# Patient Record
Sex: MALE | Race: WHITE | NOT HISPANIC OR LATINO | Employment: FULL TIME | URBAN - METROPOLITAN AREA
[De-identification: names, ages, dates, MRNs, and addresses within clinical notes are randomized per-mention and may not be internally consistent; named-entity substitution may affect disease eponyms.]

---

## 2017-01-04 DIAGNOSIS — I27.20 PULMONARY HYPERTENSION (H): Primary | ICD-10-CM

## 2017-01-04 RX ORDER — DIGOXIN 125 MCG
125 TABLET ORAL DAILY
Qty: 90 TABLET | Refills: 1 | Status: SHIPPED | OUTPATIENT
Start: 2017-01-04 | End: 2017-07-14

## 2017-01-10 DIAGNOSIS — E78.2 MIXED HYPERLIPIDEMIA: ICD-10-CM

## 2017-01-10 DIAGNOSIS — I27.20 PULMONARY HYPERTENSION (H): Primary | ICD-10-CM

## 2017-01-10 RX ORDER — ALBUTEROL SULFATE 90 UG/1
4-6 AEROSOL, METERED RESPIRATORY (INHALATION) EVERY 6 HOURS PRN
Qty: 1 INHALER | Refills: 3 | Status: SHIPPED | OUTPATIENT
Start: 2017-01-10 | End: 2017-04-13

## 2017-03-17 ENCOUNTER — TELEPHONE (OUTPATIENT)
Dept: CARDIOLOGY | Facility: CLINIC | Age: 57
End: 2017-03-17

## 2017-03-17 NOTE — TELEPHONE ENCOUNTER
PA Initiation    Medication: Adcirca 20 MG  Insurance Company: [employer]akes - Phone [phone] Fax [phone]  Pharmacy Filling the Rx: [facility] - [place], [place] [facility] [address] [facility]  Filling Pharmacy Phone: [phone]  Filling Pharmacy Fax:    Start Date: 3/17/2017     Urgent renewal request has been faxed to [facility]. Previous letter of approval and RHC were included with request.

## 2017-03-17 NOTE — TELEPHONE ENCOUNTER
Prior Authorization Specialty Medication Request    Medication/Dose: Adcirca 20 MG  Frequency: 40 MG Daily    Route: PO  60 Quantity 60 tablets/30days  Diagnosis and ICD: PAH associated with Sarcoid [I27.2, D86.9]   WHO Group: 1 NYHA FC: 3  New/Renewal/Insurance Change PA: Renewal  Previously Tried and Failed Therapies:   *Adcirca initiated: 05/04/12  *Letairis initiated: 04/17/13  *Tyvaso initiated: 03/07/12

## 2017-03-22 DIAGNOSIS — I27.20 PULMONARY HYPERTENSION (H): Primary | ICD-10-CM

## 2017-03-22 RX ORDER — TADALAFIL 20 MG/1
40 TABLET ORAL DAILY
Qty: 60 TABLET | Refills: 11 | Status: SHIPPED | OUTPATIENT
Start: 2017-03-22 | End: 2018-03-02

## 2017-04-06 DIAGNOSIS — J44.9 COPD (CHRONIC OBSTRUCTIVE PULMONARY DISEASE) (H): Primary | ICD-10-CM

## 2017-04-06 DIAGNOSIS — J44.9 COPD (CHRONIC OBSTRUCTIVE PULMONARY DISEASE) (H): ICD-10-CM

## 2017-04-12 DIAGNOSIS — I27.20 PULMONARY HYPERTENSION (H): ICD-10-CM

## 2017-04-12 DIAGNOSIS — E78.2 MIXED HYPERLIPIDEMIA: ICD-10-CM

## 2017-04-12 RX ORDER — ALBUTEROL SULFATE 90 UG/1
4-6 AEROSOL, METERED RESPIRATORY (INHALATION) EVERY 6 HOURS PRN
Qty: 1 INHALER | Refills: 3 | Status: CANCELLED | OUTPATIENT
Start: 2017-04-12

## 2017-04-13 DIAGNOSIS — J44.9 COPD (CHRONIC OBSTRUCTIVE PULMONARY DISEASE) (H): Primary | ICD-10-CM

## 2017-04-13 DIAGNOSIS — E78.2 MIXED HYPERLIPIDEMIA: ICD-10-CM

## 2017-04-13 DIAGNOSIS — I27.20 PULMONARY HYPERTENSION (H): ICD-10-CM

## 2017-04-13 RX ORDER — ALBUTEROL SULFATE 90 UG/1
4-6 AEROSOL, METERED RESPIRATORY (INHALATION) EVERY 6 HOURS PRN
Qty: 1 INHALER | Refills: 6 | Status: SHIPPED | OUTPATIENT
Start: 2017-04-13 | End: 2017-10-12

## 2017-04-26 DIAGNOSIS — I27.20 PULMONARY HYPERTENSION (H): ICD-10-CM

## 2017-04-26 RX ORDER — AMBRISENTAN 5 MG/1
10 TABLET, FILM COATED ORAL DAILY
Qty: 30 TABLET | Refills: 11 | Status: SHIPPED | OUTPATIENT
Start: 2017-04-26 | End: 2017-11-01

## 2017-04-26 NOTE — TELEPHONE ENCOUNTER
Date: 4/26/2017    Time of Call: 11:38 AM     Diagnosis: Pulmonary Hypertension     [ TORB ] Ordering provider: Dr Khang Lindsey MD    Order: Letairis 10 mg, daily (e prescribed--30 tablets for 11 refills)     Order received by: Sandi Kwok RN, BSN     Follow-up/additional notes: Sent the prescription, as requested to [facility].    Continue to monitor.

## 2017-05-12 NOTE — PROGRESS NOTES
Khang Lindsey M.D.  Cardiovascular Medicine    I personally saw and examined this patient, discussed care with housestaff and other consultants, reviewed current laboratories and imaging studies, and conveyed impression and diagnostic/therapeutic plan to patient.    Marianne Rodríguez M.D.  Pulmonary Division  Department of Medicine  [facility]     David Perlman, M.D.  Pulmonary Division  [facility]  Shane Ruvalcaba M.D.    Leandra Ratliff M.D.    Problem List  1. Pulmonary hypertension  2. Bronchial sarcoid  3. COLD  4. CKD  5. Diabetes    History      Objective    Wt Readings from Last 5 Encounters:   10/06/16 79.4 kg (175 lb)   08/23/16 81.2 kg (179 lb)   04/12/16 84.1 kg (185 lb 8 oz)   04/12/16 83.9 kg (185 lb)   03/29/16 84 kg (185 lb 1.6 oz)       Meds    Current Outpatient Prescriptions on File Prior to Visit:  LETAIRIS 5 MG tablet Take 2 tablets (10 mg) by mouth daily   albuterol (ALBUTEROL) 108 (90 BASE) MCG/ACT Inhaler Inhale 4-6 puffs into the lungs every 6 hours as needed   amoxicillin-clavulanate (AUGMENTIN) 875-125 MG per tablet Take 1 tablet by mouth 2 times daily   tadalafil, PAH, (ADCIRCA) 20 MG TABS Take 2 tablets (40 mg) by mouth daily   digoxin (LANOXIN) 125 MCG tablet Take 1 tablet (125 mcg) by mouth daily   budesonide (PULMICORT) 0.5 MG/2ML nebulizer solution Take 2 mLs (0.5 mg) by nebulization 2 times daily   treprostinil (TYVASO) 0.6 MG/ML SOLN 12 breaths 4 times daily   Cholecalciferol (VITAMIN D) 2000 UNITS CAPS Take 1 capsule by mouth daily.     No current facility-administered medications on file prior to visit.     Labs  Results for MATTY LOPEZ (MRN [id number]) as of 5/17/2017 07:46   Ref. Range 10/6/2016 12:36 5/16/2017 11:59   Sodium Latest Ref Range: 133 - 144 mmol/L  139   Potassium Latest Ref Range: 3.4 - 5.3 mmol/L  4.3   Chloride Latest Ref Range: 94 - 109 mmol/L  103   Carbon Dioxide Latest Ref Range: 20 - 32 mmol/L  28   Urea Nitrogen Latest Ref Range:  7 - 30 mg/dL  16   Creatinine Latest Ref Range: 0.66 - 1.25 mg/dL  1.38 (H)   GFR Estimate Latest Ref Range: >60 mL/min/1.7m2  53 (L)   GFR Estimate If Black Latest Ref Range: >60 mL/min/1.7m2  64   Calcium Latest Ref Range: 8.5 - 10.1 mg/dL  9.1   Anion Gap Latest Ref Range: 3 - 14 mmol/L  8   Albumin Latest Ref Range: 3.4 - 5.0 g/dL  3.8   Protein Total Latest Ref Range: 6.8 - 8.8 g/dL  7.3   Bilirubin Total Latest Ref Range: 0.2 - 1.3 mg/dL  0.5   Alkaline Phosphatase Latest Ref Range: 40 - 150 U/L  75   ALT Latest Ref Range: 0 - 70 U/L  22   AST Latest Ref Range: 0 - 45 U/L  17   Angiotensin Converting Enzyme Unknown  50   CRP Inflammation Latest Ref Range: 0.0 - 8.0 mg/L  8.0   N-Terminal Pro Bnp Latest Ref Range: 0 - 125 pg/mL  460 (H)   Glucose Latest Ref Range: 70 - 99 mg/dL  98   WBC Latest Ref Range: 4.0 - 11.0 10e9/L  5.7   Hemoglobin Latest Ref Range: 13.3 - 17.7 g/dL  14.9   Hematocrit Latest Ref Range: 40.0 - 53.0 %  44.4   Platelet Count Latest Ref Range: 150 - 450 10e9/L  142 (L)   RBC Count Latest Ref Range: 4.4 - 5.9 10e12/L  4.88   MCV Latest Ref Range: 78 - 100 fl  91   MCH Latest Ref Range: 26.5 - 33.0 pg  30.5   MCHC Latest Ref Range: 31.5 - 36.5 g/dL  33.6   RDW Latest Ref Range: 10.0 - 15.0 %  13.5   CT CHEST W/O CONTRAST Unknown Rpt      Imaging     Echocariogram  Name: MATTY LOPEZ  MRN: [id number]  : 1960  Study Date: 2016 01:06 PM  Age: 55 yrs  Gender: Male  Patient Location: [facility] [place]  Reason For Study: , Other secondary pulmonary hypertension  Ordering Physician: DUY MIDDLETON  Referring Physician: DUY MIDDLETON  Performed By: Jaqueline Peña RDCS     BSA: 2.1 m2  Height: 72 in  Weight: 186 lb  BP: 123/80 mmHg  ______________________________________________________________________________        Procedure  Echocardiogram with two-dimensional, color and spectral Doppler  performed.  ______________________________________________________________________________     Interpretation Summary  Global and regional left ventricular function is normal with an EF of 60-65%.  Right ventricular function, chamber size, wall motion, and thickness are  normal.  Right ventricular systolic pressure is 38mmHg above the right atrial  pressure.  The inferior vena cava is normal.  No pericardial effusion is present.  ______________________________________________________________________________           Left Ventricle  Global and regional left ventricular function is normal with an EF of 60-65%.  Left ventricular wall thickness is normal. Left ventricular size is normal.  Normal left ventricular filling for age.     Right Ventricle  Right ventricular function, chamber size, wall motion, and thickness are  normal.  Atria  Both atria appear normal.     Mitral Valve  The mitral valve is normal.     Aortic Valve  Aortic valve is normal in structure and function.     Tricuspid Valve  The tricuspid valve is normal. Trace tricuspid insufficiency is present.  Right ventricular systolic pressure is 38mmHg above the right atrial  pressure.     Pulmonic Valve  The pulmonic valve is normal.     Vessels  The aorta root is normal. The inferior vena cava is normal.  Pericardium  No pericardial effusion is present.     ______________________________________________________________________________  MMode/2D Measurements & Calculations  IVSd: 0.95 cm  LVIDd: 4.0 cm  LVIDs: 2.0 cm  LVPWd: 0.92 cm  FS: 51.3 %  EDV(Teich): 70.5 ml  ESV(Teich): 12.0 ml  LV mass(C)d: 116.3 grams  Ao root diam: 3.5 cm  LA dimension: 3.8 cm  LA/Ao: 1.1  LVOT diam: 2.2 cm  LVOT area: 3.9 cm2  LA Volume (BP): 24.8 ml     LA Volume Index (BP): 12.0 ml/m2           Doppler Measurements & Calculations  MV E max xavi: 68.7 cm/sec  MV A max xavi: 85.2 cm/sec  MV E/A: 0.81    CT chest without intravenous contrast; coronal and sagittal  reformats.  FINDINGS:  Perihilar interstitial fibrosis involving both lungs. Traction bronchiectasis involving both lungs in the perihilar location. Abnormal mediastinal lymphadenopathy. The lymph node in the right paratracheal location at station 2 R is measuring 1.5 cm in the short axis diameter. Enlarged central pulmonary arteries indicating pulmonary hypertension. The main pulmonary artery is measuring 4 cm in diameter. No evidence of pleural effusion. Normal size cardiac silhouette without any evidence of pericardial effusion. Coronary artery calcifications. No discrete abnormal intra pulmonary nodular densities are identified. Limited CT through the upper abdomen reveals multiple nonobstructing renal calculi bilateral.      IMPRESSION:  1. Diffuse interstitial fibrosis predominantly perihilar location with traction bronchiectasis.  2. Abnormal mediastinal lymphadenopathy.  3. Enlarged central pulmonary arteries r/o pulmonary hypertension.  4. Nonobstructing renal calculi bilateral.  5. Coronary artery calcifications.    Right heart  catherization  Mean PA 55 PCP 15 RA8    PVR 7.0 CO 5.5  Assessment/Plan     Deteriorating functional status with history of sarcoid  1. Increase inhaled prostacyclin  2. Cardiac MRI  3. Repeat echocardiogram  4. PET scan for sarcoid : cardiac liver and lung imaging please

## 2017-05-16 ENCOUNTER — OFFICE VISIT (OUTPATIENT)
Dept: CARDIOLOGY | Facility: CLINIC | Age: 57
End: 2017-05-16
Payer: COMMERCIAL

## 2017-05-16 VITALS
DIASTOLIC BLOOD PRESSURE: 66 MMHG | HEIGHT: 72 IN | HEART RATE: 90 BPM | OXYGEN SATURATION: 92 % | BODY MASS INDEX: 25.33 KG/M2 | WEIGHT: 187 LBS | SYSTOLIC BLOOD PRESSURE: 132 MMHG

## 2017-05-16 DIAGNOSIS — I27.20 PULMONARY HYPERTENSION (H): ICD-10-CM

## 2017-05-16 DIAGNOSIS — I27.20 PULMONARY HYPERTENSION (H): Primary | ICD-10-CM

## 2017-05-16 DIAGNOSIS — I50.810 RIGHT HEART FAILURE (H): ICD-10-CM

## 2017-05-16 DIAGNOSIS — D86.9 SARCOIDOSIS: Primary | ICD-10-CM

## 2017-05-16 DIAGNOSIS — R06.09 DYSPNEA ON EXERTION: ICD-10-CM

## 2017-05-16 LAB
ALBUMIN SERPL-MCNC: 3.8 G/DL (ref 3.4–5)
ALP SERPL-CCNC: 75 U/L (ref 40–150)
ALT SERPL W P-5'-P-CCNC: 22 U/L (ref 0–70)
ANION GAP SERPL CALCULATED.3IONS-SCNC: 8 MMOL/L (ref 3–14)
AST SERPL W P-5'-P-CCNC: 17 U/L (ref 0–45)
BILIRUB SERPL-MCNC: 0.5 MG/DL (ref 0.2–1.3)
BUN SERPL-MCNC: 16 MG/DL (ref 7–30)
CALCIUM SERPL-MCNC: 9.1 MG/DL (ref 8.5–10.1)
CHLORIDE SERPL-SCNC: 103 MMOL/L (ref 94–109)
CO2 SERPL-SCNC: 28 MMOL/L (ref 20–32)
CREAT SERPL-MCNC: 1.38 MG/DL (ref 0.66–1.25)
CRP SERPL-MCNC: 8 MG/L (ref 0–8)
ERYTHROCYTE [DISTWIDTH] IN BLOOD BY AUTOMATED COUNT: 13.5 % (ref 10–15)
GFR SERPL CREATININE-BSD FRML MDRD: 53 ML/MIN/1.7M2
GLUCOSE SERPL-MCNC: 98 MG/DL (ref 70–99)
HCT VFR BLD AUTO: 44.4 % (ref 40–53)
HGB BLD-MCNC: 14.9 G/DL (ref 13.3–17.7)
MCH RBC QN AUTO: 30.5 PG (ref 26.5–33)
MCHC RBC AUTO-ENTMCNC: 33.6 G/DL (ref 31.5–36.5)
MCV RBC AUTO: 91 FL (ref 78–100)
NT-PROBNP SERPL-MCNC: 460 PG/ML (ref 0–125)
PLATELET # BLD AUTO: 142 10E9/L (ref 150–450)
POTASSIUM SERPL-SCNC: 4.3 MMOL/L (ref 3.4–5.3)
PROT SERPL-MCNC: 7.3 G/DL (ref 6.8–8.8)
RBC # BLD AUTO: 4.88 10E12/L (ref 4.4–5.9)
SODIUM SERPL-SCNC: 139 MMOL/L (ref 133–144)
WBC # BLD AUTO: 5.7 10E9/L (ref 4–11)

## 2017-05-16 PROCEDURE — 99000 SPECIMEN HANDLING OFFICE-LAB: CPT | Performed by: INTERNAL MEDICINE

## 2017-05-16 PROCEDURE — 83880 ASSAY OF NATRIURETIC PEPTIDE: CPT | Performed by: INTERNAL MEDICINE

## 2017-05-16 PROCEDURE — 36415 COLL VENOUS BLD VENIPUNCTURE: CPT | Performed by: INTERNAL MEDICINE

## 2017-05-16 PROCEDURE — 85027 COMPLETE CBC AUTOMATED: CPT | Performed by: INTERNAL MEDICINE

## 2017-05-16 PROCEDURE — 82164 ANGIOTENSIN I ENZYME TEST: CPT | Mod: 90 | Performed by: INTERNAL MEDICINE

## 2017-05-16 PROCEDURE — 86140 C-REACTIVE PROTEIN: CPT | Performed by: INTERNAL MEDICINE

## 2017-05-16 PROCEDURE — 80053 COMPREHEN METABOLIC PANEL: CPT | Performed by: INTERNAL MEDICINE

## 2017-05-16 PROCEDURE — 99212 OFFICE O/P EST SF 10 MIN: CPT | Performed by: INTERNAL MEDICINE

## 2017-05-16 RX ORDER — POTASSIUM CHLORIDE 1500 MG/1
20 TABLET, EXTENDED RELEASE ORAL
Status: CANCELLED | OUTPATIENT
Start: 2017-05-16

## 2017-05-16 RX ORDER — LIDOCAINE 40 MG/G
CREAM TOPICAL
Status: CANCELLED | OUTPATIENT
Start: 2017-05-16

## 2017-05-16 RX ORDER — SODIUM CHLORIDE 9 MG/ML
1000 INJECTION, SOLUTION INTRAVENOUS CONTINUOUS
Status: CANCELLED | OUTPATIENT
Start: 2017-05-16

## 2017-05-16 NOTE — NURSING NOTE
Cardiac Testing: Patient given instructions regarding  echocardiogram . Discussed purpose, preparation, procedure and when to expect results reported back to the patient. Patient demonstrated understanding of this information and agreed to call with further questions or concerns.    Diet: Patient instructed regarding a heart healthy diet, including discussion of reduced fat and sodium intake. Patient demonstrated understanding of this information and agreed to call with further questions or concerns.    Labs: Patient was given results of the laboratory testing obtained today. Patient was instructed to return for the next laboratory testing TODAY. Patient demonstrated understanding of this information and agreed to call with further questions or concerns.    Med Reconcile: Reviewed and verified all current medications with the patient. The updated medication list was printed and given to the patient.    Return Appointment: Patient given instructions regarding scheduling next clinic visit. Patient demonstrated understanding of this information and agreed to call with further questions or concerns.    Right Heart Catheterization: Patient was instructed regarding right heart catheterization, including discussion of the procedure, preparation, intra-procedural steps, and recovery at home. Patient demonstrated understanding of this information and agreed to call with further questions or concerns.    Medication Change: Patient was educated regarding prescribed medication change, including discussion of the indication, administration, side effects, and when to report to MD or RN. Patient demonstrated understanding of this information and agreed to call with further questions or concerns.    Patient stated he understood all health information given and agreed to call with further questions or concerns.     Medication Changes:  Increase your Tyvaso to 14 breaths, four times per day    Patient Instructions:  Abdominal  MRI-evaluation of pancreatic cyst ([facility])  Cardiac Echo ([facility])      Check-In  Time Check-In Location Estimated Length Procedure   Name        GOLD  waiting room 60-90 minutes Right Heart Catheterization**     Procedure Preparations & Instructions     This is an invasive procedure that DOES require preparation:    - Nothing to eat for 6 hours   - You may have clear liquids up until the time of your procedure  - A ride should be arranged for you in the instance you are unable to drive home, however you should be able to function as you normally would after the procedure     *For Patients with Diabetes: ? DO NOT take any oral diabetic medication, short-acting diabetes medications/insulin, humalog or regular insulin the morning of your test  ? Take   dose of long-acting insulin (Lantus, Levemir) the day of your test  ? Remember to  bring your glucometer and insulin with you to take after your test if needed     *For Patients on anticoagulants: ? Your Coumadin Clinic will give you instructions on medication adjustments or bridging prior to the procedure       PET scan (evaluation for sarcoid), heart and lung ([facility])  CT scan-full body ([facility])    **labs today- CBC, CMP, BNP, CRP Inflammation, ACE      Follow up Appointment Information:  Check-In  Time Check-In Location Appointment   Type Provider   Name     After testing   [address] [facility]  3rd Floor Routine Clinic   Follow Up Visit Khang Lindsey MD   Appointment Preparations & Instructions   We ask that you plan accordingly due to traffic and parking that may delay you. We look forward to seeing you!         For scheduling at [facility] please call [phone]  For scheduling at the [place] please call [phone]  We are located on the second floor [address] at the [facility].  Our address is     [facility] [address],   [address]  [place]  [address]    Thank you for allowing us to be a part of your  care here at the [facility]    If you have questions or concerns please contact us at:    Sandi Kwok RN, BSN  Care Coordinator  Pulmonary Hypertension  [facility]  (P) [phone]    ** Please note that you will NOT receive a reminder call regarding your scheduled testing, reminder calls are for provider appointments only.  If you are scheduled for testing within the [facility] system you may receive a call regarding pre-registration for billing purposes only.**     Remember to weigh yourself daily after voiding and before you consume any food or beverages and log the numbers.  If you have gained/lost 2 pounds overnight or 5 pounds in a week contact us immediately for medication adjustments or further instructions.

## 2017-05-16 NOTE — LETTER
5/16/2017    Cristhian Busch MD  [facility]  [address]  [place] [address]    RE: Matty KASPER Poncho       Dear Colleague,    I had the pleasure of seeing Matty Lopez in the [facility].    Khang Lindsey M.D.  Cardiovascular Medicine    I personally saw and examined this patient, discussed care with housestaff and other consultants, reviewed current laboratories and imaging studies, and conveyed impression and diagnostic/therapeutic plan to patient.    Marianne Rodríguez M.D.  Pulmonary Division  Department of Medicine  [facility]     David Perlman, M.D.  Pulmonary Division  [facility]  Shane Ruvalcaba M.D.    Leandra Ratliff M.D.    Problem List  1. Pulmonary hypertension  2. Bronchial sarcoid  3. COLD  4. CKD  5. Diabetes    History      Objective    Wt Readings from Last 5 Encounters:   10/06/16 79.4 kg (175 lb)   08/23/16 81.2 kg (179 lb)   04/12/16 84.1 kg (185 lb 8 oz)   04/12/16 83.9 kg (185 lb)   03/29/16 84 kg (185 lb 1.6 oz)       Meds    Current Outpatient Prescriptions on File Prior to Visit:  LETAIRIS 5 MG tablet Take 2 tablets (10 mg) by mouth daily   albuterol (ALBUTEROL) 108 (90 BASE) MCG/ACT Inhaler Inhale 4-6 puffs into the lungs every 6 hours as needed   amoxicillin-clavulanate (AUGMENTIN) 875-125 MG per tablet Take 1 tablet by mouth 2 times daily   tadalafil, PAH, (ADCIRCA) 20 MG TABS Take 2 tablets (40 mg) by mouth daily   digoxin (LANOXIN) 125 MCG tablet Take 1 tablet (125 mcg) by mouth daily   budesonide (PULMICORT) 0.5 MG/2ML nebulizer solution Take 2 mLs (0.5 mg) by nebulization 2 times daily   treprostinil (TYVASO) 0.6 MG/ML SOLN 12 breaths 4 times daily   Cholecalciferol (VITAMIN D) 2000 UNITS CAPS Take 1 capsule by mouth daily.     No current facility-administered medications on file prior to visit.     Labs  Results for MATTY LOPEZ (MRN [id number]) as of 5/17/2017 07:46   Ref. Range 10/6/2016 12:36 5/16/2017 11:59   Sodium Latest  Ref Range: 133 - 144 mmol/L  139   Potassium Latest Ref Range: 3.4 - 5.3 mmol/L  4.3   Chloride Latest Ref Range: 94 - 109 mmol/L  103   Carbon Dioxide Latest Ref Range: 20 - 32 mmol/L  28   Urea Nitrogen Latest Ref Range: 7 - 30 mg/dL  16   Creatinine Latest Ref Range: 0.66 - 1.25 mg/dL  1.38 (H)   GFR Estimate Latest Ref Range: >60 mL/min/1.7m2  53 (L)   GFR Estimate If Black Latest Ref Range: >60 mL/min/1.7m2  64   Calcium Latest Ref Range: 8.5 - 10.1 mg/dL  9.1   Anion Gap Latest Ref Range: 3 - 14 mmol/L  8   Albumin Latest Ref Range: 3.4 - 5.0 g/dL  3.8   Protein Total Latest Ref Range: 6.8 - 8.8 g/dL  7.3   Bilirubin Total Latest Ref Range: 0.2 - 1.3 mg/dL  0.5   Alkaline Phosphatase Latest Ref Range: 40 - 150 U/L  75   ALT Latest Ref Range: 0 - 70 U/L  22   AST Latest Ref Range: 0 - 45 U/L  17   Angiotensin Converting Enzyme Unknown  50   CRP Inflammation Latest Ref Range: 0.0 - 8.0 mg/L  8.0   N-Terminal Pro Bnp Latest Ref Range: 0 - 125 pg/mL  460 (H)   Glucose Latest Ref Range: 70 - 99 mg/dL  98   WBC Latest Ref Range: 4.0 - 11.0 10e9/L  5.7   Hemoglobin Latest Ref Range: 13.3 - 17.7 g/dL  14.9   Hematocrit Latest Ref Range: 40.0 - 53.0 %  44.4   Platelet Count Latest Ref Range: 150 - 450 10e9/L  142 (L)   RBC Count Latest Ref Range: 4.4 - 5.9 10e12/L  4.88   MCV Latest Ref Range: 78 - 100 fl  91   MCH Latest Ref Range: 26.5 - 33.0 pg  30.5   MCHC Latest Ref Range: 31.5 - 36.5 g/dL  33.6   RDW Latest Ref Range: 10.0 - 15.0 %  13.5   CT CHEST W/O CONTRAST Unknown Rpt      Imaging     Echocariogram  Name: MATTY LOPEZ  MRN: [id number]  : 1960  Study Date: 2016 01:06 PM  Age: 55 yrs  Gender: Male  Patient Location: [facility] [place]  Reason For Study: , Other secondary pulmonary hypertension  Ordering Physician: DUY MIDDLETON  Referring Physician: DUY MIDDLETON  Performed By: Jaqueline Peña RDCS     BSA: 2.1 m2  Height: 72 in  Weight: 186 lb  BP: 123/80  mmHg  ______________________________________________________________________________        Procedure  Echocardiogram with two-dimensional, color and spectral Doppler performed.  ______________________________________________________________________________     Interpretation Summary  Global and regional left ventricular function is normal with an EF of 60-65%.  Right ventricular function, chamber size, wall motion, and thickness are  normal.  Right ventricular systolic pressure is 38mmHg above the right atrial  pressure.  The inferior vena cava is normal.  No pericardial effusion is present.  ______________________________________________________________________________           Left Ventricle  Global and regional left ventricular function is normal with an EF of 60-65%.  Left ventricular wall thickness is normal. Left ventricular size is normal.  Normal left ventricular filling for age.     Right Ventricle  Right ventricular function, chamber size, wall motion, and thickness are  normal.  Atria  Both atria appear normal.     Mitral Valve  The mitral valve is normal.     Aortic Valve  Aortic valve is normal in structure and function.     Tricuspid Valve  The tricuspid valve is normal. Trace tricuspid insufficiency is present.  Right ventricular systolic pressure is 38mmHg above the right atrial  pressure.     Pulmonic Valve  The pulmonic valve is normal.     Vessels  The aorta root is normal. The inferior vena cava is normal.  Pericardium  No pericardial effusion is present.     ______________________________________________________________________________  MMode/2D Measurements & Calculations  IVSd: 0.95 cm  LVIDd: 4.0 cm  LVIDs: 2.0 cm  LVPWd: 0.92 cm  FS: 51.3 %  EDV(Teich): 70.5 ml  ESV(Teich): 12.0 ml  LV mass(C)d: 116.3 grams  Ao root diam: 3.5 cm  LA dimension: 3.8 cm  LA/Ao: 1.1  LVOT diam: 2.2 cm  LVOT area: 3.9 cm2  LA Volume (BP): 24.8 ml     LA Volume Index (BP): 12.0 ml/m2           Doppler  Measurements & Calculations  MV E max xavi: 68.7 cm/sec  MV A max xavi: 85.2 cm/sec  MV E/A: 0.81    CT chest without intravenous contrast; coronal and sagittal reformats.  FINDINGS:  Perihilar interstitial fibrosis involving both lungs. Traction bronchiectasis involving both lungs in the perihilar location. Abnormal mediastinal lymphadenopathy. The lymph node in the right paratracheal location at station 2 R is measuring 1.5 cm in the short axis diameter. Enlarged central pulmonary arteries indicating pulmonary hypertension. The main pulmonary artery is measuring 4 cm in diameter. No evidence of pleural effusion. Normal size cardiac silhouette without any evidence of pericardial effusion. Coronary artery calcifications. No discrete abnormal intra pulmonary nodular densities are identified. Limited CT through the upper abdomen reveals multiple nonobstructing renal calculi bilateral.      IMPRESSION:  1. Diffuse interstitial fibrosis predominantly perihilar location with traction bronchiectasis.  2. Abnormal mediastinal lymphadenopathy.  3. Enlarged central pulmonary arteries r/o pulmonary hypertension.  4. Nonobstructing renal calculi bilateral.  5. Coronary artery calcifications.    Right heart  catherization  Mean PA 55 PCP 15 RA8    PVR 7.0 CO 5.5  Assessment/Plan     Deteriorating functional status with history of sarcoid  1. Increase inhaled prostacyclin  2. Cardiac MRI  3. Repeat echocardiogram  4. PET scan for sarcoid : cardiac liver and lung imaging please    Thank you for allowing me to participate in the care of your patient.    Sincerely,     Khang Lindsey MD     [facility]

## 2017-05-16 NOTE — MR AVS SNAPSHOT
After Visit Summary   5/16/2017    Jimmy Isaac    MRN: [id number]           Patient Information     Date Of Birth          1960        Visit Information        Provider Department      5/16/2017 10:00 AM Khang Lindsey MD [facility] [place]        Today's Diagnoses     Sarcoidosis (H)    -  1    Dyspnea on exertion        Right heart failure (H)        Pulmonary hypertension (H)          Care Instructions    Medication Changes:  Increase your Tyvaso to 14 breaths, four times per day    Patient Instructions:  Abdominal MRI-evaluation of pancreatic cyst ([facility])  Cardiac Echo ([facility])      Check-In  Time Check-In Location Estimated Length Procedure   Name        [facility]  waiting room 60-90 minutes Right Heart Catheterization**     Procedure Preparations & Instructions     This is an invasive procedure that DOES require preparation:    - Nothing to eat for 6 hours   - You may have clear liquids up until the time of your procedure  - A ride should be arranged for you in the instance you are unable to drive home, however you should be able to function as you normally would after the procedure     *For Patients with Diabetes: ? DO NOT take any oral diabetic medication, short-acting diabetes medications/insulin, humalog or regular insulin the morning of your test  ? Take   dose of long-acting insulin (Lantus, Levemir) the day of your test  ? Remember to  bring your glucometer and insulin with you to take after your test if needed     *For Patients on anticoagulants: ? Your Coumadin Clinic will give you instructions on medication adjustments or bridging prior to the procedure       PET scan (evaluation for sarcoid), heart and lung ([facility])  CT scan-full body ([facility])    **labs today- CBC, CMP, BNP, CRP Inflammation, ACE      Follow up Appointment Information:  Check-In  Time Check-In Location Appointment   Type Provider    Name     After testing   [address] [place] [address]  3rd Floor Routine Clinic   Follow Up Visit Khang Lindsey MD   Appointment Preparations & Instructions   We ask that you plan accordingly due to traffic and parking that may delay you. We look forward to seeing you!         For scheduling at [facility] please call [phone]  For scheduling at the [place] please call [phone]  We are located on the second floor [address] at the [facility].  Our address is     [facility] [address],   [address]  [place]  [address]    Thank you for allowing us to be a part of your care here at the [facility]    If you have questions or concerns please contact us at:    Sandi Kwok RN, BSN  Care Coordinator  Pulmonary Hypertension  [facility]  (P) [phone]    ** Please note that you will NOT receive a reminder call regarding your scheduled testing, reminder calls are for provider appointments only.  If you are scheduled for testing within the [facility] system you may receive a call regarding pre-registration for billing purposes only.**     Remember to weigh yourself daily after voiding and before you consume any food or beverages and log the numbers.  If you have gained/lost 2 pounds overnight or 5 pounds in a week contact us immediately for medication adjustments or further instructions.           Follow-ups after your visit        Your next 10 appointments already scheduled     May 25, 2017  1:00 PM CDT   PFT VISIT with  PFL D   [facility] Pulmonary Function Testing ([facility])    [address] [place] [address] [phone]            May 25, 2017  1:40 PM CDT   (Arrive by 1:25 PM)   Return Visit with Leandra Ratliff MD   [facility] ([facility])    [address] [place] [address] [phone]              Future tests  that were ordered for you today     Open Future Orders        Priority Expected Expires Ordered    Echocardiogram Complete Routine  5/16/2018 5/16/2017    MR Abdomen w Contrast Routine  5/16/2018 5/16/2017    NM PET Cardiac viability Routine  5/16/2018 5/16/2017    CT 3D Reconstruction Routine  5/16/2018 5/16/2017    Heart Cath Right heart cath Routine  5/16/2018 5/16/2017            Who to contact     If you have questions or need follow up information about today's clinic visit or your schedule please contact [facility] AT [place] directly at [phone].  Normal or non-critical lab and imaging results will be communicated to you by Snagstahart, letter or phone within 4 business days after the clinic has received the results. If you do not hear from us within 7 days, please contact the clinic through basestonet or phone. If you have a critical or abnormal lab result, we will notify you by phone as soon as possible.  Submit refill requests through [employer] or call your pharmacy and they will forward the refill request to us. Please allow 3 business days for your refill to be completed.          Additional Information About Your Visit        MyChart Information     [employer] gives you secure access to your electronic health record. If you see a primary care provider, you can also send messages to your care team and make appointments. If you have questions, please call your primary care clinic.  If you do not have a primary care provider, please call [phone] and they will assist you.        Care EveryWhere ID     This is your Care EveryWhere ID. This could be used by other organizations to access your [place] medical records  [id number]        Your Vitals Were     Pulse Height Pulse Oximetry BMI (Body Mass Index)          90 1.829 m (6') 92% 25.36 kg/m2         Blood Pressure from Last 3 Encounters:   05/16/17 132/66   10/06/16 151/85   08/23/16 107/73    Weight from Last 3 Encounters:    05/16/17 84.8 kg (187 lb)   10/06/16 79.4 kg (175 lb)   08/23/16 81.2 kg (179 lb)              We Performed the Following     Angiotensin converting enzyme     CBC with platelets     Comprehensive metabolic panel     CRP inflammation     N terminal pro BNP outpatient        Primary Care Provider Office Phone # Fax #    Cristhian Busch [phone]       [facility] [address]  [facility] [address]        Thank you!     Thank you for choosing [facility] [place]  for your care. Our goal is always to provide you with excellent care. Hearing back from our patients is one way we can continue to improve our services. Please take a few minutes to complete the written survey that you may receive in the mail after your visit with us. Thank you!             Your Updated Medication List - Protect others around you: Learn how to safely use, store and throw away your medicines at www.disposemymeds.org.          This list is accurate as of: 5/16/17 11:22 AM.  Always use your most recent med list.                   Brand Name Dispense Instructions for use    albuterol 108 (90 BASE) MCG/ACT Inhaler    albuterol    1 Inhaler    Inhale 4-6 puffs into the lungs every 6 hours as needed       digoxin 125 MCG tablet    LANOXIN    90 tablet    Take 1 tablet (125 mcg) by mouth daily       LETAIRIS 5 MG tablet   Generic drug:  ambrisentan     30 tablet    Take 2 tablets (10 mg) by mouth daily       tadalafil (PAH) 20 MG Tabs    ADCIRCA    60 tablet    Take 2 tablets (40 mg) by mouth daily       treprostinil 0.6 MG/ML Soln neb solution    TYVASO    3.6 mL    12 breaths 4 times daily       vitamin D 2000 UNITS Caps      Take 1 capsule by mouth daily.

## 2017-05-16 NOTE — PATIENT INSTRUCTIONS
Medication Changes:  Increase your Tyvaso to 14 breaths, four times per day    Patient Instructions:  Abdominal MRI-evaluation of pancreatic cyst ([facility])  Cardiac Echo ([facility])      Check-In  Time Check-In Location Estimated Length Procedure   Name        [facility]  waiting room 60-90 minutes Right Heart Catheterization**     Procedure Preparations & Instructions     This is an invasive procedure that DOES require preparation:    - Nothing to eat for 6 hours   - You may have clear liquids up until the time of your procedure  - A ride should be arranged for you in the instance you are unable to drive home, however you should be able to function as you normally would after the procedure     *For Patients with Diabetes: ? DO NOT take any oral diabetic medication, short-acting diabetes medications/insulin, humalog or regular insulin the morning of your test  ? Take   dose of long-acting insulin (Lantus, Levemir) the day of your test  ? Remember to  bring your glucometer and insulin with you to take after your test if needed     *For Patients on anticoagulants: ? Your Coumadin Clinic will give you instructions on medication adjustments or bridging prior to the procedure       PET scan (evaluation for sarcoid), heart and lung ([facility])  CT scan-full body ([facility])    **labs today- CBC, CMP, BNP, CRP Inflammation, ACE      Follow up Appointment Information:  Check-In  Time Check-In Location Appointment   Type Provider   Name     After testing   [address] [facility]  3rd Floor Routine Clinic   Follow Up Visit Khang Lindsey MD   Appointment Preparations & Instructions   We ask that you plan accordingly due to traffic and parking that may delay you. We look forward to seeing you!         For scheduling at [facility] please call [phone]  For scheduling at the [facility] please call [phone]  We are located on the second floor [address] at the [facility].  Our  address is     [address],   [address]  [place]  [address]    Thank you for allowing us to be a part of your care here at the [facility]    If you have questions or concerns please contact us at:    Sandi Kwok RN, BSN  Care Coordinator  Pulmonary Hypertension  [facility]  (P) [phone]    ** Please note that you will NOT receive a reminder call regarding your scheduled testing, reminder calls are for provider appointments only.  If you are scheduled for testing within the [facility] system you may receive a call regarding pre-registration for billing purposes only.**     Remember to weigh yourself daily after voiding and before you consume any food or beverages and log the numbers.  If you have gained/lost 2 pounds overnight or 5 pounds in a week contact us immediately for medication adjustments or further instructions.

## 2017-05-17 LAB — ACE SERPL-CCNC: 50 U/L

## 2017-05-22 DIAGNOSIS — I27.20 PULMONARY HYPERTENSION (H): Primary | ICD-10-CM

## 2017-05-24 ASSESSMENT — ENCOUNTER SYMPTOMS
WEIGHT LOSS: 0
SLEEP DISTURBANCES DUE TO BREATHING: 1
BLOOD IN STOOL: 0
MUSCLE CRAMPS: 0
PALPITATIONS: 1
SNORES LOUDLY: 0
NAUSEA: 0
SYNCOPE: 0
BLOATING: 0
HALLUCINATIONS: 0
DIARRHEA: 1
POLYDIPSIA: 0
ALTERED TEMPERATURE REGULATION: 0
NECK PAIN: 1
ORTHOPNEA: 1
ARTHRALGIAS: 1
EXERCISE INTOLERANCE: 1
RECTAL BLEEDING: 0
DECREASED APPETITE: 0
BACK PAIN: 1
TACHYCARDIA: 1
CONSTIPATION: 0
RECTAL PAIN: 0
FATIGUE: 1
VOMITING: 0
WHEEZING: 1
COUGH DISTURBING SLEEP: 0
HEARTBURN: 0
DYSPNEA ON EXERTION: 1
POLYPHAGIA: 0
HYPOTENSION: 0
INCREASED ENERGY: 1
LEG SWELLING: 1
FEVER: 0
CLAUDICATION: 0
NIGHT SWEATS: 0
WEIGHT GAIN: 0
LEG PAIN: 1
CHILLS: 0
RESPIRATORY PAIN: 1
MUSCLE WEAKNESS: 0
POSTURAL DYSPNEA: 1
JOINT SWELLING: 1
STIFFNESS: 1
JAUNDICE: 0
SHORTNESS OF BREATH: 1
LIGHT-HEADEDNESS: 0
ABDOMINAL PAIN: 0
COUGH: 1
BOWEL INCONTINENCE: 0
HEMOPTYSIS: 0
MYALGIAS: 1
SPUTUM PRODUCTION: 1
HYPERTENSION: 0

## 2017-05-25 ENCOUNTER — OFFICE VISIT (OUTPATIENT)
Dept: PULMONOLOGY | Facility: CLINIC | Age: 57
End: 2017-05-25
Payer: COMMERCIAL

## 2017-05-25 ENCOUNTER — HOSPITAL ENCOUNTER (OUTPATIENT)
Dept: MRI IMAGING | Facility: CLINIC | Age: 57
End: 2017-05-25
Attending: INTERNAL MEDICINE
Payer: COMMERCIAL

## 2017-05-25 ENCOUNTER — HOSPITAL ENCOUNTER (OUTPATIENT)
Dept: CARDIOLOGY | Facility: CLINIC | Age: 57
Discharge: HOME | End: 2017-05-25
Attending: INTERNAL MEDICINE | Admitting: INTERNAL MEDICINE
Payer: COMMERCIAL

## 2017-05-25 VITALS
OXYGEN SATURATION: 94 % | BODY MASS INDEX: 23.7 KG/M2 | DIASTOLIC BLOOD PRESSURE: 77 MMHG | HEART RATE: 96 BPM | HEIGHT: 72 IN | WEIGHT: 175 LBS | SYSTOLIC BLOOD PRESSURE: 129 MMHG

## 2017-05-25 DIAGNOSIS — I27.20 PULMONARY HYPERTENSION (H): ICD-10-CM

## 2017-05-25 DIAGNOSIS — I50.810 RIGHT HEART FAILURE (H): ICD-10-CM

## 2017-05-25 DIAGNOSIS — J45.51 SEVERE PERSISTENT ASTHMA WITH ACUTE EXACERBATION (H): Primary | ICD-10-CM

## 2017-05-25 DIAGNOSIS — D86.9 SARCOIDOSIS: ICD-10-CM

## 2017-05-25 DIAGNOSIS — R06.09 DYSPNEA ON EXERTION: ICD-10-CM

## 2017-05-25 DIAGNOSIS — I27.20 PULMONARY HYPERTENSION (H): Primary | ICD-10-CM

## 2017-05-25 LAB
EXPTIME-PRE: 15.8 SEC
FEF2575-%PRED-PRE: 10 %
FEF2575-PRE: 0.36 L/SEC
FEF2575-PRED: 3.29 L/SEC
FEFMAX-%PRED-PRE: 38 %
FEFMAX-PRE: 3.81 L/SEC
FEFMAX-PRED: 9.85 L/SEC
FEV1-%PRED-PRE: 30 %
FEV1-PRE: 1.21 L
FEV1FEV6-PRE: 45 %
FEV1FEV6-PRED: 79 %
FEV1FVC-PRE: 35 %
FEV1FVC-PRED: 78 %
FIFMAX-PRE: 4.64 L/SEC
FVC-%PRED-PRE: 67 %
FVC-PRE: 3.4 L
FVC-PRED: 5.07 L

## 2017-05-25 PROCEDURE — A9585 GADOBUTROL INJECTION: HCPCS | Performed by: RADIOLOGY

## 2017-05-25 PROCEDURE — 25000128 H RX IP 250 OP 636: Performed by: RADIOLOGY

## 2017-05-25 PROCEDURE — 25500064 ZZH RX 255 OP 636: Performed by: INTERNAL MEDICINE

## 2017-05-25 PROCEDURE — 74183 MRI ABD W/O CNTR FLWD CNTR: CPT

## 2017-05-25 PROCEDURE — 40000264 ECHO COMPLETE WITH OPTISON

## 2017-05-25 PROCEDURE — 99212 OFFICE O/P EST SF 10 MIN: CPT | Mod: ZF

## 2017-05-25 PROCEDURE — 93306 TTE W/DOPPLER COMPLETE: CPT | Mod: 26 | Performed by: INTERNAL MEDICINE

## 2017-05-25 RX ORDER — GADOBUTROL 604.72 MG/ML
15 INJECTION INTRAVENOUS ONCE
Status: COMPLETED | OUTPATIENT
Start: 2017-05-25 | End: 2017-05-25

## 2017-05-25 RX ADMIN — GADOBUTROL 15 ML: 604.72 INJECTION INTRAVENOUS at 08:30

## 2017-05-25 RX ADMIN — HUMAN ALBUMIN MICROSPHERES AND PERFLUTREN 3 ML: 10; .22 INJECTION, SOLUTION INTRAVENOUS at 10:43

## 2017-05-25 ASSESSMENT — PAIN SCALES - GENERAL: PAINLEVEL: NO PAIN (0)

## 2017-05-25 NOTE — NURSING NOTE
Chief Complaint   Patient presents with     Follow Up For     Return Visit--COPD/Pulm. HTN    Rachel Chun at 12:48 PM on 5/25/2017

## 2017-05-25 NOTE — PATIENT INSTRUCTIONS
We will start you on Dulera - this should help open your lungs and reduce your wheezing    In addition, I think you should see one of the doctors who specialize in Sarcoidosis. We'll get that set up for you

## 2017-05-25 NOTE — MR AVS SNAPSHOT
After Visit Summary   5/25/2017    Jimmy Isaac    MRN: [id number]           Patient Information     Date Of Birth          1960        Visit Information        Provider Department      5/25/2017 1:40 PM Leandra Ratliff MD [facility]        Today's Diagnoses     Severe persistent asthma with acute exacerbation    -  1      Care Instructions    We will start you on Dulera - this should help open your lungs and reduce your wheezing    In addition, I think you should see one of the doctors who specialize in Sarcoidosis. We'll get that set up for you          Follow-ups after your visit        Follow-up notes from your care team     Return for Next available in sarcoid clinic.      Your next 10 appointments already scheduled     Jun 01, 2017 10:30 AM CDT   PE NPET CARDIAC PERFUSION with UMPPET1   [facility] ([facility])    [address] [facility] [address] [facility] [address]   [phone]           Tell your doctor:   If there is any chance you may be pregnant or if you are breastfeeding.   If you have problems lying in small spaces (claustrophobia). If you do, your doctor may give you medicine to help you relax. If you have diabetes:   Have your exam early in the morning. Your blood glucose will go up as the day goes by.   Your glucose level must be 180 or less at the start of the exam. Please take any medicines you need to ensure this blood glucose level. 24 hours before your scan: Don t do any heavy exercise. (No jogging, aerobics or other workouts.) Exercise will make your pictures less accurate. 6 hours before your scan:   Stop all food and liquids (except water).   Do not chew gum or suck on mints.   If you need to take medicine with food, you may take it with a few crackers.  Please call your Imaging Department at your exam site with any questions.            Jun 01, 2017 12:00 PM CDT   PE NPET CARDIAC PERFUSION with UMPPET1    [facility]    [address] [facility] [facility] [address]   [phone]           Tell your doctor:   If there is any chance you may be pregnant or if you are breastfeeding.   If you have problems lying in small spaces (claustrophobia). If you do, your doctor may give you medicine to help you relax. If you have diabetes:   Have your exam early in the morning. Your blood glucose will go up as the day goes by.   Your glucose level must be 180 or less at the start of the exam. Please take any medicines you need to ensure this blood glucose level. 24 hours before your scan: Don t do any heavy exercise. (No jogging, aerobics or other workouts.) Exercise will make your pictures less accurate. 6 hours before your scan:   Stop all food and liquids (except water).   Do not chew gum or suck on mints.   If you need to take medicine with food, you may take it with a few crackers.  Please call your Imaging Department at your exam site with any questions.            Jun 02, 2017  8:40 AM CDT   CT 3D RECONSTRUCTION with UUCT1   [facility] [place] [place] ([facility])    [address] [facility] [address] [phone]           Please bring any scans or X-rays taken at other hospitals, if similar tests were done. Also bring a list of your medicines, including vitamins, minerals and over-the-counter drugs. It is safest to leave personal items at home.  Be sure to tell your doctor:   If you have any allergies.   If there s any chance you are pregnant.   If you are breastfeeding.   If you have any special needs.  You do not need to do anything special to prepare.  Please wear loose clothing, such as a sweat suit or jogging clothes. Avoid snaps, zippers and other metal. We may ask you to undress and put on a hospital gown.            Jun 02, 2017  9:30 AM CDT   Procedure - 2.5 hour with U2A ROOM 9   Unit [address]A [facility])    [address] [address]               Jun 02, 2017 10:30 AM CDT   Heart Cath Right Heart Cath with UUHCVR3   [facility])    [address] [address]   [phone]            Jun 06, 2017  1:00 PM CDT   Pulmonary Hypertension Return with Khang Lindsey MD   [facility] AT [place] ([facility])    [address] [place] [address] [address]  [facility] [address]   [phone]              Future tests that were ordered for you today     Open Future Orders        Priority Expected Expires Ordered    MR Abdomen w/o & w Contrast Routine  5/16/2018 5/16/2017    ECHO COMPLETE WITH OPTISON Routine  5/16/2018 5/16/2017            Who to contact     If you have questions or need follow up information about today's clinic visit or your schedule please contact [facility] directly at [phone].  Normal or non-critical lab and imaging results will be communicated to you by ApolloMedhart, letter or phone within 4 business days after the clinic has received the results. If you do not hear from us within 7 days, please contact the clinic through [employer] or phone. If you have a critical or abnormal lab result, we will notify you by phone as soon as possible.  Submit refill requests through [employer] or call your pharmacy and they will forward the refill request to us. Please allow 3 business days for your refill to be completed.          Additional Information About Your Visit        [employer] Information     [employer] gives you secure access to your electronic health record. If you see a primary care provider, you can also send messages to your care team and make appointments. If you have questions, please call your primary care clinic.  If you do not have a primary care provider, please call [phone] and they will assist  you.        Care EveryWhere ID     This is your Care EveryWhere ID. This could be used by other organizations to access your [place] medical records  [id number]        Your Vitals Were     Pulse Height Pulse Oximetry BMI (Body Mass Index)          96 1.829 m (6') 94% 23.73 kg/m2         Blood Pressure from Last 3 Encounters:   05/25/17 129/77   05/16/17 132/66   10/06/16 151/85    Weight from Last 3 Encounters:   05/25/17 79.4 kg (175 lb)   05/16/17 84.8 kg (187 lb)   10/06/16 79.4 kg (175 lb)              Today, you had the following     No orders found for display         Today's Medication Changes          These changes are accurate as of: 5/25/17  1:59 PM.  If you have any questions, ask your nurse or doctor.               Start taking these medicines.        Dose/Directions    mometasone-formoterol 200-5 MCG/ACT oral inhaler   Commonly known as:  DULERA   Used for:  Severe persistent asthma with acute exacerbation   Started by:  Leandra Ratliff MD        Dose:  2 puff   Inhale 2 puffs into the lungs 2 times daily   Quantity:  13 g   Refills:  3            Where to get your medicines      These medications were sent to [facility] [place], [place] - [address]  [address], [facility] [address]     Phone:  [phone]     mometasone-formoterol 200-5 MCG/ACT oral inhaler                Primary Care Provider Office Phone # Fax #    Cristhian Busch [phone]       [facility]  [address]  [facility] [address]        Thank you!     Thank you for choosing [facility]  for your care. Our goal is always to provide you with excellent care. Hearing back from our patients is one way we can continue to improve our services. Please take a few minutes to complete the written survey that you may receive in the mail after your visit with us. Thank you!             Your Updated Medication List - Protect others around you: Learn  how to safely use, store and throw away your medicines at www.disposemymeds.org.          This list is accurate as of: 5/25/17  1:59 PM.  Always use your most recent med list.                   Brand Name Dispense Instructions for use    albuterol 108 (90 BASE) MCG/ACT Inhaler    albuterol    1 Inhaler    Inhale 4-6 puffs into the lungs every 6 hours as needed       digoxin 125 MCG tablet    LANOXIN    90 tablet    Take 1 tablet (125 mcg) by mouth daily       LETAIRIS 5 MG tablet   Generic drug:  ambrisentan     30 tablet    Take 2 tablets (10 mg) by mouth daily       mometasone-formoterol 200-5 MCG/ACT oral inhaler    DULERA    13 g    Inhale 2 puffs into the lungs 2 times daily       tadalafil (PAH) 20 MG Tabs    ADCIRCA    60 tablet    Take 2 tablets (40 mg) by mouth daily       treprostinil 0.6 MG/ML Soln neb solution    TYVASO    3.6 mL    12 breaths 4 times daily       vitamin D 2000 UNITS Caps      Take 1 capsule by mouth daily.

## 2017-05-25 NOTE — PROGRESS NOTES
HISTORY OF PRESENT ILLNESS:  The patient is a 56-year-old male with sarcoid and COPD.  I am seeing him today in followup - I had last seen him back in October.  At that time, he was thinking about whether he wanted a trial of steroids.  He has decided against this; however, he is also having worsening difficulty with his breathing.       He called in to the clinic a month or so ago with report of increased, purulent sputum.  He was placed on a course of amoxicillin which helped somewhat.  However, he is still wheezing, coughing and very dyspneic.  He uses albuterol frequently - he goes through about an inhaler every 3 weeks.  He has tried Advair and Symbicort in the past.  He cannot remember why he stopped these medications, but he has been off both of them for quite some time.  He continues to follow with Dr. Lindsey for his pulmonary hypertension.  He has an appointment in the near future for a PET scan and a right heart cath.  He has no fevers or chills.  He has mild diffuse arthralgias.  Complete review of systems is negative.      SOCIAL HISTORY:  He is a nonsmoker.  He lives independently.      PHYSICAL EXAMINATION:   GENERAL:  He is alert, breathing without the use of accessory respiratory muscles.   VITAL SIGNS:  Blood pressure 129/77, heart rate 96, respirations 16, oxygen saturation 94% on 2 liters oxygen per nasal cannula.   HEENT:  Normocephalic, atraumatic.  Sclerae are anicteric and conjunctivae are without injection.  Oropharynx is without lesion and mucous membranes are moist.   NECK:  No adenopathy.   LUNGS:  Diffuse inspiratory and expiratory wheezes.  Crackles at the right base.   HEART:  Regular rate and rhythm, S1, S2.  No murmurs.   EXTREMITIES:  No clubbing or cyanosis.  Trace pretibial edema.  Scattered small ecchymoses.   NEUROLOGIC EXAM:  He is alert and appropriate.  Speech is fluent and there is no facial droop.      RESULTS:  Pulmonary function testing is performed today and personally  reviewed.  FEV1 is 1.21 liters or 30% predicted.  FVC is 3.40 liters or 67% predicted.  This is consistent with very severe obstruction - values are somewhat improved compared with 03/2015.  At that time, his FEV1 was 1.14 liters and FVC was 3.04 liters.      ASSESSMENT AND PLAN:  Sarcoid/chronic obstructive pulmonary disease:  He has a diagnosis of sarcoidosis with changes seen on CT scan and biopsy in the distant past.  He has also been treated for COPD in the past and is a former smoker.  Finally, he has pulmonary hypertension, and that has been the most actively treated condition in the past few years.       He is quite wheezy today and I have encouraged him to use an inhaled corticosteroid on a regular basis.  Since he has tried both Symbicort and Advair in the past, I am going to choose Dulera.  This also appears to be preferred by his insurance formulary.       Since he has a diagnosis of sarcoid, I think he should be seen in our Sarcoid Clinic by Dr. Perlman or Dr. Ruvalcaba.  We discussed the possibility of starting empiric oral steroids before his sarcoid appointment; however, he would prefer not to be on steroids unless needed.       Followup with me will be p.r.n.       Answers for HPI/ROS submitted by the patient on 5/24/2017   General Symptoms: Yes  Skin Symptoms: No  HENT Symptoms: No  EYE SYMPTOMS: No  HEART SYMPTOMS: Yes  LUNG SYMPTOMS: Yes  INTESTINAL SYMPTOMS: Yes  URINARY SYMPTOMS: No  REPRODUCTIVE SYMPTOMS: No  SKELETAL SYMPTOMS: Yes  BLOOD SYMPTOMS: No  NERVOUS SYSTEM SYMPTOMS: No  MENTAL HEALTH SYMPTOMS: No  Fever: No  Loss of appetite: No  Weight loss: No  Weight gain: No  Fatigue: Yes  Night sweats: No  Chills: No  Increased stress: No  Excessive hunger: No  Excessive thirst: No  Feeling hot or cold when others believe the temperature is normal: No  Loss of height: No  Post-operative complications: No  Surgical site pain: No  Hallucinations: No  Change in or Loss of Energy:  Yes  Hyperactivity: No  Confusion: No  Cough: Yes  Sputum or phlegm: Yes  Coughing up blood: No  Difficulty breating or shortness of breath: Yes  Snoring: No  Wheezing: Yes  Difficulty breathing on exertion: Yes  Respiratory pain: Yes  Nighttime Cough: No  Difficulty breathing when lying flat: Yes  Chest pain or pressure: Yes  Fast or irregular heartbeat: Yes  Pain in legs with walking: Yes  Swelling in feet or ankles: Yes  Trouble breathing while lying down: Yes  Fingers or Toes appear blue: No  High blood pressure: No  Low blood pressure: No  Fainting: No  Murmurs: No  Chest pain on exertion: Yes  Chest pain at rest: No  Cramping pain in leg during exercise: No  Pacemaker: No  Varicose veins: No  Edema or swelling: Yes  Fast heart beat: Yes  Wake up at night with shortness of breath: Yes  Heart flutters: No  Light-headedness: No  Exercise intolerance: Yes  Heart burn or indigestion: No  Nausea: No  Vomiting: No  Abdominal pain: No  Bloating: No  Constipation: No  Diarrhea: Yes  Blood in stool: No  Black stools: No  Rectal or Anal pain: No  Fecal incontinence: No  Rectal bleeding: No  Yellowing of skin or eyes: No  Vomit with blood: No  Change in stools: Yes  Hemorrhoids: No  Back pain: Yes  Muscle aches: Yes  Neck pain: Yes  Swollen joints: Yes  Joint pain: Yes  Bone pain: Yes  Muscle cramps: No  Muscle weakness: No  Joint stiffness: Yes  Bone fracture: No

## 2017-05-25 NOTE — LETTER
5/25/2017       RE: Jimmy Isaac  [id number] [address] [address]     Dear Colleague,    Thank you for referring your patient, Jimmy Isaac, to the [facility] at [facility]. Please see a copy of my visit note below.    HISTORY OF PRESENT ILLNESS:  The patient is a 56-year-old male with sarcoid and COPD.  I am seeing him today in followup - I had last seen him back in October.  At that time, he was thinking about whether he wanted a trial of steroids.  He has decided against this; however, he is also having worsening difficulty with his breathing.       He called in to the clinic a month or so ago with report of increased, purulent sputum.  He was placed on a course of amoxicillin which helped somewhat.  However, he is still wheezing, coughing and very dyspneic.  He uses albuterol frequently - he goes through about an inhaler every 3 weeks.  He has tried Advair and Symbicort in the past.  He cannot remember why he stopped these medications, but he has been off both of them for quite some time.  He continues to follow with Dr. Lindsey for his pulmonary hypertension.  He has an appointment in the near future for a PET scan and a right heart cath.  He has no fevers or chills.  He has mild diffuse arthralgias.  Complete review of systems is negative.      SOCIAL HISTORY:  He is a nonsmoker.  He lives independently.      PHYSICAL EXAMINATION:   GENERAL:  He is alert, breathing without the use of accessory respiratory muscles.   VITAL SIGNS:  Blood pressure 129/77, heart rate 96, respirations 16, oxygen saturation 94% on 2 liters oxygen per nasal cannula.   HEENT:  Normocephalic, atraumatic.  Sclerae are anicteric and conjunctivae are without injection.  Oropharynx is without lesion and mucous membranes are moist.   NECK:  No adenopathy.   LUNGS:  Diffuse inspiratory and expiratory wheezes.  Crackles at the right base.   HEART:  Regular  rate and rhythm, S1, S2.  No murmurs.   EXTREMITIES:  No clubbing or cyanosis.  Trace pretibial edema.  Scattered small ecchymoses.   NEUROLOGIC EXAM:  He is alert and appropriate.  Speech is fluent and there is no facial droop.      RESULTS:  Pulmonary function testing is performed today and personally reviewed.  FEV1 is 1.21 liters or 30% predicted.  FVC is 3.40 liters or 67% predicted.  This is consistent with very severe obstruction - values are somewhat improved compared with 03/2015.  At that time, his FEV1 was 1.14 liters and FVC was 3.04 liters.      ASSESSMENT AND PLAN:  Sarcoid/chronic obstructive pulmonary disease:  He has a diagnosis of sarcoidosis with changes seen on CT scan and biopsy in the distant past.  He has also been treated for COPD in the past and is a former smoker.  Finally, he has pulmonary hypertension, and that has been the most actively treated condition in the past few years.       He is quite wheezy today and I have encouraged him to use an inhaled corticosteroid on a regular basis.  Since he has tried both Symbicort and Advair in the past, I am going to choose Dulera.  This also appears to be preferred by his insurance formulary.       Since he has a diagnosis of sarcoid, I think he should be seen in our Sarcoid Clinic by Dr. Perlman or Dr. Ruvalcaba.  We discussed the possibility of starting empiric oral steroids before his sarcoid appointment; however, he would prefer not to be on steroids unless needed.       Followup with me will be p.r.n.       Again, thank you for allowing me to participate in the care of your patient.      Sincerely,    Leandra Ratliff MD

## 2017-06-02 ENCOUNTER — HOSPITAL ENCOUNTER (OUTPATIENT)
Facility: CLINIC | Age: 57
Discharge: HOME | End: 2017-06-02
Attending: INTERNAL MEDICINE | Admitting: INTERNAL MEDICINE
Payer: COMMERCIAL

## 2017-06-02 ENCOUNTER — APPOINTMENT (OUTPATIENT)
Dept: MEDSURG UNIT | Facility: CLINIC | Age: 57
End: 2017-06-02
Attending: INTERNAL MEDICINE
Payer: COMMERCIAL

## 2017-06-02 ENCOUNTER — APPOINTMENT (OUTPATIENT)
Dept: CARDIOLOGY | Facility: CLINIC | Age: 57
End: 2017-06-02
Attending: INTERNAL MEDICINE
Payer: COMMERCIAL

## 2017-06-02 VITALS
DIASTOLIC BLOOD PRESSURE: 81 MMHG | RESPIRATION RATE: 18 BRPM | HEART RATE: 78 BPM | BODY MASS INDEX: 23.19 KG/M2 | WEIGHT: 175 LBS | SYSTOLIC BLOOD PRESSURE: 139 MMHG | HEIGHT: 73 IN | TEMPERATURE: 98 F | OXYGEN SATURATION: 95 %

## 2017-06-02 DIAGNOSIS — I27.20 PULMONARY HYPERTENSION (H): ICD-10-CM

## 2017-06-02 DIAGNOSIS — I50.810 RIGHT HEART FAILURE (H): ICD-10-CM

## 2017-06-02 PROCEDURE — 27210787 ZZH MANIFOLD CR2

## 2017-06-02 PROCEDURE — 40000166 ZZH STATISTIC PP CARE STAGE 1

## 2017-06-02 PROCEDURE — 4A023N6 MEASUREMENT OF CARDIAC SAMPLING AND PRESSURE, RIGHT HEART, PERCUTANEOUS APPROACH: ICD-10-PCS | Performed by: INTERNAL MEDICINE

## 2017-06-02 PROCEDURE — 93451 RIGHT HEART CATH: CPT | Mod: 26 | Performed by: INTERNAL MEDICINE

## 2017-06-02 PROCEDURE — 27211181 ZZH BALLOON TIP PRESSURE CR5

## 2017-06-02 PROCEDURE — 27210982 ZZH KIT RT HC TOTES DISP CR7

## 2017-06-02 PROCEDURE — 93451 RIGHT HEART CATH: CPT

## 2017-06-02 RX ORDER — LIDOCAINE 40 MG/G
CREAM TOPICAL
Status: COMPLETED | OUTPATIENT
Start: 2017-06-02 | End: 2017-06-02

## 2017-06-02 RX ADMIN — LIDOCAINE: 40 CREAM TOPICAL at 09:21

## 2017-06-02 NOTE — DISCHARGE INSTRUCTIONS
[facility]                        Interventional Cardiology  Discharge Instructions   Post Right Heart Cath      AFTER YOU GO HOME:    DO drink plenty of fluids    DO resume your regular diet and medications unless otherwise instructed by your Primary Physician    Do Not scrub the procedure site vigorously    No lotion or powder to the puncture site for 3 days    CALL YOUR PRIMARY PHYSICIAN IF: You may resume all normal activity.  Monitor neck site for bleeding, swelling, or voice changes. If you notice bleeding or swelling immediately apply pressure to the site and call number below to speak with Cardiology Fellow.  If you experience any changes in your breathing you should call your doctor immediately or come to the closest Emergency Department.  Do not drive yourself.    ADDITIONAL INSTRUCTIONS: Medications: You are to resume all home medications including anticoagulation therapy unless otherwise advised by your primary cardiologist or nurse coordinator.    Follow Up: Per your primary cardiology team    If you have any questions or concerns regarding your procedure site please call [phone] at anytime and ask for Cardiology Fellow on call.  They are available 24 hours a day.  You may also contact the Cardiology Clinic after hours number at [phone].                                                       Telephone Numbers [phone] Monday-Friday 8:00 am to 4:30 pm    [phone] After 4:30 pm Monday-Friday, Weekends & Holidays  Ask for Interventional Cardiologist on call. Someone is on call 24 hours/day   [facility] toll free number [phone] Monday-Friday 8:00 am to 4:30 pm   [facility] Emergency Dept [phone]

## 2017-06-02 NOTE — PROGRESS NOTES
D: Pt arrived in cath lab for Right Heart Catheterization  I: Right heart catheterization completed per MD.  7fr sheath removed from rijv site, manual pressure applied until hemostasis achieved.  A: right neck*site clean, dry and intact. Soft, no hematoma.  P: Pt to transfer to  for discharge.  Pt educated on watching neck site for bleeding, hematoma, pressure on airway and voice changes.

## 2017-06-02 NOTE — PROGRESS NOTES
Pt returned to 2A s/p R heart cath. VSS, pt denies pain. R neck site CDI. Pt verbalized understanding of discharge instructions. Pt discharged to home

## 2017-06-02 NOTE — IP AVS SNAPSHOT
[address] [address]                                       After Visit Summary   6/2/2017    Jimmy Isaac    MRN: [id number]           After Visit Summary Signature Page     I have received my discharge instructions, and my questions have been answered. I have discussed any challenges I see with this plan with the nurse or doctor.    ..........................................................................................................................................  Patient/Patient Representative Signature      ..........................................................................................................................................  Patient Representative Print Name and Relationship to Patient    ..................................................               ................................................  Date                                            Time    ..........................................................................................................................................  Reviewed by Signature/Title    ...................................................              ..............................................  Date                                                            Time

## 2017-06-02 NOTE — IP AVS SNAPSHOT
MRN:[id number]                      After Visit Summary   6/2/2017    Jimmy Isaac    MRN: [id number]           Visit Information        Department      6/2/2017  8:41 AM [address] [facility] [place]          Review of your medicines      UNREVIEWED medicines. Ask your doctor about these medicines        Dose / Directions    albuterol 108 (90 BASE) MCG/ACT Inhaler   Commonly known as:  albuterol   Used for:  Pulmonary hypertension (H), Mixed hyperlipidemia, COPD (chronic obstructive pulmonary disease) (H)        Dose:  4-6 puff   Inhale 4-6 puffs into the lungs every 6 hours as needed   Quantity:  1 Inhaler   Refills:  6       digoxin 125 MCG tablet   Commonly known as:  LANOXIN   Used for:  Pulmonary hypertension (H)        Dose:  125 mcg   Take 1 tablet (125 mcg) by mouth daily   Quantity:  90 tablet   Refills:  1       LETAIRIS 5 MG tablet   Used for:  Pulmonary hypertension (H)   Generic drug:  ambrisentan        Dose:  10 mg   Take 2 tablets (10 mg) by mouth daily   Quantity:  30 tablet   Refills:  11       mometasone-formoterol 200-5 MCG/ACT oral inhaler   Commonly known as:  DULERA   Used for:  Severe persistent asthma with acute exacerbation        Dose:  2 puff   Inhale 2 puffs into the lungs 2 times daily   Quantity:  13 g   Refills:  3       tadalafil (PAH) 20 MG Tabs   Commonly known as:  ADCIRCA   Used for:  Pulmonary hypertension (H)        Dose:  40 mg   Take 2 tablets (40 mg) by mouth daily   Quantity:  60 tablet   Refills:  11       treprostinil 0.6 MG/ML Soln neb solution   Commonly known as:  TYVASO   Used for:  Pulmonary hypertension (H)        12 breaths 4 times daily   Quantity:  3.6 mL   Refills:  11       vitamin D 2000 UNITS Caps        Dose:  1 capsule   Take 1 capsule by mouth daily.   Refills:  0                Protect others around you: Learn how to safely use, store and throw away your medicines at www.disposemymeds.org.         Follow-ups after your visit        Your next  10 appointments already scheduled     Jun 06, 2017 11:00 AM CDT   PE NPET CARDIAC PERFUSION with UMPPET1   [facility] ([facility])    [address] [facility] [facility] [address]   [phone]           Tell your doctor:   If there is any chance you may be pregnant or if you are breastfeeding.   If you have problems lying in small spaces (claustrophobia). If you do, your doctor may give you medicine to help you relax. If you have diabetes:   Have your exam early in the morning. Your blood glucose will go up as the day goes by.   Your glucose level must be 180 or less at the start of the exam. Please take any medicines you need to ensure this blood glucose level. 24 hours before your scan: Don t do any heavy exercise. (No jogging, aerobics or other workouts.) Exercise will make your pictures less accurate. 6 hours before your scan:   Stop all food and liquids (except water).   Do not chew gum or suck on mints.   If you need to take medicine with food, you may take it with a few crackers.  Please call your Imaging Department at your exam site with any questions.            Jun 06, 2017 12:45 PM CDT   PE NPET CARDIAC PERFUSION with UMPPET1   [place] [facility] ([facility])    [address] [facility] [facility] [address]   [phone]           Tell your doctor:   If there is any chance you may be pregnant or if you are breastfeeding.   If you have problems lying in small spaces (claustrophobia). If you do, your doctor may give you medicine to help you relax. If you have diabetes:   Have your exam early in the morning. Your blood glucose will go up as the day goes by.   Your glucose level must be 180 or less at the start of the exam. Please take any medicines you need to ensure this blood glucose level. 24 hours before your scan: Don t do any heavy exercise. (No jogging, aerobics or other workouts.) Exercise will make your pictures less accurate. 6 hours  before your scan:   Stop all food and liquids (except water).   Do not chew gum or suck on mints.   If you need to take medicine with food, you may take it with a few crackers.  Please call your Imaging Department at your exam site with any questions.            Jun 06, 2017  1:00 PM CDT   Pulmonary Hypertension Return with Khang Lindsey MD   [facility] [place] ([facility])    [address] [place] [address] [phone]               Care Instructions        Further instructions from your care team       [facility]                        Interventional Cardiology  Discharge Instructions   Post Right Heart Cath      AFTER YOU GO HOME:    DO drink plenty of fluids    DO resume your regular diet and medications unless otherwise instructed by your Primary Physician    Do Not scrub the procedure site vigorously    No lotion or powder to the puncture site for 3 days    CALL YOUR PRIMARY PHYSICIAN IF: You may resume all normal activity.  Monitor neck site for bleeding, swelling, or voice changes. If you notice bleeding or swelling immediately apply pressure to the site and call number below to speak with Cardiology Fellow.  If you experience any changes in your breathing you should call your doctor immediately or come to the closest Emergency Department.  Do not drive yourself.    ADDITIONAL INSTRUCTIONS: Medications: You are to resume all home medications including anticoagulation therapy unless otherwise advised by your primary cardiologist or nurse coordinator.    Follow Up: Per your primary cardiology team    If you have any questions or concerns regarding your procedure site please call [phone] at anytime and ask for Cardiology Fellow on call.  They are available 24 hours a day.  You may also contact the Cardiology Clinic after hours number at [phone].                                                      "  Telephone Numbers [phone] Monday-Friday 8:00 am to 4:30 pm    [phone] After 4:30 pm Monday-Friday, Weekends & Holidays  Ask for Interventional Cardiologist on call. Someone is on call 24 hours/day   [facility] toll free number [phone] Monday-Friday 8:00 am to 4:30 pm   [facility] Emergency Dept [phone]                    Additional Information About Your Visit        [employer] Information     [employer] gives you secure access to your electronic health record. If you see a primary care provider, you can also send messages to your care team and make appointments. If you have questions, please call your primary care clinic.  If you do not have a primary care provider, please call [phone] and they will assist you.        Care EveryWhere ID     This is your Care EveryWhere ID. This could be used by other organizations to access your [facility] medical records  [id number]        Your Vitals Were     Blood Pressure Pulse Temperature Respirations Height Weight    138/81 (BP Location: Right arm) 86 98  F (36.7  C) 18 1.854 m (6' 1\") 79.4 kg (175 lb)    Pulse Oximetry BMI (Body Mass Index)                98% 23.09 kg/m2           Primary Care Provider Office Phone # Fax #    Cristhian Busch [phone]      Thank you!     Thank you for choosing [facility] for your care. Our goal is always to provide you with excellent care. Hearing back from our patients is one way we can continue to improve our services. Please take a few minutes to complete the written survey that you may receive in the mail after you visit with us. Thank you!             Medication List: This is a list of all your medications and when to take them. Check marks below indicate your daily home schedule. Keep this list as a reference.      Medications           Morning Afternoon Evening Bedtime As Needed    albuterol 108 (90 BASE) MCG/ACT Inhaler   Commonly known as:  albuterol   Inhale 4-6 puffs into the lungs every 6 " hours as needed                                digoxin 125 MCG tablet   Commonly known as:  LANOXIN   Take 1 tablet (125 mcg) by mouth daily                                LETAIRIS 5 MG tablet   Take 2 tablets (10 mg) by mouth daily   Generic drug:  ambrisentan                                mometasone-formoterol 200-5 MCG/ACT oral inhaler   Commonly known as:  DULERA   Inhale 2 puffs into the lungs 2 times daily                                tadalafil (PAH) 20 MG Tabs   Commonly known as:  ADCIRCA   Take 2 tablets (40 mg) by mouth daily                                treprostinil 0.6 MG/ML Soln neb solution   Commonly known as:  TYVASO   12 breaths 4 times daily                                vitamin D 2000 UNITS Caps   Take 1 capsule by mouth daily.

## 2017-06-05 DIAGNOSIS — J44.9 COPD (CHRONIC OBSTRUCTIVE PULMONARY DISEASE) (H): Primary | ICD-10-CM

## 2017-06-05 DIAGNOSIS — J84.9 ILD (INTERSTITIAL LUNG DISEASE) (H): Primary | ICD-10-CM

## 2017-06-05 RX ORDER — AMOXICILLIN 875 MG
875 TABLET ORAL 2 TIMES DAILY
Qty: 20 TABLET | Refills: 0 | Status: SHIPPED | OUTPATIENT
Start: 2017-06-05 | End: 2017-06-20

## 2017-06-15 DIAGNOSIS — J44.9 COPD (CHRONIC OBSTRUCTIVE PULMONARY DISEASE) (H): Primary | ICD-10-CM

## 2017-06-15 RX ORDER — AMOXICILLIN 875 MG
875 TABLET ORAL 2 TIMES DAILY
Qty: 10 TABLET | Refills: 0 | Status: SHIPPED | OUTPATIENT
Start: 2017-06-15 | End: 2017-08-01

## 2017-06-20 ENCOUNTER — OFFICE VISIT (OUTPATIENT)
Dept: CARDIOLOGY | Facility: CLINIC | Age: 57
End: 2017-06-20
Payer: COMMERCIAL

## 2017-06-20 VITALS
HEIGHT: 72 IN | SYSTOLIC BLOOD PRESSURE: 142 MMHG | HEART RATE: 78 BPM | WEIGHT: 184 LBS | DIASTOLIC BLOOD PRESSURE: 68 MMHG | BODY MASS INDEX: 24.92 KG/M2 | OXYGEN SATURATION: 90 %

## 2017-06-20 DIAGNOSIS — I27.20 PULMONARY HYPERTENSION (H): Primary | ICD-10-CM

## 2017-06-20 PROCEDURE — 99212 OFFICE O/P EST SF 10 MIN: CPT | Performed by: INTERNAL MEDICINE

## 2017-06-20 NOTE — NURSING NOTE
Med Reconcile: Reviewed and verified all current medications with the patient. The updated medication list was printed and given to the patient.  Return Appointment: Patient given instructions regarding scheduling next clinic visit. Patient demonstrated understanding of this information and agreed to call with further questions or concerns.  Patient stated he understood all health information given and agreed to call with further questions or concerns.     Medication Changes:  Increase your tyvaso to 14 breath QID    Patient Instructions:  Reschedule your PET scan and Chest CT 7/11/17 at 9:45 Check in: 1st floor PET desk at the [facility].  Nothing to eat or drink 6 hours prior   No gum, mints, or cough drops day of procedure.  No exercise 24 hours prior to procedure.    Follow up Appointment Information:  8/1/17 Labs at 10:30, [facility] Herring 11:00 [facility].

## 2017-06-20 NOTE — PATIENT INSTRUCTIONS
Medication Changes:  Increase your tyvaso to 14 breath QID    Patient Instructions:  Reschedule your PET scan and Chest CT 7/11/17 at 9:45 Check in: 1st floor PET desk at the [facility].  Nothing to eat or drink 6 hours prior   No gum, mints, or cough drops day of procedure.  No exercise 24 hours prior to procedure.    Follow up Appointment Information:  8/1/17 Labs at 10:30, NEWTON Herring 11:00 [facility].    For scheduling at [facility] please call [phone]  For scheduling at the [place] please call [phone]  We are located on the second floor [address] at the [facility].  Our address is     [address],   [address]  [place]  [address]    Thank you for allowing us to be a part of your care here at the [facility]    If you have questions or concerns please contact us at:    Bernarda Wilson RN      Nurse Coordinator       Pulmonary Hypertension     [facility]   (P)[phone]  (F)[phone]    ** Please note that you will NOT receive a reminder call regarding your scheduled testing, reminder calls are for provider appointments only.  If you are scheduled for testing within the [facility] system you may receive a call regarding pre-registration for billing purposes only.**     Remember to weigh yourself daily after voiding and before you consume any food or beverages and log the numbers.  If you have gained/lost 2 pounds overnight or 5 pounds in a week contact us immediately for medication adjustments or further instructions.

## 2017-06-20 NOTE — PROGRESS NOTES
1. HR 80 bpm  2. /79/99 mmHg  3. RA 12/15/12  4. RV 75/12  5. PA 75/34/52   6. PCW 16//16   7. PA sat 72.6%   8. PCW sat 97%  9. Hgb 14.2g/dL   10. Lesia CO 5.3   11. Lesia CI 2.6   12. TD CO 5.7   13. TD CI 2.8   14. PVR 6.9  MRN: [id number]  : 1960  Study Date: 2017 09:01 AM  Age: 56 yrs  Gender: Male  [place] [facility] [place] For Study: , Other forms of dyspnea, Heart failure, unspecified, Other  seco  Ordering Physician: DUY MIDDLETON  Referring Physician: SHERRY BOB MD  Performed By: Amy David RDCS     BSA: 2.1 m2  Height: 72 in  Weight: 187 lb  HR: 94  BP: 130/70 mmHg  _____________________________________________________________________________  __        Procedure  Complete Echo Adult. Contrast Optison.  _____________________________________________________________________________  __        Interpretation Summary     The visual ejection fraction is estimated at 65-70%.  There is mild concentric left ventricular hypertrophy.  The right ventricle is not well visualized.  Mildly decreased right ventricular systolic function  The study was technically difficult. Compared to prior study, changes are  noted.  _____________________________________________________________________________  __        Left Ventricle  The left ventricle is normal in size. There is mild concentric left  ventricular hypertrophy. The visual ejection fraction is estimated at 65-70%.  The transmitral spectral Doppler flow pattern is suggestive of impaired LV  relaxation. E by E prime ratio is between 8 and 15, which is indeterminate for  assessment of left ventricular filling pressures. Septal motion is consistent  with conduction abnormality.     Right Ventricle  In the apical views the RV appears to be mildly dilated but in the subcostal  views appears to be normal in size. Mildly decreased right ventricular  systolic function. The right ventricle is not well  visualized.     Atria  Normal left atrial size. Borderline right atrial enlargement. There is no  atrial shunt seen.     Mitral Valve  The mitral valve is normal in structure and function. There is trace mitral  regurgitation.        Tricuspid Valve  The tricuspid valve is normal in structure and function. There is trace  tricuspid regurgitation. Right ventricular systolic pressure could not be  approximated due to inadequate tricuspid regurgitation.     Aortic Valve  The aortic valve is trileaflet. The aortic valve is not well visualized. No  hemodynamically significant valvular aortic stenosis.     Pulmonic Valve  The pulmonic valve is not well seen, but is grossly normal. There is no  pulmonic valvular regurgitation.     Vessels  The aortic root is normal size. Normal size ascending aorta. The inferior vena  cava is not dilated.     Pericardium  There is no pericardial effusion.        Rhythm  The rhythm was normal sinus.  _____________________________________________________________________________  __  MMode/2D Measurements & Calculations  IVSd: 1.2 cm     LVIDd: 3.6 cm  LVIDs: 2.5 cm  LVPWd: 1.4 cm  FS: 32.3 %  EDV(Teich): 55.2 ml  ESV(Teich): 21.3 ml  LV mass(C)d: 161.1 grams  LV mass(C)dI: 77.8 grams/m2  Ao root diam: 3.2 cm  LA dimension: 2.9 cm  asc Aorta Diam: 3.1 cm  LA/Ao: 0.92  LA Volume Index (BP): 19.0 ml/m2        Doppler Measurements & Calculations  MV E max nirmal: 63.1 cm/sec  MV A max nirmal: 81.7 cm/sec  MV E/A: 0.77     MV dec slope: 244.7 cm/sec2  MV dec time: 0.26 sec  Peak E' Nirmal: 6.8 cm/sec             Current Outpatient Prescriptions   Medication     amoxicillin (AMOXIL) 875 MG tablet     LETAIRIS 5 MG tablet     albuterol (ALBUTEROL) 108 (90 BASE) MCG/ACT Inhaler     tadalafil, PAH, (ADCIRCA) 20 MG TABS     digoxin (LANOXIN) 125 MCG tablet     treprostinil (TYVASO) 0.6 MG/ML SOLN     Cholecalciferol (VITAMIN D) 2000 UNITS CAPS     No current facility-administered medications for this visit.         Results for MATTY LOPEZ (MRN [id number]) as of 6/20/2017 13:02   Ref. Range 5/16/2017 11:59 5/25/2017 08:41 5/25/2017 10:44 5/25/2017 11:59 6/2/2017 12:10   Sodium Latest Ref Range: 133 - 144 mmol/L 139       Potassium Latest Ref Range: 3.4 - 5.3 mmol/L 4.3       Chloride Latest Ref Range: 94 - 109 mmol/L 103       Carbon Dioxide Latest Ref Range: 20 - 32 mmol/L 28       Urea Nitrogen Latest Ref Range: 7 - 30 mg/dL 16       Creatinine Latest Ref Range: 0.66 - 1.25 mg/dL 1.38 (H)       GFR Estimate Latest Ref Range: >60 mL/min/1.7m2 53 (L)       GFR Estimate If Black Latest Ref Range: >60 mL/min/1.7m2 64       Calcium Latest Ref Range: 8.5 - 10.1 mg/dL 9.1       Anion Gap Latest Ref Range: 3 - 14 mmol/L 8       Albumin Latest Ref Range: 3.4 - 5.0 g/dL 3.8       Protein Total Latest Ref Range: 6.8 - 8.8 g/dL 7.3       Bilirubin Total Latest Ref Range: 0.2 - 1.3 mg/dL 0.5       Alkaline Phosphatase Latest Ref Range: 40 - 150 U/L 75       ALT Latest Ref Range: 0 - 70 U/L 22       AST Latest Ref Range: 0 - 45 U/L 17       Angiotensin Converting Enzyme Unknown 50       CRP Inflammation Latest Ref Range: 0.0 - 8.0 mg/L 8.0       N-Terminal Pro Bnp Latest Ref Range: 0 - 125 pg/mL 460 (H)       Glucose Latest Ref Range: 70 - 99 mg/dL 98       WBC Latest Ref Range: 4.0 - 11.0 10e9/L 5.7       Hemoglobin Latest Ref Range: 13.3 - 17.7 g/dL 14.9       Hematocrit Latest Ref Range: 40.0 - 53.0 % 44.4       Platelet Count Latest Ref Range: 150 - 450 10e9/L 142 (L)       RBC Count Latest Ref Range: 4.4 - 5.9 10e12/L 4.88       MCV Latest Ref Range: 78 - 100 fl 91       MCH Latest Ref Range: 26.5 - 33.0 pg 30.5       MCHC Latest Ref Range: 31.5 - 36.5 g/dL 33.6       RDW Latest Ref Range: 10.0 - 15.0 % 13.5       MR ABDOMEN W/O & W CONTRAST Unknown  Rpt      FVC-Pred Latest Units: L    5.07    FVC-Pre Latest Units: L    3.40    FVC-%Pred-Pre Latest Units: %    67    FEV1-Pre Latest Units: L    1.21    FEV1-%Pred-Pre Latest  Units: %    30    FEV1FVC-Pred Latest Units: %    78    FEV1FVC-Pre Latest Units: %    35    FEV1FEV6-Pred Latest Units: %    79    FEV1FEV6-Pre Latest Units: %    45    FEFMax-Pred Latest Units: L/sec    9.85    FEFMax-Pre Latest Units: L/sec    3.81    FEFMax-%Pred-Pre Latest Units: %    38    FIFMax-Pre Latest Units: L/sec    4.64    ExpTime-Pre Latest Units: sec    15.80    ECHO COMPLETE WITH OPTISON Unknown   Rpt     HEART CATH RIGHT HEART CATH Unknown     Attch   PFT GENERAL LAB TESTING Unknown    Rpt    VRP0268-%Pred-Pre Latest Units: %    10    FEF2575-Pre Latest Units: L/sec    0.36    FEF2575-Pred Latest Units: L/sec    3.29        HISTORY: Assessment of pancreatic cyst. Sarcoidosis, unspecified.  Other forms of dyspnea. Heart failure, unspecified. Other secondary  pulmonary hypertension.      COMPARISON: CT chest 10/6/2016.     TECHNIQUE: Multisequence, multiplanar imaging of the abdomen is  performed without contrast. A total of 15 mL Gadavist is then given  intravenously. Additional dynamic axial T1 fat-sat sequences are  performed.     FINDINGS:      Abdomen: There is no evidence of fatty liver infiltration or mass.  Gallbladder, spleen, pancreas and adrenal glands are unremarkable.  Bilateral renal calculi are noted. No hydronephrosis. T2 hyperintense  renal lesions are most consistent with cysts. No enlarged abdominal  lymph nodes. No T2 hyperintense lesions are noted in the pancreas.     Following contrast administration, there are no abnormal arterial  enhancing lesions in the liver or pancreas. Area of decreased signal  intensity in the head of the pancreas may be related to the previously  seen pancreatic head calcification. No abnormal enhancing pancreatic  lesions are evident. Upper abdominal organs enhance normally. Multiple  bilateral renal cortical cysts show no abnormal enhancement consistent  with simple cysts. No enlarged lymph nodes. No ascites. No bowel  distention is appreciated where  imaged.         IMPRESSION: No evidence of a cyst in the pancreas when compared to  prior CT. No pancreatic ductal dilatation. Simple renal cysts and  collecting system stones. Abdominal organs are otherwise within normal  limits.

## 2017-06-20 NOTE — MR AVS SNAPSHOT
After Visit Summary   6/20/2017    Jimmy Isaac    MRN: [id number]           Patient Information     Date Of Birth          1960        Visit Information        Provider Department      6/20/2017 12:30 PM Khang Lindsey MD [facility] [place]        Care Instructions    REschedule PET scan  (total body) for sarcoid    Increase number of inhalations.    See me in 6 weeks    Medication Changes:  Increase your tyvaso to 14 breath QID    Patient Instructions:  Reschedule your PET scan and Chest CT 7/11/17 at 9:45 Check in: 1st floor PET desk at the [facility].  Nothing to eat or drink 6 hours prior   No gum, mints, or cough drops day of procedure.  No exercise 24 hours prior to procedure.    Follow up Appointment Information:  8/1/17 Labs at 10:30, NE[facility] Herring 11:00 [facility].    For scheduling at [facility] please call [phone]  For scheduling at the [facility] please call [phone]  We are located on the second floor [address] at the [facility].  Our address is     [address] [place] [address]   [address]  [place] [address]    Thank you for allowing us to be a part of your care here at the [facility]    If you have questions or concerns please contact us at:    Bernarda Wilson, LUCINA      Nurse Coordinator       Pulmonary Hypertension     [facility]   (P)[phone]  (F)[phone]    ** Please note that you will NOT receive a reminder call regarding your scheduled testing, reminder calls are for provider appointments only.  If you are scheduled for testing within the [facility] system you may receive a call regarding pre-registration for billing purposes only.**     Remember to weigh yourself daily after voiding and before you consume any food or beverages and log the numbers.  If you have gained/lost 2 pounds overnight or 5 pounds in a week contact us immediately for  medication adjustments or further instructions.          Follow-ups after your visit        Your next 10 appointments already scheduled     Jul 11, 2017  9:45 AM CDT   PE NPET CARDIAC PERFUSION with UUPET1   [facility] [place] PET CT ([facility])    [address] [address]   [phone]           Tell your doctor:   If there is any chance you may be pregnant or if you are breastfeeding.   If you have problems lying in small spaces (claustrophobia). If you do, your doctor may give you medicine to help you relax. If you have diabetes:   Have your exam early in the morning. Your blood glucose will go up as the day goes by.   Your glucose level must be 180 or less at the start of the exam. Please take any medicines you need to ensure this blood glucose level. 24 hours before your scan: Don t do any heavy exercise. (No jogging, aerobics or other workouts.) Exercise will make your pictures less accurate. 6 hours before your scan:   Stop all food and liquids (except water).   Do not chew gum or suck on mints.   If you need to take medicine with food, you may take it with a few crackers.  Please call your Imaging Department at your exam site with any questions.            Jul 11, 2017 10:30 AM CDT   PE NPET CARDIAC VIABILITY with UUPET1   [facility] [place] PET CT ([facility])    [address] [id number] [phone]           Tell your doctor:   If there is any chance you may be pregnant or if you are breastfeeding.   If you have problems lying in small spaces (claustrophobia). If you do, your doctor may give you medicine to help you relax. If you have diabetes:   Have your exam early in the morning. Your blood glucose will go up as the day goes by.   Your glucose level must be 180 or less at the start of the exam. Please take any medicines you need to ensure this blood glucose level. 24 hours  before your scan: Don t do any heavy exercise. (No jogging, aerobics or other workouts.) Exercise will make your pictures less accurate. 6 hours before your scan:   Stop all food and liquids (except water).   Do not chew gum or suck on mints.   If you need to take medicine with food, you may take it with a few crackers.  Please call your Imaging Department at your exam site with any questions.            Sep 07, 2017  9:40 AM CDT   (Arrive by 9:25 AM)   CT CHEST W/O CONTRAST with UCCT2   [facility] CT ([facility])    [address] [id number] [phone]           Please bring any scans or X-rays taken at other hospitals, if similar tests were done. Also bring a list of your medicines, including vitamins, minerals and over-the-counter drugs. It is safest to leave personal items at home.  Be sure to tell your doctor:   If you have any allergies.   If there s any chance you are pregnant.   If you are breastfeeding.   If you have any special needs.  You do not need to do anything special to prepare.  Please wear loose clothing, such as a sweat suit or jogging clothes. Avoid snaps, zippers and other metal. We may ask you to undress and put on a hospital gown.            Sep 07, 2017 10:00 AM CDT   PFT VISIT with  PFL D   [facility] Pulmonary Function Testing ([facility])    [address] [place] [id number] [phone]            Sep 07, 2017 10:30 AM CDT   (Arrive by 10:15 AM)   New Interstitial Lung with Shane Ruvalcaba MD   [facility] ([facility])    [address] [place] [id number] [phone]              Who to contact     If you have questions or need follow up information about today's clinic visit or your schedule please contact [facility] AT [place] directly at  [phone].  Normal or non-critical lab and imaging results will be communicated to you by MyChart, letter or phone within 4 business days after the clinic has received the results. If you do not hear from us within 7 days, please contact the clinic through [facility] or phone. If you have a critical or abnormal lab result, we will notify you by phone as soon as possible.  Submit refill requests through [facility] or call your pharmacy and they will forward the refill request to us. Please allow 3 business days for your refill to be completed.          Additional Information About Your Visit        [facility] Information     [facility] gives you secure access to your electronic health record. If you see a primary care provider, you can also send messages to your care team and make appointments. If you have questions, please call your primary care clinic.  If you do not have a primary care provider, please call [phone] and they will assist you.        Care EveryWhere ID     This is your Care EveryWhere ID. This could be used by other organizations to access your [facility] medical records  [id number]        Your Vitals Were     Pulse Height Pulse Oximetry BMI (Body Mass Index)          78 1.829 m (6') 90% 24.95 kg/m2         Blood Pressure from Last 3 Encounters:   06/20/17 142/68   06/02/17 139/81   05/25/17 129/77    Weight from Last 3 Encounters:   06/20/17 83.5 kg (184 lb)   06/02/17 79.4 kg (175 lb)   05/25/17 79.4 kg (175 lb)              Today, you had the following     No orders found for display       Primary Care Provider Office Phone # Fax #    Cristhian Busch [phone]       [facility] [address]  [facility] [address]        Thank you!     Thank you for choosing [facility] [place]  for your care. Our goal is always to provide you with excellent care. Hearing back from our patients is one way we can continue to improve our services. Please take a few  minutes to complete the written survey that you may receive in the mail after your visit with us. Thank you!             Your Updated Medication List - Protect others around you: Learn how to safely use, store and throw away your medicines at www.disposemymeds.org.          This list is accurate as of: 6/20/17  1:26 PM.  Always use your most recent med list.                   Brand Name Dispense Instructions for use    albuterol 108 (90 BASE) MCG/ACT Inhaler    albuterol    1 Inhaler    Inhale 4-6 puffs into the lungs every 6 hours as needed       amoxicillin 875 MG tablet    AMOXIL    10 tablet    Take 1 tablet (875 mg) by mouth 2 times daily       digoxin 125 MCG tablet    LANOXIN    90 tablet    Take 1 tablet (125 mcg) by mouth daily       LETAIRIS 5 MG tablet   Generic drug:  ambrisentan     30 tablet    Take 2 tablets (10 mg) by mouth daily       tadalafil (PAH) 20 MG Tabs    ADCIRCA    60 tablet    Take 2 tablets (40 mg) by mouth daily       treprostinil 0.6 MG/ML Soln neb solution    TYVASO    3.6 mL    12 breaths 4 times daily       vitamin D 2000 UNITS Caps      Take 1 capsule by mouth daily.

## 2017-07-14 DIAGNOSIS — I27.20 PULMONARY HYPERTENSION (H): ICD-10-CM

## 2017-07-14 RX ORDER — DIGOXIN 125 MCG
125 TABLET ORAL DAILY
Qty: 90 TABLET | Refills: 3 | Status: SHIPPED | OUTPATIENT
Start: 2017-07-14 | End: 2018-07-02

## 2017-08-01 ENCOUNTER — PRE VISIT (OUTPATIENT)
Dept: CARDIOLOGY | Facility: CLINIC | Age: 57
End: 2017-08-01

## 2017-08-01 ENCOUNTER — OFFICE VISIT (OUTPATIENT)
Dept: CARDIOLOGY | Facility: CLINIC | Age: 57
End: 2017-08-01
Attending: NURSE PRACTITIONER
Payer: COMMERCIAL

## 2017-08-01 VITALS
DIASTOLIC BLOOD PRESSURE: 74 MMHG | SYSTOLIC BLOOD PRESSURE: 133 MMHG | BODY MASS INDEX: 25.22 KG/M2 | HEIGHT: 72 IN | WEIGHT: 186.2 LBS | OXYGEN SATURATION: 93 % | HEART RATE: 70 BPM

## 2017-08-01 DIAGNOSIS — I27.20 PULMONARY HYPERTENSION (H): ICD-10-CM

## 2017-08-01 DIAGNOSIS — I27.20 PULMONARY HYPERTENSION (H): Primary | ICD-10-CM

## 2017-08-01 DIAGNOSIS — D86.9 SARCOIDOSIS: ICD-10-CM

## 2017-08-01 LAB
ALBUMIN SERPL-MCNC: 3.9 G/DL (ref 3.4–5)
ALP SERPL-CCNC: 74 U/L (ref 40–150)
ALT SERPL W P-5'-P-CCNC: 20 U/L (ref 0–70)
ANION GAP SERPL CALCULATED.3IONS-SCNC: 8 MMOL/L (ref 3–14)
AST SERPL W P-5'-P-CCNC: 17 U/L (ref 0–45)
BILIRUB SERPL-MCNC: 0.5 MG/DL (ref 0.2–1.3)
BUN SERPL-MCNC: 20 MG/DL (ref 7–30)
CALCIUM SERPL-MCNC: 8.8 MG/DL (ref 8.5–10.1)
CHLORIDE SERPL-SCNC: 103 MMOL/L (ref 94–109)
CO2 SERPL-SCNC: 29 MMOL/L (ref 20–32)
CREAT SERPL-MCNC: 1.33 MG/DL (ref 0.66–1.25)
ERYTHROCYTE [DISTWIDTH] IN BLOOD BY AUTOMATED COUNT: 13.1 % (ref 10–15)
GFR SERPL CREATININE-BSD FRML MDRD: 55 ML/MIN/1.7M2
GLUCOSE SERPL-MCNC: 99 MG/DL (ref 70–99)
HCT VFR BLD AUTO: 43.8 % (ref 40–53)
HGB BLD-MCNC: 14.3 G/DL (ref 13.3–17.7)
MCH RBC QN AUTO: 30.2 PG (ref 26.5–33)
MCHC RBC AUTO-ENTMCNC: 32.6 G/DL (ref 31.5–36.5)
MCV RBC AUTO: 92 FL (ref 78–100)
NT-PROBNP SERPL-MCNC: 522 PG/ML (ref 0–125)
PLATELET # BLD AUTO: 144 10E9/L (ref 150–450)
POTASSIUM SERPL-SCNC: 4.1 MMOL/L (ref 3.4–5.3)
PROT SERPL-MCNC: 7 G/DL (ref 6.8–8.8)
RBC # BLD AUTO: 4.74 10E12/L (ref 4.4–5.9)
SODIUM SERPL-SCNC: 140 MMOL/L (ref 133–144)
WBC # BLD AUTO: 6.3 10E9/L (ref 4–11)

## 2017-08-01 PROCEDURE — 83880 ASSAY OF NATRIURETIC PEPTIDE: CPT | Performed by: NURSE PRACTITIONER

## 2017-08-01 PROCEDURE — 36415 COLL VENOUS BLD VENIPUNCTURE: CPT | Performed by: NURSE PRACTITIONER

## 2017-08-01 PROCEDURE — 99214 OFFICE O/P EST MOD 30 MIN: CPT | Mod: ZP | Performed by: NURSE PRACTITIONER

## 2017-08-01 PROCEDURE — 99212 OFFICE O/P EST SF 10 MIN: CPT

## 2017-08-01 PROCEDURE — 85027 COMPLETE CBC AUTOMATED: CPT | Performed by: NURSE PRACTITIONER

## 2017-08-01 PROCEDURE — 80053 COMPREHEN METABOLIC PANEL: CPT | Performed by: NURSE PRACTITIONER

## 2017-08-01 RX ORDER — TADALAFIL 20 MG/1
40 TABLET ORAL DAILY
COMMUNITY
End: 2017-10-04

## 2017-08-01 ASSESSMENT — ENCOUNTER SYMPTOMS
SHORTNESS OF BREATH: 1
WHEEZING: 0
COUGH DISTURBING SLEEP: 0
SPUTUM PRODUCTION: 0
POSTURAL DYSPNEA: 1
HEMOPTYSIS: 0
DYSPNEA ON EXERTION: 1
SNORES LOUDLY: 0
COUGH: 0
RESPIRATORY PAIN: 0

## 2017-08-01 ASSESSMENT — PAIN SCALES - GENERAL: PAINLEVEL: NO PAIN (0)

## 2017-08-01 NOTE — LETTER
"8/1/2017      RE: Jimmy Isaac  [id number] [address] [address]       Dear Colleague,    Thank you for the opportunity to participate in the care of your patient, Jimmy Isaac, at the [facility] HEART [facility]. Please see a copy of my visit note below.    August 1, 2017      Mr. Jimmy Isaac is a pleasant 56 year old male with a past medical history of pulmonary hypertension, bronchial sarcoid, chronic kidney disease, and diabetes.    Today, he is accompanied by: no one; here alone    Currently dose of Tyvaso is 12 breaths, four times a day.    Current Symptoms  1. Any ER visits or hospital admissions since last appointment: No    2. Shortness of breath: Yes: with exertion or physical activity. Will stop and rest when needed and pace himself accordingly.  Will wear 6L of oxygen with activity, and at rest most of the time no oxygen.     3. Dizziness or lightheadedness: No  4. Any syncopal episodes or falls: No  5. Chest pain or chest tightness: Yes: tightness with shortness of breath, resolves with rest.  6. Nausea, vomiting, diarrhea, constipation: No  7. Swelling in the legs: No  8. Bloating in the abdomen: Yes: slight bloating  9. PND; Waking up at night coughing, choking or SOB: No; usually does not sleep with oxygen but lately he has been.  Feels \"ready\" to take Tyvaso in the morning and slightly short of breath.    10. Any medication intolerances: No  11. Reported headaches: No  12. Jaw pain or bone/joint pain: No  13. On IV Prostacyclin: No; current dose: N/A      PAST MEDICAL HISTORY:  Past Medical History:   Diagnosis Date     Hypertension      Malignant neoplasm (H)      Pulmonary sarcoidosis (H)          FAMILY HISTORY:  Family History   Problem Relation Age of Onset     Coronary Artery Disease Father      Hypertension Mother      Hyperlipidemia Mother      CEREBROVASCULAR DISEASE Mother          SOCIAL HISTORY:  Social History   Substance Use " Topics     Smoking status: Former Smoker     Packs/day: 1.00     Years: 35.00     Quit date: 3/1/2010     Smokeless tobacco: Former User     Quit date: 1/18/2010     Alcohol use No         CURRENT MEDICATIONS:  Current Outpatient Prescriptions   Medication Sig Dispense Refill     LETAIRIS 5 MG tablet Take 2 tablets (10 mg) by mouth daily 30 tablet 11     albuterol (ALBUTEROL) 108 (90 BASE) MCG/ACT Inhaler Inhale 4-6 puffs into the lungs every 6 hours as needed 1 Inhaler 6     tadalafil, PAH, (ADCIRCA) 20 MG TABS Take 2 tablets (40 mg) by mouth daily 60 tablet 11     treprostinil (TYVASO) 0.6 MG/ML SOLN 12 breaths 4 times daily 3.6 mL 11     Cholecalciferol (VITAMIN D) 2000 UNITS CAPS Take 1 capsule by mouth daily.       tadalafil, PAH, 20 MG TABS Take 40 mg by mouth daily       digoxin (LANOXIN) 125 MCG tablet Take 1 tablet (125 mcg) by mouth daily 90 tablet 3         ROS:   10 point ROS of systems including Constitutional, Eyes, Respiratory, Cardiovascular, Gastroenterology, Genitourinary, Integumentary, Muscularskeletal, Psychiatric were all negative except for pertinent positives noted in my HPI.    EXAM:  /74 (BP Location: Right arm, Patient Position: Chair, Cuff Size: Adult Regular)  Pulse 70  Ht 1.829 m (6')  Wt 84.5 kg (186 lb 3.2 oz)  SpO2 93%  BMI 25.25 kg/m2  General: appears comfortable, alert and articulate  Head: normocephalic, atraumatic  Eyes: anicteric sclera, EOMI  Neck: no adenopathy  Orophyarynx: moist mucosa, no lesions, dentition intact  Heart: regular, S1/S2, no murmur, gallop, rub, estimated JVP wnl  Lungs: clear, no rales or wheezing  Abdomen: soft, non-tender, bowel sounds present, no hepatosplenomegaly  Extremities: no clubbing, cyanosis or edema  Neurological: normal speech and affect, no gross motor deficits      Weight  Wt Readings from Last 10 Encounters:   08/01/17 84.5 kg (186 lb 3.2 oz)   06/20/17 83.5 kg (184 lb)   06/02/17 79.4 kg (175 lb)   05/25/17 79.4 kg (175 lb)    05/16/17 84.8 kg (187 lb)   10/06/16 79.4 kg (175 lb)   08/23/16 81.2 kg (179 lb)   04/12/16 84.1 kg (185 lb 8 oz)   04/12/16 83.9 kg (185 lb)   03/29/16 84 kg (185 lb 1.6 oz)         Labs:    CBC RESULTS:  Lab Results   Component Value Date    WBC 6.3 08/01/2017    RBC 4.74 08/01/2017    HGB 14.3 08/01/2017    HCT 43.8 08/01/2017    MCV 92 08/01/2017    MCH 30.2 08/01/2017    MCHC 32.6 08/01/2017    RDW 13.1 08/01/2017     (L) 08/01/2017       CMP RESULTS:  Lab Results   Component Value Date     08/01/2017    POTASSIUM 4.1 08/01/2017    CHLORIDE 103 08/01/2017    CO2 29 08/01/2017    ANIONGAP 8 08/01/2017    GLC 99 08/01/2017    BUN 20 08/01/2017    CR 1.33 (H) 08/01/2017    GFRESTIMATED 55 (L) 08/01/2017    GFRESTBLACK 67 08/01/2017    TERI 8.8 08/01/2017    BILITOTAL 0.5 08/01/2017    ALBUMIN 3.9 08/01/2017    ALKPHOS 74 08/01/2017    ALT 20 08/01/2017    AST 17 08/01/2017        INR RESULTS:  Lab Results   Component Value Date    INR 1.18 (H) 03/09/2012       BNP RESULTS:  Lab Results   Component Value Date    NTBNPI 260 04/12/2016     No results found for: BNP      Recent Tests:      Echocardiogram (5/25/2017):  Interpretation Summary     The visual ejection fraction is estimated at 65-70%.  There is mild concentric left ventricular hypertrophy.  The right ventricle is not well visualized.  Mildly decreased right ventricular systolic function  The study was technically difficult. Compared to prior study, changes are  noted.    Cardiac PET Scan (7/11/2017)  Impression: No evidence of active cardiac sarcoidosis. Active systemic  sarcoidosis   1. Cardiac: Normal cardiac function. No decreased perfusion. No  abnormal FDG uptake to suggest active cardiac sarcoidosis.   2. Multiple mediastinal and upper abdominal hypermetabolic  lymphadenopathy, consistent with active sarcoidosis and stable since  10/6/2016.  3. Extensive bilateral nephrolithiasis without hydronephrosis.    RHC (6/2/2017):  RA - 12, 15,  12  RV- 75, 12  PA- 75/34, 52  PAW-16, 17, 16  PVR-6.9  CO/CI-5.7 /2.8  Lesia CO/CI- 5.3/2.6      PFT (5/25/2017):   PFT Latest Ref Rng & Units 5/25/2017   FVC L 3.40   FEV1 L 1.21   FVC% % 67   FEV1% % 30       6MWT (8/23/2016):  314 meters on 4 L continuous O2, desat at 1:30 to 88%, continued 88-86% until completion and 2:29 recovery.      Assessment and Plan:   Mr. Jimmy Isaac is a 56 year old male with pulmonary hypertension and sarcoidosis.  WHO Group V, NYHA Functional Class II.    1. PH  Mr. Isaac is here for follow up to review PET scan results and possible medication titration. He revealed that due to a recent upper respiratory infection, he had not yet titrated his Tyvaso to 14 breaths, four times a day and was still taking 12.  He is going to now start the change in medication.  Additionally, we reviewed his PET scan results which showed no active cardiac sarcoidosis.  He is doing well otherwise with stable labs, weight, and minimal symptoms.  He will return to clinic in three months to see Dr. Lindsey, or sooner if his condition should change.    It was a pleasure seeing Mr. Jimmy Isaac at the [facility]pert[facility].       Sincerely,  Nevaeh Griffith, APRN, CNP.

## 2017-08-01 NOTE — PROGRESS NOTES
"August 1, 2017      Mr. Jimmy Isaac is a pleasant 56 year old male with a past medical history of pulmonary hypertension, bronchial sarcoid, chronic kidney disease, and diabetes.    Today, he is accompanied by: no one; here alone    Currently dose of Tyvaso is 12 breaths, four times a day.    Current Symptoms  1. Any ER visits or hospital admissions since last appointment: No    2. Shortness of breath: Yes: with exertion or physical activity. Will stop and rest when needed and pace himself accordingly.  Will wear 6L of oxygen with activity, and at rest most of the time no oxygen.     3. Dizziness or lightheadedness: No  4. Any syncopal episodes or falls: No  5. Chest pain or chest tightness: Yes: tightness with shortness of breath, resolves with rest.  6. Nausea, vomiting, diarrhea, constipation: No  7. Swelling in the legs: No  8. Bloating in the abdomen: Yes: slight bloating  9. PND; Waking up at night coughing, choking or SOB: No; usually does not sleep with oxygen but lately he has been.  Feels \"ready\" to take Tyvaso in the morning and slightly short of breath.    10. Any medication intolerances: No  11. Reported headaches: No  12. Jaw pain or bone/joint pain: No  13. On IV Prostacyclin: No; current dose: N/A      PAST MEDICAL HISTORY:  Past Medical History:   Diagnosis Date     Hypertension      Malignant neoplasm (H)      Pulmonary sarcoidosis (H)          FAMILY HISTORY:  Family History   Problem Relation Age of Onset     Coronary Artery Disease Father      Hypertension Mother      Hyperlipidemia Mother      CEREBROVASCULAR DISEASE Mother          SOCIAL HISTORY:  Social History   Substance Use Topics     Smoking status: Former Smoker     Packs/day: 1.00     Years: 35.00     Quit date: 3/1/2010     Smokeless tobacco: Former User     Quit date: 1/18/2010     Alcohol use No         CURRENT MEDICATIONS:  Current Outpatient Prescriptions   Medication Sig Dispense Refill     LETAIRIS 5 MG tablet Take 2 tablets " (10 mg) by mouth daily 30 tablet 11     albuterol (ALBUTEROL) 108 (90 BASE) MCG/ACT Inhaler Inhale 4-6 puffs into the lungs every 6 hours as needed 1 Inhaler 6     tadalafil, PAH, (ADCIRCA) 20 MG TABS Take 2 tablets (40 mg) by mouth daily 60 tablet 11     treprostinil (TYVASO) 0.6 MG/ML SOLN 12 breaths 4 times daily 3.6 mL 11     Cholecalciferol (VITAMIN D) 2000 UNITS CAPS Take 1 capsule by mouth daily.       tadalafil, PAH, 20 MG TABS Take 40 mg by mouth daily       digoxin (LANOXIN) 125 MCG tablet Take 1 tablet (125 mcg) by mouth daily 90 tablet 3         ROS:   10 point ROS of systems including Constitutional, Eyes, Respiratory, Cardiovascular, Gastroenterology, Genitourinary, Integumentary, Muscularskeletal, Psychiatric were all negative except for pertinent positives noted in my HPI.    EXAM:  /74 (BP Location: Right arm, Patient Position: Chair, Cuff Size: Adult Regular)  Pulse 70  Ht 1.829 m (6')  Wt 84.5 kg (186 lb 3.2 oz)  SpO2 93%  BMI 25.25 kg/m2  General: appears comfortable, alert and articulate  Head: normocephalic, atraumatic  Eyes: anicteric sclera, EOMI  Neck: no adenopathy  Orophyarynx: moist mucosa, no lesions, dentition intact  Heart: regular, S1/S2, no murmur, gallop, rub, estimated JVP wnl  Lungs: clear, no rales or wheezing  Abdomen: soft, non-tender, bowel sounds present, no hepatosplenomegaly  Extremities: no clubbing, cyanosis or edema  Neurological: normal speech and affect, no gross motor deficits      Weight  Wt Readings from Last 10 Encounters:   08/01/17 84.5 kg (186 lb 3.2 oz)   06/20/17 83.5 kg (184 lb)   06/02/17 79.4 kg (175 lb)   05/25/17 79.4 kg (175 lb)   05/16/17 84.8 kg (187 lb)   10/06/16 79.4 kg (175 lb)   08/23/16 81.2 kg (179 lb)   04/12/16 84.1 kg (185 lb 8 oz)   04/12/16 83.9 kg (185 lb)   03/29/16 84 kg (185 lb 1.6 oz)         Labs:    CBC RESULTS:  Lab Results   Component Value Date    WBC 6.3 08/01/2017    RBC 4.74 08/01/2017    HGB 14.3 08/01/2017    HCT  43.8 08/01/2017    MCV 92 08/01/2017    MCH 30.2 08/01/2017    MCHC 32.6 08/01/2017    RDW 13.1 08/01/2017     (L) 08/01/2017       CMP RESULTS:  Lab Results   Component Value Date     08/01/2017    POTASSIUM 4.1 08/01/2017    CHLORIDE 103 08/01/2017    CO2 29 08/01/2017    ANIONGAP 8 08/01/2017    GLC 99 08/01/2017    BUN 20 08/01/2017    CR 1.33 (H) 08/01/2017    GFRESTIMATED 55 (L) 08/01/2017    GFRESTBLACK 67 08/01/2017    TERI 8.8 08/01/2017    BILITOTAL 0.5 08/01/2017    ALBUMIN 3.9 08/01/2017    ALKPHOS 74 08/01/2017    ALT 20 08/01/2017    AST 17 08/01/2017        INR RESULTS:  Lab Results   Component Value Date    INR 1.18 (H) 03/09/2012       BNP RESULTS:  Lab Results   Component Value Date    NTBNPI 260 04/12/2016     No results found for: BNP      Recent Tests:      Echocardiogram (5/25/2017):  Interpretation Summary     The visual ejection fraction is estimated at 65-70%.  There is mild concentric left ventricular hypertrophy.  The right ventricle is not well visualized.  Mildly decreased right ventricular systolic function  The study was technically difficult. Compared to prior study, changes are  noted.    Cardiac PET Scan (7/11/2017)  Impression: No evidence of active cardiac sarcoidosis. Active systemic  sarcoidosis   1. Cardiac: Normal cardiac function. No decreased perfusion. No  abnormal FDG uptake to suggest active cardiac sarcoidosis.   2. Multiple mediastinal and upper abdominal hypermetabolic  lymphadenopathy, consistent with active sarcoidosis and stable since  10/6/2016.  3. Extensive bilateral nephrolithiasis without hydronephrosis.    RHC (6/2/2017):  RA - 12, 15, 12  RV- 75, 12  PA- 75/34, 52  PAW-16, 17, 16  PVR-6.9  CO/CI-5.7 /2.8  Lesia CO/CI- 5.3/2.6      PFT (5/25/2017):   PFT Latest Ref Rng & Units 5/25/2017   FVC L 3.40   FEV1 L 1.21   FVC% % 67   FEV1% % 30       6MWT (8/23/2016):  314 meters on 4 L continuous O2, desat at 1:30 to 88%, continued 88-86% until completion  and 2:29 recovery.      Assessment and Plan:   Mr. Jimmy Isaac is a 56 year old male with pulmonary hypertension and sarcoidosis.  WHO Group V, NYHA Functional Class II.    1. PH  Mr. Isaac is here for follow up to review PET scan results and possible medication titration. He revealed that due to a recent upper respiratory infection, he had not yet titrated his Tyvaso to 14 breaths, four times a day and was still taking 12.  He is going to now start the change in medication.  Additionally, we reviewed his PET scan results which showed no active cardiac sarcoidosis.  He is doing well otherwise with stable labs, weight, and minimal symptoms.  He will return to clinic in three months to see Dr. Lindsey, or sooner if his condition should change.    It was a pleasure seeing Mr. Jimmy Isaac at the [facility] Pulmonary Hypertension Clinic.       Sincerely,  Nevaeh Griffith, APRN, CNP.                Answers for HPI/ROS submitted by the patient on 8/1/2017   General Symptoms: No  Skin Symptoms: No  HENT Symptoms: No  EYE SYMPTOMS: No  HEART SYMPTOMS: No  LUNG SYMPTOMS: Yes  INTESTINAL SYMPTOMS: No  URINARY SYMPTOMS: No  REPRODUCTIVE SYMPTOMS: No  SKELETAL SYMPTOMS: No  BLOOD SYMPTOMS: No  NERVOUS SYSTEM SYMPTOMS: No  MENTAL HEALTH SYMPTOMS: No  Cough: No  Sputum or phlegm: No  Coughing up blood: No  Difficulty breating or shortness of breath: Yes  Snoring: No  Wheezing: No  Difficulty breathing on exertion: Yes  Respiratory pain: No  Nighttime Cough: No  Difficulty breathing when lying flat: Yes

## 2017-08-01 NOTE — NURSING NOTE
Chief Complaint   Patient presents with     Follow Up For     ph f/u, PET results     Vitals were taken and medications were reconciled.    Maureen Reveles CMA     10:52 AM

## 2017-08-01 NOTE — MR AVS SNAPSHOT
After Visit Summary   8/1/2017    Jimmy Isaac    MRN: [id number]           Patient Information     Date Of Birth          1960        Visit Information        Provider Department      8/1/2017 11:00 AM Nevaeh Griffith APRN CNP [facility]        Today's Diagnoses     Pulmonary hypertension (H)    -  1    Sarcoidosis (H)          Care Instructions    Medication Changes:  1. Try to increase to 14 breaths of Tyvaso, 4 times a day.    Patient Instructions:  1. Continue to stay active; eating a low salt, heart healthy diet    Follow up Appointment Information:  1. Three months follow up with Dr. Lindsey, labs and 6 minute walk test    We are located on the third floor of [facility] on the [facility].  Our address is     [address] [place] [address] on [address]rd [place] [address]      Thank you for allowing us to be a part of your care here at the [facility]    If you have questions or concerns please contact us at:    Bernarda Wilson RN, BSN    Miguel Ángel Valverde (Schedule,P.A.)  Nurse Coordinator     Clinic   Pulmonary Hypertension   Pulmonary Hypertension  [facility]  (P)[phone]    (P) [phone]        (F)[phone]                ** Please note that you will NOT receive a reminder call regarding your scheduled testing, reminder calls are for provider appointments only.  If you are scheduled for testing within the [facility] system you may receive a call regarding pre-registration for billing purposes only.**     Remember to weigh yourself daily after voiding and before you consume any food or beverages and log the numbers.  If you have gained/lost 2 pounds overnight or 5 pounds in a week contact us immediately for medication adjustments or further instructions.  **Please call us immediately if you have any  syncope, chest pain, edema, or decline in your functional status.            Follow-ups after your visit        Follow-up notes from your care team     Return in about 3 months (around 11/1/2017) for with Rosalia, Return PH, with, Labs, 6MWT.      Your next 10 appointments already scheduled     Sep 07, 2017  9:40 AM CDT   (Arrive by 9:25 AM)   CT CHEST W/O CONTRAST with UCCT2   [facility] [place] [facility] ([facility])    [address] [place] [id number] [phone]           Please bring any scans or X-rays taken at other hospitals, if similar tests were done. Also bring a list of your medicines, including vitamins, minerals and over-the-counter drugs. It is safest to leave personal items at home.  Be sure to tell your doctor:   If you have any allergies.   If there s any chance you are pregnant.   If you are breastfeeding.   If you have any special needs.  You do not need to do anything special to prepare.  Please wear loose clothing, such as a sweat suit or jogging clothes. Avoid snaps, zippers and other metal. We may ask you to undress and put on a hospital gown.            Sep 07, 2017 10:00 AM CDT   PFT VISIT with  PFL ISABELLA   [facility] Pulmonary Function Testing ([facility])    [address] [place] [address]   [phone]            Sep 07, 2017 10:30 AM CDT   (Arrive by 10:15 AM)   New Interstitial Lung with Shane Ruvalcaba MD   [facility] ([facility])    [address] [place] [address]   [phone]            Nov 01, 2017 10:30 AM CDT   Lab with  LAB   [facility] ([facility])    [address] [place] [id number]   [phone]            Nov 01, 2017 11:00 AM CDT   PFT VISIT with  PFL LUTHER   [facility] Pulmonary Function Testing ([facility])     [address] [address]   [phone]            Nov 01, 2017 11:30 AM CDT   (Arrive by 11:15 AM)   RETURN PRIMARY PULMONARY with Khang Lindsey MD   [facility] ([facility])    [address] [id number] [phone]              Who to contact     If you have questions or need follow up information about today's clinic visit or your schedule please contact [facility] directly at [phone].  Normal or non-critical lab and imaging results will be communicated to you by [employer], letter or phone within 4 business days after the clinic has received the results. If you do not hear from us within 7 days, please contact the clinic through [employer] or phone. If you have a critical or abnormal lab result, we will notify you by phone as soon as possible.  Submit refill requests through [employer] or call your pharmacy and they will forward the refill request to us. Please allow 3 business days for your refill to be completed.          Additional Information About Your Visit        [employer] Information     [employer] gives you secure access to your electronic health record. If you see a primary care provider, you can also send messages to your care team and make appointments. If you have questions, please call your primary care clinic.  If you do not have a primary care provider, please call [phone] and they will assist you.        Care EveryWhere ID     This is your Care EveryWhere ID. This could be used by other organizations to access your [place] medical records  [id number]        Your Vitals Were     Pulse Height Pulse Oximetry BMI (Body Mass Index)          70 1.829 m (6') 93% 25.25 kg/m2         Blood Pressure from Last 3 Encounters:   08/01/17 133/74   06/20/17 142/68   06/02/17 139/81    Weight from Last 3 Encounters:   08/01/17 84.5 kg (186 lb 3.2 oz)   06/20/17 83.5 kg (184 lb)   06/02/17  79.4 kg (175 lb)              Today, you had the following     No orders found for display       Primary Care Provider Office Phone # Fax #    Cristhian Busch [phone]       [facility] [address]  [facility] [address]        Equal Access to Services     MARIONZOILA SANDER : Hadii aad ku hadasho Soomaali, waaxda luqadaha, qaybta kaalmada adeegyada, waxay farzanehin hayflorindan emperatriz franciscajj laely ah. So [facility] [phone].    ATENCIÓN: Si habla español, tiene a hutton disposición servicios gratuitos de asistencia lingüística. Diegoame al [phone].    We comply with applicable federal civil rights laws and [place] laws. We do not discriminate on the basis of race, color, national origin, age, disability sex, sexual orientation or gender identity.            Thank you!     Thank you for choosing [facility]  for your care. Our goal is always to provide you with excellent care. Hearing back from our patients is one way we can continue to improve our services. Please take a few minutes to complete the written survey that you may receive in the mail after your visit with us. Thank you!             Your Updated Medication List - Protect others around you: Learn how to safely use, store and throw away your medicines at www.disposemymeds.org.          This list is accurate as of: 8/1/17 11:56 AM.  Always use your most recent med list.                   Brand Name Dispense Instructions for use Diagnosis    albuterol 108 (90 BASE) MCG/ACT Inhaler    albuterol    1 Inhaler    Inhale 4-6 puffs into the lungs every 6 hours as needed    Pulmonary hypertension (H), Mixed hyperlipidemia, COPD (chronic obstructive pulmonary disease) (H)       digoxin 125 MCG tablet    LANOXIN    90 tablet    Take 1 tablet (125 mcg) by mouth daily    Pulmonary hypertension (H)       LETAIRIS 5 MG tablet   Generic drug:  ambrisentan     30 tablet    Take 2 tablets (10 mg) by mouth daily    Pulmonary hypertension (H)       * tadalafil (PAH)  20 MG Tabs      Take 40 mg by mouth daily        * tadalafil (PAH) 20 MG Tabs    ADCIRCA    60 tablet    Take 2 tablets (40 mg) by mouth daily    Pulmonary hypertension (H)       treprostinil 0.6 MG/ML Soln neb solution    TYVASO    3.6 mL    12 breaths 4 times daily    Pulmonary hypertension (H)       vitamin D 2000 UNITS Caps      Take 1 capsule by mouth daily.        * Notice:  This list has 2 medication(s) that are the same as other medications prescribed for you. Read the directions carefully, and ask your doctor or other care provider to review them with you.

## 2017-08-01 NOTE — PATIENT INSTRUCTIONS
Medication Changes:  1. Try to increase to 14 breaths of Tyvaso, 4 times a day.    Patient Instructions:  1. Continue to stay active; eating a low salt, heart healthy diet    Follow up Appointment Information:  1. Three months follow up with Dr. Lindsey, labs and 6 minute walk test    We are located on the third floor of the [facility].  Our address is     [address] [place] [address] on 3rd [place] [address]      Thank you for allowing us to be a part of your care here at the [facility]    If you have questions or concerns please contact us at:    Bernarda Wilson RN, BSN    Miguel Ángel Valverde (Schedule,P.A.)  Nurse Coordinator     Clinic   Pulmonary Hypertension   Pulmonary Hypertension  [facility]  (P)[phone]    (P) [phone]        (F)[phone]                ** Please note that you will NOT receive a reminder call regarding your scheduled testing, reminder calls are for provider appointments only.  If you are scheduled for testing within the [facility] system you may receive a call regarding pre-registration for billing purposes only.**     Remember to weigh yourself daily after voiding and before you consume any food or beverages and log the numbers.  If you have gained/lost 2 pounds overnight or 5 pounds in a week contact us immediately for medication adjustments or further instructions.  **Please call us immediately if you have any syncope, chest pain, edema, or decline in your functional status.

## 2017-09-06 ASSESSMENT — ENCOUNTER SYMPTOMS
ORTHOPNEA: 0
POLYDIPSIA: 0
WHEEZING: 1
LEG PAIN: 0
WEIGHT GAIN: 0
RESPIRATORY PAIN: 0
POLYPHAGIA: 0
LEG SWELLING: 0
NIGHT SWEATS: 0
HYPOTENSION: 0
ALTERED TEMPERATURE REGULATION: 0
SNORES LOUDLY: 0
TACHYCARDIA: 1
SLEEP DISTURBANCES DUE TO BREATHING: 0
CHILLS: 0
FATIGUE: 1
HYPERTENSION: 1
INCREASED ENERGY: 0
DYSPNEA ON EXERTION: 1
COUGH: 0
DECREASED APPETITE: 0
FEVER: 0
CLAUDICATION: 0
POSTURAL DYSPNEA: 1
HEMOPTYSIS: 0
SYNCOPE: 0
WEIGHT LOSS: 0
HALLUCINATIONS: 0
COUGH DISTURBING SLEEP: 0
LIGHT-HEADEDNESS: 0
EXERCISE INTOLERANCE: 1
SPUTUM PRODUCTION: 0
PALPITATIONS: 1
SHORTNESS OF BREATH: 1

## 2017-09-07 ENCOUNTER — OFFICE VISIT (OUTPATIENT)
Dept: PULMONOLOGY | Facility: CLINIC | Age: 57
End: 2017-09-07
Attending: INTERNAL MEDICINE
Payer: COMMERCIAL

## 2017-09-07 VITALS
HEART RATE: 75 BPM | HEIGHT: 72 IN | RESPIRATION RATE: 16 BRPM | SYSTOLIC BLOOD PRESSURE: 132 MMHG | OXYGEN SATURATION: 99 % | BODY MASS INDEX: 25.34 KG/M2 | DIASTOLIC BLOOD PRESSURE: 72 MMHG | WEIGHT: 187.1 LBS

## 2017-09-07 DIAGNOSIS — R06.02 SOB (SHORTNESS OF BREATH): ICD-10-CM

## 2017-09-07 DIAGNOSIS — I27.0 PRIMARY PULMONARY HYPERTENSION (H): ICD-10-CM

## 2017-09-07 DIAGNOSIS — D86.9 SARCOIDOSIS: Primary | ICD-10-CM

## 2017-09-07 DIAGNOSIS — J84.9 ILD (INTERSTITIAL LUNG DISEASE) (H): ICD-10-CM

## 2017-09-07 LAB
6 MIN WALK (FT): 1150 FT
6 MIN WALK (M): 351 M

## 2017-09-07 PROCEDURE — 86606 ASPERGILLUS ANTIBODY: CPT | Performed by: INTERNAL MEDICINE

## 2017-09-07 PROCEDURE — 82787 IGG 1 2 3 OR 4 EACH: CPT | Performed by: INTERNAL MEDICINE

## 2017-09-07 PROCEDURE — 86331 IMMUNODIFFUSION OUCHTERLONY: CPT | Performed by: INTERNAL MEDICINE

## 2017-09-07 ASSESSMENT — PAIN SCALES - GENERAL: PAINLEVEL: NO PAIN (0)

## 2017-09-07 NOTE — LETTER
"9/7/2017       RE: Jimmy Isaac  [address] [address] [address]     Dear Colleague,    Thank you for referring your patient, Jimmy Isaac, to the [facility] at [facility]. Please see a copy of my visit note below.    REASON FOR VISIT:  Mr. Isaac is seen for pulmonary sarcoidosis.        HISTORY OF PRESENT ILLNESS:  This is a very pleasant 56-year-old male who has a diagnosis of sarcoidosis which was made via transbronchial biopsy which he tells me was in 1991.  Apparently he had another bronchoscopy in 2012.  He was treated with prednisone initially which caused significant weight gain, irritability and insomnia.  He has since been receiving periodic bursts of prednisone for \"pneumonia.\"  In 2010 he was evaluated for shortness of breath and ended up getting diagnosed with pulmonary hypertension for which he is on Tyvaso, digoxin, Letairis and Adcirca.  Since 2013 he has been on home oxygen, which he has been using intermittently.  He has been using nocturnal oxygen for the last 2 months.  He has had progressive decline in his symptoms, prompting this visit.  At this time, he tells me that his shortness of breath is present with almost any activity including going from one room to the other.  He denies any significant wheezing with it at this point, though he has noticed wheezing in the past.  He also has had intermittent chest pain.  He uses ProAir with some relief.  He is doing 2 puffs 2-3 times a day.  He is not on any long-acting bronchodilators.      He denies any other symptoms suggestive of active sarcoid except that he was told he has chronic sinusitis.  He also has had renal stones, and a stone examination done in 2012 showed calcium oxalate stones.  A recent PET scan does show nephrolithiasis with possible hydronephrosis.  He denies any ocular symptoms.  He has no neurological symptoms.  He does not have any liver " problems.        PAST MEDICAL HISTORY:   1.  Sarcoidosis diagnosed in  per his report by transbronchial biopsy.  This was treated with prednisone causing irritability, weight gain, insomnia and jitteriness.  He was treated for SAPH with most recent PA pressures of 75/34/52 and wedge pressure of 16.  Last echo was in 2016 with an EF of 60-65%.  I am not sure if there was a bubble study done at that time.    2.  Chronic sinusitis with deviated nasal septum.   3.  Hyperlipidemia.   4.  Renal stones, calcium oxalate.    5.  Pancreatitis.   6.  Question of hypercalcemia.      CURRENT MEDICATIONS:  Reviewed and include albuterol, vitamin D, digoxin, Letairis, Adcirca, and Tyvaso inhaled.      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  This company is in [place].  It is a desk job, though he is exposed to some paper dust, but he does not work actively in the [employer] area.  He lives in an old house with mold in it.  He also has horses which he feeds hay on a daily basis.  He acknowledges being exposed to mold doing that.  Currently they have cats, but no birds.  He has a 30-45-pack-year history of smoking.  He drinks 1-2 drinks twice a week.  He has no recreational drug use recently, but he has smoked pot in the past.  He does not use hot tubs and has had no TB exposures.  He has had no asbestos exposures.  He has been to [place] where they have been in caves, but nothing recently.       FAMILY HISTORY:  No sarcoidosis in the family.  One of his sisters  of lupus and the other one is living with lupus.  His father had COPD.       ALLERGIES:  No environmental allergies.      REVIEW OF SYSTEMS:  A comprehensive review of systems was done.  Positives include shortness of breath and intermittent wheezing and chest pain.  Otherwise, his review of systems is complete and negative.      PHYSICAL EXAMINATION:   GENERAL:  A middle-aged male.   VITAL SIGNS:  Blood  pressure 132/72, heart rate 75, respiratory rate 16, O2 saturation 99% on room air at rest.   HEENT:  Normocephalic, atraumatic.  Pupils round and reactive to light.  Extraocular movement intact.  Moist mucosa.  Anicteric sclerae.    NECK:  No JVD, no lymphadenopathy, no thyromegaly.   HEART:  S1, S2.  No murmurs or rubs or gallops.   ABDOMEN:  Soft, nontender, no distention.   EXTREMITIES:  No clubbing, no cyanosis, no edema.   NEUROLOGIC:  Nonfocal.      RESULTS:  PFTs done today were reviewed by me.  FVC is 2.8 liters which is 55% of predicted.  FEV1 is 0.95 liter which is 24% of predicted.  The ratio is 34.  DLCO is 10.14 which is 34% of predicted.  My interpretation is that he has obstructive ventilatory defect with severely decreased diffusion capacity.  Lung volumes would be needed to confirm concomitant restriction versus air trapping.  In comparison to spirometry in May, there has been a 260-mL decrease in FEV1.  I do not have lung volumes or    6-minute walk test on him today.        Chest CT scan has been reviewed by me with the patient.  The formal reading on his chest CT is still pending.  There are peribronchial fibrocystic changes present.  Lymph node enlargement is noted.        Cardiac PET scan done on 07/11/2017 shows normal cardiac function, multiple mediastinal and upper abdominal hypermetabolic lymph nodes, extensive bilateral nephrolithiasis with hydronephrosis, and no FDG uptake in the myocardium.       ASSESSMENT AND PLAN:  Mr. Isaac is a pleasant 56-year-old male with sarcoidosis (transbronchial biopsies not reviewed by me here) and sarcoidosis-related pulmonary hypertension presenting with worsening shortness of breath over several months.  He has had increasing gas exchange issues and has been on oxygen.  There is some question about adherence to therapy with oxygen.    1.  Pulmonary sarcoidosis and fibrotic changes with consolidation in the peribronchial area.  It is unclear if this is  treatment-responsive disease.  I have discussed this with the patient.  One way to approach this would be to do a trial of steroids for 6-8 weeks and see if his PFTs and symptoms improve.  Alternatively, he can be on methotrexate for 3-4 months to see if that will improve his symptoms.    2.  Extrapulmonary sarcoidosis with evidence of sarcoidosis-related pulmonary hypertension, on full treatment.  He has other concerns including concern for sinus involvement and also abnormal   vitamin D, calcium metabolism with renal stones.  His most recent PET scan done in 07/2017 shows hydronephrosis.  I will discuss this case with Dr. Ba and see if he would need to see Nephrology or Urology.  I think he should also see ENT to see if he has sinus disease or not.  He has never seen Ophthalmology and will need evaluation there.  Meanwhile, I am getting metabolic labs on him to see if his liver function tests versus calcium, etc., are within range.    3.  Hypoxia, on oxygen supplementation.  We will do a titration study today.  He might benefit from a bubble study to make sure he has no right-to-left shunting.    4.  I will see him back in 4-6 weeks to firm up the plan of action.  He will review prednisone versus methotrexate as treatment options.  He can also benefit from inhalers in the meantime.       Addendum: CT scan of the chest reviewed in radiology multidisciplinary rounds. Peribronchial infiltrates seen. Airway narrowing also present. Agreement about trial of prednisone to see if there is any response. If no improvement in approximately 6 weeks, will discontinue otherwise will add steroid sparing agent. Also reviewed Echo. No atrial level shunting.       Again, thank you for allowing me to participate in the care of your patient.      Sincerely,    Shane Ruvalcaba MD

## 2017-09-07 NOTE — MR AVS SNAPSHOT
After Visit Summary   9/7/2017    Jimmy Isaac    MRN: [id number]           Patient Information     Date Of Birth          1960        Visit Information        Provider Department      9/7/2017 10:30 AM Shane Ruvalcaba MD [facility]        Today's Diagnoses     Sarcoidosis (H)    -  1       Follow-ups after your visit        Additional Services     ENT referral       Specify- evalute for sinus sarcoidosis            Nephrology Adult Referral       Specify provider Dr Joshua            OPHTHALMOLOGY ADULT REFERRAL       Your provider has referred you to: [facility]: Eye Clinic [facility] [phone]   [email or web address]    Please see Dr Queen    Please be aware that coverage of these services is subject to the terms and limitations of your health insurance plan.  Call member services at your health plan with any benefit or coverage questions.      Please bring the following with you to your appointment:    (1) Any X-Rays, CTs or MRIs which have been performed.  Contact the facility where they were done to arrange for  prior to your scheduled appointment.    (2) List of current medications  (3) This referral request   (4) Any documents/labs given to you for this referral                  Follow-up notes from your care team     Return in about 6 weeks (around 10/19/2017).      Your next 10 appointments already scheduled     Sep 07, 2017 12:45 PM CDT   Lab with  LAB    Health Lab ([facility])    [address] [place] [address] [id number] [phone]            Sep 07, 2017  1:00 PM CDT   Six Minute Walk with  PFL 6 MINUTE WALK 1   [facility] Pulmonary Function Testing ([facility])    [address] [place] [address] [id number] [phone]            Sep 22, 2017 10:00 AM CDT   NEW CORNEA with Zackery Queen MD   Eye Clinic ([facility]  Clinics)    [address]  [address]  [facility] [address]   [phone]            Nov 01, 2017 10:30 AM CDT   Lab with  LAB   [facility] ([facility])    [address] [facility] [address]   [phone]            Nov 01, 2017 11:00 AM CDT   PFT VISIT with MARYANN PFL C   [facility] Pulmonary Function Testing ([facility])    [address]  [address] [facility] [address]   [phone]            Nov 01, 2017 11:30 AM CDT   (Arrive by 11:15 AM)   RETURN PRIMARY PULMONARY with Khang Lindsey MD   [facility] ([facility])    [address]  [address] [facility] [address]   [phone]            Nov 01, 2017  1:15 PM CDT   (Arrive by 1:00 PM)   New Patient Visit with Marcela Roy MD   [facility] ([facility])    [address]  [address] [facility] [address] [phone]              Future tests that were ordered for you today     Open Future Orders        Priority Expected Expires Ordered    Pulmonary Function Test Today  9/7/2018 9/7/2017    6 minute walk test Today  9/7/2018 9/7/2017    General PFT Lab (Please always keep checked) Routine  9/7/2018 9/7/2017            Who to contact     If you have questions or need follow up information about today's clinic visit or your schedule please contact [facility] directly at [phone].  Normal or non-critical lab and imaging results will be communicated to you by MyChart, letter or phone within 4 business days after the clinic has received the results. If you do not hear from us within 7 days, please contact the clinic through MyChart or phone. If you have a critical or abnormal lab result, we will notify you by phone as soon as possible.  Submit refill requests through [employer] or call your pharmacy  and they will forward the refill request to us. Please allow 3 business days for your refill to be completed.          Additional Information About Your Visit        [employer] Information     [employer] gives you secure access to your electronic health record. If you see a primary care provider, you can also send messages to your care team and make appointments. If you have questions, please call your primary care clinic.  If you do not have a primary care provider, please call [phone] and they will assist you.        Care EveryWhere ID     This is your Care EveryWhere ID. This could be used by other organizations to access your [facility] medical records  [id number]        Your Vitals Were     Pulse Respirations Height Pulse Oximetry BMI (Body Mass Index)       75 16 1.829 m (6') 99% 25.38 kg/m2        Blood Pressure from Last 3 Encounters:   09/07/17 132/72   08/01/17 133/74   06/20/17 142/68    Weight from Last 3 Encounters:   09/07/17 84.9 kg (187 lb 1.6 oz)   08/01/17 84.5 kg (186 lb 3.2 oz)   06/20/17 83.5 kg (184 lb)              We Performed the Following     Aldolase     Angiotensin converting enzyme     Anti Nuclear Phyllis IgG by IFA with Reflex     Antineutrophil cytoplasmic Phyllis IgG     Basic metabolic panel     CBC with platelets differential     CK total     CRP inflammation     Cyclic Citrullinated Peptide Antibody IgG     ENT referral     Erythrocyte sedimentation rate auto     Hepatic panel     Hypersens Pneumonitis - Farmer's Lung II     Hypersensitivity pneumonitis     IgG Subclasses     Nikole 1 Antibody IgG     Nephrology Adult Referral     OPHTHALMOLOGY ADULT REFERRAL     Rheumatoid factor     Scleroderma Antibody Scl70 SERGIO IgG     SSA Ro SERGIO Antibody IgG     SSB La SERGIO Antibody IgG        Primary Care Provider Office Phone # Fax #    Cristhian Busch [phone]       [facility] [address]  [facility] [address]        Equal Access to Services     JOVAN BOUCHER AH: Gus collins  Sarahali, isabelda luqadaha, qajoelta kajarrett ware, silvano youngblood. So [facility] [phone].    ATENCIÓN: Si kati grove, tiene a hutton disposición servicios gratuitos de asistencia lingüística. Magi al [phone].    We comply with applicable federal civil rights laws and [place] laws. We do not discriminate on the basis of race, color, national origin, age, disability sex, sexual orientation or gender identity.            Thank you!     Thank you for choosing [facility]  for your care. Our goal is always to provide you with excellent care. Hearing back from our patients is one way we can continue to improve our services. Please take a few minutes to complete the written survey that you may receive in the mail after your visit with us. Thank you!             Your Updated Medication List - Protect others around you: Learn how to safely use, store and throw away your medicines at www.disposemymeds.org.          This list is accurate as of: 9/7/17 12:09 PM.  Always use your most recent med list.                   Brand Name Dispense Instructions for use Diagnosis    albuterol 108 (90 BASE) MCG/ACT Inhaler    PROAIR HFA    1 Inhaler    Inhale 4-6 puffs into the lungs every 6 hours as needed    Pulmonary hypertension (H), Mixed hyperlipidemia, COPD (chronic obstructive pulmonary disease) (H)       digoxin 125 MCG tablet    LANOXIN    90 tablet    Take 1 tablet (125 mcg) by mouth daily    Pulmonary hypertension (H)       LETAIRIS 5 MG tablet   Generic drug:  ambrisentan     30 tablet    Take 2 tablets (10 mg) by mouth daily    Pulmonary hypertension (H)       * tadalafil (PAH) 20 MG Tabs      Take 40 mg by mouth daily        * tadalafil (PAH) 20 MG Tabs    ADCIRCA    60 tablet    Take 2 tablets (40 mg) by mouth daily    Pulmonary hypertension (H)       treprostinil 0.6 MG/ML Soln neb solution    TYVASO    3.6 mL    12 breaths 4 times daily    Pulmonary  hypertension (H)       vitamin D 2000 UNITS Caps      Take 1 capsule by mouth daily.        * Notice:  This list has 2 medication(s) that are the same as other medications prescribed for you. Read the directions carefully, and ask your doctor or other care provider to review them with you.

## 2017-09-07 NOTE — NURSING NOTE
Chief Complaint   Patient presents with     Interstitial Lung Disease (ILD)     Patient is being seen for consultation of ILD      Beverly De Santiago CMA at 10:31 AM on 9/7/2017

## 2017-09-13 LAB
DLCOUNC-%PRED-PRE: 34 %
DLCOUNC-PRE: 10.14 ML/MIN/MMHG
DLCOUNC-PRED: 29.76 ML/MIN/MMHG
ERV-%PRED-PRE: 55 %
ERV-PRE: 0.84 L
ERV-PRED: 1.5 L
EXPTIME-PRE: 15.08 SEC
FEF2575-%PRED-PRE: 9 %
FEF2575-PRE: 0.3 L/SEC
FEF2575-PRED: 3.27 L/SEC
FEFMAX-%PRED-PRE: 30 %
FEFMAX-PRE: 3.01 L/SEC
FEFMAX-PRED: 9.83 L/SEC
FEV1-%PRED-PRE: 24 %
FEV1-PRE: 0.95 L
FEV1FEV6-PRE: 44 %
FEV1FEV6-PRED: 79 %
FEV1FVC-PRE: 34 %
FEV1FVC-PRED: 77 %
FEV1SVC-PRE: 33 %
FEV1SVC-PRED: 72 %
FIFMAX-PRE: 3.22 L/SEC
FRCPLETH-%PRED-PRE: 152 %
FRCPLETH-PRE: 5.66 L
FRCPLETH-PRED: 3.71 L
FVC-%PRED-PRE: 55 %
FVC-PRE: 2.8 L
FVC-PRED: 5.06 L
IC-%PRED-PRE: 52 %
IC-PRE: 2.03 L
IC-PRED: 3.89 L
RVPLETH-%PRED-PRE: 197 %
RVPLETH-PRE: 4.82 L
RVPLETH-PRED: 2.44 L
TLCPLETH-%PRED-PRE: 102 %
TLCPLETH-PRE: 7.69 L
TLCPLETH-PRED: 7.53 L
VA-%PRED-PRE: 64 %
VA-PRE: 4.67 L
VC-%PRED-PRE: 53 %
VC-PRE: 2.87 L
VC-PRED: 5.39 L

## 2017-09-14 DIAGNOSIS — J84.9 ILD (INTERSTITIAL LUNG DISEASE) (H): Primary | ICD-10-CM

## 2017-09-18 ENCOUNTER — CARE COORDINATION (OUTPATIENT)
Dept: PULMONOLOGY | Facility: CLINIC | Age: 57
End: 2017-09-18

## 2017-09-18 DIAGNOSIS — D86.9 SARCOIDOSIS: Primary | ICD-10-CM

## 2017-09-18 NOTE — PROGRESS NOTES
Telephone Encounter: Outgoing    Reason for Outgoing Call: Dr Ruvalcaba reviewed case with team, team agreed that starting therapy may have benefit, recommending starting Prednisone first.     Nursing Action/Patient Instruction: Informed patient.    Patient Response/Questions/Concerns: Agreed with plan and okay to start right away.

## 2017-09-20 RX ORDER — PREDNISONE 10 MG/1
30 TABLET ORAL DAILY
Qty: 180 TABLET | Refills: 1 | Status: SHIPPED | OUTPATIENT
Start: 2017-09-20 | End: 2017-09-20

## 2017-09-20 RX ORDER — PREDNISONE 10 MG/1
30 TABLET ORAL DAILY
Qty: 180 TABLET | Refills: 1 | Status: SHIPPED | OUTPATIENT
Start: 2017-09-20 | End: 2017-11-27

## 2017-09-21 DIAGNOSIS — D86.9 SARCOIDOSIS: Primary | ICD-10-CM

## 2017-09-22 ENCOUNTER — OFFICE VISIT (OUTPATIENT)
Dept: OPHTHALMOLOGY | Facility: CLINIC | Age: 57
End: 2017-09-22
Attending: OPHTHALMOLOGY
Payer: COMMERCIAL

## 2017-09-22 ENCOUNTER — RADIANT APPOINTMENT (OUTPATIENT)
Dept: CARDIOLOGY | Facility: CLINIC | Age: 57
End: 2017-09-22
Attending: INTERNAL MEDICINE

## 2017-09-22 DIAGNOSIS — H25.13 AGE-RELATED NUCLEAR CATARACT OF BOTH EYES: Primary | ICD-10-CM

## 2017-09-22 DIAGNOSIS — R06.02 SOB (SHORTNESS OF BREATH): ICD-10-CM

## 2017-09-22 DIAGNOSIS — D86.9 SARCOIDOSIS: ICD-10-CM

## 2017-09-22 LAB
ALBUMIN SERPL-MCNC: 3.8 G/DL (ref 3.4–5)
ALP SERPL-CCNC: 67 U/L (ref 40–150)
ALT SERPL W P-5'-P-CCNC: 20 U/L (ref 0–70)
ANION GAP SERPL CALCULATED.3IONS-SCNC: 7 MMOL/L (ref 3–14)
AST SERPL W P-5'-P-CCNC: 17 U/L (ref 0–45)
BASOPHILS # BLD AUTO: 0 10E9/L (ref 0–0.2)
BASOPHILS NFR BLD AUTO: 0.7 %
BILIRUB DIRECT SERPL-MCNC: 0.1 MG/DL (ref 0–0.2)
BILIRUB SERPL-MCNC: 0.6 MG/DL (ref 0.2–1.3)
BUN SERPL-MCNC: 24 MG/DL (ref 7–30)
CALCIUM SERPL-MCNC: 8 MG/DL (ref 8.5–10.1)
CHLORIDE SERPL-SCNC: 103 MMOL/L (ref 94–109)
CK SERPL-CCNC: 79 U/L (ref 30–300)
CO2 SERPL-SCNC: 30 MMOL/L (ref 20–32)
CREAT SERPL-MCNC: 1.49 MG/DL (ref 0.66–1.25)
CRP SERPL-MCNC: 5.1 MG/L (ref 0–8)
DIFFERENTIAL METHOD BLD: ABNORMAL
EOSINOPHIL # BLD AUTO: 0.1 10E9/L (ref 0–0.7)
EOSINOPHIL NFR BLD AUTO: 2.6 %
ERYTHROCYTE [DISTWIDTH] IN BLOOD BY AUTOMATED COUNT: 12.6 % (ref 10–15)
ERYTHROCYTE [SEDIMENTATION RATE] IN BLOOD BY WESTERGREN METHOD: 8 MM/H (ref 0–20)
GFR SERPL CREATININE-BSD FRML MDRD: 48 ML/MIN/1.7M2
GLUCOSE SERPL-MCNC: 93 MG/DL (ref 70–99)
HCT VFR BLD AUTO: 43.3 % (ref 40–53)
HGB BLD-MCNC: 14 G/DL (ref 13.3–17.7)
IMM GRANULOCYTES # BLD: 0 10E9/L (ref 0–0.4)
IMM GRANULOCYTES NFR BLD: 0.4 %
LYMPHOCYTES # BLD AUTO: 0.6 10E9/L (ref 0.8–5.3)
LYMPHOCYTES NFR BLD AUTO: 11.1 %
MCH RBC QN AUTO: 29.9 PG (ref 26.5–33)
MCHC RBC AUTO-ENTMCNC: 32.3 G/DL (ref 31.5–36.5)
MCV RBC AUTO: 93 FL (ref 78–100)
MONOCYTES # BLD AUTO: 0.4 10E9/L (ref 0–1.3)
MONOCYTES NFR BLD AUTO: 7.6 %
NEUTROPHILS # BLD AUTO: 4.2 10E9/L (ref 1.6–8.3)
NEUTROPHILS NFR BLD AUTO: 77.6 %
NRBC # BLD AUTO: 0 10*3/UL
NRBC BLD AUTO-RTO: 0 /100
PLATELET # BLD AUTO: 131 10E9/L (ref 150–450)
POTASSIUM SERPL-SCNC: 4.1 MMOL/L (ref 3.4–5.3)
PROT SERPL-MCNC: 7.2 G/DL (ref 6.8–8.8)
RBC # BLD AUTO: 4.68 10E12/L (ref 4.4–5.9)
SODIUM SERPL-SCNC: 140 MMOL/L (ref 133–144)
WBC # BLD AUTO: 5.4 10E9/L (ref 4–11)

## 2017-09-22 PROCEDURE — 99214 OFFICE O/P EST MOD 30 MIN: CPT | Mod: ZF

## 2017-09-22 RX ADMIN — Medication 3 ML: at 13:45

## 2017-09-22 ASSESSMENT — REFRACTION_WEARINGRX
OS_SPHERE: -2.00
SPECS_TYPE: SVL
OS_AXIS: 004
OD_AXIS: 174
OS_CYLINDER: +1.50
OD_CYLINDER: +1.00
OD_SPHERE: -1.75

## 2017-09-22 ASSESSMENT — CUP TO DISC RATIO
OD_RATIO: 0.1
OS_RATIO: 0.1

## 2017-09-22 ASSESSMENT — REFRACTION_MANIFEST
OD_CYLINDER: +1.00
OS_CYLINDER: +1.00
OS_SPHERE: -1.50
OD_AXIS: 175
OD_SPHERE: -1.75
OD_ADD: +2.50
OS_AXIS: 010
OS_ADD: +2.50

## 2017-09-22 ASSESSMENT — VISUAL ACUITY
METHOD: SNELLEN - LINEAR
OS_PH_CC: 20/20-2
CORRECTION_TYPE: GLASSES
OS_CC: 20/30+1
OD_CC: 20/20-

## 2017-09-22 ASSESSMENT — TONOMETRY
IOP_METHOD: TONOPEN
OS_IOP_MMHG: 24
OD_IOP_MMHG: 19

## 2017-09-22 ASSESSMENT — EXTERNAL EXAM - RIGHT EYE: OD_EXAM: NORMAL

## 2017-09-22 ASSESSMENT — EXTERNAL EXAM - LEFT EYE: OS_EXAM: NORMAL

## 2017-09-22 ASSESSMENT — CONF VISUAL FIELD
METHOD: COUNTING FINGERS
OD_NORMAL: 1
OS_NORMAL: 1

## 2017-09-22 ASSESSMENT — SLIT LAMP EXAM - LIDS
COMMENTS: NORMAL
COMMENTS: NORMAL

## 2017-09-22 NOTE — PROGRESS NOTES
A 56 yr M    Hx of sarcoidosis since 1992, not on immunosupression    Reports mild intermittent redness. 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 I personally obtained the chief complaint(s) and history of present illness.  I confirmed and edited as necessary the review of systems, past medical/surgical history, family history, social history, and examination findings as documented by others.  I examined the patient myself, and I personally reviewed the relevant tests, images, and reports as documented above. I formulated and edited as necessary the assessment and plan and discussed the findings and management plan with the patient and family.     Zackery Queen MD, MA  Director, Cornea & Anterior Segment  [facility] Department of Ophthalmology & Visual Neuroscience

## 2017-09-22 NOTE — NURSING NOTE
Chief Complaints and History of Present Illnesses   Patient presents with     New Patient     HPI    Affected eye(s):  Both   Symptoms:     Blurred vision   No floaters   No flashes   No Dryness   No photophobia         Do you have eye pain now?:  No      Comments:  No VA changes. Wears glasses for driving. No floaters, flashes, photophobia. No previous problems with uveitis.   Brooklyn ALVARADO September 22, 2017 10:29 AM

## 2017-09-22 NOTE — MR AVS SNAPSHOT
After Visit Summary   9/22/2017    Jimmy Isaac    MRN: [id number]           Patient Information     Date Of Birth          1960        Visit Information        Provider Department      9/22/2017 10:00 AM [facility]        Today's Diagnoses     Age-related nuclear cataract of both eyes    -  1    Sarcoidosis (H)           Follow-ups after your visit        Follow-up notes from your care team     Return in about 1 year (around 9/22/2018) for Follow Up, Refraction.      Your next 10 appointments already scheduled     Sep 22, 2017 12:30 PM CDT   LAB with [facility] ([facility])    [address] [place] [id number] [phone]           Patient must bring picture ID. Patient should be prepared to give a urine specimen  Please do not eat 10-12 hours before your appointment if you are coming in fasting for labs on lipids, cholesterol, or glucose (sugar). Pregnant women should follow their Care Team instructions. Water with medications is okay. Do not drink coffee or other fluids. If you have concerns about taking  your medications, please ask at office or if scheduling via Volof[employer], send a message by clicking on Secure Messaging, Message Your Care Team.            Sep 22, 2017  1:00 PM CDT   Ech Complete Bubble with [id number]   [facility] ([facility])    [address] [place] [address] [id number] [phone]           1.  Please bring or wear a comfortable two-piece outfit. 2.  You may eat, drink and take your normal medicines. 3.  For any questions that cannot be answered, please contact the ordering physician            Sep 27, 2017 10:00 AM CDT   Lab with [facility] ([facility])    [address] [place] [id number] [phone]            Sep 27, 2017 11:00 AM CDT   (Arrive by 10:30 AM)   New Patient Visit  with Lilly Ba MD   [facility] Nephrology ([facility])    [address] [facility]  [address] [facility] [address]   [phone]            Nov 01, 2017 10:30 AM CDT   Lab with  LAB   [facility] Lab ([facility])    [address] [place] [address]  [address] [facility] [address]   [phone]            Nov 01, 2017 11:00 AM CDT   PFT VISIT with  PFL C   [facility] Pulmonary Function Testing ([facility])    [address] [place] [address]  [address] [facility] [address]   [phone]            Nov 01, 2017 11:30 AM CDT   (Arrive by 11:15 AM)   RETURN PRIMARY PULMONARY with Khang Lindsey MD   [facility] Heart Care ([facility])    [address] [place] [address]  [address] [facility] [address]   [phone]            Nov 01, 2017  1:15 PM CDT   (Arrive by 1:00 PM)   New Patient Visit with Marcela Roy MD   [facility] Ear Nose and Throat ([facility])    [address] [place] [address]  [address] [facility] [address]   [phone]            Nov 16, 2017  9:30 AM CST   (Arrive by 9:15 AM)   Return Interstitial Lung with Shane Ruvalcaba MD   [facility] ([facility])    [address] [place] [id number] [phone]              Who to contact     Please call your clinic at [phone] to:    Ask questions about your health    Make or cancel appointments    Discuss your medicines    Learn about your test results    Speak to your doctor   If you have compliments or concerns about an experience at your clinic, or if you wish to file a complaint, please contact [facility] Patient Relations at [phone] or email us at [email or web address]         Additional Information About Your Visit        MyChart Information     [employer] gives you secure access to your electronic health  record. If you see a primary care provider, you can also send messages to your care team and make appointments. If you have questions, please call your primary care clinic.  If you do not have a primary care provider, please call [phone] and they will assist you.      [employer] is an electronic gateway that provides easy, online access to your medical records. With [employer], you can request a clinic appointment, read your test results, renew a prescription or communicate with your care team.     To access your existing account, please contact your [facility] or call [phone] for assistance.        Care EveryWhere ID     This is your Care EveryWhere ID. This could be used by other organizations to access your [facility] medical records  [id number]         Blood Pressure from Last 3 Encounters:   09/07/17 132/72   08/01/17 133/74   06/20/17 142/68    Weight from Last 3 Encounters:   09/07/17 84.9 kg (187 lb 1.6 oz)   08/01/17 84.5 kg (186 lb 3.2 oz)   06/20/17 83.5 kg (184 lb)              We Performed the Following     DILATED FUNDUS EXAM        Primary Care Provider Office Phone # Fax #    Cristhian Busch [phone]       [facility] [address]  [facility] [address]        Equal Access to Services     JOVAN BOUCHER AH: Hadii aad ku hadasho Soomaali, waaxda luqadaha, qaybta kaalmada adeegyada, waxay idiin hayaan emperatriz khanthony laely . So [facility] [phone].    ATENCIÓN: Si habla español, tiene a hutton disposición servicios gratuitos de asistencia lingüística. Llame al [phone].    We comply with applicable federal civil rights laws and [place] laws. We do not discriminate on the basis of race, color, national origin, age, disability sex, sexual orientation or gender identity.            Thank you!     Thank you for choosing [facility]  for your care. Our goal is always to provide you with excellent care. Hearing back from our patients is one way we can continue  to improve our services. Please take a few minutes to complete the written survey that you may receive in the mail after your visit with us. Thank you!             Your Updated Medication List - Protect others around you: Learn how to safely use, store and throw away your medicines at www.disposemymeds.org.          This list is accurate as of: 9/22/17 11:41 AM.  Always use your most recent med list.                   Brand Name Dispense Instructions for use Diagnosis    albuterol 108 (90 BASE) MCG/ACT Inhaler    PROAIR HFA    1 Inhaler    Inhale 4-6 puffs into the lungs every 6 hours as needed    Pulmonary hypertension (H), Mixed hyperlipidemia, COPD (chronic obstructive pulmonary disease) (H)       digoxin 125 MCG tablet    LANOXIN    90 tablet    Take 1 tablet (125 mcg) by mouth daily    Pulmonary hypertension (H)       LETAIRIS 5 MG tablet   Generic drug:  ambrisentan     30 tablet    Take 2 tablets (10 mg) by mouth daily    Pulmonary hypertension (H)       predniSONE 10 MG tablet    DELTASONE    180 tablet    Take 3 tablets (30 mg) by mouth daily    Sarcoidosis (H)       * tadalafil (PAH) 20 MG Tabs      Take 40 mg by mouth daily        * tadalafil (PAH) 20 MG Tabs    ADCIRCA    60 tablet    Take 2 tablets (40 mg) by mouth daily    Pulmonary hypertension (H)       treprostinil 0.6 MG/ML Soln neb solution    TYVASO    3.6 mL    12 breaths 4 times daily    Pulmonary hypertension (H)       vitamin D 2000 UNITS Caps      Take 1 capsule by mouth daily.        * Notice:  This list has 2 medication(s) that are the same as other medications prescribed for you. Read the directions carefully, and ask your doctor or other care provider to review them with you.

## 2017-09-23 LAB
ACE SERPL-CCNC: 40 U/L (ref 9–67)
ALDOLASE SERPL-CCNC: 3.9 U/L (ref 1.5–8.1)

## 2017-09-24 LAB — ANCA IGG TITR SER IF: NORMAL {TITER}

## 2017-09-25 DIAGNOSIS — I27.20 PULMONARY HYPERTENSION (H): Primary | ICD-10-CM

## 2017-09-25 LAB
ANA SER QL IF: NEGATIVE
CCP AB SER IA-ACNC: 1 U/ML
DEPRECATED CALCIDIOL+CALCIFEROL SERPL-MC: 37 UG/L (ref 20–75)
ENA JO1 IGG SER-ACNC: <0.2 AI (ref 0–0.9)
ENA SCL70 IGG SER IA-ACNC: <0.2 AI (ref 0–0.9)
ENA SS-A IGG SER IA-ACNC: <0.2 AI (ref 0–0.9)
ENA SS-B IGG SER IA-ACNC: <0.2 AI (ref 0–0.9)
RHEUMATOID FACT SER NEPH-ACNC: <20 IU/ML (ref 0–20)

## 2017-09-26 LAB
IGG SERPL-MCNC: 665 MG/DL (ref 695–1620)
IGG1 SER-MCNC: 406 MG/DL (ref 300–856)
IGG2 SER-MCNC: 142 MG/DL (ref 158–761)
IGG3 SER-MCNC: 38 MG/DL (ref 24–192)
IGG4 SER-MCNC: 10 MG/DL (ref 11–86)

## 2017-09-27 LAB
1,25(OH)2D SERPL-MCNC: 17.4 PG/ML (ref 19.9–79.3)
A FLAVUS AB SER QL ID: NORMAL
A FUMIGATUS1 AB SER QL ID: NORMAL
A FUMIGATUS2 AB SER QL: NORMAL
A FUMIGATUS3 AB SER QL ID: NORMAL
A FUMIGATUS6 AB SER QL ID: NORMAL
A PULLULANS AB SER QL ID: NORMAL
LACEYELLA SACCHARI AB SER QL: NORMAL
PIGEON DROP IGE QN: NORMAL
S RECTIVIRGULA AB SER QL ID: NORMAL
S VIRIDIS AB SER QL: NORMAL
T CANDIDUS AB SER QL: NORMAL
T VULGARIS AB SER QL ID: NORMAL

## 2017-10-01 ENCOUNTER — TRANSFERRED RECORDS (OUTPATIENT)
Dept: HEALTH INFORMATION MANAGEMENT | Facility: CLINIC | Age: 57
End: 2017-10-01

## 2017-10-03 RX ORDER — BUDESONIDE 0.25 MG/2ML
INHALANT ORAL
Qty: 360 ML | Refills: 3 | Status: SHIPPED | OUTPATIENT
Start: 2017-10-03 | End: 2018-09-18

## 2017-10-12 ENCOUNTER — MYC REFILL (OUTPATIENT)
Dept: PULMONOLOGY | Facility: CLINIC | Age: 57
End: 2017-10-12

## 2017-10-12 DIAGNOSIS — J44.9 COPD (CHRONIC OBSTRUCTIVE PULMONARY DISEASE) (H): ICD-10-CM

## 2017-10-12 DIAGNOSIS — I27.20 PULMONARY HYPERTENSION (H): ICD-10-CM

## 2017-10-12 DIAGNOSIS — E78.2 MIXED HYPERLIPIDEMIA: ICD-10-CM

## 2017-10-12 ASSESSMENT — ENCOUNTER SYMPTOMS
SMELL DISTURBANCE: 0
TASTE DISTURBANCE: 0
HEMOPTYSIS: 0
DYSPNEA ON EXERTION: 1
COUGH DISTURBING SLEEP: 0
SINUS CONGESTION: 1
SNORES LOUDLY: 0
COUGH: 1
SHORTNESS OF BREATH: 1
SPUTUM PRODUCTION: 0
SINUS PAIN: 1
HOARSE VOICE: 0
POSTURAL DYSPNEA: 1
RESPIRATORY PAIN: 1
NECK MASS: 0
TROUBLE SWALLOWING: 0
WHEEZING: 1

## 2017-10-16 RX ORDER — ALBUTEROL SULFATE 90 UG/1
4-6 AEROSOL, METERED RESPIRATORY (INHALATION) EVERY 6 HOURS PRN
Qty: 1 INHALER | Refills: 6 | Status: SHIPPED | OUTPATIENT
Start: 2017-10-16 | End: 2018-05-29

## 2017-10-18 ENCOUNTER — OFFICE VISIT (OUTPATIENT)
Dept: NEPHROLOGY | Facility: CLINIC | Age: 57
End: 2017-10-18
Attending: INTERNAL MEDICINE
Payer: COMMERCIAL

## 2017-10-18 VITALS
WEIGHT: 193.6 LBS | BODY MASS INDEX: 26.22 KG/M2 | DIASTOLIC BLOOD PRESSURE: 75 MMHG | HEART RATE: 65 BPM | TEMPERATURE: 99.1 F | SYSTOLIC BLOOD PRESSURE: 132 MMHG | HEIGHT: 72 IN | OXYGEN SATURATION: 90 %

## 2017-10-18 DIAGNOSIS — I27.20 PULMONARY HYPERTENSION (H): ICD-10-CM

## 2017-10-18 DIAGNOSIS — N20.0 RECURRENT NEPHROLITHIASIS: Primary | ICD-10-CM

## 2017-10-18 DIAGNOSIS — R60.0 LOCALIZED EDEMA: ICD-10-CM

## 2017-10-18 LAB
ALBUMIN SERPL-MCNC: 3.4 G/DL (ref 3.4–5)
ALBUMIN UR-MCNC: NEGATIVE MG/DL
ANION GAP SERPL CALCULATED.3IONS-SCNC: 6 MMOL/L (ref 3–14)
APPEARANCE UR: CLEAR
BILIRUB UR QL STRIP: NEGATIVE
BUN SERPL-MCNC: 29 MG/DL (ref 7–30)
CALCIUM SERPL-MCNC: 8.7 MG/DL (ref 8.5–10.1)
CHLORIDE SERPL-SCNC: 102 MMOL/L (ref 94–109)
CO2 SERPL-SCNC: 29 MMOL/L (ref 20–32)
COLOR UR AUTO: YELLOW
CREAT SERPL-MCNC: 1.29 MG/DL (ref 0.66–1.25)
CREAT UR-MCNC: 44 MG/DL
DEPRECATED CALCIDIOL+CALCIFEROL SERPL-MC: 29 UG/L (ref 20–75)
FERRITIN SERPL-MCNC: 41 NG/ML (ref 26–388)
GFR SERPL CREATININE-BSD FRML MDRD: 57 ML/MIN/1.7M2
GLUCOSE SERPL-MCNC: 122 MG/DL (ref 70–99)
GLUCOSE UR STRIP-MCNC: NEGATIVE MG/DL
HGB BLD-MCNC: 14.2 G/DL (ref 13.3–17.7)
HGB UR QL STRIP: ABNORMAL
IRON SATN MFR SERPL: 29 % (ref 15–46)
IRON SERPL-MCNC: 82 UG/DL (ref 35–180)
KETONES UR STRIP-MCNC: NEGATIVE MG/DL
LEUKOCYTE ESTERASE UR QL STRIP: NEGATIVE
MUCOUS THREADS #/AREA URNS LPF: PRESENT /LPF
NITRATE UR QL: NEGATIVE
PH UR STRIP: 6 PH (ref 5–7)
PHOSPHATE SERPL-MCNC: 1.7 MG/DL (ref 2.5–4.5)
POTASSIUM SERPL-SCNC: 4.4 MMOL/L (ref 3.4–5.3)
PROT UR-MCNC: 0.08 G/L
PROT/CREAT 24H UR: 0.19 G/G CR (ref 0–0.2)
PTH-INTACT SERPL-MCNC: 29 PG/ML (ref 12–72)
RBC #/AREA URNS AUTO: 25 /HPF (ref 0–2)
SODIUM SERPL-SCNC: 138 MMOL/L (ref 133–144)
SOURCE: ABNORMAL
SP GR UR STRIP: 1.01 (ref 1–1.03)
TIBC SERPL-MCNC: 285 UG/DL (ref 240–430)
UROBILINOGEN UR STRIP-MCNC: 0 MG/DL (ref 0–2)
WBC #/AREA URNS AUTO: 2 /HPF (ref 0–2)

## 2017-10-18 PROCEDURE — 83540 ASSAY OF IRON: CPT | Performed by: INTERNAL MEDICINE

## 2017-10-18 PROCEDURE — 83970 ASSAY OF PARATHORMONE: CPT | Performed by: INTERNAL MEDICINE

## 2017-10-18 PROCEDURE — 82306 VITAMIN D 25 HYDROXY: CPT | Performed by: INTERNAL MEDICINE

## 2017-10-18 PROCEDURE — 83550 IRON BINDING TEST: CPT | Performed by: INTERNAL MEDICINE

## 2017-10-18 PROCEDURE — 84156 ASSAY OF PROTEIN URINE: CPT | Performed by: INTERNAL MEDICINE

## 2017-10-18 PROCEDURE — 81001 URINALYSIS AUTO W/SCOPE: CPT | Performed by: INTERNAL MEDICINE

## 2017-10-18 PROCEDURE — 85018 HEMOGLOBIN: CPT | Performed by: INTERNAL MEDICINE

## 2017-10-18 PROCEDURE — 36415 COLL VENOUS BLD VENIPUNCTURE: CPT | Performed by: INTERNAL MEDICINE

## 2017-10-18 PROCEDURE — 82728 ASSAY OF FERRITIN: CPT | Performed by: INTERNAL MEDICINE

## 2017-10-18 PROCEDURE — 80069 RENAL FUNCTION PANEL: CPT | Performed by: INTERNAL MEDICINE

## 2017-10-18 RX ORDER — AMBRISENTAN 10 MG/1
TABLET, FILM COATED ORAL DAILY
COMMUNITY
Start: 2017-10-03 | End: 2018-03-02

## 2017-10-18 RX ORDER — FUROSEMIDE 20 MG
20 TABLET ORAL DAILY
Qty: 30 TABLET | Refills: 1 | Status: SHIPPED | OUTPATIENT
Start: 2017-10-18 | End: 2018-01-18

## 2017-10-18 ASSESSMENT — PAIN SCALES - GENERAL: PAINLEVEL: NO PAIN (0)

## 2017-10-18 NOTE — LETTER
10/18/2017       RE: Jimmy Isaac  [address] [address] [phone]     Dear Colleague,    Thank you for referring your patient, Jimmy Isaac, to the [facility] NEPHROLOGY at [facility]. Please see a copy of my visit note below.    [facility]  10/18/2017     Jimmy Isaac MRN:[id number] YOB: 1960  Primary care provider: Cristhian Busch  Requesting physician: Cristhian Busch     ASSESSMENT AND RECOMMENDATIONS:   Jimmy Isaac is a 56 year old male presenting for nephrolithiasis.  1. CKD 3 - b/l Creatinine ~1.4 with eGFR 50-60 since 2012, inactive urine sediment, hematuria probably due to kidney stones.  ddx previous SISI vs interstitial disease related to sarcoidosis  2. Recurrent kidney stones - calcium oxalate, risk for recurrence related to urine volume less than 2.5 liters, low calcium diet, intermittent high sodium diet, dietary hyperoxaluria and hypocitraturia   - hyperoxaluria - he does not have hypercalcemia and based on low 1,25 OH D he is low risk so will have him increase dietary calcium with 8 oz milk with meals  - hypocitraturia - due to low fruit/veggie diet - advised on increase fruit and veggies, he does not like lemon or lime so supplementing with that is not an option.  Briefly introduced idea of adding potassium citrate, he declines additional medication at this time  - increase fluid intake to 100 oz a day when off prednisone  - CT scan for stone burden, he will complete this in one month when he is due for chest CT  - advised on appt with Dr. Moura and dietician after CT is completed  3. Hypervolemia - mild sodium retention with prednisone, discussed low salt diet, will give furosemide 20 mg daily and have nurse follow up in one week  4. Hyperphosphatemia - discussed that dairy is high in phos, his active vitamin D is low, will follow up on PTH and consider adding calcitriol.  - continue  cholecalciferol 2000 units daily    RTC 16 weeks with litholink and no imaging      Lilly Ba MD  [facility]  Department of Medicine  Division of Renal Disease and Hypertension  [phone]       REASON FOR CONSULT: nephrolithiasis    HISTORY OF PRESENT ILLNESS:  Jimmy Isaac is a 56 year old man with history of sarcoidosis diagnosed in 1991 complicated by pulmonary hypertension, hypoxia on home oxygen when ambulating (Dr. Ruvalcaba note reviewed, no e/o left/right shunt on echo, he does have peribronchial infiltrates so started on prednisone) and nephrolithiasis (calcium oxalate).  There is no recent renal imaging.  Labs last month were significant for Vitamin D level of 37 with 1,25 OH D of 17.4 and corrected serum calcium of 8 (albumin is 3.8).  Since starting on prednisone, he does not feel much better, he has a bit more energy but still gets short of breath.  He also notes bilateral leg edema and facial fullness since starting prednisone.    He also has chronic kidney disease with baseline creatinine ~1.4 mg/dL and eGFR 48-55.  He saw Dr. Sevilla for this in 2012.  His creatinine has been stable since 2012.  He has had RBC on UA (2 available to review, today and one from 2012).    He has been on vitamin D since 2012 - 2000 units per day.  He does not take any calcium supplements.  He eats 2 meals per day.  Minimizes red meat, he eats chicken, salad, some veggies.  He also eats pizza, burgers, fries.  He gets only one serving of fruit or veggie per day.  He lives with his fiance who tries to push veggies on him.  He drinks water, soda 2-4 per month, beer 1 every two weeks, coffee every day (2-3 cups but not on weekends).  He avoids milk because of sarcoidosis but has occasional cheese, yogurt or ice cream.  He is trying to avoid nuts.    He was initiate dx kidney stones in 1991 with new dx sarcoidosis and was told to drink a lot of water.  Stones were noted again in 2012  incidentally on imaging for pulmonary hypertension but he has also passed stones which were analyzed in 2012 and showed calcium oxalate.    I reviewed 24 hour urine chemistries, done on 10/1/17 that shows volume of 1.56 L, calcium 186 with sodium of 135 and protein catabolic rate of 1.2.   The 24 hour urine oxalate is 64 and super-saturation of calcium oxalate 12.9.   24 hour urine citrate is 102 with pH of 5.25.  24 hour urine uric acid is 575 mg/day with super-saturation uric acid of 2.25.    In terms of calcium phosphate, risk is low with low urine pH.  24 hour urine potassium is 42 mmol/day  24 hour urine magnesium is 103 mg/day    Family history is negative for kidney stones but he has two sisters with lupus, both with kidney problems, one  of lupus.    Last imagin  Not stone free    PAST MEDICAL HISTORY:  Reviewed  Past Medical History:   Diagnosis Date     Hypertension      Malignant neoplasm (H)      Pulmonary sarcoidosis (H)        PSH:  Reviewed  Past Surgical History:   Procedure Laterality Date     COLONOSCOPY       ENT SURGERY      wisdom teeth extraction        MEDICATIONS:  Current Outpatient Prescriptions   Medication Sig Dispense Refill     albuterol (PROAIR HFA) 108 (90 BASE) MCG/ACT Inhaler Inhale 4-6 puffs into the lungs every 6 hours as needed 1 Inhaler 6     budesonide (PULMICORT) 0.25 MG/2ML neb solution INHALE THE CONTENTS OF 1   VIAL TWO TIMES A  mL 3     predniSONE (DELTASONE) 10 MG tablet Take 3 tablets (30 mg) by mouth daily 180 tablet 1     digoxin (LANOXIN) 125 MCG tablet Take 1 tablet (125 mcg) by mouth daily 90 tablet 3     LETAIRIS 5 MG tablet Take 2 tablets (10 mg) by mouth daily 30 tablet 11     tadalafil, PAH, (ADCIRCA) 20 MG TABS Take 2 tablets (40 mg) by mouth daily 60 tablet 11     treprostinil (TYVASO) 0.6 MG/ML SOLN 12 breaths 4 times daily 3.6 mL 11     Cholecalciferol (VITAMIN D) 2000 UNITS CAPS Take 1 capsule by mouth daily.          ALLERGIES:     Allergies   Allergen Reactions     Nkda [No Known Drug Allergies]        REVIEW OF SYSTEMS:  A 10 point review of systems was negative except as noted above.    SOCIAL HISTORY:   Reviewed  Employed as  for a printing company  Social History     Social History     Marital status: Single     Spouse name: N/A     Number of children: N/A     Years of education: N/A     Occupational History     Not on file.     Social History Main Topics     Smoking status: Former Smoker     Packs/day: 1.00     Years: 35.00     Quit date: 3/1/2010     Smokeless tobacco: Former User     Quit date: 1/18/2010     Alcohol use No     Drug use: No      Comment: past use of marijuana     Sexual activity: Not Currently     Partners: Female     Birth control/ protection: Female Surgical     Other Topics Concern     Caffeine Concern Yes     Exercise Yes     Social History Narrative     No narrative on file       FAMILY MEDICAL HISTORY:   Family History   Problem Relation Age of Onset     Coronary Artery Disease Father      Hypertension Mother      Hyperlipidemia Mother      CEREBROVASCULAR DISEASE Mother      Glaucoma No family hx of      Macular Degeneration No family hx of        PHYSICAL EXAM:   /75  Pulse 65  Temp 99.1  F (37.3  C) (Oral)  Ht 1.829 m (6')  Wt 87.8 kg (193 lb 9.6 oz)  SpO2 90%  BMI 26.26 kg/m2   Not on oxygen  GENERAL APPEARANCE: alert and no distress  EYES: no scleral icterus, gaze conjugate  HENT: mouth without ulcers or lesions  Endocrine: no moon facies, no goiter  RESP: lungs with decreased breath sounds, no appreciable crackles normal work of breathing, no cyanosis or clubbing   CV: regular, no rub, 1+ bilateral leg edema, warm and well perfused  : no Chong  SKIN: no rash, warm, dry  NEURO: face symmetric, mentation intact and speech normal  Psych: well groomed, affect full, pleasant, normal mental status  Musculoskeletal: grossly normal ROM of arms and legs without obvious joint  abnormalities    LABS:   I reviewed:  Electrolytes/Renal -   Recent Labs   Lab Test  09/22/17   1227  08/01/17   1026  05/16/17   1159   03/09/12   0723  03/08/12   0657  03/07/12   1527   01/19/12   1311   NA  140  140  139   < >  138  137  139   < >  143   POTASSIUM  4.1  4.1  4.3   < >  4.2  3.8  4.4   < >  4.9   CHLORIDE  103  103  103   < >  98  100  98   < >  103   CO2  30  29  28   < >  33*  29  34*   < >  30   BUN  24  20  16   < >  24  28  31*   < >  25   CR  1.49*  1.33*  1.38*   < >  1.84*  1.69*  1.86*   < >  1.67*   GLC  93  99  98   < >  96  86  104*   < >  82   TERI  8.0*  8.8  9.1   < >  8.7  8.5  9.0   < >  9.2   MAG   --    --    --    --   2.0  2.0  1.9   --    --    PHOS   --    --    --    --    --    --    --    --   3.8    < > = values in this interval not displayed.       CBC -   Recent Labs   Lab Test  10/18/17   1121  09/22/17   1227  08/01/17   1026  05/16/17   1159   WBC   --   5.4  6.3  5.7   HGB  14.2  14.0  14.3  14.9   PLT   --   131*  144*  142*       LFTs -   Recent Labs   Lab Test  09/22/17   1227  08/01/17   1026  05/16/17   1159   ALKPHOS  67  74  75   BILITOTAL  0.6  0.5  0.5   ALT  20  20  22   AST  17  17  17   PROTTOTAL  7.2  7.0  7.3   ALBUMIN  3.8  3.9  3.8       Coags -   Recent Labs   Lab Test  03/09/12   0723  03/08/12   0657  01/12/12   1147   INR  1.18*  1.15*  1.15*       Iron Panel -   Recent Labs   Lab Test  01/12/12   1147   IRON  49   IRONSAT  14*       Endocrine -   Recent Labs   Lab Test  08/23/16   1431  01/12/12   1147   A1C  6.2*   --    TSH   --   2.05       IMAGING:  CT 9/7/17 report reviewed by me and I also reviewed images, kidneys were not completely imaged.  I measure one stone on left to be 1.5 cm    1. Decreased groundglass opacities in the medial right upper lobe with otherwise unchanged findings of perihilar fibrosis secondary to sarcoidosis   2. Stable mediastinal adenopathy.  3. Pulmonary arterial hypertension.  4. No significant change in the  extensive nonobstructing nephrolithiasis.    Lilly Ba MD       Answers for HPI/ROS submitted by the patient on 10/12/2017   General Symptoms: No  Skin Symptoms: No  HENT Symptoms: Yes  EYE SYMPTOMS: No  HEART SYMPTOMS: No  LUNG SYMPTOMS: Yes  INTESTINAL SYMPTOMS: No  URINARY SYMPTOMS: No  REPRODUCTIVE SYMPTOMS: No  SKELETAL SYMPTOMS: No  BLOOD SYMPTOMS: No  NERVOUS SYSTEM SYMPTOMS: No  MENTAL HEALTH SYMPTOMS: No  Ear pain: No  Ear discharge: No  Hearing loss: No  Tinnitus: No  Nosebleeds: No  Congestion: Yes  Sinus pain: Yes  Trouble swallowing: No   Voice hoarseness: No  Mouth sores: No  Tooth pain: No  Gum tenderness: No  Bleeding gums: No  Change in taste: No  Change in sense of smell: No  Dry mouth: No  Hearing aid used: No  Neck lump: No  Cough: Yes  Sputum or phlegm: No  Coughing up blood: No  Difficulty breating or shortness of breath: Yes  Snoring: No  Wheezing: Yes  Difficulty breathing on exertion: Yes  Respiratory pain: Yes  Nighttime Cough: No  Difficulty breathing when lying flat: Yes      Again, thank you for allowing me to participate in the care of your patient.      Sincerely,    Lilly Ba MD

## 2017-10-18 NOTE — PATIENT INSTRUCTIONS
Dear Jimmy Isaac      Your were seen in the [facility].  Basic advice to avoid kidney stones  - Drink 100 ounces of fluid daily  - keep urine volume greater than 72 ounces per day  - low sodium diet (less than 2400 mg sodium per day)  - plenty of dietary calcium.  Please have one high calcium food three times a day with meals - can be either 8 oz milk, 6 oz yogurt or 2 oz low sodium cheese or 8 oz calcium fortified OJ/dairy alternative)   ---- Total should be at least 1000 mg calcium a day from food  - Protein (meat) in moderation  - add lemon or lime to foods and beverages.  Consider 2-4 oz lemon or lime juice diluted in water.  I advise against lemonade since it is high in sugar and low in actual lemon juice.     [email or web address]    Today we discussed   1- increase dietary calcium - 8 oz milk or 8 oz calcium fortified orange juice with every meal  2. The goal is to drink 100 ounces of fluid every day - coffee counts towards that goal  3. Increase fruit and veggie intake to 6-9 servings per day    I would like you to have appointment with our dietician - Joan Baez as well as one of the surgeons - you can see Dr. Moura on a Tuesday in November after your CT scan      We are suggesting the following medications:  Furosemide 20 mg daily  Please take your BP at home and let us know if you are dizzy or if blood pressure is less than 110/55    Please get the following tests done:  CT scan for kidney stones  24 hour urine collection in 3 months - if citrate level is not better we will talk about citrate pill      Please set up appointment with:  Lilly Ba MD in 4 months    It was a pleasure meeting with you today. Thank you for allowing me and my team the privilege of caring for you today. YOU are the reason we are here, and I truly hope we provided you with the excellent service you deserve. Please let us know if there is anything else we can  do for you so that we can be sure you are leaving completely satisfied with your care experience.    Take care!  Lilly Ba MD  Department of Medicine  Division of Renal Diseases and Hypertension  [facility]    Email: [email or web address]  You may reach a nurse by calling [phone]

## 2017-10-18 NOTE — PROGRESS NOTES
[facility] Nephrology [facility]  10/18/2017     Jimmy Isaac MRN:[id number] YOB: 1960  Primary care provider: Cristhian Busch  Requesting physician: Cristhian Busch     ASSESSMENT AND RECOMMENDATIONS:   Jimmy Isaac is a 56 year old male presenting for nephrolithiasis.  1. CKD 3 - b/l Creatinine ~1.4 with eGFR 50-60 since 2012, inactive urine sediment, hematuria probably due to kidney stones.  ddx previous SISI vs interstitial disease related to sarcoidosis  2. Recurrent kidney stones - calcium oxalate, risk for recurrence related to urine volume less than 2.5 liters, low calcium diet, intermittent high sodium diet, dietary hyperoxaluria and hypocitraturia   - hyperoxaluria - he does not have hypercalcemia and based on low 1,25 OH D he is low risk so will have him increase dietary calcium with 8 oz milk with meals  - hypocitraturia - due to low fruit/veggie diet - advised on increase fruit and veggies, he does not like lemon or lime so supplementing with that is not an option.  Briefly introduced idea of adding potassium citrate, he declines additional medication at this time  - increase fluid intake to 100 oz a day when off prednisone  - CT scan for stone burden, he will complete this in one month when he is due for chest CT  - advised on appt with Dr. Moura and dietician after CT is completed  3. Hypervolemia - mild sodium retention with prednisone, discussed low salt diet, will give furosemide 20 mg daily and have nurse follow up in one week  4. Hyperphosphatemia - discussed that dairy is high in phos, his active vitamin D is low, will follow up on PTH and consider adding calcitriol.  - continue cholecalciferol 2000 units daily    RTC 16 weeks with litholink and no imaging      Lilly Ba MD  [facility]  Department of Medicine  Division of Renal Disease and Hypertension  [phone]       REASON FOR CONSULT: nephrolithiasis    HISTORY OF PRESENT  ILLNESS:  Jimmy Isaac is a 56 year old man with history of sarcoidosis diagnosed in 1991 complicated by pulmonary hypertension, hypoxia on home oxygen when ambulating (Dr. Ruvalcaba note reviewed, no e/o left/right shunt on echo, he does have peribronchial infiltrates so started on prednisone) and nephrolithiasis (calcium oxalate).  There is no recent renal imaging.  Labs last month were significant for Vitamin D level of 37 with 1,25 OH D of 17.4 and corrected serum calcium of 8 (albumin is 3.8).  Since starting on prednisone, he does not feel much better, he has a bit more energy but still gets short of breath.  He also notes bilateral leg edema and facial fullness since starting prednisone.    He also has chronic kidney disease with baseline creatinine ~1.4 mg/dL and eGFR 48-55.  He saw Dr. Sevilla for this in 2012.  His creatinine has been stable since 2012.  He has had RBC on UA (2 available to review, today and one from 2012).    He has been on vitamin D since 2012 - 2000 units per day.  He does not take any calcium supplements.  He eats 2 meals per day.  Minimizes red meat, he eats chicken, salad, some veggies.  He also eats pizza, burgers, fries.  He gets only one serving of fruit or veggie per day.  He lives with his fiance who tries to push veggies on him.  He drinks water, soda 2-4 per month, beer 1 every two weeks, coffee every day (2-3 cups but not on weekends).  He avoids milk because of sarcoidosis but has occasional cheese, yogurt or ice cream.  He is trying to avoid nuts.    He was initiate dx kidney stones in 1991 with new dx sarcoidosis and was told to drink a lot of water.  Stones were noted again in 2012 incidentally on imaging for pulmonary hypertension but he has also passed stones which were analyzed in 2012 and showed calcium oxalate.    I reviewed 24 hour urine chemistries, done on 10/1/17 that shows volume of 1.56 L, calcium 186 with sodium of 135 and protein catabolic rate of 1.2.   The  24 hour urine oxalate is 64 and super-saturation of calcium oxalate 12.9.   24 hour urine citrate is 102 with pH of 5.25.  24 hour urine uric acid is 575 mg/day with super-saturation uric acid of 2.25.    In terms of calcium phosphate, risk is low with low urine pH.  24 hour urine potassium is 42 mmol/day  24 hour urine magnesium is 103 mg/day    Family history is negative for kidney stones but he has two sisters with lupus, both with kidney problems, one  of lupus.    Last imagin  Not stone free    PAST MEDICAL HISTORY:  Reviewed  Past Medical History:   Diagnosis Date     Hypertension      Malignant neoplasm (H)      Pulmonary sarcoidosis (H)        PSH:  Reviewed  Past Surgical History:   Procedure Laterality Date     COLONOSCOPY       ENT SURGERY      wisdom teeth extraction        MEDICATIONS:  Current Outpatient Prescriptions   Medication Sig Dispense Refill     albuterol (PROAIR HFA) 108 (90 BASE) MCG/ACT Inhaler Inhale 4-6 puffs into the lungs every 6 hours as needed 1 Inhaler 6     budesonide (PULMICORT) 0.25 MG/2ML neb solution INHALE THE CONTENTS OF 1   VIAL TWO TIMES A  mL 3     predniSONE (DELTASONE) 10 MG tablet Take 3 tablets (30 mg) by mouth daily 180 tablet 1     digoxin (LANOXIN) 125 MCG tablet Take 1 tablet (125 mcg) by mouth daily 90 tablet 3     LETAIRIS 5 MG tablet Take 2 tablets (10 mg) by mouth daily 30 tablet 11     tadalafil, PAH, (ADCIRCA) 20 MG TABS Take 2 tablets (40 mg) by mouth daily 60 tablet 11     treprostinil (TYVASO) 0.6 MG/ML SOLN 12 breaths 4 times daily 3.6 mL 11     Cholecalciferol (VITAMIN D) 2000 UNITS CAPS Take 1 capsule by mouth daily.          ALLERGIES:    Allergies   Allergen Reactions     Nkda [No Known Drug Allergies]        REVIEW OF SYSTEMS:  A 10 point review of systems was negative except as noted above.    SOCIAL HISTORY:   Reviewed  Employed as  for a printing company  Social History     Social History     Marital  status: Single     Spouse name: N/A     Number of children: N/A     Years of education: N/A     Occupational History     Not on file.     Social History Main Topics     Smoking status: Former Smoker     Packs/day: 1.00     Years: 35.00     Quit date: 3/1/2010     Smokeless tobacco: Former User     Quit date: 1/18/2010     Alcohol use No     Drug use: No      Comment: past use of marijuana     Sexual activity: Not Currently     Partners: Female     Birth control/ protection: Female Surgical     Other Topics Concern     Caffeine Concern Yes     Exercise Yes     Social History Narrative     No narrative on file       FAMILY MEDICAL HISTORY:   Family History   Problem Relation Age of Onset     Coronary Artery Disease Father      Hypertension Mother      Hyperlipidemia Mother      CEREBROVASCULAR DISEASE Mother      Glaucoma No family hx of      Macular Degeneration No family hx of        PHYSICAL EXAM:   /75  Pulse 65  Temp 99.1  F (37.3  C) (Oral)  Ht 1.829 m (6')  Wt 87.8 kg (193 lb 9.6 oz)  SpO2 90%  BMI 26.26 kg/m2   Not on oxygen  GENERAL APPEARANCE: alert and no distress  EYES: no scleral icterus, gaze conjugate  HENT: mouth without ulcers or lesions  Endocrine: no moon facies, no goiter  RESP: lungs with decreased breath sounds, no appreciable crackles normal work of breathing, no cyanosis or clubbing   CV: regular, no rub, 1+ bilateral leg edema, warm and well perfused  : no Chong  SKIN: no rash, warm, dry  NEURO: face symmetric, mentation intact and speech normal  Psych: well groomed, affect full, pleasant, normal mental status  Musculoskeletal: grossly normal ROM of arms and legs without obvious joint abnormalities    LABS:   I reviewed:  Electrolytes/Renal -   Recent Labs   Lab Test  09/22/17   1227  08/01/17   1026  05/16/17   1159   03/09/12   0723  03/08/12   0657  03/07/12   1527   01/19/12   1311   NA  140  140  139   < >  138  137  139   < >  143   POTASSIUM  4.1  4.1  4.3   < >  4.2  3.8   4.4   < >  4.9   CHLORIDE  103  103  103   < >  98  100  98   < >  103   CO2  30  29  28   < >  33*  29  34*   < >  30   BUN  24  20  16   < >  24  28  31*   < >  25   CR  1.49*  1.33*  1.38*   < >  1.84*  1.69*  1.86*   < >  1.67*   GLC  93  99  98   < >  96  86  104*   < >  82   TERI  8.0*  8.8  9.1   < >  8.7  8.5  9.0   < >  9.2   MAG   --    --    --    --   2.0  2.0  1.9   --    --    PHOS   --    --    --    --    --    --    --    --   3.8    < > = values in this interval not displayed.       CBC -   Recent Labs   Lab Test  10/18/17   1121  09/22/17   1227  08/01/17   1026  05/16/17   1159   WBC   --   5.4  6.3  5.7   HGB  14.2  14.0  14.3  14.9   PLT   --   131*  144*  142*       LFTs -   Recent Labs   Lab Test  09/22/17   1227  08/01/17   1026  05/16/17   1159   ALKPHOS  67  74  75   BILITOTAL  0.6  0.5  0.5   ALT  20  20  22   AST  17  17  17   PROTTOTAL  7.2  7.0  7.3   ALBUMIN  3.8  3.9  3.8       Coags -   Recent Labs   Lab Test  03/09/12   0723  03/08/12   0657  01/12/12   1147   INR  1.18*  1.15*  1.15*       Iron Panel -   Recent Labs   Lab Test  01/12/12   1147   IRON  49   IRONSAT  14*       Endocrine -   Recent Labs   Lab Test  08/23/16   1431  01/12/12   1147   A1C  6.2*   --    TSH   --   2.05       IMAGING:  CT 9/7/17 report reviewed by me and I also reviewed images, kidneys were not completely imaged.  I measure one stone on left to be 1.5 cm    1. Decreased groundglass opacities in the medial right upper lobe with otherwise unchanged findings of perihilar fibrosis secondary to sarcoidosis   2. Stable mediastinal adenopathy.  3. Pulmonary arterial hypertension.  4. No significant change in the extensive nonobstructing nephrolithiasis.    Lilly Ba MD       Answers for HPI/ROS submitted by the patient on 10/12/2017   General Symptoms: No  Skin Symptoms: No  HENT Symptoms: Yes  EYE SYMPTOMS: No  HEART SYMPTOMS: No  LUNG SYMPTOMS: Yes  INTESTINAL SYMPTOMS: No  URINARY SYMPTOMS:  No  REPRODUCTIVE SYMPTOMS: No  SKELETAL SYMPTOMS: No  BLOOD SYMPTOMS: No  NERVOUS SYSTEM SYMPTOMS: No  MENTAL HEALTH SYMPTOMS: No  Ear pain: No  Ear discharge: No  Hearing loss: No  Tinnitus: No  Nosebleeds: No  Congestion: Yes  Sinus pain: Yes  Trouble swallowing: No   Voice hoarseness: No  Mouth sores: No  Tooth pain: No  Gum tenderness: No  Bleeding gums: No  Change in taste: No  Change in sense of smell: No  Dry mouth: No  Hearing aid used: No  Neck lump: No  Cough: Yes  Sputum or phlegm: No  Coughing up blood: No  Difficulty breating or shortness of breath: Yes  Snoring: No  Wheezing: Yes  Difficulty breathing on exertion: Yes  Respiratory pain: Yes  Nighttime Cough: No  Difficulty breathing when lying flat: Yes

## 2017-10-18 NOTE — MR AVS SNAPSHOT
After Visit Summary   10/18/2017    Jimmy Isaac    MRN: [id number]           Patient Information     Date Of Birth          1960        Visit Information        Provider Department      10/18/2017 12:30 PM Lilly Ba MD [facility] Nephrology        Today's Diagnoses     Recurrent nephrolithiasis    -  1    Localized edema          Care Instructions    Dear Jimmy Isaac      Your were seen in the [facility].  Basic advice to avoid kidney stones  - Drink 100 ounces of fluid daily  - keep urine volume greater than 72 ounces per day  - low sodium diet (less than 2400 mg sodium per day)  - plenty of dietary calcium.  Please have one high calcium food three times a day with meals - can be either 8 oz milk, 6 oz yogurt or 2 oz low sodium cheese or 8 oz calcium fortified OJ/dairy alternative)   ---- Total should be at least 1000 mg calcium a day from food  - Protein (meat) in moderation  - add lemon or lime to foods and beverages.  Consider 2-4 oz lemon or lime juice diluted in water.  I advise against lemonade since it is high in sugar and low in actual lemon juice.     [email or web address]    Today we discussed   1- increase dietary calcium - 8 oz milk or 8 oz calcium fortified orange juice with every meal  2. The goal is to drink 100 ounces of fluid every day - coffee counts towards that goal  3. Increase fruit and veggie intake to 6-9 servings per day    I would like you to have appointment with our dietician - Joan Baez as well as one of the surgeons - you can see Dr. Moura on a Tuesday in November after your CT scan      We are suggesting the following medications:  Furosemide 20 mg daily  Please take your BP at home and let us know if you are dizzy or if blood pressure is less than 110/55    Please get the following tests done:  CT scan for kidney stones  24 hour urine collection in 3 months - if citrate level is not better we  will talk about citrate pill      Please set up appointment with:  Lilly Ba MD in 4 months    It was a pleasure meeting with you today. Thank you for allowing me and my team the privilege of caring for you today. YOU are the reason we are here, and I truly hope we provided you with the excellent service you deserve. Please let us know if there is anything else we can do for you so that we can be sure you are leaving completely satisfied with your care experience.    Take care!  Lilly Ba MD  Department of Medicine  Division of Renal Diseases and Hypertension  [facility]    Email: [email or web address]  You may reach a nurse by calling [phone]                  Follow-ups after your visit        Follow-up notes from your care team     Return in about 4 months (around 2/18/2018).      Your next 10 appointments already scheduled     Nov 01, 2017 10:30 AM CDT   Lab with  LAB   [facility] ([facility])    [address] [place] [address]  1st [facility] [address]   [phone]            Nov 01, 2017 11:00 AM CDT   PFT VISIT with  PFL [facility] ([facility])    [address] [place] [address]  3rd [facility] [address]   [phone]            Nov 01, 2017 11:30 AM CDT   (Arrive by 11:15 AM)   RETURN PRIMARY PULMONARY with Khang Lindsey MD   [facility] ([facility])    [address] [place] [address]  3[address] [facility] [address]   [phone]            Nov 01, 2017  1:15 PM CDT   (Arrive by 1:00 PM)   New Patient Visit with Marcela Roy MD   [facility] ([facility])    [address] [place] [address]  4th Floor  [facility] [address]   [phone]            Nov 16, 2017  9:00 AM CST   CT ABDOMEN PELVIS W/O CONTRAST with CT2   [facility] [place] [facility] ([facility])    [address] [place] [address]  [facility] [address] [phone]           Please bring any scans or X-rays taken at other hospitals, if similar tests were done. Also bring a list of your medicines, including vitamins, minerals and over-the-counter drugs. It is safest to leave personal items at home.  Be sure to tell your doctor:   If you have any allergies.   If there s any chance you are pregnant.   If you are breastfeeding.   If you have any special needs.  You may have contrast for this exam. To prepare:   Do not eat or drink for 2 hours before your exam. If you need to take medicine, you may take it with small sips of water. (We may ask you to take liquid medicine as well.)   The day before your exam, drink extra fluids at least six 8-ounce glasses (unless your doctor tells you to restrict your fluids).  Patients over 70 or patients with diabetes or kidney problems:   If you haven t had a blood test (creatinine test) within the last 30 days, go to your clinic or Diagnostic Imaging Department for this test.  If you have diabetes:   If your kidney function is normal, continue taking your metformin (Avandamet, Glucophage, Glucovance, Metaglip) on the day of your exam.   If your kidney function is abnormal, wait 48 hours before restarting this medicine.  You will have oral contrast for this exam:   You will drink the contrast at home. Get this from your clinic or Diagnostic Imaging Department. Please follow the directions given.  Please wear loose clothing, such as a sweat suit or jogging clothes. Avoid snaps, zippers and other metal. We may ask you to undress and put on a hospital gown.  If you have any questions, please call the Imaging Department where you will have your exam.            Nov 16, 2017  9:30 AM CST   (Arrive by 9:15 AM)   Return Interstitial Lung with Shane Ruvalcaba MD   [facility] ([facility])    [address]  [address]   [phone]            Mar 28, 2018  1:30 PM CDT   Lab with UC LAB   [facility] ([facility])    [address] [facility]  1st Floor  [facility] [id number] [phone]            Mar 28, 2018  2:30 PM CDT   (Arrive by 2:00 PM)   Return Visit with Lilly Ba MD   [facility] ([facility])    [address] [facility]  3rd [facility] [id number] [phone]              Future tests that were ordered for you today     Open Future Orders        Priority Expected Expires Ordered    CT Abdomen Pelvis w/o Contrast Routine  10/18/2018 10/18/2017            Who to contact     If you have questions or need follow up information about today's clinic visit or your schedule please contact [facility] NEPHROLOGY directly at [phone].  Normal or non-critical lab and imaging results will be communicated to you by Holvihart, letter or phone within 4 business days after the clinic has received the results. If you do not hear from us within 7 days, please contact the clinic through Holvihart or phone. If you have a critical or abnormal lab result, we will notify you by phone as soon as possible.  Submit refill requests through [employer] or call your pharmacy and they will forward the refill request to us. Please allow 3 business days for your refill to be completed.          Additional Information About Your Visit        HolviharGhostery Information     [employer] gives you secure access to your electronic health record. If you see a primary care provider, you can also send messages to your care team and make appointments. If you have questions, please call your primary care clinic.  If you do not have a primary care provider, please call [phone] and they will assist you.        Care EveryWhere ID     This is your Care EveryWhere ID. This could be used by other organizations to access your [facility] medical records  [id number]        Your Vitals Were      Pulse Temperature Height Pulse Oximetry BMI (Body Mass Index)       65 99.1  F (37.3  C) (Oral) 1.829 m (6') 90% 26.26 kg/m2        Blood Pressure from Last 3 Encounters:   10/18/17 132/75   09/07/17 132/72   08/01/17 133/74    Weight from Last 3 Encounters:   10/18/17 87.8 kg (193 lb 9.6 oz)   09/07/17 84.9 kg (187 lb 1.6 oz)   08/01/17 84.5 kg (186 lb 3.2 oz)                 Today's Medication Changes          These changes are accurate as of: 10/18/17 12:53 PM.  If you have any questions, ask your nurse or doctor.               Start taking these medicines.        Dose/Directions    furosemide 20 MG tablet   Commonly known as:  LASIX   Used for:  Localized edema   Started by:  Lilly Ba MD        Dose:  20 mg   Take 1 tablet (20 mg) by mouth daily   Quantity:  30 tablet   Refills:  1            Where to get your medicines      These medications were sent to [facility]/pharmacy #[id number] - [place], [place] - [address]  [address] [facility] [place] [address]     Phone:  [phone]     furosemide 20 MG tablet                Primary Care Provider Office Phone # Fax #    Cristhian Busch [phone]       [facility] [address]  [facility] [address]        Equal Access to Services     [facility] AH: Hadii mehran carrillo hadshakiro Solayo, waaxda luqadaha, qaybta kaalmada jeanie, silvano gonzalez . So [facility] [phone].    ATENCIÓN: Si habla español, tiene a hutton disposición servicios gratuitos de asistencia lingüística. Llame al [phone].    We comply with applicable federal civil rights laws and [place] laws. We do not discriminate on the basis of race, color, national origin, age, disability, sex, sexual orientation, or gender identity.            Thank you!     Thank you for choosing [facility] NEPHROLOGY  for your care. Our goal is always to provide you with excellent care. Hearing back from our patients is one way we can continue to improve our services. Please take a  few minutes to complete the written survey that you may receive in the mail after your visit with us. Thank you!             Your Updated Medication List - Protect others around you: Learn how to safely use, store and throw away your medicines at www.disposemymeds.org.          This list is accurate as of: 10/18/17 12:53 PM.  Always use your most recent med list.                   Brand Name Dispense Instructions for use Diagnosis    albuterol 108 (90 BASE) MCG/ACT Inhaler    PROAIR HFA    1 Inhaler    Inhale 4-6 puffs into the lungs every 6 hours as needed    Pulmonary hypertension, Mixed hyperlipidemia, COPD (chronic obstructive pulmonary disease) (H)       budesonide 0.25 MG/2ML neb solution    PULMICORT    360 mL    INHALE THE CONTENTS OF 1   VIAL TWO TIMES A DAY    ILD (interstitial lung disease) (H)       digoxin 125 MCG tablet    LANOXIN    90 tablet    Take 1 tablet (125 mcg) by mouth daily    Pulmonary hypertension       furosemide 20 MG tablet    LASIX    30 tablet    Take 1 tablet (20 mg) by mouth daily    Localized edema       * LETAIRIS 5 MG tablet   Generic drug:  ambrisentan     30 tablet    Take 2 tablets (10 mg) by mouth daily    Pulmonary hypertension       * LETAIRIS 10 MG tablet   Generic drug:  ambrisentan           predniSONE 10 MG tablet    DELTASONE    180 tablet    Take 3 tablets (30 mg) by mouth daily    Sarcoidosis       tadalafil (PAH) 20 MG Tabs    ADCIRCA    60 tablet    Take 2 tablets (40 mg) by mouth daily    Pulmonary hypertension       treprostinil 0.6 MG/ML Soln neb solution    TYVASO    3.6 mL    12 breaths 4 times daily    Pulmonary hypertension       vitamin D 2000 UNITS Caps      Take 1 capsule by mouth daily.        * Notice:  This list has 2 medication(s) that are the same as other medications prescribed for you. Read the directions carefully, and ask your doctor or other care provider to review them with you.

## 2017-10-20 ENCOUNTER — PRE VISIT (OUTPATIENT)
Dept: OTOLARYNGOLOGY | Facility: CLINIC | Age: 57
End: 2017-10-20

## 2017-10-20 NOTE — TELEPHONE ENCOUNTER
"APPT INFO    Date /Time:  11/1/17 at 1:15PM   Reason for Appt:  sinus sarcoidosis   Ref Provider/Clinic:  Tod Ruvalcaba @ [facility] Pul[facility]   Patient Contact (Y/N) & Call Details:  no, referred   Action:      RECORDS CLINIC NAME  (\"None\" if no records ) RECEIVED RECS & IMG? Y/N   (may include other helpful notes)   Internal Clinics:  [facility]  yes - records and imaging in epic/pacs        External Clinics:  n/a             "

## 2017-10-23 ENCOUNTER — PRE VISIT (OUTPATIENT)
Dept: UROLOGY | Facility: CLINIC | Age: 57
End: 2017-10-23

## 2017-10-28 ENCOUNTER — PRE VISIT (OUTPATIENT)
Dept: CARDIOLOGY | Facility: CLINIC | Age: 57
End: 2017-10-28

## 2017-10-28 DIAGNOSIS — R06.09 DYSPNEA ON EXERTION: ICD-10-CM

## 2017-10-28 DIAGNOSIS — I27.20 PULMONARY HYPERTENSION (H): Primary | ICD-10-CM

## 2017-10-28 NOTE — TELEPHONE ENCOUNTER
Pulmonary Hypertension Return Patient Form       Patient Name: Jimmy Isaac Age: 56M  [birth year] MRN: [id number]   Todays Provider: Dr. Lindsey    Appt:              PH Medications: Letairis 10mg QD, Adcirca 40mg QD, Tyvaso 12 breaths QID              Todays Testing: Labs, PFTs                  Patient Update: Last seen by Nevaeh on 8/1/17. At that time, stated he had not yet titrated his Tyvaso to 14 breaths, four times a day and was still taking 12, but was going to begin to titrate up. PET scan results were reviewed which showed no active cardiac sarcoidosis.  He was doing well otherwise with stable labs, weight, and minimal symptoms.  PMH pulmonary hypertension, bronchial sarcoid, chronic kidney disease, and diabetes. WHO Group V, NYHA Functional Class II.                Recent Testing Prior to Appointment:        Date Test Results        9/22/2017 Echo RVSP:  LVEF: 60-65%       RV: Normal       9/7/2017 6MWT 315  Meters/lowest SaO2 O2: 90     Max HR: 113       9/7/2017 PFT FEV1/FVC: 34, 77, *  FEV1: 0.95, 3.90, 24     FVC: 2.80, 3.90, 24  DLCO: 10.14, 29.76, 34

## 2017-10-31 ENCOUNTER — PRE VISIT (OUTPATIENT)
Dept: UROLOGY | Facility: CLINIC | Age: 57
End: 2017-10-31

## 2017-11-01 ENCOUNTER — OFFICE VISIT (OUTPATIENT)
Dept: OTOLARYNGOLOGY | Facility: CLINIC | Age: 57
End: 2017-11-01

## 2017-11-01 ENCOUNTER — OFFICE VISIT (OUTPATIENT)
Dept: CARDIOLOGY | Facility: CLINIC | Age: 57
End: 2017-11-01
Attending: INTERNAL MEDICINE
Payer: COMMERCIAL

## 2017-11-01 VITALS
DIASTOLIC BLOOD PRESSURE: 72 MMHG | HEART RATE: 89 BPM | WEIGHT: 187.9 LBS | SYSTOLIC BLOOD PRESSURE: 121 MMHG | BODY MASS INDEX: 25.45 KG/M2 | HEIGHT: 72 IN | OXYGEN SATURATION: 95 %

## 2017-11-01 VITALS — BODY MASS INDEX: 25.47 KG/M2 | HEIGHT: 72 IN | WEIGHT: 188 LBS

## 2017-11-01 DIAGNOSIS — R06.09 DYSPNEA ON EXERTION: ICD-10-CM

## 2017-11-01 DIAGNOSIS — D86.9 SARCOIDOSIS: ICD-10-CM

## 2017-11-01 DIAGNOSIS — I27.20 PULMONARY HYPERTENSION (H): ICD-10-CM

## 2017-11-01 DIAGNOSIS — J32.0 CHRONIC MAXILLARY SINUSITIS: Primary | ICD-10-CM

## 2017-11-01 DIAGNOSIS — R06.02 SOB (SHORTNESS OF BREATH): ICD-10-CM

## 2017-11-01 DIAGNOSIS — I27.20 PULMONARY HYPERTENSION (H): Primary | ICD-10-CM

## 2017-11-01 DIAGNOSIS — N18.30 CHRONIC KIDNEY DISEASE, STAGE 3 (MODERATE): ICD-10-CM

## 2017-11-01 LAB
ALBUMIN SERPL-MCNC: 3.7 G/DL (ref 3.4–5)
ALP SERPL-CCNC: 66 U/L (ref 40–150)
ALT SERPL W P-5'-P-CCNC: 24 U/L (ref 0–70)
ANION GAP SERPL CALCULATED.3IONS-SCNC: 7 MMOL/L (ref 3–14)
AST SERPL W P-5'-P-CCNC: 16 U/L (ref 0–45)
BILIRUB SERPL-MCNC: 0.7 MG/DL (ref 0.2–1.3)
BUN SERPL-MCNC: 28 MG/DL (ref 7–30)
CALCIUM SERPL-MCNC: 8.7 MG/DL (ref 8.5–10.1)
CHLORIDE SERPL-SCNC: 98 MMOL/L (ref 94–109)
CHOLEST SERPL-MCNC: 234 MG/DL
CO2 SERPL-SCNC: 30 MMOL/L (ref 20–32)
CREAT SERPL-MCNC: 1.3 MG/DL (ref 0.66–1.25)
ERYTHROCYTE [DISTWIDTH] IN BLOOD BY AUTOMATED COUNT: 14.1 % (ref 10–15)
GFR SERPL CREATININE-BSD FRML MDRD: 57 ML/MIN/1.7M2
GLUCOSE SERPL-MCNC: 146 MG/DL (ref 70–99)
GRAM STN SPEC: NORMAL
GRAM STN SPEC: NORMAL
HBA1C MFR BLD: 6.2 % (ref 4.3–6)
HCT VFR BLD AUTO: 46.4 % (ref 40–53)
HDLC SERPL-MCNC: 91 MG/DL
HGB BLD-MCNC: 15.2 G/DL (ref 13.3–17.7)
LDLC SERPL CALC-MCNC: 129 MG/DL
MCH RBC QN AUTO: 30.6 PG (ref 26.5–33)
MCHC RBC AUTO-ENTMCNC: 32.8 G/DL (ref 31.5–36.5)
MCV RBC AUTO: 93 FL (ref 78–100)
NONHDLC SERPL-MCNC: 143 MG/DL
NT-PROBNP SERPL-MCNC: 1000 PG/ML (ref 0–125)
PHOSPHATE SERPL-MCNC: 1.7 MG/DL (ref 2.5–4.5)
PLATELET # BLD AUTO: 160 10E9/L (ref 150–450)
POTASSIUM SERPL-SCNC: 4.2 MMOL/L (ref 3.4–5.3)
PROT SERPL-MCNC: 6.8 G/DL (ref 6.8–8.8)
RBC # BLD AUTO: 4.97 10E12/L (ref 4.4–5.9)
SODIUM SERPL-SCNC: 136 MMOL/L (ref 133–144)
SPECIMEN SOURCE: NORMAL
TRIGL SERPL-MCNC: 68 MG/DL
WBC # BLD AUTO: 15.4 10E9/L (ref 4–11)

## 2017-11-01 PROCEDURE — 85027 COMPLETE CBC AUTOMATED: CPT | Performed by: INTERNAL MEDICINE

## 2017-11-01 PROCEDURE — 84100 ASSAY OF PHOSPHORUS: CPT | Performed by: INTERNAL MEDICINE

## 2017-11-01 PROCEDURE — 83036 HEMOGLOBIN GLYCOSYLATED A1C: CPT | Performed by: INTERNAL MEDICINE

## 2017-11-01 PROCEDURE — 80061 LIPID PANEL: CPT | Performed by: INTERNAL MEDICINE

## 2017-11-01 PROCEDURE — 36415 COLL VENOUS BLD VENIPUNCTURE: CPT | Performed by: INTERNAL MEDICINE

## 2017-11-01 PROCEDURE — 80053 COMPREHEN METABOLIC PANEL: CPT | Performed by: INTERNAL MEDICINE

## 2017-11-01 PROCEDURE — 99214 OFFICE O/P EST MOD 30 MIN: CPT | Mod: ZP | Performed by: INTERNAL MEDICINE

## 2017-11-01 PROCEDURE — 99212 OFFICE O/P EST SF 10 MIN: CPT | Mod: ZF

## 2017-11-01 PROCEDURE — 80323 ALKALOIDS NOS: CPT | Performed by: INTERNAL MEDICINE

## 2017-11-01 PROCEDURE — 83880 ASSAY OF NATRIURETIC PEPTIDE: CPT | Performed by: INTERNAL MEDICINE

## 2017-11-01 PROCEDURE — 99213 OFFICE O/P EST LOW 20 MIN: CPT | Mod: ZF

## 2017-11-01 ASSESSMENT — ENCOUNTER SYMPTOMS
COUGH DISTURBING SLEEP: 1
TASTE DISTURBANCE: 1
COUGH: 1
PALPITATIONS: 1
SNORES LOUDLY: 0
SPUTUM PRODUCTION: 1
HALLUCINATIONS: 0
SMELL DISTURBANCE: 0
SINUS CONGESTION: 1
HEMOPTYSIS: 0
WEIGHT GAIN: 1
POLYPHAGIA: 1
LIGHT-HEADEDNESS: 0
WEIGHT LOSS: 0
POLYDIPSIA: 1
TROUBLE SWALLOWING: 0
LEG PAIN: 0
SLEEP DISTURBANCES DUE TO BREATHING: 1
EXERCISE INTOLERANCE: 1
ORTHOPNEA: 1
ALTERED TEMPERATURE REGULATION: 0
INCREASED ENERGY: 1
HYPERTENSION: 0
NECK MASS: 0
NIGHT SWEATS: 0
SYNCOPE: 0
SINUS PAIN: 1
FEVER: 0
HYPOTENSION: 0
DECREASED APPETITE: 0
SHORTNESS OF BREATH: 1
DYSPNEA ON EXERTION: 1
SORE THROAT: 0
WHEEZING: 1
CHILLS: 0
FATIGUE: 1
POSTURAL DYSPNEA: 1

## 2017-11-01 ASSESSMENT — PAIN SCALES - GENERAL
PAINLEVEL: NO PAIN (0)
PAINLEVEL: NO PAIN (0)

## 2017-11-01 NOTE — LETTER
2017       RE: Jimmy Isaac  [id number] [address] [address]     Dear Colleague,    Thank you for referring your patient, Jimmy Isaac, to the [facility] EAR NOSE AND THROAT at [facility]. Please see a copy of my visit note below.    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  He has no obvious evidence of nasal sarcoid on exam today.  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  I am going to order an imaging study on him today, and I would like to discuss with his pulmonary team whether or not he would be healthy enough to undergo surgery.  I am very concerned that this could be fungus in his nose and given the amount of prednisone he is on, he is at risk for invasive disease.  I did take a culture today and that may be helpful to base our steps on.  I will look at his imaging study as well.  We will keep in contact with Mr. Isaac regarding next steps.          HPI: Jimmy Isaac is a 56 year old male seen today in the Otolaryngology Clinic consultation from Dr. 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  He is a patient who was diagnosed with sarcoid many years ago.  He states that he has been treated on and off with prednisone for sinus and pulmonary infections.  He most recently has been following with Dr. Ruvalcaba and Dr. Lindsey for cardiac issues.  He has fairly extensive lung findings on his recent CT.  He has been having worsening shortness of breath.  He has pulmonary hypertension.  He is on oxygen currently for hypoxia.  Recently, he has been on prednisone but will possibly be changing to methotrexate.  He  is also using inhalers.  He states that he has intermittent sinus congestion.  He has never had sinus surgery.  He has had head CTs for other reasons that have shown some sinus disease in the past.  His sinus symptoms are not constant.  He does work around moldy hay and horses.           Current Outpatient Prescriptions on File Prior to Visit:  amoxicillin-clavulanate (AUGMENTIN) 875-125 MG per tablet Take 1 tablet by mouth 2 times daily for 7 days   LETAIRIS 10 MG tablet    furosemide (LASIX) 20 MG tablet Take 1 tablet (20 mg) by mouth daily   albuterol (PROAIR HFA) 108 (90 BASE) MCG/ACT Inhaler Inhale 4-6 puffs into the lungs every 6 hours as needed   budesonide (PULMICORT) 0.25 MG/2ML neb solution INHALE THE CONTENTS OF 1   VIAL TWO TIMES A DAY   predniSONE (DELTASONE) 10 MG tablet Take 3 tablets (30 mg) by mouth daily   digoxin (LANOXIN) 125 MCG tablet Take 1 tablet (125 mcg) by mouth daily   tadalafil, PAH, (ADCIRCA) 20 MG TABS Take 2 tablets (40 mg) by mouth daily   treprostinil (TYVASO) 0.6 MG/ML SOLN 12 breaths 4 times daily   Cholecalciferol (VITAMIN D) 2000 UNITS CAPS Take 1 capsule by mouth daily.   [DISCONTINUED] LETAIRIS 5 MG tablet Take 2 tablets (10 mg) by mouth daily     No current facility-administered medications on file prior to visit.        Allergies   Allergen Reactions     Nkda [No Known Drug Allergies]        Past Medical History:   Diagnosis Date     Chronic sinusitis      Hypertension      Malignant neoplasm (H)      Pneumonia a few times     Pulmonary sarcoidosis (H)          Review of Systems   ENT ROS 11/1/2017   Constitutional Weight gain   Ears, Nose, Throat Nasal congestion or drainage   Cardiopulmonary Cough, Breathing problems, Wheezing, Chest pain   Endocrine Thirst, Frequent urination        14 point ROS neg other than the symptoms noted above.    Physical Exam:    General Assessment   The patient is alert, oriented and in no acute distress.   Head/Face/Scalp  Grossly normal.    Ears  Normal canals, auricles and tympanic membranes.   Nose  External nose is straight, skin is normal. Intranasal exam (anterior rhinoscopy) reveals normal moist mucosa, turbinate tissue without edema, erythema or crusting.  Septum mainly to right.  Mouth  Oral cavity shows healthy mucosa with out ulceration, masses or other lesions involving the tongue, palate, buccal mucosa, floor of mouth or gingiva.  Dentition in good repair.  Oropharynx with normal tonsils, posterior wall and base of tongue.  Neck  No significant adenopathy, thyroid or salivary gland abnormality.       Procedure:  Nasal endoscopy performed to examine the posterior nasal passages for pathology.    Verbal consent obtained. Topical anesthetic/decongestant solution applied per patient request.   A rigid endoscope was passed into each nasal cavity separately.  Findings are as follows:   R is narrow due to septal deviation, no polyps or purulence.   Left middle meatus with debri c/w fungal ball.  Culture taken.            Again, thank you for allowing me to participate in the care of your patient.      Sincerely,    Marcela Roy MD

## 2017-11-01 NOTE — PROGRESS NOTES
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  He has no obvious evidence of nasal sarcoid on exam today.  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  I am going to order an imaging study on him today, and I would like to discuss with his pulmonary team whether or not he would be healthy enough to undergo surgery.  I am very concerned that this could be fungus in his nose and given the amount of prednisone he is on, he is at risk for invasive disease.  I did take a culture today and that may be helpful to base our steps on.  I will look at his imaging study as well.  We will keep in contact with Mr. Isaac regarding next steps.          HPI: Jimmy Isaac is a 56 year old male seen today in the Otolaryngology Clinic consultation from Dr. 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  He is a patient who was diagnosed with sarcoid many years ago.  He states that he has been treated on and off with prednisone for sinus and pulmonary infections.  He most recently has been following with Dr. Ruvalcaba and Dr. Lindsey for cardiac issues.  He has fairly extensive lung findings on his recent CT.  He has been having worsening shortness of breath.  He has pulmonary hypertension.  He is on oxygen currently for hypoxia.  Recently, he has been on prednisone but will possibly be changing to methotrexate.  He is also using inhalers.  He states that he has intermittent sinus congestion.  He has never had sinus surgery.  He has had head CTs for other reasons that have shown some sinus disease in the past.  His sinus symptoms are not constant.  He does work around moldy hay and horses.            Current Outpatient Prescriptions on File Prior to Visit:  amoxicillin-clavulanate (AUGMENTIN) 875-125 MG per tablet Take 1 tablet by mouth 2 times daily for 7 days   LETAIRIS 10 MG tablet    furosemide (LASIX) 20 MG tablet Take 1 tablet (20 mg) by mouth daily   albuterol (PROAIR HFA) 108 (90 BASE) MCG/ACT Inhaler Inhale 4-6 puffs into the lungs every 6 hours as needed   budesonide (PULMICORT) 0.25 MG/2ML neb solution INHALE THE CONTENTS OF 1   VIAL TWO TIMES A DAY   predniSONE (DELTASONE) 10 MG tablet Take 3 tablets (30 mg) by mouth daily   digoxin (LANOXIN) 125 MCG tablet Take 1 tablet (125 mcg) by mouth daily   tadalafil, PAH, (ADCIRCA) 20 MG TABS Take 2 tablets (40 mg) by mouth daily   treprostinil (TYVASO) 0.6 MG/ML SOLN 12 breaths 4 times daily   Cholecalciferol (VITAMIN D) 2000 UNITS CAPS Take 1 capsule by mouth daily.   [DISCONTINUED] LETAIRIS 5 MG tablet Take 2 tablets (10 mg) by mouth daily     No current facility-administered medications on file prior to visit.        Allergies   Allergen Reactions     Nkda [No Known Drug Allergies]        Past Medical History:   Diagnosis Date     Chronic sinusitis      Hypertension      Malignant neoplasm (H)      Pneumonia a few times     Pulmonary sarcoidosis (H)          Review of Systems   ENT ROS 11/1/2017   Constitutional Weight gain   Ears, Nose, Throat Nasal congestion or drainage   Cardiopulmonary Cough, Breathing problems, Wheezing, Chest pain   Endocrine Thirst, Frequent urination        14 point ROS neg other than the symptoms noted above.    Physical Exam:    General Assessment   The patient is alert, oriented and in no acute distress.   Head/Face/Scalp  Grossly normal.   Ears  Normal canals, auricles and tympanic membranes.   Nose  External nose is straight, skin is normal. Intranasal exam (anterior rhinoscopy) reveals normal moist mucosa, turbinate tissue without edema, erythema or crusting.  Septum mainly to right.  Mouth  Oral cavity shows  healthy mucosa with out ulceration, masses or other lesions involving the tongue, palate, buccal mucosa, floor of mouth or gingiva.  Dentition in good repair.  Oropharynx with normal tonsils, posterior wall and base of tongue.  Neck  No significant adenopathy, thyroid or salivary gland abnormality.       Procedure:  Nasal endoscopy performed to examine the posterior nasal passages for pathology.    Verbal consent obtained. Topical anesthetic/decongestant solution applied per patient request.   A rigid endoscope was passed into each nasal cavity separately.  Findings are as follows:   R is narrow due to septal deviation, no polyps or purulence.   Left middle meatus with debri c/w fungal ball.  Culture taken.

## 2017-11-01 NOTE — MR AVS SNAPSHOT
After Visit Summary   11/1/2017    Jimmy Isaac    MRN: [id number]           Patient Information     Date Of Birth          1960        Visit Information        Provider Department      11/1/2017 11:30 AM Khang Lindsey MD [facility]        Today's Diagnoses     Pulmonary hypertension    -  1    Dyspnea on exertion          Care Instructions    Medication Changes:  No medication changes at this time. Please continue current medication regiment.    Patient Instructions:  Continue staying active, eating a low sodium, low fat diet.    Check-In  Time Check-In Location Estimated Length Procedure   Name        [facility]  waiting room 60-90 minutes Right Heart Catheterization**     Procedure Preparations & Instructions     This is an invasive procedure that DOES require preparation:    - Nothing to eat for 6 hours   - You may have clear liquids up until the time of your procedure  - A ride should be arranged for you in the instance you are unable to drive home, however you should be able to function as you normally would after the procedure     *For Patients with Diabetes: ? DO NOT take any oral diabetic medication, short-acting diabetes medications/insulin, humalog or regular insulin the morning of your test  ? Take   dose of long-acting insulin (Lantus, Levemir) the day of your test  ? Remember to  bring your glucometer and insulin with you to take after your test if needed     *For Patients on anticoagulants: ? Your Coumadin Clinic will give you instructions on medication adjustments or bridging prior to the procedure        Follow up Appointment Information:  Follow up with Nevaeh in 3 months    Results:  Results for JIMMY ISAAC (MRN [id number]) as of 11/1/2017 12:40   Ref. Range 11/1/2017 10:47   Sodium Latest Ref Range: 133 - 144 mmol/L 136   Potassium Latest Ref Range: 3.4 - 5.3 mmol/L 4.2   Chloride Latest Ref Range: 94 - 109 mmol/L 98   Carbon Dioxide Latest Ref Range: 20 - 32  mmol/L 30   Urea Nitrogen Latest Ref Range: 7 - 30 mg/dL 28   Creatinine Latest Ref Range: 0.66 - 1.25 mg/dL 1.30 (H)   GFR Estimate Latest Ref Range: >60 mL/min/1.7m2 57 (L)   GFR Estimate If Black Latest Ref Range: >60 mL/min/1.7m2 69   Calcium Latest Ref Range: 8.5 - 10.1 mg/dL 8.7   Anion Gap Latest Ref Range: 3 - 14 mmol/L 7   Phosphorus Latest Ref Range: 2.5 - 4.5 mg/dL 1.7 (L)   Albumin Latest Ref Range: 3.4 - 5.0 g/dL 3.7   Protein Total Latest Ref Range: 6.8 - 8.8 g/dL 6.8   Bilirubin Total Latest Ref Range: 0.2 - 1.3 mg/dL 0.7   Alkaline Phosphatase Latest Ref Range: 40 - 150 U/L 66   ALT Latest Ref Range: 0 - 70 U/L 24   AST Latest Ref Range: 0 - 45 U/L 16   Cholesterol Latest Ref Range: <200 mg/dL 234 (H)   HDL Cholesterol Latest Ref Range: >39 mg/dL 91   LDL Cholesterol Calculated Latest Ref Range: <100 mg/dL 129 (H)   Non HDL Cholesterol Latest Ref Range: <130 mg/dL 143 (H)   N-Terminal Pro Bnp Latest Ref Range: 0 - 125 pg/mL 1000 (H)   Triglycerides Latest Ref Range: <150 mg/dL 68   Glucose Latest Ref Range: 70 - 99 mg/dL 146 (H)   WBC Latest Ref Range: 4.0 - 11.0 10e9/L 15.4 (H)   Hemoglobin Latest Ref Range: 13.3 - 17.7 g/dL 15.2   Hematocrit Latest Ref Range: 40.0 - 53.0 % 46.4   Platelet Count Latest Ref Range: 150 - 450 10e9/L 160   RBC Count Latest Ref Range: 4.4 - 5.9 10e12/L 4.97   MCV Latest Ref Range: 78 - 100 fl 93   MCH Latest Ref Range: 26.5 - 33.0 pg 30.6   MCHC Latest Ref Range: 31.5 - 36.5 g/dL 32.8   RDW Latest Ref Range: 10.0 - 15.0 % 14.1     We are located on the third floor of the [facility] ([facility]) on the [facility].  Our address is     [address] [place] [address] on 3rd [place] [address]    Thank you for allowing us to be a part of your care here at the [facility]    If you have questions or concerns please contact us at:    Michelle Bauer RN, BSN   Miguel Ángel Valverde (Schedule,P.A.)  Nurse Coordinator      Clinic   Pulmonary Hypertension   Pulmonary Hypertension  [facility]  (P)[phone]    (P) [phone]        (F)[phone]    ** Please note that you will NOT receive a reminder call regarding your scheduled testing, reminder calls are for provider appointments only.  If you are scheduled for testing within the [facility] system you may receive a call regarding pre-registration for billing purposes only.**     Remember to weigh yourself daily after voiding and before you consume any food or beverages and log the numbers.  If you have gained/lost 2 pounds overnight or 5 pounds in a week contact us immediately for medication adjustments or further instructions.   **Please call us immediately if you have any syncope, chest pain, edema, or decline in your functional status.    Support Group:  Pulmonary Hypertension Association  Https://www.phassociation.org/  **Look at the Events Tab** They even have Support Groups that you can call into    [facility] PH Support Group  First Saturday of the Month from 1-3 PM   Location: [address] [place] [id number]  Leader: Bert Weber  Phone: [phone]  Email: [email or web address]            Follow-ups after your visit        Follow-up notes from your care team     Return in about 3 months (around 2/1/2018) for with Nevaeh, Return PH, with, Labs.      Your next 10 appointments already scheduled     Nov 01, 2017  1:15 PM CDT   (Arrive by 1:00 PM)   New Patient Visit with Marcela Roy MD   [facility] Nose and Throat ([facility])    [address] [place] [address]  4th [facility] [id number] [phone]            Nov 16, 2017  9:00 AM CST   CT ABDOMEN PELVIS W/O CONTRAST with UCCT2   [facility] [place] [facility] ([facility])    [address] [place] [address]  1st [facility] [address] [phone]           Please bring any  scans or X-rays taken at other hospitals, if similar tests were done. Also bring a list of your medicines, including vitamins, minerals and over-the-counter drugs. It is safest to leave personal items at home.  Be sure to tell your doctor:   If you have any allergies.   If there s any chance you are pregnant.   If you are breastfeeding.   If you have any special needs.  You may have contrast for this exam. To prepare:   Do not eat or drink for 2 hours before your exam. If you need to take medicine, you may take it with small sips of water. (We may ask you to take liquid medicine as well.)   The day before your exam, drink extra fluids at least six 8-ounce glasses (unless your doctor tells you to restrict your fluids).  Patients over 70 or patients with diabetes or kidney problems:   If you haven t had a blood test (creatinine test) within the last 30 days, go to your clinic or Diagnostic Imaging Department for this test.  If you have diabetes:   If your kidney function is normal, continue taking your metformin (Avandamet, Glucophage, Glucovance, Metaglip) on the day of your exam.   If your kidney function is abnormal, wait 48 hours before restarting this medicine.  You will have oral contrast for this exam:   You will drink the contrast at home. Get this from your clinic or Diagnostic Imaging Department. Please follow the directions given.  Please wear loose clothing, such as a sweat suit or jogging clothes. Avoid snaps, zippers and other metal. We may ask you to undress and put on a hospital gown.  If you have any questions, please call the Imaging Department where you will have your exam.            Nov 16, 2017  9:30 AM CST   (Arrive by 9:15 AM)   Return Interstitial Lung with Shane Ruvalcaba MD   [facility] ([facility])    [address] [facility]  [address]rd [facility] [address] [phone]            Nov 21, 2017 11:00 AM CST   (Arrive by 10:45  AM)   New Renal Calculi with Zackery Moura MD   [facility] and [facility]rolog[facility] ([facility])    [address] [place] [address]  4th [facility] [address] [phone]            Nov 21, 2017  2:30 PM CST   NUTRITION VISIT with Joan Baez RD   [facility] for Prostate [facility] Urologic Cancers ([facility])    [address] [place] [address]  4th [facility] [address] [phone]            Feb 05, 2018 11:00 AM CST   Lab with UC LAB   [facility] Lab ([facility])    [address] [place] [address]  1st [facility] [address] [phone]            Feb 05, 2018 11:30 AM CST   (Arrive by 11:15 AM)   RETURN PRIMARY PULMONARY with ALLI May CNP   [facility] ([facility])    [address] [place] [address]  3rd [facility] [id number] [phone]            Mar 28, 2018  1:30 PM CDT   Lab with UC LAB   [facility] Lab ([facility])    [address] [place] [address] [phone]            Mar 28, 2018  2:30 PM CDT   (Arrive by 2:00 PM)   Return Visit with Lilly Ba MD   [facility] Nephrology ([facility])    [address] [place] [address]  3rd [facility] [id number] [phone]              Future tests that were ordered for you today     Open Standing Orders        Priority Remaining Interval Expires Ordered    Nicotine and Cotinine Blood Routine 1/1 AM DRAW  11/1/2017            Who to contact     If you have questions or need follow up information about today's clinic visit or your schedule please contact [facility] directly at [phone].  Normal or non-critical lab and imaging results will be communicated to you by MyChart, letter or phone within 4 business days after the clinic has received the results. If you do not hear from us within 7  days, please contact the clinic through [employer] or phone. If you have a critical or abnormal lab result, we will notify you by phone as soon as possible.  Submit refill requests through [employer] or call your pharmacy and they will forward the refill request to us. Please allow 3 business days for your refill to be completed.          Additional Information About Your Visit        [employer] Information     [employer] gives you secure access to your electronic health record. If you see a primary care provider, you can also send messages to your care team and make appointments. If you have questions, please call your primary care clinic.  If you do not have a primary care provider, please call [phone] and they will assist you.        Care EveryWhere ID     This is your Care EveryWhere ID. This could be used by other organizations to access your [facility] medical records  [id number]        Your Vitals Were     Pulse Height Pulse Oximetry BMI (Body Mass Index)          89 1.829 m (6') 95% 25.48 kg/m2         Blood Pressure from Last 3 Encounters:   11/01/17 121/72   10/18/17 132/75   09/07/17 132/72    Weight from Last 3 Encounters:   11/01/17 85.2 kg (187 lb 14.4 oz)   10/18/17 87.8 kg (193 lb 9.6 oz)   09/07/17 84.9 kg (187 lb 1.6 oz)                 Today's Medication Changes          These changes are accurate as of: 11/1/17 12:46 PM.  If you have any questions, ask your nurse or doctor.               These medicines have changed or have updated prescriptions.        Dose/Directions    LETAIRIS 10 MG tablet   This may have changed:  Another medication with the same name was removed. Continue taking this medication, and follow the directions you see here.   Generic drug:  ambrisentan   Changed by:  Lilly Ba MD        Refills:  0       vitamin D 2000 UNITS Caps   This may have changed:  Another medication with the same name was removed. Continue taking this medication, and follow the directions you see  here.   Changed by:  Chandni Denson APRN CNP        Dose:  1 capsule   Take 1 capsule by mouth daily.   Refills:  0                Primary Care Provider Office Phone # Fax #    Cristhian Busch [phone]       [facility] [address]  [facility] [address]        Equal Access to Services     [facility]: Hadii aad ku hadasho Soomaali, waaxda luqadaha, qaybta kaalmada adeegyada, waxay idiin hayaan adeeg kharash laNenaaan . So [facility] [phone].    ATENCIÓN: Si habla español, tiene a hutton disposición servicios gratuitos de asistencia lingüística. Llame al [phone].    We comply with applicable federal civil rights laws and [place] laws. We do not discriminate on the basis of race, color, national origin, age, disability, sex, sexual orientation, or gender identity.            Thank you!     Thank you for choosing [facility]  for your care. Our goal is always to provide you with excellent care. Hearing back from our patients is one way we can continue to improve our services. Please take a few minutes to complete the written survey that you may receive in the mail after your visit with us. Thank you!             Your Updated Medication List - Protect others around you: Learn how to safely use, store and throw away your medicines at www.disposemymeds.org.          This list is accurate as of: 11/1/17 12:46 PM.  Always use your most recent med list.                   Brand Name Dispense Instructions for use Diagnosis    albuterol 108 (90 BASE) MCG/ACT Inhaler    PROAIR HFA    1 Inhaler    Inhale 4-6 puffs into the lungs every 6 hours as needed    Pulmonary hypertension, Mixed hyperlipidemia, COPD (chronic obstructive pulmonary disease) (H)       amoxicillin-clavulanate 875-125 MG per tablet    AUGMENTIN    14 tablet    Take 1 tablet by mouth 2 times daily for 7 days    URI (upper respiratory infection)       budesonide 0.25 MG/2ML neb solution    PULMICORT    360 mL    INHALE THE  CONTENTS OF 1   VIAL TWO TIMES A DAY    ILD (interstitial lung disease) (H)       digoxin 125 MCG tablet    LANOXIN    90 tablet    Take 1 tablet (125 mcg) by mouth daily    Pulmonary hypertension       furosemide 20 MG tablet    LASIX    30 tablet    Take 1 tablet (20 mg) by mouth daily    Localized edema       LETAIRIS 10 MG tablet   Generic drug:  ambrisentan           predniSONE 10 MG tablet    DELTASONE    180 tablet    Take 3 tablets (30 mg) by mouth daily    Sarcoidosis       tadalafil (PAH) 20 MG Tabs    ADCIRCA    60 tablet    Take 2 tablets (40 mg) by mouth daily    Pulmonary hypertension       treprostinil 0.6 MG/ML Soln neb solution    TYVASO    3.6 mL    12 breaths 4 times daily    Pulmonary hypertension       vitamin D 2000 UNITS Caps      Take 1 capsule by mouth daily.

## 2017-11-01 NOTE — LETTER
11/1/2017      RE: Jimmy Isaac  [id number] [address] [address]       Dear Colleague,    Thank you for the opportunity to participate in the care of your patient, Jimmy Isaac, at the [facility]. Please see a copy of my visit note below.        Impression:  1. Pulmonary arterial hypertension  2. Chronic, oxygen dependent respiratory failure  3. Sarcoidosis without cardiac involvement  4. Nephrolithiasis  5. Prednisone related fluid retention    Patient returns for follow-up while on prednisone trial.  He is being treated for some fluid retention as well as nephrolithiasis.  Vit  D supplementation increase, eyes without evidence of sarcoid and no cardiac involvement.He feels steroid buzz but no perceptible increase in walking tolerance.  He is utilizing oxygen more consistency.     Echocardiogram without evidence of right to left shunt as was initial echocardiogram when first seen.      PERRLA, EOM full, normal gait and station, normal mentation, skin without lesions, chest is clear, no evidence of right or left ventricular overactivity, sternum stable, no carotid bruits, regular rhythm, S1, S2, no murmurs, abdomen without tenderness, rebound guarding or masses, no edema, no focal neurologic defects. Cushingoid  PMH:   1.  Sarcoidosis diagnosed in 1991 per his report by transbronchial biopsy.  This was treated with prednisone causing irritability, weight gain, insomnia and jitteriness.  He was treated for SAPH with most recent PA pressures of 75/34/52 and wedge pressure of 16.  Last echo was in 2016 with an EF of 60-65%.  I am not sure if there was a bubble study done at that time.    2.  Chronic sinusitis with deviated nasal septum.   3.  Hyperlipidemia.   4.  Renal stones, calcium oxalate.    5.  Pancreatitis.   6.  Question of hypercalcemia.  Wt Readings from Last 24 Encounters:   11/01/17 85.2 kg (187 lb 14.4 oz)   10/18/17 87.8 kg (193  lb 9.6 oz)   17 84.9 kg (187 lb 1.6 oz)   17 84.5 kg (186 lb 3.2 oz)   17 83.5 kg (184 lb)   17 79.4 kg (175 lb)   17 79.4 kg (175 lb)   17 84.8 kg (187 lb)   10/06/16 79.4 kg (175 lb)   16 81.2 kg (179 lb)   16 84.1 kg (185 lb 8 oz)   16 83.9 kg (185 lb)   16 84 kg (185 lb 1.6 oz)   10/29/15 84.6 kg (186 lb 6.4 oz)   07/09/15 83.4 kg (183 lb 12.8 oz)   04/09/15 86.3 kg (190 lb 4.8 oz)   04/09/15 79.4 kg (175 lb)   03/05/15 85 kg (187 lb 4.8 oz)   02/26/15 86.7 kg (191 lb 1.6 oz)   14 83.4 kg (183 lb 12.8 oz)   14 78.8 kg (173 lb 12.8 oz)   14 81.2 kg (179 lb)   14 78.5 kg (173 lb)   14 78.6 kg (173 lb 3.2 oz)   Heart Catherization 2017  .1. HR 80 bpm  2. /79/99 mmHg  3. RA 12/15/12  4. RV 75/12  5. PA 75/34/52   6. PCW    7. PA sat 72.6%   8. PCW sat 97%  9. Hgb 14.2g/dL   10. Lesia CO 5.3   11. Lesia CI 2.6   12. TD CO 5.7   13. TD CI 2.8   14. PVR 6.9  MRN: [id number]  : 1960  Study Date: 2017 09:01 AM  Age: 56 yrs  Gender: Male  Patient Location: [facility]  Reason For Study: , Other forms of dyspnea, Heart failure, unspecified, Other  seco  Ordering Physician: DUY MIDDLETON  Referring Physician: SHERRY BOB MD  Performed By: Amy David, [facility]     BSA: 2.1 m2  Height: 72 in  Weight: 187 lb  HR: 94  BP: 130/70 mmHg  _____________________________________________________________________________  __        Procedure  Complete Echo Adult. Contrast Optison.  _____________________________________________________________________________  __        Interpretation Summary     The visual ejection fraction is estimated at 65-70%.  There is mild concentric left ventricular hypertrophy.  The right ventricle is not well visualized.  Mildly decreased right ventricular systolic function  The study was technically difficult. Compared to prior study, changes  are  noted.  _____________________________________________________________________________  __        Left Ventricle  The left ventricle is normal in size. There is mild concentric left  ventricular hypertrophy. The visual ejection fraction is estimated at 65-70%.  The transmitral spectral Doppler flow pattern is suggestive of impaired LV  relaxation. E by E prime ratio is between 8 and 15, which is indeterminate for  assessment of left ventricular filling pressures. Septal motion is consistent  with conduction abnormality.     Right Ventricle  In the apical views the RV appears to be mildly dilated but in the subcostal  views appears to be normal in size. Mildly decreased right ventricular  systolic function. The right ventricle is not well visualized.     Atria  Normal left atrial size. Borderline right atrial enlargement. There is no  atrial shunt seen.     Mitral Valve  The mitral valve is normal in structure and function. There is trace mitral  regurgitation.        Tricuspid Valve  The tricuspid valve is normal in structure and function. There is trace  tricuspid regurgitation. Right ventricular systolic pressure could not be  approximated due to inadequate tricuspid regurgitation.     Aortic Valve  The aortic valve is trileaflet. The aortic valve is not well visualized. No  hemodynamically significant valvular aortic stenosis.     Pulmonic Valve  The pulmonic valve is not well seen, but is grossly normal. There is no  pulmonic valvular regurgitation.     Vessels  The aortic root is normal size. Normal size ascending aorta. The inferior vena  cava is not dilated.     Pericardium  There is no pericardial effusion.        Rhythm  The rhythm was normal sinus.  _____________________________________________________________________________  __  MMode/2D Measurements & Calculations  IVSd: 1.2 cm     LVIDd: 3.6 cm  LVIDs: 2.5 cm  LVPWd: 1.4 cm  FS: 32.3 %  EDV(Teich): 55.2 ml  ESV(Teich): 21.3 ml  LV mass(C)d: 161.1  grams  LV mass(C)dI: 77.8 grams/m2  Ao root diam: 3.2 cm  LA dimension: 2.9 cm  asc Aorta Diam: 3.1 cm  LA/Ao: 0.92  LA Volume Index (BP): 19.0 ml/m2        Doppler Measurements & Calculations  MV E max nirmal: 63.1 cm/sec  MV A max nirmal: 81.7 cm/sec  MV E/A: 0.77     MV dec slope: 244.7 cm/sec2  MV dec time: 0.26 sec  Peak E' Nirmal: 6.8 cm/sec         Current Outpatient Prescriptions   Medication     amoxicillin-clavulanate (AUGMENTIN) 875-125 MG per tablet     LETAIRIS 10 MG tablet     furosemide (LASIX) 20 MG tablet     albuterol (PROAIR HFA) 108 (90 BASE) MCG/ACT Inhaler     budesonide (PULMICORT) 0.25 MG/2ML neb solution     predniSONE (DELTASONE) 10 MG tablet     digoxin (LANOXIN) 125 MCG tablet     tadalafil, PAH, (ADCIRCA) 20 MG TABS     treprostinil (TYVASO) 0.6 MG/ML SOLN     Cholecalciferol (VITAMIN D) 2000 UNITS CAPS     [DISCONTINUED] LETAIRIS 5 MG tablet     No current facility-administered medications for this visit.      Results for MATTY LOPEZ (MRN [id number]) as of 11/1/2017 12:01   Ref. Range 11/1/2017 10:47 11/1/2017 11:01   Sodium Latest Ref Range: 133 - 144 mmol/L 136    Potassium Latest Ref Range: 3.4 - 5.3 mmol/L 4.2    Chloride Latest Ref Range: 94 - 109 mmol/L 98    Carbon Dioxide Latest Ref Range: 20 - 32 mmol/L 30    Urea Nitrogen Latest Ref Range: 7 - 30 mg/dL 28    Creatinine Latest Ref Range: 0.66 - 1.25 mg/dL 1.30 (H)    GFR Estimate Latest Ref Range: >60 mL/min/1.7m2 57 (L)    GFR Estimate If Black Latest Ref Range: >60 mL/min/1.7m2 69    Calcium Latest Ref Range: 8.5 - 10.1 mg/dL 8.7    Anion Gap Latest Ref Range: 3 - 14 mmol/L 7    Phosphorus Latest Ref Range: 2.5 - 4.5 mg/dL 1.7 (L)    Albumin Latest Ref Range: 3.4 - 5.0 g/dL 3.7    Protein Total Latest Ref Range: 6.8 - 8.8 g/dL 6.8    Bilirubin Total Latest Ref Range: 0.2 - 1.3 mg/dL 0.7    Alkaline Phosphatase Latest Ref Range: 40 - 150 U/L 66    ALT Latest Ref Range: 0 - 70 U/L 24    AST Latest Ref Range: 0 - 45 U/L 16     N-Terminal Pro Bnp Latest Ref Range: 0 - 125 pg/mL 1000 (H)    Glucose Latest Ref Range: 70 - 99 mg/dL 146 (H)    WBC Latest Ref Range: 4.0 - 11.0 10e9/L 15.4 (H)    Hemoglobin Latest Ref Range: 13.3 - 17.7 g/dL 15.2    Hematocrit Latest Ref Range: 40.0 - 53.0 % 46.4    Platelet Count Latest Ref Range: 150 - 450 10e9/L 160    RBC Count Latest Ref Range: 4.4 - 5.9 10e12/L 4.97    MCV Latest Ref Range: 78 - 100 fl 93    MCH Latest Ref Range: 26.5 - 33.0 pg 30.6    MCHC Latest Ref Range: 31.5 - 36.5 g/dL 32.8    RDW Latest Ref Range: 10.0 - 15.0 % 14.1      Results for MATTY LOPEZ (MRN [id number]) as of 6/20/2017 13:02   Ref. Range 5/16/2017 11:59 5/25/2017 08:41 5/25/2017 10:44 5/25/2017 11:59 6/2/2017 12:10   Sodium Latest Ref Range: 133 - 144 mmol/L 139       Potassium Latest Ref Range: 3.4 - 5.3 mmol/L 4.3       Chloride Latest Ref Range: 94 - 109 mmol/L 103       Carbon Dioxide Latest Ref Range: 20 - 32 mmol/L 28       Urea Nitrogen Latest Ref Range: 7 - 30 mg/dL 16       Creatinine Latest Ref Range: 0.66 - 1.25 mg/dL 1.38 (H)       GFR Estimate Latest Ref Range: >60 mL/min/1.7m2 53 (L)       GFR Estimate If Black Latest Ref Range: >60 mL/min/1.7m2 64       Calcium Latest Ref Range: 8.5 - 10.1 mg/dL 9.1       Anion Gap Latest Ref Range: 3 - 14 mmol/L 8       Albumin Latest Ref Range: 3.4 - 5.0 g/dL 3.8       Protein Total Latest Ref Range: 6.8 - 8.8 g/dL 7.3       Bilirubin Total Latest Ref Range: 0.2 - 1.3 mg/dL 0.5       Alkaline Phosphatase Latest Ref Range: 40 - 150 U/L 75       ALT Latest Ref Range: 0 - 70 U/L 22       AST Latest Ref Range: 0 - 45 U/L 17       Angiotensin Converting Enzyme Unknown 50       CRP Inflammation Latest Ref Range: 0.0 - 8.0 mg/L 8.0       N-Terminal Pro Bnp Latest Ref Range: 0 - 125 pg/mL 460 (H)       Glucose Latest Ref Range: 70 - 99 mg/dL 98       WBC Latest Ref Range: 4.0 - 11.0 10e9/L 5.7       Hemoglobin Latest Ref Range: 13.3 - 17.7 g/dL 14.9       Hematocrit Latest Ref  Range: 40.0 - 53.0 % 44.4       Platelet Count Latest Ref Range: 150 - 450 10e9/L 142 (L)       RBC Count Latest Ref Range: 4.4 - 5.9 10e12/L 4.88       MCV Latest Ref Range: 78 - 100 fl 91       MCH Latest Ref Range: 26.5 - 33.0 pg 30.5       MCHC Latest Ref Range: 31.5 - 36.5 g/dL 33.6       RDW Latest Ref Range: 10.0 - 15.0 % 13.5       MR ABDOMEN W/O & W CONTRAST Unknown  Rpt      FVC-Pred Latest Units: L    5.07    FVC-Pre Latest Units: L    3.40    FVC-%Pred-Pre Latest Units: %    67    FEV1-Pre Latest Units: L    1.21    FEV1-%Pred-Pre Latest Units: %    30    FEV1FVC-Pred Latest Units: %    78    FEV1FVC-Pre Latest Units: %    35    FEV1FEV6-Pred Latest Units: %    79    FEV1FEV6-Pre Latest Units: %    45    FEFMax-Pred Latest Units: L/sec    9.85    FEFMax-Pre Latest Units: L/sec    3.81    FEFMax-%Pred-Pre Latest Units: %    38    FIFMax-Pre Latest Units: L/sec    4.64    ExpTime-Pre Latest Units: sec    15.80    ECHO COMPLETE WITH OPTISON Unknown   Rpt     HEART CATH RIGHT HEART CATH Unknown     Attch   PFT GENERAL LAB TESTING Unknown    Rpt    ZHN9307-%Pred-Pre Latest Units: %    10    FEF2575-Pre Latest Units: L/sec    0.36    FEF2575-Pred Latest Units: L/sec    3.29        HISTORY: Assessment of pancreatic cyst. Sarcoidosis, unspecified.  Other forms of dyspnea. Heart failure, unspecified. Other secondary  pulmonary hypertension.      COMPARISON: CT chest 10/6/2016.     TECHNIQUE: Multisequence, multiplanar imaging of the abdomen is  performed without contrast. A total of 15 mL Gadavist is then given  intravenously. Additional dynamic axial T1 fat-sat sequences are  performed.     FINDINGS:      Abdomen: There is no evidence of fatty liver infiltration or mass.  Gallbladder, spleen, pancreas and adrenal glands are unremarkable.  Bilateral renal calculi are noted. No hydronephrosis. T2 hyperintense  renal lesions are most consistent with cysts. No enlarged abdominal  lymph nodes. No T2 hyperintense  lesions are noted in the pancreas.     Following contrast administration, there are no abnormal arterial  enhancing lesions in the liver or pancreas. Area of decreased signal  intensity in the head of the pancreas may be related to the previously  seen pancreatic head calcification. No abnormal enhancing pancreatic  lesions are evident. Upper abdominal organs enhance normally. Multiple  bilateral renal cortical cysts show no abnormal enhancement consistent  with simple cysts. No enlarged lymph nodes. No ascites. No bowel  distention is appreciated where imaged.         IMPRESSION: No evidence of a cyst in the pancreas when compared to  prior CT. No pancreatic ductal dilatation. Simple renal cysts and  collecting system stones. Abdominal organs are otherwise within normal  Limits.    Plan:  1. Repeat right heart catherization in January while on prednisone and diuretics  2. While patient has elevated total cholesterol he has a TG/HDL ratio <1.0 and astonishing high HDL of 91.  The effect of statins in this particular situation is unknown.  3. Patient may have elevated blood sugar or Hgb A1C secondary to temporary use of prednisone.  The patien's TG:HDL does not indicate insulin resistance/diabetes.        Please do not hesitate to contact me if you have any questions/concerns.     Sincerely,     Khang Lindsey MD

## 2017-11-01 NOTE — NURSING NOTE
Chief Complaint   Patient presents with     Consult     evaluate  for sinus sarcodosis      Eamon Fuentes LPN

## 2017-11-01 NOTE — NURSING NOTE
Chief Complaint   Patient presents with     Follow Up For     Return PH patient with labs and PFTs prior     .Vitals were taken and medications were reconciled.     Cheli Garcia MA  11:53 AM

## 2017-11-01 NOTE — PATIENT INSTRUCTIONS
Plan of care:  You will hear from us regarding your CT  Your surgery:  Image guided bilateral maxillary antrostomy  Pre-op reminders:  1. Complete pre-op H+P within 30 days of surgery (recommend pre-op appointment 2 weeks prior to surgery date).   2.  needed on day of surgery.   3.Discuss all medications, including blood-thinning medications with PCP at pre-op appointment (Coumadin, Plavix, Aspirin, Ibuprofen/Motrin, Vitamin E and Fish Oil), which may need to be discontinued 7 days prior to surgery date.   4. Nothing to eat or drink after midnight the night before surgery.   5. Take shower or bath the morning of surgery and remove deodorant, cologne, scented lotion, makeup, nail polish and jewelry.     Clinic contact information:  1. To schedule an appointment call [phone], option 1  2. To talk to the Triage RN call [phone], option 3  3. If you need to speak to Ira or get a message to your doctor on a Friday, call the triage RN  4. IraRN: [phone]  5. Surgery scheduling:      Zakiya Miles: [phone]      Audra Araya: [phone]  6. Fax: [phone]  7. [phone]

## 2017-11-01 NOTE — MR AVS SNAPSHOT
After Visit Summary   2017    Jimmy Isaac    MRN: [id number]           Patient Information     Date Of Birth          1960        Visit Information        Provider Department      2017 1:15 PM Marcela Roy MD [facility]        Today's Diagnoses     Chronic maxillary sinusitis    -  1    Sarcoidosis          Care Instructions    Plan of care:  You will hear from us regarding your CT  Your surgery:  Image guided bilateral maxillary antrostomy  Pre-op reminders:  1. Complete pre-op H+P within 30 days of surgery (recommend pre-op appointment 2 weeks prior to surgery date).   2.  needed on day of surgery.   3.Discuss all medications, including blood-thinning medications with PCP at pre-op appointment (Coumadin, Plavix, Aspirin, Ibuprofen/Motrin, Vitamin E and Fish Oil), which may need to be discontinued 7 days prior to surgery date.   4. Nothing to eat or drink after midnight the night before surgery.   5. Take shower or bath the morning of surgery and remove deodorant, cologne, scented lotion, makeup, nail polish and jewelry.     Clinic contact information:  1. To schedule an appointment call [phone], option 1  2. To talk to the Triage RN call [phone], option 3  3. If you need to speak to Ira or get a message to your doctor on a Friday, call the triage RN  4. IraRN: [phone]  5. Surgery scheduling:      Zakiya Miles: [phone]      Audra Araya: [phone]  6. Fax: [phone]  7. [phone]            Follow-ups after your visit        Your next 10 appointments already scheduled     2017  9:00 AM CST   CT ABDOMEN PELVIS W/O CONTRAST with UCCT2   [facility] ([facility])    [address] [address] [phone]           Please bring any scans or X-rays taken at other hospitals, if similar tests were done. Also bring a list of your medicines,  including vitamins, minerals and over-the-counter drugs. It is safest to leave personal items at home.  Be sure to tell your doctor:   If you have any allergies.   If there s any chance you are pregnant.   If you are breastfeeding.   If you have any special needs.  You may have contrast for this exam. To prepare:   Do not eat or drink for 2 hours before your exam. If you need to take medicine, you may take it with small sips of water. (We may ask you to take liquid medicine as well.)   The day before your exam, drink extra fluids at least six 8-ounce glasses (unless your doctor tells you to restrict your fluids).  Patients over 70 or patients with diabetes or kidney problems:   If you haven t had a blood test (creatinine test) within the last 30 days, go to your clinic or Diagnostic Imaging Department for this test.  If you have diabetes:   If your kidney function is normal, continue taking your metformin (Avandamet, Glucophage, Glucovance, Metaglip) on the day of your exam.   If your kidney function is abnormal, wait 48 hours before restarting this medicine.  You will have oral contrast for this exam:   You will drink the contrast at home. Get this from your clinic or Diagnostic Imaging Department. Please follow the directions given.  Please wear loose clothing, such as a sweat suit or jogging clothes. Avoid snaps, zippers and other metal. We may ask you to undress and put on a hospital gown.  If you have any questions, please call the Imaging Department where you will have your exam.            Nov 16, 2017  9:30 AM CST   (Arrive by 9:15 AM)   Return Interstitial Lung with Shane Ruvalcaba MD   [facility] ([facility])    [address] [place] [id number]   [phone]            Nov 21, 2017 11:00 AM CST   (Arrive by 10:45 AM)   New Renal Calculi with Zackery Moura MD   [facility] ([facility])    [address] [place] [address]   [phone]            Nov 21, 2017  2:30 PM CST   NUTRITION VISIT with Joan Baez RD   [facility] C[facility] ([facility])    [address] [place] [address]   [phone]            Feb 05, 2018 11:00 AM CST   Lab with UC LAB   [facility] Lab ([facility])    [address] [place] [id number] [phone]            Feb 05, 2018 11:30 AM CST   (Arrive by 11:15 AM)   RETURN PRIMARY PULMONARY with ALLI May CNP   [facility] Heart Care ([facility])    [address] [place] [id number] [phone]            Mar 28, 2018  1:30 PM CDT   Lab with MARYANN LAB   [facility] ([facility])    [address] [place] [address]   [phone]            Mar 28, 2018  2:30 PM CDT   (Arrive by 2:00 PM)   Return Visit with Lilly Ba MD   [facility] Nephrology ([facility])    [address] [place] [id number] [phone]              Future tests that were ordered for you today     Open Standing Orders        Priority Remaining Interval Expires Ordered    Nicotine and Cotinine Blood Routine 1/1 AM DRAW  11/1/2017            Who to contact     Please call your clinic at [phone] to:    Ask questions about your health    Make or cancel appointments    Discuss your medicines    Learn about your test results    Speak to your doctor   If you have compliments or concerns about an experience at your clinic, or if you wish to file a complaint, please contact [facility] Patient Relations at [phone] or email us at [email or web address]         Additional Information About  Your Visit        [employer] Information     [employer] gives you secure access to your electronic health record. If you see a primary care provider, you can also send messages to your care team and make appointments. If you have questions, please call your primary care clinic.  If you do not have a primary care provider, please call [phone] and they will assist you.      [employer] is an electronic gateway that provides easy, online access to your medical records. With [employer], you can request a clinic appointment, read your test results, renew a prescription or communicate with your care team.     To access your existing account, please contact your [facility] or call [phone] for assistance.        Care EveryWhere ID     This is your Care EveryWhere ID. This could be used by other organizations to access your [place] medical records  [id number]        Your Vitals Were     Height BMI (Body Mass Index)                1.829 m (6') 25.5 kg/m2           Blood Pressure from Last 3 Encounters:   11/01/17 121/72   10/18/17 132/75   09/07/17 132/72    Weight from Last 3 Encounters:   11/01/17 85.3 kg (188 lb)   11/01/17 85.2 kg (187 lb 14.4 oz)   10/18/17 87.8 kg (193 lb 9.6 oz)              We Performed the Following     NASAL ENDOSCOPY, DIAGNOSTIC          Today's Medication Changes          These changes are accurate as of: 11/1/17  2:24 PM.  If you have any questions, ask your nurse or doctor.               These medicines have changed or have updated prescriptions.        Dose/Directions    LETAIRIS 10 MG tablet   This may have changed:  Another medication with the same name was removed. Continue taking this medication, and follow the directions you see here.   Generic drug:  ambrisentan   Changed by:  Lilly Ba MD        Refills:  0       vitamin D 2000 UNITS Caps   This may have changed:  Another medication with the same name was removed. Continue taking this medication,  and follow the directions you see here.   Changed by:  Chandni Denson, ALLI FERRO        Dose:  1 capsule   Take 1 capsule by mouth daily.   Refills:  0                Primary Care Provider Office Phone # Fax #    Cristhian Busch [phone]       [facility] [address]  [facility] [address]        Equal Access to Services     [facility]: Hadii aad ku hadasho Soomaali, waaxda luqadaha, qaybta kaalmada adeegyada, waxay idiin hayaan adeeg kharash la'aan . So [facility] [phone].    ATENCIÓN: Si habla español, tiene a hutton disposición servicios gratuitos de asistencia lingüística. Llame al [phone].    We comply with applicable federal civil rights laws and [place] laws. We do not discriminate on the basis of race, color, national origin, age, disability, sex, sexual orientation, or gender identity.            Thank you!     Thank you for choosing [facility]  for your care. Our goal is always to provide you with excellent care. Hearing back from our patients is one way we can continue to improve our services. Please take a few minutes to complete the written survey that you may receive in the mail after your visit with us. Thank you!             Your Updated Medication List - Protect others around you: Learn how to safely use, store and throw away your medicines at www.disposemymeds.org.          This list is accurate as of: 11/1/17  2:24 PM.  Always use your most recent med list.                   Brand Name Dispense Instructions for use Diagnosis    albuterol 108 (90 BASE) MCG/ACT Inhaler    PROAIR HFA    1 Inhaler    Inhale 4-6 puffs into the lungs every 6 hours as needed    Pulmonary hypertension, Mixed hyperlipidemia, COPD (chronic obstructive pulmonary disease) (H)       amoxicillin-clavulanate 875-125 MG per tablet    AUGMENTIN    14 tablet    Take 1 tablet by mouth 2 times daily for 7 days    URI (upper respiratory infection)       budesonide 0.25 MG/2ML neb  solution    PULMICORT    360 mL    INHALE THE CONTENTS OF 1   VIAL TWO TIMES A DAY    ILD (interstitial lung disease) (H)       digoxin 125 MCG tablet    LANOXIN    90 tablet    Take 1 tablet (125 mcg) by mouth daily    Pulmonary hypertension       furosemide 20 MG tablet    LASIX    30 tablet    Take 1 tablet (20 mg) by mouth daily    Localized edema       LETAIRIS 10 MG tablet   Generic drug:  ambrisentan           predniSONE 10 MG tablet    DELTASONE    180 tablet    Take 3 tablets (30 mg) by mouth daily    Sarcoidosis       tadalafil (PAH) 20 MG Tabs    ADCIRCA    60 tablet    Take 2 tablets (40 mg) by mouth daily    Pulmonary hypertension       treprostinil 0.6 MG/ML Soln neb solution    TYVASO    3.6 mL    12 breaths 4 times daily    Pulmonary hypertension       vitamin D 2000 UNITS Caps      Take 1 capsule by mouth daily.

## 2017-11-01 NOTE — NURSING NOTE
Diet: Patient instructed regarding a heart healthy diet, including discussion of reduced fat and sodium intake. Patient demonstrated understanding of this information and agreed to call with further questions or concerns.    Labs: Patient was given results of the laboratory testing obtained today. Patient demonstrated understanding of this information and agreed to call with further questions or concerns.     Med Reconcile: Reviewed and verified all current medications with the patient. The updated medication list was printed and given to the patient.    Right Heart Catheterization: Patient was instructed regarding right heart catheterization, including discussion of the procedure, preparation, intra-procedural steps, and recovery at home. Patient demonstrated understanding of this information and agreed to call with further questions or concerns.    Patient stated he understood all health information given and agreed to call with further questions or concerns.    Medication Changes:  No medication changes at this time. Please continue current medication regiment.    Patient Instructions:  Continue staying active, eating a low sodium, low fat diet.    Check-In  Time Check-In Location Estimated Length Procedure   Name        [facility]  waiting room 60-90 minutes Right Heart Catheterization**     Procedure Preparations & Instructions     This is an invasive procedure that DOES require preparation:    - Nothing to eat for 6 hours   - You may have clear liquids up until the time of your procedure  - A ride should be arranged for you in the instance you are unable to drive home, however you should be able to function as you normally would after the procedure     *For Patients with Diabetes: ? DO NOT take any oral diabetic medication, short-acting diabetes medications/insulin, humalog or regular insulin the morning of your test  ? Take   dose of long-acting insulin (Lantus, Levemir) the day of your test  ? Remember to  bring  your glucometer and insulin with you to take after your test if needed     *For Patients on anticoagulants: ? Your Coumadin Clinic will give you instructions on medication adjustments or bridging prior to the procedure        Follow up Appointment Information:  Follow up with Nevaeh in 3 months    Results:  Results for MATTY LOPEZ (MRN [id number]) as of 11/1/2017 12:40   Ref. Range 11/1/2017 10:47   Sodium Latest Ref Range: 133 - 144 mmol/L 136   Potassium Latest Ref Range: 3.4 - 5.3 mmol/L 4.2   Chloride Latest Ref Range: 94 - 109 mmol/L 98   Carbon Dioxide Latest Ref Range: 20 - 32 mmol/L 30   Urea Nitrogen Latest Ref Range: 7 - 30 mg/dL 28   Creatinine Latest Ref Range: 0.66 - 1.25 mg/dL 1.30 (H)   GFR Estimate Latest Ref Range: >60 mL/min/1.7m2 57 (L)   GFR Estimate If Black Latest Ref Range: >60 mL/min/1.7m2 69   Calcium Latest Ref Range: 8.5 - 10.1 mg/dL 8.7   Anion Gap Latest Ref Range: 3 - 14 mmol/L 7   Phosphorus Latest Ref Range: 2.5 - 4.5 mg/dL 1.7 (L)   Albumin Latest Ref Range: 3.4 - 5.0 g/dL 3.7   Protein Total Latest Ref Range: 6.8 - 8.8 g/dL 6.8   Bilirubin Total Latest Ref Range: 0.2 - 1.3 mg/dL 0.7   Alkaline Phosphatase Latest Ref Range: 40 - 150 U/L 66   ALT Latest Ref Range: 0 - 70 U/L 24   AST Latest Ref Range: 0 - 45 U/L 16   Cholesterol Latest Ref Range: <200 mg/dL 234 (H)   HDL Cholesterol Latest Ref Range: >39 mg/dL 91   LDL Cholesterol Calculated Latest Ref Range: <100 mg/dL 129 (H)   Non HDL Cholesterol Latest Ref Range: <130 mg/dL 143 (H)   N-Terminal Pro Bnp Latest Ref Range: 0 - 125 pg/mL 1000 (H)   Triglycerides Latest Ref Range: <150 mg/dL 68   Glucose Latest Ref Range: 70 - 99 mg/dL 146 (H)   WBC Latest Ref Range: 4.0 - 11.0 10e9/L 15.4 (H)   Hemoglobin Latest Ref Range: 13.3 - 17.7 g/dL 15.2   Hematocrit Latest Ref Range: 40.0 - 53.0 % 46.4   Platelet Count Latest Ref Range: 150 - 450 10e9/L 160   RBC Count Latest Ref Range: 4.4 - 5.9 10e12/L 4.97   MCV Latest Ref Range: 78 -  100 fl 93   MCH Latest Ref Range: 26.5 - 33.0 pg 30.6   MCHC Latest Ref Range: 31.5 - 36.5 g/dL 32.8   RDW Latest Ref Range: 10.0 - 15.0 % 14.1

## 2017-11-01 NOTE — PROGRESS NOTES
Impression:  1. Pulmonary arterial hypertension  2. Chronic, oxygen dependent respiratory failure  3. Sarcoidosis without cardiac involvement  4. Nephrolithiasis  5. Prednisone related fluid retention    Patient returns for follow-up while on prednisone trial.  He is being treated for some fluid retention as well as nephrolithiasis.  Vit  D supplementation increase, eyes without evidence of sarcoid and no cardiac involvement.He feels steroid buzz but no perceptible increase in walking tolerance.  He is utilizing oxygen more consistency.     Echocardiogram without evidence of right to left shunt as was initial echocardiogram when first seen.    11/1/2017   General Symptoms: Yes  Skin Symptoms: No  HENT Symptoms: Yes  EYE SYMPTOMS: No  HEART SYMPTOMS: Yes  LUNG SYMPTOMS: Yes  INTESTINAL SYMPTOMS: No  URINARY SYMPTOMS: No  REPRODUCTIVE SYMPTOMS: No  SKELETAL SYMPTOMS: No  BLOOD SYMPTOMS: No  NERVOUS SYSTEM SYMPTOMS: No  MENTAL HEALTH SYMPTOMS: No  Fever: No  Loss of appetite: No  Weight loss: No  Weight gain: Yes  Fatigue: Yes  Night sweats: No  Chills: No  Increased stress: Yes  Excessive hunger: Yes  Excessive thirst: Yes  Feeling hot or cold when others believe the temperature is normal: No  Loss of height: No  Post-operative complications: No  Surgical site pain: No  Hallucinations: No  Change in or Loss of Energy: Yes  Hyperactivity: No  Confusion: No  Ear pain: No  Ear discharge: No  Hearing loss: No  Tinnitus: No  Nosebleeds: No  Congestion: Yes  Sinus pain: Yes  Trouble swallowing: No  Mouth sores: No  Sore throat: No  Tooth pain: No  Gum tenderness: No  Bleeding gums: No  Change in taste: Yes  Change in sense of smell: No  Dry mouth: Yes  Hearing aid used: No  Neck lump: No  Cough: Yes  Sputum or phlegm: Yes  Coughing up blood: No  Difficulty breating or shortness of breath: Yes  Snoring: No  Wheezing: Yes  Difficulty breathing on exertion: Yes  Nighttime Cough: Yes  Difficulty breathing when lying flat:  Yes  Chest pain or pressure: Yes  Fast or irregular heartbeat: Yes  Pain in legs with walking: No  Trouble breathing while lying down: Yes  Fingers or toes appear blue: No  High blood pressure: No  Low blood pressure: No  Fainting: No  Murmurs: No  Pacemaker: No  Varicose veins: No  Edema or swelling: Yes  Wake up at night with shortness of breath: Yes  Light-headedness: No  Exercise intolerance: Yes  PERRLA, EOM full, normal gait and station, normal mentation, skin without lesions, chest is clear, no evidence of right or left ventricular overactivity, sternum stable, no carotid bruits, regular rhythm, S1, S2, no murmurs, abdomen without tenderness, rebound guarding or masses, no edema, no focal neurologic defects. Cushingoid  PMH:   1.  Sarcoidosis diagnosed in 1991 per his report by transbronchial biopsy.  This was treated with prednisone causing irritability, weight gain, insomnia and jitteriness.  He was treated for SAPH with most recent PA pressures of 75/34/52 and wedge pressure of 16.  Last echo was in 2016 with an EF of 60-65%.  I am not sure if there was a bubble study done at that time.    2.  Chronic sinusitis with deviated nasal septum.   3.  Hyperlipidemia.   4.  Renal stones, calcium oxalate.    5.  Pancreatitis.   6.  Question of hypercalcemia.  Wt Readings from Last 24 Encounters:   11/01/17 85.2 kg (187 lb 14.4 oz)   10/18/17 87.8 kg (193 lb 9.6 oz)   09/07/17 84.9 kg (187 lb 1.6 oz)   08/01/17 84.5 kg (186 lb 3.2 oz)   06/20/17 83.5 kg (184 lb)   06/02/17 79.4 kg (175 lb)   05/25/17 79.4 kg (175 lb)   05/16/17 84.8 kg (187 lb)   10/06/16 79.4 kg (175 lb)   08/23/16 81.2 kg (179 lb)   04/12/16 84.1 kg (185 lb 8 oz)   04/12/16 83.9 kg (185 lb)   03/29/16 84 kg (185 lb 1.6 oz)   10/29/15 84.6 kg (186 lb 6.4 oz)   07/09/15 83.4 kg (183 lb 12.8 oz)   04/09/15 86.3 kg (190 lb 4.8 oz)   04/09/15 79.4 kg (175 lb)   03/05/15 85 kg (187 lb 4.8 oz)   02/26/15 86.7 kg (191 lb 1.6 oz)   09/03/14 83.4 kg (183 lb  12.8 oz)   14 78.8 kg (173 lb 12.8 oz)   14 81.2 kg (179 lb)   14 78.5 kg (173 lb)   14 78.6 kg (173 lb 3.2 oz)   Heart Catherization 2017  .1. HR 80 bpm  2. /79/99 mmHg  3. RA 12/15/12  4. RV 75/12  5. PA 75/34/52   6. PCW    7. PA sat 72.6%   8. PCW sat 97%  9. Hgb 14.2g/dL   10. Lesia CO 5.3   11. Lesia CI 2.6   12. TD CO 5.7   13. TD CI 2.8   14. PVR 6.9  MRN: [id number]  : 1960  Study Date: 2017 09:01 AM  Age: 56 yrs  Gender: Male  Patient Location: [facility]  Reason For Study: , Other forms of dyspnea, Heart failure, unspecified, Other  seco  Ordering Physician: DUY MIDDLETON  Referring Physician: SHERRY BOB MD  Performed By: Amy David RDCS     BSA: 2.1 m2  Height: 72 in  Weight: 187 lb  HR: 94  BP: 130/70 mmHg  _____________________________________________________________________________  __        Procedure  Complete Echo Adult. Contrast Optison.  _____________________________________________________________________________  __        Interpretation Summary     The visual ejection fraction is estimated at 65-70%.  There is mild concentric left ventricular hypertrophy.  The right ventricle is not well visualized.  Mildly decreased right ventricular systolic function  The study was technically difficult. Compared to prior study, changes are  noted.  _____________________________________________________________________________  __        Left Ventricle  The left ventricle is normal in size. There is mild concentric left  ventricular hypertrophy. The visual ejection fraction is estimated at 65-70%.  The transmitral spectral Doppler flow pattern is suggestive of impaired LV  relaxation. E by E prime ratio is between 8 and 15, which is indeterminate for  assessment of left ventricular filling pressures. Septal motion is consistent  with conduction abnormality.     Right Ventricle  In the apical views the RV appears to be mildly dilated but in the  subcostal  views appears to be normal in size. Mildly decreased right ventricular  systolic function. The right ventricle is not well visualized.     Atria  Normal left atrial size. Borderline right atrial enlargement. There is no  atrial shunt seen.     Mitral Valve  The mitral valve is normal in structure and function. There is trace mitral  regurgitation.        Tricuspid Valve  The tricuspid valve is normal in structure and function. There is trace  tricuspid regurgitation. Right ventricular systolic pressure could not be  approximated due to inadequate tricuspid regurgitation.     Aortic Valve  The aortic valve is trileaflet. The aortic valve is not well visualized. No  hemodynamically significant valvular aortic stenosis.     Pulmonic Valve  The pulmonic valve is not well seen, but is grossly normal. There is no  pulmonic valvular regurgitation.     Vessels  The aortic root is normal size. Normal size ascending aorta. The inferior vena  cava is not dilated.     Pericardium  There is no pericardial effusion.        Rhythm  The rhythm was normal sinus.  _____________________________________________________________________________  __  MMode/2D Measurements & Calculations  IVSd: 1.2 cm     LVIDd: 3.6 cm  LVIDs: 2.5 cm  LVPWd: 1.4 cm  FS: 32.3 %  EDV(Teich): 55.2 ml  ESV(Teich): 21.3 ml  LV mass(C)d: 161.1 grams  LV mass(C)dI: 77.8 grams/m2  Ao root diam: 3.2 cm  LA dimension: 2.9 cm  asc Aorta Diam: 3.1 cm  LA/Ao: 0.92  LA Volume Index (BP): 19.0 ml/m2        Doppler Measurements & Calculations  MV E max nirmal: 63.1 cm/sec  MV A max nirmal: 81.7 cm/sec  MV E/A: 0.77     MV dec slope: 244.7 cm/sec2  MV dec time: 0.26 sec  Peak E' Nirmal: 6.8 cm/sec         Current Outpatient Prescriptions   Medication     amoxicillin-clavulanate (AUGMENTIN) 875-125 MG per tablet     LETAIRIS 10 MG tablet     furosemide (LASIX) 20 MG tablet     albuterol (PROAIR HFA) 108 (90 BASE) MCG/ACT Inhaler     budesonide (PULMICORT) 0.25 MG/2ML neb  solution     predniSONE (DELTASONE) 10 MG tablet     digoxin (LANOXIN) 125 MCG tablet     tadalafil, PAH, (ADCIRCA) 20 MG TABS     treprostinil (TYVASO) 0.6 MG/ML SOLN     Cholecalciferol (VITAMIN D) 2000 UNITS CAPS     [DISCONTINUED] LETAIRIS 5 MG tablet     No current facility-administered medications for this visit.      Results for MATTY LOPEZ (MRN [id number]) as of 11/1/2017 12:01   Ref. Range 11/1/2017 10:47 11/1/2017 11:01   Sodium Latest Ref Range: 133 - 144 mmol/L 136    Potassium Latest Ref Range: 3.4 - 5.3 mmol/L 4.2    Chloride Latest Ref Range: 94 - 109 mmol/L 98    Carbon Dioxide Latest Ref Range: 20 - 32 mmol/L 30    Urea Nitrogen Latest Ref Range: 7 - 30 mg/dL 28    Creatinine Latest Ref Range: 0.66 - 1.25 mg/dL 1.30 (H)    GFR Estimate Latest Ref Range: >60 mL/min/1.7m2 57 (L)    GFR Estimate If Black Latest Ref Range: >60 mL/min/1.7m2 69    Calcium Latest Ref Range: 8.5 - 10.1 mg/dL 8.7    Anion Gap Latest Ref Range: 3 - 14 mmol/L 7    Phosphorus Latest Ref Range: 2.5 - 4.5 mg/dL 1.7 (L)    Albumin Latest Ref Range: 3.4 - 5.0 g/dL 3.7    Protein Total Latest Ref Range: 6.8 - 8.8 g/dL 6.8    Bilirubin Total Latest Ref Range: 0.2 - 1.3 mg/dL 0.7    Alkaline Phosphatase Latest Ref Range: 40 - 150 U/L 66    ALT Latest Ref Range: 0 - 70 U/L 24    AST Latest Ref Range: 0 - 45 U/L 16    N-Terminal Pro Bnp Latest Ref Range: 0 - 125 pg/mL 1000 (H)    Glucose Latest Ref Range: 70 - 99 mg/dL 146 (H)    WBC Latest Ref Range: 4.0 - 11.0 10e9/L 15.4 (H)    Hemoglobin Latest Ref Range: 13.3 - 17.7 g/dL 15.2    Hematocrit Latest Ref Range: 40.0 - 53.0 % 46.4    Platelet Count Latest Ref Range: 150 - 450 10e9/L 160    RBC Count Latest Ref Range: 4.4 - 5.9 10e12/L 4.97    MCV Latest Ref Range: 78 - 100 fl 93    MCH Latest Ref Range: 26.5 - 33.0 pg 30.6    MCHC Latest Ref Range: 31.5 - 36.5 g/dL 32.8    RDW Latest Ref Range: 10.0 - 15.0 % 14.1      Results for MATTY LOPEZ (MRN [id number]) as of 6/20/2017 13:02    Ref. Range 5/16/2017 11:59 5/25/2017 08:41 5/25/2017 10:44 5/25/2017 11:59 6/2/2017 12:10   Sodium Latest Ref Range: 133 - 144 mmol/L 139       Potassium Latest Ref Range: 3.4 - 5.3 mmol/L 4.3       Chloride Latest Ref Range: 94 - 109 mmol/L 103       Carbon Dioxide Latest Ref Range: 20 - 32 mmol/L 28       Urea Nitrogen Latest Ref Range: 7 - 30 mg/dL 16       Creatinine Latest Ref Range: 0.66 - 1.25 mg/dL 1.38 (H)       GFR Estimate Latest Ref Range: >60 mL/min/1.7m2 53 (L)       GFR Estimate If Black Latest Ref Range: >60 mL/min/1.7m2 64       Calcium Latest Ref Range: 8.5 - 10.1 mg/dL 9.1       Anion Gap Latest Ref Range: 3 - 14 mmol/L 8       Albumin Latest Ref Range: 3.4 - 5.0 g/dL 3.8       Protein Total Latest Ref Range: 6.8 - 8.8 g/dL 7.3       Bilirubin Total Latest Ref Range: 0.2 - 1.3 mg/dL 0.5       Alkaline Phosphatase Latest Ref Range: 40 - 150 U/L 75       ALT Latest Ref Range: 0 - 70 U/L 22       AST Latest Ref Range: 0 - 45 U/L 17       Angiotensin Converting Enzyme Unknown 50       CRP Inflammation Latest Ref Range: 0.0 - 8.0 mg/L 8.0       N-Terminal Pro Bnp Latest Ref Range: 0 - 125 pg/mL 460 (H)       Glucose Latest Ref Range: 70 - 99 mg/dL 98       WBC Latest Ref Range: 4.0 - 11.0 10e9/L 5.7       Hemoglobin Latest Ref Range: 13.3 - 17.7 g/dL 14.9       Hematocrit Latest Ref Range: 40.0 - 53.0 % 44.4       Platelet Count Latest Ref Range: 150 - 450 10e9/L 142 (L)       RBC Count Latest Ref Range: 4.4 - 5.9 10e12/L 4.88       MCV Latest Ref Range: 78 - 100 fl 91       MCH Latest Ref Range: 26.5 - 33.0 pg 30.5       MCHC Latest Ref Range: 31.5 - 36.5 g/dL 33.6       RDW Latest Ref Range: 10.0 - 15.0 % 13.5       MR ABDOMEN W/O & W CONTRAST Unknown  Rpt      FVC-Pred Latest Units: L    5.07    FVC-Pre Latest Units: L    3.40    FVC-%Pred-Pre Latest Units: %    67    FEV1-Pre Latest Units: L    1.21    FEV1-%Pred-Pre Latest Units: %    30    FEV1FVC-Pred Latest Units: %    78    FEV1FVC-Pre Latest  Units: %    35    FEV1FEV6-Pred Latest Units: %    79    FEV1FEV6-Pre Latest Units: %    45    FEFMax-Pred Latest Units: L/sec    9.85    FEFMax-Pre Latest Units: L/sec    3.81    FEFMax-%Pred-Pre Latest Units: %    38    FIFMax-Pre Latest Units: L/sec    4.64    ExpTime-Pre Latest Units: sec    15.80    ECHO COMPLETE WITH OPTISON Unknown   Rpt     HEART CATH RIGHT HEART CATH Unknown     Attch   PFT GENERAL LAB TESTING Unknown    Rpt    MQY6434-%Pred-Pre Latest Units: %    10    FEF2575-Pre Latest Units: L/sec    0.36    FEF2575-Pred Latest Units: L/sec    3.29        HISTORY: Assessment of pancreatic cyst. Sarcoidosis, unspecified.  Other forms of dyspnea. Heart failure, unspecified. Other secondary  pulmonary hypertension.      COMPARISON: CT chest 10/6/2016.     TECHNIQUE: Multisequence, multiplanar imaging of the abdomen is  performed without contrast. A total of 15 mL Gadavist is then given  intravenously. Additional dynamic axial T1 fat-sat sequences are  performed.     FINDINGS:      Abdomen: There is no evidence of fatty liver infiltration or mass.  Gallbladder, spleen, pancreas and adrenal glands are unremarkable.  Bilateral renal calculi are noted. No hydronephrosis. T2 hyperintense  renal lesions are most consistent with cysts. No enlarged abdominal  lymph nodes. No T2 hyperintense lesions are noted in the pancreas.     Following contrast administration, there are no abnormal arterial  enhancing lesions in the liver or pancreas. Area of decreased signal  intensity in the head of the pancreas may be related to the previously  seen pancreatic head calcification. No abnormal enhancing pancreatic  lesions are evident. Upper abdominal organs enhance normally. Multiple  bilateral renal cortical cysts show no abnormal enhancement consistent  with simple cysts. No enlarged lymph nodes. No ascites. No bowel  distention is appreciated where imaged.         IMPRESSION: No evidence of a cyst in the pancreas when  compared to  prior CT. No pancreatic ductal dilatation. Simple renal cysts and  collecting system stones. Abdominal organs are otherwise within normal  Limits.    Plan:  1. Repeat right heart catherization in January while on prednisone and diuretics  2. While patient has elevated total cholesterol he has a TG/HDL ratio <1.0 and astonishing high HDL of 91.  The effect of statins in this particular situation is unknown.  3. Patient may have elevated blood sugar or Hgb A1C secondary to temporary use of prednisone.  The patien's TG:HDL does not indicate insulin resistance/diabetes.

## 2017-11-01 NOTE — PATIENT INSTRUCTIONS
Medication Changes:  No medication changes at this time. Please continue current medication regiment.    Patient Instructions:  Continue staying active, eating a low sodium, low fat diet.    Check-In  Time Check-In Location Estimated Length Procedure   Name        [facility]  waiting room 60-90 minutes Right Heart Catheterization**     Procedure Preparations & Instructions     This is an invasive procedure that DOES require preparation:    - Nothing to eat for 6 hours   - You may have clear liquids up until the time of your procedure  - A ride should be arranged for you in the instance you are unable to drive home, however you should be able to function as you normally would after the procedure     *For Patients with Diabetes: ? DO NOT take any oral diabetic medication, short-acting diabetes medications/insulin, humalog or regular insulin the morning of your test  ? Take   dose of long-acting insulin (Lantus, Levemir) the day of your test  ? Remember to  bring your glucometer and insulin with you to take after your test if needed     *For Patients on anticoagulants: ? Your Coumadin Clinic will give you instructions on medication adjustments or bridging prior to the procedure        Follow up Appointment Information:  Follow up with Nevaeh in 3 months    Results:  Results for MATTY LOPEZ (MRN [id number]) as of 11/1/2017 12:40   Ref. Range 11/1/2017 10:47   Sodium Latest Ref Range: 133 - 144 mmol/L 136   Potassium Latest Ref Range: 3.4 - 5.3 mmol/L 4.2   Chloride Latest Ref Range: 94 - 109 mmol/L 98   Carbon Dioxide Latest Ref Range: 20 - 32 mmol/L 30   Urea Nitrogen Latest Ref Range: 7 - 30 mg/dL 28   Creatinine Latest Ref Range: 0.66 - 1.25 mg/dL 1.30 (H)   GFR Estimate Latest Ref Range: >60 mL/min/1.7m2 57 (L)   GFR Estimate If Black Latest Ref Range: >60 mL/min/1.7m2 69   Calcium Latest Ref Range: 8.5 - 10.1 mg/dL 8.7   Anion Gap Latest Ref Range: 3 - 14 mmol/L 7   Phosphorus Latest Ref Range: 2.5 - 4.5 mg/dL 1.7  (L)   Albumin Latest Ref Range: 3.4 - 5.0 g/dL 3.7   Protein Total Latest Ref Range: 6.8 - 8.8 g/dL 6.8   Bilirubin Total Latest Ref Range: 0.2 - 1.3 mg/dL 0.7   Alkaline Phosphatase Latest Ref Range: 40 - 150 U/L 66   ALT Latest Ref Range: 0 - 70 U/L 24   AST Latest Ref Range: 0 - 45 U/L 16   Cholesterol Latest Ref Range: <200 mg/dL 234 (H)   HDL Cholesterol Latest Ref Range: >39 mg/dL 91   LDL Cholesterol Calculated Latest Ref Range: <100 mg/dL 129 (H)   Non HDL Cholesterol Latest Ref Range: <130 mg/dL 143 (H)   N-Terminal Pro Bnp Latest Ref Range: 0 - 125 pg/mL 1000 (H)   Triglycerides Latest Ref Range: <150 mg/dL 68   Glucose Latest Ref Range: 70 - 99 mg/dL 146 (H)   WBC Latest Ref Range: 4.0 - 11.0 10e9/L 15.4 (H)   Hemoglobin Latest Ref Range: 13.3 - 17.7 g/dL 15.2   Hematocrit Latest Ref Range: 40.0 - 53.0 % 46.4   Platelet Count Latest Ref Range: 150 - 450 10e9/L 160   RBC Count Latest Ref Range: 4.4 - 5.9 10e12/L 4.97   MCV Latest Ref Range: 78 - 100 fl 93   MCH Latest Ref Range: 26.5 - 33.0 pg 30.6   MCHC Latest Ref Range: 31.5 - 36.5 g/dL 32.8   RDW Latest Ref Range: 10.0 - 15.0 % 14.1     We are located on the third floor of the [facility] ([facility]) on the [facility].  Our address is     [address] [place] [address] on [address]rd [place] [address]    Thank you for allowing us to be a part of your care here at the [facility]    If you have questions or concerns please contact us at:    Michelle Bauer, RN, BSN   Miguel Ángel Valverde (Schedule,P.A.)  Nurse Coordinator     Clinic   Pulmonary Hypertension   Pulmonary Hypertension  [facility]  (P)[phone]    (P) [phone]        (F)[phone]    ** Please note that you will NOT receive a reminder call regarding your scheduled testing, reminder calls are for provider appointments only.  If you are  scheduled for testing within the [facility] system you may receive a call regarding pre-registration for billing purposes only.**     Remember to weigh yourself daily after voiding and before you consume any food or beverages and log the numbers.  If you have gained/lost 2 pounds overnight or 5 pounds in a week contact us immediately for medication adjustments or further instructions.   **Please call us immediately if you have any syncope, chest pain, edema, or decline in your functional status.    Support Group:  Pulmonary Hypertension Association  [email or web address].phassociation.org/  **Look at the Events Tab** They even have Support Groups that you can call into    [facility] PH Support Group  First Saturday of the Month from 1-3 PM   Location: [address] [place] [id number]  Leader: Bert Weber  Phone: [phone]  Email: [email or web address]

## 2017-11-02 DIAGNOSIS — D86.9 SARCOIDOSIS: Primary | ICD-10-CM

## 2017-11-04 LAB
BACTERIA SPEC CULT: ABNORMAL
SPECIMEN SOURCE: ABNORMAL

## 2017-11-06 LAB
FUNGUS SPEC CULT: ABNORMAL
FUNGUS SPEC CULT: ABNORMAL
SPECIMEN SOURCE: ABNORMAL

## 2017-11-07 ENCOUNTER — TELEPHONE (OUTPATIENT)
Dept: OTOLARYNGOLOGY | Facility: CLINIC | Age: 57
End: 2017-11-07

## 2017-11-07 NOTE — TELEPHONE ENCOUNTER
11/3/17  Lt msg to call surgery scheduling for Dr Roy    11/07/17  Lt 2nd msg regarding scheduling of surgery with Dr Roy    11/9/17 call to patient, he needs kidney surgery first, thinking about summer of 2018 for surgery with Dr Roy.  Pt had questions                So tranferred him to Nurse Ira Miles   [facility]-Op Coordinator  [phone]

## 2017-11-08 LAB
COTININE SERPL-MCNC: NORMAL NG/ML
NICOTINE SERPL-MCNC: NORMAL NG/ML

## 2017-11-09 ENCOUNTER — TELEPHONE (OUTPATIENT)
Dept: OTOLARYNGOLOGY | Facility: CLINIC | Age: 57
End: 2017-11-09

## 2017-11-09 NOTE — PROGRESS NOTES
Ira, Please contact patient by letter/phone with following results:  Has both fungus and bacteria - I was waiting for him to get his CT so I could make a plan to try to deal with this in the OR verses clinic due to his lung issues.  The pulmonary doc feels he is quite high risk.  I forwarded that note to you.  He really needs the sinuses cleaned out......

## 2017-11-09 NOTE — TELEPHONE ENCOUNTER
11/9/17  Pt needs Kidney stone surgery first.  He was thinking summer of 2018 for Sinus surgery.  He had questions so transferred him to Ira boothe (ENT Nurse Coordinator to Dr Roy)    Zakiya Miles   [facility]-Op Coordinator  [phone]

## 2017-11-16 ENCOUNTER — OFFICE VISIT (OUTPATIENT)
Dept: PULMONOLOGY | Facility: CLINIC | Age: 57
End: 2017-11-16
Attending: INTERNAL MEDICINE
Payer: COMMERCIAL

## 2017-11-16 VITALS
SYSTOLIC BLOOD PRESSURE: 126 MMHG | BODY MASS INDEX: 25.47 KG/M2 | HEART RATE: 98 BPM | RESPIRATION RATE: 18 BRPM | OXYGEN SATURATION: 87 % | HEIGHT: 72 IN | DIASTOLIC BLOOD PRESSURE: 75 MMHG | WEIGHT: 188 LBS

## 2017-11-16 DIAGNOSIS — D86.9 SARCOIDOSIS: Primary | ICD-10-CM

## 2017-11-16 DIAGNOSIS — D86.9 SARCOIDOSIS: ICD-10-CM

## 2017-11-16 DIAGNOSIS — Z23 ENCOUNTER FOR IMMUNIZATION: Primary | ICD-10-CM

## 2017-11-16 PROCEDURE — 25000128 H RX IP 250 OP 636: Mod: ZF | Performed by: INTERNAL MEDICINE

## 2017-11-16 PROCEDURE — G0008 ADMIN INFLUENZA VIRUS VAC: HCPCS | Mod: ZF

## 2017-11-16 PROCEDURE — 90670 PCV13 VACCINE IM: CPT | Mod: ZF | Performed by: INTERNAL MEDICINE

## 2017-11-16 PROCEDURE — 90686 IIV4 VACC NO PRSV 0.5 ML IM: CPT | Mod: ZF | Performed by: INTERNAL MEDICINE

## 2017-11-16 PROCEDURE — G0009 ADMIN PNEUMOCOCCAL VACCINE: HCPCS | Mod: ZF

## 2017-11-16 PROCEDURE — 99212 OFFICE O/P EST SF 10 MIN: CPT | Mod: ZF

## 2017-11-16 RX ADMIN — INFLUENZA A VIRUS A/MICHIGAN/45/2015 X-275 (H1N1) ANTIGEN (FORMALDEHYDE INACTIVATED), INFLUENZA A VIRUS A/HONG KONG/4801/2014 X-263B (H3N2) ANTIGEN (FORMALDEHYDE INACTIVATED), INFLUENZA B VIRUS B/PHUKET/3073/2013 ANTIGEN (FORMALDEHYDE INACTIVATED), AND INFLUENZA B VIRUS B/BRISBANE/60/2008 ANTIGEN (FORMALDEHYDE INACTIVATED) 0.5 ML: 15; 15; 15; 15 INJECTION, SUSPENSION INTRAMUSCULAR at 11:07

## 2017-11-16 RX ADMIN — PNEUMOCOCCAL 13-VALENT CONJUGATE VACCINE 0.5 ML: 2.2; 2.2; 2.2; 2.2; 2.2; 4.4; 2.2; 2.2; 2.2; 2.2; 2.2; 2.2; 2.2 INJECTION, SUSPENSION INTRAMUSCULAR at 11:07

## 2017-11-16 ASSESSMENT — PAIN SCALES - GENERAL: PAINLEVEL: NO PAIN (0)

## 2017-11-16 NOTE — MR AVS SNAPSHOT
After Visit Summary   11/16/2017    Jimmy Isaac    MRN: [id number]           Patient Information     Date Of Birth          1960        Visit Information        Provider Department      11/16/2017 9:30 AM Shane Ruvalcaba MD [facility]        Today's Diagnoses     Encounter for immunization    -  1    Sarcoidosis           Follow-ups after your visit        Follow-up notes from your care team     Return in about 3 months (around 2/16/2018).      Your next 10 appointments already scheduled     Feb 05, 2018 11:00 AM CST   Lab with  LAB   [facility] ([facility])    [address] [place] [id number]   [phone]            Feb 05, 2018 11:30 AM CST   (Arrive by 11:15 AM)   RETURN PRIMARY PULMONARY with [facility] ([facility])    [address] [place] [id number] [phone]            Mar 28, 2018  1:30 PM CDT   Lab with  LAB   [facility] ([facility])    [address] [place] [id number] [phone]            Mar 28, 2018  2:30 PM CDT   (Arrive by 2:00 PM)   Return Visit with Lilly Ba MD   [facility] ([facility])    [address] [place] [id number] [phone]              Who to contact     If you have questions or need follow up information about today's clinic visit or your schedule please contact [facility] directly at [phone].  Normal or non-critical lab and imaging results will be communicated to you by MyChart, letter or phone within 4 business days after the clinic has received the results. If you do not hear from us within 7 days, please contact the clinic through MyChart or phone. If you have a critical or abnormal lab result, we will  notify you by phone as soon as possible.  Submit refill requests through [email or web address] or call your pharmacy and they will forward the refill request to us. Please allow 3 business days for your refill to be completed.          Additional Information About Your Visit        [employer] Information     [email or web address] gives you secure access to your electronic health record. If you see a primary care provider, you can also send messages to your care team and make appointments. If you have questions, please call your primary care clinic.  If you do not have a primary care provider, please call [phone] and they will assist you.        Care EveryWhere ID     This is your Care EveryWhere ID. This could be used by other organizations to access your [facility] medical records  [id number]        Your Vitals Were     Pulse Respirations Height Pulse Oximetry BMI (Body Mass Index)       98 18 1.829 m (6') 87% 25.5 kg/m2        Blood Pressure from Last 3 Encounters:   11/21/17 149/78   11/16/17 126/75   11/01/17 121/72    Weight from Last 3 Encounters:   11/21/17 85.3 kg (188 lb)   11/16/17 85.3 kg (188 lb)   11/01/17 85.3 kg (188 lb)               Primary Care Provider Office Phone # Fax #    Cristhian Busch [phone]       [facility] [address]  [facility] [address]        Equal Access to Services     JOVAN BOUCHER AH: Hadii mehran ku hadasho Soomaali, waaxda luqadaha, qaybta kaalmada elyda, silvano youngblood. So [facility] [phone].    ATENCIÓN: Si habla español, tiene a hutton disposición servicios gratuitos de asistencia lingüística. Magi al [phone].    We comply with applicable federal civil rights laws and [place] laws. We do not discriminate on the basis of race, color, national origin, age, disability, sex, sexual orientation, or gender identity.            Thank you!     Thank you for choosing [facility]  for your care. Our goal is always to provide  you with excellent care. Hearing back from our patients is one way we can continue to improve our services. Please take a few minutes to complete the written survey that you may receive in the mail after your visit with us. Thank you!             Your Updated Medication List - Protect others around you: Learn how to safely use, store and throw away your medicines at www.disposemymeds.org.          This list is accurate as of: 11/16/17 11:59 PM.  Always use your most recent med list.                   Brand Name Dispense Instructions for use Diagnosis    albuterol 108 (90 BASE) MCG/ACT Inhaler    PROAIR HFA    1 Inhaler    Inhale 4-6 puffs into the lungs every 6 hours as needed    Pulmonary hypertension, Mixed hyperlipidemia, COPD (chronic obstructive pulmonary disease) (H)       budesonide 0.25 MG/2ML neb solution    PULMICORT    360 mL    INHALE THE CONTENTS OF 1   VIAL TWO TIMES A DAY    ILD (interstitial lung disease) (H)       digoxin 125 MCG tablet    LANOXIN    90 tablet    Take 1 tablet (125 mcg) by mouth daily    Pulmonary hypertension       furosemide 20 MG tablet    LASIX    30 tablet    Take 1 tablet (20 mg) by mouth daily    Localized edema       LETAIRIS 10 MG tablet   Generic drug:  ambrisentan           predniSONE 10 MG tablet    DELTASONE    180 tablet    Take 3 tablets (30 mg) by mouth daily    Sarcoidosis       tadalafil (PAH) 20 MG Tabs    ADCIRCA    60 tablet    Take 2 tablets (40 mg) by mouth daily    Pulmonary hypertension       treprostinil 0.6 MG/ML Soln neb solution    TYVASO    3.6 mL    12 breaths 4 times daily    Pulmonary hypertension       vitamin D 2000 UNITS Caps      Take 1 capsule by mouth daily.

## 2017-11-16 NOTE — PROGRESS NOTES
Reason for Visit  Jimmy Isaac is a 57 year old year old male who is being seen for Sarcoidosis.   Pulmonary HPI    The patient was seen and examined by Shane Ruvalcaba     Mr. Isaac was seen in the Pulmonary Clinic for his initial visit on 09/07/2017.  He has a longstanding history of sarcoidosis which was diagnosed via transbronchial biopsies and also sarcoidosis-associated pulmonary hypertension with PA pressures of 75/34/52 with slightly elevated wedge pressure.  He also had a history of renal stones.  At the time of the last clinic visit, the plan was to have him see Ophthalmology, consider ENT evaluation for sinus disease, and also evaluation for renal stones and if they were related to his sarcoidosis.  A number of lab studies including chest CT were also done.  Though his infiltrate on the chest CT appeared to indicate fibrotic lung disease, a trial of steroids was planned.  He was started on 30 mg of prednisone, which he is currently taking.  For his ENT disease, he has seen Dr. Roy, who has done workup, and it appears that he has fungal sinusits.  He also saw Dr. Ba for his renal stones and he is scheduled to see Dr. Moura for possible surgical intervention of the renal stones.      He comes in today with no new symptoms.  On the 30 mg of prednisone, he has not noticed any significant change in his pulmonary symptoms.  He believes that he may have slightly more energy, but he continues to have dyspnea with activity.  He would also describe episodic chest tightness.  He has tolerated the prednisone well.  At the last clinic visit with Dr. Lindsey, the plan was to consider right heart cath in January.       Current Outpatient Prescriptions   Medication     LETAIRIS 10 MG tablet     furosemide (LASIX) 20 MG tablet     albuterol (PROAIR HFA) 108 (90 BASE) MCG/ACT Inhaler     budesonide (PULMICORT) 0.25 MG/2ML neb solution     predniSONE (DELTASONE) 10 MG tablet     digoxin (LANOXIN) 125 MCG tablet      tadalafil, PAH, (ADCIRCA) 20 MG TABS     treprostinil (TYVASO) 0.6 MG/ML SOLN     Cholecalciferol (VITAMIN D) 2000 UNITS CAPS     Current Facility-Administered Medications   Medication     pneumococcal (PREVNAR 13) injection 0.5 mL     influenza quadrivalent (PF) vacc age 3 yrs and older (FLUZONE or Flulaval) injection 0.5 mL     Allergies   Allergen Reactions     Nkda [No Known Drug Allergies]      Past Medical History:   Diagnosis Date     Chronic sinusitis      Hypertension      Malignant neoplasm (H)      Pneumonia a few times     Pulmonary sarcoidosis (H)        Past Surgical History:   Procedure Laterality Date     COLONOSCOPY       ENT SURGERY      wisdom teeth extraction     TONSILLECTOMY  1964       Social History     Social History     Marital status: Single     Spouse name: N/A     Number of children: N/A     Years of education: N/A     Occupational History     Not on file.     Social History Main Topics     Smoking status: Former Smoker     Packs/day: 1.00     Years: 35.00     Types: Cigarettes     Start date: 10/10/1975     Quit date: 3/1/2010     Smokeless tobacco: Never Used     Alcohol use No     Drug use: No      Comment: past use of marijuana     Sexual activity: Not Currently     Partners: Female     Birth control/ protection: Abstinence, Female Surgical     Other Topics Concern     Caffeine Concern Yes     Exercise Yes     Social History Narrative       ROS Pulmonary    A complete ROS was otherwise negative except as noted in the HPI.  /75  Pulse 98  Resp 18  Ht 1.829 m (6')  Wt 85.3 kg (188 lb)  SpO2 (!) 87%  BMI 25.5 kg/m2  Exam:   GENERAL APPEARANCE: Alert, and in no apparent distress.  EYES: PERRL, EOMI  HENT: Nasal mucosa with no edema and no hyperemia. No nasal polyps.  EARS: Canals clear, TMs normal  MOUTH: Oral mucosa is moist, without any lesions, no tonsillar enlargement, no oropharyngeal exudate.  NECK: supple, no masses, no thyromegaly.  LYMPHATICS: No significant  axillary, cervical, or supraclavicular nodes.  RESP: normal percussion, good air flow throughout.  No crackles. No rhonchi. No wheezes.  CV: Normal S1, S2, regular rhythm, normal rate. No murmur.  No rub. No gallop. No LE edema.   ABDOMEN:  Bowel sounds normal, soft, nontender, no HSM or masses.   MS: extremities normal. No clubbing. No cyanosis.  SKIN: no rash on limited exam  NEURO: Mentation intact, speech normal, normal strength and tone, normal gait and stance  Results:  PFTs done on 11/01/2017 were reviewed by me with the patient.  FVC is 2.42 liters which is 47% of predicted.  FEV1 is 1.82 liters which is 21% of predicted.  DLCO corrected for hemoglobin is 40% of predicted.  My interpretation is that he has severe mixed obstructive and restrictive ventilatory defect.  In comparison to prior spirometry on 09/07, the FVC is lower by 420 mL and FEV1 is lower by 130 mL.  We do not have lung volumes today, but in the past       Most recent laboratory studies were reviewed by me.  Creatinine is 1.30 with a GFR of 57.  White count is 15.4 with a platelet count of 169,000.  Prior lab studies were also reviewed.  The    1,25-dihydroxyvitamin D level was 17.4, bicarb was elevated at 30, and total IgG was 665.  He also had cultures taken from his sinuses which have light growth of Achromobacter and also Scedosporium species.  He is following with Dr. Roy for these.     Assessment and plan: Mr. 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  He was initiated on a trial of prednisone to see if he has any reversible loss of lung function, but despite 30 mg of prednisone there is no improvement in his PFTs or chest CT scan.     1.  Pulmonary sarcoidosis with fibrotic lung disease.  He has an obstructive pattern on his PFTs with air  trapping and impression of restriction.  His FEV1 is 21% of predicted.  He is at higher risk of perioperative pulmonary complications with general anesthesia including long term mechanical ventilation   2.  Sarcoidosis-associated pulmonary hypertension.  He follows with Dr. Lindsey with a plan for right heart catheterization.   3.  Other extrapulmonary sarcoidosis.  Ocular exam recently in 2017 was without any ocular inflammation.  He follows up with Nephrology and Urology for renal stones which could be related to sarcoidosis.  LFTs are within normal limits.  Calcium level is within normal limits.   4.  Hypoxia: There is no intra-atrial shunt, on oxygen supplementation.      I briefly discussed with him if he would be interested in lung transplantation.  I will review this with Dr. Lindsey.   I will review his chest CT scan in multidisciplinary conference and also discuss his transplant status.       Addendum  reviewed in radiology rounds. CT with no improvement with Prednisone. Agreement to stop steroids. Consider lung transplantation.

## 2017-11-16 NOTE — LETTER
11/16/2017       RE: Jimmy Isaac  [address] [address] [address]     Dear Colleague,    Thank you for referring your patient, Jimmy Isaac, to the [facility] at [facility]. Please see a copy of my visit note below.    Reason for Visit  Jimmy Isaac is a 57 year old year old male who is being seen for Sarcoidosis.   Pulmonary HPI    The patient was seen and examined by Shane Ruvalcaba     Mr. Isaac was seen in the Pulmonary Clinic for his initial visit on 09/07/2017.  He has a longstanding history of sarcoidosis which was diagnosed via transbronchial biopsies and also sarcoidosis-associated pulmonary hypertension with PA pressures of 75/34/52 with slightly elevated wedge pressure.  He also had a history of renal stones.  At the time of the last clinic visit, the plan was to have him see Ophthalmology, consider ENT evaluation for sinus disease, and also evaluation for renal stones and if they were related to his sarcoidosis.  A number of lab studies including chest CT were also done.  Though his infiltrate on the chest CT appeared to indicate fibrotic lung disease, a trial of steroids was planned.  He was started on 30 mg of prednisone, which he is currently taking.  For his ENT disease, he has seen Dr. Roy, who has done workup, and it appears that he has fungal sinusits.  He also saw Dr. Ba for his renal stones and he is scheduled to see Dr. Moura for possible surgical intervention of the renal stones.      He comes in today with no new symptoms.  On the 30 mg of prednisone, he has not noticed any significant change in his pulmonary symptoms.  He believes that he may have slightly more energy, but he continues to have dyspnea with activity.  He would also describe episodic chest tightness.  He has tolerated the prednisone well.  At the last clinic visit with Dr. Lindsey, the plan was to consider right heart cath in  January.       Current Outpatient Prescriptions   Medication     LETAIRIS 10 MG tablet     furosemide (LASIX) 20 MG tablet     albuterol (PROAIR HFA) 108 (90 BASE) MCG/ACT Inhaler     budesonide (PULMICORT) 0.25 MG/2ML neb solution     predniSONE (DELTASONE) 10 MG tablet     digoxin (LANOXIN) 125 MCG tablet     tadalafil, PAH, (ADCIRCA) 20 MG TABS     treprostinil (TYVASO) 0.6 MG/ML SOLN     Cholecalciferol (VITAMIN D) 2000 UNITS CAPS     Current Facility-Administered Medications   Medication     pneumococcal (PREVNAR 13) injection 0.5 mL     influenza quadrivalent (PF) vacc age 3 yrs and older (FLUZONE or Flulaval) injection 0.5 mL     Allergies   Allergen Reactions     Nkda [No Known Drug Allergies]      Past Medical History:   Diagnosis Date     Chronic sinusitis      Hypertension      Malignant neoplasm (H)      Pneumonia a few times     Pulmonary sarcoidosis (H)        Past Surgical History:   Procedure Laterality Date     COLONOSCOPY       ENT SURGERY      wisdom teeth extraction     TONSILLECTOMY  1964       Social History     Social History     Marital status: Single     Spouse name: N/A     Number of children: N/A     Years of education: N/A     Occupational History     Not on file.     Social History Main Topics     Smoking status: Former Smoker     Packs/day: 1.00     Years: 35.00     Types: Cigarettes     Start date: 10/10/1975     Quit date: 3/1/2010     Smokeless tobacco: Never Used     Alcohol use No     Drug use: No      Comment: past use of marijuana     Sexual activity: Not Currently     Partners: Female     Birth control/ protection: Abstinence, Female Surgical     Other Topics Concern     Caffeine Concern Yes     Exercise Yes     Social History Narrative       ROS Pulmonary    A complete ROS was otherwise negative except as noted in the HPI.  /75  Pulse 98  Resp 18  Ht 1.829 m (6')  Wt 85.3 kg (188 lb)  SpO2 (!) 87%  BMI 25.5 kg/m2  Exam:   GENERAL APPEARANCE: Alert, and in no  apparent distress.  EYES: PERRL, EOMI  HENT: Nasal mucosa with no edema and no hyperemia. No nasal polyps.  EARS: Canals clear, TMs normal  MOUTH: Oral mucosa is moist, without any lesions, no tonsillar enlargement, no oropharyngeal exudate.  NECK: supple, no masses, no thyromegaly.  LYMPHATICS: No significant axillary, cervical, or supraclavicular nodes.  RESP: normal percussion, good air flow throughout.  No crackles. No rhonchi. No wheezes.  CV: Normal S1, S2, regular rhythm, normal rate. No murmur.  No rub. No gallop. No LE edema.   ABDOMEN:  Bowel sounds normal, soft, nontender, no HSM or masses.   MS: extremities normal. No clubbing. No cyanosis.  SKIN: no rash on limited exam  NEURO: Mentation intact, speech normal, normal strength and tone, normal gait and stance  Results:  PFTs done on 11/01/2017 were reviewed by me with the patient.  FVC is 2.42 liters which is 47% of predicted.  FEV1 is 1.82 liters which is 21% of predicted.  DLCO corrected for hemoglobin is 40% of predicted.  My interpretation is that he has severe mixed obstructive and restrictive ventilatory defect.  In comparison to prior spirometry on 09/07, the FVC is lower by 420 mL and FEV1 is lower by 130 mL.  We do not have lung volumes today, but in the past       Most recent laboratory studies were reviewed by me.  Creatinine is 1.30 with a GFR of 57.  White count is 15.4 with a platelet count of 169,000.  Prior lab studies were also reviewed.  The    1,25-dihydroxyvitamin D level was 17.4, bicarb was elevated at 30, and total IgG was 665.  He also had cultures taken from his sinuses which have light growth of Achromobacter and also Scedosporium species.  He is following with Dr. Roy for these.     Assessment and plan: Mr. Isaac is a pleasant 57-year-old male with a history of histology-proven sarcoidosis, sarcoidosis-associated pulmonary hypertension with mean PA pressure in mid 50s, renal stones, and mixed fungal and bacterial infection of  his sinuses.  He was initiated on a trial of prednisone to see if he has any reversible loss of lung function, but despite 30 mg of prednisone there is no improvement in his PFTs or chest CT scan.     1.  Pulmonary sarcoidosis with fibrotic lung disease.  He has an obstructive pattern on his PFTs with air trapping and impression of restriction.  His FEV1 is 21% of predicted.  He is at higher risk of perioperative pulmonary complications with general anesthesia including long term mechanical ventilation   2.  Sarcoidosis-associated pulmonary hypertension.  He follows with Dr. Lindsey with a plan for right heart catheterization.   3.  Other extrapulmonary sarcoidosis.  Ocular exam recently in 09/2017 was without any ocular inflammation.  He follows up with Nephrology and Urology for renal stones which could be related to sarcoidosis.  LFTs are within normal limits.  Calcium level is within normal limits.   4.  Hypoxia: There is no intra-atrial shunt, on oxygen supplementation.      I briefly discussed with him if he would be interested in lung transplantation.  I will review this with Dr. Lindsey.   I will review his chest CT scan in multidisciplinary conference and also discuss his transplant status.       Again, thank you for allowing me to participate in the care of your patient.      Sincerely,    Shane Ruvalcaba MD

## 2017-11-16 NOTE — NURSING NOTE
Chief Complaint   Patient presents with     Interstitial Lung Disease (ILD)     Follow up on Jimmy and his Sarcoids     Logan Ibarra CMA at 9:20 AM on 11/16/2017

## 2017-11-21 ENCOUNTER — OFFICE VISIT (OUTPATIENT)
Dept: UROLOGY | Facility: CLINIC | Age: 57
End: 2017-11-21

## 2017-11-21 ENCOUNTER — CARE COORDINATION (OUTPATIENT)
Dept: OTOLARYNGOLOGY | Facility: CLINIC | Age: 57
End: 2017-11-21

## 2017-11-21 VITALS
HEART RATE: 97 BPM | HEIGHT: 72 IN | DIASTOLIC BLOOD PRESSURE: 78 MMHG | WEIGHT: 188 LBS | BODY MASS INDEX: 25.47 KG/M2 | SYSTOLIC BLOOD PRESSURE: 149 MMHG

## 2017-11-21 DIAGNOSIS — N20.0 CALCULUS OF KIDNEY: Primary | ICD-10-CM

## 2017-11-21 RX ORDER — POTASSIUM CITRATE 10 MEQ/1
10 TABLET, EXTENDED RELEASE ORAL
Qty: 90 TABLET | Refills: 3 | Status: SHIPPED | OUTPATIENT
Start: 2017-11-21 | End: 2017-11-29

## 2017-11-21 ASSESSMENT — ENCOUNTER SYMPTOMS
TASTE DISTURBANCE: 0
SINUS PAIN: 1
POSTURAL DYSPNEA: 1
DYSPNEA ON EXERTION: 1
HEMOPTYSIS: 0
COUGH: 1
POLYPHAGIA: 1
EXERCISE INTOLERANCE: 1
HYPOTENSION: 0
SMELL DISTURBANCE: 0
SLEEP DISTURBANCES DUE TO BREATHING: 1
POLYDIPSIA: 1
ALTERED TEMPERATURE REGULATION: 0
HALLUCINATIONS: 0
SNORES LOUDLY: 1
LEG PAIN: 0
COUGH DISTURBING SLEEP: 1
HYPERTENSION: 1
HOARSE VOICE: 0
CHILLS: 0
SHORTNESS OF BREATH: 1
WHEEZING: 1
TROUBLE SWALLOWING: 0
WEIGHT LOSS: 0
LIGHT-HEADEDNESS: 0
NIGHT SWEATS: 1
NECK MASS: 0
DECREASED APPETITE: 0
WEIGHT GAIN: 1
FEVER: 0
INCREASED ENERGY: 0
SORE THROAT: 0
SINUS CONGESTION: 1
FATIGUE: 1
PALPITATIONS: 1
SPUTUM PRODUCTION: 1
SYNCOPE: 0
ORTHOPNEA: 1

## 2017-11-21 ASSESSMENT — PAIN SCALES - GENERAL: PAINLEVEL: NO PAIN (0)

## 2017-11-21 NOTE — LETTER
11/21/2017       RE: Jimmy Isaac  [id number] [address] [address]     Dear Colleague,    Thank you for referring your patient, Jimmy Isaac, to the [facility] UROLOGY AND INST FOR PROSTATE AND UROLOGIC CANCERS at [facility]. Please see a copy of my visit note below.          Chief Complaint:   Bilateral Kidney Stones         Consult or Referral:   Jimmy Isaac is a 57 year old male seen in consultation from Dr. Ba.         History of Present Illness:    Jimmy Isaac is a very pleasant 57 year old male who presents with past medical history significant for sarcoidosis, diagnosed in 1992. He is currently on continous oxygen and is being considered for a lung transplant. He has a urologic history significant for recurrent nephrolithiasis. He sees Dr. Ba for medical prevention of stones.    From a stone perspective:  First stone: 0864-3372.   Number of stone surgeries: None   Number of stones passed spontaneously: Several, most recent stone passed 1 year ago  History of UTI: None  Prior Stone Analysis: Calcium oxalate  Family History Nephrolithasis: None  Stone Risk Factors: Sarcoid  Prior Metabolic Workup: YEs, did not show hypercalciuria, follows with Dr. Ba    Denies current issues related to stones.  Denies current flank pain.  Does think perhaps he passed a stone several weeks ago as he did have a bit of flank pain and some gross hematuria.  No issues with recurrent UTI's.         Past Medical History:     Past Medical History:   Diagnosis Date     Chronic sinusitis      Hypertension      Malignant neoplasm (H)      Pneumonia a few times     Pulmonary sarcoidosis (H)             Past Surgical History:     Past Surgical History:   Procedure Laterality Date     COLONOSCOPY       ENT SURGERY      wisdom teeth extraction     TONSILLECTOMY  1964            Medications     Current Outpatient Prescriptions   Medication     LETAIRIS 10 MG tablet      furosemide (LASIX) 20 MG tablet     albuterol (PROAIR HFA) 108 (90 BASE) MCG/ACT Inhaler     budesonide (PULMICORT) 0.25 MG/2ML neb solution     predniSONE (DELTASONE) 10 MG tablet     digoxin (LANOXIN) 125 MCG tablet     tadalafil, PAH, (ADCIRCA) 20 MG TABS     treprostinil (TYVASO) 0.6 MG/ML SOLN     Cholecalciferol (VITAMIN D) 2000 UNITS CAPS     No current facility-administered medications for this visit.             Family History:     Family History   Problem Relation Age of Onset     Coronary Artery Disease Father      HEART DISEASE Father      heart attack age 35-40     Hypertension Mother      Hyperlipidemia Mother      CEREBROVASCULAR DISEASE Mother      Dementia Mother      Glaucoma No family hx of      Macular Degeneration No family hx of             Social History:     Social History     Social History     Marital status: Single     Spouse name: N/A     Number of children: N/A     Years of education: N/A     Occupational History     Not on file.     Social History Main Topics     Smoking status: Former Smoker     Packs/day: 1.00     Years: 35.00     Types: Cigarettes     Start date: 10/10/1975     Quit date: 3/1/2010     Smokeless tobacco: Never Used     Alcohol use No     Drug use: No      Comment: past use of marijuana     Sexual activity: Not Currently     Partners: Female     Birth control/ protection: Abstinence, Female Surgical     Other Topics Concern     Caffeine Concern Yes     Exercise Yes     Social History Narrative            Allergies:   Nkda [no known drug allergies]         Review of Systems:  From intake questionnaire   Negative 14 system review except as noted on HPI, nurse's note.         Physical Exam:   Patient is a 57 year old  male   Vitals: There were no vitals taken for this visit.  General Appearance Adult: Alert, no acute distress, oriented  HENT: throat/mouth:normal, good dentition  Neck: No adenopathy,masses or thyromegaly  Lungs: Using oxygen, no respiratory distress, or  pursed lip breathing  Heart: No obvious jugular venous distension present  Abdomen: soft, nontender, no organomegaly or masses, There is no height or weight on file to calculate BMI.  Lymphatics: No cervical or supraclavicular adenopathy  Musculoskeltal: extremities normal, no peripheral edema  Skin: no suspicious lesions or rashes  Neuro: Alert, oriented, speech and mentation normal  Psych: affect and mood normal  Gait: Normal  : Deferred         Labs and Pathology:    I personally reviewed all applicable laboratory data and went over findings with patient  Significant for:    CBC RESULTS:  Recent Labs   Lab Test  11/01/17   1047  10/18/17   1121  09/22/17   1227  08/01/17   1026  05/16/17   1159   WBC  15.4*   --   5.4  6.3  5.7   HGB  15.2  14.2  14.0  14.3  14.9   PLT  160   --   131*  144*  142*        BMP RESULTS:  Recent Labs   Lab Test  11/01/17   1047  10/18/17   1121  09/22/17   1227  08/01/17   1026   NA  136  138  140  140   POTASSIUM  4.2  4.4  4.1  4.1   CHLORIDE  98  102  103  103   CO2  30  29  30  29   ANIONGAP  7  6  7  8   GLC  146*  122*  93  99   BUN  28  29  24  20   CR  1.30*  1.29*  1.49*  1.33*   GFRESTIMATED  57*  57*  48*  55*   GFRESTBLACK  69  69  59*  67   TERI  8.7  8.7  8.0*  8.8       UA RESULTS:   Recent Labs   Lab Test  10/18/17   1125  01/19/12   1509   SG  1.011  1.010   URINEPH  6.0  6.0   NITRITE  Negative  Negative   RBCU  25*  5*   WBCU  2  2       CALCIUM RESULTS  Lab Results   Component Value Date    TERI 8.7 11/01/2017    TERI 8.7 10/18/2017    TERI 8.0 09/22/2017       INR  Recent Labs   Lab Test  03/09/12   0723  03/08/12   0657  01/12/12   1147   INR  1.18*  1.15*  1.15*           Imaging:    I personally reviewed all applicable imaging and went over findings with patient.  Significant for:    Results for orders placed or performed in visit on 11/16/17   CT Abdomen Pelvis w/o Contrast    Narrative    EXAMINATION: CT ABDOMEN PELVIS W/O CONTRAST, 11/16/2017 8:01  AM    TECHNIQUE:  Helical CT images from the lung bases through the  symphysis pubis were obtained without contrast.  Coronal reformatted  images were generated at a workstation for further assessment.    COMPARISON: 5/25/2017    HISTORY: recurrent nephrolithiasis, large stone burden; Recurrent  nephrolithiasis    FINDINGS:    Abdomen and pelvis:   Liver: No suspicious liver lesions.   Gallbladder: No gallstones. No evidence of acute cholecystitis.  Spleen: Normal size.  Pancreas: No suspicious pancreatic lesions. Calcification in the  pancreatic head.  Adrenal glands: No adrenal nodules.  Kidneys: Extensive nephrolithiasis bilaterally, unchanged from  7/11/2017, with largest stone being 1.7 x 1.0 cm in the axial plane on  the left. 2 additional large stones on the left which appeared to be  closely associated with much smaller stones measuring up to 3 mm in  diameter. The larger stones on the right are quite irregular and it is  difficult to assess which is the largest although it appears to be in  the upper pole measuring 1.5 x 0.5 cm in maximum coronal dimensions on  image 69. Additional, narrower curvilinear elongated stone in the  upper pole of the right kidney measures 11.5 x 0.4 cm also on image 69  with 1 midpole stone and 2 lower pole stones measuring up to 1 cm in  maximum diameter and an additional upper pole stone measuring  approximately 9 x 5 mm. The mid pole stone and one of the lower pole  stones may be bilobed or be intimately associated with smaller stones  measuring roughly 3 to 4 mm in maximum dimension. No hydronephrosis.  No ureteral stone.  Bladder / Pelvic organs: Unremarkable.  Bowel: No bowel wall thickening. The appendix is unremarkable.  Lymph nodes: Unchanged upper abdominal lymphadenopathy. 13 mm  gastrohepatic lymph node (series 4 image 90), unchanged from  7/11/2017. A 11 mm nova hepatic lymph node (series 4 image 89),  unchanged.  Fluid: No free fluid within the abdomen.  Vessels:  No infrarenal aortic aneurysm.     Lung bases: Extensive emphysematous changes with curvilinear scarring  in the lung bases. See CT of the chest dictation from today for  dictation of the chest.    Bones and soft tissues: No suspicious osseous lesions. Small foci of  osteonecrosis about both superolateral femoral heads without  subchondral depression. The area on the right measures up to 2 cm in  transverse dimension while the area of the left is less distinct  measuring up to roughly 1.3 cm in transverse dimension. On sagittal  images the AP dimension on the right is up to 1.9 cm and the AP  dimension on the left is difficult to accurately measure given its  irregular configuration but appears to measure up to 2 cm in maximum  dimension. Short pedicles in the lumbar spine. Marked degenerative  disease at L1-2 including vacuum phenomena and. Poorly defined mild  broad-based disc bulging most prominent at L4-5 which is eccentric to  the left, partially filling the inferior recess of the left L4 neural  foramen.      Impression    IMPRESSION:   1.  Unchanged extensive bilateral nephrolithiasis with no  hydronephrosis or hydroureter. No ureteral stone.  2.  Unchanged enlarged upper abdominal lymph nodes, consistent with  history of sarcoidosis.   3.  Osteonecrosis of both femoral heads without evidence of  subchondral collapse.    I have personally reviewed the examination and initial interpretation  and I agree with the findings.    LOW OLIVERA MD              Assessment and Plan:     Assessment:   56 yo M w/ hx of nephrolithiasis, sarcoidosis     Plan:  We discussed treatment options for kidney stones including observation, shock wave lithotripsy, ureteroscopy and percutaneous nephrolithotomy. We reviewed risks and benefits including but not limited to the following: bleeding, infection, damage to adjacent organs, ureteral stricture, need for staged treatment, incomplete stone removal.    Ultimately the patient  has elected to proceed with observation because of his pulmonary risk with anesthesia. He does have extensive stone burdens in each kidney but is not having acute symptoms at this time and it is likley that given his history of sarcoid that some if not much of this is nephrocalcinosis.    We will continue to work on metabolic prevention.  He is receptiove to starting potassium citrate for history of hypocitraturia.  We discussed possible side effects of this medication.  He will start at one tab TID And he will follow-up with new 24 hour urine collection with Dr. Ba.    We reviewed signs of stone passage and obstruction, he is aware to call or come to ER for any cocnerning signs.      Scribe Disclosure:   Afshin KEYS, am serving as a scribe; to document services personally performed by Zackery Moura MD based on data collection and the provider's statements to me.     Zackery KEYS saw and evaluated this patient and agree with the plan as stated above     CC:  Lilly Ba

## 2017-11-21 NOTE — NURSING NOTE
Chief Complaint   Patient presents with     Consult     Kidney stone consult       Blood pressure 149/78, pulse 97, height 1.829 m (6'), weight 85.3 kg (188 lb). Body mass index is 25.5 kg/(m^2).    Patient Active Problem List   Diagnosis     Pulmonary hypertension     Sarcoid - pulmonary     Renal insufficiency due to nephrolithiasis     Dyspnea on exertion       Allergies   Allergen Reactions     Nkda [No Known Drug Allergies]        Current Outpatient Prescriptions   Medication Sig Dispense Refill     LETAIRIS 10 MG tablet        furosemide (LASIX) 20 MG tablet Take 1 tablet (20 mg) by mouth daily 30 tablet 1     albuterol (PROAIR HFA) 108 (90 BASE) MCG/ACT Inhaler Inhale 4-6 puffs into the lungs every 6 hours as needed 1 Inhaler 6     budesonide (PULMICORT) 0.25 MG/2ML neb solution INHALE THE CONTENTS OF 1   VIAL TWO TIMES A  mL 3     predniSONE (DELTASONE) 10 MG tablet Take 3 tablets (30 mg) by mouth daily 180 tablet 1     digoxin (LANOXIN) 125 MCG tablet Take 1 tablet (125 mcg) by mouth daily 90 tablet 3     tadalafil, PAH, (ADCIRCA) 20 MG TABS Take 2 tablets (40 mg) by mouth daily 60 tablet 11     treprostinil (TYVASO) 0.6 MG/ML SOLN 12 breaths 4 times daily 3.6 mL 11     Cholecalciferol (VITAMIN D) 2000 UNITS CAPS Take 1 capsule by mouth daily.         Social History   Substance Use Topics     Smoking status: Former Smoker     Packs/day: 1.00     Years: 35.00     Types: Cigarettes     Start date: 10/10/1975     Quit date: 3/1/2010     Smokeless tobacco: Never Used     Alcohol use No       BRANDON Rocha  11/21/2017  11:21 AM

## 2017-11-21 NOTE — MR AVS SNAPSHOT
After Visit Summary   11/21/2017    Jimmy Isaac    MRN: [id number]           Patient Information     Date Of Birth          1960        Visit Information        Provider Department      11/21/2017 11:00 AM Zackery Moura MD [facility] Urology and Inst for Prostate and Urologic Cancers        Today's Diagnoses     Calculus of kidney    -  1      Care Instructions    KUB in 3 months and blood work.    Litho link will be sent to your home.    It was a pleasure meeting with you today.  Thank you for allowing me and my team the privilege of caring for you today.  YOU are the reason we are here, and I truly hope we provided you with the excellent service you deserve.  Please let us know if there is anything else we can do for you so that we can be sure you are leaving completely satisfied with your care experience.              Follow-ups after your visit        Your next 10 appointments already scheduled     Feb 05, 2018 11:00 AM CST   Lab with  LAB   [facility] ([facility])    [address] [place] [id number] [phone]            Feb 05, 2018 11:30 AM CST   (Arrive by 11:15 AM)   RETURN PRIMARY PULMONARY with ALLI May [facility] ([facility])    [address] [place] [id number] [phone]            Mar 28, 2018  1:30 PM CDT   Lab with  LAB   [facility] ([facility])    [address] [place] [id number] [phone]            Mar 28, 2018  2:30 PM CDT   (Arrive by 2:00 PM)   Return Visit with Lilly Ba MD   [facility] Nephrology ([facility])    [address] [place] [id number] [phone]              Future tests that were ordered for you today     Open Future Orders        Priority Expected Expires Ordered    X-Ray KUB Routine  11/21/2017 11/21/2018 11/21/2017    Basic metabolic panel Routine  11/21/2018 11/21/2017            Who to contact     Please call your clinic at [phone] to:    Ask questions about your health    Make or cancel appointments    Discuss your medicines    Learn about your test results    Speak to your doctor   If you have compliments or concerns about an experience at your clinic, or if you wish to file a complaint, please contact [facility] Patient Relations at [phone] or email us at [email or web address]         Additional Information About Your Visit        Language SystemsharCouch[employer]merce Information     eReceipts gives you secure access to your electronic health record. If you see a primary care provider, you can also send messages to your care team and make appointments. If you have questions, please call your primary care clinic.  If you do not have a primary care provider, please call [phone] and they will assist you.      eReceipts is an electronic gateway that provides easy, online access to your medical records. With eReceipts, you can request a clinic appointment, read your test results, renew a prescription or communicate with your care team.     To access your existing account, please contact your [facility] or call [phone] for assistance.        Care EveryWhere ID     This is your Care EveryWhere ID. This could be used by other organizations to access your [place] medical records  [id number]        Your Vitals Were     Pulse Height BMI (Body Mass Index)             97 1.829 m (6') 25.5 kg/m2          Blood Pressure from Last 3 Encounters:   11/21/17 149/78   11/16/17 126/75   11/01/17 121/72    Weight from Last 3 Encounters:   11/21/17 85.3 kg (188 lb)   11/16/17 85.3 kg (188 lb)   11/01/17 85.3 kg (188 lb)              We Performed the Following     Litholink: Clinic Lab Order          Today's Medication Changes          These changes are  accurate as of: 11/21/17 11:54 AM.  If you have any questions, ask your nurse or doctor.               Start taking these medicines.        Dose/Directions    potassium citrate 10 MEQ (1080 MG) CR tablet   Commonly known as:  UROCIT-K   Used for:  Calculus of kidney   Started by:  Zackery Moura MD        Dose:  10 mEq   Take 1 tablet (10 mEq) by mouth 3 times daily (with meals)   Quantity:  90 tablet   Refills:  3            Where to get your medicines      These medications were sent to [facility]/pharmacy #[id number] - [place], [place] - [address]  [address] [facility] [address] [place] [address]     Phone:  [phone]     potassium citrate 10 MEQ (1080 MG) CR tablet                Primary Care Provider Office Phone # Fax #    Cristhian Busch [phone]       [facility] [address]  [facility] [address]        Equal Access to Services     [facility]: Hadii mehran carrillo hadasho Solayo, waaxda luqadaha, qaybta kaalmada adeegyada, silvano gonzalez . So [facility] [phone].    ATENCIÓN: Si habla español, tiene a hutton disposición servicios gratuitos de asistencia lingüística. Llame al [phone].    We comply with applicable federal civil rights laws and [place] laws. We do not discriminate on the basis of race, color, national origin, age, disability, sex, sexual orientation, or gender identity.            Thank you!     Thank you for choosing [facility] UROLOGY AND [facility] FOR PROSTATE AND UROLOGIC CANCERS  for your care. Our goal is always to provide you with excellent care. Hearing back from our patients is one way we can continue to improve our services. Please take a few minutes to complete the written survey that you may receive in the mail after your visit with us. Thank you!             Your Updated Medication List - Protect others around you: Learn how to safely use, store and throw away your medicines at www.disposemymeds.org.          This list is accurate as of: 11/21/17 11:54  AM.  Always use your most recent med list.                   Brand Name Dispense Instructions for use Diagnosis    albuterol 108 (90 BASE) MCG/ACT Inhaler    PROAIR HFA    1 Inhaler    Inhale 4-6 puffs into the lungs every 6 hours as needed    Pulmonary hypertension, Mixed hyperlipidemia, COPD (chronic obstructive pulmonary disease) (H)       budesonide 0.25 MG/2ML neb solution    PULMICORT    360 mL    INHALE THE CONTENTS OF 1   VIAL TWO TIMES A DAY    ILD (interstitial lung disease) (H)       digoxin 125 MCG tablet    LANOXIN    90 tablet    Take 1 tablet (125 mcg) by mouth daily    Pulmonary hypertension       furosemide 20 MG tablet    LASIX    30 tablet    Take 1 tablet (20 mg) by mouth daily    Localized edema       LETAIRIS 10 MG tablet   Generic drug:  ambrisentan           potassium citrate 10 MEQ (1080 MG) CR tablet    UROCIT-K    90 tablet    Take 1 tablet (10 mEq) by mouth 3 times daily (with meals)    Calculus of kidney       predniSONE 10 MG tablet    DELTASONE    180 tablet    Take 3 tablets (30 mg) by mouth daily    Sarcoidosis       tadalafil (PAH) 20 MG Tabs    ADCIRCA    60 tablet    Take 2 tablets (40 mg) by mouth daily    Pulmonary hypertension       treprostinil 0.6 MG/ML Soln neb solution    TYVASO    3.6 mL    12 breaths 4 times daily    Pulmonary hypertension       vitamin D 2000 UNITS Caps      Take 1 capsule by mouth daily.

## 2017-11-21 NOTE — PATIENT INSTRUCTIONS
MADINA in 3 months and blood work.    Litho link will be sent to your home.    It was a pleasure meeting with you today.  Thank you for allowing me and my team the privilege of caring for you today.  YOU are the reason we are here, and I truly hope we provided you with the excellent service you deserve.  Please let us know if there is anything else we can do for you so that we can be sure you are leaving completely satisfied with your care experience.

## 2017-11-21 NOTE — PROGRESS NOTES
Chief Complaint:   Bilateral Kidney Stones         Consult or Referral:   Jimmy Isaac is a 57 year old male seen in consultation from Dr. Ba.         History of Present Illness:    Jimmy Isaac is a very pleasant 57 year old male who presents with past medical history significant for sarcoidosis, diagnosed in 1992. He is currently on continous oxygen and is being considered for a lung transplant. He has a urologic history significant for recurrent nephrolithiasis. He sees Dr. Ba for medical prevention of stones.    From a stone perspective:  First stone: 4093-3365.   Number of stone surgeries: None   Number of stones passed spontaneously: Several, most recent stone passed 1 year ago  History of UTI: None  Prior Stone Analysis: Calcium oxalate  Family History Nephrolithasis: None  Stone Risk Factors: Sarcoid  Prior Metabolic Workup: YEs, did not show hypercalciuria, follows with Dr. Ba    Denies current issues related to stones.  Denies current flank pain.  Does think perhaps he passed a stone several weeks ago as he did have a bit of flank pain and some gross hematuria.  No issues with recurrent UTI's.         Past Medical History:     Past Medical History:   Diagnosis Date     Chronic sinusitis      Hypertension      Malignant neoplasm (H)      Pneumonia a few times     Pulmonary sarcoidosis (H)             Past Surgical History:     Past Surgical History:   Procedure Laterality Date     COLONOSCOPY       ENT SURGERY      wisdom teeth extraction     TONSILLECTOMY  1964            Medications     Current Outpatient Prescriptions   Medication     LETAIRIS 10 MG tablet     furosemide (LASIX) 20 MG tablet     albuterol (PROAIR HFA) 108 (90 BASE) MCG/ACT Inhaler     budesonide (PULMICORT) 0.25 MG/2ML neb solution     predniSONE (DELTASONE) 10 MG tablet     digoxin (LANOXIN) 125 MCG tablet     tadalafil, PAH, (ADCIRCA) 20 MG TABS     treprostinil (TYVASO) 0.6 MG/ML SOLN     Cholecalciferol  (VITAMIN D) 2000 UNITS CAPS     No current facility-administered medications for this visit.             Family History:     Family History   Problem Relation Age of Onset     Coronary Artery Disease Father      HEART DISEASE Father      heart attack age 35-40     Hypertension Mother      Hyperlipidemia Mother      CEREBROVASCULAR DISEASE Mother      Dementia Mother      Glaucoma No family hx of      Macular Degeneration No family hx of             Social History:     Social History     Social History     Marital status: Single     Spouse name: N/A     Number of children: N/A     Years of education: N/A     Occupational History     Not on file.     Social History Main Topics     Smoking status: Former Smoker     Packs/day: 1.00     Years: 35.00     Types: Cigarettes     Start date: 10/10/1975     Quit date: 3/1/2010     Smokeless tobacco: Never Used     Alcohol use No     Drug use: No      Comment: past use of marijuana     Sexual activity: Not Currently     Partners: Female     Birth control/ protection: Abstinence, Female Surgical     Other Topics Concern     Caffeine Concern Yes     Exercise Yes     Social History Narrative            Allergies:   Nkda [no known drug allergies]         Review of Systems:  From intake questionnaire   Negative 14 system review except as noted on HPI, nurse's note.         Physical Exam:   Patient is a 57 year old  male   Vitals: There were no vitals taken for this visit.  General Appearance Adult: Alert, no acute distress, oriented  HENT: throat/mouth:normal, good dentition  Neck: No adenopathy,masses or thyromegaly  Lungs: Using oxygen, no respiratory distress, or pursed lip breathing  Heart: No obvious jugular venous distension present  Abdomen: soft, nontender, no organomegaly or masses, There is no height or weight on file to calculate BMI.  Lymphatics: No cervical or supraclavicular adenopathy  Musculoskeltal: extremities normal, no peripheral edema  Skin: no suspicious  lesions or rashes  Neuro: Alert, oriented, speech and mentation normal  Psych: affect and mood normal  Gait: Normal  : Deferred         Labs and Pathology:    I personally reviewed all applicable laboratory data and went over findings with patient  Significant for:    CBC RESULTS:  Recent Labs   Lab Test  11/01/17   1047  10/18/17   1121  09/22/17   1227  08/01/17   1026  05/16/17   1159   WBC  15.4*   --   5.4  6.3  5.7   HGB  15.2  14.2  14.0  14.3  14.9   PLT  160   --   131*  144*  142*        BMP RESULTS:  Recent Labs   Lab Test  11/01/17   1047  10/18/17   1121  09/22/17   1227  08/01/17   1026   NA  136  138  140  140   POTASSIUM  4.2  4.4  4.1  4.1   CHLORIDE  98  102  103  103   CO2  30  29  30  29   ANIONGAP  7  6  7  8   GLC  146*  122*  93  99   BUN  28  29  24  20   CR  1.30*  1.29*  1.49*  1.33*   GFRESTIMATED  57*  57*  48*  55*   GFRESTBLACK  69  69  59*  67   TERI  8.7  8.7  8.0*  8.8       UA RESULTS:   Recent Labs   Lab Test  10/18/17   1125  01/19/12   1509   SG  1.011  1.010   URINEPH  6.0  6.0   NITRITE  Negative  Negative   RBCU  25*  5*   WBCU  2  2       CALCIUM RESULTS  Lab Results   Component Value Date    TERI 8.7 11/01/2017    TERI 8.7 10/18/2017    TERI 8.0 09/22/2017       INR  Recent Labs   Lab Test  03/09/12   0723  03/08/12   0657  01/12/12   1147   INR  1.18*  1.15*  1.15*           Imaging:    I personally reviewed all applicable imaging and went over findings with patient.  Significant for:    Results for orders placed or performed in visit on 11/16/17   CT Abdomen Pelvis w/o Contrast    Narrative    EXAMINATION: CT ABDOMEN PELVIS W/O CONTRAST, 11/16/2017 8:01 AM    TECHNIQUE:  Helical CT images from the lung bases through the  symphysis pubis were obtained without contrast.  Coronal reformatted  images were generated at a workstation for further assessment.    COMPARISON: 5/25/2017    HISTORY: recurrent nephrolithiasis, large stone burden;  Recurrent  nephrolithiasis    FINDINGS:    Abdomen and pelvis:   Liver: No suspicious liver lesions.   Gallbladder: No gallstones. No evidence of acute cholecystitis.  Spleen: Normal size.  Pancreas: No suspicious pancreatic lesions. Calcification in the  pancreatic head.  Adrenal glands: No adrenal nodules.  Kidneys: Extensive nephrolithiasis bilaterally, unchanged from  7/11/2017, with largest stone being 1.7 x 1.0 cm in the axial plane on  the left. 2 additional large stones on the left which appeared to be  closely associated with much smaller stones measuring up to 3 mm in  diameter. The larger stones on the right are quite irregular and it is  difficult to assess which is the largest although it appears to be in  the upper pole measuring 1.5 x 0.5 cm in maximum coronal dimensions on  image 69. Additional, narrower curvilinear elongated stone in the  upper pole of the right kidney measures 11.5 x 0.4 cm also on image 69  with 1 midpole stone and 2 lower pole stones measuring up to 1 cm in  maximum diameter and an additional upper pole stone measuring  approximately 9 x 5 mm. The mid pole stone and one of the lower pole  stones may be bilobed or be intimately associated with smaller stones  measuring roughly 3 to 4 mm in maximum dimension. No hydronephrosis.  No ureteral stone.  Bladder / Pelvic organs: Unremarkable.  Bowel: No bowel wall thickening. The appendix is unremarkable.  Lymph nodes: Unchanged upper abdominal lymphadenopathy. 13 mm  gastrohepatic lymph node (series 4 image 90), unchanged from  7/11/2017. A 11 mm nova hepatic lymph node (series 4 image 89),  unchanged.  Fluid: No free fluid within the abdomen.  Vessels: No infrarenal aortic aneurysm.     Lung bases: Extensive emphysematous changes with curvilinear scarring  in the lung bases. See CT of the chest dictation from today for  dictation of the chest.    Bones and soft tissues: No suspicious osseous lesions. Small foci of  osteonecrosis about  both superolateral femoral heads without  subchondral depression. The area on the right measures up to 2 cm in  transverse dimension while the area of the left is less distinct  measuring up to roughly 1.3 cm in transverse dimension. On sagittal  images the AP dimension on the right is up to 1.9 cm and the AP  dimension on the left is difficult to accurately measure given its  irregular configuration but appears to measure up to 2 cm in maximum  dimension. Short pedicles in the lumbar spine. Marked degenerative  disease at L1-2 including vacuum phenomena and. Poorly defined mild  broad-based disc bulging most prominent at L4-5 which is eccentric to  the left, partially filling the inferior recess of the left L4 neural  foramen.      Impression    IMPRESSION:   1.  Unchanged extensive bilateral nephrolithiasis with no  hydronephrosis or hydroureter. No ureteral stone.  2.  Unchanged enlarged upper abdominal lymph nodes, consistent with  history of sarcoidosis.   3.  Osteonecrosis of both femoral heads without evidence of  subchondral collapse.    I have personally reviewed the examination and initial interpretation  and I agree with the findings.    LOW OLIVERA MD              Assessment and Plan:     Assessment:   58 yo M w/ hx of nephrolithiasis, sarcoidosis     Plan:  We discussed treatment options for kidney stones including observation, shock wave lithotripsy, ureteroscopy and percutaneous nephrolithotomy. We reviewed risks and benefits including but not limited to the following: bleeding, infection, damage to adjacent organs, ureteral stricture, need for staged treatment, incomplete stone removal.    Ultimately the patient has elected to proceed with observation because of his pulmonary risk with anesthesia. He does have extensive stone burdens in each kidney but is not having acute symptoms at this time and it is likley that given his history of sarcoid that some if not much of this is  nephrocalcinosis.    We will continue to work on metabolic prevention.  He is receptiove to starting potassium citrate for history of hypocitraturia.  We discussed possible side effects of this medication.  He will start at one tab TID And he will follow-up with new 24 hour urine collection with Dr. Ba.    We reviewed signs of stone passage and obstruction, he is aware to call or come to ER for any cocnerning signs.      Scribe Disclosure:   Afshin KEYS, am serving as a scribe; to document services personally performed by Zackery Moura MD based on data collection and the provider's statements to me.     IZackery saw and evaluated this patient and agree with the plan as stated above     CC:  Lilly Ba    Answers for HPI/ROS submitted by the patient on 11/21/2017   General Symptoms: Yes  Skin Symptoms: No  HENT Symptoms: Yes  EYE SYMPTOMS: No  HEART SYMPTOMS: Yes  LUNG SYMPTOMS: Yes  INTESTINAL SYMPTOMS: No  URINARY SYMPTOMS: No  REPRODUCTIVE SYMPTOMS: No  SKELETAL SYMPTOMS: No  BLOOD SYMPTOMS: No  NERVOUS SYSTEM SYMPTOMS: No  MENTAL HEALTH SYMPTOMS: No  Fever: No  Loss of appetite: No  Weight loss: No  Weight gain: Yes  Fatigue: Yes  Night sweats: Yes  Chills: No  Increased stress: Yes  Excessive hunger: Yes  Excessive thirst: Yes  Feeling hot or cold when others believe the temperature is normal: No  Loss of height: No  Post-operative complications: No  Surgical site pain: No  Hallucinations: No  Change in or Loss of Energy: No  Hyperactivity: No  Confusion: No  Ear pain: No  Ear discharge: No  Hearing loss: No  Tinnitus: No  Nosebleeds: No  Congestion: Yes  Sinus pain: Yes  Trouble swallowing: No   Voice hoarseness: No  Mouth sores: No  Sore throat: No  Tooth pain: No  Gum tenderness: No  Bleeding gums: No  Change in taste: No  Change in sense of smell: No  Dry mouth: Yes  Hearing aid used: No  Neck lump: No  Cough: Yes  Sputum or phlegm: Yes  Coughing up blood:  No  Difficulty breating or shortness of breath: Yes  Snoring: Yes  Wheezing: Yes  Difficulty breathing on exertion: Yes  Nighttime Cough: Yes  Difficulty breathing when lying flat: Yes  Chest pain or pressure: Yes  Fast or irregular heartbeat: Yes  Pain in legs with walking: No  Trouble breathing while lying down: Yes  Fingers or toes appear blue: No  High blood pressure: Yes  Low blood pressure: No  Fainting: No  Murmurs: No  Pacemaker: No  Varicose veins: No  Edema or swelling: Yes  Wake up at night with shortness of breath: Yes  Light-headedness: No  Exercise intolerance: Yes

## 2017-11-21 NOTE — PROGRESS NOTES
Culture results:  Has both fungus and bacteria - I was waiting for him to get his CT so I could make a plan to try to deal with this in the OR verses clinic due to his lung issues.  The pulmonary doc feels he is quite high risk.  I forwarded that note to you.  He really needs the sinuses cleaned out......    Above was discussed with patient. He agreed to schedule his CT sinus    CT results:    Only the maxillary sinus is involved.  I could do this under local, but maybe in the OR under more controlled setting.  Will likely need to be main OR.  Let's talk to him about it over the phone.  I will let his pulmonary doc know.    Common[employer] message sent.

## 2017-11-29 ENCOUNTER — MYC MEDICAL ADVICE (OUTPATIENT)
Dept: NEPHROLOGY | Facility: CLINIC | Age: 57
End: 2017-11-29

## 2017-11-29 DIAGNOSIS — N20.0 CALCULUS OF KIDNEY: ICD-10-CM

## 2017-11-29 RX ORDER — POTASSIUM CITRATE 10 MEQ/1
10 TABLET, EXTENDED RELEASE ORAL 2 TIMES DAILY
Qty: 90 TABLET | Refills: 3 | Status: SHIPPED | OUTPATIENT
Start: 2017-11-29 | End: 2018-02-05

## 2017-11-29 NOTE — TELEPHONE ENCOUNTER
Changed potassium citrate to BID rather than TID given CKD and risk for hyperkalemia.  Lilly Ba MD

## 2017-11-30 LAB
DLCOCOR-%PRED-PRE: 40 %
DLCOCOR-PRE: 12.15 ML/MIN/MMHG
DLCOUNC-%PRED-PRE: 41 %
DLCOUNC-PRE: 12.35 ML/MIN/MMHG
DLCOUNC-PRED: 29.73 ML/MIN/MMHG
ERV-%PRED-PRE: 70 %
ERV-PRE: 1.01 L
ERV-PRED: 1.44 L
EXPTIME-PRE: 13.49 SEC
FEF2575-%PRED-PRE: 8 %
FEF2575-PRE: 0.27 L/SEC
FEF2575-PRED: 3.26 L/SEC
FEFMAX-%PRED-PRE: 26 %
FEFMAX-PRE: 2.57 L/SEC
FEFMAX-PRED: 9.82 L/SEC
FEV1-%PRED-PRE: 21 %
FEV1-PRE: 0.82 L
FEV1FEV6-PRE: 42 %
FEV1FEV6-PRED: 79 %
FEV1FVC-PRE: 34 %
FEV1FVC-PRED: 77 %
FEV1SVC-PRE: 28 %
FEV1SVC-PRED: 72 %
FIFMAX-PRE: 2.94 L/SEC
FRCPLETH-%PRED-PRE: 147 %
FRCPLETH-PRE: 5.46 L
FRCPLETH-PRED: 3.71 L
FVC-%PRED-PRE: 47 %
FVC-PRE: 2.42 L
FVC-PRED: 5.05 L
IC-%PRED-PRE: 47 %
IC-PRE: 1.88 L
IC-PRED: 3.95 L
RVPLETH-%PRED-PRE: 182 %
RVPLETH-PRE: 4.45 L
RVPLETH-PRED: 2.44 L
TLCPLETH-%PRED-PRE: 97 %
TLCPLETH-PRE: 7.33 L
TLCPLETH-PRED: 7.53 L
VA-%PRED-PRE: 54 %
VA-PRE: 3.89 L
VC-%PRED-PRE: 53 %
VC-PRE: 2.89 L
VC-PRED: 5.39 L

## 2017-12-06 ENCOUNTER — CARE COORDINATION (OUTPATIENT)
Dept: PULMONOLOGY | Facility: CLINIC | Age: 57
End: 2017-12-06

## 2017-12-11 NOTE — PROGRESS NOTES
Telephone Encounter: Outgoing    Reason for Outgoing Call: Dr Ruvalcaba reviewed case with ILD team and patient's cardiologist and lung transplant team. Plan to start formal referral for lung transplant if patient agrees.     Nursing Action/Patient Instruction: Discussed with patient.    Patient Response/Questions/Concerns: Yes would like to proceed.    Note sent to Lung transplant team.

## 2018-01-18 ENCOUNTER — TELEPHONE (OUTPATIENT)
Dept: NEPHROLOGY | Facility: CLINIC | Age: 58
End: 2018-01-18

## 2018-01-18 DIAGNOSIS — R60.0 LOCALIZED EDEMA: ICD-10-CM

## 2018-01-18 RX ORDER — FUROSEMIDE 20 MG
TABLET ORAL
Qty: 30 TABLET | Refills: 1 | Status: SHIPPED | OUTPATIENT
Start: 2018-01-18 | End: 2018-05-30

## 2018-01-18 NOTE — TELEPHONE ENCOUNTER
Refill request came through for Lasix 20  Daily. Looks like Dr. Ba mentioned ok to stop taking if swelling is gone. Left VM for patient to see if swelling has come back. Provided number for call back.  Elin Schmitt LPN  Nephrology  [facility]  [phone]

## 2018-02-01 ENCOUNTER — PRE VISIT (OUTPATIENT)
Dept: CARDIOLOGY | Facility: CLINIC | Age: 58
End: 2018-02-01

## 2018-02-01 DIAGNOSIS — R06.09 DYSPNEA ON EXERTION: Primary | ICD-10-CM

## 2018-02-01 DIAGNOSIS — I27.20 PULMONARY HYPERTENSION (H): ICD-10-CM

## 2018-02-05 ENCOUNTER — OFFICE VISIT (OUTPATIENT)
Dept: CARDIOLOGY | Facility: CLINIC | Age: 58
End: 2018-02-05
Attending: NURSE PRACTITIONER
Payer: COMMERCIAL

## 2018-02-05 VITALS
BODY MASS INDEX: 25.61 KG/M2 | SYSTOLIC BLOOD PRESSURE: 114 MMHG | WEIGHT: 189.1 LBS | DIASTOLIC BLOOD PRESSURE: 67 MMHG | HEART RATE: 81 BPM | OXYGEN SATURATION: 93 % | HEIGHT: 72 IN

## 2018-02-05 DIAGNOSIS — I27.20 PULMONARY HYPERTENSION (H): ICD-10-CM

## 2018-02-05 DIAGNOSIS — I27.0 PRIMARY PULMONARY HYPERTENSION (H): Primary | ICD-10-CM

## 2018-02-05 DIAGNOSIS — R06.09 DYSPNEA ON EXERTION: ICD-10-CM

## 2018-02-05 LAB
ALBUMIN SERPL-MCNC: 3.8 G/DL (ref 3.4–5)
ALP SERPL-CCNC: 69 U/L (ref 40–150)
ALT SERPL W P-5'-P-CCNC: 23 U/L (ref 0–70)
ANION GAP SERPL CALCULATED.3IONS-SCNC: 6 MMOL/L (ref 3–14)
AST SERPL W P-5'-P-CCNC: 22 U/L (ref 0–45)
BILIRUB SERPL-MCNC: 0.5 MG/DL (ref 0.2–1.3)
BUN SERPL-MCNC: 27 MG/DL (ref 7–30)
CALCIUM SERPL-MCNC: 9 MG/DL (ref 8.5–10.1)
CHLORIDE SERPL-SCNC: 99 MMOL/L (ref 94–109)
CO2 SERPL-SCNC: 31 MMOL/L (ref 20–32)
CREAT SERPL-MCNC: 1.28 MG/DL (ref 0.66–1.25)
ERYTHROCYTE [DISTWIDTH] IN BLOOD BY AUTOMATED COUNT: 12.2 % (ref 10–15)
GFR SERPL CREATININE-BSD FRML MDRD: 58 ML/MIN/1.7M2
GLUCOSE SERPL-MCNC: 99 MG/DL (ref 70–99)
HCT VFR BLD AUTO: 44 % (ref 40–53)
HGB BLD-MCNC: 14.3 G/DL (ref 13.3–17.7)
MCH RBC QN AUTO: 29.5 PG (ref 26.5–33)
MCHC RBC AUTO-ENTMCNC: 32.5 G/DL (ref 31.5–36.5)
MCV RBC AUTO: 91 FL (ref 78–100)
NT-PROBNP SERPL-MCNC: 506 PG/ML (ref 0–125)
PLATELET # BLD AUTO: 165 10E9/L (ref 150–450)
POTASSIUM SERPL-SCNC: 4 MMOL/L (ref 3.4–5.3)
PROT SERPL-MCNC: 7.4 G/DL (ref 6.8–8.8)
RBC # BLD AUTO: 4.84 10E12/L (ref 4.4–5.9)
SODIUM SERPL-SCNC: 137 MMOL/L (ref 133–144)
WBC # BLD AUTO: 7.6 10E9/L (ref 4–11)

## 2018-02-05 PROCEDURE — 99213 OFFICE O/P EST LOW 20 MIN: CPT | Performed by: NURSE PRACTITIONER

## 2018-02-05 PROCEDURE — 83880 ASSAY OF NATRIURETIC PEPTIDE: CPT | Performed by: NURSE PRACTITIONER

## 2018-02-05 PROCEDURE — 80053 COMPREHEN METABOLIC PANEL: CPT | Performed by: NURSE PRACTITIONER

## 2018-02-05 PROCEDURE — 36415 COLL VENOUS BLD VENIPUNCTURE: CPT | Performed by: NURSE PRACTITIONER

## 2018-02-05 PROCEDURE — G0463 HOSPITAL OUTPT CLINIC VISIT: HCPCS | Mod: ZF

## 2018-02-05 PROCEDURE — 85027 COMPLETE CBC AUTOMATED: CPT | Performed by: NURSE PRACTITIONER

## 2018-02-05 RX ORDER — LIDOCAINE 40 MG/G
CREAM TOPICAL
Status: CANCELLED | OUTPATIENT
Start: 2018-02-05

## 2018-02-05 ASSESSMENT — ENCOUNTER SYMPTOMS
COUGH: 0
POSTURAL DYSPNEA: 1
WHEEZING: 0
DYSPNEA ON EXERTION: 1
SPUTUM PRODUCTION: 1
SNORES LOUDLY: 0
COUGH DISTURBING SLEEP: 0
HEMOPTYSIS: 0
SHORTNESS OF BREATH: 1

## 2018-02-05 ASSESSMENT — PAIN SCALES - GENERAL: PAINLEVEL: NO PAIN (0)

## 2018-02-05 NOTE — PROGRESS NOTES
February 5, 2018      Mr. Jimmy Isaac is a pleasant 56 year old male with a past medical history of pulmonary hypertension, bronchial sarcoid, chronic kidney disease, and diabetes.    Today, he is accompanied by: no one; here alone      Current Symptoms  1. Any ER visits or hospital admissions since last appointment: No    2. Shortness of breath: Yes: reports good days and bad days.  Has felt good with the dry air.  Wears 4L at night, as well as 4L at rest.  During activity he wears 8 Liters.  He is not on the oxygen constantly, but using it more after seeing Pulmonologist     3. Dizziness or lightheadedness: No  4. Any syncopal episodes or falls: No  5. Chest pain or chest tightness: Yes: had an episode of chest tightness with instance of lower leg swelling.  Otherwise no recent chest pressure or pain.  Has been more careful with diet and food choices.    6. Nausea, vomiting, diarrhea, constipation: Yes: will have diarrhea occasionally with skipping meals or food choices.    7. Swelling in the legs: Yes: approximately one month ago he started to experience lower leg swelling; which came accompanied by a lung infection and antibiotic course.  He had been taking potassium citrate for kidney stone prevention.  At the time he called his renal doctor who restarted him on furosemide 20 mg daily. Also at that time, he also     8. Bloating in the abdomen: No  9. PND; Waking up at night coughing, choking or SOB: No  10. Any medication intolerances: No  11. Reported headaches: No  12. Jaw pain or bone/joint pain: Yes. At times right hip pain- may be positional with sleeping.  13. On IV Prostacyclin: No; current dose: N/A    14. Current level of activity: independent with oxygen      PAST MEDICAL HISTORY:  Past Medical History:   Diagnosis Date     Chronic sinusitis      CKD (chronic kidney disease)      Hypertension      Malignant neoplasm (H)      Pneumonia a few times     Pulmonary sarcoidosis (H)          FAMILY  HISTORY:  Family History   Problem Relation Age of Onset     Coronary Artery Disease Father      HEART DISEASE Father      heart attack age 35-40     Hypertension Mother      Hyperlipidemia Mother      CEREBROVASCULAR DISEASE Mother      Dementia Mother      Glaucoma No family hx of      Macular Degeneration No family hx of          SOCIAL HISTORY:  Social History   Substance Use Topics     Smoking status: Former Smoker     Packs/day: 1.00     Years: 30.00     Types: Cigarettes     Start date: 10/10/1975     Quit date: 3/1/2010     Smokeless tobacco: Never Used     Alcohol use 0.0 oz/week     0 Standard drinks or equivalent per week      Comment: stated 2 bottles of beer occ         CURRENT MEDICATIONS:  Current Outpatient Prescriptions   Medication Sig Dispense Refill     furosemide (LASIX) 20 MG tablet TAKE 1 TABLET (20 MG) BY MOUTH DAILY 30 tablet 1     LETAIRIS 10 MG tablet daily        albuterol (PROAIR HFA) 108 (90 BASE) MCG/ACT Inhaler Inhale 4-6 puffs into the lungs every 6 hours as needed 1 Inhaler 6     budesonide (PULMICORT) 0.25 MG/2ML neb solution INHALE THE CONTENTS OF 1   VIAL TWO TIMES A  mL 3     digoxin (LANOXIN) 125 MCG tablet Take 1 tablet (125 mcg) by mouth daily 90 tablet 3     tadalafil, PAH, (ADCIRCA) 20 MG TABS Take 2 tablets (40 mg) by mouth daily 60 tablet 11     treprostinil (TYVASO) 0.6 MG/ML SOLN 12 breaths 4 times daily 3.6 mL 11     Cholecalciferol (VITAMIN D) 2000 UNITS CAPS Take 1 capsule by mouth daily.           ROS:   10 point ROS of systems including Constitutional, Eyes, Respiratory, Cardiovascular, Gastroenterology, Genitourinary, Integumentary, Muscularskeletal, Psychiatric were all negative except for pertinent positives noted in my HPI.    EXAM:  /67 (BP Location: Left arm, Patient Position: Chair, Cuff Size: Adult Regular)  Pulse 81  Ht 1.829 m (6')  Wt 85.8 kg (189 lb 1.6 oz)  SpO2 93%  BMI 25.65 kg/m2  General: appears comfortable, alert and  articulate  Head: normocephalic, atraumatic  Eyes: anicteric sclera, EOMI  Neck: no adenopathy  Orophyarynx: moist mucosa, no lesions, dentition intact  Heart: regular, S1/S2, no murmur, gallop, rub, estimated JVP wnl  Lungs: clear, no rales or wheezing  Abdomen: soft, non-tender, bowel sounds present, no hepatosplenomegaly  Extremities: no clubbing, cyanosis or edema  Neurological: normal speech and affect, no gross motor deficits      Weight  Wt Readings from Last 10 Encounters:   02/05/18 85.8 kg (189 lb 1.6 oz)   12/12/17 79.4 kg (175 lb)   11/21/17 85.3 kg (188 lb)   11/16/17 85.3 kg (188 lb)   11/01/17 85.3 kg (188 lb)   11/01/17 85.2 kg (187 lb 14.4 oz)   10/18/17 87.8 kg (193 lb 9.6 oz)   09/07/17 84.9 kg (187 lb 1.6 oz)   08/01/17 84.5 kg (186 lb 3.2 oz)   06/20/17 83.5 kg (184 lb)         Labs:    CBC RESULTS:  Lab Results   Component Value Date    WBC 7.6 02/05/2018    RBC 4.84 02/05/2018    HGB 14.3 02/05/2018    HCT 44.0 02/05/2018    MCV 91 02/05/2018    MCH 29.5 02/05/2018    MCHC 32.5 02/05/2018    RDW 12.2 02/05/2018     02/05/2018       CMP RESULTS:  Lab Results   Component Value Date     02/05/2018    POTASSIUM 4.0 02/05/2018    CHLORIDE 99 02/05/2018    CO2 31 02/05/2018    ANIONGAP 6 02/05/2018    GLC 99 02/05/2018    BUN 27 02/05/2018    CR 1.28 (H) 02/05/2018    GFRESTIMATED 58 (L) 02/05/2018    GFRESTBLACK 70 02/05/2018    TERI 9.0 02/05/2018    BILITOTAL 0.5 02/05/2018    ALBUMIN 3.8 02/05/2018    ALKPHOS 69 02/05/2018    ALT 23 02/05/2018    AST 22 02/05/2018        INR RESULTS:  Lab Results   Component Value Date    INR 1.18 (H) 03/09/2012       BNP RESULTS:  Lab Results   Component Value Date    NTBNPI 260 04/12/2016     No results found for: BNP      Recent Tests:      Echocardiogram (9/22/2017):  Interpretation Summary  Technically difficult study.Poor acoustic windows.  Global and regional left ventricular function is normal with an EF of 60-65%.  The right ventricle is  normal size.  Global right ventricular function is normal.  Pulmonary artery systolic pressure cannot be assessed.  The inferior vena cava was normal in size with preserved respiratory  variability.  Estimated mean right atrial pressure is 3 mmHg.  No pericardial effusion is present.  There was no shunt at the atrial septal level as assessed by agitated saline  bubble study at rest and with Valsalva maneuver.  This study was compared with the study from 5/25/2017. RV systolic function is  normal in this study.        RHC (6/2/2017):      PFT (11/1/2017):   PFT Latest Ref Rng & Units 11/1/2017   FVC L 2.42   FEV1 L 0.82   FVC% % 47   FEV1% % 21           Assessment and Plan:   Mr. Jimmy Isaac is a 57 year old male with pulmonary hypertension and bronchial sarcoid.  WHO Group V, NYHA Functional Class III.    Mr. Isaac presents to clinic for three month follow-up. He described an instance one month ago where he experienced an increase in left ankle edema, but notified his renal doctor who started him on furosemide 20 mg daily.  He does not regularly weigh himself, but I advised that he do so, along with using the furosemide on an as needed basis for increased weight or ankle swelling.  Otherwise,despite complaining of any increase in shortness of breath, he has started wearing oxygen more regularly based on recommendations from Dr. Ruvalcaba.  Since his last visit with Dr. Lindsey, a right heart catheterization was planned but never ordered. Therefore we will have Mr. Isaac scheduled for the procedure and follow up with Dr. Lindsey.  This will be to evaluate for any worsening of his pulmonary hypertension.    It was a pleasure seeing Mr. Jimmy Isaac at the [facility].       Sincerely,  Nevaeh Griffith, APRN, CNP.                  Answers for HPI/ROS submitted by the patient on 2/5/2018   General Symptoms: No  Skin Symptoms: No  HENT Symptoms: No  EYE SYMPTOMS: No  HEART  SYMPTOMS: No  LUNG SYMPTOMS: Yes  INTESTINAL SYMPTOMS: No  URINARY SYMPTOMS: No  REPRODUCTIVE SYMPTOMS: No  SKELETAL SYMPTOMS: No  BLOOD SYMPTOMS: No  NERVOUS SYSTEM SYMPTOMS: No  MENTAL HEALTH SYMPTOMS: No  Cough: No  Sputum or phlegm: Yes  Coughing up blood: No  Difficulty breating or shortness of breath: Yes  Snoring: No  Wheezing: No  Difficulty breathing on exertion: Yes  Nighttime Cough: No  Difficulty breathing when lying flat: Yes

## 2018-02-05 NOTE — LETTER
2/5/2018      RE: Jimmy Isaac  [address] [address] [address]       Dear Colleague,    Thank you for the opportunity to participate in the care of your patient, Jimmy Isaac, at the [facility] HEART [facility]. Please see a copy of my visit note below.    February 5, 2018      Mr. Jimmy Isaac is a pleasant 56 year old male with a past medical history of pulmonary hypertension, bronchial sarcoid, chronic kidney disease, and diabetes.    Today, he is accompanied by: no one; here alone      Current Symptoms  1. Any ER visits or hospital admissions since last appointment: No    2. Shortness of breath: Yes: reports good days and bad days.  Has felt good with the dry air.  Wears 4L at night, as well as 4L at rest.  During activity he wears 8 Liters.  He is not on the oxygen constantly, but using it more after seeing Pulmonologist     3. Dizziness or lightheadedness: No  4. Any syncopal episodes or falls: No  5. Chest pain or chest tightness: Yes: had an episode of chest tightness with instance of lower leg swelling.  Otherwise no recent chest pressure or pain.  Has been more careful with diet and food choices.    6. Nausea, vomiting, diarrhea, constipation: Yes: will have diarrhea occasionally with skipping meals or food choices.    7. Swelling in the legs: Yes: approximately one month ago he started to experience lower leg swelling; which came accompanied by a lung infection and antibiotic course.  He had been taking potassium citrate for kidney stone prevention.  At the time he called his renal doctor who restarted him on furosemide 20 mg daily. Also at that time, he also     8. Bloating in the abdomen: No  9. PND; Waking up at night coughing, choking or SOB: No  10. Any medication intolerances: No  11. Reported headaches: No  12. Jaw pain or bone/joint pain: Yes. At times right hip pain- may be positional with sleeping.  13. On IV Prostacyclin: No; current  dose: N/A    14. Current level of activity: independent with oxygen      PAST MEDICAL HISTORY:  Past Medical History:   Diagnosis Date     Chronic sinusitis      CKD (chronic kidney disease)      Hypertension      Malignant neoplasm (H)      Pneumonia a few times     Pulmonary sarcoidosis (H)          FAMILY HISTORY:  Family History   Problem Relation Age of Onset     Coronary Artery Disease Father      HEART DISEASE Father      heart attack age 35-40     Hypertension Mother      Hyperlipidemia Mother      CEREBROVASCULAR DISEASE Mother      Dementia Mother      Glaucoma No family hx of      Macular Degeneration No family hx of          SOCIAL HISTORY:  Social History   Substance Use Topics     Smoking status: Former Smoker     Packs/day: 1.00     Years: 30.00     Types: Cigarettes     Start date: 10/10/1975     Quit date: 3/1/2010     Smokeless tobacco: Never Used     Alcohol use 0.0 oz/week     0 Standard drinks or equivalent per week      Comment: stated 2 bottles of beer occ         CURRENT MEDICATIONS:  Current Outpatient Prescriptions   Medication Sig Dispense Refill     furosemide (LASIX) 20 MG tablet TAKE 1 TABLET (20 MG) BY MOUTH DAILY 30 tablet 1     LETAIRIS 10 MG tablet daily        albuterol (PROAIR HFA) 108 (90 BASE) MCG/ACT Inhaler Inhale 4-6 puffs into the lungs every 6 hours as needed 1 Inhaler 6     budesonide (PULMICORT) 0.25 MG/2ML neb solution INHALE THE CONTENTS OF 1   VIAL TWO TIMES A  mL 3     digoxin (LANOXIN) 125 MCG tablet Take 1 tablet (125 mcg) by mouth daily 90 tablet 3     tadalafil, PAH, (ADCIRCA) 20 MG TABS Take 2 tablets (40 mg) by mouth daily 60 tablet 11     treprostinil (TYVASO) 0.6 MG/ML SOLN 12 breaths 4 times daily 3.6 mL 11     Cholecalciferol (VITAMIN D) 2000 UNITS CAPS Take 1 capsule by mouth daily.           ROS:   10 point ROS of systems including Constitutional, Eyes, Respiratory, Cardiovascular, Gastroenterology, Genitourinary, Integumentary, Muscularskeletal,  Psychiatric were all negative except for pertinent positives noted in my HPI.    EXAM:  /67 (BP Location: Left arm, Patient Position: Chair, Cuff Size: Adult Regular)  Pulse 81  Ht 1.829 m (6')  Wt 85.8 kg (189 lb 1.6 oz)  SpO2 93%  BMI 25.65 kg/m2  General: appears comfortable, alert and articulate  Head: normocephalic, atraumatic  Eyes: anicteric sclera, EOMI  Neck: no adenopathy  Orophyarynx: moist mucosa, no lesions, dentition intact  Heart: regular, S1/S2, no murmur, gallop, rub, estimated JVP wnl  Lungs: clear, no rales or wheezing  Abdomen: soft, non-tender, bowel sounds present, no hepatosplenomegaly  Extremities: no clubbing, cyanosis or edema  Neurological: normal speech and affect, no gross motor deficits      Weight  Wt Readings from Last 10 Encounters:   02/05/18 85.8 kg (189 lb 1.6 oz)   12/12/17 79.4 kg (175 lb)   11/21/17 85.3 kg (188 lb)   11/16/17 85.3 kg (188 lb)   11/01/17 85.3 kg (188 lb)   11/01/17 85.2 kg (187 lb 14.4 oz)   10/18/17 87.8 kg (193 lb 9.6 oz)   09/07/17 84.9 kg (187 lb 1.6 oz)   08/01/17 84.5 kg (186 lb 3.2 oz)   06/20/17 83.5 kg (184 lb)         Labs:    CBC RESULTS:  Lab Results   Component Value Date    WBC 7.6 02/05/2018    RBC 4.84 02/05/2018    HGB 14.3 02/05/2018    HCT 44.0 02/05/2018    MCV 91 02/05/2018    MCH 29.5 02/05/2018    MCHC 32.5 02/05/2018    RDW 12.2 02/05/2018     02/05/2018       CMP RESULTS:  Lab Results   Component Value Date     02/05/2018    POTASSIUM 4.0 02/05/2018    CHLORIDE 99 02/05/2018    CO2 31 02/05/2018    ANIONGAP 6 02/05/2018    GLC 99 02/05/2018    BUN 27 02/05/2018    CR 1.28 (H) 02/05/2018    GFRESTIMATED 58 (L) 02/05/2018    GFRESTBLACK 70 02/05/2018    TERI 9.0 02/05/2018    BILITOTAL 0.5 02/05/2018    ALBUMIN 3.8 02/05/2018    ALKPHOS 69 02/05/2018    ALT 23 02/05/2018    AST 22 02/05/2018        INR RESULTS:  Lab Results   Component Value Date    INR 1.18 (H) 03/09/2012       BNP RESULTS:  Lab Results   Component  Value Date    NTBNPI 260 04/12/2016     No results found for: BNP      Recent Tests:      Echocardiogram (9/22/2017):  Interpretation Summary  Technically difficult study.Poor acoustic windows.  Global and regional left ventricular function is normal with an EF of 60-65%.  The right ventricle is normal size.  Global right ventricular function is normal.  Pulmonary artery systolic pressure cannot be assessed.  The inferior vena cava was normal in size with preserved respiratory  variability.  Estimated mean right atrial pressure is 3 mmHg.  No pericardial effusion is present.  There was no shunt at the atrial septal level as assessed by agitated saline  bubble study at rest and with Valsalva maneuver.  This study was compared with the study from 5/25/2017. RV systolic function is  normal in this study.        RHC (6/2/2017):      PFT (11/1/2017):   PFT Latest Ref Rng & Units 11/1/2017   FVC L 2.42   FEV1 L 0.82   FVC% % 47   FEV1% % 21           Assessment and Plan:   Mr. Jimmy Isaac is a 57 year old male with pulmonary hypertension and bronchial sarcoid.  WHO Group V, NYHA Functional Class III.    Mr. Isaac presents to clinic for three month follow-up. He described an instance one month ago where he experienced an increase in left ankle edema, but notified his renal doctor who started him on furosemide 20 mg daily.  He does not regularly weigh himself, but I advised that he do so, along with using the furosemide on an as needed basis for increased weight or ankle swelling.  Otherwise,despite complaining of any increase in shortness of breath, he has started wearing oxygen more regularly based on recommendations from Dr. Ruvalcaba.  Since his last visit with Dr. Lindsey, a right heart catheterization was planned but never ordered. Therefore we will have Mr. Isaac scheduled for the procedure and follow up with Dr. Lindsey.  This will be to evaluate for any worsening of his pulmonary hypertension.    It was a pleasure  seeing . Jimmy Isaac at the [facility].       Sincerely,  Nevaeh Griffith, APRN, CNP.

## 2018-02-05 NOTE — PATIENT INSTRUCTIONS
Medication Changes:   1. Take your furosemide as needed when you experience increases in weight or swelling in lower legs  2. You will be having a right heart catheterization    Patient Instructions:  Check-In  Time Check-In Location Estimated Length Procedure   Name        [facility]  waiting room 60-90 minutes Right Heart Catheterization**     Procedure Preparations & Instructions     This is an invasive procedure that DOES require preparation:    - Nothing to eat for 6 hours   - You may have clear liquids up until the time of your procedure  - A ride should be arranged for you in the instance you are unable to drive home, however you should be able to function as you normally would after the procedure     *For Patients with Diabetes: ? DO NOT take any oral diabetic medication, short-acting diabetes medications/insulin, humalog or regular insulin the morning of your test  ? Take   dose of long-acting insulin (Lantus, Levemir) the day of your test  ? Remember to  bring your glucometer and insulin with you to take after your test if needed     *For Patients on anticoagulants: ? Your Coumadin Clinic will give you instructions on medication adjustments or bridging prior to the procedure          Follow up Appointment Information:  After your catheterization to review results with Dr. Lindsey      We are located on the third floor of the [facility] on the [facility].  Our address is     [address] [place] [address] [place] [address] [place] [address]      Thank you for allowing us to be a part of your care here at the [facility]    If you have questions or concerns please contact us at:    Bernarda Wilson RN, BSN    Miguel Ángel Valverde (Schedule,P.A.)  Nurse Coordinator     Clinic   Pulmonary Hypertension   Pulmonary Hypertension  [facility] Heart [facility]  (P)[phone]    (P)  [phone]        (F)[phone]                ** Please note that you will NOT receive a reminder call regarding your scheduled testing, reminder calls are for provider appointments only.  If you are scheduled for testing within the [facility] system you may receive a call regarding pre-registration for billing purposes only.**     Remember to weigh yourself daily after voiding and before you consume any food or beverages and log the numbers.  If you have gained/lost 2 pounds overnight or 5 pounds in a week contact us immediately for medication adjustments or further instructions.  **Please call us immediately if you have any syncope, chest pain, edema, or decline in your functional status.    Support Group:  Pulmonary Hypertension Association  Https://www.phassociation.org/  **Look at the Events Tab** They even have Support Groups that you can call into    [facility] PH Support Group  First Saturday of the Month from 1-3 PM   Location: [address] [place] [address]  Leader: Bert Weber  Phone: [phone]  Email: [email or web address]

## 2018-02-05 NOTE — MR AVS SNAPSHOT
After Visit Summary   2/5/2018    Jimmy Isaac    MRN: [id number]           Patient Information     Date Of Birth          1960        Visit Information        Provider Department      2/5/2018 11:30 AM Nevaeh Griffith APRN CNP [facility]        Today's Diagnoses     Primary pulmonary hypertension (H)    -  1      Care Instructions    Medication Changes:   1. Take your furosemide as needed when you experience increases in weight or swelling in lower legs  2. You will be having a right heart catheterization    Patient Instructions:  Check-In  Time Check-In Location Estimated Length Procedure   Name        [facility]  waiting room 60-90 minutes Right Heart Catheterization**     Procedure Preparations & Instructions     This is an invasive procedure that DOES require preparation:    - Nothing to eat for 6 hours   - You may have clear liquids up until the time of your procedure  - A ride should be arranged for you in the instance you are unable to drive home, however you should be able to function as you normally would after the procedure     *For Patients with Diabetes: ? DO NOT take any oral diabetic medication, short-acting diabetes medications/insulin, humalog or regular insulin the morning of your test  ? Take   dose of long-acting insulin (Lantus, Levemir) the day of your test  ? Remember to  bring your glucometer and insulin with you to take after your test if needed     *For Patients on anticoagulants: ? Your Coumadin Clinic will give you instructions on medication adjustments or bridging prior to the procedure          Follow up Appointment Information:  After your catheterization to review results with Dr. Lindsey      We are located on the third floor of the [facility] ([facility] on the [facility].  Our address is     [address] [place] [address] on [address]rd [place] [address]      Thank you for allowing us to be a part of your  care here at the [facility]    If you have questions or concerns please contact us at:    Bernarda Wilson RN, BSN    Miguel Ángel Valverde (Schedule,P.A.)  Nurse Coordinator     Clinic   Pulmonary Hypertension   Pulmonary Hypertension  [facility] Care [facility]  [phone])[phone]    (P) [phone]        (F)[phone]                ** Please note that you will NOT receive a reminder call regarding your scheduled testing, reminder calls are for provider appointments only.  If you are scheduled for testing within the [facility] system you may receive a call regarding pre-registration for billing purposes only.**     Remember to weigh yourself daily after voiding and before you consume any food or beverages and log the numbers.  If you have gained/lost 2 pounds overnight or 5 pounds in a week contact us immediately for medication adjustments or further instructions.  **Please call us immediately if you have any syncope, chest pain, edema, or decline in your functional status.    Support Group:  Pulmonary Hypertension Association  Https://www.phassociation.org/  **Look at the Events Tab** They even have Support Groups that you can call into    [facility] PH Support Group  First Saturday of the Month from 1-3 PM   Location: [address] [place] [address]  Leader: Bert Weber  Phone: [phone]  Email: [email or web address]              Follow-ups after your visit        Your next 10 appointments already scheduled     Mar 28, 2018  1:30 PM CDT   Lab with  LAB   [facility] ([facility])    [address]  1st Floor  [facility] [address] [phone]            Mar 28, 2018  2:30 PM CDT   (Arrive by 2:00 PM)   Return Visit with Lilly Ba MD   [facility] ([facility])    [address]  [address]  [facility] [address] [phone]               Future tests that were ordered for you today     Open Future Orders        Priority Expected Expires Ordered    Heart Cath Right heart cath Routine  2/5/2019 2/5/2018            Who to contact     If you have questions or need follow up information about today's clinic visit or your schedule please contact [facility] directly at [phone].  Normal or non-critical lab and imaging results will be communicated to you by MyChart, letter or phone within 4 business days after the clinic has received the results. If you do not hear from us within 7 days, please contact the clinic through [employer] or phone. If you have a critical or abnormal lab result, we will notify you by phone as soon as possible.  Submit refill requests through [employer] or call your pharmacy and they will forward the refill request to us. Please allow 3 business days for your refill to be completed.          Additional Information About Your Visit        MyChart Information     [employer] gives you secure access to your electronic health record. If you see a primary care provider, you can also send messages to your care team and make appointments. If you have questions, please call your primary care clinic.  If you do not have a primary care provider, please call [phone] and they will assist you.        Care EveryWhere ID     This is your Care EveryWhere ID. This could be used by other organizations to access your [facility] medical records  [id number]        Your Vitals Were     Pulse Height Pulse Oximetry BMI (Body Mass Index)          81 1.829 m (6') 93% 25.65 kg/m2         Blood Pressure from Last 3 Encounters:   02/05/18 114/67   11/21/17 149/78   11/16/17 126/75    Weight from Last 3 Encounters:   02/05/18 85.8 kg (189 lb 1.6 oz)   12/12/17 79.4 kg (175 lb)   11/21/17 85.3 kg (188 lb)                 Today's Medication Changes          These changes are accurate as of 2/5/18 12:24 PM.  If you have any questions, ask your  nurse or doctor.               Stop taking these medicines if you haven't already. Please contact your care team if you have questions.     potassium citrate 10 MEQ (1080 MG) CR tablet   Commonly known as:  UROCIT-K   Stopped by:  Nevaeh Griffith APRN CNP                    Primary Care Provider Office Phone # Fax #    Cristhian Busch [phone]       [facility] [address]  [facility] [address]        Equal Access to Services     ZOILA [facility] HospitalPRABHAKAR : Hadii aad ku hadasho Soomaali, waaxda luqadaha, qaybta kaalmada adeegyada, waxay idiin hayaan adeeg kharash la'aan . So [facility] [phone].    ATENCIÓN: Si habla español, tiene a hutton disposición servicios gratuitos de asistencia lingüística. [facility] [phone].    We comply with applicable federal civil rights laws and [place] laws. We do not discriminate on the basis of race, color, national origin, age, disability, sex, sexual orientation, or gender identity.            Thank you!     Thank you for choosing [facility]  for your care. Our goal is always to provide you with excellent care. Hearing back from our patients is one way we can continue to improve our services. Please take a few minutes to complete the written survey that you may receive in the mail after your visit with us. Thank you!             Your Updated Medication List - Protect others around you: Learn how to safely use, store and throw away your medicines at www.disposemymeds.org.          This list is accurate as of 2/5/18 12:24 PM.  Always use your most recent med list.                   Brand Name Dispense Instructions for use Diagnosis    albuterol 108 (90 BASE) MCG/ACT Inhaler    PROAIR HFA    1 Inhaler    Inhale 4-6 puffs into the lungs every 6 hours as needed    Pulmonary hypertension, Mixed hyperlipidemia, COPD (chronic obstructive pulmonary disease) (H)       budesonide 0.25 MG/2ML neb solution    PULMICORT    360 mL    INHALE THE CONTENTS OF 1   VIAL  TWO TIMES A DAY    ILD (interstitial lung disease) (H)       digoxin 125 MCG tablet    LANOXIN    90 tablet    Take 1 tablet (125 mcg) by mouth daily    Pulmonary hypertension       furosemide 20 MG tablet    LASIX    30 tablet    TAKE 1 TABLET (20 MG) BY MOUTH DAILY    Localized edema       LETAIRIS 10 MG tablet   Generic drug:  ambrisentan      daily        tadalafil (PAH) 20 MG Tabs    ADCIRCA    60 tablet    Take 2 tablets (40 mg) by mouth daily    Pulmonary hypertension       treprostinil 0.6 MG/ML Soln neb solution    TYVASO    3.6 mL    12 breaths 4 times daily    Pulmonary hypertension       vitamin D 2000 UNITS Caps      Take 1 capsule by mouth daily.

## 2018-02-05 NOTE — NURSING NOTE
Chief Complaint   Patient presents with     Follow Up For     f/u for pulmonary hypertension w/labs prior     Vitals were taken and medications were reconciled.    Yvrose Nunez MA    11:02 AM

## 2018-02-21 ENCOUNTER — PRE VISIT (OUTPATIENT)
Dept: CARDIOLOGY | Facility: CLINIC | Age: 58
End: 2018-02-21

## 2018-02-21 DIAGNOSIS — R06.09 DYSPNEA ON EXERTION: ICD-10-CM

## 2018-02-21 DIAGNOSIS — I27.20 PULMONARY HYPERTENSION (H): Primary | ICD-10-CM

## 2018-02-23 ASSESSMENT — ENCOUNTER SYMPTOMS
DYSPNEA ON EXERTION: 1
INCREASED ENERGY: 0
STIFFNESS: 0
MUSCLE CRAMPS: 0
ARTHRALGIAS: 0
SKIN CHANGES: 0
POOR WOUND HEALING: 0
JOINT SWELLING: 0
POLYPHAGIA: 0
SHORTNESS OF BREATH: 1
POLYDIPSIA: 0
WEIGHT GAIN: 1
ALTERED TEMPERATURE REGULATION: 0
MUSCLE WEAKNESS: 0
POSTURAL DYSPNEA: 1
NIGHT SWEATS: 0
HEMOPTYSIS: 0
FEVER: 0
MYALGIAS: 0
SPUTUM PRODUCTION: 0
COUGH: 0
WEIGHT LOSS: 0
COUGH DISTURBING SLEEP: 0
SNORES LOUDLY: 0
CHILLS: 0
DECREASED APPETITE: 0
FATIGUE: 1
HALLUCINATIONS: 0
NECK PAIN: 1
NAIL CHANGES: 0
BACK PAIN: 1
WHEEZING: 0

## 2018-02-27 ENCOUNTER — APPOINTMENT (OUTPATIENT)
Dept: CARDIOLOGY | Facility: CLINIC | Age: 58
End: 2018-02-27
Attending: NURSE PRACTITIONER
Payer: COMMERCIAL

## 2018-02-27 ENCOUNTER — OFFICE VISIT (OUTPATIENT)
Dept: CARDIOLOGY | Facility: CLINIC | Age: 58
End: 2018-02-27
Attending: INTERNAL MEDICINE
Payer: COMMERCIAL

## 2018-02-27 ENCOUNTER — APPOINTMENT (OUTPATIENT)
Dept: MEDSURG UNIT | Facility: CLINIC | Age: 58
End: 2018-02-27
Attending: INTERNAL MEDICINE
Payer: COMMERCIAL

## 2018-02-27 ENCOUNTER — HOSPITAL ENCOUNTER (OUTPATIENT)
Facility: CLINIC | Age: 58
Discharge: HOME | End: 2018-02-27
Attending: INTERNAL MEDICINE | Admitting: INTERNAL MEDICINE
Payer: COMMERCIAL

## 2018-02-27 VITALS
OXYGEN SATURATION: 95 % | SYSTOLIC BLOOD PRESSURE: 143 MMHG | HEART RATE: 83 BPM | DIASTOLIC BLOOD PRESSURE: 75 MMHG | RESPIRATION RATE: 20 BRPM | TEMPERATURE: 97.8 F

## 2018-02-27 VITALS
OXYGEN SATURATION: 93 % | BODY MASS INDEX: 25.26 KG/M2 | DIASTOLIC BLOOD PRESSURE: 68 MMHG | WEIGHT: 186.5 LBS | SYSTOLIC BLOOD PRESSURE: 113 MMHG | HEIGHT: 72 IN | HEART RATE: 107 BPM

## 2018-02-27 DIAGNOSIS — D86.9 SARCOIDOSIS: ICD-10-CM

## 2018-02-27 DIAGNOSIS — I27.0 PRIMARY PULMONARY HYPERTENSION (H): ICD-10-CM

## 2018-02-27 DIAGNOSIS — R06.09 DYSPNEA ON EXERTION: ICD-10-CM

## 2018-02-27 DIAGNOSIS — I27.20 PULMONARY HYPERTENSION (H): Primary | ICD-10-CM

## 2018-02-27 PROCEDURE — 99213 OFFICE O/P EST LOW 20 MIN: CPT | Mod: ZP | Performed by: INTERNAL MEDICINE

## 2018-02-27 PROCEDURE — 40000166 ZZH STATISTIC PP CARE STAGE 1

## 2018-02-27 PROCEDURE — 27210982 ZZH KIT RT HC TOTES DISP CR7

## 2018-02-27 PROCEDURE — G0463 HOSPITAL OUTPT CLINIC VISIT: HCPCS | Mod: 25

## 2018-02-27 PROCEDURE — 27211181 ZZH BALLOON TIP PRESSURE CR5

## 2018-02-27 PROCEDURE — 93451 RIGHT HEART CATH: CPT

## 2018-02-27 PROCEDURE — G0463 HOSPITAL OUTPT CLINIC VISIT: HCPCS | Mod: ZF

## 2018-02-27 PROCEDURE — 27210787 ZZH MANIFOLD CR2

## 2018-02-27 PROCEDURE — 27210807 ZZH SHEATH CR6

## 2018-02-27 PROCEDURE — 93451 RIGHT HEART CATH: CPT | Mod: 26 | Performed by: INTERNAL MEDICINE

## 2018-02-27 PROCEDURE — 25000125 ZZHC RX 250: Performed by: NURSE PRACTITIONER

## 2018-02-27 RX ORDER — LIDOCAINE 40 MG/G
CREAM TOPICAL
Status: COMPLETED | OUTPATIENT
Start: 2018-02-27 | End: 2018-02-27

## 2018-02-27 RX ADMIN — LIDOCAINE: 40 CREAM TOPICAL at 08:55

## 2018-02-27 ASSESSMENT — PAIN SCALES - GENERAL: PAINLEVEL: NO PAIN (0)

## 2018-02-27 NOTE — MR AVS SNAPSHOT
After Visit Summary   2/27/2018    Jimmy Isaac    MRN: [id number]           Patient Information     Date Of Birth          1960        Visit Information        Provider Department      2/27/2018 12:00 PM Khang Lindsey MD [facility]        Today's Diagnoses     Pulmonary hypertension    -  1    Sarcoidosis        Dyspnea on exertion          Care Instructions    Medication Changes:   No medication changes at this time. Please continue current medication regiment.     Patient Instructions:  Continue staying active and eat a heart healthy diet.    GI Referral for Colonoscopy [phone]    Follow up Appointment Information:  3 month follow up    Results:  Component      Latest Ref Rng & Units 2/5/2018   Sodium      133 - 144 mmol/L 137   Potassium      3.4 - 5.3 mmol/L 4.0   Chloride      94 - 109 mmol/L 99   Carbon Dioxide      20 - 32 mmol/L 31   Anion Gap      3 - 14 mmol/L 6   Glucose      70 - 99 mg/dL 99   Urea Nitrogen      7 - 30 mg/dL 27   Creatinine      0.66 - 1.25 mg/dL 1.28 (H)   GFR Estimate      >60 mL/min/1.7m2 58 (L)   GFR Estimate If Black      >60 mL/min/1.7m2 70   Calcium      8.5 - 10.1 mg/dL 9.0   Bilirubin Total      0.2 - 1.3 mg/dL 0.5   Albumin      3.4 - 5.0 g/dL 3.8   Protein Total      6.8 - 8.8 g/dL 7.4   Alkaline Phosphatase      40 - 150 U/L 69   ALT      0 - 70 U/L 23   AST      0 - 45 U/L 22   WBC      4.0 - 11.0 10e9/L 7.6   RBC Count      4.4 - 5.9 10e12/L 4.84   Hemoglobin      13.3 - 17.7 g/dL 14.3   Hematocrit      40.0 - 53.0 % 44.0   MCV      78 - 100 fl 91   MCH      26.5 - 33.0 pg 29.5   MCHC      31.5 - 36.5 g/dL 32.5   RDW      10.0 - 15.0 % 12.2   Platelet Count      150 - 450 10e9/L 165   N-Terminal Pro Bnp      0 - 125 pg/mL 506 (H)     We are located on the third floor of the [facility] ([facility] on the [facility].  Our address is     [address] [place] [address] on [address]rd [place]  [address]      Thank you for allowing us to be a part of your care here at the [facility]    If you have questions or concerns please contact us at:    Bernarda Wilson, RN, BSN    Miguel Ángel Valverde (Schedule,P.A.)  Nurse Coordinator     Clinic   Pulmonary Hypertension   Pulmonary Hypertension  [facility]  [phone])[phone]    (P) [phone]        (F)[phone]                ** Please note that you will NOT receive a reminder call regarding your scheduled testing, reminder calls are for provider appointments only.  If you are scheduled for testing within the [facility] system you may receive a call regarding pre-registration for billing purposes only.**     Remember to weigh yourself daily after voiding and before you consume any food or beverages and log the numbers.  If you have gained/lost 2 pounds overnight or 5 pounds in a week contact us immediately for medication adjustments or further instructions.  **Please call us immediately if you have any syncope, chest pain, edema, or decline in your functional status.    Support Group:  Pulmonary Hypertension Association  Https://www.phassociation.org/  **Look at the Events Tab** They even have Support Groups that you can call into    [facility] PH Support Group  First Saturday of the Month from 1-3 PM   Location: [address] [place] [id number]  Leader: Bert Weber  Phone: [phone]  Email: [email or web address]          Follow-ups after your visit        Additional Services     GASTROENTEROLOGY ADULT REF PROCEDURE ONLY [facility] [phone]           Follow-Up with Pulmonary Hypertension Clinic       GI Referral for Colonoscopy - [phone]  3 month F/U with Labs                  Your next 10 appointments already scheduled     Mar 28, 2018  1:30 PM CDT   Lab with  LAB    Health Lab ([facility])    [address]  1st Floor  [facility] [address]   [phone]            Mar 28, 2018  2:30 PM CDT   (Arrive by 2:00 PM)   Return Visit with Lilly Ba MD   [facility] ([facility])    [address]  [address]  [facility] [address] [phone]            May 30, 2018 10:30 AM CDT   Lab with  LAB   [facility] ([facility])    [address] [place] [address] Se  1st Floor  [facility] [address]   [phone]            May 30, 2018 11:00 AM CDT   (Arrive by 10:45 AM)   RETURN PRIMARY PULMONARY with Khang Lindsey MD   [facility] ([facility])    [address] [place] [address]  [address]  [facility] [address] [phone]              Future tests that were ordered for you today     Open Future Orders        Priority Expected Expires Ordered    Prostate spec antigen screen Routine 5/27/2018 2/27/2019 2/27/2018    N terminal pro BNP outpatient Routine 5/27/2018 2/27/2019 2/27/2018    CBC with platelets Routine 5/27/2018 2/27/2019 2/27/2018    Comprehensive metabolic panel Routine 5/27/2018 2/27/2019 2/27/2018    Follow-Up with Pulmonary Hypertension Clinic Routine 5/27/2018 2/27/2019 2/27/2018    GASTROENTEROLOGY ADULT REF PROCEDURE ONLY [facility] [phone] Routine 2/28/2018 2/27/2019 2/27/2018            Who to contact     If you have questions or need follow up information about today's clinic visit or your schedule please contact [facility] directly at [phone].  Normal or non-critical lab and imaging results will be communicated to you by MyChart, letter or phone within 4 business days after the clinic has received the results. If you do not hear from us within 7 days, please contact the clinic through MyChart or phone. If you have a critical or abnormal lab result, we will notify you by phone as soon as possible.  Submit refill requests through [employer] or call your pharmacy and they  will forward the refill request to us. Please allow 3 business days for your refill to be completed.          Additional Information About Your Visit        [employer] Information     [employer] gives you secure access to your electronic health record. If you see a primary care provider, you can also send messages to your care team and make appointments. If you have questions, please call your primary care clinic.  If you do not have a primary care provider, please call [phone] and they will assist you.        Care EveryWhere ID     This is your Care EveryWhere ID. This could be used by other organizations to access your [place] medical records  [id number]        Your Vitals Were     Pulse Height Pulse Oximetry BMI (Body Mass Index)          107 1.829 m (6') 93% 25.29 kg/m2         Blood Pressure from Last 3 Encounters:   02/27/18 113/68   02/27/18 143/75   02/05/18 114/67    Weight from Last 3 Encounters:   02/27/18 84.6 kg (186 lb 8 oz)   02/05/18 85.8 kg (189 lb 1.6 oz)   12/12/17 79.4 kg (175 lb)               Primary Care Provider Office Phone # Fax #    Cristhian Busch [phone]       [facility] [address]  [facility] [address]        Equal Access to Services     JOVAN BOUCHER AH: Hadii aad ku hadasho Soomaali, waaxda luqadaha, qaybta kaalmada adeegyada, waxay idiin hayaan emperatriz malik la'jonathan youngblood. So [facility] [phone].    ATENCIÓN: Si habla español, tiene a hutton disposición servicios gratuitos de asistencia lingüística. Llame al [phone].    We comply with applicable federal civil rights laws and [place] laws. We do not discriminate on the basis of race, color, national origin, age, disability, sex, sexual orientation, or gender identity.            Thank you!     Thank you for choosing [facility]  for your care. Our goal is always to provide you with excellent care. Hearing back from our patients is one way we can continue to improve our services. Please take a few minutes to  complete the written survey that you may receive in the mail after your visit with us. Thank you!             Your Updated Medication List - Protect others around you: Learn how to safely use, store and throw away your medicines at www.disposemymeds.org.          This list is accurate as of 2/27/18  1:39 PM.  Always use your most recent med list.                   Brand Name Dispense Instructions for use Diagnosis    albuterol 108 (90 BASE) MCG/ACT Inhaler    PROAIR HFA    1 Inhaler    Inhale 4-6 puffs into the lungs every 6 hours as needed    Pulmonary hypertension, Mixed hyperlipidemia, COPD (chronic obstructive pulmonary disease) (H)       budesonide 0.25 MG/2ML neb solution    PULMICORT    360 mL    INHALE THE CONTENTS OF 1   VIAL TWO TIMES A DAY    ILD (interstitial lung disease) (H)       digoxin 125 MCG tablet    LANOXIN    90 tablet    Take 1 tablet (125 mcg) by mouth daily    Pulmonary hypertension       furosemide 20 MG tablet    LASIX    30 tablet    TAKE 1 TABLET (20 MG) BY MOUTH DAILY    Localized edema       LETAIRIS 10 MG tablet   Generic drug:  ambrisentan      daily        tadalafil (PAH) 20 MG Tabs    ADCIRCA    60 tablet    Take 2 tablets (40 mg) by mouth daily    Pulmonary hypertension       treprostinil 0.6 MG/ML Soln neb solution    TYVASO    3.6 mL    12 breaths 4 times daily    Pulmonary hypertension       vitamin D 2000 UNITS Caps      Take 1 capsule by mouth daily.

## 2018-02-27 NOTE — NURSING NOTE
Med Reconcile: Reviewed and verified all current medications with the patient. The updated medication list was printed and given to the patient.  Diet: Patient instructed regarding a heart healthy diet, including discussion of reduced fat and sodium intake. Patient demonstrated understanding of this information and agreed to call with further questions or concerns.  Return Appointment: Patient given instructions regarding scheduling next clinic visit. Patient demonstrated understanding of this information and agreed to call with further questions or concerns.  Patient stated he understood all health information given and agreed to call with further questions or concerns.     Medication Changes:   No medication changes at this time. Please continue current medication regiment.     Patient Instructions:  Continue staying active and eat a heart healthy diet.    GI Referral for Colonoscopy    Follow up Appointment Information:  3 month follow up    Results:  Component      Latest Ref Rng & Units 2/5/2018   Sodium      133 - 144 mmol/L 137   Potassium      3.4 - 5.3 mmol/L 4.0   Chloride      94 - 109 mmol/L 99   Carbon Dioxide      20 - 32 mmol/L 31   Anion Gap      3 - 14 mmol/L 6   Glucose      70 - 99 mg/dL 99   Urea Nitrogen      7 - 30 mg/dL 27   Creatinine      0.66 - 1.25 mg/dL 1.28 (H)   GFR Estimate      >60 mL/min/1.7m2 58 (L)   GFR Estimate If Black      >60 mL/min/1.7m2 70   Calcium      8.5 - 10.1 mg/dL 9.0   Bilirubin Total      0.2 - 1.3 mg/dL 0.5   Albumin      3.4 - 5.0 g/dL 3.8   Protein Total      6.8 - 8.8 g/dL 7.4   Alkaline Phosphatase      40 - 150 U/L 69   ALT      0 - 70 U/L 23   AST      0 - 45 U/L 22   WBC      4.0 - 11.0 10e9/L 7.6   RBC Count      4.4 - 5.9 10e12/L 4.84   Hemoglobin      13.3 - 17.7 g/dL 14.3   Hematocrit      40.0 - 53.0 % 44.0   MCV      78 - 100 fl 91   MCH      26.5 - 33.0 pg 29.5   MCHC      31.5 - 36.5 g/dL 32.5   RDW      10.0 - 15.0 % 12.2   Platelet Count      150 -  450 10e9/L 165   N-Terminal Pro Bnp      0 - 125 pg/mL 506 (H)

## 2018-02-27 NOTE — NURSING NOTE
Chief Complaint   Patient presents with     Follow Up For     f/u for pulmonary hypertension; review test results     Vitals were taken and medications were reconciled.     Elin Dowell RMA  11:26 AM

## 2018-02-27 NOTE — PROGRESS NOTES
Returned from [facility] at 1032 per W/C, using O2 at 4 l/m NC.  Patient tolerated recovery stage well. VSS, RIJV site clean/dry/intact, no hematoma, and denies pain. Patient tolerated PO fluids. Teaching was done and discharge instructions were given.  Patient discharged from the hospital via wheel chair using O2 to home per self.

## 2018-02-27 NOTE — PROCEDURES
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    Procedure: Right Heart Cathterization  Access: 7 Fr sheath in RIJ obtained without complications   Findings   RA: 11/9/6  RV 57/12  PA 57/26/40  PCWP 16/17/14  Wedge sat 94.6%  Lesia CO 5.4 (2.6) TD CO 5.1 (2.4)   TPR 7.8 PVR 5.1  PaSat 73.5% Hb 12.7  MAP 95  SVR 1318    Oximetry Run:   SVC: 71%  IVC: 78.2%  High RA: 72.0%  Mid RA: 70.4%  Low RA 71.6%  RV 72.9%  PA: 73.5%  Wedge: 94.6%  Qp 5.8  Qs 5.7  Qp/Qs: 1.01    Conclusions:  1. normal right and mildly elevated left sided filling pressures  2. Moderately elevated pulmonary artery pressures on treatment, pre and post capillary   3. Normal cardiac output   4. No evidence of intra cardiac shunt    Recommendations: Results discussed with his outpatient cardiologist Dr. Lindsey. Follow up in PH clinic today. Discharge per protocol.     Emerson Yoo MD  Advanced Heart Failure Cardiologist

## 2018-02-27 NOTE — IP AVS SNAPSHOT
MRN:[id number]                      After Visit Summary   2/27/2018    Jimmy Isaac    MRN: [id number]           Visit Information        Department      2/27/2018  8:39 AM [facility] Lab          Review of your medicines      UNREVIEWED medicines. Ask your doctor about these medicines        Dose / Directions    albuterol 108 (90 BASE) MCG/ACT Inhaler   Commonly known as:  PROAIR HFA   Used for:  Pulmonary hypertension, Mixed hyperlipidemia, COPD (chronic obstructive pulmonary disease) (H)        Dose:  4-6 puff   Inhale 4-6 puffs into the lungs every 6 hours as needed   Quantity:  1 Inhaler   Refills:  6       budesonide 0.25 MG/2ML neb solution   Commonly known as:  PULMICORT   Used for:  ILD (interstitial lung disease) (H)        INHALE THE CONTENTS OF 1   VIAL TWO TIMES A DAY   Quantity:  360 mL   Refills:  3       digoxin 125 MCG tablet   Commonly known as:  LANOXIN   Used for:  Pulmonary hypertension        Dose:  125 mcg   Take 1 tablet (125 mcg) by mouth daily   Quantity:  90 tablet   Refills:  3       furosemide 20 MG tablet   Commonly known as:  LASIX   Used for:  Localized edema        TAKE 1 TABLET (20 MG) BY MOUTH DAILY   Quantity:  30 tablet   Refills:  1       LETAIRIS 10 MG tablet   Generic drug:  ambrisentan        daily   Refills:  0       tadalafil (PAH) 20 MG Tabs   Commonly known as:  ADCIRCA   Used for:  Pulmonary hypertension        Dose:  40 mg   Take 2 tablets (40 mg) by mouth daily   Quantity:  60 tablet   Refills:  11       treprostinil 0.6 MG/ML Soln neb solution   Commonly known as:  TYVASO   Used for:  Pulmonary hypertension        12 breaths 4 times daily   Quantity:  3.6 mL   Refills:  11       vitamin D 2000 UNITS Caps        Dose:  1 capsule   Take 1 capsule by mouth daily.   Refills:  0                Protect others around you: Learn how to safely use, store and throw away your medicines at www.disposemymeds.org.         Follow-ups after your  visit        Your next 10 appointments already scheduled     Feb 27, 2018 12:00 PM CST   (Arrive by 11:45 AM)   RETURN PRIMARY PULMONARY with Khang Lindsey MD   [facility] ([facility])    [address]  [address]  [facility] [id number] [phone]            Mar 28, 2018  1:30 PM CDT   Lab with  LAB   [facility] Lab ([facility])    [address]  1st Floor  [facility] [address] [phone]            Mar 28, 2018  2:30 PM CDT   (Arrive by 2:00 PM)   Return Visit with Lilly Ba MD   [facility] Nephrology ([facility])    [address]  [address]  [facility] [address] [phone]               Care Instructions        Further instructions from your care team       [facility]                        Interventional Cardiology  Discharge Instructions   Post Right Heart Cath      AFTER YOU GO HOME:    DO drink plenty of fluids    DO resume your regular diet and medications unless otherwise instructed by your Primary Physician    Do Not scrub the procedure site vigorously    No lotion or powder to the puncture site for 3 days    CALL YOUR PRIMARY PHYSICIAN IF: You may resume all normal activity.  Monitor neck site for bleeding, swelling, or voice changes. If you notice bleeding or swelling immediately apply pressure to the site and call number below to speak with Cardiology Fellow.  If you experience any changes in your breathing you should call your doctor immediately or come to the closest Emergency Department.  Do not drive yourself.    ADDITIONAL INSTRUCTIONS: Medications: You are to resume all home medications including anticoagulation therapy unless otherwise advised by your primary cardiologist or nurse coordinator.    Follow Up: Per your primary cardiology team    If you have any questions or concerns regarding your procedure site please call [phone]  at anytime and ask for Cardiology Fellow on call.  They are available 24 hours a day.  You may also contact the Cardiology Clinic after hours number at [phone].                                                       Telephone Numbers [phone] Monday-Friday 8:00 am to 4:30 pm    [phone] After 4:30 pm Monday-Friday, Weekends & Holidays  Ask for Interventional Cardiologist on call. Someone is on call 24 hours/day   [facility] toll free number [phone] Monday-Friday 8:00 am to 4:30 pm   [facility] Emergency Dept [phone]                    Additional Information About Your Visit        [employer] Information     [employer] gives you secure access to your electronic health record. If you see a primary care provider, you can also send messages to your care team and make appointments. If you have questions, please call your primary care clinic.  If you do not have a primary care provider, please call [phone] and they will assist you.        Care EveryWhere ID     This is your Care EveryWhere ID. This could be used by other organizations to access your [place] medical records  [id number]        Your Vitals Were     Blood Pressure Pulse Temperature Respirations Pulse Oximetry       128/74 (BP Location: Left arm) 92 97.8  F (36.6  C) (Oral) 24 94%        Primary Care Provider Office Phone # Fax #    Cristhian Bsuch [phone]      Equal Access to Services     [facility] Medical CenterPRABHAKAR AH: Hadii aad ku hadasho Soomaali, waaxda luqadaha, qaybta kaalmada adeegyada, silvano youngblood. So [facility] [phone].    ATENCIÓN: Si habla español, tiene a hutton disposición servicios gratuitos de asistencia lingüística. Llame al [phone].    We comply with applicable federal civil rights laws and [place] laws. We do not discriminate on the basis of race, color, national origin, age, disability, sex, sexual orientation, or gender identity.            Thank you!     Thank you for  choosing [facility] for your care. Our goal is always to provide you with excellent care. Hearing back from our patients is one way we can continue to improve our services. Please take a few minutes to complete the written survey that you may receive in the mail after you visit with us. Thank you!             Medication List: This is a list of all your medications and when to take them. Check marks below indicate your daily home schedule. Keep this list as a reference.      Medications           Morning Afternoon Evening Bedtime As Needed    albuterol 108 (90 BASE) MCG/ACT Inhaler   Commonly known as:  PROAIR HFA   Inhale 4-6 puffs into the lungs every 6 hours as needed                                budesonide 0.25 MG/2ML neb solution   Commonly known as:  PULMICORT   INHALE THE CONTENTS OF 1   VIAL TWO TIMES A DAY                                digoxin 125 MCG tablet   Commonly known as:  LANOXIN   Take 1 tablet (125 mcg) by mouth daily                                furosemide 20 MG tablet   Commonly known as:  LASIX   TAKE 1 TABLET (20 MG) BY MOUTH DAILY                                LETAIRIS 10 MG tablet   daily   Generic drug:  ambrisentan                                tadalafil (PAH) 20 MG Tabs   Commonly known as:  ADCIRCA   Take 2 tablets (40 mg) by mouth daily                                treprostinil 0.6 MG/ML Soln neb solution   Commonly known as:  TYVASO   12 breaths 4 times daily                                vitamin D 2000 UNITS Caps   Take 1 capsule by mouth daily.

## 2018-02-27 NOTE — DISCHARGE INSTRUCTIONS
[facility]                        Interventional Cardiology  Discharge Instructions   Post Right Heart Cath      AFTER YOU GO HOME:    DO drink plenty of fluids    DO resume your regular diet and medications unless otherwise instructed by your Primary Physician    Do Not scrub the procedure site vigorously    No lotion or powder to the puncture site for 3 days    CALL YOUR PRIMARY PHYSICIAN IF: You may resume all normal activity.  Monitor neck site for bleeding, swelling, or voice changes. If you notice bleeding or swelling immediately apply pressure to the site and call number below to speak with Cardiology Fellow.  If you experience any changes in your breathing you should call your doctor immediately or come to the closest Emergency Department.  Do not drive yourself.    ADDITIONAL INSTRUCTIONS: Medications: You are to resume all home medications including anticoagulation therapy unless otherwise advised by your primary cardiologist or nurse coordinator.    Follow Up: Per your primary cardiology team    If you have any questions or concerns regarding your procedure site please call [phone] at anytime and ask for Cardiology Fellow on call.  They are available 24 hours a day.  You may also contact the Cardiology Clinic after hours number at [phone].                                                       Telephone Numbers [phone] Monday-Friday 8:00 am to 4:30 pm    [phone] After 4:30 pm Monday-Friday, Weekends & Holidays  Ask for Interventional Cardiologist on call. Someone is on call 24 hours/day   [facility] toll free number [phone] Monday-Friday 8:00 am to 4:30 pm   [facility] Emergency Dept [phone]

## 2018-02-27 NOTE — PATIENT INSTRUCTIONS
Medication Changes:   No medication changes at this time. Please continue current medication regiment.     Patient Instructions:  Continue staying active and eat a heart healthy diet.    GI Referral for Colonoscopy [phone]    Follow up Appointment Information:  3 month follow up    Results:  Component      Latest Ref Rng & Units 2/5/2018   Sodium      133 - 144 mmol/L 137   Potassium      3.4 - 5.3 mmol/L 4.0   Chloride      94 - 109 mmol/L 99   Carbon Dioxide      20 - 32 mmol/L 31   Anion Gap      3 - 14 mmol/L 6   Glucose      70 - 99 mg/dL 99   Urea Nitrogen      7 - 30 mg/dL 27   Creatinine      0.66 - 1.25 mg/dL 1.28 (H)   GFR Estimate      >60 mL/min/1.7m2 58 (L)   GFR Estimate If Black      >60 mL/min/1.7m2 70   Calcium      8.5 - 10.1 mg/dL 9.0   Bilirubin Total      0.2 - 1.3 mg/dL 0.5   Albumin      3.4 - 5.0 g/dL 3.8   Protein Total      6.8 - 8.8 g/dL 7.4   Alkaline Phosphatase      40 - 150 U/L 69   ALT      0 - 70 U/L 23   AST      0 - 45 U/L 22   WBC      4.0 - 11.0 10e9/L 7.6   RBC Count      4.4 - 5.9 10e12/L 4.84   Hemoglobin      13.3 - 17.7 g/dL 14.3   Hematocrit      40.0 - 53.0 % 44.0   MCV      78 - 100 fl 91   MCH      26.5 - 33.0 pg 29.5   MCHC      31.5 - 36.5 g/dL 32.5   RDW      10.0 - 15.0 % 12.2   Platelet Count      150 - 450 10e9/L 165   N-Terminal Pro Bnp      0 - 125 pg/mL 506 (H)     We are located on the third floor of the Clinic and [facility] Center ([facility] [place]) on the [facility].  Our address is     [address] [place] [address] on 3rd Floor   [place] [phone]      Thank you for allowing us to be a part of your care here at the [facility]    If you have questions or concerns please contact us at:    Bernarda Wilson, RN, BSN    Miguel Ángel Valverde (Schedule,P.A.)  Nurse Coordinator     Clinic   Pulmonary Hypertension   Pulmonary Hypertension  [facility]  (P)[phone]    (P) [phone]        (F)[phone]                ** Please note that you will NOT receive a reminder call regarding your scheduled testing, reminder calls are for provider appointments only.  If you are scheduled for testing within the [place] system you may receive a call regarding pre-registration for billing purposes only.**     Remember to weigh yourself daily after voiding and before you consume any food or beverages and log the numbers.  If you have gained/lost 2 pounds overnight or 5 pounds in a week contact us immediately for medication adjustments or further instructions.  **Please call us immediately if you have any syncope, chest pain, edema, or decline in your functional status.    Support Group:  Pulmonary Hypertension Association  [email or web address].phassociation.org/  **Look at the Events Tab** They even have Support Groups that you can call into    [facility] PH Support Group  First Saturday of the Month from 1-3 PM   Location: [address] [place] [id number]  Leader: Bert Weber  Phone: [phone]  Email: [email or web address]

## 2018-02-27 NOTE — LETTER
2/27/2018      RE: Jimmy Isaac  [address] [address] [id number]       Dear Colleague,    Thank you for the opportunity to participate in the care of your patient, Jimmy Isaac, at the [facility] at [facility]. Please see a copy of my visit note below.        Impression:  1. Pulmonary arterial hypertension  2. Chronic, oxygen dependent respiratory failure  3. Sarcoidosis without cardiac involvement  4. Nephrolithiasis  5. Prednisone related fluid retention    Patient returns for follow-up while on prednisone trial.  He is being treated for some fluid retention as well as nephrolithiasis.  Vit  D supplementation increase, eyes without evidence of sarcoid and no cardiac involvement.He feels steroid buzz but no perceptible increase in walking tolerance.  He is utilizing oxygen more consistency.     Echocardiogram without evidence of right to left shunt as was initial echocardiogram when first seen.      PERRLA, EOM full, normal gait and station, normal mentation, skin without lesions, chest is clear, no evidence of right or left ventricular overactivity, sternum stable, no carotid bruits, regular rhythm, S1, S2, no murmurs, abdomen without tenderness, rebound guarding or masses, no edema, no focal neurologic defects. Cushingoid    PMH:   1.  Sarcoidosis diagnosed in 1991 per his report by transbronchial biopsy.  This was treated with prednisone causing irritability, weight gain, insomnia and jitteriness.  He was treated for SAPH with most recent PA pressures of 75/34/52 and wedge pressure of 16.  Last echo was in 2016 with an EF of 60-65%.  I am not sure if there was a bubble study done at that time.    2.  Chronic sinusitis with deviated nasal septum.   3.  Hyperlipidemia.   4.  Renal stones, calcium oxalate.    5.  Pancreatitis.   6.  Question of hypercalcemia.  Wt Readings from Last 24 Encounters:   02/27/18 84.6 kg (186 lb 8 oz)   02/05/18 85.8 kg (189  lb 1.6 oz)   17 79.4 kg (175 lb)   17 85.3 kg (188 lb)   17 85.3 kg (188 lb)   17 85.3 kg (188 lb)   17 85.2 kg (187 lb 14.4 oz)   10/18/17 87.8 kg (193 lb 9.6 oz)   17 84.9 kg (187 lb 1.6 oz)   17 84.5 kg (186 lb 3.2 oz)   17 83.5 kg (184 lb)   17 79.4 kg (175 lb)   17 79.4 kg (175 lb)   17 84.8 kg (187 lb)   10/06/16 79.4 kg (175 lb)   16 81.2 kg (179 lb)   16 84.1 kg (185 lb 8 oz)   16 83.9 kg (185 lb)   16 84 kg (185 lb 1.6 oz)   10/29/15 84.6 kg (186 lb 6.4 oz)   07/09/15 83.4 kg (183 lb 12.8 oz)   04/09/15 86.3 kg (190 lb 4.8 oz)   04/09/15 79.4 kg (175 lb)   03/05/15 85 kg (187 lb 4.8 oz)   Heart Catherization 2017  .1. HR 80 bpm  2. /79/99 mmHg  3. RA 12/15/12  4. RV 75/12  5. PA 75/34/52   6. PCW    7. PA sat 72.6%   8. PCW sat 97%  9. Hgb 14.2g/dL   10. Lesia CO 5.3   11. Lesia CI 2.6   12. TD CO 5.7   13. TD CI 2.8   14. PVR 6.9      Right Heart Catheterization   2018       History: 57 year-old man with WHO type V PH due to bronchial sarcoidosis currently on letaris 10mg and tyvaso and home oxygen, here for hemodynamic evaluation with right heart catheterization and shunt run.     Procedure: Right Heart Cathterization  Access: 7 Fr sheath in RIJ obtained without complications   Findings   RA:   RV 57/12  PA 57/26/40  PCWP 16//14  Wedge sat 94.6%  Lesia CO 5.4 (2.6) TD CO 5.1 (2.4)   TPR 7.8 PVR 5.1  PaSat 73.5% Hb 12.7  MAP 95  SVR 1318     Oximetry Run:   SVC: 71%  IVC: 78.2%  High RA: 72.0%  Mid RA: 70.4%  Low RA 71.6%  RV 72.9%  PA: 73.5%  Wedge: 94.6%  Qp 5.8  Qs 5.7  Qp/Qs: 1.01     Conclusions:  1. normal right and mildly elevated left sided filling pressures  2. Moderately elevated pulmonary artery pressures on treatment, pre and post capillary   3. Normal cardiac output   4. No evidence of intra cardiac shunt     MRN: [id number]  : 1960  Study Date: 2017  09:01 AM  Age: 56 yrs  Gender: Male  Patient Location: [facility] [place]  Reason For Study: , Other forms of dyspnea, Heart failure, unspecified, Other  seco  Ordering Physician: DUY MIDDLETON  Referring Physician: SHERRY BOB MD  Performed By: [facility]     BSA: 2.1 m2  Height: 72 in  Weight: 187 lb  HR: 94  BP: 130/70 mmHg  _____________________________________________________________________________  __        Procedure  Complete Echo Adult. Contrast Optison.  _____________________________________________________________________________  __        Interpretation Summary     The visual ejection fraction is estimated at 65-70%.  There is mild concentric left ventricular hypertrophy.  The right ventricle is not well visualized.  Mildly decreased right ventricular systolic function  The study was technically difficult. Compared to prior study, changes are  noted.  _____________________________________________________________________________  __        Left Ventricle  The left ventricle is normal in size. There is mild concentric left  ventricular hypertrophy. The visual ejection fraction is estimated at 65-70%.  The transmitral spectral Doppler flow pattern is suggestive of impaired LV  relaxation. E by E prime ratio is between 8 and 15, which is indeterminate for  assessment of left ventricular filling pressures. Septal motion is consistent  with conduction abnormality.     Right Ventricle  In the apical views the RV appears to be mildly dilated but in the subcostal  views appears to be normal in size. Mildly decreased right ventricular  systolic function. The right ventricle is not well visualized.     Atria  Normal left atrial size. Borderline right atrial enlargement. There is no  atrial shunt seen.     Mitral Valve  The mitral valve is normal in structure and function. There is trace mitral  regurgitation.        Tricuspid Valve  The tricuspid valve is normal in structure and function. There is  trace  tricuspid regurgitation. Right ventricular systolic pressure could not be  approximated due to inadequate tricuspid regurgitation.     Aortic Valve  The aortic valve is trileaflet. The aortic valve is not well visualized. No  hemodynamically significant valvular aortic stenosis.     Pulmonic Valve  The pulmonic valve is not well seen, but is grossly normal. There is no  pulmonic valvular regurgitation.     Vessels  The aortic root is normal size. Normal size ascending aorta. The inferior vena  cava is not dilated.     Pericardium  There is no pericardial effusion.        Rhythm  The rhythm was normal sinus.  _____________________________________________________________________________  __  MMode/2D Measurements & Calculations  IVSd: 1.2 cm     LVIDd: 3.6 cm  LVIDs: 2.5 cm  LVPWd: 1.4 cm  FS: 32.3 %  EDV(Teich): 55.2 ml  ESV(Teich): 21.3 ml  LV mass(C)d: 161.1 grams  LV mass(C)dI: 77.8 grams/m2  Ao root diam: 3.2 cm  LA dimension: 2.9 cm  asc Aorta Diam: 3.1 cm  LA/Ao: 0.92  LA Volume Index (BP): 19.0 ml/m2        Doppler Measurements & Calculations  MV E max nirmal: 63.1 cm/sec  MV A max nirmal: 81.7 cm/sec  MV E/A: 0.77     MV dec slope: 244.7 cm/sec2  MV dec time: 0.26 sec  Peak E' Nirmal: 6.8 cm/sec         Current Outpatient Prescriptions   Medication     LETAIRIS 10 MG tablet     albuterol (PROAIR HFA) 108 (90 BASE) MCG/ACT Inhaler     budesonide (PULMICORT) 0.25 MG/2ML neb solution     digoxin (LANOXIN) 125 MCG tablet     tadalafil, PAH, (ADCIRCA) 20 MG TABS     treprostinil (TYVASO) 0.6 MG/ML SOLN     Cholecalciferol (VITAMIN D) 2000 UNITS CAPS     furosemide (LASIX) 20 MG tablet     No current facility-administered medications for this visit.      Results for MATTY LOPEZ (MRN [id number]) as of 11/1/2017 12:01   Ref. Range 11/1/2017 10:47 11/1/2017 11:01   Sodium Latest Ref Range: 133 - 144 mmol/L 136    Potassium Latest Ref Range: 3.4 - 5.3 mmol/L 4.2    Chloride Latest Ref Range: 94 - 109 mmol/L 98     Carbon Dioxide Latest Ref Range: 20 - 32 mmol/L 30    Urea Nitrogen Latest Ref Range: 7 - 30 mg/dL 28    Creatinine Latest Ref Range: 0.66 - 1.25 mg/dL 1.30 (H)    GFR Estimate Latest Ref Range: >60 mL/min/1.7m2 57 (L)    GFR Estimate If Black Latest Ref Range: >60 mL/min/1.7m2 69    Calcium Latest Ref Range: 8.5 - 10.1 mg/dL 8.7    Anion Gap Latest Ref Range: 3 - 14 mmol/L 7    Phosphorus Latest Ref Range: 2.5 - 4.5 mg/dL 1.7 (L)    Albumin Latest Ref Range: 3.4 - 5.0 g/dL 3.7    Protein Total Latest Ref Range: 6.8 - 8.8 g/dL 6.8    Bilirubin Total Latest Ref Range: 0.2 - 1.3 mg/dL 0.7    Alkaline Phosphatase Latest Ref Range: 40 - 150 U/L 66    ALT Latest Ref Range: 0 - 70 U/L 24    AST Latest Ref Range: 0 - 45 U/L 16    N-Terminal Pro Bnp Latest Ref Range: 0 - 125 pg/mL 1000 (H)    Glucose Latest Ref Range: 70 - 99 mg/dL 146 (H)    WBC Latest Ref Range: 4.0 - 11.0 10e9/L 15.4 (H)    Hemoglobin Latest Ref Range: 13.3 - 17.7 g/dL 15.2    Hematocrit Latest Ref Range: 40.0 - 53.0 % 46.4    Platelet Count Latest Ref Range: 150 - 450 10e9/L 160    RBC Count Latest Ref Range: 4.4 - 5.9 10e12/L 4.97    MCV Latest Ref Range: 78 - 100 fl 93    MCH Latest Ref Range: 26.5 - 33.0 pg 30.6    MCHC Latest Ref Range: 31.5 - 36.5 g/dL 32.8    RDW Latest Ref Range: 10.0 - 15.0 % 14.1      Results for MATTY LOPEZ (MRN [id number]) as of 6/20/2017 13:02   Ref. Range 5/16/2017 11:59 5/25/2017 08:41 5/25/2017 10:44 5/25/2017 11:59 6/2/2017 12:10   Sodium Latest Ref Range: 133 - 144 mmol/L 139       Potassium Latest Ref Range: 3.4 - 5.3 mmol/L 4.3       Chloride Latest Ref Range: 94 - 109 mmol/L 103       Carbon Dioxide Latest Ref Range: 20 - 32 mmol/L 28       Urea Nitrogen Latest Ref Range: 7 - 30 mg/dL 16       Creatinine Latest Ref Range: 0.66 - 1.25 mg/dL 1.38 (H)       GFR Estimate Latest Ref Range: >60 mL/min/1.7m2 53 (L)       GFR Estimate If Black Latest Ref Range: >60 mL/min/1.7m2 64       Calcium Latest Ref Range: 8.5 -  10.1 mg/dL 9.1       Anion Gap Latest Ref Range: 3 - 14 mmol/L 8       Albumin Latest Ref Range: 3.4 - 5.0 g/dL 3.8       Protein Total Latest Ref Range: 6.8 - 8.8 g/dL 7.3       Bilirubin Total Latest Ref Range: 0.2 - 1.3 mg/dL 0.5       Alkaline Phosphatase Latest Ref Range: 40 - 150 U/L 75       ALT Latest Ref Range: 0 - 70 U/L 22       AST Latest Ref Range: 0 - 45 U/L 17       Angiotensin Converting Enzyme Unknown 50       CRP Inflammation Latest Ref Range: 0.0 - 8.0 mg/L 8.0       N-Terminal Pro Bnp Latest Ref Range: 0 - 125 pg/mL 460 (H)       Glucose Latest Ref Range: 70 - 99 mg/dL 98       WBC Latest Ref Range: 4.0 - 11.0 10e9/L 5.7       Hemoglobin Latest Ref Range: 13.3 - 17.7 g/dL 14.9       Hematocrit Latest Ref Range: 40.0 - 53.0 % 44.4       Platelet Count Latest Ref Range: 150 - 450 10e9/L 142 (L)       RBC Count Latest Ref Range: 4.4 - 5.9 10e12/L 4.88       MCV Latest Ref Range: 78 - 100 fl 91       MCH Latest Ref Range: 26.5 - 33.0 pg 30.5       MCHC Latest Ref Range: 31.5 - 36.5 g/dL 33.6       RDW Latest Ref Range: 10.0 - 15.0 % 13.5       MR ABDOMEN W/O & W CONTRAST Unknown  Rpt      FVC-Pred Latest Units: L    5.07    FVC-Pre Latest Units: L    3.40    FVC-%Pred-Pre Latest Units: %    67    FEV1-Pre Latest Units: L    1.21    FEV1-%Pred-Pre Latest Units: %    30    FEV1FVC-Pred Latest Units: %    78    FEV1FVC-Pre Latest Units: %    35    FEV1FEV6-Pred Latest Units: %    79    FEV1FEV6-Pre Latest Units: %    45    FEFMax-Pred Latest Units: L/sec    9.85    FEFMax-Pre Latest Units: L/sec    3.81    FEFMax-%Pred-Pre Latest Units: %    38    FIFMax-Pre Latest Units: L/sec    4.64    ExpTime-Pre Latest Units: sec    15.80    ECHO COMPLETE WITH OPTISON Unknown   Rpt     HEART CATH RIGHT HEART CATH Unknown     Attch   PFT GENERAL LAB TESTING Unknown    Rpt    PPR1528-%Pred-Pre Latest Units: %    10    FEF2575-Pre Latest Units: L/sec    0.36    FEF2575-Pred Latest Units: L/sec    3.29        HISTORY:  Assessment of pancreatic cyst. Sarcoidosis, unspecified.  Other forms of dyspnea. Heart failure, unspecified. Other secondary  pulmonary hypertension.      COMPARISON: CT chest 10/6/2016.     TECHNIQUE: Multisequence, multiplanar imaging of the abdomen is  performed without contrast. A total of 15 mL Gadavist is then given  intravenously. Additional dynamic axial T1 fat-sat sequences are  performed.     FINDINGS:      Abdomen: There is no evidence of fatty liver infiltration or mass.  Gallbladder, spleen, pancreas and adrenal glands are unremarkable.  Bilateral renal calculi are noted. No hydronephrosis. T2 hyperintense  renal lesions are most consistent with cysts. No enlarged abdominal  lymph nodes. No T2 hyperintense lesions are noted in the pancreas.     Following contrast administration, there are no abnormal arterial  enhancing lesions in the liver or pancreas. Area of decreased signal  intensity in the head of the pancreas may be related to the previously  seen pancreatic head calcification. No abnormal enhancing pancreatic  lesions are evident. Upper abdominal organs enhance normally. Multiple  bilateral renal cortical cysts show no abnormal enhancement consistent  with simple cysts. No enlarged lymph nodes. No ascites. No bowel  distention is appreciated where imaged.         IMPRESSION: No evidence of a cyst in the pancreas when compared to  prior CT. No pancreatic ductal dilatation. Simple renal cysts and  collecting system stones. Abdominal organs are otherwise within normal  Limits.    Assessment:    This catherization shows improvement in the pulmonary artery pressures such that PVR is 5. 1.  He has been utilizing his oxygen considerably more regularly. His most recent right heart catherization demonstrated:    Interpretation Summary  Technically difficult study.Poor acoustic windows.  Global and regional left ventricular function is normal with an EF of 60-65%.  The right ventricle is normal  size.  Global right ventricular function is normal.  Pulmonary artery systolic pressure cannot be assessed.  The inferior vena cava was normal in size with preserved respiratory  variability.  Estimated mean right atrial pressure is 3 mmHg.  No pericardial effusion is present.  There was no shunt at the atrial septal level as assessed by agitated saline  bubble study at rest and with Valsalva maneuver.  This study was compared with the study from 5/25/2017. RV systolic function is  normal in this study      The patient would be a candidate for isolated lung transplantation as the right heart function is normal.      While the anesthesia risk for his lung disease with scaring and pulmonary hypertension is increased, there is no absolute contraindication to surgery.  The posterior approach engendered by stone removal would affect respiration if an open procedure as opposed to scope based effect.    Steroids had no effect on his sarcoid.    Patient needs colonoscopy with anesthesia in attendance.     RTC 3 months    Add PSA to today's labs      Sincerely,     Khang Lindsey MD

## 2018-02-27 NOTE — PROGRESS NOTES
Impression:  1. Pulmonary arterial hypertension  2. Chronic, oxygen dependent respiratory failure  3. Sarcoidosis without cardiac involvement  4. Nephrolithiasis  5. Prednisone related fluid retention    Patient returns for follow-up while on prednisone trial.  He is being treated for some fluid retention as well as nephrolithiasis.  Vit  D supplementation increase, eyes without evidence of sarcoid and no cardiac involvement.He feels steroid buzz but no perceptible increase in walking tolerance.  He is utilizing oxygen more consistency.     Echocardiogram without evidence of right to left shunt as was initial echocardiogram when first seen.     2/23/2018   General Symptoms: Yes  Skin Symptoms: Yes  HENT Symptoms: No  EYE SYMPTOMS: No  HEART SYMPTOMS: No  LUNG SYMPTOMS: Yes  INTESTINAL SYMPTOMS: No  URINARY SYMPTOMS: No  REPRODUCTIVE SYMPTOMS: No  SKELETAL SYMPTOMS: Yes  BLOOD SYMPTOMS: No  NERVOUS SYSTEM SYMPTOMS: No  MENTAL HEALTH SYMPTOMS: No  Fever: No  Loss of appetite: No  Weight loss: No  Weight gain: Yes  Fatigue: Yes  Night sweats: No  Chills: No  Increased stress: No  Excessive hunger: No  Excessive thirst: No  Feeling hot or cold when others believe the temperature is normal: No  Loss of height: No  Post-operative complications: No  Surgical site pain: No  Hallucinations: No  Change in or Loss of Energy: No  Hyperactivity: No  Confusion: No  Changes in hair: No  Changes in moles/birth marks: No  Itching: Yes  Rashes: No  Changes in nails: No  Acne: No  Change in facial hair: No  Warts: No  Non-healing sores: No  Scarring: No  Flaking of skin: No  Color changes of hands/feet in cold : No  Sun sensitivity: No  Skin thickening: No  Cough: No  Sputum or phlegm: No  Coughing up blood: No  Difficulty breating or shortness of breath: Yes  Snoring: No  Wheezing: No  Difficulty breathing on exertion: Yes  Nighttime Cough: No  Difficulty breathing when lying flat: Yes  Back pain: Yes  Muscle aches: No  Neck  pain: Yes  Swollen joints: No  Joint pain: No  Bone pain: Yes  Muscle cramps: No  Muscle weakness: No  Joint stiffness: No  Bone fracture: Yes    PERRLA, EOM full, normal gait and station, normal mentation, skin without lesions, chest is clear, no evidence of right or left ventricular overactivity, sternum stable, no carotid bruits, regular rhythm, S1, S2, no murmurs, abdomen without tenderness, rebound guarding or masses, no edema, no focal neurologic defects. Cushingoid  PMH:   1.  Sarcoidosis diagnosed in 1991 per his report by transbronchial biopsy.  This was treated with prednisone causing irritability, weight gain, insomnia and jitteriness.  He was treated for SAPH with most recent PA pressures of 75/34/52 and wedge pressure of 16.  Last echo was in 2016 with an EF of 60-65%.  I am not sure if there was a bubble study done at that time.    2.  Chronic sinusitis with deviated nasal septum.   3.  Hyperlipidemia.   4.  Renal stones, calcium oxalate.    5.  Pancreatitis.   6.  Question of hypercalcemia.  Wt Readings from Last 24 Encounters:   02/27/18 84.6 kg (186 lb 8 oz)   02/05/18 85.8 kg (189 lb 1.6 oz)   12/12/17 79.4 kg (175 lb)   11/21/17 85.3 kg (188 lb)   11/16/17 85.3 kg (188 lb)   11/01/17 85.3 kg (188 lb)   11/01/17 85.2 kg (187 lb 14.4 oz)   10/18/17 87.8 kg (193 lb 9.6 oz)   09/07/17 84.9 kg (187 lb 1.6 oz)   08/01/17 84.5 kg (186 lb 3.2 oz)   06/20/17 83.5 kg (184 lb)   06/02/17 79.4 kg (175 lb)   05/25/17 79.4 kg (175 lb)   05/16/17 84.8 kg (187 lb)   10/06/16 79.4 kg (175 lb)   08/23/16 81.2 kg (179 lb)   04/12/16 84.1 kg (185 lb 8 oz)   04/12/16 83.9 kg (185 lb)   03/29/16 84 kg (185 lb 1.6 oz)   10/29/15 84.6 kg (186 lb 6.4 oz)   07/09/15 83.4 kg (183 lb 12.8 oz)   04/09/15 86.3 kg (190 lb 4.8 oz)   04/09/15 79.4 kg (175 lb)   03/05/15 85 kg (187 lb 4.8 oz)   Heart Catherization June 2017  .1. HR 80 bpm  2. /79/99 mmHg  3. RA 12/15/12  4. RV 75/12  5. PA 75/34/52   6. PCW 16/17/16   7. PA  sat 72.6%   8. PCW sat 97%  9. Hgb 14.2g/dL   10. Lesia CO 5.3   11. Lesia CI 2.6   12. TD CO 5.7   13. TD CI 2.8   14. PVR 6.9      Right Heart Catheterization   2018       History: 57 year-old man with WHO type V PH due to bronchial sarcoidosis currently on letaris 10mg and tyvaso and home oxygen, here for hemodynamic evaluation with right heart catheterization and shunt run.     Procedure: Right Heart Cathterization  Access: 7 Fr sheath in RIJ obtained without complications   Findings   RA:   RV 57/12  PA 57//40  PCWP 16//  Wedge sat 94.6%  Lesia CO 5.4 (2.6) TD CO 5.1 (2.4)   TPR 7.8 PVR 5.1  PaSat 73.5% Hb 12.7  MAP 95  SVR 1318     Oximetry Run:   SVC: 71%  IVC: 78.2%  High RA: 72.0%  Mid RA: 70.4%  Low RA 71.6%  RV 72.9%  PA: 73.5%  Wedge: 94.6%  Qp 5.8  Qs 5.7  Qp/Qs: 1.01     Conclusions:  1. normal right and mildly elevated left sided filling pressures  2. Moderately elevated pulmonary artery pressures on treatment, pre and post capillary   3. Normal cardiac output   4. No evidence of intra cardiac shunt     MRN: [id number]  : 1960  Study Date: 2017 09:01 AM  Age: 56 yrs  Gender: Male  Patient Location: [facility] [place]  Reason For Study: , Other forms of dyspnea, Heart failure, unspecified, Other  seco  Ordering Physician: DUY MIDDLETON  Referring Physician: SHERRY BOB MD  Performed By: Amy David RDCS     BSA: 2.1 m2  Height: 72 in  Weight: 187 lb  HR: 94  BP: 130/70 mmHg  _____________________________________________________________________________  __        Procedure  Complete Echo Adult. Contrast Optison.  _____________________________________________________________________________  __        Interpretation Summary     The visual ejection fraction is estimated at 65-70%.  There is mild concentric left ventricular hypertrophy.  The right ventricle is not well visualized.  Mildly decreased right ventricular systolic function  The study was technically difficult.  Compared to prior study, changes are  noted.  _____________________________________________________________________________  __        Left Ventricle  The left ventricle is normal in size. There is mild concentric left  ventricular hypertrophy. The visual ejection fraction is estimated at 65-70%.  The transmitral spectral Doppler flow pattern is suggestive of impaired LV  relaxation. E by E prime ratio is between 8 and 15, which is indeterminate for  assessment of left ventricular filling pressures. Septal motion is consistent  with conduction abnormality.     Right Ventricle  In the apical views the RV appears to be mildly dilated but in the subcostal  views appears to be normal in size. Mildly decreased right ventricular  systolic function. The right ventricle is not well visualized.     Atria  Normal left atrial size. Borderline right atrial enlargement. There is no  atrial shunt seen.     Mitral Valve  The mitral valve is normal in structure and function. There is trace mitral  regurgitation.        Tricuspid Valve  The tricuspid valve is normal in structure and function. There is trace  tricuspid regurgitation. Right ventricular systolic pressure could not be  approximated due to inadequate tricuspid regurgitation.     Aortic Valve  The aortic valve is trileaflet. The aortic valve is not well visualized. No  hemodynamically significant valvular aortic stenosis.     Pulmonic Valve  The pulmonic valve is not well seen, but is grossly normal. There is no  pulmonic valvular regurgitation.     Vessels  The aortic root is normal size. Normal size ascending aorta. The inferior vena  cava is not dilated.     Pericardium  There is no pericardial effusion.        Rhythm  The rhythm was normal sinus.  _____________________________________________________________________________  __  MMode/2D Measurements & Calculations  IVSd: 1.2 cm     LVIDd: 3.6 cm  LVIDs: 2.5 cm  LVPWd: 1.4 cm  FS: 32.3 %  EDV(Teich): 55.2  ml  ESV(Teich): 21.3 ml  LV mass(C)d: 161.1 grams  LV mass(C)dI: 77.8 grams/m2  Ao root diam: 3.2 cm  LA dimension: 2.9 cm  asc Aorta Diam: 3.1 cm  LA/Ao: 0.92  LA Volume Index (BP): 19.0 ml/m2        Doppler Measurements & Calculations  MV E max nirmal: 63.1 cm/sec  MV A max nirmal: 81.7 cm/sec  MV E/A: 0.77     MV dec slope: 244.7 cm/sec2  MV dec time: 0.26 sec  Peak E' Nirmal: 6.8 cm/sec         Current Outpatient Prescriptions   Medication     LETAIRIS 10 MG tablet     albuterol (PROAIR HFA) 108 (90 BASE) MCG/ACT Inhaler     budesonide (PULMICORT) 0.25 MG/2ML neb solution     digoxin (LANOXIN) 125 MCG tablet     tadalafil, PAH, (ADCIRCA) 20 MG TABS     treprostinil (TYVASO) 0.6 MG/ML SOLN     Cholecalciferol (VITAMIN D) 2000 UNITS CAPS     furosemide (LASIX) 20 MG tablet     No current facility-administered medications for this visit.      Results for MATTY LOPEZ (MRN [id number]) as of 11/1/2017 12:01   Ref. Range 11/1/2017 10:47 11/1/2017 11:01   Sodium Latest Ref Range: 133 - 144 mmol/L 136    Potassium Latest Ref Range: 3.4 - 5.3 mmol/L 4.2    Chloride Latest Ref Range: 94 - 109 mmol/L 98    Carbon Dioxide Latest Ref Range: 20 - 32 mmol/L 30    Urea Nitrogen Latest Ref Range: 7 - 30 mg/dL 28    Creatinine Latest Ref Range: 0.66 - 1.25 mg/dL 1.30 (H)    GFR Estimate Latest Ref Range: >60 mL/min/1.7m2 57 (L)    GFR Estimate If Black Latest Ref Range: >60 mL/min/1.7m2 69    Calcium Latest Ref Range: 8.5 - 10.1 mg/dL 8.7    Anion Gap Latest Ref Range: 3 - 14 mmol/L 7    Phosphorus Latest Ref Range: 2.5 - 4.5 mg/dL 1.7 (L)    Albumin Latest Ref Range: 3.4 - 5.0 g/dL 3.7    Protein Total Latest Ref Range: 6.8 - 8.8 g/dL 6.8    Bilirubin Total Latest Ref Range: 0.2 - 1.3 mg/dL 0.7    Alkaline Phosphatase Latest Ref Range: 40 - 150 U/L 66    ALT Latest Ref Range: 0 - 70 U/L 24    AST Latest Ref Range: 0 - 45 U/L 16    N-Terminal Pro Bnp Latest Ref Range: 0 - 125 pg/mL 1000 (H)    Glucose Latest Ref Range: 70 - 99 mg/dL 146  (H)    WBC Latest Ref Range: 4.0 - 11.0 10e9/L 15.4 (H)    Hemoglobin Latest Ref Range: 13.3 - 17.7 g/dL 15.2    Hematocrit Latest Ref Range: 40.0 - 53.0 % 46.4    Platelet Count Latest Ref Range: 150 - 450 10e9/L 160    RBC Count Latest Ref Range: 4.4 - 5.9 10e12/L 4.97    MCV Latest Ref Range: 78 - 100 fl 93    MCH Latest Ref Range: 26.5 - 33.0 pg 30.6    MCHC Latest Ref Range: 31.5 - 36.5 g/dL 32.8    RDW Latest Ref Range: 10.0 - 15.0 % 14.1      Results for MATTY LOPEZ (MRN [id number]) as of 6/20/2017 13:02   Ref. Range 5/16/2017 11:59 5/25/2017 08:41 5/25/2017 10:44 5/25/2017 11:59 6/2/2017 12:10   Sodium Latest Ref Range: 133 - 144 mmol/L 139       Potassium Latest Ref Range: 3.4 - 5.3 mmol/L 4.3       Chloride Latest Ref Range: 94 - 109 mmol/L 103       Carbon Dioxide Latest Ref Range: 20 - 32 mmol/L 28       Urea Nitrogen Latest Ref Range: 7 - 30 mg/dL 16       Creatinine Latest Ref Range: 0.66 - 1.25 mg/dL 1.38 (H)       GFR Estimate Latest Ref Range: >60 mL/min/1.7m2 53 (L)       GFR Estimate If Black Latest Ref Range: >60 mL/min/1.7m2 64       Calcium Latest Ref Range: 8.5 - 10.1 mg/dL 9.1       Anion Gap Latest Ref Range: 3 - 14 mmol/L 8       Albumin Latest Ref Range: 3.4 - 5.0 g/dL 3.8       Protein Total Latest Ref Range: 6.8 - 8.8 g/dL 7.3       Bilirubin Total Latest Ref Range: 0.2 - 1.3 mg/dL 0.5       Alkaline Phosphatase Latest Ref Range: 40 - 150 U/L 75       ALT Latest Ref Range: 0 - 70 U/L 22       AST Latest Ref Range: 0 - 45 U/L 17       Angiotensin Converting Enzyme Unknown 50       CRP Inflammation Latest Ref Range: 0.0 - 8.0 mg/L 8.0       N-Terminal Pro Bnp Latest Ref Range: 0 - 125 pg/mL 460 (H)       Glucose Latest Ref Range: 70 - 99 mg/dL 98       WBC Latest Ref Range: 4.0 - 11.0 10e9/L 5.7       Hemoglobin Latest Ref Range: 13.3 - 17.7 g/dL 14.9       Hematocrit Latest Ref Range: 40.0 - 53.0 % 44.4       Platelet Count Latest Ref Range: 150 - 450 10e9/L 142 (L)       RBC Count  Latest Ref Range: 4.4 - 5.9 10e12/L 4.88       MCV Latest Ref Range: 78 - 100 fl 91       MCH Latest Ref Range: 26.5 - 33.0 pg 30.5       MCHC Latest Ref Range: 31.5 - 36.5 g/dL 33.6       RDW Latest Ref Range: 10.0 - 15.0 % 13.5       MR ABDOMEN W/O & W CONTRAST Unknown  Rpt      FVC-Pred Latest Units: L    5.07    FVC-Pre Latest Units: L    3.40    FVC-%Pred-Pre Latest Units: %    67    FEV1-Pre Latest Units: L    1.21    FEV1-%Pred-Pre Latest Units: %    30    FEV1FVC-Pred Latest Units: %    78    FEV1FVC-Pre Latest Units: %    35    FEV1FEV6-Pred Latest Units: %    79    FEV1FEV6-Pre Latest Units: %    45    FEFMax-Pred Latest Units: L/sec    9.85    FEFMax-Pre Latest Units: L/sec    3.81    FEFMax-%Pred-Pre Latest Units: %    38    FIFMax-Pre Latest Units: L/sec    4.64    ExpTime-Pre Latest Units: sec    15.80    ECHO COMPLETE WITH OPTISON Unknown   Rpt     HEART CATH RIGHT HEART CATH Unknown     Attch   PFT GENERAL LAB TESTING Unknown    Rpt    JTZ1852-%Pred-Pre Latest Units: %    10    FEF2575-Pre Latest Units: L/sec    0.36    FEF2575-Pred Latest Units: L/sec    3.29        HISTORY: Assessment of pancreatic cyst. Sarcoidosis, unspecified.  Other forms of dyspnea. Heart failure, unspecified. Other secondary  pulmonary hypertension.      COMPARISON: CT chest 10/6/2016.     TECHNIQUE: Multisequence, multiplanar imaging of the abdomen is  performed without contrast. A total of 15 mL Gadavist is then given  intravenously. Additional dynamic axial T1 fat-sat sequences are  performed.     FINDINGS:      Abdomen: There is no evidence of fatty liver infiltration or mass.  Gallbladder, spleen, pancreas and adrenal glands are unremarkable.  Bilateral renal calculi are noted. No hydronephrosis. T2 hyperintense  renal lesions are most consistent with cysts. No enlarged abdominal  lymph nodes. No T2 hyperintense lesions are noted in the pancreas.     Following contrast administration, there are no abnormal  arterial  enhancing lesions in the liver or pancreas. Area of decreased signal  intensity in the head of the pancreas may be related to the previously  seen pancreatic head calcification. No abnormal enhancing pancreatic  lesions are evident. Upper abdominal organs enhance normally. Multiple  bilateral renal cortical cysts show no abnormal enhancement consistent  with simple cysts. No enlarged lymph nodes. No ascites. No bowel  distention is appreciated where imaged.         IMPRESSION: No evidence of a cyst in the pancreas when compared to  prior CT. No pancreatic ductal dilatation. Simple renal cysts and  collecting system stones. Abdominal organs are otherwise within normal  Limits.    Assessment:    This catherization shows improvement in the pulmonary artery pressures such that PVR is 5. 1.  He has been utilizing his oxygen considerably more regularly. His most recent right heart catherization demonstrated:    Interpretation Summary  Technically difficult study.Poor acoustic windows.  Global and regional left ventricular function is normal with an EF of 60-65%.  The right ventricle is normal size.  Global right ventricular function is normal.  Pulmonary artery systolic pressure cannot be assessed.  The inferior vena cava was normal in size with preserved respiratory  variability.  Estimated mean right atrial pressure is 3 mmHg.  No pericardial effusion is present.  There was no shunt at the atrial septal level as assessed by agitated saline  bubble study at rest and with Valsalva maneuver.  This study was compared with the study from 5/25/2017. RV systolic function is  normal in this study      The patient would be a candidate for isolated lung transplantation as the right heart function is normal.      While the anesthesia risk for his lung disease with scaring and pulmonary hypertension is increased, there is no absolute contraindication to surgery.  The posterior approach engendered by stone removal would  affect respiration if an open procedure as opposed to scope based effect.    Steroids had no effect on his sarcoid.    Patient needs colonoscopy with anesthesia in attendance.     RTC 3 months    Add PSA to today's labs

## 2018-02-27 NOTE — IP AVS SNAPSHOT
[facility], [facility],  Heart Cath Lab    [address] [address]    Phone:  [phone]                                       After Visit Summary   2/27/2018    Jimmy Isaac    MRN: [id number]           After Visit Summary Signature Page     I have received my discharge instructions, and my questions have been answered. I have discussed any challenges I see with this plan with the nurse or doctor.    ..........................................................................................................................................  Patient/Patient Representative Signature      ..........................................................................................................................................  Patient Representative Print Name and Relationship to Patient    ..................................................               ................................................  Date                                            Time    ..........................................................................................................................................  Reviewed by Signature/Title    ...................................................              ..............................................  Date                                                            Time

## 2018-03-02 DIAGNOSIS — I27.20 PULMONARY HYPERTENSION (H): ICD-10-CM

## 2018-03-02 RX ORDER — TADALAFIL 20 MG/1
40 TABLET ORAL DAILY
Qty: 60 TABLET | Refills: 11 | Status: SHIPPED | OUTPATIENT
Start: 2018-03-02 | End: 2018-03-13

## 2018-03-02 RX ORDER — AMBRISENTAN 10 MG/1
10 TABLET, FILM COATED ORAL DAILY
Qty: 30 TABLET | Refills: 11 | Status: SHIPPED | OUTPATIENT
Start: 2018-03-02 | End: 2019-03-22

## 2018-03-07 ENCOUNTER — DOCUMENTATION ONLY (OUTPATIENT)
Dept: OTOLARYNGOLOGY | Facility: CLINIC | Age: 58
End: 2018-03-07

## 2018-03-07 NOTE — PROGRESS NOTES
Called patient to discuss scheduling of proposed surgery with Dr Roy and insurance notification.    Mr Isaac has [place] [employer] which required prior auth for surgery, but they also want the scheduled date information per Radha Rivera [facility] .    I explained to him what was needed per insurance protocol and asked him that when he is ready to schedule (he is requesting summer 2018, but is doing reseach on procedures)  He will need to give us about a 6 week window to schedule and get prior auth with insurance.      Patient had questions for Dr Roy and her nurse Ira SMALLWOOD - [facility]    Zakiya Miles   [facility]-Op Coordinator  [phone]

## 2018-03-13 DIAGNOSIS — I27.20 PULMONARY HYPERTENSION (H): ICD-10-CM

## 2018-03-13 RX ORDER — TADALAFIL 20 MG/1
40 TABLET ORAL DAILY
Qty: 60 TABLET | Refills: 11 | Status: SHIPPED | OUTPATIENT
Start: 2018-03-13 | End: 2019-02-15

## 2018-03-19 DIAGNOSIS — N18.30 CKD (CHRONIC KIDNEY DISEASE) STAGE 3, GFR 30-59 ML/MIN (H): Primary | ICD-10-CM

## 2018-03-23 ENCOUNTER — TELEPHONE (OUTPATIENT)
Dept: CARDIOLOGY | Facility: CLINIC | Age: 58
End: 2018-03-23

## 2018-03-23 NOTE — TELEPHONE ENCOUNTER
PA Initiation    Medication: Letairis 10 MG  Insurance Company: [employer] - Phone [phone] Fax [phone]  Pharmacy Filling the Rx: [facility] - [place], [place] - [address]  Filling Pharmacy Phone: [phone]  Filling Pharmacy Fax: [phone]  Start Date: 3/23/2018    Urgent renewal request has been faxed to [facility] for review.

## 2018-03-23 NOTE — TELEPHONE ENCOUNTER
Prior Authorization Specialty Medication Request     Medication/Dose: Letairis 10 MG  Frequency: 10 MG Daily    Route: PO  60 Quantity 30 tablets/30days  Diagnosis and ICD: PAH associated with Sarcoid [I27.2, D86.9]   WHO Group: 1 NYHA FC: 3  New/Renewal/Insurance Change PA: Renewal  Previously Tried and Failed Therapies:   *Adcirca initiated: 05/04/12  *Letairis initiated: 04/17/13  *Tyvaso initiated: 03/07/12

## 2018-03-26 NOTE — TELEPHONE ENCOUNTER
Prior Authorization Approval    Authorization Effective Date: 3/23/2018  Authorization Expiration Date: 3/23/2020  Medication: Letairis 10 MG - Approved  Approved Dose/Quantity: 30 Tabs/30 Days  Reference #: [id number]   Insurance Company: [employer] - Phone [phone] Fax [phone]  Expected CoPay: N/A     CoPay Card Available:      Foundation Assistance Needed:    Which Pharmacy is filling the prescription (Not needed for infusion/clinic administered): [facility] - [place], [place] - [address] [place] [address]  Pharmacy Notified: Yes  Patient Notified: Yes

## 2018-05-07 ENCOUNTER — TRANSFERRED RECORDS (OUTPATIENT)
Dept: HEALTH INFORMATION MANAGEMENT | Facility: CLINIC | Age: 58
End: 2018-05-07

## 2018-05-23 ENCOUNTER — TELEPHONE (OUTPATIENT)
Dept: NEPHROLOGY | Facility: CLINIC | Age: 58
End: 2018-05-23

## 2018-05-23 NOTE — TELEPHONE ENCOUNTER
Emmett results received and uploaded in chart.  Collected 5/7/18.  Elin Schmitt LPN  Nephrology  [phone]

## 2018-05-29 DIAGNOSIS — I27.20 PULMONARY HYPERTENSION (H): ICD-10-CM

## 2018-05-29 DIAGNOSIS — E78.2 MIXED HYPERLIPIDEMIA: ICD-10-CM

## 2018-05-29 DIAGNOSIS — J44.9 COPD (CHRONIC OBSTRUCTIVE PULMONARY DISEASE) (H): ICD-10-CM

## 2018-05-29 RX ORDER — ALBUTEROL SULFATE 90 UG/1
4-6 AEROSOL, METERED RESPIRATORY (INHALATION) EVERY 6 HOURS PRN
Qty: 1 INHALER | Refills: 6 | Status: SHIPPED | OUTPATIENT
Start: 2018-05-29 | End: 2019-03-28

## 2018-05-29 ASSESSMENT — ENCOUNTER SYMPTOMS
WEIGHT LOSS: 0
SINUS CONGESTION: 1
RECTAL PAIN: 0
MUSCLE WEAKNESS: 0
JOINT SWELLING: 0
BOWEL INCONTINENCE: 0
SMELL DISTURBANCE: 0
ALTERED TEMPERATURE REGULATION: 0
LEG PAIN: 0
SORE THROAT: 0
NIGHT SWEATS: 0
SYNCOPE: 0
HYPOTENSION: 0
JAUNDICE: 0
HEMOPTYSIS: 0
DECREASED APPETITE: 0
SINUS PAIN: 1
PALPITATIONS: 0
CONSTIPATION: 0
HEARTBURN: 0
NECK PAIN: 0
MUSCLE WEAKNESS: 0
MUSCLE CRAMPS: 0
SNORES LOUDLY: 0
STIFFNESS: 0
ABDOMINAL PAIN: 0
BACK PAIN: 1
DYSPNEA ON EXERTION: 1
INCREASED ENERGY: 1
DIARRHEA: 1
POLYPHAGIA: 0
HEMATURIA: 1
SHORTNESS OF BREATH: 1
BLOOD IN STOOL: 0
TASTE DISTURBANCE: 0
CHILLS: 0
VOMITING: 0
SHORTNESS OF BREATH: 1
HOARSE VOICE: 0
BLOATING: 0
SYNCOPE: 0
COUGH DISTURBING SLEEP: 0
DIARRHEA: 1
POLYPHAGIA: 0
ORTHOPNEA: 0
HALLUCINATIONS: 0
DYSPNEA ON EXERTION: 1
SORE THROAT: 0
DIFFICULTY URINATING: 0
NAUSEA: 0
MYALGIAS: 0
SPUTUM PRODUCTION: 1
COUGH DISTURBING SLEEP: 0
DYSURIA: 0
NAUSEA: 0
HYPERTENSION: 0
EXERCISE INTOLERANCE: 1
LIGHT-HEADEDNESS: 0
LEG PAIN: 0
FEVER: 0
HEMOPTYSIS: 0
HOARSE VOICE: 0
SNORES LOUDLY: 0
RECTAL PAIN: 0
WEIGHT GAIN: 0
SLEEP DISTURBANCES DUE TO BREATHING: 1
BOWEL INCONTINENCE: 0
CHILLS: 0
CONSTIPATION: 0
HALLUCINATIONS: 0
TASTE DISTURBANCE: 0
PALPITATIONS: 0
JOINT SWELLING: 0
FLANK PAIN: 0
MUSCLE CRAMPS: 0
ABDOMINAL PAIN: 0
HYPERTENSION: 0
SLEEP DISTURBANCES DUE TO BREATHING: 1
FATIGUE: 1
STIFFNESS: 0
ALTERED TEMPERATURE REGULATION: 0
SPUTUM PRODUCTION: 1
ARTHRALGIAS: 0
DECREASED APPETITE: 0
MYALGIAS: 0
WEIGHT LOSS: 0
LIGHT-HEADEDNESS: 0
FATIGUE: 1
INCREASED ENERGY: 1
ARTHRALGIAS: 0
BLOOD IN STOOL: 0
DIFFICULTY URINATING: 0
VOMITING: 0
BLOATING: 0
POLYDIPSIA: 0
SINUS PAIN: 1
JAUNDICE: 0
FEVER: 0
SINUS CONGESTION: 1
NECK MASS: 0
POSTURAL DYSPNEA: 1
ORTHOPNEA: 0
TROUBLE SWALLOWING: 0
TROUBLE SWALLOWING: 0
POLYDIPSIA: 0
NECK MASS: 0
EXERCISE INTOLERANCE: 1
WHEEZING: 1
NECK PAIN: 0
WEIGHT GAIN: 0
DYSURIA: 0
POSTURAL DYSPNEA: 1
HYPOTENSION: 0
HEARTBURN: 0
COUGH: 1
NIGHT SWEATS: 0
HEMATURIA: 1
COUGH: 1
WHEEZING: 1
FLANK PAIN: 0
SMELL DISTURBANCE: 0
BACK PAIN: 1

## 2018-05-30 ENCOUNTER — TELEPHONE (OUTPATIENT)
Dept: NEPHROLOGY | Facility: CLINIC | Age: 58
End: 2018-05-30

## 2018-05-30 ENCOUNTER — OFFICE VISIT (OUTPATIENT)
Dept: CARDIOLOGY | Facility: CLINIC | Age: 58
End: 2018-05-30
Attending: INTERNAL MEDICINE
Payer: COMMERCIAL

## 2018-05-30 ENCOUNTER — OFFICE VISIT (OUTPATIENT)
Dept: NEPHROLOGY | Facility: CLINIC | Age: 58
End: 2018-05-30
Attending: INTERNAL MEDICINE
Payer: COMMERCIAL

## 2018-05-30 VITALS
HEART RATE: 78 BPM | SYSTOLIC BLOOD PRESSURE: 119 MMHG | BODY MASS INDEX: 25.27 KG/M2 | HEIGHT: 72 IN | TEMPERATURE: 97.5 F | DIASTOLIC BLOOD PRESSURE: 70 MMHG | OXYGEN SATURATION: 93 % | WEIGHT: 186.6 LBS

## 2018-05-30 VITALS
SYSTOLIC BLOOD PRESSURE: 125 MMHG | OXYGEN SATURATION: 96 % | HEIGHT: 72 IN | HEART RATE: 72 BPM | DIASTOLIC BLOOD PRESSURE: 86 MMHG | WEIGHT: 187 LBS | BODY MASS INDEX: 25.33 KG/M2

## 2018-05-30 DIAGNOSIS — N20.0 RECURRENT KIDNEY STONES: Primary | ICD-10-CM

## 2018-05-30 DIAGNOSIS — I27.20 PULMONARY HYPERTENSION (H): ICD-10-CM

## 2018-05-30 DIAGNOSIS — D86.9 SARCOIDOSIS: ICD-10-CM

## 2018-05-30 DIAGNOSIS — N18.30 CKD (CHRONIC KIDNEY DISEASE) STAGE 3, GFR 30-59 ML/MIN (H): ICD-10-CM

## 2018-05-30 DIAGNOSIS — R06.09 DYSPNEA ON EXERTION: ICD-10-CM

## 2018-05-30 DIAGNOSIS — N20.0 RENAL CALCULUS: Primary | ICD-10-CM

## 2018-05-30 LAB
ALBUMIN SERPL-MCNC: 3.8 G/DL (ref 3.4–5)
ALP SERPL-CCNC: 69 U/L (ref 40–150)
ALT SERPL W P-5'-P-CCNC: 18 U/L (ref 0–70)
ANION GAP SERPL CALCULATED.3IONS-SCNC: 7 MMOL/L (ref 3–14)
AST SERPL W P-5'-P-CCNC: 19 U/L (ref 0–45)
BILIRUB SERPL-MCNC: 0.6 MG/DL (ref 0.2–1.3)
BUN SERPL-MCNC: 24 MG/DL (ref 7–30)
CALCIUM SERPL-MCNC: 9.2 MG/DL (ref 8.5–10.1)
CHLORIDE SERPL-SCNC: 104 MMOL/L (ref 94–109)
CO2 SERPL-SCNC: 28 MMOL/L (ref 20–32)
CREAT SERPL-MCNC: 1.34 MG/DL (ref 0.66–1.25)
CREAT UR-MCNC: 68 MG/DL
ERYTHROCYTE [DISTWIDTH] IN BLOOD BY AUTOMATED COUNT: 13.5 % (ref 10–15)
GFR SERPL CREATININE-BSD FRML MDRD: 55 ML/MIN/1.7M2
GLUCOSE SERPL-MCNC: 100 MG/DL (ref 70–99)
HCT VFR BLD AUTO: 43.8 % (ref 40–53)
HGB BLD-MCNC: 14.5 G/DL (ref 13.3–17.7)
MCH RBC QN AUTO: 29.8 PG (ref 26.5–33)
MCHC RBC AUTO-ENTMCNC: 33.1 G/DL (ref 31.5–36.5)
MCV RBC AUTO: 90 FL (ref 78–100)
NT-PROBNP SERPL-MCNC: 445 PG/ML (ref 0–125)
PHOSPHATE SERPL-MCNC: 2.4 MG/DL (ref 2.5–4.5)
PLATELET # BLD AUTO: 148 10E9/L (ref 150–450)
POTASSIUM SERPL-SCNC: 4.2 MMOL/L (ref 3.4–5.3)
PROT SERPL-MCNC: 7.4 G/DL (ref 6.8–8.8)
PROT UR-MCNC: 0.2 G/L
PROT/CREAT 24H UR: 0.29 G/G CR (ref 0–0.2)
PSA SERPL-ACNC: 1.63 UG/L (ref 0–4)
RBC # BLD AUTO: 4.87 10E12/L (ref 4.4–5.9)
SODIUM SERPL-SCNC: 138 MMOL/L (ref 133–144)
WBC # BLD AUTO: 6.2 10E9/L (ref 4–11)

## 2018-05-30 PROCEDURE — 84100 ASSAY OF PHOSPHORUS: CPT | Performed by: INTERNAL MEDICINE

## 2018-05-30 PROCEDURE — 84156 ASSAY OF PROTEIN URINE: CPT | Performed by: INTERNAL MEDICINE

## 2018-05-30 PROCEDURE — G0463 HOSPITAL OUTPT CLINIC VISIT: HCPCS | Mod: ZF

## 2018-05-30 PROCEDURE — 99213 OFFICE O/P EST LOW 20 MIN: CPT | Mod: ZP | Performed by: INTERNAL MEDICINE

## 2018-05-30 PROCEDURE — 36415 COLL VENOUS BLD VENIPUNCTURE: CPT | Performed by: INTERNAL MEDICINE

## 2018-05-30 PROCEDURE — G0463 HOSPITAL OUTPT CLINIC VISIT: HCPCS | Mod: 27

## 2018-05-30 ASSESSMENT — PAIN SCALES - GENERAL
PAINLEVEL: NO PAIN (0)
PAINLEVEL: NO PAIN (0)

## 2018-05-30 NOTE — NURSING NOTE
Chief Complaint   Patient presents with     Follow Up For     Return for 3 month PH F/U w/labs prior     Vitals were taken and medications were reconciled.     Cheli Garcia, LYNETTEA  10:45 AM

## 2018-05-30 NOTE — TELEPHONE ENCOUNTER
Emmett request faxed per Dr. Curry to be done before next office visit. Sent to be scanned in.  Elin Schmitt LPN  Nephrology  [phone]

## 2018-05-30 NOTE — LETTER
5/30/2018      RE: Jimmy Isaac  [id number] [address] [id number]       Dear Colleague,    Thank you for the opportunity to participate in the care of your patient, Jimmy Isaac, at the [facility]. Please see a copy of my visit note below.        Impression:  1. Pulmonary arterial hypertension  2. Chronic, oxygen dependent respiratory failure  3. Sarcoidosis without cardiac involvement  4. Nephrolithiasis  5. Prednisone related fluid retention    The patient returns for follow-up of PAH associated with burnt out sarcoid..  There is no interim history of chest pain, tightness, paroxysmal nocturnal dyspnea, orthopnea, peripheral edema, palpitation, pre-syncope, syncope, device discharge.  Exercise tolerance is stable.  The patient is attempting to exercise regularly and following a sodium restricted, calorically appropriate diet.  Medications are reviewed and the patient is taking medications as prescribed.  The patient is generally sleeping well.     .    eneral Symptoms: Yes  Skin Symptoms: No  HENT Symptoms: Yes  EYE SYMPTOMS: No  HEART SYMPTOMS: Yes  LUNG SYMPTOMS: Yes  INTESTINAL SYMPTOMS: Yes  URINARY SYMPTOMS: Yes  REPRODUCTIVE SYMPTOMS: No  SKELETAL SYMPTOMS: Yes  BLOOD SYMPTOMS: No  NERVOUS SYSTEM SYMPTOMS: No  MENTAL HEALTH SYMPTOMS: No  Fever: No  Loss of appetite: No  Weight loss: No  Weight gain: No  Fatigue: Yes  Night sweats: No  Chills: No  Increased stress: No  Excessive hunger: No  Excessive thirst: No  Feeling hot or cold when others believe the temperature is normal: No  Loss of height: No  Post-operative complications: No  Hallucinations: No  Change in or Loss of Energy: Yes  Hyperactivity: No  Confusion: No  Ear pain: No  Ear discharge: No  Hearing loss: No  Tinnitus: No  Nosebleeds: No  Congestion: Yes  Sinus pain: Yes  Trouble swallowing: No   Voice hoarseness: No  Mouth sores: No  Sore throat: No  Tooth pain: No  Gum  tenderness: No  Bleeding gums: No  Change in taste: No  Change in sense of smell: No  Dry mouth: No  Hearing aid used: No  Neck lump: No  Cough: Yes  Sputum or phlegm: Yes  Coughing up blood: No  Difficulty breating or shortness of breath: Yes  Snoring: No  Wheezing: Yes  Difficulty breathing on exertion: Yes  Nighttime Cough: No  Difficulty breathing when lying flat: Yes  Chest pain or pressure: No  Fast or irregular heartbeat: No  Pain in legs with walking: No  Trouble breathing while lying down: No  Fingers or toes appear blue: No  High blood pressure: No  Low blood pressure: No  Fainting: No  Murmurs: No  Pacemaker: No  Varicose veins: No  Edema or swelling: No  Wake up at night with shortness of breath: Yes  Light-headedness: No  Exercise intolerance: Yes  Heart burn or indigestion: No  Nausea: No  Vomiting: No  Abdominal pain: No  Bloating: No  Constipation: No  Diarrhea: Yes  Blood in stool: No  Black stools: No  Rectal or Anal pain: No  Fecal incontinence: No  Yellowing of skin or eyes: No  Vomit with blood: No  Change in stools: No  Trouble holding urine or incontinence: No  Pain or burning: No  Trouble starting or stopping: No  Increased frequency of urination: No  Blood in urine: Yes  Decreased frequency of urination: No  Frequent nighttime urination: No  Flank pain: No  Difficulty emptying bladder: No  Back pain: Yes  Muscle aches: No  Neck pain: No  Swollen joints: No  Joint pain: No  Bone pain: No  Muscle cramps: No  Muscle weakness: No  Joint stiffness: No  Bone fracture: No      PERRLA, EOM full, normal gait and station, normal mentation, skin without lesions, chest is clear, no evidence of right or left ventricular overactivity, sternum stable, no carotid bruits, regular rhythm, S1, S2, no murmurs, abdomen without tenderness, rebound guarding or masses, no edema, no focal neurologic defects. Cushingoid  PMH:   1.  Sarcoidosis diagnosed in 1991 per his report by transbronchial biopsy.  This was  treated with prednisone causing irritability, weight gain, insomnia and jitteriness.  He was treated for SAPH with most recent PA pressures of 75/34/52 and wedge pressure of 16.  Last echo was in 2016 with an EF of 60-65%.  I am not sure if there was a bubble study done at that time.    2.  Chronic sinusitis with deviated nasal septum.   3.  Hyperlipidemia.   4.  Renal stones, calcium oxalate.    5.  Pancreatitis.   6.  Question of hypercalcemia.  Wt Readings from Last 24 Encounters:   05/30/18 84.8 kg (187 lb)   02/27/18 84.6 kg (186 lb 8 oz)   02/05/18 85.8 kg (189 lb 1.6 oz)   12/12/17 79.4 kg (175 lb)   11/21/17 85.3 kg (188 lb)   11/16/17 85.3 kg (188 lb)   11/01/17 85.3 kg (188 lb)   11/01/17 85.2 kg (187 lb 14.4 oz)   10/18/17 87.8 kg (193 lb 9.6 oz)   09/07/17 84.9 kg (187 lb 1.6 oz)   08/01/17 84.5 kg (186 lb 3.2 oz)   06/20/17 83.5 kg (184 lb)   06/02/17 79.4 kg (175 lb)   05/25/17 79.4 kg (175 lb)   05/16/17 84.8 kg (187 lb)   10/06/16 79.4 kg (175 lb)   08/23/16 81.2 kg (179 lb)   04/12/16 84.1 kg (185 lb 8 oz)   04/12/16 83.9 kg (185 lb)   03/29/16 84 kg (185 lb 1.6 oz)   10/29/15 84.6 kg (186 lb 6.4 oz)   07/09/15 83.4 kg (183 lb 12.8 oz)   04/09/15 86.3 kg (190 lb 4.8 oz)   04/09/15 79.4 kg (175 lb)   Heart Catherization June 2017  .1. HR 80 bpm  2. /79/99 mmHg  3. RA 12/15/12  4. RV 75/12  5. PA 75/34/52   6. PCW 16/17/16   7. PA sat 72.6%   8. PCW sat 97%  9. Hgb 14.2g/dL   10. Lesia CO 5.3   11. Lesia CI 2.6   12. TD CO 5.7   13. TD CI 2.8   14. PVR 6.9      Right Heart Catheterization   February 27, 2018          Procedure: Right Heart Cathterization  Access: 7 Fr sheath in RIJ obtained without complications   Findings   RA: 11/9/6  RV 57/12  PA 57/26/40  PCWP 16/17/14  Wedge sat 94.6%  Lesia CO 5.4 (2.6) TD CO 5.1 (2.4)   TPR 7.8 PVR 5.1  PaSat 73.5% Hb 12.7  MAP 95  SVR 1318     Oximetry Run:   SVC: 71%  IVC: 78.2%  High RA: 72.0%  Mid RA: 70.4%  Low RA 71.6%  RV 72.9%  PA: 73.5%  Wedge:  94.6%  Qp 5.8  Qs 5.7  Qp/Qs: 1.01     Conclusions:  1. normal right and mildly elevated left sided filling pressures  2. Moderately elevated pulmonary artery pressures on treatment, pre and post capillary   3. Normal cardiac output   4. No evidence of intra cardiac shunt     MRN: [id number]  : 1960  Study Date: 2017 09:01 AM  Age: 56 yrs  Gender: Male  Patient [place] [facility] [place]  Reason For Study: , Other forms of dyspnea, Heart failure, unspecified, Other  seco  Ordering Physician: DUY MIDDLETON  Referring Physician: SHERRY BOB MD  Performed By: Amy David RDCS     BSA: 2.1 m2  Height: 72 in  Weight: 187 lb  HR: 94  BP: 130/70 mmHg  _____________________________________________________________________________  __        Procedure  Complete Echo Adult. Contrast Optison.  _____________________________________________________________________________  __        Interpretation Summary     The visual ejection fraction is estimated at 65-70%.  There is mild concentric left ventricular hypertrophy.  The right ventricle is not well visualized.  Mildly decreased right ventricular systolic function  The study was technically difficult. Compared to prior study, changes are  noted.  _____________________________________________________________________________  __        Left Ventricle  The left ventricle is normal in size. There is mild concentric left  ventricular hypertrophy. The visual ejection fraction is estimated at 65-70%.  The transmitral spectral Doppler flow pattern is suggestive of impaired LV  relaxation. E by E prime ratio is between 8 and 15, which is indeterminate for  assessment of left ventricular filling pressures. Septal motion is consistent  with conduction abnormality.     Right Ventricle  In the apical views the RV appears to be mildly dilated but in the subcostal  views appears to be normal in size. Mildly decreased right ventricular  systolic function. The right ventricle is not  well visualized.     Atria  Normal left atrial size. Borderline right atrial enlargement. There is no  atrial shunt seen.     Mitral Valve  The mitral valve is normal in structure and function. There is trace mitral  regurgitation.        Tricuspid Valve  The tricuspid valve is normal in structure and function. There is trace  tricuspid regurgitation. Right ventricular systolic pressure could not be  approximated due to inadequate tricuspid regurgitation.     Aortic Valve  The aortic valve is trileaflet. The aortic valve is not well visualized. No  hemodynamically significant valvular aortic stenosis.     Pulmonic Valve  The pulmonic valve is not well seen, but is grossly normal. There is no  pulmonic valvular regurgitation.     Vessels  The aortic root is normal size. Normal size ascending aorta. The inferior vena  cava is not dilated.     Pericardium  There is no pericardial effusion.        Rhythm  The rhythm was normal sinus.  _____________________________________________________________________________  __  MMode/2D Measurements & Calculations  IVSd: 1.2 cm     LVIDd: 3.6 cm  LVIDs: 2.5 cm  LVPWd: 1.4 cm  FS: 32.3 %  EDV(Teich): 55.2 ml  ESV(Teich): 21.3 ml  LV mass(C)d: 161.1 grams  LV mass(C)dI: 77.8 grams/m2  Ao root diam: 3.2 cm  LA dimension: 2.9 cm  asc Aorta Diam: 3.1 cm  LA/Ao: 0.92  LA Volume Index (BP): 19.0 ml/m2        Doppler Measurements & Calculations  MV E max nirmal: 63.1 cm/sec  MV A max nirmal: 81.7 cm/sec  MV E/A: 0.77     MV dec slope: 244.7 cm/sec2  MV dec time: 0.26 sec  Peak E' Nirmal: 6.8 cm/sec         Current Outpatient Prescriptions   Medication     albuterol (PROAIR HFA) 108 (90 Base) MCG/ACT Inhaler     budesonide (PULMICORT) 0.25 MG/2ML neb solution     Cholecalciferol (VITAMIN D) 2000 UNITS CAPS     digoxin (LANOXIN) 125 MCG tablet     LETAIRIS 10 MG tablet     tadalafil, PAH, (ADCIRCA) 20 MG TABS     treprostinil (TYVASO) 0.6 MG/ML SOLN     No current facility-administered medications  for this visit.        MR ABDOMEN W/O & W CONTRAST Unknown  Rpt      FVC-Pred Latest Units: L    5.07    FVC-Pre Latest Units: L    3.40    FVC-%Pred-Pre Latest Units: %    67    FEV1-Pre Latest Units: L    1.21    FEV1-%Pred-Pre Latest Units: %    30    FEV1FVC-Pred Latest Units: %    78    FEV1FVC-Pre Latest Units: %    35    FEV1FEV6-Pred Latest Units: %    79    FEV1FEV6-Pre Latest Units: %    45    FEFMax-Pred Latest Units: L/sec    9.85    FEFMax-Pre Latest Units: L/sec    3.81    FEFMax-%Pred-Pre Latest Units: %    38    FIFMax-Pre Latest Units: L/sec    4.64    ExpTime-Pre Latest Units: sec    15.80    ECHO COMPLETE WITH OPTISON Unknown   Rpt     HEART CATH RIGHT HEART CATH Unknown     Attch   PFT GENERAL LAB TESTING Unknown    Rpt    YIQ1931-%Pred-Pre Latest Units: %    10    FEF2575-Pre Latest Units: L/sec    0.36    FEF2575-Pred Latest Units: L/sec    3.29        HISTORY: Assessment of pancreatic cyst. Sarcoidosis, unspecified.  Other forms of dyspnea. Heart failure, unspecified. Other secondary  pulmonary hypertension.      COMPARISON: CT chest 10/6/2016.     TECHNIQUE: Multisequence, multiplanar imaging of the abdomen is  performed without contrast. A total of 15 mL Gadavist is then given  intravenously. Additional dynamic axial T1 fat-sat sequences are  performed.     FINDINGS:      Abdomen: There is no evidence of fatty liver infiltration or mass.  Gallbladder, spleen, pancreas and adrenal glands are unremarkable.  Bilateral renal calculi are noted. No hydronephrosis. T2 hyperintense  renal lesions are most consistent with cysts. No enlarged abdominal  lymph nodes. No T2 hyperintense lesions are noted in the pancreas.     Following contrast administration, there are no abnormal arterial  enhancing lesions in the liver or pancreas. Area of decreased signal  intensity in the head of the pancreas may be related to the previously  seen pancreatic head calcification. No abnormal enhancing  pancreatic  lesions are evident. Upper abdominal organs enhance normally. Multiple  bilateral renal cortical cysts show no abnormal enhancement consistent  with simple cysts. No enlarged lymph nodes. No ascites. No bowel  distention is appreciated where imaged.         IMPRESSION: No evidence of a cyst in the pancreas when compared to  prior CT. No pancreatic ductal dilatation. Simple renal cysts and  collecting system stones. Abdominal organs are otherwise within normal  Limits.      Interpretation Summary  Technically difficult study.Poor acoustic windows.  Global and regional left ventricular function is normal with an EF of 60-65%.  The right ventricle is normal size.  Global right ventricular function is normal.  Pulmonary artery systolic pressure cannot be assessed.  The inferior vena cava was normal in size with preserved respiratory  variability.  Estimated mean right atrial pressure is 3 mmHg.  No pericardial effusion is present.  There was no shunt at the atrial septal level as assessed by agitated saline  bubble study at rest and with Valsalva maneuver.  This study was compared with the study from 5/25/2017. RV systolic function is  normal in this study  Assessment:    The patient is clinically, functionally and measured without change.  We discussed therapeutic options but elected to continue current medications and see back in January, sooner should he not be doing well.      Please do not hesitate to contact me if you have any questions/concerns.     Sincerely,     Khang Lindsey MD

## 2018-05-30 NOTE — PROGRESS NOTES
Impression:  1. Pulmonary arterial hypertension  2. Chronic, oxygen dependent respiratory failure  3. Sarcoidosis without cardiac involvement  4. Nephrolithiasis  5. Prednisone related fluid retention    The patient returns for follow-up of PAH associated with burnt out sarcoid..  There is no interim history of chest pain, tightness, paroxysmal nocturnal dyspnea, orthopnea, peripheral edema, palpitation, pre-syncope, syncope, device discharge.  Exercise tolerance is stable.  The patient is attempting to exercise regularly and following a sodium restricted, calorically appropriate diet.  Medications are reviewed and the patient is taking medications as prescribed.  The patient is generally sleeping well.     .    eneral Symptoms: Yes  Skin Symptoms: No  HENT Symptoms: Yes  EYE SYMPTOMS: No  HEART SYMPTOMS: Yes  LUNG SYMPTOMS: Yes  INTESTINAL SYMPTOMS: Yes  URINARY SYMPTOMS: Yes  REPRODUCTIVE SYMPTOMS: No  SKELETAL SYMPTOMS: Yes  BLOOD SYMPTOMS: No  NERVOUS SYSTEM SYMPTOMS: No  MENTAL HEALTH SYMPTOMS: No  Fever: No  Loss of appetite: No  Weight loss: No  Weight gain: No  Fatigue: Yes  Night sweats: No  Chills: No  Increased stress: No  Excessive hunger: No  Excessive thirst: No  Feeling hot or cold when others believe the temperature is normal: No  Loss of height: No  Post-operative complications: No  Hallucinations: No  Change in or Loss of Energy: Yes  Hyperactivity: No  Confusion: No  Ear pain: No  Ear discharge: No  Hearing loss: No  Tinnitus: No  Nosebleeds: No  Congestion: Yes  Sinus pain: Yes  Trouble swallowing: No   Voice hoarseness: No  Mouth sores: No  Sore throat: No  Tooth pain: No  Gum tenderness: No  Bleeding gums: No  Change in taste: No  Change in sense of smell: No  Dry mouth: No  Hearing aid used: No  Neck lump: No  Cough: Yes  Sputum or phlegm: Yes  Coughing up blood: No  Difficulty breating or shortness of breath: Yes  Snoring: No  Wheezing: Yes  Difficulty breathing on exertion:  Yes  Nighttime Cough: No  Difficulty breathing when lying flat: Yes  Chest pain or pressure: No  Fast or irregular heartbeat: No  Pain in legs with walking: No  Trouble breathing while lying down: No  Fingers or toes appear blue: No  High blood pressure: No  Low blood pressure: No  Fainting: No  Murmurs: No  Pacemaker: No  Varicose veins: No  Edema or swelling: No  Wake up at night with shortness of breath: Yes  Light-headedness: No  Exercise intolerance: Yes  Heart burn or indigestion: No  Nausea: No  Vomiting: No  Abdominal pain: No  Bloating: No  Constipation: No  Diarrhea: Yes  Blood in stool: No  Black stools: No  Rectal or Anal pain: No  Fecal incontinence: No  Yellowing of skin or eyes: No  Vomit with blood: No  Change in stools: No  Trouble holding urine or incontinence: No  Pain or burning: No  Trouble starting or stopping: No  Increased frequency of urination: No  Blood in urine: Yes  Decreased frequency of urination: No  Frequent nighttime urination: No  Flank pain: No  Difficulty emptying bladder: No  Back pain: Yes  Muscle aches: No  Neck pain: No  Swollen joints: No  Joint pain: No  Bone pain: No  Muscle cramps: No  Muscle weakness: No  Joint stiffness: No  Bone fracture: No      PERRLA, EOM full, normal gait and station, normal mentation, skin without lesions, chest is clear, no evidence of right or left ventricular overactivity, sternum stable, no carotid bruits, regular rhythm, S1, S2, no murmurs, abdomen without tenderness, rebound guarding or masses, no edema, no focal neurologic defects. Cushingoid  PMH:   1.  Sarcoidosis diagnosed in 1991 per his report by transbronchial biopsy.  This was treated with prednisone causing irritability, weight gain, insomnia and jitteriness.  He was treated for SAPH with most recent PA pressures of 75/34/52 and wedge pressure of 16.  Last echo was in 2016 with an EF of 60-65%.  I am not sure if there was a bubble study done at that time.    2.  Chronic sinusitis  with deviated nasal septum.   3.  Hyperlipidemia.   4.  Renal stones, calcium oxalate.    5.  Pancreatitis.   6.  Question of hypercalcemia.  Wt Readings from Last 24 Encounters:   18 84.8 kg (187 lb)   18 84.6 kg (186 lb 8 oz)   18 85.8 kg (189 lb 1.6 oz)   17 79.4 kg (175 lb)   17 85.3 kg (188 lb)   17 85.3 kg (188 lb)   17 85.3 kg (188 lb)   17 85.2 kg (187 lb 14.4 oz)   10/18/17 87.8 kg (193 lb 9.6 oz)   17 84.9 kg (187 lb 1.6 oz)   17 84.5 kg (186 lb 3.2 oz)   17 83.5 kg (184 lb)   17 79.4 kg (175 lb)   17 79.4 kg (175 lb)   17 84.8 kg (187 lb)   10/06/16 79.4 kg (175 lb)   16 81.2 kg (179 lb)   16 84.1 kg (185 lb 8 oz)   16 83.9 kg (185 lb)   16 84 kg (185 lb 1.6 oz)   10/29/15 84.6 kg (186 lb 6.4 oz)   07/09/15 83.4 kg (183 lb 12.8 oz)   04/09/15 86.3 kg (190 lb 4.8 oz)   04/09/15 79.4 kg (175 lb)   Heart Catherization 2017  .1. HR 80 bpm  2. /79/99 mmHg  3. RA 12/15/12  4. RV 75/12  5. PA 75/34/52   6. PCW    7. PA sat 72.6%   8. PCW sat 97%  9. Hgb 14.2g/dL   10. Lesia CO 5.3   11. Lesia CI 2.6   12. TD CO 5.7   13. TD CI 2.8   14. PVR 6.9      Right Heart Catheterization   2018          Procedure: Right Heart Cathterization  Access: 7 Fr sheath in RIJ obtained without complications   Findings   RA: 11/6  RV 57/12  PA 57/26/40  PCWP 16//14  Wedge sat 94.6%  Lesia CO 5.4 (2.6) TD CO 5.1 (2.4)   TPR 7.8 PVR 5.1  PaSat 73.5% Hb 12.7  MAP 95  SVR 1318     Oximetry Run:   SVC: 71%  IVC: 78.2%  High RA: 72.0%  Mid RA: 70.4%  Low RA 71.6%  RV 72.9%  PA: 73.5%  Wedge: 94.6%  Qp 5.8  Qs 5.7  Qp/Qs: 1.01     Conclusions:  1. normal right and mildly elevated left sided filling pressures  2. Moderately elevated pulmonary artery pressures on treatment, pre and post capillary   3. Normal cardiac output   4. No evidence of intra cardiac shunt     MRN: [id number]  :  1960  Study Date: 05/25/2017 09:01 AM  Age: 56 yrs  Gender: Male  [place] [facility] [place]  Reason For Study: , Other forms of dyspnea, Heart failure, unspecified, Other  seco  Ordering Physician: DUY MIDDLETON  Referring Physician: SHERRY BOB MD  Performed By: [facility]     BSA: 2.1 m2  Height: 72 in  Weight: 187 lb  HR: 94  BP: 130/70 mmHg  _____________________________________________________________________________  __        Procedure  Complete Echo Adult. Contrast Optison.  _____________________________________________________________________________  __        Interpretation Summary     The visual ejection fraction is estimated at 65-70%.  There is mild concentric left ventricular hypertrophy.  The right ventricle is not well visualized.  Mildly decreased right ventricular systolic function  The study was technically difficult. Compared to prior study, changes are  noted.  _____________________________________________________________________________  __        Left Ventricle  The left ventricle is normal in size. There is mild concentric left  ventricular hypertrophy. The visual ejection fraction is estimated at 65-70%.  The transmitral spectral Doppler flow pattern is suggestive of impaired LV  relaxation. E by E prime ratio is between 8 and 15, which is indeterminate for  assessment of left ventricular filling pressures. Septal motion is consistent  with conduction abnormality.     Right Ventricle  In the apical views the RV appears to be mildly dilated but in the subcostal  views appears to be normal in size. Mildly decreased right ventricular  systolic function. The right ventricle is not well visualized.     Atria  Normal left atrial size. Borderline right atrial enlargement. There is no  atrial shunt seen.     Mitral Valve  The mitral valve is normal in structure and function. There is trace mitral  regurgitation.        Tricuspid Valve  The tricuspid valve is normal in structure  and function. There is trace  tricuspid regurgitation. Right ventricular systolic pressure could not be  approximated due to inadequate tricuspid regurgitation.     Aortic Valve  The aortic valve is trileaflet. The aortic valve is not well visualized. No  hemodynamically significant valvular aortic stenosis.     Pulmonic Valve  The pulmonic valve is not well seen, but is grossly normal. There is no  pulmonic valvular regurgitation.     Vessels  The aortic root is normal size. Normal size ascending aorta. The inferior vena  cava is not dilated.     Pericardium  There is no pericardial effusion.        Rhythm  The rhythm was normal sinus.  _____________________________________________________________________________  __  MMode/2D Measurements & Calculations  IVSd: 1.2 cm     LVIDd: 3.6 cm  LVIDs: 2.5 cm  LVPWd: 1.4 cm  FS: 32.3 %  EDV(Teich): 55.2 ml  ESV(Teich): 21.3 ml  LV mass(C)d: 161.1 grams  LV mass(C)dI: 77.8 grams/m2  Ao root diam: 3.2 cm  LA dimension: 2.9 cm  asc Aorta Diam: 3.1 cm  LA/Ao: 0.92  LA Volume Index (BP): 19.0 ml/m2        Doppler Measurements & Calculations  MV E max nirmal: 63.1 cm/sec  MV A max nirmal: 81.7 cm/sec  MV E/A: 0.77     MV dec slope: 244.7 cm/sec2  MV dec time: 0.26 sec  Peak E' Nirmal: 6.8 cm/sec         Current Outpatient Prescriptions   Medication     albuterol (PROAIR HFA) 108 (90 Base) MCG/ACT Inhaler     budesonide (PULMICORT) 0.25 MG/2ML neb solution     Cholecalciferol (VITAMIN D) 2000 UNITS CAPS     digoxin (LANOXIN) 125 MCG tablet     LETAIRIS 10 MG tablet     tadalafil, PAH, (ADCIRCA) 20 MG TABS     treprostinil (TYVASO) 0.6 MG/ML SOLN     No current facility-administered medications for this visit.        MR ABDOMEN W/O & W CONTRAST Unknown  Rpt      FVC-Pred Latest Units: L    5.07    FVC-Pre Latest Units: L    3.40    FVC-%Pred-Pre Latest Units: %    67    FEV1-Pre Latest Units: L    1.21    FEV1-%Pred-Pre Latest Units: %    30    FEV1FVC-Pred Latest Units: %    78     FEV1FVC-Pre Latest Units: %    35    FEV1FEV6-Pred Latest Units: %    79    FEV1FEV6-Pre Latest Units: %    45    FEFMax-Pred Latest Units: L/sec    9.85    FEFMax-Pre Latest Units: L/sec    3.81    FEFMax-%Pred-Pre Latest Units: %    38    FIFMax-Pre Latest Units: L/sec    4.64    ExpTime-Pre Latest Units: sec    15.80    ECHO COMPLETE WITH OPTISON Unknown   Rpt     HEART CATH RIGHT HEART CATH Unknown     Attch   PFT GENERAL LAB TESTING Unknown    Rpt    OOK2952-%Pred-Pre Latest Units: %    10    FEF2575-Pre Latest Units: L/sec    0.36    FEF2575-Pred Latest Units: L/sec    3.29        HISTORY: Assessment of pancreatic cyst. Sarcoidosis, unspecified.  Other forms of dyspnea. Heart failure, unspecified. Other secondary  pulmonary hypertension.      COMPARISON: CT chest 10/6/2016.     TECHNIQUE: Multisequence, multiplanar imaging of the abdomen is  performed without contrast. A total of 15 mL Gadavist is then given  intravenously. Additional dynamic axial T1 fat-sat sequences are  performed.     FINDINGS:      Abdomen: There is no evidence of fatty liver infiltration or mass.  Gallbladder, spleen, pancreas and adrenal glands are unremarkable.  Bilateral renal calculi are noted. No hydronephrosis. T2 hyperintense  renal lesions are most consistent with cysts. No enlarged abdominal  lymph nodes. No T2 hyperintense lesions are noted in the pancreas.     Following contrast administration, there are no abnormal arterial  enhancing lesions in the liver or pancreas. Area of decreased signal  intensity in the head of the pancreas may be related to the previously  seen pancreatic head calcification. No abnormal enhancing pancreatic  lesions are evident. Upper abdominal organs enhance normally. Multiple  bilateral renal cortical cysts show no abnormal enhancement consistent  with simple cysts. No enlarged lymph nodes. No ascites. No bowel  distention is appreciated where imaged.         IMPRESSION: No evidence of a cyst in  the pancreas when compared to  prior CT. No pancreatic ductal dilatation. Simple renal cysts and  collecting system stones. Abdominal organs are otherwise within normal  Limits.      Interpretation Summary  Technically difficult study.Poor acoustic windows.  Global and regional left ventricular function is normal with an EF of 60-65%.  The right ventricle is normal size.  Global right ventricular function is normal.  Pulmonary artery systolic pressure cannot be assessed.  The inferior vena cava was normal in size with preserved respiratory  variability.  Estimated mean right atrial pressure is 3 mmHg.  No pericardial effusion is present.  There was no shunt at the atrial septal level as assessed by agitated saline  bubble study at rest and with Valsalva maneuver.  This study was compared with the study from 5/25/2017. RV systolic function is  normal in this study  Assessment:    The patient is clinically, functionally and measured without change.  We discussed therapeutic options but elected to continue current medications and see back in January, sooner should he not be doing well.

## 2018-05-30 NOTE — PATIENT INSTRUCTIONS
Medication Changes:   No medication changes at this time. Please continue current medication regiment.    Patient Instructions:  Continue staying active and eat a heart healthy diet.      Follow up Appointment Information:  January with Echocardiogram and Labs    Results:  Component      Latest Ref Rng & Units 5/30/2018   Sodium      133 - 144 mmol/L 138   Potassium      3.4 - 5.3 mmol/L 4.2   Chloride      94 - 109 mmol/L 104   Carbon Dioxide      20 - 32 mmol/L 28   Anion Gap      3 - 14 mmol/L 7   Glucose      70 - 99 mg/dL 100 (H)   Urea Nitrogen      7 - 30 mg/dL 24   Creatinine      0.66 - 1.25 mg/dL 1.34 (H)   GFR Estimate      >60 mL/min/1.7m2 55 (L)   GFR Estimate If Black      >60 mL/min/1.7m2 66   Calcium      8.5 - 10.1 mg/dL 9.2   Bilirubin Total      0.2 - 1.3 mg/dL 0.6   Albumin      3.4 - 5.0 g/dL 3.8   Protein Total      6.8 - 8.8 g/dL 7.4   Alkaline Phosphatase      40 - 150 U/L 69   ALT      0 - 70 U/L 18   AST      0 - 45 U/L 19   WBC      4.0 - 11.0 10e9/L 6.2   RBC Count      4.4 - 5.9 10e12/L 4.87   Hemoglobin      13.3 - 17.7 g/dL 14.5   Hematocrit      40.0 - 53.0 % 43.8   MCV      78 - 100 fl 90   MCH      26.5 - 33.0 pg 29.8   MCHC      31.5 - 36.5 g/dL 33.1   RDW      10.0 - 15.0 % 13.5   Platelet Count      150 - 450 10e9/L 148 (L)   Protein Random Urine      g/L 0.20   Protein Total Urine g/gr Creatinine      0 - 0.2 g/g Cr 0.29 (H)   PSA      0 - 4 ug/L 1.63   N-Terminal Pro Bnp      0 - 125 pg/mL 445 (H)   Phosphorus      2.5 - 4.5 mg/dL 2.4 (L)   Creatinine Urine      mg/dL 68     We are located on the third floor of the [facility] ([facility] [place] the [facility].  Our address is     [address] [place] [address] [place] [address] [place] [address]      Thank you for allowing us to be a part of your care here at the [facility]    If you have questions or concerns please contact us at:    Bernarda Wilson RN, BSN    Miguel Ángel  Nicolle ([facility]  Nurse Coordinator     Clinic   Pulmonary Hypertension   Pulmonary Hypertension  [facility]  (P)[phone]    (P) [phone]        (F)[phone]                ** Please note that you will NOT receive a reminder call regarding your scheduled testing, reminder calls are for provider appointments only.  If you are scheduled for testing within the [facility] system you may receive a call regarding pre-registration for billing purposes only.**     Remember to weigh yourself daily after voiding and before you consume any food or beverages and log the numbers.  If you have gained/lost 2 pounds overnight or 5 pounds in a week contact us immediately for medication adjustments or further instructions.  **Please call us immediately if you have any syncope, chest pain, edema, or decline in your functional status.    Support Group:  Pulmonary Hypertension Association  Https://www.phassociation.org/  **Look at the Events Tab** They even have Support Groups that you can call into    [facility] PH Support Group  First Saturday of the Month from 1-3 PM   Location: [address] [place] [id number]  Leader: Bert Weber  Phone: [phone]  Email: [email or web address]

## 2018-05-30 NOTE — MR AVS SNAPSHOT
After Visit Summary   5/30/2018    Jimmy Isaac    MRN: [id number]           Patient Information     Date Of Birth          1960        Visit Information        Provider Department      5/30/2018 11:00 AM Khang Lindsey MD [facility]        Today's Diagnoses     Pulmonary hypertension        Sarcoidosis        Dyspnea on exertion          Care Instructions    Medication Changes:   No medication changes at this time. Please continue current medication regiment.    Patient Instructions:  Continue staying active and eat a heart healthy diet.      Follow up Appointment Information:  January with Echocardiogram and Labs    Results:  Component      Latest Ref Rng & Units 5/30/2018   Sodium      133 - 144 mmol/L 138   Potassium      3.4 - 5.3 mmol/L 4.2   Chloride      94 - 109 mmol/L 104   Carbon Dioxide      20 - 32 mmol/L 28   Anion Gap      3 - 14 mmol/L 7   Glucose      70 - 99 mg/dL 100 (H)   Urea Nitrogen      7 - 30 mg/dL 24   Creatinine      0.66 - 1.25 mg/dL 1.34 (H)   GFR Estimate      >60 mL/min/1.7m2 55 (L)   GFR Estimate If Black      >60 mL/min/1.7m2 66   Calcium      8.5 - 10.1 mg/dL 9.2   Bilirubin Total      0.2 - 1.3 mg/dL 0.6   Albumin      3.4 - 5.0 g/dL 3.8   Protein Total      6.8 - 8.8 g/dL 7.4   Alkaline Phosphatase      40 - 150 U/L 69   ALT      0 - 70 U/L 18   AST      0 - 45 U/L 19   WBC      4.0 - 11.0 10e9/L 6.2   RBC Count      4.4 - 5.9 10e12/L 4.87   Hemoglobin      13.3 - 17.7 g/dL 14.5   Hematocrit      40.0 - 53.0 % 43.8   MCV      78 - 100 fl 90   MCH      26.5 - 33.0 pg 29.8   MCHC      31.5 - 36.5 g/dL 33.1   RDW      10.0 - 15.0 % 13.5   Platelet Count      150 - 450 10e9/L 148 (L)   Protein Random Urine      g/L 0.20   Protein Total Urine g/gr Creatinine      0 - 0.2 g/g Cr 0.29 (H)   PSA      0 - 4 ug/L 1.63   N-Terminal Pro Bnp      0 - 125 pg/mL 445 (H)   Phosphorus      2.5 - 4.5 mg/dL 2.4 (L)   Creatinine Urine      mg/dL 68     We are  located on the third floor of the [facility] on the [facility].  Our address is     [address] [place] [address] on [address] [place] [address]      Thank you for allowing us to be a part of your care here at the [facility]    If you have questions or concerns please contact us at:    Bernarda Wilson RN, BSN    Miguel Ángel Valverde (Schedule,P.A.)  Nurse Coordinator     Clinic   Pulmonary Hypertension   Pulmonary Hypertension  [facility]  [phone])[phone]    (P) [phone]        (F)[phone]                ** Please note that you will NOT receive a reminder call regarding your scheduled testing, reminder calls are for provider appointments only.  If you are scheduled for testing within the [facility] system you may receive a call regarding pre-registration for billing purposes only.**     Remember to weigh yourself daily after voiding and before you consume any food or beverages and log the numbers.  If you have gained/lost 2 pounds overnight or 5 pounds in a week contact us immediately for medication adjustments or further instructions.  **Please call us immediately if you have any syncope, chest pain, edema, or decline in your functional status.    Support Group:  Pulmonary Hypertension Association  Https://www.phassociation.org/  **Look at the Events Tab** They even have Support Groups that you can call into    [facility] PH Support Group  First Saturday of the Month from 1-3 PM   Location: [address] [place] [address]  Leader: Bert Weber  Phone: [phone]  Email: [email or web address]          Follow-ups after your visit        Additional Services     Follow-Up with Cardiac Advanced Practice Provider       With labs and Echocardiogram Prior                  Your next 10 appointments already scheduled     May 30, 2018  1:00 PM CDT   (Arrive by  12:30 PM)   Return Visit with Jonatan Curry MD   [facility] ([facility])    [address]  [address]  [facility] [address] [phone]              Future tests that were ordered for you today     Open Future Orders        Priority Expected Expires Ordered    Echocardiogram Routine 1/30/2019 5/30/2019 5/30/2018    Basic metabolic panel Routine 1/30/2019 5/30/2019 5/30/2018    CBC with platelets Routine 1/30/2019 5/30/2019 5/30/2018    Comprehensive metabolic panel Routine 1/30/2019 5/30/2019 5/30/2018    Follow-Up with Cardiac Advanced Practice Provider Routine 1/30/2019 5/30/2019 5/30/2018    X-Ray KUB Routine 5/30/2018 5/30/2019 5/30/2018            Who to contact     If you have questions or need follow up information about today's clinic visit or your schedule please contact [facility] directly at [phone].  Normal or non-critical lab and imaging results will be communicated to you by [employer], letter or phone within 4 business days after the clinic has received the results. If you do not hear from us within 7 days, please contact the clinic through [employer] or phone. If you have a critical or abnormal lab result, we will notify you by phone as soon as possible.  Submit refill requests through [employer] or call your pharmacy and they will forward the refill request to us. Please allow 3 business days for your refill to be completed.          Additional Information About Your Visit        [employer] Information     [employer] gives you secure access to your electronic health record. If you see a primary care provider, you can also send messages to your care team and make appointments. If you have questions, please call your primary care clinic.  If you do not have a primary care provider, please call [phone] and they will assist you.        Care EveryWhere ID     This is your Care EveryWhere ID. This could be used by other organizations to access your  [place] medical records  [id number]        Your Vitals Were     Pulse Height Pulse Oximetry BMI (Body Mass Index)          72 1.829 m (6') 96% 25.36 kg/m2         Blood Pressure from Last 3 Encounters:   05/30/18 125/86   02/27/18 113/68   02/27/18 143/75    Weight from Last 3 Encounters:   05/30/18 84.8 kg (187 lb)   02/27/18 84.6 kg (186 lb 8 oz)   02/05/18 85.8 kg (189 lb 1.6 oz)              We Performed the Following     Follow-Up with Pulmonary Hypertension Clinic          Today's Medication Changes          These changes are accurate as of 5/30/18 11:32 AM.  If you have any questions, ask your nurse or doctor.               Stop taking these medicines if you haven't already. Please contact your care team if you have questions.     furosemide 20 MG tablet   Commonly known as:  LASIX   Stopped by:  Khang Lindsey MD                    Primary Care Provider Office Phone # Fax #    Cristhian Busch [phone]       [facility] [address]  [facility] [address]        Equal Access to Services     [facility] AH: Hadii mehran carrillo hadasho Somitulali, waaxda luqadaha, qaybta kaalmada adejef, silvano gonzalez . So [facility] [phone].    ATENCIÓN: Si habla español, tiene a hutton disposición servicios gratuitos de asistencia lingüística. Llame al [phone].    We comply with applicable federal civil rights laws and [place] laws. We do not discriminate on the basis of race, color, national origin, age, disability, sex, sexual orientation, or gender identity.            Thank you!     Thank you for choosing [facility]  for your care. Our goal is always to provide you with excellent care. Hearing back from our patients is one way we can continue to improve our services. Please take a few minutes to complete the written survey that you may receive in the mail after your visit with us. Thank you!             Your Updated Medication List - Protect others around you:  Learn how to safely use, store and throw away your medicines at www.disposemymeds.org.          This list is accurate as of 5/30/18 11:32 AM.  Always use your most recent med list.                   Brand Name Dispense Instructions for use Diagnosis    albuterol 108 (90 Base) MCG/ACT Inhaler    PROAIR HFA    1 Inhaler    Inhale 4-6 puffs into the lungs every 6 hours as needed    Pulmonary hypertension, Mixed hyperlipidemia, COPD (chronic obstructive pulmonary disease) (H)       budesonide 0.25 MG/2ML neb solution    PULMICORT    360 mL    INHALE THE CONTENTS OF 1   VIAL TWO TIMES A DAY    ILD (interstitial lung disease) (H)       digoxin 125 MCG tablet    LANOXIN    90 tablet    Take 1 tablet (125 mcg) by mouth daily    Pulmonary hypertension       LETAIRIS 10 MG tablet   Generic drug:  ambrisentan     30 tablet    Take 1 tablet (10 mg) by mouth daily    Pulmonary hypertension       tadalafil (PAH) 20 MG Tabs    ADCIRCA    60 tablet    Take 2 tablets (40 mg) by mouth daily    Pulmonary hypertension       treprostinil 0.6 MG/ML Soln neb solution    TYVASO    3.6 mL    12 breaths 4 times daily    Pulmonary hypertension       vitamin D 2000 units Caps      Take 1 capsule by mouth daily.

## 2018-05-30 NOTE — PROGRESS NOTES
Assessment and Plan:   Recurrent kidney stone: CaOx stone, still have several stone in CT scan 11/17, risk factor now include low urine output, low citrate, low urine PH increase  Risk for uric acid stone.   -- drink 100 oz fluid daily  -- low sodium <2400mg daily  -- increase dietary calcium at least 1000mg daily from food.  -- protein in moderation    Hypocitrateuria: urine citrate improving from 102 to 334 but not at goal of higher than 450, consider adding lime or lemon juice to diet. Lemon juice 2-4 oz daily     Ckd stage 3 A: at baseline Cr 1.3 and GFR 55, stable since 2012.     BP and volume: normal bp and volume status is eovolemic.     Sarcoidosis: put him at risk for kidney stone, but his 1,25 vit D level is low    BMD: Ca 9.2, Phos 2.4, Albumin 3.5, Vit D 29, 1.25Vit D 17.4, Pth normal     Patient seen and discussed  with Dr. Ba.  Jonatan Curry  Nephrology Fellow  Pager: [phone]        Assessment and plan was discussed with patient and he voiced his understanding and agreement.    Attestation:  This patient has been seen and evaluated by me, Lilly Ba MD on 5/30/18 .  Discussed with the fellow or resident and agree with the findings and plan in this note.  I have reviewed  Medications, Vital Signs and Labs.    Lilly Ba MD  [facility]  Department of Medicine  Division of Renal Disease and Hypertension  [phone]       Reason for Visit:  Jimmy Isaac is a 57 year old male with nephrolithiasis, who presents for routine follow up.     HPI:  Jimmy Isaac is a 57 year old man with history of sarcoidosis diagnosed in 1991 complicated by pulmonary hypertension, hypoxia on home oxygen when ambulating (Dr. Ruvalcaba note reviewed, no e/o left/right shunt on echo, he does have peribronchial infiltrates so started on prednisone) and nephrolithiasis (calcium oxalate).    He was initiate dx kidney stones in 1991 with new dx sarcoidosis and was told to drink a  lot of water.  Stones were noted again in 2012 incidentally on imaging for pulmonary hypertension but he has also passed stones which were analyzed in 2012 and showed calcium oxalate.    Since last visit he seems ok, his daily routine is 2 bottles of water day time and one at night, 3 cup of coffee the total fluid is about 100 oz /day. He confirmed low salt diet and moderate meat and protein intake. He passed stone several weeks ago and still have occasional blood on and off in his urine, deny any flank pain no fever no nausea or vomiting , breathing at baseline on continuous oxygen supplementation. No other complaint.     I reviewed 24 hour urine chemistries, done on 05/07/18 that shows volume of 1.39 L, calcium 187 with sodium of 119 and protein catabolic rate of 0.8.   The 24 hour urine oxalate is 34 and super-saturation of calcium oxalate 9.18.   24 hour urine citrate is 334 with pH of 5.53.  24 hour urine uric acid is 438 mg/day with super-saturation uric acid of 1.37.    In terms of calcium phosphate, risk is low with low urine pH.  24 hour urine potassium is 41 mmol/day  24 hour urine magnesium is 98 mg/day      Home BP: Not checked    Baseline Cr: 1.4    ROS:  A comprehensive review of systems was obtained and negative, except as noted in the HPI or PMH.    Active Medical Problems:  Patient Active Problem List   Diagnosis     Pulmonary hypertension     Sarcoid - pulmonary     Renal insufficiency due to nephrolithiasis     Dyspnea on exertion       Personal Hx:   Social History     Social History     Marital status: Single     Spouse name: N/A     Number of children: N/A     Years of education: N/A     Occupational History     Not on file.     Social History Main Topics     Smoking status: Former Smoker     Packs/day: 1.00     Years: 30.00     Types: Cigarettes     Start date: 10/10/1975     Quit date: 3/1/2010     Smokeless tobacco: Never Used     Alcohol use 0.0 oz/week     0 Standard drinks or equivalent  per week      Comment: stated 2 bottles of beer occ     Drug use: No      Comment: past use of marijuana     Sexual activity: Not Currently     Partners: Female     Birth control/ protection: Abstinence, Female Surgical     Other Topics Concern     Caffeine Concern Yes     Exercise Yes     Social History Narrative       Allergies:  Allergies   Allergen Reactions     Nkda [No Known Drug Allergies]        Medications:  Current Outpatient Prescriptions   Medication     albuterol (PROAIR HFA) 108 (90 Base) MCG/ACT Inhaler     budesonide (PULMICORT) 0.25 MG/2ML neb solution     Cholecalciferol (VITAMIN D) 2000 UNITS CAPS     digoxin (LANOXIN) 125 MCG tablet     LETAIRIS 10 MG tablet     tadalafil, PAH, (ADCIRCA) 20 MG TABS     treprostinil (TYVASO) 0.6 MG/ML SOLN     No current facility-administered medications for this visit.        Vitals:  /70  Pulse 78  Temp 97.5  F (36.4  C) (Oral)  Ht 1.829 m (6')  Wt 84.6 kg (186 lb 9.6 oz)  SpO2 93%  BMI 25.31 kg/m2    Exam:  GENERAL APPEARANCE: alert and no distress  HENT: mouth without ulcers or lesions  LYMPHATICS: no cervical or supraclavicular nodes  RESP: lungs clear to auscultation - no rales, rhonchi or wheezes  CV: regular rhythm, normal rate, no rub, no murmur  EDEMA: no LE edema bilaterally  ABDOMEN: soft, nondistended, nontender, bowel sounds normal  MS: extremities normal - no gross deformities noted, no evidence of inflammation in joints, no muscle tenderness  SKIN: no rash    Results:  Electrolytes/Renal -   Recent Labs   Lab Test  05/30/18   1025  02/05/18   1049  11/01/17   1047  10/18/17   1121   03/09/12   0723  03/08/12   0657  03/07/12   1527   NA  138  137  136  138   < >  138  137  139   POTASSIUM  4.2  4.0  4.2  4.4   < >  4.2  3.8  4.4   CHLORIDE  104  99  98  102   < >  98  100  98   CO2  28  31  30  29   < >  33*  29  34*   BUN  24  27  28  29   < >  24  28  31*   CR  1.34*  1.28*  1.30*  1.29*   < >  1.84*  1.69*  1.86*   GLC  100*  99   146*  122*   < >  96  86  104*   TERI  9.2  9.0  8.7  8.7   < >  8.7  8.5  9.0   MAG   --    --    --    --    --   2.0  2.0  1.9   PHOS  2.4*   --   1.7*  1.7*   --    --    --    --     < > = values in this interval not displayed.       CBC -   Recent Labs   Lab Test  05/30/18   1025  02/05/18   1049  11/01/17   1047   WBC  6.2  7.6  15.4*   HGB  14.5  14.3  15.2   PLT  148*  165  160       LFTs -   Recent Labs   Lab Test  05/30/18   1025  02/05/18   1049  11/01/17   1047   ALKPHOS  69  69  66   BILITOTAL  0.6  0.5  0.7   ALT  18  23  24   AST  19  22  16   PROTTOTAL  7.4  7.4  6.8   ALBUMIN  3.8  3.8  3.7       Coags -   Recent Labs   Lab Test  03/09/12   0723  03/08/12   0657  01/12/12   1147   INR  1.18*  1.15*  1.15*       Iron Panel -   Recent Labs   Lab Test  10/18/17   1121  01/12/12   1147   IRON  82  49   IRONSAT  29  14*   MIHIR  41   --        Endocrine -   Recent Labs   Lab Test  11/01/17   1047  08/23/16   1431  01/12/12   1147   A1C  6.2*  6.2*   --    TSH   --    --   2.05

## 2018-05-30 NOTE — PATIENT INSTRUCTIONS
Your kidney function stable at baseline Cr 1.3 and GFR55  Your risk of stone still high with low UOP cont drinking at least 100 oz of fluid daily, your urine citrate is low consider adding lime or lemon juice 2-4 oz to your daily water.    Will order 24 hr urine before last visit     Basic advice to avoid kidney stones  - Drink 100 ounces of fluid daily  - keep urine volume greater than 72 ounces per day  - low sodium diet (less than 2400 mg sodium per day)  - plenty of dietary calcium.  Please have one high calcium food three times a day with meals - can be either 8 oz milk, 6 oz yogurt or 2 oz low sodium cheese or 8 oz calcium fortified OJ/dairy alternative)   ---- Total should be at least 1000 mg calcium a day from food  - Protein (meat) in moderation  - add lemon or lime to foods and beverages.  Consider 2-4 oz lemon or lime juice diluted in water.  I advise against lemonade since it is high in sugar and low in actual lemon juice.    Follow up in 3 months.

## 2018-05-30 NOTE — MR AVS SNAPSHOT
After Visit Summary   5/30/2018    Jimmy Isaac    MRN: [id number]           Patient Information     Date Of Birth          1960        Visit Information        Provider Department      5/30/2018 1:00 PM Jonatan Curry MD [facility]        Care Instructions    Your kidney function stable at baseline Cr 1.3 and GFR55  Your risk of stone still high with low UOP cont drinking at least 100 oz of fluid daily, your urine citrate is low consider adding lime or lemon juice 2-4 oz to your daily water.    Will order 24 hr urine before last visit     Basic advice to avoid kidney stones  - Drink 100 ounces of fluid daily  - keep urine volume greater than 72 ounces per day  - low sodium diet (less than 2400 mg sodium per day)  - plenty of dietary calcium.  Please have one high calcium food three times a day with meals - can be either 8 oz milk, 6 oz yogurt or 2 oz low sodium cheese or 8 oz calcium fortified OJ/dairy alternative)   ---- Total should be at least 1000 mg calcium a day from food  - Protein (meat) in moderation  - add lemon or lime to foods and beverages.  Consider 2-4 oz lemon or lime juice diluted in water.  I advise against lemonade since it is high in sugar and low in actual lemon juice.    Follow up in 3 months.           Follow-ups after your visit        Follow-up notes from your care team     Return in about 3 months (around 8/30/2018).      Future tests that were ordered for you today     Open Future Orders        Priority Expected Expires Ordered    Echocardiogram Routine 1/30/2019 5/30/2019 5/30/2018    Basic metabolic panel Routine 1/30/2019 5/30/2019 5/30/2018    CBC with platelets Routine 1/30/2019 5/30/2019 5/30/2018    Comprehensive metabolic panel Routine 1/30/2019 5/30/2019 5/30/2018    Follow-Up with Cardiac Advanced Practice Provider Routine 1/30/2019 5/30/2019 5/30/2018    X-Ray KUB Routine 5/30/2018 5/30/2019 5/30/2018            Who to contact     If you have  questions or need follow up information about today's clinic visit or your schedule please contact [facility] directly at [phone].  Normal or non-critical lab and imaging results will be communicated to you by [employer], letter or phone within 4 business days after the clinic has received the results. If you do not hear from us within 7 days, please contact the clinic through [employer] or phone. If you have a critical or abnormal lab result, we will notify you by phone as soon as possible.  Submit refill requests through [employer] or call your pharmacy and they will forward the refill request to us. Please allow 3 business days for your refill to be completed.          Additional Information About Your Visit        [employer] Information     [employer] gives you secure access to your electronic health record. If you see a primary care provider, you can also send messages to your care team and make appointments. If you have questions, please call your primary care clinic.  If you do not have a primary care provider, please call [phone] and they will assist you.        Care EveryWhere ID     This is your Care EveryWhere ID. This could be used by other organizations to access your [place] medical records  [id number]        Your Vitals Were     Pulse Temperature Height Pulse Oximetry BMI (Body Mass Index)       78 97.5  F (36.4  C) (Oral) 1.829 m (6') 93% 25.31 kg/m2        Blood Pressure from Last 3 Encounters:   05/30/18 119/70   05/30/18 125/86   02/27/18 113/68    Weight from Last 3 Encounters:   05/30/18 84.6 kg (186 lb 9.6 oz)   05/30/18 84.8 kg (187 lb)   02/27/18 84.6 kg (186 lb 8 oz)              Today, you had the following     No orders found for display         Today's Medication Changes          These changes are accurate as of 5/30/18  2:16 PM.  If you have any questions, ask your nurse or doctor.               Stop taking these medicines if you haven't already. Please contact your care team if  you have questions.     furosemide 20 MG tablet   Commonly known as:  LASIX   Stopped by:  Khang Lindsey MD                    Primary Care Provider Office Phone # Fax #    Cristhian Busch [phone]       [facility] [address]  [facility] [address]        Equal Access to Services     [facility]: Hadii aad ku hadasho Soomaali, waaxda luqadaha, qaybta kaalmada adeegyada, waxay idiin hayaan adeeg king lashaunan . So [facility] [phone].    ATENCIÓN: Si habla español, tiene a hutton disposición servicios gratuitos de asistencia lingüística. [facility] [phone].    We comply with applicable federal civil rights laws and [place] laws. We do not discriminate on the basis of race, color, national origin, age, disability, sex, sexual orientation, or gender identity.            Thank you!     Thank you for choosing [facility]  for your care. Our goal is always to provide you with excellent care. Hearing back from our patients is one way we can continue to improve our services. Please take a few minutes to complete the written survey that you may receive in the mail after your visit with us. Thank you!             Your Updated Medication List - Protect others around you: Learn how to safely use, store and throw away your medicines at www.disposemymeds.org.          This list is accurate as of 5/30/18  2:16 PM.  Always use your most recent med list.                   Brand Name Dispense Instructions for use Diagnosis    albuterol 108 (90 Base) MCG/ACT Inhaler    PROAIR HFA    1 Inhaler    Inhale 4-6 puffs into the lungs every 6 hours as needed    Pulmonary hypertension, Mixed hyperlipidemia, COPD (chronic obstructive pulmonary disease) (H)       budesonide 0.25 MG/2ML neb solution    PULMICORT    360 mL    INHALE THE CONTENTS OF 1   VIAL TWO TIMES A DAY    ILD (interstitial lung disease) (H)       digoxin 125 MCG tablet    LANOXIN    90 tablet    Take 1 tablet (125 mcg) by mouth daily     Pulmonary hypertension       LETAIRIS 10 MG tablet   Generic drug:  ambrisentan     30 tablet    Take 1 tablet (10 mg) by mouth daily    Pulmonary hypertension       tadalafil (PAH) 20 MG Tabs    ADCIRCA    60 tablet    Take 2 tablets (40 mg) by mouth daily    Pulmonary hypertension       treprostinil 0.6 MG/ML Soln neb solution    TYVASO    3.6 mL    12 breaths 4 times daily    Pulmonary hypertension       vitamin D 2000 units Caps      Take 1 capsule by mouth daily.

## 2018-05-30 NOTE — LETTER
5/30/2018      RE: Jimmy Isaac  [id number] [address] [address]       Assessment and Plan:   Recurrent kidney stone: CaOx stone, still have several stone in CT scan 11/17, risk factor now include low urine output, low citrate, low urine PH increase  Risk for uric acid stone.   -- drink 100 oz fluid daily  -- low sodium <2400mg daily  -- increase dietary calcium at least 1000mg daily from food.  -- protein in moderation    Hypocitrateuria: urine citrate improving from 102 to 334 but not at goal of higher than 450, consider adding lime or lemon juice to diet. Lemon juice 2-4 oz daily     Ckd stage 3 A: at baseline Cr 1.3 and GFR 55, stable since 2012.     BP and volume: normal bp and volume status is eovolemic.     Sarcoidosis: put him at risk for kidney stone, but his 1,25 vit D level is low    BMD: Ca 9.2, Phos 2.4, Albumin 3.5, Vit D 29, 1.25Vit D 17.4, Pth normal     Patient seen and discussed  with Dr. Ba.  Jonatan Curry  Nephrology Fellow  Pager: [phone]        Assessment and plan was discussed with patient and he voiced his understanding and agreement.    Attestation:  This patient has been seen and evaluated by me, Lilly Ba MD on 5/30/18 .  Discussed with the fellow or resident and agree with the findings and plan in this note.  I have reviewed  Medications, Vital Signs and Labs.    Lilly Ba MD  [facility]  Department of Medicine  Division of Renal Disease and Hypertension  [phone]       Reason for Visit:  Jimmy Isaac is a 57 year old male with nephrolithiasis, who presents for routine follow up.     HPI:  Jimmy Isaac is a 57 year old man with history of sarcoidosis diagnosed in 1991 complicated by pulmonary hypertension, hypoxia on home oxygen when ambulating (Dr. Ruvalcaba note reviewed, no e/o left/right shunt on echo, he does have peribronchial infiltrates so started on prednisone) and nephrolithiasis (calcium oxalate).    He  was initiate dx kidney stones in 1991 with new dx sarcoidosis and was told to drink a lot of water.  Stones were noted again in 2012 incidentally on imaging for pulmonary hypertension but he has also passed stones which were analyzed in 2012 and showed calcium oxalate.    Since last visit he seems ok, his daily routine is 2 bottles of water day time and one at night, 3 cup of coffee the total fluid is about 100 oz /day. He confirmed low salt diet and moderate meat and protein intake. He passed stone several weeks ago and still have occasional blood on and off in his urine, deny any flank pain no fever no nausea or vomiting , breathing at baseline on continuous oxygen supplementation. No other complaint.     I reviewed 24 hour urine chemistries, done on 05/07/18 that shows volume of 1.39 L, calcium 187 with sodium of 119 and protein catabolic rate of 0.8.   The 24 hour urine oxalate is 34 and super-saturation of calcium oxalate 9.18.   24 hour urine citrate is 334 with pH of 5.53.  24 hour urine uric acid is 438 mg/day with super-saturation uric acid of 1.37.    In terms of calcium phosphate, risk is low with low urine pH.  24 hour urine potassium is 41 mmol/day  24 hour urine magnesium is 98 mg/day      Home BP: Not checked    Baseline Cr: 1.4    ROS:  A comprehensive review of systems was obtained and negative, except as noted in the HPI or PMH.    Active Medical Problems:  Patient Active Problem List   Diagnosis     Pulmonary hypertension     Sarcoid - pulmonary     Renal insufficiency due to nephrolithiasis     Dyspnea on exertion       Personal Hx:   Social History     Social History     Marital status: Single     Spouse name: N/A     Number of children: N/A     Years of education: N/A     Occupational History     Not on file.     Social History Main Topics     Smoking status: Former Smoker     Packs/day: 1.00     Years: 30.00     Types: Cigarettes     Start date: 10/10/1975     Quit date: 3/1/2010     Smokeless  tobacco: Never Used     Alcohol use 0.0 oz/week     0 Standard drinks or equivalent per week      Comment: stated 2 bottles of beer occ     Drug use: No      Comment: past use of marijuana     Sexual activity: Not Currently     Partners: Female     Birth control/ protection: Abstinence, Female Surgical     Other Topics Concern     Caffeine Concern Yes     Exercise Yes     Social History Narrative       Allergies:  Allergies   Allergen Reactions     Nkda [No Known Drug Allergies]        Medications:  Current Outpatient Prescriptions   Medication     albuterol (PROAIR HFA) 108 (90 Base) MCG/ACT Inhaler     budesonide (PULMICORT) 0.25 MG/2ML neb solution     Cholecalciferol (VITAMIN D) 2000 UNITS CAPS     digoxin (LANOXIN) 125 MCG tablet     LETAIRIS 10 MG tablet     tadalafil, PAH, (ADCIRCA) 20 MG TABS     treprostinil (TYVASO) 0.6 MG/ML SOLN     No current facility-administered medications for this visit.        Vitals:  /70  Pulse 78  Temp 97.5  F (36.4  C) (Oral)  Ht 1.829 m (6')  Wt 84.6 kg (186 lb 9.6 oz)  SpO2 93%  BMI 25.31 kg/m2    Exam:  GENERAL APPEARANCE: alert and no distress  HENT: mouth without ulcers or lesions  LYMPHATICS: no cervical or supraclavicular nodes  RESP: lungs clear to auscultation - no rales, rhonchi or wheezes  CV: regular rhythm, normal rate, no rub, no murmur  EDEMA: no LE edema bilaterally  ABDOMEN: soft, nondistended, nontender, bowel sounds normal  MS: extremities normal - no gross deformities noted, no evidence of inflammation in joints, no muscle tenderness  SKIN: no rash    Results:  Electrolytes/Renal -   Recent Labs   Lab Test  05/30/18   1025  02/05/18   1049  11/01/17   1047  10/18/17   1121   03/09/12   0723  03/08/12   0657  03/07/12   1527   NA  138  137  136  138   < >  138  137  139   POTASSIUM  4.2  4.0  4.2  4.4   < >  4.2  3.8  4.4   CHLORIDE  104  99  98  102   < >  98  100  98   CO2  28  31  30  29   < >  33*  29  34*   BUN  24  27  28  29   < >  24  28   31*   CR  1.34*  1.28*  1.30*  1.29*   < >  1.84*  1.69*  1.86*   GLC  100*  99  146*  122*   < >  96  86  104*   TERI  9.2  9.0  8.7  8.7   < >  8.7  8.5  9.0   MAG   --    --    --    --    --   2.0  2.0  1.9   PHOS  2.4*   --   1.7*  1.7*   --    --    --    --     < > = values in this interval not displayed.       CBC -   Recent Labs   Lab Test  05/30/18   1025  02/05/18   1049  11/01/17   1047   WBC  6.2  7.6  15.4*   HGB  14.5  14.3  15.2   PLT  148*  165  160       LFTs -   Recent Labs   Lab Test  05/30/18   1025  02/05/18   1049  11/01/17   1047   ALKPHOS  69  69  66   BILITOTAL  0.6  0.5  0.7   ALT  18  23  24   AST  19  22  16   PROTTOTAL  7.4  7.4  6.8   ALBUMIN  3.8  3.8  3.7       Coags -   Recent Labs   Lab Test  03/09/12   0723  03/08/12   0657  01/12/12   1147   INR  1.18*  1.15*  1.15*       Iron Panel -   Recent Labs   Lab Test  10/18/17   1121  01/12/12   1147   IRON  82  49   IRONSAT  29  14*   MIHIR  41   --        Endocrine -   Recent Labs   Lab Test  11/01/17   1047  08/23/16   1431  01/12/12   1147   A1C  6.2*  6.2*   --    TSH   --    --   2.05       Jonatan Curry MD

## 2018-05-30 NOTE — NURSING NOTE
Procedures and/or Testing: Patient given instructions regarding  Echocardiogram. Discussed purpose, preparation, procedure and when to expect results reported back to the patient. Patient demonstrated understanding of this information and agreed to call with further questions or concerns.  Med Reconcile: Reviewed and verified all current medications with the patient. The updated medication list was printed and given to the patient.  Labs: Patient was given results of the laboratory testing obtained today. Patient demonstrated understanding of this information and agreed to call with further questions or concerns.   Diet: Patient instructed regarding a heart healthy diet, including discussion of reduced fat and sodium intake. Patient demonstrated understanding of this information and agreed to call with further questions or concerns.  Return Appointment: Patient given instructions regarding scheduling next clinic visit. Patient demonstrated understanding of this information and agreed to call with further questions or concerns.  Patient stated he understood all health information given and agreed to call with further questions or concerns.     Medication Changes:   No medication changes at this time. Please continue current medication regiment.    Patient Instructions:  Continue staying active and eat a heart healthy diet.      Follow up Appointment Information:  January with Echocardiogram and Labs    Results:  Component      Latest Ref Rng & Units 5/30/2018   Sodium      133 - 144 mmol/L 138   Potassium      3.4 - 5.3 mmol/L 4.2   Chloride      94 - 109 mmol/L 104   Carbon Dioxide      20 - 32 mmol/L 28   Anion Gap      3 - 14 mmol/L 7   Glucose      70 - 99 mg/dL 100 (H)   Urea Nitrogen      7 - 30 mg/dL 24   Creatinine      0.66 - 1.25 mg/dL 1.34 (H)   GFR Estimate      >60 mL/min/1.7m2 55 (L)   GFR Estimate If Black      >60 mL/min/1.7m2 66   Calcium      8.5 - 10.1 mg/dL 9.2   Bilirubin Total      0.2 - 1.3  mg/dL 0.6   Albumin      3.4 - 5.0 g/dL 3.8   Protein Total      6.8 - 8.8 g/dL 7.4   Alkaline Phosphatase      40 - 150 U/L 69   ALT      0 - 70 U/L 18   AST      0 - 45 U/L 19   WBC      4.0 - 11.0 10e9/L 6.2   RBC Count      4.4 - 5.9 10e12/L 4.87   Hemoglobin      13.3 - 17.7 g/dL 14.5   Hematocrit      40.0 - 53.0 % 43.8   MCV      78 - 100 fl 90   MCH      26.5 - 33.0 pg 29.8   MCHC      31.5 - 36.5 g/dL 33.1   RDW      10.0 - 15.0 % 13.5   Platelet Count      150 - 450 10e9/L 148 (L)   Protein Random Urine      g/L 0.20   Protein Total Urine g/gr Creatinine      0 - 0.2 g/g Cr 0.29 (H)   PSA      0 - 4 ug/L 1.63   N-Terminal Pro Bnp      0 - 125 pg/mL 445 (H)   Phosphorus      2.5 - 4.5 mg/dL 2.4 (L)   Creatinine Urine      mg/dL 68

## 2018-05-31 ENCOUNTER — TELEPHONE (OUTPATIENT)
Dept: UROLOGY | Facility: CLINIC | Age: 58
End: 2018-05-31

## 2018-05-31 NOTE — TELEPHONE ENCOUNTER
LVM for pt to call back and make f/u appt with   Dr. Moura with a KUB XR done prior. Next available

## 2018-07-02 DIAGNOSIS — I27.20 PULMONARY HYPERTENSION (H): Primary | ICD-10-CM

## 2018-07-02 RX ORDER — DIGOXIN 125 MCG
125 TABLET ORAL DAILY
Qty: 90 TABLET | Refills: 2 | Status: SHIPPED | OUTPATIENT
Start: 2018-07-02 | End: 2019-04-01

## 2018-07-02 NOTE — TELEPHONE ENCOUNTER
Medication Refill double check:    Last office visit was on 5/30/18 with .    Follow up was recommended for January 2019.    Any additional encounters with changes to requested med? no    Authorizing provider is: Dr. Lindsey    Refill was approved.     Additional orders/notes: regina Rome RN on 7/2/2018 at 3:21 PM

## 2018-08-01 ENCOUNTER — TELEPHONE (OUTPATIENT)
Dept: NEPHROLOGY | Facility: CLINIC | Age: 58
End: 2018-08-01

## 2018-08-01 NOTE — TELEPHONE ENCOUNTER
Call to patient regarding Litholink test and about scheduling follow up appointment with Nephrology. Left VM. Provided number for call back.  Elin Schmitt LPN  Nephrology  [phone]

## 2018-09-12 ENCOUNTER — MEDICAL CORRESPONDENCE (OUTPATIENT)
Dept: HEALTH INFORMATION MANAGEMENT | Facility: CLINIC | Age: 58
End: 2018-09-12

## 2018-09-18 DIAGNOSIS — J84.9 ILD (INTERSTITIAL LUNG DISEASE) (H): ICD-10-CM

## 2018-09-18 RX ORDER — BUDESONIDE 0.25 MG/2ML
0.25 INHALANT ORAL 2 TIMES DAILY
Qty: 360 ML | Refills: 0 | Status: SHIPPED | OUTPATIENT
Start: 2018-09-18 | End: 2018-12-18

## 2018-09-27 ENCOUNTER — MYC MEDICAL ADVICE (OUTPATIENT)
Dept: PULMONOLOGY | Facility: CLINIC | Age: 58
End: 2018-09-27

## 2018-09-28 DIAGNOSIS — J84.9 ILD (INTERSTITIAL LUNG DISEASE) (H): Primary | ICD-10-CM

## 2018-09-28 RX ORDER — CEPHALEXIN 500 MG/1
500 CAPSULE ORAL 2 TIMES DAILY
Qty: 20 CAPSULE | Refills: 0 | Status: SHIPPED | OUTPATIENT
Start: 2018-09-28 | End: 2018-10-29

## 2018-10-08 ENCOUNTER — TELEPHONE (OUTPATIENT)
Dept: NEPHROLOGY | Facility: CLINIC | Age: 58
End: 2018-10-08

## 2018-10-08 ENCOUNTER — MYC MEDICAL ADVICE (OUTPATIENT)
Dept: CARDIOLOGY | Facility: CLINIC | Age: 58
End: 2018-10-08

## 2018-10-08 DIAGNOSIS — Z13.9 ENCOUNTER FOR HEALTH-RELATED SCREENING: Primary | ICD-10-CM

## 2018-10-08 NOTE — TELEPHONE ENCOUNTER
Left Vm for patient regarding Litholink test, to make sure kit was received and regarding questions if he has them. Provided number for call back.  Elin Schmitt LPN  Nephrology  [phone]

## 2018-10-17 NOTE — TELEPHONE ENCOUNTER
Order placed for Nicotine & cotinine per patient request for employment. Obdulia Rome RN on 10/17/2018 at 12:17 PM

## 2018-10-29 ENCOUNTER — PATIENT OUTREACH (OUTPATIENT)
Dept: CARE COORDINATION | Facility: CLINIC | Age: 58
End: 2018-10-29

## 2018-10-29 ENCOUNTER — OFFICE VISIT (OUTPATIENT)
Dept: CARDIOLOGY | Facility: CLINIC | Age: 58
End: 2018-10-29
Attending: NURSE PRACTITIONER
Payer: COMMERCIAL

## 2018-10-29 ENCOUNTER — RADIANT APPOINTMENT (OUTPATIENT)
Dept: CARDIOLOGY | Facility: CLINIC | Age: 58
End: 2018-10-29
Payer: COMMERCIAL

## 2018-10-29 VITALS
WEIGHT: 180 LBS | DIASTOLIC BLOOD PRESSURE: 70 MMHG | HEIGHT: 72 IN | BODY MASS INDEX: 24.38 KG/M2 | SYSTOLIC BLOOD PRESSURE: 136 MMHG | HEART RATE: 89 BPM | OXYGEN SATURATION: 92 %

## 2018-10-29 DIAGNOSIS — I27.20 PULMONARY HYPERTENSION (H): ICD-10-CM

## 2018-10-29 DIAGNOSIS — Z13.9 ENCOUNTER FOR HEALTH-RELATED SCREENING: ICD-10-CM

## 2018-10-29 DIAGNOSIS — D86.9 SARCOIDOSIS: ICD-10-CM

## 2018-10-29 DIAGNOSIS — R06.09 DYSPNEA ON EXERTION: ICD-10-CM

## 2018-10-29 DIAGNOSIS — N20.0 CALCULUS OF KIDNEY: ICD-10-CM

## 2018-10-29 LAB
ALBUMIN SERPL-MCNC: 3.8 G/DL (ref 3.4–5)
ALP SERPL-CCNC: 77 U/L (ref 40–150)
ALT SERPL W P-5'-P-CCNC: 20 U/L (ref 0–70)
ANION GAP SERPL CALCULATED.3IONS-SCNC: 8 MMOL/L (ref 3–14)
AST SERPL W P-5'-P-CCNC: 19 U/L (ref 0–45)
BILIRUB SERPL-MCNC: 0.5 MG/DL (ref 0.2–1.3)
BUN SERPL-MCNC: 21 MG/DL (ref 7–30)
CALCIUM SERPL-MCNC: 9 MG/DL (ref 8.5–10.1)
CHLORIDE SERPL-SCNC: 102 MMOL/L (ref 94–109)
CHOLEST SERPL-MCNC: 188 MG/DL
CO2 SERPL-SCNC: 28 MMOL/L (ref 20–32)
CREAT SERPL-MCNC: 1.31 MG/DL (ref 0.66–1.25)
ERYTHROCYTE [DISTWIDTH] IN BLOOD BY AUTOMATED COUNT: 12.9 % (ref 10–15)
GFR SERPL CREATININE-BSD FRML MDRD: 56 ML/MIN/1.7M2
GLUCOSE SERPL-MCNC: 113 MG/DL (ref 70–99)
HBA1C MFR BLD: 5.8 % (ref 0–5.6)
HCT VFR BLD AUTO: 41.3 % (ref 40–53)
HDLC SERPL-MCNC: 34 MG/DL
HGB BLD-MCNC: 13.9 G/DL (ref 13.3–17.7)
LDLC SERPL CALC-MCNC: 120 MG/DL
MCH RBC QN AUTO: 30.2 PG (ref 26.5–33)
MCHC RBC AUTO-ENTMCNC: 33.7 G/DL (ref 31.5–36.5)
MCV RBC AUTO: 90 FL (ref 78–100)
NONHDLC SERPL-MCNC: 154 MG/DL
PLATELET # BLD AUTO: 178 10E9/L (ref 150–450)
POTASSIUM SERPL-SCNC: 3.8 MMOL/L (ref 3.4–5.3)
PROT SERPL-MCNC: 7.5 G/DL (ref 6.8–8.8)
RBC # BLD AUTO: 4.6 10E12/L (ref 4.4–5.9)
SODIUM SERPL-SCNC: 138 MMOL/L (ref 133–144)
TRIGL SERPL-MCNC: 174 MG/DL
WBC # BLD AUTO: 7.1 10E9/L (ref 4–11)

## 2018-10-29 PROCEDURE — 85027 COMPLETE CBC AUTOMATED: CPT | Performed by: INTERNAL MEDICINE

## 2018-10-29 PROCEDURE — 36415 COLL VENOUS BLD VENIPUNCTURE: CPT | Performed by: INTERNAL MEDICINE

## 2018-10-29 PROCEDURE — 99214 OFFICE O/P EST MOD 30 MIN: CPT | Mod: 25 | Performed by: NURSE PRACTITIONER

## 2018-10-29 PROCEDURE — G0463 HOSPITAL OUTPT CLINIC VISIT: HCPCS

## 2018-10-29 PROCEDURE — 80053 COMPREHEN METABOLIC PANEL: CPT | Performed by: INTERNAL MEDICINE

## 2018-10-29 PROCEDURE — 80323 ALKALOIDS NOS: CPT | Performed by: INTERNAL MEDICINE

## 2018-10-29 PROCEDURE — 80061 LIPID PANEL: CPT | Performed by: INTERNAL MEDICINE

## 2018-10-29 PROCEDURE — 83036 HEMOGLOBIN GLYCOSYLATED A1C: CPT | Performed by: INTERNAL MEDICINE

## 2018-10-29 RX ADMIN — Medication 3 ML: at 14:15

## 2018-10-29 ASSESSMENT — ENCOUNTER SYMPTOMS
SORE THROAT: 0
TASTE DISTURBANCE: 0
SPUTUM PRODUCTION: 1
SNORES LOUDLY: 1
SMELL DISTURBANCE: 0
SHORTNESS OF BREATH: 1
HOARSE VOICE: 0
WHEEZING: 1
SLEEP DISTURBANCES DUE TO BREATHING: 0
DYSPNEA ON EXERTION: 1
SINUS PAIN: 1
PALPITATIONS: 1
COUGH DISTURBING SLEEP: 0
TROUBLE SWALLOWING: 0
HEMOPTYSIS: 0
POSTURAL DYSPNEA: 1
COUGH: 1
ORTHOPNEA: 0
LEG PAIN: 0
LIGHT-HEADEDNESS: 0
HYPERTENSION: 0
HYPOTENSION: 0
EXERCISE INTOLERANCE: 1
SINUS CONGESTION: 1
NECK MASS: 0
SYNCOPE: 0

## 2018-10-29 ASSESSMENT — PAIN SCALES - GENERAL: PAINLEVEL: MILD PAIN (3)

## 2018-10-29 NOTE — NURSING NOTE
Chief Complaint   Patient presents with     Follow Up For     3 month f/u     Medications reviewed and vitals performed.  Debby Guerra CMA

## 2018-10-29 NOTE — PROGRESS NOTES
October 29, 2018      Mr. Jimmy Isaac is a pleasant 57 year old male with a past medical history of pulmonary hypertension, bronchial sarcoid, chronic kidney disease, and diabetes.  His current PH therapy is Letairis 10 mg daily, Tadalafil 40 mg daily and Tyvaso 12 breaths QID.    Impression:  1. Pulmonary arterial hypertension  2. Chronic, oxygen dependent respiratory failure  3. Sarcoidosis without cardiac involvement  4. Nephrolithiasis  5. Prednisone related fluid retention     Today, he is accompanied by: no one; here alone    Current Symptoms  1. Any ER visits or hospital admissions since last appointment: No. But over the summer had a  URI that took almost a month to feel better. On 10 day course of antibiotics.     2. Shortness of breath: Yes: with exertion, using 1L at rest, will turn up to 6-8L with activity    3. Dizziness or lightheadedness: No  4. Any syncopal episodes or falls: No  5. Chest pain or chest tightness: Yes: tightness with exertion and relieves with rest  6. Nausea, vomiting, diarrhea, constipation: No  7. Swelling in the legs: Yes: mild here and there  8. Bloating in the abdomen: No  9. PND; Waking up at night coughing, choking or SOB: No  10. Any medication intolerances: No  11. Reported headaches: No  12. Jaw pain or bone/joint pain: No  13. On IV Prostacyclin: No; current dose: N/A    14. Current level of activity: active/takes care of 4 horses, works full time      PAST MEDICAL HISTORY:  Past Medical History:   Diagnosis Date     Chronic sinusitis      CKD (chronic kidney disease)      Hypertension      Keratosis     frontal scalp, treated with liquid nitrogen at [facility]     Malignant neoplasm (H)      Pneumonia a few times     Pulmonary sarcoidosis (H)          FAMILY HISTORY:  Family History   Problem Relation Age of Onset     Coronary Artery Disease Father      HEART DISEASE Father      heart attack age 35-40     Hypertension Mother      Hyperlipidemia Mother       Cerebrovascular Disease Mother      Dementia Mother      Lupus Sister      Glaucoma No family hx of      Macular Degeneration No family hx of          SOCIAL HISTORY:  Social History   Substance Use Topics     Smoking status: Former Smoker     Packs/day: 1.00     Years: 30.00     Types: Cigarettes     Start date: 10/10/1975     Quit date: 3/1/2010     Smokeless tobacco: Never Used     Alcohol use 0.0 oz/week     0 Standard drinks or equivalent per week      Comment: stated 2 bottles of beer occ         CURRENT MEDICATIONS:  Current Outpatient Prescriptions   Medication Sig Dispense Refill     albuterol (PROAIR HFA) 108 (90 Base) MCG/ACT Inhaler Inhale 4-6 puffs into the lungs every 6 hours as needed 1 Inhaler 6     budesonide (PULMICORT) 0.25 MG/2ML neb solution Take 2 mLs (0.25 mg) by nebulization 2 times daily 360 mL 0     Cholecalciferol (VITAMIN D) 2000 UNITS CAPS Take 1 capsule by mouth daily.       digoxin (LANOXIN) 125 MCG tablet Take 1 tablet (125 mcg) by mouth daily 90 tablet 2     LETAIRIS 10 MG tablet Take 1 tablet (10 mg) by mouth daily 30 tablet 11     tadalafil, PAH, (ADCIRCA) 20 MG TABS Take 2 tablets (40 mg) by mouth daily 60 tablet 11     treprostinil (TYVASO) 0.6 MG/ML SOLN 12 breaths 4 times daily 3.6 mL 11     cephALEXin (KEFLEX) 500 MG capsule Take 1 capsule (500 mg) by mouth 2 times daily (Patient not taking: Reported on 10/29/2018) 20 capsule 0         ROS:   10 point ROS of systems including Constitutional, Eyes, Respiratory, Cardiovascular, Gastroenterology, Genitourinary, Integumentary, Muscularskeletal, Psychiatric were all negative except for pertinent positives noted in my HPI.    EXAM:  /86 (BP Location: Left arm, Patient Position: Chair, Cuff Size: Adult Regular)  Pulse 89  Ht 1.829 m (6')  Wt 81.6 kg (180 lb)  SpO2 92%  BMI 24.41 kg/m2  General: appears comfortable, alert and articulate  Head: normocephalic, atraumatic  Eyes: anicteric sclera, EOMI  Neck: no  adenopathy  Orophyarynx: moist mucosa, no lesions, dentition intact  Heart: regular, S1/S2, no murmur, gallop, rub, estimated JVP non elevated  Lungs: clear, no rales or wheezing  Abdomen: soft, non-tender, bowel sounds present, no hepatosplenomegaly  Extremities: no clubbing, cyanosis or edema  Neurological: normal speech and affect, no gross motor deficits      Weight  Wt Readings from Last 10 Encounters:   10/29/18 81.6 kg (180 lb)   05/30/18 84.6 kg (186 lb 9.6 oz)   05/30/18 84.8 kg (187 lb)   02/27/18 84.6 kg (186 lb 8 oz)   02/05/18 85.8 kg (189 lb 1.6 oz)   12/12/17 79.4 kg (175 lb)   11/21/17 85.3 kg (188 lb)   11/16/17 85.3 kg (188 lb)   11/01/17 85.3 kg (188 lb)   11/01/17 85.2 kg (187 lb 14.4 oz)         Labs:    CBC RESULTS:  Lab Results   Component Value Date    WBC 7.1 10/29/2018    RBC 4.60 10/29/2018    HGB 13.9 10/29/2018    HCT 41.3 10/29/2018    MCV 90 10/29/2018    MCH 30.2 10/29/2018    MCHC 33.7 10/29/2018    RDW 12.9 10/29/2018     10/29/2018       CMP RESULTS:  Lab Results   Component Value Date     10/29/2018    POTASSIUM 3.8 10/29/2018    CHLORIDE 102 10/29/2018    CO2 28 10/29/2018    ANIONGAP 8 10/29/2018     (H) 10/29/2018    BUN 21 10/29/2018    CR 1.31 (H) 10/29/2018    GFRESTIMATED 56 (L) 10/29/2018    GFRESTBLACK 68 10/29/2018    TERI 9.0 10/29/2018    BILITOTAL 0.5 10/29/2018    ALBUMIN 3.8 10/29/2018    ALKPHOS 77 10/29/2018    ALT 20 10/29/2018    AST 19 10/29/2018        INR RESULTS:  Lab Results   Component Value Date    INR 1.18 (H) 03/09/2012       BNP RESULTS:  Lab Results   Component Value Date    NTBNPI 260 04/12/2016     No results found for: BNP      Recent Tests:    Echocardiogram (9/22/2017):  Interpretation Summary  Technically difficult study.Poor acoustic windows.  Global and regional left ventricular function is normal with an EF of 60-65%.  The right ventricle is normal size.  Global right ventricular function is normal.  Pulmonary artery  systolic pressure cannot be assessed.  The inferior vena cava was normal in size with preserved respiratory  variability.  Estimated mean right atrial pressure is 3 mmHg.  No pericardial effusion is present.  There was no shunt at the atrial septal level as assessed by agitated saline  bubble study at rest and with Valsalva maneuver.  This study was compared with the study from 5/25/2017. RV systolic function is  normal in this study.      Echocardiogram (10/29/2018):  Interpretation Summary  Technically difficult study.  Global and regional left ventricular function is normal with an EF of 55-60%.  Mild right ventricular dilation is present.  Global right ventricular function is normal.  Right ventricular systolic pressure is 28mmHg above the right atrial pressure.  No significant valvular abnormalities.  The inferior vena cava was normal in size with preserved respiratory  variability.  No pericardial effusion is present.  This study was compared to a TTE from 9/22/2017. There has been no significant  change.      RHC (2/27/2018):        Assessment and Plan:   Mr. Jimmy Isaac is a 57 year old male with pulmonary hypertension and bronchial sarcoid.  WHO Group V, NYHA Functional Class III.     #PH Plan  -Patient is stable from PH prospective with unchanged repeat echocardiogram  -Continue current regimen of Letairis, Adcirca and Tyvaso  -Return to clinic in three months to see Dr. Lindsey with repeat labs     It was a pleasure seeing Mr. Jimmy Isaac at the [facility].         Sincerely,  Nevaeh Griffith, APRN, CNP.

## 2018-10-29 NOTE — MR AVS SNAPSHOT
After Visit Summary   10/29/2018    Jimmy Isaac    MRN: [id number]           Patient Information     Date Of Birth          1960        Visit Information        Provider Department      10/29/2018 3:30 PM Nevaeh Griffith, ALLI [facility]        Today's Diagnoses     Pulmonary hypertension (H)        Sarcoidosis        Dyspnea on exertion          Care Instructions    Medication Changes:  1. No medication changes    Patient Instructions:  1. Continue staying active and eat a heart healthy diet.    2. Please keep current list of medications with you at all times.    3. Remember to weigh yourself daily after voiding and before you consume any food or beverages and log the numbers.  If you have gained/lost 2 pounds overnight or 5 pounds in a week contact us immediately for medication adjustments or further instructions.    4. **Please call us immediately if you have any syncope, chest pain, edema, or decline in your functional status.    Follow up Appointment Information:  1. Return to clinic in three months for follow up with Dr. Lindsey and labs    Results:  Results for JIMMY ISAAC (MRN [id number]) as of 10/29/2018 15:02   Ref. Range 10/29/2018 13:08   Sodium Latest Ref Range: 133 - 144 mmol/L 138   Potassium Latest Ref Range: 3.4 - 5.3 mmol/L 3.8   Chloride Latest Ref Range: 94 - 109 mmol/L 102   Carbon Dioxide Latest Ref Range: 20 - 32 mmol/L 28   Urea Nitrogen Latest Ref Range: 7 - 30 mg/dL 21   Creatinine Latest Ref Range: 0.66 - 1.25 mg/dL 1.31 (H)   GFR Estimate Latest Ref Range: >60 mL/min/1.7m2 56 (L)   GFR Estimate If Black Latest Ref Range: >60 mL/min/1.7m2 68   Calcium Latest Ref Range: 8.5 - 10.1 mg/dL 9.0   Anion Gap Latest Ref Range: 3 - 14 mmol/L 8   Albumin Latest Ref Range: 3.4 - 5.0 g/dL 3.8   Protein Total Latest Ref Range: 6.8 - 8.8 g/dL 7.5   Bilirubin Total Latest Ref Range: 0.2 - 1.3 mg/dL 0.5   Alkaline Phosphatase Latest Ref Range: 40 - 150 U/L  77   ALT Latest Ref Range: 0 - 70 U/L 20   AST Latest Ref Range: 0 - 45 U/L 19   Glucose Latest Ref Range: 70 - 99 mg/dL 113 (H)   WBC Latest Ref Range: 4.0 - 11.0 10e9/L 7.1   Hemoglobin Latest Ref Range: 13.3 - 17.7 g/dL 13.9   Hematocrit Latest Ref Range: 40.0 - 53.0 % 41.3   Platelet Count Latest Ref Range: 150 - 450 10e9/L 178   RBC Count Latest Ref Range: 4.4 - 5.9 10e12/L 4.60   MCV Latest Ref Range: 78 - 100 fl 90   MCH Latest Ref Range: 26.5 - 33.0 pg 30.2   MCHC Latest Ref Range: 31.5 - 36.5 g/dL 33.7   RDW Latest Ref Range: 10.0 - 15.0 % 12.9     We are located on the third floor of the Clinic and Surgery Center ([facility]) on the [facility].  Our address is     [address] [place] [address] on 3rd [place] [address]    Thank you for allowing us to be a part of your care here at the [facility]    If you have questions or concerns please contact us at:    Obdulia Rome RN, BSN, PHN  Miguel Ángel Valverde (Schedule,Prior Auth)  Nurse Coordinator     Clinic   Pulmonary Hypertension   Pulmonary Hypertension  [facility]  (P)[phone]    (P) [phone]        (F) [phone]    ** Please note that you will NOT receive a reminder call regarding your scheduled testing, reminder calls are for provider appointments only.  If you are scheduled for testing within the [facility] system you may receive a call regarding pre-registration for billing purposes only.**     Remember to weigh yourself daily after voiding and before you consume any food or beverages and log the numbers.  If you have gained/lost 2 pounds overnight or 5 pounds in a week contact us immediately for medication adjustments or further instructions.   **Please call us immediately if you have any syncope, chest pain, edema, or decline in your functional status.    Support Group:  Pulmonary Hypertension  Association  Https://www.phassociation.org/  **Look at the Events Tab** They even have Support Groups that you can call into    [facility] PH Support Group  First Saturday of the Month from 1-3 PM   Location: [facility] [address]  Leader: Bert Weber  Phone: [phone]  Email: [email or web address]            Follow-ups after your visit        Additional Services     Follow-Up with Pulmonary Hypertension Clinic                 Your next 10 appointments already scheduled     Dec 05, 2018  2:30 PM CST   Lab with UC LAB   [facility] ([facility])    [address]  1st Floor  [facility] [id number] [phone]            Dec 05, 2018  3:30 PM CST   (Arrive by 3:00 PM)   Return Visit with Lilly Ba MD   [facility] ([facility])    [address]  [address]  [facility] [id number] [phone]            Jan 09, 2019 10:00 AM CST   (Arrive by 9:45 AM)   Return Interstitial Lung with Shane Ruvalcaba MD   [facility] ([facility])    [address]  [address]  [facility] [id number] [phone]              Future tests that were ordered for you today     Open Future Orders        Priority Expected Expires Ordered    Basic metabolic panel Routine 2/7/2019 10/29/2019 10/29/2018    N terminal pro BNP outpatient Routine 2/7/2019 10/29/2019 10/29/2018    CBC with platelets Routine 2/7/2019 10/29/2019 10/29/2018    Follow-Up with Pulmonary Hypertension Clinic Routine 2/7/2019 10/29/2019 10/29/2018    Basic metabolic panel Routine  10/29/2019 10/29/2018            Who to contact     If you have questions or need follow up information about today's clinic visit or your schedule please contact [facility] directly at [phone].  Normal or non-critical lab and imaging results will be communicated to you by MyChart, letter or phone within 4  business days after the clinic has received the results. If you do not hear from us within 7 days, please contact the clinic through [employer] or phone. If you have a critical or abnormal lab result, we will notify you by phone as soon as possible.  Submit refill requests through [employer] or call your pharmacy and they will forward the refill request to us. Please allow 3 business days for your refill to be completed.          Additional Information About Your Visit        [employer] Information     [employer] gives you secure access to your electronic health record. If you see a primary care provider, you can also send messages to your care team and make appointments. If you have questions, please call your primary care clinic.  If you do not have a primary care provider, please call [phone] and they will assist you.        Care EveryWhere ID     This is your Care EveryWhere ID. This could be used by other organizations to access your [facility] medical records  [id number]        Your Vitals Were     Pulse Height Pulse Oximetry BMI (Body Mass Index)          89 1.829 m (6') 92% 24.41 kg/m2         Blood Pressure from Last 3 Encounters:   10/29/18 136/70   05/30/18 119/70   05/30/18 125/86    Weight from Last 3 Encounters:   10/29/18 81.6 kg (180 lb)   05/30/18 84.6 kg (186 lb 9.6 oz)   05/30/18 84.8 kg (187 lb)              We Performed the Following     Follow-Up with Cardiac Advanced Practice Provider        Primary Care Provider Office Phone # Fax #    Cristhian Busch [phone]       [facility] [address]  [facility] [address]        Equal Access to Services     [facility]: Hadii aad ku hadasho Soomaali, waaxda luqadaha, qaybta kaalmada jeanie, silvano youngblood. So [facility] [phone].    ATENCIÓN: Si habla español, tiene a hutton disposición servicios gratuitos de asistencia lingüística. Llame al [phone].    We comply with applicable federal civil rights laws and  [place] laws. We do not discriminate on the basis of race, color, national origin, age, disability, sex, sexual orientation, or gender identity.            Thank you!     Thank you for choosing [facility]  for your care. Our goal is always to provide you with excellent care. Hearing back from our patients is one way we can continue to improve our services. Please take a few minutes to complete the written survey that you may receive in the mail after your visit with us. Thank you!             Your Updated Medication List - Protect others around you: Learn how to safely use, store and throw away your medicines at www.disposemymeds.org.          This list is accurate as of 10/29/18  6:48 PM.  Always use your most recent med list.                   Brand Name Dispense Instructions for use Diagnosis    albuterol 108 (90 Base) MCG/ACT inhaler    PROAIR HFA    1 Inhaler    Inhale 4-6 puffs into the lungs every 6 hours as needed    Pulmonary hypertension (H), Mixed hyperlipidemia, COPD (chronic obstructive pulmonary disease) (H)       budesonide 0.25 MG/2ML neb solution    PULMICORT    360 mL    Take 2 mLs (0.25 mg) by nebulization 2 times daily    ILD (interstitial lung disease) (H)       digoxin 125 MCG tablet    LANOXIN    90 tablet    Take 1 tablet (125 mcg) by mouth daily    Pulmonary hypertension (H)       LETAIRIS 10 MG tablet   Generic drug:  ambrisentan     30 tablet    Take 1 tablet (10 mg) by mouth daily    Pulmonary hypertension (H)       tadalafil (PAH) 20 MG Tabs    ADCIRCA    60 tablet    Take 2 tablets (40 mg) by mouth daily    Pulmonary hypertension (H)       treprostinil 0.6 MG/ML Soln neb solution    TYVASO    3.6 mL    12 breaths 4 times daily    Pulmonary hypertension (H)       vitamin D 2000 units Caps      Take 1 capsule by mouth daily.

## 2018-10-29 NOTE — LETTER
10/29/2018      RE: Jimmy Isaac  [address] [address] [address]       Dear Colleague,    Thank you for the opportunity to participate in the care of your patient, Jimmy Isaac, at the [facility]. Please see a copy of my visit note below.    October 29, 2018      Mr. Jimmy Isaac is a pleasant 57 year old male with a past medical history of pulmonary hypertension, bronchial sarcoid, chronic kidney disease, and diabetes.  His current PH therapy is Letairis 10 mg daily, Tadalafil 40 mg daily and Tyvaso 12 breaths QID.    Impression:  1. Pulmonary arterial hypertension  2. Chronic, oxygen dependent respiratory failure  3. Sarcoidosis without cardiac involvement  4. Nephrolithiasis  5. Prednisone related fluid retention     Today, he is accompanied by: no one; here alone    Current Symptoms  1. Any ER visits or hospital admissions since last appointment: No. But over the summer had a  URI that took almost a month to feel better. On 10 day course of antibiotics.     2. Shortness of breath: Yes: with exertion, using 1L at rest, will turn up to 6-8L with activity    3. Dizziness or lightheadedness: No  4. Any syncopal episodes or falls: No  5. Chest pain or chest tightness: Yes: tightness with exertion and relieves with rest  6. Nausea, vomiting, diarrhea, constipation: No  7. Swelling in the legs: Yes: mild here and there  8. Bloating in the abdomen: No  9. PND; Waking up at night coughing, choking or SOB: No  10. Any medication intolerances: No  11. Reported headaches: No  12. Jaw pain or bone/joint pain: No  13. On IV Prostacyclin: No; current dose: N/A    14. Current level of activity: active/takes care of 4 horses, works full time      PAST MEDICAL HISTORY:  Past Medical History:   Diagnosis Date     Chronic sinusitis      CKD (chronic kidney disease)      Hypertension      Keratosis     frontal scalp, treated with liquid nitrogen at  [facility]     Malignant neoplasm (H)      Pneumonia a few times     Pulmonary sarcoidosis (H)          FAMILY HISTORY:  Family History   Problem Relation Age of Onset     Coronary Artery Disease Father      HEART DISEASE Father      heart attack age 35-40     Hypertension Mother      Hyperlipidemia Mother      Cerebrovascular Disease Mother      Dementia Mother      Lupus Sister      Glaucoma No family hx of      Macular Degeneration No family hx of          SOCIAL HISTORY:  Social History   Substance Use Topics     Smoking status: Former Smoker     Packs/day: 1.00     Years: 30.00     Types: Cigarettes     Start date: 10/10/1975     Quit date: 3/1/2010     Smokeless tobacco: Never Used     Alcohol use 0.0 oz/week     0 Standard drinks or equivalent per week      Comment: stated 2 bottles of beer occ         CURRENT MEDICATIONS:  Current Outpatient Prescriptions   Medication Sig Dispense Refill     albuterol (PROAIR HFA) 108 (90 Base) MCG/ACT Inhaler Inhale 4-6 puffs into the lungs every 6 hours as needed 1 Inhaler 6     budesonide (PULMICORT) 0.25 MG/2ML neb solution Take 2 mLs (0.25 mg) by nebulization 2 times daily 360 mL 0     Cholecalciferol (VITAMIN D) 2000 UNITS CAPS Take 1 capsule by mouth daily.       digoxin (LANOXIN) 125 MCG tablet Take 1 tablet (125 mcg) by mouth daily 90 tablet 2     LETAIRIS 10 MG tablet Take 1 tablet (10 mg) by mouth daily 30 tablet 11     tadalafil, PAH, (ADCIRCA) 20 MG TABS Take 2 tablets (40 mg) by mouth daily 60 tablet 11     treprostinil (TYVASO) 0.6 MG/ML SOLN 12 breaths 4 times daily 3.6 mL 11     cephALEXin (KEFLEX) 500 MG capsule Take 1 capsule (500 mg) by mouth 2 times daily (Patient not taking: Reported on 10/29/2018) 20 capsule 0         ROS:   10 point ROS of systems including Constitutional, Eyes, Respiratory, Cardiovascular, Gastroenterology, Genitourinary, Integumentary, Muscularskeletal, Psychiatric were all negative except for pertinent positives noted in  my HPI.    EXAM:  /86 (BP Location: Left arm, Patient Position: Chair, Cuff Size: Adult Regular)  Pulse 89  Ht 1.829 m (6')  Wt 81.6 kg (180 lb)  SpO2 92%  BMI 24.41 kg/m2  General: appears comfortable, alert and articulate  Head: normocephalic, atraumatic  Eyes: anicteric sclera, EOMI  Neck: no adenopathy  Orophyarynx: moist mucosa, no lesions, dentition intact  Heart: regular, S1/S2, no murmur, gallop, rub, estimated JVP non elevated  Lungs: clear, no rales or wheezing  Abdomen: soft, non-tender, bowel sounds present, no hepatosplenomegaly  Extremities: no clubbing, cyanosis or edema  Neurological: normal speech and affect, no gross motor deficits      Weight  Wt Readings from Last 10 Encounters:   10/29/18 81.6 kg (180 lb)   05/30/18 84.6 kg (186 lb 9.6 oz)   05/30/18 84.8 kg (187 lb)   02/27/18 84.6 kg (186 lb 8 oz)   02/05/18 85.8 kg (189 lb 1.6 oz)   12/12/17 79.4 kg (175 lb)   11/21/17 85.3 kg (188 lb)   11/16/17 85.3 kg (188 lb)   11/01/17 85.3 kg (188 lb)   11/01/17 85.2 kg (187 lb 14.4 oz)         Labs:    CBC RESULTS:  Lab Results   Component Value Date    WBC 7.1 10/29/2018    RBC 4.60 10/29/2018    HGB 13.9 10/29/2018    HCT 41.3 10/29/2018    MCV 90 10/29/2018    MCH 30.2 10/29/2018    MCHC 33.7 10/29/2018    RDW 12.9 10/29/2018     10/29/2018       CMP RESULTS:  Lab Results   Component Value Date     10/29/2018    POTASSIUM 3.8 10/29/2018    CHLORIDE 102 10/29/2018    CO2 28 10/29/2018    ANIONGAP 8 10/29/2018     (H) 10/29/2018    BUN 21 10/29/2018    CR 1.31 (H) 10/29/2018    GFRESTIMATED 56 (L) 10/29/2018    GFRESTBLACK 68 10/29/2018    TERI 9.0 10/29/2018    BILITOTAL 0.5 10/29/2018    ALBUMIN 3.8 10/29/2018    ALKPHOS 77 10/29/2018    ALT 20 10/29/2018    AST 19 10/29/2018        INR RESULTS:  Lab Results   Component Value Date    INR 1.18 (H) 03/09/2012       BNP RESULTS:  Lab Results   Component Value Date    NTBNPI 260 04/12/2016     No results found for:  BNP      Recent Tests:    Echocardiogram (9/22/2017):  Interpretation Summary  Technically difficult study.Poor acoustic windows.  Global and regional left ventricular function is normal with an EF of 60-65%.  The right ventricle is normal size.  Global right ventricular function is normal.  Pulmonary artery systolic pressure cannot be assessed.  The inferior vena cava was normal in size with preserved respiratory  variability.  Estimated mean right atrial pressure is 3 mmHg.  No pericardial effusion is present.  There was no shunt at the atrial septal level as assessed by agitated saline  bubble study at rest and with Valsalva maneuver.  This study was compared with the study from 5/25/2017. RV systolic function is  normal in this study.      Echocardiogram (10/29/2018):  Interpretation Summary  Technically difficult study.  Global and regional left ventricular function is normal with an EF of 55-60%.  Mild right ventricular dilation is present.  Global right ventricular function is normal.  Right ventricular systolic pressure is 28mmHg above the right atrial pressure.  No significant valvular abnormalities.  The inferior vena cava was normal in size with preserved respiratory  variability.  No pericardial effusion is present.  This study was compared to a TTE from 9/22/2017. There has been no significant  change.      RHC (2/27/2018):        Assessment and Plan:   Mr. Jimmy Isaac is a 57 year old male with pulmonary hypertension and bronchial sarcoid.  WHO Group V, NYHA Functional Class III.     #PH Plan  -Patient is stable from PH prospective with unchanged repeat echocardiogram  -Continue current regimen of Letairis, Adcirca and Tyvaso  -Return to clinic in three months to see Dr. Lindsey with repeat labs     It was a pleasure seeing Mr. Jimmy Isaac at the [facility] Pulmonary Hypertension [facility].         Sincerely,  Nevaeh Griffith, APRN, CNP.

## 2018-10-29 NOTE — NURSING NOTE
Patient presents today for resting echo ordered by MD.     Echo Tech provided patient education about resting echo. IV started in LAC.   IV start documented in  [facility].  Echo technician completed resting echo. Patient monitored according to Optison protocol. Data transferred to [facility] for final interpretation through [facility].     Optison medication:  Amount used 3ml Optison mixed with 6ml Saline - [id number].  6ml Wasted.   After completion of resting echo, IV removed.    Patient education provided about cardiology interpretation and primary provider will be notified of results.    Radha Dawson RDCS

## 2018-10-29 NOTE — PATIENT INSTRUCTIONS
Medication Changes:  1. No medication changes    Patient Instructions:  1. Continue staying active and eat a heart healthy diet.    2. Please keep current list of medications with you at all times.    3. Remember to weigh yourself daily after voiding and before you consume any food or beverages and log the numbers.  If you have gained/lost 2 pounds overnight or 5 pounds in a week contact us immediately for medication adjustments or further instructions.    4. **Please call us immediately if you have any syncope, chest pain, edema, or decline in your functional status.    Follow up Appointment Information:  1. Return to clinic in three months for follow up with Dr. Lindsey and labs    Results:  Results for MATTY LOPEZ (MRN [id number]) as of 10/29/2018 15:02   Ref. Range 10/29/2018 13:08   Sodium Latest Ref Range: 133 - 144 mmol/L 138   Potassium Latest Ref Range: 3.4 - 5.3 mmol/L 3.8   Chloride Latest Ref Range: 94 - 109 mmol/L 102   Carbon Dioxide Latest Ref Range: 20 - 32 mmol/L 28   Urea Nitrogen Latest Ref Range: 7 - 30 mg/dL 21   Creatinine Latest Ref Range: 0.66 - 1.25 mg/dL 1.31 (H)   GFR Estimate Latest Ref Range: >60 mL/min/1.7m2 56 (L)   GFR Estimate If Black Latest Ref Range: >60 mL/min/1.7m2 68   Calcium Latest Ref Range: 8.5 - 10.1 mg/dL 9.0   Anion Gap Latest Ref Range: 3 - 14 mmol/L 8   Albumin Latest Ref Range: 3.4 - 5.0 g/dL 3.8   Protein Total Latest Ref Range: 6.8 - 8.8 g/dL 7.5   Bilirubin Total Latest Ref Range: 0.2 - 1.3 mg/dL 0.5   Alkaline Phosphatase Latest Ref Range: 40 - 150 U/L 77   ALT Latest Ref Range: 0 - 70 U/L 20   AST Latest Ref Range: 0 - 45 U/L 19   Glucose Latest Ref Range: 70 - 99 mg/dL 113 (H)   WBC Latest Ref Range: 4.0 - 11.0 10e9/L 7.1   Hemoglobin Latest Ref Range: 13.3 - 17.7 g/dL 13.9   Hematocrit Latest Ref Range: 40.0 - 53.0 % 41.3   Platelet Count Latest Ref Range: 150 - 450 10e9/L 178   RBC Count Latest Ref Range: 4.4 - 5.9 10e12/L 4.60   MCV Latest Ref Range: 78 -  100 fl 90   MCH Latest Ref Range: 26.5 - 33.0 pg 30.2   MCHC Latest Ref Range: 31.5 - 36.5 g/dL 33.7   RDW Latest Ref Range: 10.0 - 15.0 % 12.9     We are located on the third floor of the [facility] ([facility]) on the [facility].  Our address is     [address] [place] [address] [place] [address] [place] [address]    Thank you for allowing us to be a part of your care here at the [facility]    If you have questions or concerns please contact us at:    Obdulia Rome, RN, BSN, PHN  Miguel Ángel Valverde (Schedule,Prior Auth)  Nurse Coordinator     Clinic   Pulmonary Hypertension   Pulmonary Hypertension  [facility]  (P)[phone]    (P) [phone]        (F) [phone]    ** Please note that you will NOT receive a reminder call regarding your scheduled testing, reminder calls are for provider appointments only.  If you are scheduled for testing within the [facility] system you may receive a call regarding pre-registration for billing purposes only.**     Remember to weigh yourself daily after voiding and before you consume any food or beverages and log the numbers.  If you have gained/lost 2 pounds overnight or 5 pounds in a week contact us immediately for medication adjustments or further instructions.   **Please call us immediately if you have any syncope, chest pain, edema, or decline in your functional status.    Support Group:  Pulmonary Hypertension Association  Https://www.phassociation.org/  **Look at the Events Tab** They even have Support Groups that you can call into    [facility] PH Support Group  First Saturday of the Month from 1-3 PM   Location: [address] [place] [address]  Leader: Bert Weber  Phone: [phone]  Email: [email or web address]

## 2018-10-30 ENCOUNTER — MYC MEDICAL ADVICE (OUTPATIENT)
Dept: CARDIOLOGY | Facility: CLINIC | Age: 58
End: 2018-10-30

## 2018-11-05 LAB
COTININE SERPL-MCNC: NORMAL NG/ML
NICOTINE SERPL-MCNC: NORMAL NG/ML

## 2018-12-10 ENCOUNTER — TRANSFERRED RECORDS (OUTPATIENT)
Dept: HEALTH INFORMATION MANAGEMENT | Facility: CLINIC | Age: 58
End: 2018-12-10

## 2018-12-18 DIAGNOSIS — J84.9 ILD (INTERSTITIAL LUNG DISEASE) (H): ICD-10-CM

## 2018-12-18 RX ORDER — BUDESONIDE 0.25 MG/2ML
0.25 INHALANT ORAL 2 TIMES DAILY
Qty: 360 ML | Refills: 0 | Status: SHIPPED | OUTPATIENT
Start: 2018-12-18 | End: 2019-03-05

## 2018-12-21 DIAGNOSIS — N20.0 RENAL CALCULUS: Primary | ICD-10-CM

## 2018-12-26 ENCOUNTER — OFFICE VISIT (OUTPATIENT)
Dept: NEPHROLOGY | Facility: CLINIC | Age: 58
End: 2018-12-26
Attending: INTERNAL MEDICINE
Payer: COMMERCIAL

## 2018-12-26 VITALS
BODY MASS INDEX: 24.62 KG/M2 | DIASTOLIC BLOOD PRESSURE: 68 MMHG | SYSTOLIC BLOOD PRESSURE: 105 MMHG | OXYGEN SATURATION: 94 % | HEIGHT: 72 IN | TEMPERATURE: 98.1 F | WEIGHT: 181.8 LBS | HEART RATE: 82 BPM

## 2018-12-26 DIAGNOSIS — N20.0 CALCULUS OF KIDNEY: ICD-10-CM

## 2018-12-26 DIAGNOSIS — N20.0 RECURRENT KIDNEY STONES: Primary | ICD-10-CM

## 2018-12-26 DIAGNOSIS — N20.0 RENAL CALCULUS: ICD-10-CM

## 2018-12-26 LAB
ALBUMIN SERPL-MCNC: 3.8 G/DL (ref 3.4–5)
ANION GAP SERPL CALCULATED.3IONS-SCNC: 6 MMOL/L (ref 3–14)
BUN SERPL-MCNC: 32 MG/DL (ref 7–30)
CALCIUM SERPL-MCNC: 9.1 MG/DL (ref 8.5–10.1)
CHLORIDE SERPL-SCNC: 102 MMOL/L (ref 94–109)
CO2 SERPL-SCNC: 31 MMOL/L (ref 20–32)
CREAT SERPL-MCNC: 1.4 MG/DL (ref 0.66–1.25)
CREAT UR-MCNC: 67 MG/DL
GFR SERPL CREATININE-BSD FRML MDRD: 55 ML/MIN/{1.73_M2}
GLUCOSE SERPL-MCNC: 122 MG/DL (ref 70–99)
HGB BLD-MCNC: 14.2 G/DL (ref 13.3–17.7)
PHOSPHATE SERPL-MCNC: 3.3 MG/DL (ref 2.5–4.5)
POTASSIUM SERPL-SCNC: 3.8 MMOL/L (ref 3.4–5.3)
PROT UR-MCNC: 0.42 G/L
PROT/CREAT 24H UR: 0.64 G/G CR (ref 0–0.2)
SODIUM SERPL-SCNC: 139 MMOL/L (ref 133–144)

## 2018-12-26 PROCEDURE — 80069 RENAL FUNCTION PANEL: CPT | Performed by: INTERNAL MEDICINE

## 2018-12-26 PROCEDURE — 84156 ASSAY OF PROTEIN URINE: CPT | Performed by: INTERNAL MEDICINE

## 2018-12-26 PROCEDURE — G0463 HOSPITAL OUTPT CLINIC VISIT: HCPCS | Mod: ZF

## 2018-12-26 PROCEDURE — 85018 HEMOGLOBIN: CPT | Performed by: INTERNAL MEDICINE

## 2018-12-26 PROCEDURE — 36415 COLL VENOUS BLD VENIPUNCTURE: CPT | Performed by: INTERNAL MEDICINE

## 2018-12-26 ASSESSMENT — PAIN SCALES - GENERAL: PAINLEVEL: NO PAIN (0)

## 2018-12-26 ASSESSMENT — MIFFLIN-ST. JEOR: SCORE: 1677.64

## 2018-12-26 NOTE — PROGRESS NOTES
Nephrology Clinic    Lilly Ba MD  2018     Name: Jimmy Isaac  MRN: [id number]  Age: 58 year old  : 1960  Referring provider: Referred Self     Assessment and Plan:    Recurrent kidney stones: CaOx stone, still have several stones bilaterally on CT scan , risk factor now include low urine output, low citrate, low urine PH increase  Risk for uric acid stone.   -- drink 100 oz fluid daily  -- low sodium <2400mg daily  -- increase dietary calcium at least 1000mg daily from food.  -- protein in moderation  --CT Abdomen and Pelvis to assess stone burden was ordered to be done within the next 2 weeks      Hypocitraturia: urine citrate persistently low at 213 today with goal of higher than 450, consider adding lime or lemon juice to diet. Lemon juice 2-4 oz daily.   Recheck in 6 months, if remains below goal and if stone burden is worsening then I will add potassium citrate 10 mew bid.  Discussed with patient, he prefers trial dietary intervention rather than adding medication.     CKD stage 3 A: at baseline Cr 1.4 and GFR 55, stable since .      BP and volume: Normal BP and volume status is euvolemic.      Sarcoidosis: Followed by pulmonology and cardiology for this. Puts him at risk for kidney stone.     Osteonecrosis: CT done a year ago shows osteonecrosis on bilateral hips. Patient is not symptomatic in this regard, no further interventions necessary at this time.     Lilly Ba MD  [facility]  Department of Medicine  Division of Renal Disease and Hypertension  [phone]     Follow-up: RTC in 6 months     Reason For Visit:   Follow up for Sarcoidosis, CKD and Nephrolithiasis. Last seen May 2018.     HPI:   Jimmy Isaac is a 58 year old male with a history of Sarcoidosis, CKD and Nephrolithiasis who presents for routine follow up. Sarcoidosis was diagnosed in  complicated by pulmonary hypertension, hypoxia on home oxygen when ambulating and  "nephrolithiasis (calcium oxalate).     He was initially diagnosed with kidney stones in 1991 with new diagnosis of sarcoidosis and was told to drink a lot of water. Stones were noted again in 2012 incidentally on imaging for pulmonary hypertension but he has also passed stones which were analyzed in 2012 and showed calcium oxalate. Most recent urine analysis done and reviewed as below.     I reviewed 24 hour urine done on 12/10/18 that shows:   result notes   Volume Liters 1.17  Below goal    Calcium mg/d 200    Sodium mmol/d 87    Protein catabolic rate 0.6    Oxalate mg/d 33    Citrate mg/d 213 Persistently low   pH 5.2    Uric acid mg/d 0.301    Potassium mmol/d 42    Magnesium mg/d 96      Super-saturation of calcium oxalate 12    Super-saturation calcium phosphate 0.42  Super-saturation uric acid 1.64    This is in the setting of low salt diet.  He has not been consistently adding lemon juice to fluids.  He drinks about 80 ounces fluid per day.  He has calcium fortified orange juice in morning and one yogurt most days of the week.    Since last visit, he has been doing generally well. He notes that he had a \"lung infection\" this fall, had called his pulmonologist and said that Keflex has worked for his lung infections in the past and so had this prescribed over the phone. Patient says he did improve from this. Patient is otherwise says he has not passed any recent kidney stones. No leg swelling.     Provider notes reviewed.    Review of Systems:   Pertinent items are noted in HPI or as below, remainder of comprehensive ROS is negative.      Active Medications:      albuterol (PROAIR HFA) 108 (90 Base) MCG/ACT Inhaler, Inhale 4-6 puffs into the lungs every 6 hours as needed, Disp: 1 Inhaler, Rfl: 6     budesonide (PULMICORT) 0.25 MG/2ML neb solution, Take 2 mLs (0.25 mg) by nebulization 2 times daily, Disp: 360 mL, Rfl: 0     Cholecalciferol (VITAMIN D) 2000 UNITS CAPS, Take 1 capsule by mouth daily., Disp: , " Rfl:      digoxin (LANOXIN) 125 MCG tablet, Take 1 tablet (125 mcg) by mouth daily, Disp: 90 tablet, Rfl: 2     LETAIRIS 10 MG tablet, Take 1 tablet (10 mg) by mouth daily, Disp: 30 tablet, Rfl: 11     tadalafil, PAH, (ADCIRCA) 20 MG TABS, Take 2 tablets (40 mg) by mouth daily, Disp: 60 tablet, Rfl: 11     treprostinil (TYVASO) 0.6 MG/ML SOLN, 12 breaths 4 times daily, Disp: 3.6 mL, Rfl: 11     Allergies:   Nkda [no known drug allergies]      Past Medical History:  CKD   Hypertension   Keratosis   Malignant neoplasm   Pulmonary sarcoidosis   Pulmonary hypertension   Renal insufficiency due to nephrolithiasis      Past Surgical History:  Colonoscopy   Tonsillectomy     Family History:   CAD, father   Heart disease, father   Hypertension, mother   Hyperlipidemia, mother   Dementia, mother   Lupus, sister       Social History:   Former smoker. Here today by himself.     Physical Exam:  /68   Pulse 82   Temp 98.1  F (36.7  C) (Oral)   Ht 1.829 m (6')   Wt 82.5 kg (181 lb 12.8 oz)   SpO2 94%   BMI 24.66 kg/m     GENERAL APPEARANCE: alert and no distress  EYES: No scleral icterus, pupils equal  HENT: NC/AT,  mouth  without ulcers or lesions  Endocrine: no goiter, no moon facies  Pulmonary: Breathing comfortably, uses supplemental oxygen with ambulation  CV: No edema and well perfused   MS: no evidence of inflammation in joints, no muscle tenderness  SKIN: no rash, warm, dry, no cyanosis  NEURO: mentation intact and speech normal      Laboratory:  CMP  Recent Labs   Lab Test 12/26/18  1318 10/29/18  1308 05/30/18  1025 02/05/18  1049 11/01/17  1047 10/18/17  1121  03/09/12  0723 03/08/12  0657 03/07/12  1527    138 138 137 136 138   < > 138 137 139   POTASSIUM 3.8 3.8 4.2 4.0 4.2 4.4   < > 4.2 3.8 4.4   CHLORIDE 102 102 104 99 98 102   < > 98 100 98   CO2 31 28 28 31 30 29   < > 33* 29 34*   ANIONGAP 6 8 7 6 7 6   < > 7 7 7   * 113* 100* 99 146* 122*   < > 96 86 104*   BUN 32* 21 24 27 28 29   < >  24 28 31*   CR 1.40* 1.31* 1.34* 1.28* 1.30* 1.29*   < > 1.84* 1.69* 1.86*   GFRESTIMATED 55* 56* 55* 58* 57* 57*   < > 39* 43* 38*   GFRESTBLACK 63 68 66 70 69 69   < > 47* 52* 46*   TERI 9.1 9.0 9.2 9.0 8.7 8.7   < > 8.7 8.5 9.0   MAG  --   --   --   --   --   --   --  2.0 2.0 1.9   PHOS 3.3  --  2.4*  --  1.7* 1.7*  --   --   --   --    PROTTOTAL  --  7.5 7.4 7.4 6.8  --    < >  --   --  6.6*   ALBUMIN 3.8 3.8 3.8 3.8 3.7 3.4   < >  --   --  3.8*   BILITOTAL  --  0.5 0.6 0.5 0.7  --    < >  --   --  1.6*   ALKPHOS  --  77 69 69 66  --    < >  --   --  65   AST  --  19 19 22 16  --    < >  --   --  30   ALT  --  20 18 23 24  --    < >  --   --  28    < > = values in this interval not displayed.     CBC  Recent Labs   Lab Test 12/26/18  1318 10/29/18  1308 05/30/18  1025 02/05/18  1049 11/01/17  1047   HGB 14.2 13.9 14.5 14.3 15.2   WBC  --  7.1 6.2 7.6 15.4*   RBC  --  4.60 4.87 4.84 4.97   HCT  --  41.3 43.8 44.0 46.4   MCV  --  90 90 91 93   MCH  --  30.2 29.8 29.5 30.6   MCHC  --  33.7 33.1 32.5 32.8   RDW  --  12.9 13.5 12.2 14.1   PLT  --  178 148* 165 160     INR  Recent Labs   Lab Test 03/09/12  0723 03/08/12  0657 01/12/12  1147   INR 1.18* 1.15* 1.15*     URINE STUDIES  Recent Labs   Lab Test 10/18/17  1125 01/19/12  1509   COLOR Yellow Yellow   APPEARANCE Clear Clear   URINEGLC Negative Negative   URINEBILI Negative Negative   URINEKETONE Negative Negative   SG 1.011 1.010   UBLD Moderate* Small*   URINEPH 6.0 6.0   PROTEIN Negative 30*   NITRITE Negative Negative   LEUKEST Negative Negative   RBCU 25* 5*   WBCU 2 2     Recent Labs   Lab Test 12/26/18  1335 05/30/18  1034 10/18/17  1125 01/19/12  1509   UTPG 0.64* 0.29* 0.19 1.09*     PTH  Recent Labs   Lab Test 10/18/17  1121 01/19/12  1311   PTHI 29 81*     IRON STUDIES   Recent Labs   Lab Test 10/18/17  1121 01/12/12  1147   IRON 82 49    348   IRONSAT 29 14*   MIHIR 41  --        Scribe Disclosure:   I, Lilly Jack, am serving as a scribe  to document services personally performed by Lilly Ba MD at this visit, based upon the provider's statements to me. All documentation has been reviewed by the aforementioned provider prior to being entered into the official medical record.     Portions of this medical record were completed by a scribe. UPON MY REVIEW AND AUTHENTICATION BY ELECTRONIC SIGNATURE, this confirms (a) I performed the applicable clinical services, and (b) the record is accurate.

## 2018-12-26 NOTE — LETTER
2018      RE: Jimmy Isaac  [address] [address] [address]         Nephrology Clinic    Lilly Ba MD  2018     Name: Jimmy Isaac  MRN: [id number]  Age: 58 year old  : 1960  Referring provider: Referred Self     Assessment and Plan:    Recurrent kidney stone s: CaOx stone, still have several stones bilaterally on CT scan , risk factor now include low urine output, low citrate, low urine PH increase  Risk for uric acid stone.   -- drink 100 oz fluid daily  -- low sodium <2400mg daily  -- increase dietary calcium at least 1000mg daily from food.  -- protein in moderation  --CT Abdomen and Pelvis to assess stone burden was ordered to be done within the next 2 weeks      Hypocitraturia: urine citrate persistently low at 213 today with goal of higher than 450, consider adding lime or lemon juice to diet. Lemon juice 2-4 oz daily.   Recheck in 6 months, if remains below goal and if stone burden is worsening then I will add potassium citrate 10 mew bid.  Discussed with patient, he prefers trial dietary intervention rather than adding medication.     CKD stage 3 A: at baseline Cr 1.4 and GFR 55, stable since .      BP and volume:  Normal BP and volume status is euvolemic.      Sarcoidosis:  Followed by pulmonology and cardiology for this. Puts him at risk for kidney stone.     Osteonecrosis: CT done a year ago shows osteonecrosis on bilateral hips. Patient is not symptomatic in this regard, no further interventions necessary at this time.     Lilly Ba MD  [facility]  Department of Medicine  Division of Renal Disease and Hypertension  [phone]     Follow-up: RTC in 6 months     Reason For Visit:   Follow up for Sarcoidosis, CKD and Nephrolithiasis. Last seen May 2018.     HPI:   Jimmy Isaac is a 58 year old male with a history of Sarcoidosis, CKD and Nephrolithiasis who presents for routine follow up. Sarcoidosis was diagnosed in  "1991 complicated by pulmonary hypertension, hypoxia on home oxygen when ambulating and nephrolithiasis (calcium oxalate).     He was initially diagnosed with kidney stones in 1991 with new diagnosis of sarcoidosis and was told to drink a lot of water. Stones were noted again in 2012 incidentally on imaging for pulmonary hypertension but he has also passed stones which were analyzed in 2012 and showed calcium oxalate. Most recent urine analysis done and reviewed as below.     I reviewed 24 hour urine done on 12/10/18 that shows:   result notes   Volume Liters 1.17  Below goal    Calcium mg/d 200    Sodium mmol/d 87    Protein catabolic rate 0.6    Oxalate mg/d 33    Citrate mg/d 213 Persistently low   pH 5.2    Uric acid mg/d 0.301    Potassium mmol/d 42    Magnesium mg/d 96      Super-saturation of calcium oxalate 12    Super-saturation calcium phosphate 0.42  Super-saturation uric acid 1.64    This is in the setting of low salt diet.  He has not been consistently adding lemon juice to fluids.  He drinks about 80 ounces fluid per day.  He has calcium fortified orange juice in morning and one yogurt most days of the week.    Since last visit, he has been doing generally well. He notes that he had a \"lung infection\" this fall, had called his pulmonologist and said that Keflex has worked for his lung infections in the past and so had this prescribed over the phone. Patient says he did improve from this. Patient is otherwise says he has not passed any recent kidney stones. No leg swelling.     Provider notes reviewed.    Review of Systems:   Pertinent items are noted in HPI or as below, remainder of comprehensive ROS is negative.      Active Medications:      albuterol (PROAIR HFA) 108 (90 Base) MCG/ACT Inhaler, Inhale 4-6 puffs into the lungs every 6 hours as needed, Disp: 1 Inhaler, Rfl: 6     budesonide (PULMICORT) 0.25 MG/2ML neb solution, Take 2 mLs (0.25 mg) by nebulization 2 times daily, Disp: 360 mL, Rfl: 0     " Cholecalciferol (VITAMIN D) 2000 UNITS CAPS, Take 1 capsule by mouth daily., Disp: , Rfl:      digoxin (LANOXIN) 125 MCG tablet, Take 1 tablet (125 mcg) by mouth daily, Disp: 90 tablet, Rfl: 2     LETAIRIS 10 MG tablet, Take 1 tablet (10 mg) by mouth daily, Disp: 30 tablet, Rfl: 11     tadalafil, PAH, (ADCIRCA) 20 MG TABS, Take 2 tablets (40 mg) by mouth daily, Disp: 60 tablet, Rfl: 11     treprostinil (TYVASO) 0.6 MG/ML SOLN, 12 breaths 4 times daily, Disp: 3.6 mL, Rfl: 11     Allergies:   Nkda [no known drug allergies]      Past Medical History:  CKD   Hypertension   Keratosis   Malignant neoplasm   Pulmonary sarcoidosis   Pulmonary hypertension   Renal insufficiency due to nephrolithiasis      Past Surgical History:  Colonoscopy   Tonsillectomy     Family History:   CAD, father   Heart disease, father   Hypertension, mother   Hyperlipidemia, mother   Dementia, mother   Lupus, sister       Social History:   Former smoker. Here today by himself.     Physical Exam:  /68   Pulse 82   Temp 98.1  F (36.7  C) (Oral)   Ht 1.829 m (6')   Wt 82.5 kg (181 lb 12.8 oz)   SpO2 94%   BMI 24.66 kg/m      GENERAL APPEARANCE: alert and no distress  EYES: No scleral icterus, pupils equal  HENT: NC/AT,  mouth  without ulcers or lesions  Endocrine: no goiter, no moon facies  Pulmonary: Breathing comfortably, uses supplemental oxygen with ambulation  CV: No edema and well perfused   MS: no evidence of inflammation in joints, no muscle tenderness  SKIN: no rash, warm, dry, no cyanosis  NEURO: mentation intact and speech normal      Laboratory:  CMP  Recent Labs   Lab Test 12/26/18  1318 10/29/18  1308 05/30/18  1025 02/05/18  1049 11/01/17  1047 10/18/17  1121  03/09/12  0723 03/08/12  0657 03/07/12  1527    138 138 137 136 138   < > 138 137 139   POTASSIUM 3.8 3.8 4.2 4.0 4.2 4.4   < > 4.2 3.8 4.4   CHLORIDE 102 102 104 99 98 102   < > 98 100 98   CO2 31 28 28 31 30 29   < > 33* 29 34*   ANIONGAP 6 8 7 6 7 6   < > 7  7 7   * 113* 100* 99 146* 122*   < > 96 86 104*   BUN 32* 21 24 27 28 29   < > 24 28 31*   CR 1.40* 1.31* 1.34* 1.28* 1.30* 1.29*   < > 1.84* 1.69* 1.86*   GFRESTIMATED 55* 56* 55* 58* 57* 57*   < > 39* 43* 38*   GFRESTBLACK 63 68 66 70 69 69   < > 47* 52* 46*   TERI 9.1 9.0 9.2 9.0 8.7 8.7   < > 8.7 8.5 9.0   MAG  --   --   --   --   --   --   --  2.0 2.0 1.9   PHOS 3.3  --  2.4*  --  1.7* 1.7*  --   --   --   --    PROTTOTAL  --  7.5 7.4 7.4 6.8  --    < >  --   --  6.6*   ALBUMIN 3.8 3.8 3.8 3.8 3.7 3.4   < >  --   --  3.8*   BILITOTAL  --  0.5 0.6 0.5 0.7  --    < >  --   --  1.6*   ALKPHOS  --  77 69 69 66  --    < >  --   --  65   AST  --  19 19 22 16  --    < >  --   --  30   ALT  --  20 18 23 24  --    < >  --   --  28    < > = values in this interval not displayed.     CBC  Recent Labs   Lab Test 12/26/18  1318 10/29/18  1308 05/30/18  1025 02/05/18  1049 11/01/17  1047   HGB 14.2 13.9 14.5 14.3 15.2   WBC  --  7.1 6.2 7.6 15.4*   RBC  --  4.60 4.87 4.84 4.97   HCT  --  41.3 43.8 44.0 46.4   MCV  --  90 90 91 93   MCH  --  30.2 29.8 29.5 30.6   MCHC  --  33.7 33.1 32.5 32.8   RDW  --  12.9 13.5 12.2 14.1   PLT  --  178 148* 165 160     INR  Recent Labs   Lab Test 03/09/12  0723 03/08/12  0657 01/12/12  1147   INR 1.18* 1.15* 1.15*     URINE STUDIES  Recent Labs   Lab Test 10/18/17  1125 01/19/12  1509   COLOR Yellow Yellow   APPEARANCE Clear Clear   URINEGLC Negative Negative   URINEBILI Negative Negative   URINEKETONE Negative Negative   SG 1.011 1.010   UBLD Moderate* Small*   URINEPH 6.0 6.0   PROTEIN Negative 30*   NITRITE Negative Negative   LEUKEST Negative Negative   RBCU 25* 5*   WBCU 2 2     Recent Labs   Lab Test 12/26/18  1335 05/30/18  1034 10/18/17  1125 01/19/12  1509   UTPG 0.64* 0.29* 0.19 1.09*     PTH  Recent Labs   Lab Test 10/18/17  1121 01/19/12  1311   PTHI 29 81*     IRON STUDIES   Recent Labs   Lab Test 10/18/17  1121 01/12/12  1147   IRON 82 49    348   IRONSAT 29 14*    MIHIR 41  --        Scribe Disclosure:   I, Lilly Jack, am serving as a scribe to document services personally performed by Lilly Ba MD at this visit, based upon the provider's statements to me. All documentation has been reviewed by the aforementioned provider prior to being entered into the official medical record.     Portions of this medical record were completed by a scribe. UPON MY REVIEW AND AUTHENTICATION BY ELECTRONIC SIGNATURE, this confirms (a) I performed the applicable clinical services, and (b) the record is accurate.      Lilly Ba MD

## 2018-12-26 NOTE — PATIENT INSTRUCTIONS
Dear Jimmy Isaac      Your were seen in the [facility] for follow up.  Your kidney disease is from complications with sarcoidosis and is stable.  Your creatinine is 1.4 and your GFR is 55.      Please work a little more on getting more fluid and more lemon juice      Please get the following tests done:  CT scan in [place] in 6 months - if citrate not better then I will advise potassium citrate pills    Please set up appointment with:  Lilly Ba MD in 6 months      It was a pleasure meeting with you today. Thank you for allowing me and my team the privilege of caring for you today.  Please let us know if there is anything else we can do for you.    Elin Schmitt LPN care coordinator - [phone]  Chidi Mcbride RN care coordinator [phone]    Take care!  Lilly Ba MD  Department of Medicine  Division of Renal Diseases and Hypertension  [facility]    Email: [email or web address]

## 2018-12-26 NOTE — NURSING NOTE
Chief Complaint   Patient presents with     RECHECK     Vital signs:  Temp: 98.1  F (36.7  C) Temp src: Oral BP: 105/68 Pulse: 82     SpO2: 94 %     Height: 182.9 cm (6') Weight: 82.5 kg (181 lb 12.8 oz)  Estimated body mass index is 24.66 kg/m  as calculated from the following:    Height as of this encounter: 1.829 m (6').    Weight as of this encounter: 82.5 kg (181 lb 12.8 oz).        Tori Burkett, CMA

## 2019-01-08 ASSESSMENT — ENCOUNTER SYMPTOMS
LIGHT-HEADEDNESS: 0
PALPITATIONS: 0
ORTHOPNEA: 1
SYNCOPE: 0
SPUTUM PRODUCTION: 0
COUGH: 0
EXERCISE INTOLERANCE: 1
SNORES LOUDLY: 0
SLEEP DISTURBANCES DUE TO BREATHING: 0
COUGH DISTURBING SLEEP: 0
DYSPNEA ON EXERTION: 1
POSTURAL DYSPNEA: 1
LEG PAIN: 0
HEMOPTYSIS: 0
SHORTNESS OF BREATH: 1
WHEEZING: 1
HYPOTENSION: 0
HYPERTENSION: 1

## 2019-01-09 ENCOUNTER — OFFICE VISIT (OUTPATIENT)
Dept: PULMONOLOGY | Facility: CLINIC | Age: 59
End: 2019-01-09
Attending: INTERNAL MEDICINE
Payer: COMMERCIAL

## 2019-01-09 VITALS
RESPIRATION RATE: 16 BRPM | OXYGEN SATURATION: 93 % | HEIGHT: 72 IN | DIASTOLIC BLOOD PRESSURE: 75 MMHG | WEIGHT: 181 LBS | SYSTOLIC BLOOD PRESSURE: 131 MMHG | HEART RATE: 72 BPM | BODY MASS INDEX: 24.52 KG/M2

## 2019-01-09 DIAGNOSIS — D86.9 SARCOIDOSIS: ICD-10-CM

## 2019-01-09 DIAGNOSIS — D86.9 SARCOIDOSIS: Primary | ICD-10-CM

## 2019-01-09 DIAGNOSIS — I27.20 PULMONARY HYPERTENSION (H): Primary | ICD-10-CM

## 2019-01-09 DIAGNOSIS — Z23 ENCOUNTER FOR VACCINATION: ICD-10-CM

## 2019-01-09 LAB
ALBUMIN SERPL-MCNC: 3.8 G/DL (ref 3.4–5)
ALP SERPL-CCNC: 76 U/L (ref 40–150)
ALT SERPL W P-5'-P-CCNC: 22 U/L (ref 0–70)
ANION GAP SERPL CALCULATED.3IONS-SCNC: 7 MMOL/L (ref 3–14)
AST SERPL W P-5'-P-CCNC: 26 U/L (ref 0–45)
BASOPHILS # BLD AUTO: 0 10E9/L (ref 0–0.2)
BASOPHILS NFR BLD AUTO: 0.6 %
BILIRUB DIRECT SERPL-MCNC: 0.2 MG/DL (ref 0–0.2)
BILIRUB SERPL-MCNC: 0.6 MG/DL (ref 0.2–1.3)
BUN SERPL-MCNC: 34 MG/DL (ref 7–30)
CALCIUM SERPL-MCNC: 10.9 MG/DL (ref 8.5–10.1)
CHLORIDE SERPL-SCNC: 102 MMOL/L (ref 94–109)
CO2 SERPL-SCNC: 29 MMOL/L (ref 20–32)
CREAT SERPL-MCNC: 1.92 MG/DL (ref 0.66–1.25)
DIFFERENTIAL METHOD BLD: ABNORMAL
DLCOUNC-%PRED-PRE: 32 %
DLCOUNC-PRE: 9.62 ML/MIN/MMHG
DLCOUNC-PRED: 29.46 ML/MIN/MMHG
EOSINOPHIL # BLD AUTO: 0.2 10E9/L (ref 0–0.7)
EOSINOPHIL NFR BLD AUTO: 2.6 %
ERV-%PRED-PRE: 66 %
ERV-PRE: 1.02 L
ERV-PRED: 1.53 L
ERYTHROCYTE [DISTWIDTH] IN BLOOD BY AUTOMATED COUNT: 12.1 % (ref 10–15)
EXPTIME-PRE: 13.1 SEC
FEF2575-%PRED-PRE: 9 %
FEF2575-PRE: 0.3 L/SEC
FEF2575-PRED: 3.19 L/SEC
FEFMAX-%PRED-PRE: 31 %
FEFMAX-PRE: 3.1 L/SEC
FEFMAX-PRED: 9.74 L/SEC
FEV1-%PRED-PRE: 24 %
FEV1-PRE: 0.96 L
FEV1FEV6-PRE: 43 %
FEV1FEV6-PRED: 79 %
FEV1FVC-PRE: 35 %
FEV1FVC-PRED: 76 %
FEV1SVC-PRE: 34 %
FEV1SVC-PRED: 72 %
FIFMAX-PRE: 3.45 L/SEC
FRCPLETH-%PRED-PRE: 142 %
FRCPLETH-PRE: 5.29 L
FRCPLETH-PRED: 3.72 L
FVC-%PRED-PRE: 54 %
FVC-PRE: 2.73 L
FVC-PRED: 5 L
GFR SERPL CREATININE-BSD FRML MDRD: 37 ML/MIN/{1.73_M2}
GLUCOSE SERPL-MCNC: 87 MG/DL (ref 70–99)
HCT VFR BLD AUTO: 42.6 % (ref 40–53)
HGB BLD-MCNC: 13.7 G/DL (ref 13.3–17.7)
IC-%PRED-PRE: 47 %
IC-PRE: 1.81 L
IC-PRED: 3.83 L
IMM GRANULOCYTES # BLD: 0 10E9/L (ref 0–0.4)
IMM GRANULOCYTES NFR BLD: 0.3 %
LYMPHOCYTES # BLD AUTO: 0.6 10E9/L (ref 0.8–5.3)
LYMPHOCYTES NFR BLD AUTO: 9.9 %
MCH RBC QN AUTO: 29.4 PG (ref 26.5–33)
MCHC RBC AUTO-ENTMCNC: 32.2 G/DL (ref 31.5–36.5)
MCV RBC AUTO: 91 FL (ref 78–100)
MONOCYTES # BLD AUTO: 0.7 10E9/L (ref 0–1.3)
MONOCYTES NFR BLD AUTO: 10.2 %
NEUTROPHILS # BLD AUTO: 5 10E9/L (ref 1.6–8.3)
NEUTROPHILS NFR BLD AUTO: 76.4 %
NRBC # BLD AUTO: 0 10*3/UL
NRBC BLD AUTO-RTO: 0 /100
PLATELET # BLD AUTO: 169 10E9/L (ref 150–450)
POTASSIUM SERPL-SCNC: 4.2 MMOL/L (ref 3.4–5.3)
PROT SERPL-MCNC: 7.6 G/DL (ref 6.8–8.8)
RBC # BLD AUTO: 4.66 10E12/L (ref 4.4–5.9)
RVPLETH-%PRED-PRE: 172 %
RVPLETH-PRE: 4.27 L
RVPLETH-PRED: 2.47 L
SODIUM SERPL-SCNC: 138 MMOL/L (ref 133–144)
TLCPLETH-%PRED-PRE: 94 %
TLCPLETH-PRE: 7.1 L
TLCPLETH-PRED: 7.53 L
VA-%PRED-PRE: 58 %
VA-PRE: 4.25 L
VC-%PRED-PRE: 52 %
VC-PRE: 2.83 L
VC-PRED: 5.36 L
WBC # BLD AUTO: 6.5 10E9/L (ref 4–11)

## 2019-01-09 PROCEDURE — 85025 COMPLETE CBC W/AUTO DIFF WBC: CPT | Performed by: INTERNAL MEDICINE

## 2019-01-09 PROCEDURE — 80048 BASIC METABOLIC PNL TOTAL CA: CPT | Performed by: INTERNAL MEDICINE

## 2019-01-09 PROCEDURE — G0463 HOSPITAL OUTPT CLINIC VISIT: HCPCS | Mod: ZF

## 2019-01-09 PROCEDURE — 80076 HEPATIC FUNCTION PANEL: CPT | Performed by: INTERNAL MEDICINE

## 2019-01-09 PROCEDURE — 25000128 H RX IP 250 OP 636: Mod: ZF | Performed by: INTERNAL MEDICINE

## 2019-01-09 PROCEDURE — G0009 ADMIN PNEUMOCOCCAL VACCINE: HCPCS | Mod: ZF

## 2019-01-09 PROCEDURE — 36415 COLL VENOUS BLD VENIPUNCTURE: CPT | Performed by: INTERNAL MEDICINE

## 2019-01-09 PROCEDURE — G0009 ADMIN PNEUMOCOCCAL VACCINE: HCPCS

## 2019-01-09 PROCEDURE — 90732 PPSV23 VACC 2 YRS+ SUBQ/IM: CPT | Mod: ZF | Performed by: INTERNAL MEDICINE

## 2019-01-09 RX ADMIN — PNEUMOCOCCAL VACCINE POLYVALENT 0.5 ML
25; 25; 25; 25; 25; 25; 25; 25; 25; 25; 25; 25; 25; 25; 25; 25; 25; 25; 25; 25; 25; 25; 25 INJECTION, SOLUTION INTRAMUSCULAR; SUBCUTANEOUS at 10:41

## 2019-01-09 ASSESSMENT — MIFFLIN-ST. JEOR: SCORE: 1674.14

## 2019-01-09 ASSESSMENT — PAIN SCALES - GENERAL: PAINLEVEL: NO PAIN (0)

## 2019-01-09 NOTE — NURSING NOTE
Chief Complaint   Patient presents with     Interstitial Lung Disease (ILD)     follow up      Ciera Frey CMA

## 2019-01-09 NOTE — PROGRESS NOTES
Reason for Visit  Jimmy Isaac is a 58 year old year old male who is being seen for Sarcoidosis  Pulmonary HPI    The patient was seen and examined by Shane Ruvalcaba     Ms. Isaac comes in for followup.  He was last seen in the Pulmonary Clinic in 11/2017.  Plan was to see him back in a few months.  Briefly, he has a remote diagnosis of sarcoidosis made several years back.  He has sarcoidosis with pulmonary hypertension for which he is on Letairis, Adcirca and Tyvaso.  He was last seen in the Pulmonary Hypertension Clinic in 10/2018.      He comes in today without any significant new symptoms.  Since the last clinic visit, he has been evaluated by Nephrology and Urology for renal stones and there is no intraocular inflammation.  A 1-year followup was recommended.      He continues to have impaired exercise capacity, but is able to carry out most of his activities without any significant problems.  No cough or sputum production.  No chest pain.  No  fever or chills.         Current Outpatient Medications   Medication     albuterol (PROAIR HFA) 108 (90 Base) MCG/ACT Inhaler     budesonide (PULMICORT) 0.25 MG/2ML neb solution     Cholecalciferol (VITAMIN D PO)     digoxin (LANOXIN) 125 MCG tablet     LETAIRIS 10 MG tablet     tadalafil, PAH, (ADCIRCA) 20 MG TABS     treprostinil (TYVASO) 0.6 MG/ML SOLN     No current facility-administered medications for this visit.      Allergies   Allergen Reactions     Nkda [No Known Drug Allergies]      Past Medical History:   Diagnosis Date     Chronic sinusitis      CKD (chronic kidney disease)      Hypertension      Keratosis     frontal scalp, treated with liquid nitrogen at [facility]     Malignant neoplasm (H)      Pneumonia a few times     Pulmonary sarcoidosis (H)        Past Surgical History:   Procedure Laterality Date     COLONOSCOPY       ENT SURGERY      wisdom teeth extraction     TONSILLECTOMY  1964       Social History     Socioeconomic History      "Marital status: Single     Spouse name: Not on file     Number of children: Not on file     Years of education: Not on file     Highest education level: Not on file   Social Needs     Financial resource strain: Not on file     Food insecurity - worry: Not on file     Food insecurity - inability: Not on file     Transportation needs - medical: Not on file     Transportation needs - non-medical: Not on file   Occupational History     Not on file   Tobacco Use     Smoking status: Former Smoker     Packs/day: 1.00     Years: 30.00     Pack years: 30.00     Types: Cigarettes     Start date: 10/10/1975     Last attempt to quit: 3/1/2010     Years since quittin.8     Smokeless tobacco: Never Used   Substance and Sexual Activity     Alcohol use: Yes     Alcohol/week: 0.0 oz     Comment: stated 2 bottles of beer occ     Drug use: No     Comment: past use of marijuana     Sexual activity: Not Currently     Partners: Female     Birth control/protection: Abstinence, Female Surgical   Other Topics Concern     Parent/sibling w/ CABG, MI or angioplasty before 65F 55M? Not Asked      Service Not Asked     Blood Transfusions Not Asked     Caffeine Concern Yes     Occupational Exposure Not Asked     Hobby Hazards Not Asked     Sleep Concern Not Asked     Stress Concern Not Asked     Weight Concern Not Asked     Special Diet Not Asked     Back Care Not Asked     Exercise Yes     Bike Helmet Not Asked     Seat Belt Not Asked     Self-Exams Not Asked   Social History Narrative     Not on file       ROS Pulmonary  A complete ROS was otherwise negative except as noted in the HPI.  /75 (BP Location: Right arm, Patient Position: Chair, Cuff Size: Adult Regular)   Pulse 72   Resp 16   Ht 1.829 m (6' 0.01\")   Wt 82.1 kg (181 lb)   SpO2 93%   BMI 24.54 kg/m    Exam:   GENERAL APPEARANCE: Well developed, well nourished, alert, and in no apparent distress.  EYES: PERRL, EOMI  HENT: Nasal mucosa with no edema and no " hyperemia. No nasal polyps.  MOUTH: Oral mucosa is moist, without any lesions, no tonsillar enlargement, no oropharyngeal exudate.  NECK: supple, no masses, no thyromegaly.  LYMPHATICS: No significant axillary, cervical, or supraclavicular nodes.  RESP: normal percussion, good air flow throughout.  No crackles. No rhonchi. No wheezes.  CV: Normal S1, S2, regular rhythm, normal rate. No murmur.  No rub. No gallop. No LE edema.   ABDOMEN:  Bowel sounds normal, soft, nontender, no HSM or masses.   MS: extremities normal. No clubbing. No cyanosis.  SKIN: no rash on limited exam  NEURO: Mentation intact, speech normal, normal strength and tone, normal gait and stance  Results:    None available    Assessment and plan: Mr. Isaac is a pleasant 58-year-old male with sarcoidosis or pulmonary hypertension with mean PA pressures in mid 50s, renal stones and mixed bacteria and fungal infection of his sinuses.  He had no response to prednisone therapy in 2017.  Currently off all other systemic anti-inflammatory medications.   1.  Pulmonary sarcoidosis with no change in symptoms, but no PFTs today.  We will order full PFTs including a walk test.   2.  Extrapulmonary sarcoidosis with SAPH and recurrent renal stones.  No evidence of ocular disease.  No activity on cardiac PET.  Will get metabolic labs today.   3.  Hypoxia, on oxygen as previously.   4.  He continues to wonder if he should proceed with evaluation for lung transplantation.  He has educated about outcomes of lung transplantation.  We will wait for him to tell us that he wants to get evaluated.   5. SAPH: Follows with Dr Lindsey and is on three agents naina.   6. He is also wondering if there are any trials available for sarcoid associated pulmonary hypertension.  I will defer this to the pulmonary hypertension group.      I will see him back in 6 months.     Labs reviewed. Ca is 10.9. He is followed by Dr Good. I wll contact her for management. PFT's also  reviewed. Exercise limitation based on six minute walk distance. 8 point drop in O2 sats despite 8LNC O2. He has very severe obstruction and airtrapping with severely decreased DLCO. Need to clarify lung transplant status with Mr Poncho Ruvalcaba              Answers for HPI/ROS submitted by the patient on 1/8/2019   General Symptoms: No  Skin Symptoms: No  HENT Symptoms: No  EYE SYMPTOMS: No  HEART SYMPTOMS: Yes  LUNG SYMPTOMS: Yes  INTESTINAL SYMPTOMS: No  URINARY SYMPTOMS: No  REPRODUCTIVE SYMPTOMS: No  SKELETAL SYMPTOMS: No  BLOOD SYMPTOMS: No  NERVOUS SYSTEM SYMPTOMS: No  MENTAL HEALTH SYMPTOMS: No  Cough: No  Sputum or phlegm: No  Coughing up blood: No  Difficulty breating or shortness of breath: Yes  Snoring: No  Wheezing: Yes  Difficulty breathing on exertion: Yes  Nighttime Cough: No  Difficulty breathing when lying flat: Yes  Chest pain or pressure: No  Fast or irregular heartbeat: No  Pain in legs with walking: No  Trouble breathing while lying down: Yes  Fingers or toes appear blue: No  High blood pressure: Yes  Low blood pressure: No  Fainting: No  Pacemaker: No  Varicose veins: No  Edema or swelling: No  Wake up at night with shortness of breath: No  Light-headedness: No  Exercise intolerance: Yes

## 2019-01-09 NOTE — LETTER
1/9/2019       RE: Jimmy Isaac  [address] [address] [id number]     Dear Colleague,    Thank you for referring your patient, Jimmy Isaac, to the [facility] at [facility]. Please see a copy of my visit note below.    Reason for Visit  Jimmy Isaac is a 58 year old year old male who is being seen for Sarcoidosis  Pulmonary HPI    The patient was seen and examined by Shane Ruvalcaba     Ms. Isaac comes in for followup.  He was last seen in the Pulmonary Clinic in 11/2017.  Plan was to see him back in a few months.  Briefly, he has a remote diagnosis of sarcoidosis made several years back.  He has sarcoidosis with pulmonary hypertension for which he is on Letairis, Adcirca and Tyvaso.  He was last seen in the Pulmonary Hypertension Clinic in 10/2018.      He comes in today without any significant new symptoms.  Since the last clinic visit, he has been evaluated by Nephrology and Urology for renal stones and there is no intraocular inflammation.  A 1-year followup was recommended.      He continues to have impaired exercise capacity, but is able to carry out most of his activities without any significant problems.  No cough or sputum production.  No chest pain.  No regular fever or chills.         Current Outpatient Medications   Medication     albuterol (PROAIR HFA) 108 (90 Base) MCG/ACT Inhaler     budesonide (PULMICORT) 0.25 MG/2ML neb solution     Cholecalciferol (VITAMIN D PO)     digoxin (LANOXIN) 125 MCG tablet     LETAIRIS 10 MG tablet     tadalafil, PAH, (ADCIRCA) 20 MG TABS     treprostinil (TYVASO) 0.6 MG/ML SOLN     No current facility-administered medications for this visit.      Allergies   Allergen Reactions     Nkda [No Known Drug Allergies]      Past Medical History:   Diagnosis Date     Chronic sinusitis      CKD (chronic kidney disease)      Hypertension      Keratosis     frontal scalp, treated with liquid  nitrogen at [facility]     Malignant neoplasm (H)      Pneumonia a few times     Pulmonary sarcoidosis (H)        Past Surgical History:   Procedure Laterality Date     COLONOSCOPY       ENT SURGERY      wisdom teeth extraction     TONSILLECTOMY  1964       Social History     Socioeconomic History     Marital status: Single     Spouse name: Not on file     Number of children: Not on file     Years of education: Not on file     Highest education level: Not on file   Social Needs     Financial resource strain: Not on file     Food insecurity - worry: Not on file     Food insecurity - inability: Not on file     Transportation needs - medical: Not on file     Transportation needs - non-medical: Not on file   Occupational History     Not on file   Tobacco Use     Smoking status: Former Smoker     Packs/day: 1.00     Years: 30.00     Pack years: 30.00     Types: Cigarettes     Start date: 10/10/1975     Last attempt to quit: 3/1/2010     Years since quittin.8     Smokeless tobacco: Never Used   Substance and Sexual Activity     Alcohol use: Yes     Alcohol/week: 0.0 oz     Comment: stated 2 bottles of beer occ     Drug use: No     Comment: past use of marijuana     Sexual activity: Not Currently     Partners: Female     Birth control/protection: Abstinence, Female Surgical   Other Topics Concern     Parent/sibling w/ CABG, MI or angioplasty before 65F 55M? Not Asked      Service Not Asked     Blood Transfusions Not Asked     Caffeine Concern Yes     Occupational Exposure Not Asked     Hobby Hazards Not Asked     Sleep Concern Not Asked     Stress Concern Not Asked     Weight Concern Not Asked     Special Diet Not Asked     Back Care Not Asked     Exercise Yes     Bike Helmet Not Asked     Seat Belt Not Asked     Self-Exams Not Asked   Social History Narrative     Not on file       ROS Pulmonary  A complete ROS was otherwise negative except as noted in the HPI.  /75 (BP Location: Right arm,  "Patient Position: Chair, Cuff Size: Adult Regular)   Pulse 72   Resp 16   Ht 1.829 m (6' 0.01\")   Wt 82.1 kg (181 lb)   SpO2 93%   BMI 24.54 kg/m     Exam:   GENERAL APPEARANCE: Well developed, well nourished, alert, and in no apparent distress.  EYES: PERRL, EOMI  HENT: Nasal mucosa with no edema and no hyperemia. No nasal polyps.  MOUTH: Oral mucosa is moist, without any lesions, no tonsillar enlargement, no oropharyngeal exudate.  NECK: supple, no masses, no thyromegaly.  LYMPHATICS: No significant axillary, cervical, or supraclavicular nodes.  RESP: normal percussion, good air flow throughout.  No crackles. No rhonchi. No wheezes.  CV: Normal S1, S2, regular rhythm, normal rate. No murmur.  No rub. No gallop. No LE edema.   ABDOMEN:  Bowel sounds normal, soft, nontender, no HSM or masses.   MS: extremities normal. No clubbing. No cyanosis.  SKIN: no rash on limited exam  NEURO: Mentation intact, speech normal, normal strength and tone, normal gait and stance  Results:    None available    Assessment and plan: Mr. Isaac is a pleasant 58-year-old male with sarcoidosis or pulmonary hypertension with mean PA pressures in mid 50s, renal stones and mixed bacteria and fungal infection of his sinuses.  He had no response to prednisone therapy in 2017.  Currently off all other systemic anti-inflammatory medications.   1.  Pulmonary sarcoidosis with no change in symptoms, but no PFTs today.  We will order full PFTs including a walk test.   2.  Extrapulmonary sarcoidosis with SAPH and recurrent renal stones.  No evidence of ocular disease.  No activity on cardiac PET.  Will get metabolic labs today.   3.  Hypoxia, on oxygen as previously.   4.  He continues to wonder if he should proceed with evaluation for lung transplantation.  He has educated about outcomes of lung transplantation.  We will wait for him to tell us that he wants to get evaluated.   5. SAPH: Follows with Dr Lindsey and is on three agents naina. "   6. He is also wondering if there are any trials available for sarcoid associated pulmonary hypertension.  I will defer this to the pulmonary hypertension group.      I will see him back in 6 months.     Labs reviewed. Ca is 10.9. He is followed by Dr Good. I wll contact her for management. PFT's also reviewed. Exercise limitation based on six minute walk distance. 8 point drop in O2 sats despite 8LNC O2. He has very severe obstruction and airtrapping with severely decreased DLCO. Need to clarify lung transplant status with him    Shane Ruvalcaba

## 2019-01-10 ENCOUNTER — PATIENT OUTREACH (OUTPATIENT)
Dept: PULMONOLOGY | Facility: CLINIC | Age: 59
End: 2019-01-10

## 2019-01-10 NOTE — PROGRESS NOTES
Dr Ruvalcaba reviewed labs from yesterday, calcium slightly elevated 10.1, sent message to Dr Ba. Informed patient. Stated that Dr Ba wanted him to take calcium due to kidney stones, so not unexpected. Patient will let us know if he decides to do lung transplant and has right oxygen equipment for oxygen needs.

## 2019-01-16 ENCOUNTER — TELEPHONE (OUTPATIENT)
Dept: TRANSPLANT | Facility: CLINIC | Age: 59
End: 2019-01-16

## 2019-02-15 DIAGNOSIS — I27.20 PULMONARY HYPERTENSION (H): Primary | ICD-10-CM

## 2019-02-15 RX ORDER — TADALAFIL 20 MG/1
40 TABLET ORAL DAILY
Qty: 60 TABLET | Refills: 1 | Status: SHIPPED | OUTPATIENT
Start: 2019-02-15 | End: 2019-04-17

## 2019-02-16 NOTE — TELEPHONE ENCOUNTER
Medication Refill double check:    Last office visit was on 10/29/18 with Nevaeh Griffith CNP.    Follow up was recommended for 3 months.    Any additional encounters with changes to requested med? no    Authorizing provider is: Dr. Lindsey    Limited refill was approved until seen in clinic.     Additional orders/notes:msg sent to coordinators to call patient for appt as orders are already in.       Obdulia Rome RN on 2/15/2019 at 8:15 PM

## 2019-03-04 ENCOUNTER — MYC MEDICAL ADVICE (OUTPATIENT)
Dept: CARDIOLOGY | Facility: CLINIC | Age: 59
End: 2019-03-04

## 2019-03-04 ASSESSMENT — ENCOUNTER SYMPTOMS
SPUTUM PRODUCTION: 1
SHORTNESS OF BREATH: 1
LIGHT-HEADEDNESS: 0
ORTHOPNEA: 1
SYNCOPE: 0
SLEEP DISTURBANCES DUE TO BREATHING: 0
POSTURAL DYSPNEA: 1
SNORES LOUDLY: 0
EXERCISE INTOLERANCE: 1
PALPITATIONS: 1
COUGH: 1
HYPOTENSION: 0
HEMOPTYSIS: 0
DYSPNEA ON EXERTION: 1
LEG PAIN: 0
HYPERTENSION: 0
WHEEZING: 1
COUGH DISTURBING SLEEP: 0

## 2019-03-05 ENCOUNTER — OFFICE VISIT (OUTPATIENT)
Dept: CARDIOLOGY | Facility: CLINIC | Age: 59
End: 2019-03-05
Attending: PHYSICIAN ASSISTANT
Payer: COMMERCIAL

## 2019-03-05 VITALS
BODY MASS INDEX: 23.91 KG/M2 | DIASTOLIC BLOOD PRESSURE: 75 MMHG | WEIGHT: 176.5 LBS | OXYGEN SATURATION: 91 % | SYSTOLIC BLOOD PRESSURE: 136 MMHG | HEIGHT: 72 IN | HEART RATE: 74 BPM

## 2019-03-05 DIAGNOSIS — I27.20 PULMONARY HYPERTENSION (H): ICD-10-CM

## 2019-03-05 DIAGNOSIS — R06.09 DYSPNEA ON EXERTION: ICD-10-CM

## 2019-03-05 DIAGNOSIS — I27.20 PULMONARY HYPERTENSION (H): Primary | ICD-10-CM

## 2019-03-05 LAB
ANION GAP SERPL CALCULATED.3IONS-SCNC: 7 MMOL/L (ref 3–14)
BUN SERPL-MCNC: 32 MG/DL (ref 7–30)
CALCIUM SERPL-MCNC: 9.1 MG/DL (ref 8.5–10.1)
CHLORIDE SERPL-SCNC: 102 MMOL/L (ref 94–109)
CO2 SERPL-SCNC: 29 MMOL/L (ref 20–32)
CREAT SERPL-MCNC: 1.95 MG/DL (ref 0.66–1.25)
ERYTHROCYTE [DISTWIDTH] IN BLOOD BY AUTOMATED COUNT: 12.6 % (ref 10–15)
GFR SERPL CREATININE-BSD FRML MDRD: 37 ML/MIN/{1.73_M2}
GLUCOSE SERPL-MCNC: 92 MG/DL (ref 70–99)
HCT VFR BLD AUTO: 38.9 % (ref 40–53)
HGB BLD-MCNC: 13 G/DL (ref 13.3–17.7)
MCH RBC QN AUTO: 30 PG (ref 26.5–33)
MCHC RBC AUTO-ENTMCNC: 33.4 G/DL (ref 31.5–36.5)
MCV RBC AUTO: 90 FL (ref 78–100)
NT-PROBNP SERPL-MCNC: 508 PG/ML (ref 0–125)
PLATELET # BLD AUTO: 145 10E9/L (ref 150–450)
POTASSIUM SERPL-SCNC: 4 MMOL/L (ref 3.4–5.3)
RBC # BLD AUTO: 4.33 10E12/L (ref 4.4–5.9)
SODIUM SERPL-SCNC: 138 MMOL/L (ref 133–144)
WBC # BLD AUTO: 6.7 10E9/L (ref 4–11)

## 2019-03-05 PROCEDURE — 99214 OFFICE O/P EST MOD 30 MIN: CPT | Mod: ZP | Performed by: PHYSICIAN ASSISTANT

## 2019-03-05 PROCEDURE — G0463 HOSPITAL OUTPT CLINIC VISIT: HCPCS | Mod: ZF

## 2019-03-05 PROCEDURE — 83880 ASSAY OF NATRIURETIC PEPTIDE: CPT | Performed by: NURSE PRACTITIONER

## 2019-03-05 PROCEDURE — 36415 COLL VENOUS BLD VENIPUNCTURE: CPT | Performed by: NURSE PRACTITIONER

## 2019-03-05 PROCEDURE — 85027 COMPLETE CBC AUTOMATED: CPT | Performed by: NURSE PRACTITIONER

## 2019-03-05 PROCEDURE — 80048 BASIC METABOLIC PNL TOTAL CA: CPT | Performed by: NURSE PRACTITIONER

## 2019-03-05 ASSESSMENT — PAIN SCALES - GENERAL: PAINLEVEL: NO PAIN (0)

## 2019-03-05 ASSESSMENT — MIFFLIN-ST. JEOR: SCORE: 1653.6

## 2019-03-05 NOTE — LETTER
3/5/2019      RE: Jimmy Isaac  [address] [address] [address]       Dear Colleague,    Thank you for the opportunity to participate in the care of your patient, Jimmy Isaac, at the [facility] HEART [facility]. Please see a copy of my visit note below.    March 5, 2019  [facility] Pulmonary Hypertension Clinic    HPI:  Mr. Jimmy Isaac is a pleasant 58 year old male with a past medical history of hypertension, CKD, and pulmonary sarcoidosis (without cardiac involvement). He is followed her in our clinic by Dr. Lindsey for associated pulmonary hypertension and is maintained on Letairis, tadalafil, and Tyvaso 12 breaths QID. He is oxygen dependent.     He was seen last in our clinic by Nevaeh Griffith NP in Oct 2018. At that time he was stable from  PH perspective and no changes were made. His echo at that time showed mild RV dilation, with normal RV function. RSP was reported as 28mmHg. His LV has remained preserved with EF 55-60%. This was unchanged from prior. He's back today for routine follow up.    Since last visit he tells me he feels about the same. No worsening, but still gets winded with activities and has to stop at rest at times. He had a six min walk in Jan 2019 along with his PFTs as he follows with pulmonary. At that time he walked 1100 feet and used 8L continuous oxygen, with a low sat of 92%. He denies any new chest pain, palpitations, dizziness, or presyncope. He is not having any swelling, and weight is actually down a few lbs, though he states this is intentional as he's trying to eat better. Further ROS as below.   Labs over the last few months have shown a small bump in his BUN/SCr. It appears he has seen nephrology in the recent past though no specific changes have been made. He is not on diuretics currently and his NT-proBNP has been relatively stable.       Current Symptoms  1. Any ER visits or hospital admissions  since last appointment: No    2. Shortness of breath: Yes: Unchanged, stops to rest intermittently while walking. Notes sometimes worsens depending on weather.   3. Dizziness or lightheadedness: No  4. Any syncopal episodes or falls: No  5. Chest pain or chest tightness: No  6. Nausea, vomiting, diarrhea, constipation: No  7. Swelling in the legs: No  8. Bloating in the abdomen: No  9. PND; Waking up at night coughing, choking or SOB: No  10. Any medication intolerances: No  11. Reported new headaches: No  12. Jaw pain or bone/joint pain: No  13. On IV Prostacyclin: No    14. Current level of activity: Is outside doing chores, feeds his horses etc. Does have to stop and rest on occasion. Recovers quickly.       CURRENT PULMONARY HYPERTENSION REGIMEN:    PAH Rx: Tyvaso 12 breaths QID, Letairs 10m daily, and Adcirca 40mg daily     Diuretics: none    Oxygen: 4LPM at rest, up to 6LPM with exertional at times     Anticoagulation: none    ASSESSMENT/PLAN:    1. Pulmonary arterial hypertension.   --In the setting of pulmonary sarcoid and chronic hypoxemia. Clinically he does not appear significantly changed. His last echo showed preserved RV function, and NT-proBNP has remained relatively stable as well. Will continue Tyvaso, tadalafil, and Opsumit without change.   --He appears euvolemic on exam today, not on maintained on diuretics. Will continue to follow his renal function.     Follow up plan: Return in 3 months with Dr. Lindsey. Will arrange for annual RHC prior to that visit.       Orders this Visit:  Orders Placed This Encounter   Procedures     Basic Metabolic Profile     N terminal pro BNP outpatient     CBC with platelets     Follow-Up with Cardiologist     No orders of the defined types were placed in this encounter.    Medications Discontinued During This Encounter   Medication Reason     Cholecalciferol (VITAMIN D PO) Stopped by Patient     budesonide (PULMICORT) 0.25 MG/2ML neb solution Stopped by Patient        PAST MEDICAL HISTORY:  Past Medical History:   Diagnosis Date     Chronic sinusitis      CKD (chronic kidney disease)      Hypertension      Keratosis     frontal scalp, treated with liquid nitrogen at [facility]     Malignant neoplasm (H)      Pneumonia a few times     Pulmonary sarcoidosis (H)        FAMILY HISTORY:  Family History   Problem Relation Age of Onset     Coronary Artery Disease Father      Heart Disease Father         heart attack age 35-40     Hypertension Mother      Hyperlipidemia Mother      Cerebrovascular Disease Mother      Dementia Mother      Lupus Sister      Glaucoma No family hx of      Macular Degeneration No family hx of          SOCIAL HISTORY:  Social History     Tobacco Use     Smoking status: Former Smoker     Packs/day: 1.00     Years: 30.00     Pack years: 30.00     Types: Cigarettes     Start date: 10/10/1975     Last attempt to quit: 3/1/2010     Years since quittin.0     Smokeless tobacco: Never Used   Substance Use Topics     Alcohol use: Yes     Alcohol/week: 0.0 oz     Comment: stated 2 bottles of beer occ     Drug use: No     Comment: past use of marijuana         CURRENT MEDICATIONS:  Current Outpatient Medications   Medication Sig Dispense Refill     albuterol (PROAIR HFA) 108 (90 Base) MCG/ACT Inhaler Inhale 4-6 puffs into the lungs every 6 hours as needed 1 Inhaler 6     digoxin (LANOXIN) 125 MCG tablet Take 1 tablet (125 mcg) by mouth daily 90 tablet 2     LETAIRIS 10 MG tablet Take 1 tablet (10 mg) by mouth daily 30 tablet 11     tadalafil, PAH, (ADCIRCA) 20 MG TABS Take 2 tablets (40 mg) by mouth daily More refills after office visit. 60 tablet 1     treprostinil (TYVASO) 0.6 MG/ML SOLN 12 breaths 4 times daily 3.6 mL 11       ROS:   10 point ROS of systems including Constitutional, Eyes, Respiratory, Cardiovascular, Gastroenterology, Genitourinary, Integumentary, Muscularskeletal, Psychiatric were all negative except for pertinent positives noted  in my HPI.      EXAM:  GEN:  In general, this is a well nourished  male in no acute distress on NC.  Patient ambulatory, unaccompanied today.  HEENT:  Pupils equal, round. Sclerae nonicteric. Clear oropharynx. Mucous membranes moist.  NECK: Supple, no masses appreciated. Trachea midline. No JVD.  C/V:  Regular rate and rhythm, no murmur, rub or gallop. No S3 or RV heave.   RESP: Respirations are unlabored. No use of accessory muscles. Scattered fine crackles in bases. No wheezing or rhonchi.   GI: Abdomen soft, nontender, nondistended.   EXTREM: No LE edema. No cyanosis or clubbing.  NEURO: Alert and oriented, cooperative. Gait not assessed. No obvious focal deficits.   SKIN: Warm and dry. No rashes or petechiae appreciated.       Weight  Wt Readings from Last 10 Encounters:   03/05/19 80.1 kg (176 lb 8 oz)   01/09/19 82.1 kg (181 lb)   12/26/18 82.5 kg (181 lb 12.8 oz)   10/29/18 81.6 kg (180 lb)   05/30/18 84.6 kg (186 lb 9.6 oz)   05/30/18 84.8 kg (187 lb)   02/27/18 84.6 kg (186 lb 8 oz)   02/05/18 85.8 kg (189 lb 1.6 oz)   12/12/17 79.4 kg (175 lb)   11/21/17 85.3 kg (188 lb)         Labs:    CBC RESULTS:  Lab Results   Component Value Date    WBC 6.7 03/05/2019    RBC 4.33 (L) 03/05/2019    HGB 13.0 (L) 03/05/2019    HCT 38.9 (L) 03/05/2019    MCV 90 03/05/2019    MCH 30.0 03/05/2019    MCHC 33.4 03/05/2019    RDW 12.6 03/05/2019     (L) 03/05/2019       CMP RESULTS:  Lab Results   Component Value Date     03/05/2019    POTASSIUM 4.0 03/05/2019    CHLORIDE 102 03/05/2019    CO2 29 03/05/2019    ANIONGAP 7 03/05/2019    GLC 92 03/05/2019    BUN 32 (H) 03/05/2019    CR 1.95 (H) 03/05/2019    GFRESTIMATED 37 (L) 03/05/2019    GFRESTBLACK 42 (L) 03/05/2019    TERI 9.1 03/05/2019    BILITOTAL 0.6 01/09/2019    ALBUMIN 3.8 01/09/2019    ALKPHOS 76 01/09/2019    ALT 22 01/09/2019    AST 26 01/09/2019      Results for MATTY LOPEZ (MRN [id number]) as of 3/5/2019 16:12   Ref. Range 8/1/2017 10:26  11/1/2017 10:47 2/5/2018 10:49 5/30/2018 10:25 3/5/2019 09:50   N-Terminal Pro Bnp Latest Ref Range: 0 - 125 pg/mL 522 (H) 1,000 (H) 506 (H) 445 (H) 508 (H)       Most recent testing:    Echocardiogram (10/29/18):  Interpretation Summary  Technically difficult study.  Global and regional left ventricular function is normal with an EF of 55-60%.  Mild right ventricular dilation is present.  Global right ventricular function is normal.  Right ventricular systolic pressure is 28mmHg above the right atrial pressure.  No significant valvular abnormalities.  The inferior vena cava was normal in size with preserved respiratory  variability.  No pericardial effusion is present.  This study was compared to a TTE from 9/22/2017. There has been no significant  change.      RHC (2/27/18):          PFT (1/9/19)  PFT Latest Ref Rng & Units 1/9/2019   FVC L 2.73   FEV1 L 0.96   FVC% % 54   FEV1% % 24       FEV1/FVC 35%  DLCO 32%  TLC 94%      6MWT (1/9/19):  Six Minute Walk Test:  Probe: (Forehead) Stops: (1) due to (SOB)  Patient walked (1100 Ft /335 m) in 6 minutes. LLN = (1675 Ft /511 m)  Oxygen during the test (YES) LPM = (8) (Continuous)  O2 system: (Labs Backpack)  Pre-walk: 02 Saturation (100 %) Heart Rate (82) Will Dyspnea = (1) Fatigue = (0)  Post-walk: 02 Saturation (92 %) Heart Rate (132 ) Will Dyspnea = (5) Fatigue = (0)  Lowest 02 saturation during the 6 minute walk (92 %)  Max heart rate during walk (132)  Recovery time to baseline 02 SAT (1:55) minutes and HR (2:49) minutes    NYHA Functional Class:  Functional class 3    1--No limitation of activity  2--Slight limitation, ordinary activities may cause symptoms  3--Marked limitation, less than ordinary activities cause symptoms  4--Severe limitation, minimal activity causes symptoms, or symptoms at rest     Gauri Laurent PA-C  [facility]  Pager [phone]

## 2019-03-05 NOTE — NURSING NOTE
Chief Complaint   Patient presents with     Follow Up      Return for 4 month PH F/U w/labs prior     Vitals were taken and medications were reconciled.     Lilly Dowell CMA    10:08 AM

## 2019-03-05 NOTE — PROGRESS NOTES
March 5, 2019  [facility]        HPI:  Mr. Jimmy Isaac is a pleasant 58 year old male with a past medical history of hypertension, CKD, and pulmonary sarcoidosis (without cardiac involvement). He is followed her in our clinic by Dr. Lindsey for associated pulmonary hypertension and is maintained on Letairis, tadalafil, and Tyvaso 12 breaths QID. He is oxygen dependent.     He was seen last in our clinic by Nevaeh Griffith NP in Oct 2018. At that time he was stable from  PH perspective and no changes were made. His echo at that time showed mild RV dilation, with normal RV function. RSP was reported as 28mmHg. His LV has remained preserved with EF 55-60%. This was unchanged from prior. He's back today for routine follow up.    Since last visit he tells me he feels about the same. No worsening, but still gets winded with activities and has to stop at rest at times. He had a six min walk in Jan 2019 along with his PFTs as he follows with pulmonary. At that time he walked 1100 feet and used 8L continuous oxygen, with a low sat of 92%. He denies any new chest pain, palpitations, dizziness, or presyncope. He is not having any swelling, and weight is actually down a few lbs, though he states this is intentional as he's trying to eat better. Further ROS as below.   Labs over the last few months have shown a small bump in his BUN/SCr. It appears he has seen nephrology in the recent past though no specific changes have been made. He is not on diuretics currently and his NT-proBNP has been relatively stable.       Current Symptoms  1. Any ER visits or hospital admissions since last appointment: No    2. Shortness of breath: Yes: Unchanged, stops to rest intermittently while walking. Notes sometimes worsens depending on weather.   3. Dizziness or lightheadedness: No  4. Any syncopal episodes or falls: No  5. Chest pain or chest tightness: No  6. Nausea, vomiting, diarrhea,  constipation: No  7. Swelling in the legs: No  8. Bloating in the abdomen: No  9. PND; Waking up at night coughing, choking or SOB: No  10. Any medication intolerances: No  11. Reported new headaches: No  12. Jaw pain or bone/joint pain: No  13. On IV Prostacyclin: No    14. Current level of activity: Is outside doing chores, feeds his horses etc. Does have to stop and rest on occasion. Recovers quickly.       CURRENT PULMONARY HYPERTENSION REGIMEN:    PAH Rx: Tyvaso 12 breaths QID, Letairs 10m daily, and Adcirca 40mg daily     Diuretics: none    Oxygen: 4LPM at rest, up to 6LPM with exertional at times     Anticoagulation: none      ASSESSMENT/PLAN:    1. Pulmonary arterial hypertension.   --In the setting of pulmonary sarcoid and chronic hypoxemia. Clinically he does not appear significantly changed. His last echo showed preserved RV function, and NT-proBNP has remained relatively stable as well. Will continue Tyvaso, tadalafil, and Opsumit without change.   --He appears euvolemic on exam today, not on maintained on diuretics. Will continue to follow his renal function.     Follow up plan: Return in 3 months with Dr. Lindsey. Will arrange for annual RHC prior to that visit.       Orders this Visit:  Orders Placed This Encounter   Procedures     Basic Metabolic Profile     N terminal pro BNP outpatient     CBC with platelets     Follow-Up with Cardiologist     No orders of the defined types were placed in this encounter.    Medications Discontinued During This Encounter   Medication Reason     Cholecalciferol (VITAMIN D PO) Stopped by Patient     budesonide (PULMICORT) 0.25 MG/2ML neb solution Stopped by Patient           PAST MEDICAL HISTORY:  Past Medical History:   Diagnosis Date     Chronic sinusitis      CKD (chronic kidney disease)      Hypertension      Keratosis     frontal scalp, treated with liquid nitrogen at [facility]     Malignant neoplasm (H)      Pneumonia a few times     Pulmonary  sarcoidosis (H)          FAMILY HISTORY:  Family History   Problem Relation Age of Onset     Coronary Artery Disease Father      Heart Disease Father         heart attack age 35-40     Hypertension Mother      Hyperlipidemia Mother      Cerebrovascular Disease Mother      Dementia Mother      Lupus Sister      Glaucoma No family hx of      Macular Degeneration No family hx of          SOCIAL HISTORY:  Social History     Tobacco Use     Smoking status: Former Smoker     Packs/day: 1.00     Years: 30.00     Pack years: 30.00     Types: Cigarettes     Start date: 10/10/1975     Last attempt to quit: 3/1/2010     Years since quittin.0     Smokeless tobacco: Never Used   Substance Use Topics     Alcohol use: Yes     Alcohol/week: 0.0 oz     Comment: stated 2 bottles of beer occ     Drug use: No     Comment: past use of marijuana         CURRENT MEDICATIONS:  Current Outpatient Medications   Medication Sig Dispense Refill     albuterol (PROAIR HFA) 108 (90 Base) MCG/ACT Inhaler Inhale 4-6 puffs into the lungs every 6 hours as needed 1 Inhaler 6     digoxin (LANOXIN) 125 MCG tablet Take 1 tablet (125 mcg) by mouth daily 90 tablet 2     LETAIRIS 10 MG tablet Take 1 tablet (10 mg) by mouth daily 30 tablet 11     tadalafil, PAH, (ADCIRCA) 20 MG TABS Take 2 tablets (40 mg) by mouth daily More refills after office visit. 60 tablet 1     treprostinil (TYVASO) 0.6 MG/ML SOLN 12 breaths 4 times daily 3.6 mL 11         ROS:   10 point ROS of systems including Constitutional, Eyes, Respiratory, Cardiovascular, Gastroenterology, Genitourinary, Integumentary, Muscularskeletal, Psychiatric were all negative except for pertinent positives noted in my HPI.      EXAM:  GEN:  In general, this is a well nourished  male in no acute distress on NC.  Patient ambulatory, unaccompanied today.  HEENT:  Pupils equal, round. Sclerae nonicteric. Clear oropharynx. Mucous membranes moist.  NECK: Supple, no masses appreciated. Trachea  midline. No JVD.  C/V:  Regular rate and rhythm, no murmur, rub or gallop. No S3 or RV heave.   RESP: Respirations are unlabored. No use of accessory muscles. Scattered fine crackles in bases. No wheezing or rhonchi.   GI: Abdomen soft, nontender, nondistended.   EXTREM: No LE edema. No cyanosis or clubbing.  NEURO: Alert and oriented, cooperative. Gait not assessed. No obvious focal deficits.   SKIN: Warm and dry. No rashes or petechiae appreciated.         Weight  Wt Readings from Last 10 Encounters:   03/05/19 80.1 kg (176 lb 8 oz)   01/09/19 82.1 kg (181 lb)   12/26/18 82.5 kg (181 lb 12.8 oz)   10/29/18 81.6 kg (180 lb)   05/30/18 84.6 kg (186 lb 9.6 oz)   05/30/18 84.8 kg (187 lb)   02/27/18 84.6 kg (186 lb 8 oz)   02/05/18 85.8 kg (189 lb 1.6 oz)   12/12/17 79.4 kg (175 lb)   11/21/17 85.3 kg (188 lb)         Labs:    CBC RESULTS:  Lab Results   Component Value Date    WBC 6.7 03/05/2019    RBC 4.33 (L) 03/05/2019    HGB 13.0 (L) 03/05/2019    HCT 38.9 (L) 03/05/2019    MCV 90 03/05/2019    MCH 30.0 03/05/2019    MCHC 33.4 03/05/2019    RDW 12.6 03/05/2019     (L) 03/05/2019       CMP RESULTS:  Lab Results   Component Value Date     03/05/2019    POTASSIUM 4.0 03/05/2019    CHLORIDE 102 03/05/2019    CO2 29 03/05/2019    ANIONGAP 7 03/05/2019    GLC 92 03/05/2019    BUN 32 (H) 03/05/2019    CR 1.95 (H) 03/05/2019    GFRESTIMATED 37 (L) 03/05/2019    GFRESTBLACK 42 (L) 03/05/2019    TERI 9.1 03/05/2019    BILITOTAL 0.6 01/09/2019    ALBUMIN 3.8 01/09/2019    ALKPHOS 76 01/09/2019    ALT 22 01/09/2019    AST 26 01/09/2019      Results for MATTY LOPEZ (MRN [id number]) as of 3/5/2019 16:12   Ref. Range 8/1/2017 10:26 11/1/2017 10:47 2/5/2018 10:49 5/30/2018 10:25 3/5/2019 09:50   N-Terminal Pro Bnp Latest Ref Range: 0 - 125 pg/mL 522 (H) 1,000 (H) 506 (H) 445 (H) 508 (H)         Most recent testing:      Echocardiogram (10/29/18):  Interpretation Summary  Technically difficult study.  Global and  regional left ventricular function is normal with an EF of 55-60%.  Mild right ventricular dilation is present.  Global right ventricular function is normal.  Right ventricular systolic pressure is 28mmHg above the right atrial pressure.  No significant valvular abnormalities.  The inferior vena cava was normal in size with preserved respiratory  variability.  No pericardial effusion is present.  This study was compared to a TTE from 9/22/2017. There has been no significant  change.      RHC (2/27/18):          PFT (1/9/19)  PFT Latest Ref Rng & Units 1/9/2019   FVC L 2.73   FEV1 L 0.96   FVC% % 54   FEV1% % 24       FEV1/FVC 35%  DLCO 32%  TLC 94%      6MWT (1/9/19):  Six Minute Walk Test:  Probe: (Forehead) Stops: (1) due to (SOB)  Patient walked (1100 Ft /335 m) in 6 minutes. LLN = (1675 Ft /511 m)  Oxygen during the test (YES) LPM = (8) (Continuous)  O2 system: (Labs Backpack)  Pre-walk: 02 Saturation (100 %) Heart Rate (82) Will Dyspnea = (1) Fatigue = (0)  Post-walk: 02 Saturation (92 %) Heart Rate (132 ) Will Dyspnea = (5) Fatigue = (0)  Lowest 02 saturation during the 6 minute walk (92 %)  Max heart rate during walk (132)  Recovery time to baseline 02 SAT (1:55) minutes and HR (2:49) minutes        NYHA Functional Class:  Functional class 3    1--No limitation of activity  2--Slight limitation, ordinary activities may cause symptoms  3--Marked limitation, less than ordinary activities cause symptoms  4--Severe limitation, minimal activity causes symptoms, or symptoms at rest         Gauri Laurent PA-C  [facility]  Pager [phone]

## 2019-03-05 NOTE — PATIENT INSTRUCTIONS
Medication Changes:  None today     Patient Instructions:  1. Continue staying active and eat a heart healthy diet.    2. Please keep current list of medications with you at all times.    3. Remember to weigh yourself daily after voiding and before you consume any food or beverages and log the numbers.  If you have gained/lost 2 pounds overnight or 5 pounds in a week contact us immediately for medication adjustments or further instructions.    4. **Please call us immediately if you have any syncope (fainting or passing out), chest pain, edema (swelling or weight gain), or decline in your functional status (general decline in how you are feeling).    Follow up Appointment Information:  3 months with Dr. Lindsey.  We will call you if he would like a right heart catheterization    Results:  We reviewed these today at your visit     We are located on the third floor of the [facility] on the [facility].  Our address is     [address] [place] [address] on [address]rd [place] [address]    Thank you for allowing us to be a part of your care here at the [facility]    If you have questions or concerns please contact us at:    Obdulia Rome, RN, BSN, PHN  Miguel Ángel Valverde (Schedule,Prior Auth)  Nurse Coordinator     Clinic   Pulmonary Hypertension   Pulmonary Hypertension  [facility]  (P)[phone]    (P) [phone]        (F) [phone]    ** Please note that you will NOT receive a reminder call regarding your scheduled testing, reminder calls are for provider appointments only.  If you are scheduled for testing within the [place] [place] system you may receive a call regarding pre-registration for billing purposes only.**     Remember to weigh yourself daily after voiding and before you consume any food or beverages and log the numbers.  If you have gained/lost 2  pounds overnight or 5 pounds in a week contact us immediately for medication adjustments or further instructions.   **Please call us immediately if you have any syncope, chest pain, edema, or decline in your functional status.    Support Group:  Pulmonary Hypertension Association  Https://www.phassociation.org/  **Look at the Events Tab** They even have Support Groups that you can call into    [facility] Support Group  Second Saturday of the Month from 1-3 PM   Location: [address] [place] [address]  Leader: Sandra Jacques and Cinda Rollins  Phone: [phone] or [phone]  Email: [email or web address]

## 2019-03-06 DIAGNOSIS — I27.20 PULMONARY HYPERTENSION (H): Primary | ICD-10-CM

## 2019-03-06 RX ORDER — LIDOCAINE 40 MG/G
CREAM TOPICAL
Status: CANCELLED | OUTPATIENT
Start: 2019-03-06

## 2019-03-06 NOTE — PROGRESS NOTES
Miguel Ángel Valverde sent to  Cardiology Ph Nurse-             Can we place the orders for the testing and I will schedule this in a few months    Previous Messages      ----- Message -----   From: Gauri Laurent PA   Sent: 3/5/2019   2:46 PM   To: Miguel Ángel Valverde   Subject: needs RHC when returns in 3 months               Will you call him and let him know we will go ahead and have him do a RHC before he sees Rosalia galvez?   Do I need to enter orders or can you take care of that?   Thanks   Gauri

## 2019-03-07 DIAGNOSIS — D86.9 SARCOIDOSIS: Primary | ICD-10-CM

## 2019-03-07 RX ORDER — BUDESONIDE 0.25 MG/2ML
INHALANT ORAL
Qty: 60 AMPULE | Refills: 3 | Status: SHIPPED | OUTPATIENT
Start: 2019-03-07 | End: 2019-03-08

## 2019-03-08 DIAGNOSIS — D86.9 SARCOIDOSIS: ICD-10-CM

## 2019-03-08 RX ORDER — BUDESONIDE 0.25 MG/2ML
INHALANT ORAL
Qty: 60 AMPULE | Refills: 3 | Status: ON HOLD | OUTPATIENT
Start: 2019-03-08 | End: 2019-06-19

## 2019-03-22 ENCOUNTER — TELEPHONE (OUTPATIENT)
Dept: CARDIOLOGY | Facility: CLINIC | Age: 59
End: 2019-03-22

## 2019-03-22 DIAGNOSIS — I27.20 PULMONARY HYPERTENSION (H): Primary | ICD-10-CM

## 2019-03-22 RX ORDER — AMBRISENTAN 10 MG/1
10 TABLET, FILM COATED ORAL DAILY
Qty: 30 TABLET | Refills: 11 | Status: SHIPPED | OUTPATIENT
Start: 2019-03-22 | End: 2019-03-27

## 2019-03-22 NOTE — TELEPHONE ENCOUNTER
Medication Refill double check:    Last office visit was on 3/5/19 with HELIO Pineda.    Follow up was recommended for May.    Any additional encounters with changes to requested med? no    Authorizing provider is: Dr. Rosalia Lira was approved.     Additional orders/notes:       Obdulia Rome RN on 3/22/2019 at 5:16 PM

## 2019-03-27 RX ORDER — AMBRISENTAN 10 MG/1
10 TABLET, FILM COATED ORAL DAILY
Qty: 30 TABLET | Refills: 11 | Status: SHIPPED | OUTPATIENT
Start: 2019-03-27 | End: 2020-03-20

## 2019-03-27 NOTE — TELEPHONE ENCOUNTER
[facility] Call Center    Phone Message    May a detailed message be left on voicemail: yes    Reason for Call: Other: Pt called to say that he was supposed to get his shipment of Letairis yesterday but the pharmacy states that they haven't received the prescription renewal yet. Please give pt a call back to [facility].     Action Taken: Message routed to:  [facility] Cardiology

## 2019-03-27 NOTE — TELEPHONE ENCOUNTER
Called [facility] pharmacy who states mary jo did not receive the Erx sent on 3/22/19.  Realized it was probably sent to the wrong [facility] mail order pharmacy.  Gave TORB to pharmacist for renewal of Letairis.  New Erx also sent through for documentation purposes. Obdulia Rome RN on 3/27/2019 at 1:54 PM

## 2019-03-28 ENCOUNTER — MYC REFILL (OUTPATIENT)
Dept: PULMONOLOGY | Facility: CLINIC | Age: 59
End: 2019-03-28

## 2019-03-28 DIAGNOSIS — J44.9 COPD (CHRONIC OBSTRUCTIVE PULMONARY DISEASE) (H): ICD-10-CM

## 2019-03-28 DIAGNOSIS — E78.2 MIXED HYPERLIPIDEMIA: ICD-10-CM

## 2019-03-28 DIAGNOSIS — I27.20 PULMONARY HYPERTENSION (H): ICD-10-CM

## 2019-03-29 RX ORDER — ALBUTEROL SULFATE 90 UG/1
4-6 AEROSOL, METERED RESPIRATORY (INHALATION) EVERY 6 HOURS PRN
Qty: 18 G | Refills: 11 | Status: SHIPPED | OUTPATIENT
Start: 2019-03-29 | End: 2020-03-13

## 2019-04-01 ENCOUNTER — MYC REFILL (OUTPATIENT)
Dept: CARDIOLOGY | Facility: CLINIC | Age: 59
End: 2019-04-01

## 2019-04-01 DIAGNOSIS — I27.20 PULMONARY HYPERTENSION (H): Primary | ICD-10-CM

## 2019-04-01 NOTE — TELEPHONE ENCOUNTER
digoxin (LANOXIN) 125 MCG tablet       Last Written Prescription Date:  7-2-189  Last Fill Quantity: 90,   # refills: 2  Last Office Visit : 3-5-19  Future Office visit:  6-19-19    Routing refill request to provider for review/approval because:  No dig level on record and creat is elevated.      Kathleen M Doege RN

## 2019-04-03 RX ORDER — DIGOXIN 125 MCG
125 TABLET ORAL DAILY
Qty: 90 TABLET | Refills: 0 | Status: SHIPPED | OUTPATIENT
Start: 2019-04-03 | End: 2019-06-24

## 2019-04-03 NOTE — TELEPHONE ENCOUNTER
Medication Refill double check:    Last office visit was on 3/5/19 with HELIO Pineda.    Follow up was recommended for 3 months.    Any additional encounters with changes to requested med?     Authorizing provider is: Dr. Lindsey    Limited refill was approved.     Additional orders/notes: Elevated creatinine was noted in last office visit note, but is stable since January to March visit.  Will provide patient with limited supply to ensure frequent monitoring of his renal fx.      Obdulia Rome RN on 4/3/2019 at 11:26 AM

## 2019-04-17 ENCOUNTER — TELEPHONE (OUTPATIENT)
Dept: CARDIOLOGY | Facility: CLINIC | Age: 59
End: 2019-04-17

## 2019-04-17 DIAGNOSIS — I27.20 PULMONARY HYPERTENSION (H): Primary | ICD-10-CM

## 2019-04-17 RX ORDER — TADALAFIL 20 MG/1
40 TABLET ORAL DAILY
Qty: 180 TABLET | Refills: 3 | Status: SHIPPED | OUTPATIENT
Start: 2019-04-17 | End: 2019-08-21

## 2019-04-17 NOTE — TELEPHONE ENCOUNTER
tadalafil, PAH, (ADCIRCA) 20 MG TABS      Last Written Prescription Date:  2-15-19  Last Fill Quantity: 60,   # refills: 1  Last Office Visit : 35-19  Future Office visit:  619-19    Routing refill request to provider for review/approval because:  Not on protocol.      Kathleen M Doege RN

## 2019-04-17 NOTE — TELEPHONE ENCOUNTER
Health Call Center    Phone Message    May a detailed message be left on voicemail: yes    Reason for Call: Medication Question or concern regarding medication   Prescription Clarification  Name of Medication: tadalafil, PAH, (ADCIRCA) 20 MG TABS  Prescribing Provider: Dr. Lindsey   Pharmacy: [facility]   What on the order needs clarification? Pt is almost out of medication will be out on Friday and [facility] has sent 3 authorizations for this medication to be filled. Pt is asking for a call back with status.          Action Taken: Message routed to:  [facility] cardiology

## 2019-04-24 ENCOUNTER — TELEPHONE (OUTPATIENT)
Dept: CARDIOLOGY | Facility: CLINIC | Age: 59
End: 2019-04-24

## 2019-04-24 NOTE — TELEPHONE ENCOUNTER
Prior Authorization Specialty Medication Request    Medication/Dose: Tadalafil 20 mg  ICD code (if different than what is on RX):  Pulmonary hypertension (H) [I27.20]   Previously Tried and Failed:      Important Lab Values:   Rationale:     Insurance Name:   Insurance ID:   Insurance Phone Number:     Pharmacy Information (if different than what is on RX)  Name:  [facility]  Phone:

## 2019-04-25 NOTE — TELEPHONE ENCOUNTER
Prior Authorization Approval    Authorization Effective Date: 4/25/2019  Authorization Expiration Date: 4/25/2021  Medication: Tadalafil 20 mg - APPROVED  Approved Dose/Quantity:   Reference #:     Insurance Company: [employer] [phone] Fax [phone]  Expected CoPay:       CoPay Card Available:      Foundation Assistance Needed:    Which Pharmacy is filling the prescription (Not needed for infusion/clinic administered): [facility] - [place], [place] - [address] [facility]  Pharmacy Notified: YesComment:  Faxed approval to pharmacy  Patient Notified:

## 2019-04-25 NOTE — TELEPHONE ENCOUNTER
PA Initiation    Medication: Tadalafil 20 mg -   Insurance Company: [employer] - Phone [phone] Fax [phone]  Pharmacy Filling the Rx: [facility] - [place], [place] - [address]  Filling Pharmacy Phone: [phone]  Filling Pharmacy Fax: [phone]  Start Date: 4/25/2019

## 2019-06-19 ENCOUNTER — APPOINTMENT (OUTPATIENT)
Dept: MEDSURG UNIT | Facility: CLINIC | Age: 59
End: 2019-06-19
Attending: INTERNAL MEDICINE
Payer: COMMERCIAL

## 2019-06-19 ENCOUNTER — HOSPITAL ENCOUNTER (OUTPATIENT)
Facility: CLINIC | Age: 59
Discharge: HOME | End: 2019-06-19
Attending: INTERNAL MEDICINE | Admitting: INTERNAL MEDICINE
Payer: COMMERCIAL

## 2019-06-19 ENCOUNTER — OFFICE VISIT (OUTPATIENT)
Dept: CARDIOLOGY | Facility: CLINIC | Age: 59
End: 2019-06-19
Attending: INTERNAL MEDICINE
Payer: COMMERCIAL

## 2019-06-19 ENCOUNTER — MEDICAL CORRESPONDENCE (OUTPATIENT)
Dept: HEALTH INFORMATION MANAGEMENT | Facility: CLINIC | Age: 59
End: 2019-06-19

## 2019-06-19 VITALS
HEART RATE: 84 BPM | OXYGEN SATURATION: 94 % | BODY MASS INDEX: 23.99 KG/M2 | WEIGHT: 177.1 LBS | DIASTOLIC BLOOD PRESSURE: 74 MMHG | HEIGHT: 72 IN | SYSTOLIC BLOOD PRESSURE: 119 MMHG

## 2019-06-19 VITALS
BODY MASS INDEX: 23.7 KG/M2 | OXYGEN SATURATION: 96 % | HEART RATE: 82 BPM | TEMPERATURE: 97.8 F | DIASTOLIC BLOOD PRESSURE: 77 MMHG | RESPIRATION RATE: 16 BRPM | WEIGHT: 175 LBS | HEIGHT: 72 IN | SYSTOLIC BLOOD PRESSURE: 136 MMHG

## 2019-06-19 DIAGNOSIS — I27.20 PULMONARY HYPERTENSION (H): ICD-10-CM

## 2019-06-19 DIAGNOSIS — I27.20 PULMONARY HYPERTENSION (H): Primary | ICD-10-CM

## 2019-06-19 DIAGNOSIS — R06.02 SOB (SHORTNESS OF BREATH): ICD-10-CM

## 2019-06-19 LAB
ANION GAP SERPL CALCULATED.3IONS-SCNC: 4 MMOL/L (ref 3–14)
BUN SERPL-MCNC: 25 MG/DL (ref 7–30)
CALCIUM SERPL-MCNC: 9.2 MG/DL (ref 8.5–10.1)
CHLORIDE SERPL-SCNC: 105 MMOL/L (ref 94–109)
CO2 SERPL-SCNC: 31 MMOL/L (ref 20–32)
CREAT SERPL-MCNC: 1.8 MG/DL (ref 0.66–1.25)
ERYTHROCYTE [DISTWIDTH] IN BLOOD BY AUTOMATED COUNT: 12.3 % (ref 10–15)
GFR SERPL CREATININE-BSD FRML MDRD: 40 ML/MIN/{1.73_M2}
GLUCOSE SERPL-MCNC: 92 MG/DL (ref 70–99)
HCT VFR BLD AUTO: 41.3 % (ref 40–53)
HGB BLD-MCNC: 13.3 G/DL (ref 13.3–17.7)
MCH RBC QN AUTO: 30.6 PG (ref 26.5–33)
MCHC RBC AUTO-ENTMCNC: 32.2 G/DL (ref 31.5–36.5)
MCV RBC AUTO: 95 FL (ref 78–100)
NT-PROBNP SERPL-MCNC: 541 PG/ML (ref 0–125)
PLATELET # BLD AUTO: 177 10E9/L (ref 150–450)
POTASSIUM SERPL-SCNC: 4.6 MMOL/L (ref 3.4–5.3)
RBC # BLD AUTO: 4.35 10E12/L (ref 4.4–5.9)
SODIUM SERPL-SCNC: 140 MMOL/L (ref 133–144)
WBC # BLD AUTO: 6.4 10E9/L (ref 4–11)

## 2019-06-19 PROCEDURE — 85027 COMPLETE CBC AUTOMATED: CPT | Performed by: PHYSICIAN ASSISTANT

## 2019-06-19 PROCEDURE — 40000166 ZZH STATISTIC PP CARE STAGE 1

## 2019-06-19 PROCEDURE — 27210794 ZZH OR GENERAL SUPPLY STERILE: Performed by: INTERNAL MEDICINE

## 2019-06-19 PROCEDURE — 93451 RIGHT HEART CATH: CPT | Performed by: INTERNAL MEDICINE

## 2019-06-19 PROCEDURE — 36415 COLL VENOUS BLD VENIPUNCTURE: CPT | Performed by: PHYSICIAN ASSISTANT

## 2019-06-19 PROCEDURE — 99213 OFFICE O/P EST LOW 20 MIN: CPT | Mod: ZP | Performed by: INTERNAL MEDICINE

## 2019-06-19 PROCEDURE — C1894 INTRO/SHEATH, NON-LASER: HCPCS | Performed by: INTERNAL MEDICINE

## 2019-06-19 PROCEDURE — 83880 ASSAY OF NATRIURETIC PEPTIDE: CPT | Performed by: PHYSICIAN ASSISTANT

## 2019-06-19 PROCEDURE — 27210788 ZZH MANIFOLD CR3: Performed by: INTERNAL MEDICINE

## 2019-06-19 PROCEDURE — G0463 HOSPITAL OUTPT CLINIC VISIT: HCPCS | Mod: 25,ZF

## 2019-06-19 PROCEDURE — 93451 RIGHT HEART CATH: CPT | Mod: 26 | Performed by: INTERNAL MEDICINE

## 2019-06-19 PROCEDURE — 80048 BASIC METABOLIC PNL TOTAL CA: CPT | Performed by: PHYSICIAN ASSISTANT

## 2019-06-19 PROCEDURE — 25000125 ZZHC RX 250: Performed by: INTERNAL MEDICINE

## 2019-06-19 RX ORDER — LIDOCAINE 40 MG/G
CREAM TOPICAL
Status: DISCONTINUED | OUTPATIENT
Start: 2019-06-19 | End: 2019-06-19 | Stop reason: HOSPADM

## 2019-06-19 ASSESSMENT — PAIN SCALES - GENERAL: PAINLEVEL: NO PAIN (0)

## 2019-06-19 ASSESSMENT — MIFFLIN-ST. JEOR
SCORE: 1646.79
SCORE: 1656.32

## 2019-06-19 NOTE — PROGRESS NOTES
Khang Lindsey M.D.  Cardiovascular Medicine    I personally saw and examined this patient, discussed care with housestaff and other consultants, reviewed current laboratories and imaging studies, and conveyed impression and diagnostic/therapeutic plan to patient.    Problem List  1. Pulmonary arterial hypertension  2. Chronic, oxygen dependent respiratory failure  3. Sarcoidosis without cardiac involvement  4. Nephrolithiasis  5. Prednisone related fluid retention  6. Nephrolitiasis  7. Aseptic necrosis of femoral heads    History  There is no interim history of increasing shortness of breath, orthopnea, PND, ankle edema, increasing abdominal girth, palpitation, pre-syncope, syncope, bleeding or thromboembolic complications.  The patient is taking medication regularly, following a low salt diet, and exercising regularly as outlined.    Alert, oriented, normal gait and station, normal mental, JVP within normal limits, HJR negative,thyroid not enlarged, mucous membranes clear, abdomen without tenderness, rebound or guarding, skin clear of lesion, PMI normal, S1 S2 regular rhythm, no gallop, no edema  /74 (BP Location: Right arm, Patient Position: Chair, Cuff Size: Adult Regular)   Pulse 84   Ht 1.829 m (6')   Wt 80.3 kg (177 lb 1.6 oz)   SpO2 94%   BMI 24.02 kg/m      Wt Readings from Last 5 Encounters:   06/19/19 80.3 kg (177 lb 1.6 oz)   06/19/19 79.4 kg (175 lb)   03/05/19 80.1 kg (176 lb 8 oz)   01/09/19 82.1 kg (181 lb)   12/26/18 82.5 kg (181 lb 12.8 oz)       Results for MATTY LOPEZ (MRN [id number]) as of 6/19/2019 14:42   Ref. Range 1/9/2019 11:52 3/5/2019 09:50 6/19/2019 10:02 6/19/2019 12:05   Sodium Latest Ref Range: 133 - 144 mmol/L 138 138 140    Potassium Latest Ref Range: 3.4 - 5.3 mmol/L 4.2 4.0 4.6    Chloride Latest Ref Range: 94 - 109 mmol/L 102 102 105    Carbon Dioxide Latest Ref Range: 20 - 32 mmol/L 29 29 31    Urea Nitrogen Latest Ref Range: 7 - 30 mg/dL 34 (H) 32 (H) 25     Creatinine Latest Ref Range: 0.66 - 1.25 mg/dL 1.92 (H) 1.95 (H) 1.80 (H)    GFR Estimate Latest Ref Range: >60 mL/min/1.73_m2 37 (L) 37 (L) 40 (L)    GFR Estimate If Black Latest Ref Range: >60 mL/min/1.73_m2 43 (L) 42 (L) 47 (L)    Calcium Latest Ref Range: 8.5 - 10.1 mg/dL 10.9 (H) 9.1 9.2    Anion Gap Latest Ref Range: 3 - 14 mmol/L 7 7 4    Albumin Latest Ref Range: 3.4 - 5.0 g/dL 3.8      Protein Total Latest Ref Range: 6.8 - 8.8 g/dL 7.6      Bilirubin Total Latest Ref Range: 0.2 - 1.3 mg/dL 0.6      Alkaline Phosphatase Latest Ref Range: 40 - 150 U/L 76      ALT Latest Ref Range: 0 - 70 U/L 22      AST Latest Ref Range: 0 - 45 U/L 26      Bilirubin Direct Latest Ref Range: 0.0 - 0.2 mg/dL 0.2      N-Terminal Pro Bnp Latest Ref Range: 0 - 125 pg/mL  508 (H) 541 (H)    Glucose Latest Ref Range: 70 - 99 mg/dL 87 92 92    WBC Latest Ref Range: 4.0 - 11.0 10e9/L 6.5 6.7 6.4    Hemoglobin Latest Ref Range: 13.3 - 17.7 g/dL 13.7 13.0 (L) 13.3    Hematocrit Latest Ref Range: 40.0 - 53.0 % 42.6 38.9 (L) 41.3    Platelet Count Latest Ref Range: 150 - 450 10e9/L 169 145 (L) 177    RBC Count Latest Ref Range: 4.4 - 5.9 10e12/L 4.66 4.33 (L) 4.35 (L)    MCV Latest Ref Range: 78 - 100 fl 91 90 95    MCH Latest Ref Range: 26.5 - 33.0 pg 29.4 30.0 30.6    MCHC Latest Ref Range: 31.5 - 36.5 g/dL 32.2 33.4 32.2    RDW Latest Ref Range: 10.0 - 15.0 % 12.1 12.6 12.3    Diff Method Unknown Automated Method      % Neutrophils Latest Units: % 76.4      % Lymphocytes Latest Units: % 9.9      % Monocytes Latest Units: % 10.2      % Eosinophils Latest Units: % 2.6      % Basophils Latest Units: % 0.6      % Immature Granulocytes Latest Units: % 0.3      Nucleated RBCs Latest Ref Range: 0 /100 0      Absolute Neutrophil Latest Ref Range: 1.6 - 8.3 10e9/L 5.0      Absolute Lymphocytes Latest Ref Range: 0.8 - 5.3 10e9/L 0.6 (L)      Absolute Monocytes Latest Ref Range: 0.0 - 1.3 10e9/L 0.7      Absolute Eosinophils Latest Ref Range:  0.0 - 0.7 10e9/L 0.2      Absolute Basophils Latest Ref Range: 0.0 - 0.2 10e9/L 0.0      Abs Immature Granulocytes Latest Ref Range: 0 - 0.4 10e9/L 0.0      Absolute Nucleated RBC Unknown 0.0        Meds  Current Outpatient Medications   Medication     digoxin (LANOXIN) 125 MCG tablet     LETAIRIS 10 MG tablet     PROAIR  (90 Base) MCG/ACT inhaler     tadalafil, PAH, (ADCIRCA) 20 MG TABS     treprostinil (TYVASO) 0.6 MG/ML SOLN     No current facility-administered medications for this visit.      Imaging   Name: MATTY LOPEZ  MRN: [id number]  : 1960  Study Date: 10/29/2018 01:19 PM  Age: 57 yrs  Gender: Male  [place] [facility] [place] Study: Pulmonary hypertension (H), Sarcoidosis, Dyspnea on exertion  Ordering Physician: DUY MIDDLETON  Referring Physician: DUY MIDDLETON  Performed By: Radha Dawson RDCS     BSA: 2.1 m2  Height: 72 in  Weight: 186 lb  BP: 119/70 mmHg  _____________________________________________________________________________  __        Procedure  Echocardiogram with two-dimensional, color and spectral Doppler performed.  Optison (NDC #1987-6119-51) given intravenously. Patient was given 3.0 ml  mixture of 3 ml Optison and 6 ml saline. 6.0 ml wasted. IV start location  LAC .  _____________________________________________________________________________  __        Interpretation Summary  Technically difficult study.  Global and regional left ventricular function is normal with an EF of 55-60%.  Mild right ventricular dilation is present.  Global right ventricular function is normal.  Right ventricular systolic pressure is 28mmHg above the right atrial pressure.  No significant valvular abnormalities.  The inferior vena cava was normal in size with preserved respiratory  variability.  No pericardial effusion is present.  This study was compared to a TTE from 2017. There has been no  significant  change.  _____________________________________________________________________________  __        Left Ventricle  Left ventricular size is normal. Global and regional left ventricular function  is normal with an EF of 55-60%. Relative wall thickness is increased  consistent with concentric remodeling. Left ventricular diastolic function is  indeterminate. Flattened septum is consistent with right ventricular pressure  overload.     Right Ventricle  Global right ventricular function is normal. Mild right ventricular dilation  is present.     Atria  Both atria appear normal. The atrial septum is intact as assessed by color  Doppler .     Mitral Valve  The mitral valve is normal. Trace mitral insufficiency is present.        Aortic Valve  Aortic valve is normal in structure and function. The aortic valve is  tricuspid.     Tricuspid Valve  The tricuspid valve is normal. Trace tricuspid insufficiency is present. Right  ventricular systolic pressure is 28mmHg above the right atrial pressure.     Pulmonic Valve  The pulmonic valve is normal.     Vessels  The aorta root is normal. The thoracic aorta is normal. The inferior vena cava  was normal in size with preserved respiratory variability. Estimated mean  right atrial pressure is 3 mmHg (normal).     Pericardium  No pericardial effusion is present.        Compared to Previous Study  This study was compared to a TTE from 9/22/2017. There has been no significant  change.     Attestation  I have personally viewed the imaging and agree with the interpretation and  report as documented by the fellow, Kalyan Aragon, and/or edited by me.  _____________________________________________________________________________  __     MMode/2D Measurements & Calculations  IVSd: 1.3 cm  LVIDd: 3.5 cm  LVIDs: 2.4 cm  LVPWd: 1.3 cm  FS: 31.8 %  LV mass(C)d: 163.5 grams  LV mass(C)dI: 79.1 grams/m2  Ao root diam: 3.6 cm  LA dimension: 3.0 cm  asc Aorta Diam: 3.6 cm  LA/Ao: 0.84  LVOT  diam: 2.3 cm  LVOT area: 4.1 cm2  LA Volume (BP): 27.8 ml     LA Volume Index (BP): 13.4 ml/m2  RWT: 0.75        Doppler Measurements & Calculations  MV E max xavi: 64.0 cm/sec  MV A max xavi: 53.1 cm/sec  MV E/A: 1.2  MV dec time: 0.16 sec  PA acc time: 0.05 sec  TR max xavi: 264.2 cm/sec  TR max P.9 mmHg  E/E' av.6  Lateral E/e': 5.5  Medial E/e': 11.6  AsPressures Phase: Baseline      Time Systolic Diastolic Mean A Wave V Wave EDP Max dp/dt HR   RA Pressures 11:48 AM   5 mmHg    10 mmHg    5 mmHg      83 bpm      RV Pressures 11:36 AM       1008 mmHg/sec        11:48 AM 81 mmHg    1 mmHg       10 mmHg     81 bpm      PA Pressures 11:49 AM 81 mmHg    40 mmHg    58 mmHg        83 bpm      PCW Pressures 11:50 AM   12 mmHg    12 mmHg    17 mmHg      79 bpm      Blood Flow Results Phase: Baseline      Time Results Indexed Values   QP 11:36 AM 5.16 L/min    2.56 L/min/m2      QS 11:36 AM 5.16 L/min    2.56 L/min/m2      Blood Oximetry Phase: Baseline      Time Hb SAT(%) PO2 Content PA Sat   PA 11:36 AM  68.9 %      68.9 %      Art 11:36 AM  96 %     16.84 mL/dL       Cardiac Output Phase: Baseline      Time TDCO TDCI Lesia C.O. Lesia C.I. Lesia HR   Cardiac Output Results 11:36 AM 5.3 L/min    2.63 L/min/m2    5.16 L/min    2.56 L/min/m2        11:51 AM 5.3 L/min          Resistance Results Phase: Baseline      Time PVR SVR PVR-I SVR-I TPR TVR TPR-I TVR-I PVR/SVR TPR/TVR   Resistance Results (Metric) 11:36 .35 dsc-5     1436.72 dsc-5/m2     899.45 dsc-5     1811.52 dsc-5/m2         Resistance Results (Wood) 11:36 AM 8.92 MANRIQUE     17.96 MANRIQUE/m2     11.25 MANRIQUE     22.65 MANRIQUE/m2           Asessment/Plan     The patient appears to have had progression of his pulmonary hypertension as evidenced by increasing PA pressure and PVR.      The patient requires:  1. Transition to oral selexipag  2. Repeat echocardiogram  3. CKD needs to be followed carefully

## 2019-06-19 NOTE — PROGRESS NOTES
1120  Prep is complete for procedure.  Jimmy is here for [facility].  He is on O2 at 4L/NC.  Denies any SOB at this time.  Friend,  Sena, is here as .  CTRN

## 2019-06-19 NOTE — DISCHARGE INSTRUCTIONS
[facility]                        Interventional Cardiology  Discharge Instructions   Post Right Heart Cath      AFTER YOU GO HOME:    DO drink plenty of fluids    DO resume your regular diet and medications unless otherwise instructed by your Primary Physician    Do Not scrub the procedure site vigorously    No lotion or powder to the puncture site for 3 days    CALL YOUR PRIMARY PHYSICIAN IF: You may resume all normal activity.  Monitor neck site for bleeding, swelling, or voice changes. If you notice bleeding or swelling immediately apply pressure to the site and call number below to speak with Cardiology Fellow.  If you experience any changes in your breathing you should call your doctor immediately or come to the closest Emergency Department.  Do not drive yourself.    ADDITIONAL INSTRUCTIONS: Medications: You are to resume all home medications including anticoagulation therapy unless otherwise advised by your primary cardiologist or nurse coordinator.    Follow Up: Per your primary cardiology team    If you have any questions or concerns regarding your procedure site please call [phone] at anytime and ask for Cardiology Fellow on call.  They are available 24 hours a day.  You may also contact the Cardiology Clinic after hours number at [phone].                                                       Telephone Numbers [phone] Monday-Friday 8:00 am to 4:30 pm    [phone] After 4:30 pm Monday-Friday, Weekends & Holidays  Ask for Interventional Cardiologist on call. Someone is on call 24 hours/day   [facility] toll free number [phone] Monday-Friday 8:00 am to 4:30 pm   [facility] Emergency Dept [phone]

## 2019-06-19 NOTE — IP AVS SNAPSHOT
MRN:[id number]                      After Visit Summary   6/19/2019    Jimmy Isaac    MRN: [id number]           Visit Information        Department      6/19/2019  9:35 AM [address] [facility] [place]          Review of your medicines      UNREVIEWED medicines. Ask your doctor about these medicines       Dose / Directions   budesonide 0.25 MG/2ML neb solution  Commonly known as:  PULMICORT  Used for:  Sarcoidosis      Inhale the contents of 1 vial via nebulizer two times a day  Quantity:  60 ampule  Refills:  3     digoxin 125 MCG tablet  Commonly known as:  LANOXIN  Used for:  Pulmonary hypertension (H)      Dose:  125 mcg  Take 1 tablet (125 mcg) by mouth daily  Quantity:  90 tablet  Refills:  0     LETAIRIS 10 MG tablet  Used for:  Pulmonary hypertension (H)  Generic drug:  ambrisentan      Dose:  10 mg  Take 1 tablet (10 mg) by mouth daily  Quantity:  30 tablet  Refills:  11     PROAIR  (90 Base) MCG/ACT inhaler  Used for:  Pulmonary hypertension (H), Mixed hyperlipidemia, COPD (chronic obstructive pulmonary disease) (H)  Generic drug:  albuterol      Dose:  4-6 puff  Inhale 4-6 puffs into the lungs every 6 hours as needed for shortness of breath / dyspnea  Quantity:  18 g  Refills:  11     tadalafil (PAH) 20 MG Tabs  Commonly known as:  ADCIRCA  Used for:  Pulmonary hypertension (H)      Dose:  40 mg  Take 2 tablets (40 mg) by mouth daily More refills after office visit.  Quantity:  180 tablet  Refills:  3     treprostinil 0.6 MG/ML Soln neb solution  Commonly known as:  TYVASO  Used for:  Pulmonary hypertension (H)      12 breaths 4 times daily  Quantity:  3.6 mL  Refills:  11              Protect others around you: Learn how to safely use, store and throw away your medicines at www.disposemymeds.org.       Follow-ups after your visit       Your next 10 appointments already scheduled    Jun 19, 2019  Pro[facility] with K[facility] Delgado [facility]  [facility] ([facility]  [facility], [facility] [place] [place]) [address] [address]  [phone]   The [facility] is located on the corner of [address] and [address] on the [address] [facility] [address] [facility]. It is easily accessible from virtually any point [place], via [address] and [address].   Jun 19, 2019  2:00 PM CDT  (Arrive by 1:45 PM)  RETURN PRIMARY PULMONARY with Khang Lindsey MD  [facility] ([facility] [place]) [address]  [address]  [facility] [place] [id number] [phone]         Care Instructions       Further instructions from your care team       [facility]                        Interventional Cardiology  Discharge Instructions   Post Right Heart Cath      AFTER YOU GO HOME:    DO drink plenty of fluids    DO resume your regular diet and medications unless otherwise instructed by your Primary Physician    Do Not scrub the procedure site vigorously    No lotion or powder to the puncture site for 3 days    CALL YOUR PRIMARY PHYSICIAN IF: You may resume all normal activity.  Monitor neck site for bleeding, swelling, or voice changes. If you notice bleeding or swelling immediately apply pressure to the site and call number below to speak with Cardiology Fellow.  If you experience any changes in your breathing you should call your doctor immediately or come to the closest Emergency Department.  Do not drive yourself.    ADDITIONAL INSTRUCTIONS: Medications: You are to resume all home medications including anticoagulation therapy unless otherwise advised by your primary cardiologist or nurse coordinator.    Follow Up: Per your primary cardiology team    If you have any questions or concerns regarding your procedure site please call [phone] at anytime and ask for Cardiology Fellow on call.  They are available 24 hours a day.  You may also contact the Cardiology Clinic after hours number at [phone].                                                        Telephone Numbers [phone] Monday-Friday 8:00 am to 4:30 pm    [phone] After 4:30 pm Monday-Friday, Weekends & Holidays  Ask for Interventional Cardiologist on call. Someone is on call 24 hours/day   [facility] toll free number [phone] Monday-Friday 8:00 am to 4:30 pm   [facility] Emergency Dept [phone]                   Additional Information About Your Visit       [facility] Information    [employer] gives you secure access to your electronic health record. If you see a primary care provider, you can also send messages to your care team and make appointments. If you have questions, please call your primary care clinic.  If you do not have a primary care provider, please call [phone] and they will assist you.       Care EveryWhere ID    This is your Care EveryWhere ID. This could be used by other organizations to access your [facility] medical records  [id number]       Your Vitals Were     Blood Pressure   127/70 (BP Location: Right arm)    Pulse   81    Temperature   97.8  F (36.6  C) (Oral)    Respirations   16    Pulse Oximetry   96%          Primary Care Provider Office Phone # Fax #    Cristhian Busch [phone]      Equal Access to Services    JOVAN BOUCHER : Hadii mehran ku hadasho Soomaali, waaxda luqadaha, qaybta kaalmada adeegyada, waxay mercedez youngblood. So [facility] [phone].    ATENCIÓN: Si habla español, tiene a hutton disposición servicios gratlkzos de asistencia lingüística. Llame al [phone].    We comply with applicable federal civil rights laws and [place] laws. We do not discriminate on the basis of race, color, national origin, age, disability, sex, sexual orientation, or gender identity.           Thank you!    Thank you for choosing [facility] for your care. Our goal is always to provide you with excellent care. Hearing back from our patients is one way we can continue to improve our services.  Please take a few minutes to complete the written survey that you may receive in the mail after you visit with us. Thank you!            Medication List      ASK your doctor about these medications          Morning Afternoon Evening Bedtime As Needed    budesonide 0.25 MG/2ML neb solution  Also known as:  PULMICORT  INSTRUCTIONS:  Inhale the contents of 1 vial via nebulizer two times a day                     digoxin 125 MCG tablet  Also known as:  LANOXIN  INSTRUCTIONS:  Take 1 tablet (125 mcg) by mouth daily                     LETAIRIS 10 MG tablet  INSTRUCTIONS:  Take 1 tablet (10 mg) by mouth daily  Generic drug:  ambrisentan                     PROAIR  (90 Base) MCG/ACT inhaler  INSTRUCTIONS:  Inhale 4-6 puffs into the lungs every 6 hours as needed for shortness of breath / dyspnea  Generic drug:  albuterol                     tadalafil (PAH) 20 MG Tabs  Also known as:  ADCIRCA  INSTRUCTIONS:  Take 2 tablets (40 mg) by mouth daily More refills after office visit.                     treprostinil 0.6 MG/ML Soln neb solution  Also known as:  TYVASO  INSTRUCTIONS:  12 breaths 4 times daily

## 2019-06-19 NOTE — PROGRESS NOTES
[facility]   Interventional Cardiology        Consenting Right Heart Catheterization    Patient understands during the portion for right heart catheterization a fine tube (catheter) is put into the vein of the groin/neck.  It is carefully passed along until it reaches the heart and then goes up into the blood vessels of the lungs. This is done to measure a variety of pressures in your heart and can tell us how well the heart is filling and emptying, as well as monitor fluid status.       Patient also understands risks and complications of the procedure which include, but are not limited to bruising/swelling around the incision site, infection, bleeding, allergic reaction to local anesthetic, air embolism, arterial puncture, stroke, heart attack.      Patient verbalized understanding of risks and benefits of the right heart catheterization and has elected to proceed with the procedure.       Charles Mata PA-C  [facility] Cardiology Team

## 2019-06-19 NOTE — PROGRESS NOTES
Pt back from CCL s/p RHC.  VSS.  Pt alert and oriented x4.  Pt denies any pain.  Rt neck site F/D/I.  1220-Pt taking food along with him to eat at the clinic.  Discharge instructions had been given to the pt by previous nurse, Rachel VORARN-pt has no further questions.  1235-Rt neck site remains F/D/I.  Pt discharged with family to clinic for his appointment.

## 2019-06-19 NOTE — NURSING NOTE
Chief Complaint   Patient presents with     Follow Up     Return for 3 month PH F/U w/labs & RHC prior **(Need Care Everywhere Consent)     Vitals were taken and medications were reconciled.    Lilly Dowell CMA    1:25 PM

## 2019-06-19 NOTE — NURSING NOTE
Decrease Tyvaso by 2 breaths once weekly, while increasing Uptravi by 200mcg each time.    Proposed titration plan:    Tyvaso  Uptravi  10 breaths  200 mcg BID  8  Breaths  400 mcg BID  6 breaths  600 mcg BID  4 breaths  800 mcg BID  2 breaths  1000 mcg BID  Off   1200 mcg BID     1400 mcg BID     1600 mcg BID    -----------------------------------------------------------------  Med Reconcile: Reviewed and verified all current medications with the patient. The updated medication list was printed and given to the patient.    Medication Change: Patient was educated regarding prescribed medication change, including discussion of the indication, administration, side effects, and when to report to MD or RN. Patient demonstrated understanding of this information and agreed to call with further questions or concerns.    Labs: Patient was given results of the laboratory testing obtained today.     Patient demonstrated understanding of this information and agreed to call with further questions or concerns.     Diet: Patient instructed regarding a heart healthy diet, including discussion of reduced fat and sodium intake. Patient demonstrated understanding of this information and agreed to call with further questions or concerns.    Return Appointment: Patient given instructions regarding scheduling next clinic visit. Patient demonstrated understanding of this information and agreed to call with further questions or concerns.    Patient stated he understood all health information given and agreed to call with further questions or concerns.    Medication Changes:  -We will be transitioning you off of Tyvaso and onto Uptravi over an 8 week period.  You will receive RN visits with these titrations.    1.This medication needs to be approved by your insurance company and may take up to 2-3 weeks for approval.  You will not be able to pick it up from your pharmacy today.  2.  This medication can be expensive. If your co-pay is high,  call us before you pick it up.  Also, express financial need to your Pharmacy by stating (this medication is to expensive, I cannot afford this.)  3. Please call us when you start medication so we can schedule you for your follow up appointments.    Patient Instructions:  1. Continue staying active and eat a heart healthy diet.    2. Please keep current list of medications with you at all times.    3. Remember to weigh yourself daily after voiding and before you consume any food or beverages and log the numbers.  If you have gained 2 pounds overnight or 5 pounds in a week contact us immediately for medication adjustments or further instructions.    4. **Please call us immediately if you have any syncope (fainting or passing out), chest pain, edema (swelling or weight gain), or decline in your functional status (general decline in how you are feeling).    Follow up Appointment Information:  -follow up with Nevaeh Griffith CNP 1 month after starting new medication w/labs prior.    Results:  Component      Latest Ref Rng & Units 6/19/2019   Sodium      133 - 144 mmol/L 140   Potassium      3.4 - 5.3 mmol/L 4.6   Chloride      94 - 109 mmol/L 105   Carbon Dioxide      20 - 32 mmol/L 31   Anion Gap      3 - 14 mmol/L 4   Glucose      70 - 99 mg/dL 92   Urea Nitrogen      7 - 30 mg/dL 25   Creatinine      0.66 - 1.25 mg/dL 1.80 (H)   GFR Estimate      >60 mL/min/1.73:m2 40 (L)   GFR Estimate If Black      >60 mL/min/1.73:m2 47 (L)   Calcium      8.5 - 10.1 mg/dL 9.2   WBC      4.0 - 11.0 10e9/L 6.4   RBC Count      4.4 - 5.9 10e12/L 4.35 (L)   Hemoglobin      13.3 - 17.7 g/dL 13.3   Hematocrit      40.0 - 53.0 % 41.3   MCV      78 - 100 fl 95   MCH      26.5 - 33.0 pg 30.6   MCHC      31.5 - 36.5 g/dL 32.2   RDW      10.0 - 15.0 % 12.3   Platelet Count      150 - 450 10e9/L 177   N-Terminal Pro Bnp      0 - 125 pg/mL 541 (H)

## 2019-06-19 NOTE — PATIENT INSTRUCTIONS
Medication Changes:  -We will be transitioning you off of Tyvaso and onto Uptravi over an 8 week period.  You will receive RN visits with these titrations.    1.This medication needs to be approved by your insurance company and may take up to 2-3 weeks for approval.  You will not be able to pick it up from your pharmacy today.  2.  This medication can be expensive. If your co-pay is high, call us before you pick it up.  Also, express financial need to your Pharmacy by stating (this medication is to expensive, I cannot afford this.)  3. Please call us when you start medication so we can schedule you for your follow up appointments.    Patient Instructions:  1. Continue staying active and eat a heart healthy diet.    2. Please keep current list of medications with you at all times.    3. Remember to weigh yourself daily after voiding and before you consume any food or beverages and log the numbers.  If you have gained 2 pounds overnight or 5 pounds in a week contact us immediately for medication adjustments or further instructions.    4. **Please call us immediately if you have any syncope (fainting or passing out), chest pain, edema (swelling or weight gain), or decline in your functional status (general decline in how you are feeling).    Follow up Appointment Information:  -follow up with Nevaeh Griffith CNP 1 month after starting new medication w/labs prior.    Results:  Component      Latest Ref Rng & Units 6/19/2019   Sodium      133 - 144 mmol/L 140   Potassium      3.4 - 5.3 mmol/L 4.6   Chloride      94 - 109 mmol/L 105   Carbon Dioxide      20 - 32 mmol/L 31   Anion Gap      3 - 14 mmol/L 4   Glucose      70 - 99 mg/dL 92   Urea Nitrogen      7 - 30 mg/dL 25   Creatinine      0.66 - 1.25 mg/dL 1.80 (H)   GFR Estimate      >60 mL/min/1.73:m2 40 (L)   GFR Estimate If Black      >60 mL/min/1.73:m2 47 (L)   Calcium      8.5 - 10.1 mg/dL 9.2   WBC      4.0 - 11.0 10e9/L 6.4   RBC Count      4.4 - 5.9 10e12/L  4.35 (L)   Hemoglobin      13.3 - 17.7 g/dL 13.3   Hematocrit      40.0 - 53.0 % 41.3   MCV      78 - 100 fl 95   MCH      26.5 - 33.0 pg 30.6   MCHC      31.5 - 36.5 g/dL 32.2   RDW      10.0 - 15.0 % 12.3   Platelet Count      150 - 450 10e9/L 177   N-Terminal Pro Bnp      0 - 125 pg/mL 541 (H)     We are located on the third floor [facility].  Our address is     [address] [place] [address] [place] [address] [place] [address]    Thank you for allowing us to be a part of your care here at the [facility]    If you have questions or concerns please contact us at:    Obdulia Rome, RN, BSN, PHN  Miguel Ángel Valverde or Samantha Chandra (Schedule,Prior Auth)  Nurse Coordinator     Clinic   Pulmonary Hypertension   Pulmonary Hypertension  [facility]  (P)[phone]    (P) [phone]        (F) [phone]    ** Please note that you will NOT receive a reminder call regarding your scheduled testing, reminder calls are for provider appointments only.  If you are scheduled for testing within the [facility]) system you may receive a call regarding pre-registration for billing purposes only.**     Remember to weigh yourself daily after voiding and before you consume any food or beverages and log the numbers.  If you have gained/lost 2 pounds overnight or 5 pounds in a week contact us immediately for medication adjustments or further instructions.   **Please call us immediately if you have any syncope, chest pain, edema, or decline in your functional status.    Support Group:  Pulmonary Hypertension Association  [email or web address].phassociation.org/  **Look at the Events Tab** They even have Support Groups that you can call into    [facility] Support Group  Second Saturday of the Month from 1-3 PM   Location: [address] [place] [address]  [id number]  Leader: Sandra Rollins  Phone: [phone] or [phone]  Email: [email or web address]

## 2019-06-19 NOTE — LETTER
6/19/2019      RE: Jimmy Isaac  [address] [address] [address]       Dear Colleague,    Thank you for the opportunity to participate in the care of your patient, Jimmy Isaac, at the [facility]. Please see a copy of my visit note below.    Khang Lindsey M.D.  Cardiovascular Medicine    I personally saw and examined this patient, discussed care with housestaff and other consultants, reviewed current laboratories and imaging studies, and conveyed impression and diagnostic/therapeutic plan to patient.    Problem List  1. Pulmonary arterial hypertension  2. Chronic, oxygen dependent respiratory failure  3. Sarcoidosis without cardiac involvement  4. Nephrolithiasis  5. Prednisone related fluid retention  6. Nephrolitiasis  7. Aseptic necrosis of femoral heads    History  There is no interim history of increasing shortness of breath, orthopnea, PND, ankle edema, increasing abdominal girth, palpitation, pre-syncope, syncope, bleeding or thromboembolic complications.  The patient is taking medication regularly, following a low salt diet, and exercising regularly as outlined.    Alert, oriented, normal gait and station, normal mental, JVP within normal limits, HJR negative,thyroid not enlarged, mucous membranes clear, abdomen without tenderness, rebound or guarding, skin clear of lesion, PMI normal, S1 S2 regular rhythm, no gallop, no edema  /74 (BP Location: Right arm, Patient Position: Chair, Cuff Size: Adult Regular)   Pulse 84   Ht 1.829 m (6')   Wt 80.3 kg (177 lb 1.6 oz)   SpO2 94%   BMI 24.02 kg/m       Wt Readings from Last 5 Encounters:   06/19/19 80.3 kg (177 lb 1.6 oz)   06/19/19 79.4 kg (175 lb)   03/05/19 80.1 kg (176 lb 8 oz)   01/09/19 82.1 kg (181 lb)   12/26/18 82.5 kg (181 lb 12.8 oz)       Results for JIMMY ISAAC (MRN [id number]) as of 6/19/2019 14:42   Ref. Range 1/9/2019 11:52 3/5/2019 09:50 6/19/2019 10:02  6/19/2019 12:05   Sodium Latest Ref Range: 133 - 144 mmol/L 138 138 140    Potassium Latest Ref Range: 3.4 - 5.3 mmol/L 4.2 4.0 4.6    Chloride Latest Ref Range: 94 - 109 mmol/L 102 102 105    Carbon Dioxide Latest Ref Range: 20 - 32 mmol/L 29 29 31    Urea Nitrogen Latest Ref Range: 7 - 30 mg/dL 34 (H) 32 (H) 25    Creatinine Latest Ref Range: 0.66 - 1.25 mg/dL 1.92 (H) 1.95 (H) 1.80 (H)    GFR Estimate Latest Ref Range: >60 mL/min/1.73_m2 37 (L) 37 (L) 40 (L)    GFR Estimate If Black Latest Ref Range: >60 mL/min/1.73_m2 43 (L) 42 (L) 47 (L)    Calcium Latest Ref Range: 8.5 - 10.1 mg/dL 10.9 (H) 9.1 9.2    Anion Gap Latest Ref Range: 3 - 14 mmol/L 7 7 4    Albumin Latest Ref Range: 3.4 - 5.0 g/dL 3.8      Protein Total Latest Ref Range: 6.8 - 8.8 g/dL 7.6      Bilirubin Total Latest Ref Range: 0.2 - 1.3 mg/dL 0.6      Alkaline Phosphatase Latest Ref Range: 40 - 150 U/L 76      ALT Latest Ref Range: 0 - 70 U/L 22      AST Latest Ref Range: 0 - 45 U/L 26      Bilirubin Direct Latest Ref Range: 0.0 - 0.2 mg/dL 0.2      N-Terminal Pro Bnp Latest Ref Range: 0 - 125 pg/mL  508 (H) 541 (H)    Glucose Latest Ref Range: 70 - 99 mg/dL 87 92 92    WBC Latest Ref Range: 4.0 - 11.0 10e9/L 6.5 6.7 6.4    Hemoglobin Latest Ref Range: 13.3 - 17.7 g/dL 13.7 13.0 (L) 13.3    Hematocrit Latest Ref Range: 40.0 - 53.0 % 42.6 38.9 (L) 41.3    Platelet Count Latest Ref Range: 150 - 450 10e9/L 169 145 (L) 177    RBC Count Latest Ref Range: 4.4 - 5.9 10e12/L 4.66 4.33 (L) 4.35 (L)    MCV Latest Ref Range: 78 - 100 fl 91 90 95    MCH Latest Ref Range: 26.5 - 33.0 pg 29.4 30.0 30.6    MCHC Latest Ref Range: 31.5 - 36.5 g/dL 32.2 33.4 32.2    RDW Latest Ref Range: 10.0 - 15.0 % 12.1 12.6 12.3    Diff Method Unknown Automated Method      % Neutrophils Latest Units: % 76.4      % Lymphocytes Latest Units: % 9.9      % Monocytes Latest Units: % 10.2      % Eosinophils Latest Units: % 2.6      % Basophils Latest Units: % 0.6      % Immature  Granulocytes Latest Units: % 0.3      Nucleated RBCs Latest Ref Range: 0 /100 0      Absolute Neutrophil Latest Ref Range: 1.6 - 8.3 10e9/L 5.0      Absolute Lymphocytes Latest Ref Range: 0.8 - 5.3 10e9/L 0.6 (L)      Absolute Monocytes Latest Ref Range: 0.0 - 1.3 10e9/L 0.7      Absolute Eosinophils Latest Ref Range: 0.0 - 0.7 10e9/L 0.2      Absolute Basophils Latest Ref Range: 0.0 - 0.2 10e9/L 0.0      Abs Immature Granulocytes Latest Ref Range: 0 - 0.4 10e9/L 0.0      Absolute Nucleated RBC Unknown 0.0        Meds  Current Outpatient Medications   Medication     digoxin (LANOXIN) 125 MCG tablet     LETAIRIS 10 MG tablet     PROAIR  (90 Base) MCG/ACT inhaler     tadalafil, PAH, (ADCIRCA) 20 MG TABS     treprostinil (TYVASO) 0.6 MG/ML SOLN     No current facility-administered medications for this visit.      Imaging   Name: MATTY LOPEZ  MRN: [id number]  : 1960  Study Date: 10/29/2018 01:19 PM  Age: 57 yrs  Gender: Male  Patient Location: [facility]  Reason For Study: Pulmonary hypertension (H), Sarcoidosis, Dyspnea on exertion  Ordering Physician: DUY MIDDLETON  Referring Physician: DUY MIDDLETON  Performed By: Radha Dawson [facility]     BSA: 2.1 m2  Height: 72 in  Weight: 186 lb  BP: 119/70 mmHg  _____________________________________________________________________________  __        Procedure  Echocardiogram with two-dimensional, color and spectral Doppler performed.  Optison (NDC #2215-7485-33) given intravenously. Patient was given 3.0 ml  mixture of 3 ml Optison and 6 ml saline. 6.0 ml wasted. IV start location  LAC .  _____________________________________________________________________________  __        Interpretation Summary  Technically difficult study.  Global and regional left ventricular function is normal with an EF of 55-60%.  Mild right ventricular dilation is present.  Global right ventricular function is normal.  Right ventricular systolic pressure is 28mmHg above the right  atrial pressure.  No significant valvular abnormalities.  The inferior vena cava was normal in size with preserved respiratory  variability.  No pericardial effusion is present.  This study was compared to a TTE from 9/22/2017. There has been no significant  change.  _____________________________________________________________________________  __        Left Ventricle  Left ventricular size is normal. Global and regional left ventricular function  is normal with an EF of 55-60%. Relative wall thickness is increased  consistent with concentric remodeling. Left ventricular diastolic function is  indeterminate. Flattened septum is consistent with right ventricular pressure  overload.     Right Ventricle  Global right ventricular function is normal. Mild right ventricular dilation  is present.     Atria  Both atria appear normal. The atrial septum is intact as assessed by color  Doppler .     Mitral Valve  The mitral valve is normal. Trace mitral insufficiency is present.        Aortic Valve  Aortic valve is normal in structure and function. The aortic valve is  tricuspid.     Tricuspid Valve  The tricuspid valve is normal. Trace tricuspid insufficiency is present. Right  ventricular systolic pressure is 28mmHg above the right atrial pressure.     Pulmonic Valve  The pulmonic valve is normal.     Vessels  The aorta root is normal. The thoracic aorta is normal. The inferior vena cava  was normal in size with preserved respiratory variability. Estimated mean  right atrial pressure is 3 mmHg (normal).     Pericardium  No pericardial effusion is present.        Compared to Previous Study  This study was compared to a TTE from 9/22/2017. There has been no significant  change.     Attestation  I have personally viewed the imaging and agree with the interpretation and  report as documented by the fellow, Kalyan Aragon, and/or edited by  me.  _____________________________________________________________________________  __     MMode/2D Measurements & Calculations  IVSd: 1.3 cm  LVIDd: 3.5 cm  LVIDs: 2.4 cm  LVPWd: 1.3 cm  FS: 31.8 %  LV mass(C)d: 163.5 grams  LV mass(C)dI: 79.1 grams/m2  Ao root diam: 3.6 cm  LA dimension: 3.0 cm  asc Aorta Diam: 3.6 cm  LA/Ao: 0.84  LVOT diam: 2.3 cm  LVOT area: 4.1 cm2  LA Volume (BP): 27.8 ml     LA Volume Index (BP): 13.4 ml/m2  RWT: 0.75        Doppler Measurements & Calculations  MV E max xavi: 64.0 cm/sec  MV A max xavi: 53.1 cm/sec  MV E/A: 1.2  MV dec time: 0.16 sec  PA acc time: 0.05 sec  TR max xavi: 264.2 cm/sec  TR max P.9 mmHg  E/E' av.6  Lateral E/e': 5.5  Medial E/e': 11.6  AsPressures Phase: Baseline      Time Systolic Diastolic Mean A Wave V Wave EDP Max dp/dt HR   RA Pressures 11:48 AM   5 mmHg    10 mmHg    5 mmHg      83 bpm      RV Pressures 11:36 AM       1008 mmHg/sec        11:48 AM 81 mmHg    1 mmHg       10 mmHg     81 bpm      PA Pressures 11:49 AM 81 mmHg    40 mmHg    58 mmHg        83 bpm      PCW Pressures 11:50 AM   12 mmHg    12 mmHg    17 mmHg      79 bpm      Blood Flow Results Phase: Baseline      Time Results Indexed Values   QP 11:36 AM 5.16 L/min    2.56 L/min/m2      QS 11:36 AM 5.16 L/min    2.56 L/min/m2      Blood Oximetry Phase: Baseline      Time Hb SAT(%) PO2 Content PA Sat   PA 11:36 AM  68.9 %      68.9 %      Art 11:36 AM  96 %     16.84 mL/dL       Cardiac Output Phase: Baseline      Time TDCO TDCI Lesia C.O. Lesia C.I. Lesia HR   Cardiac Output Results 11:36 AM 5.3 L/min    2.63 L/min/m2    5.16 L/min    2.56 L/min/m2        11:51 AM 5.3 L/min          Resistance Results Phase: Baseline      Time PVR SVR PVR-I SVR-I TPR TVR TPR-I TVR-I PVR/SVR TPR/TVR   Resistance Results (Metric) 11:36 .35 dsc-5     1436.72 dsc-5/m2     899.45 dsc-5     1811.52 dsc-5/m2         Resistance Results (Wood) 11:36 AM 8.92 MANRIQUE     17.96 MANRIQUE/m2     11.25 MANRIQUE     22.65 MANRIQUE/m2            Asessment/Plan     The patient appears to have had progression of his pulmonary hypertension as evidenced by increasing PA pressure and PVR.      The patient requires:  1. Transition to oral selexipag  2. Repeat echocardiogram  3. CKD needs to be followed carefully    Please do not hesitate to contact me if you have any questions/concerns.     Sincerely,     Khang Lindsey MD

## 2019-06-19 NOTE — IP AVS SNAPSHOT
[address] [address]                                    After Visit Summary   6/19/2019    Jimmy Isaac    MRN: [id number]           After Visit Summary Signature Page    I have received my discharge instructions, and my questions have been answered. I have discussed any challenges I see with this plan with the nurse or doctor.    ..........................................................................................................................................  Patient/Patient Representative Signature      ..........................................................................................................................................  Patient Representative Print Name and Relationship to Patient    ..................................................               ................................................  Date                                   Time    ..........................................................................................................................................  Reviewed by Signature/Title    ...................................................              ..............................................  Date                                               Time          22EPIC Rev 08/18

## 2019-06-24 ENCOUNTER — TELEPHONE (OUTPATIENT)
Dept: CARDIOLOGY | Facility: CLINIC | Age: 59
End: 2019-06-24

## 2019-06-24 NOTE — TELEPHONE ENCOUNTER
PA Initiation    Medication: Uptravi 200 mcg  Insurance Company: [employer] - Phone [phone] Fax [phone]  Pharmacy Filling the Rx: [facility] - [place], [place] - [address]  Filling Pharmacy Phone: [phone]  Filling Pharmacy Fax: [phone]  Start Date: 6/24/2019    *Prior authorization form, letter of medical necessity, and right heart catheterization report has been faxed to [facility] for review as an urgent request.

## 2019-06-26 NOTE — TELEPHONE ENCOUNTER
Prior Authorization Approval    Authorization Effective Date: 3/17/2017  Authorization Expiration Date: 3/23/2018  Medication: Adcirca 20 MG - Approved  Approved Dose/Quantity:  60 tabs/30 days  Reference #: [id number]   Insurance Company: [employer] Phone [phone] Fax [phone]  Expected CoPay:       CoPay Card Available:      Foundation Assistance Needed:    Which Pharmacy is filling the prescription (Not needed for infusion/clinic administered): [facility] - [place], [place] - [address] [place] [address]  Pharmacy Notified: No  Patient Notified: No        0

## 2019-06-27 NOTE — TELEPHONE ENCOUNTER
Prior Authorization Approval    Authorization Effective Date: 6/24/2019  Authorization Expiration Date: 6/24/2021  Medication: Uptravi 200 mcg - Approved  Approved Dose/Quantity: 200mcg/30 days  Insurance Company: [employer] - Phone [phone] Fax [phone]  Which Pharmacy is filling the prescription (Not needed for infusion/clinic administered): [facility] - [place], [place] - [address]    *Uptravi enrollment forms and prior authorization has been faxed to [employer] for expedited review.

## 2019-07-02 ENCOUNTER — TELEPHONE (OUTPATIENT)
Dept: CARDIOLOGY | Facility: CLINIC | Age: 59
End: 2019-07-02

## 2019-07-02 NOTE — TELEPHONE ENCOUNTER
Kate left a VM wanting to confirm patient's transition from Tyvaso to Uptravi and clarify how we wanted him to perform the titration specifically.  --------------------------------  Attempted to call LUCINA Love, but line was busy.  Titration will be as follows;    Decrease Tyvaso by 2 breaths once weekly, while increasing Uptravi by 200mcg each time.     Proposed titration plan:     Tyvaso                        Uptravi  10 breaths                   200 mcg BID  8  Breaths                    400 mcg BID  6 breaths                     600 mcg BID  4 breaths                     800 mcg BID  2 breaths                     1000 mcg BID  Off                               1200 mcg BID                                      1400 mcg BID                                      1600 mcg BID     Obdulia Rome RN on 7/2/2019 at 10:00 AM  ----------------------------------------------  Sent E-mail to Kate with titration schedule.  Obdulia Rome RN on 7/2/2019 at 10:32 AM    Sent E-mail to Wendy Leija with titration schedule.  Obdulia Rome RN on 7/2/2019 at 12:16 PM

## 2019-08-08 ENCOUNTER — MYC REFILL (OUTPATIENT)
Dept: CARDIOLOGY | Facility: CLINIC | Age: 59
End: 2019-08-08

## 2019-08-08 ENCOUNTER — MYC MEDICAL ADVICE (OUTPATIENT)
Dept: PULMONOLOGY | Facility: CLINIC | Age: 59
End: 2019-08-08

## 2019-08-08 DIAGNOSIS — J84.9 ILD (INTERSTITIAL LUNG DISEASE) (H): Primary | ICD-10-CM

## 2019-08-08 DIAGNOSIS — D86.9 SARCOIDOSIS: Primary | ICD-10-CM

## 2019-08-08 DIAGNOSIS — Z51.81 ENCOUNTER FOR MONITORING DIGOXIN THERAPY: Primary | ICD-10-CM

## 2019-08-08 DIAGNOSIS — I27.20 PULMONARY HYPERTENSION (H): ICD-10-CM

## 2019-08-08 DIAGNOSIS — Z79.899 ENCOUNTER FOR MONITORING DIGOXIN THERAPY: Primary | ICD-10-CM

## 2019-08-08 RX ORDER — LEVOFLOXACIN 500 MG/1
500 TABLET, FILM COATED ORAL DAILY
Qty: 10 TABLET | Refills: 0 | Status: SHIPPED | OUTPATIENT
Start: 2019-08-08 | End: 2019-09-25

## 2019-08-08 RX ORDER — BUDESONIDE 0.25 MG/2ML
0.25 INHALANT ORAL 2 TIMES DAILY
Qty: 180 AMPULE | Refills: 3 | Status: SHIPPED | OUTPATIENT
Start: 2019-08-08 | End: 2019-09-25

## 2019-08-09 NOTE — TELEPHONE ENCOUNTER
digoxin (LANOXIN) 125 MCG tablet       Last Written Prescription Date:  6-27-19  Last Fill Quantity: 30,   # refills: 1  Last Office Visit : 6-19-19  Future Office visit:  none    Routing refill request to provider for review/approval because:  No dig level on record and creat elevated.      Kathleen M Doege RN    -----------------------------------------------  Medication Refill double check:    Last office visit was on 6/19/19 with .    Follow up was recommended for 1 month after starting Tyvaso.    Any additional encounters with changes to requested med?     Authorizing provider is: Dr. Lindsey    Refill was approved.     Additional orders/notes: Ordered Dig level for next appt and sent msg to Coordinators to make appt for end of this month with Dr. Lindsey.  patient has been on Tyvaso down titration for a couple weeks now.      Obdulia Rome RN on 8/12/2019 at 11:03 AM

## 2019-08-12 RX ORDER — DIGOXIN 125 MCG
125 TABLET ORAL DAILY
Qty: 90 TABLET | Refills: 0 | Status: SHIPPED | OUTPATIENT
Start: 2019-08-12 | End: 2019-11-22

## 2019-08-16 ENCOUNTER — MYC REFILL (OUTPATIENT)
Dept: CARDIOLOGY | Facility: CLINIC | Age: 59
End: 2019-08-16

## 2019-08-16 DIAGNOSIS — I27.20 PULMONARY HYPERTENSION (H): ICD-10-CM

## 2019-08-16 RX ORDER — AMBRISENTAN 10 MG/1
10 TABLET, FILM COATED ORAL DAILY
Qty: 30 TABLET | Refills: 11 | Status: CANCELLED | OUTPATIENT
Start: 2019-08-16

## 2019-08-21 DIAGNOSIS — I27.20 PULMONARY HYPERTENSION (H): ICD-10-CM

## 2019-08-21 DIAGNOSIS — Z13.21 ENCOUNTER FOR VITAMIN DEFICIENCY SCREENING: Primary | ICD-10-CM

## 2019-08-21 RX ORDER — TADALAFIL 20 MG/1
40 TABLET ORAL DAILY
Qty: 180 TABLET | Refills: 3 | Status: ON HOLD | COMMUNITY
Start: 2019-08-21 | End: 2019-12-11

## 2019-08-21 NOTE — TELEPHONE ENCOUNTER
Called patient and discussed need for a follow up visit.  patient informed me he has an appt with Dr. Lindsey at the end of September.  Reviewed with patient the need for a Digoxin level when he comes next to verify he's receiving the correct dose.  patient also asked that we draw a Vit D level.  Agreed with plan.  Obdulia Rome RN on 8/21/2019 at 9:14 AM

## 2019-08-21 NOTE — TELEPHONE ENCOUNTER
Received call from [facility] specialty pharmacy requesting a refill of patient's Adcirca generic (Alyq).    TORB given to pharmacist for;    Alyq 20mg tabs, 2 tabs once daily, Qty: 180, Refill: 3    Obdulia Rome RN on 8/21/2019 at 9:04 AM  ------------------------------  Medication Refill double check:    Last office visit was on 6/19/19 with .    Follow up was recommended for 1 month. (Overdue)    Any additional encounters with changes to requested med? no    Authorizing provider is: Dr. Lindsey    Refill was approved.     Additional orders/notes: patient is overdue for both Digoxin level and office visit.          Obdulia Rome RN on 8/21/2019 at 9:04 AM

## 2019-08-29 ENCOUNTER — TELEPHONE (OUTPATIENT)
Dept: CARDIOLOGY | Facility: CLINIC | Age: 59
End: 2019-08-29

## 2019-08-29 NOTE — TELEPHONE ENCOUNTER
Kate called while seeing patient for his Uptravi increase and noted the patient to have pedal right leg trace edema, increased shortness of breath for which the patient stated occurred the same day (Friday 8/23/19) that he increased his Uptravi dose to 1400mcg BID.  He is otherwise tolerating the med just fine.    Asked Kate to remind patient to weight himself daily, as he does not weight at all currently. Patient is not on any water pill currently, so I will have to call MD and call patient back directly with recommendations.  Clarified with Kate that patient should remain at 1400mcg BID. LUCINA Love verbalized understanding and agreed with plan.  Obdulia Rome RN on 8/29/2019 at 3:52 PM  ------------------------------  Per titration schedule, patient is now off of Tyvaso.  I was unable to reach Dr. Lindsey for recommendations, but will try again.  Obdulia Rome RN on 8/29/2019 at 4:06 PM  ------------------------------  Dr. Lindsey recommends patient begin weighing daily and see his PMD tomorrow for fluid management.  Agreed with plan.  Obdulia Rome RN on 8/29/2019 at 4:12 PM    LM for patient advising him of Dr. Lindsey's recommendations.  Especially being seen before the holiday weekend begins. Left my direct dial number for call back.  Obdulia Rome RN on 8/29/2019 at 4:18 PM

## 2019-09-09 RX ORDER — FUROSEMIDE 20 MG
10 TABLET ORAL
COMMUNITY
Start: 2019-09-06 | End: 2020-10-02

## 2019-09-20 NOTE — PROGRESS NOTES
Khang Lindsey M.D.  Cardiovascular Medicine    I personally saw and examined this patient, discussed care with housestaff and other consultants, reviewed current laboratories and imaging studies, and conveyed impression and diagnostic/therapeutic plan to patient.    Problem List  1. Pulmonary arterial hypertension  2. Chronic, oxygen dependent respiratory failure  3. Sarcoidosis without cardiac involvement  4. Nephrolithiasis  5. Prednisone related fluid retention  6. Nephrolitiasis  7. Aseptic necrosis of femoral heads  8. CKD  History  There is no interim history of increasing shortness of breath, orthopnea, PND, ankle edema, increasing abdominal girth, palpitation, pre-syncope, syncope, bleeding or thromboembolic complications.  The patient is taking medication regularly, following a low salt diet, and exercising regularly as outlined. He has had some fluid reteniton with higher doses of Uptravi  Alert, oriented, normal gait and station, normal mental, JVP within normal limits, HJR negative,thyroid not enlarged, mucous membranes clear, abdomen without tenderness, rebound or guarding, skin clear of lesion, PMI normal, S1 S2 regular rhythm, no gallop, trace edema    Wt Readings from Last 24 Encounters:   09/25/19 80.4 kg (177 lb 4.8 oz)   06/19/19 80.3 kg (177 lb 1.6 oz)   06/19/19 79.4 kg (175 lb)   03/05/19 80.1 kg (176 lb 8 oz)   01/09/19 82.1 kg (181 lb)   12/26/18 82.5 kg (181 lb 12.8 oz)   10/29/18 81.6 kg (180 lb)   05/30/18 84.6 kg (186 lb 9.6 oz)   05/30/18 84.8 kg (187 lb)   02/27/18 84.6 kg (186 lb 8 oz)   02/05/18 85.8 kg (189 lb 1.6 oz)   12/12/17 79.4 kg (175 lb)   11/21/17 85.3 kg (188 lb)   11/16/17 85.3 kg (188 lb)   11/01/17 85.3 kg (188 lb)   11/01/17 85.2 kg (187 lb 14.4 oz)   10/18/17 87.8 kg (193 lb 9.6 oz)   09/07/17 84.9 kg (187 lb 1.6 oz)   08/01/17 84.5 kg (186 lb 3.2 oz)   06/20/17 83.5 kg (184 lb)   06/02/17 79.4 kg (175 lb)   05/25/17 79.4 kg (175 lb)   05/16/17 84.8 kg (187 lb)    10/06/16 79.4 kg (175 lb)     Results for MATTY LOPEZ (MRN [id number]) as of 9/25/2019 14:32   Ref. Range 1/9/2019 11:52 3/5/2019 09:50 6/19/2019 10:02 6/19/2019 12:05 9/25/2019 12:09   Sodium Latest Ref Range: 133 - 144 mmol/L 138 138 140  136   Potassium Latest Ref Range: 3.4 - 5.3 mmol/L 4.2 4.0 4.6  4.1   Chloride Latest Ref Range: 94 - 109 mmol/L 102 102 105  102   Carbon Dioxide Latest Ref Range: 20 - 32 mmol/L 29 29 31  29   Urea Nitrogen Latest Ref Range: 7 - 30 mg/dL 34 (H) 32 (H) 25  30   Creatinine Latest Ref Range: 0.66 - 1.25 mg/dL 1.92 (H) 1.95 (H) 1.80 (H)  1.62 (H)   GFR Estimate Latest Ref Range: >60 mL/min/1.73_m2 37 (L) 37 (L) 40 (L)  45 (L)   GFR Estimate If Black Latest Ref Range: >60 mL/min/1.73_m2 43 (L) 42 (L) 47 (L)  53 (L)   Calcium Latest Ref Range: 8.5 - 10.1 mg/dL 10.9 (H) 9.1 9.2  9.4   Anion Gap Latest Ref Range: 3 - 14 mmol/L 7 7 4  5   Albumin Latest Ref Range: 3.4 - 5.0 g/dL 3.8    3.9   Protein Total Latest Ref Range: 6.8 - 8.8 g/dL 7.6    7.6   Bilirubin Total Latest Ref Range: 0.2 - 1.3 mg/dL 0.6    0.4   Alkaline Phosphatase Latest Ref Range: 40 - 150 U/L 76    72   ALT Latest Ref Range: 0 - 70 U/L 22    21   AST Latest Ref Range: 0 - 45 U/L 26    16   Bilirubin Direct Latest Ref Range: 0.0 - 0.2 mg/dL 0.2       N-Terminal Pro Bnp Latest Ref Range: 0 - 125 pg/mL  508 (H) 541 (H)  1,027 (H)   Glucose Latest Ref Range: 70 - 99 mg/dL 87 92 92  90   WBC Latest Ref Range: 4.0 - 11.0 10e9/L 6.5 6.7 6.4  6.5   Hemoglobin Latest Ref Range: 13.3 - 17.7 g/dL 13.7 13.0 (L) 13.3  13.8   Hematocrit Latest Ref Range: 40.0 - 53.0 % 42.6 38.9 (L) 41.3  42.6   Platelet Count Latest Ref Range: 150 - 450 10e9/L 169 145 (L) 177  149 (L)   RBC Count Latest Ref Range: 4.4 - 5.9 10e12/L 4.66 4.33 (L) 4.35 (L)  4.78   MCV Latest Ref Range: 78 - 100 fl 91 90 95  89   MCH Latest Ref Range: 26.5 - 33.0 pg 29.4 30.0 30.6  28.9   MCHC Latest Ref Range: 31.5 - 36.5 g/dL 32.2 33.4 32.2  32.4   RDW Latest  Ref Range: 10.0 - 15.0 % 12.1 12.6 12.3  12.5   Diff Method Unknown Automated Method       % Neutrophils Latest Units: % 76.4       % Lymphocytes Latest Units: % 9.9       % Monocytes Latest Units: % 10.2       % Eosinophils Latest Units: % 2.6       % Basophils Latest Units: % 0.6       % Immature Granulocytes Latest Units: % 0.3       Nucleated RBCs Latest Ref Range: 0 /100 0       Absolute Neutrophil Latest Ref Range: 1.6 - 8.3 10e9/L 5.0       Absolute Lymphocytes Latest Ref Range: 0.8 - 5.3 10e9/L 0.6 (L)           Meds  Current Outpatient Medications   Medication     digoxin (LANOXIN) 125 MCG tablet     furosemide (LASIX) 20 MG tablet     LETAIRIS 10 MG tablet     PROAIR  (90 Base) MCG/ACT inhaler     selexipag (UPTRAVI) 1400 MCG tablet     tadalafil, PAH, (ADCIRCA) 20 MG TABS     tadalafil, PAH, (ALYQ) 20 MG TABS     treprostinil (TYVASO) 0.6 MG/ML SOLN     No current facility-administered medications for this visit.      Imaging   Name: MATTY LOPEZ  MRN: [id number]  : 1960  Study Date: 10/29/2018 01:19 PM  Age: 57 yrs  Gender: Male  [place] [facility] [place]  Reason For Study: Pulmonary hypertension (H), Sarcoidosis, Dyspnea on exertion  Ordering Physician: DUY MIDDLETON  Referring Physician: DUY MIDDLETON  Performed By: Radha Dawson LISETTE     BSA: 2.1 m2  Height: 72 in  Weight: 186 lb  BP: 119/70 mmHg  _____________________________________________________________________________  __        Procedure  Echocardiogram with two-dimensional, color and spectral Doppler performed.  Optison (NDC #1218-9346-37) given intravenously. Patient was given 3.0 ml  mixture of 3 ml Optison and 6 ml saline. 6.0 ml wasted. IV start location  LAC .  _____________________________________________________________________________  __        Interpretation Summary  Technically difficult study.  Global and regional left ventricular function is normal with an EF of 55-60%.  Mild right ventricular dilation  is present.  Global right ventricular function is normal.  Right ventricular systolic pressure is 28mmHg above the right atrial pressure.  No significant valvular abnormalities.  The inferior vena cava was normal in size with preserved respiratory  variability.  No pericardial effusion is present.  This study was compared to a TTE from 9/22/2017. There has been no significant  change.  _____________________________________________________________________________  __        Left Ventricle  Left ventricular size is normal. Global and regional left ventricular function  is normal with an EF of 55-60%. Relative wall thickness is increased  consistent with concentric remodeling. Left ventricular diastolic function is  indeterminate. Flattened septum is consistent with right ventricular pressure  overload.     Right Ventricle  Global right ventricular function is normal. Mild right ventricular dilation  is present.     Atria  Both atria appear normal. The atrial septum is intact as assessed by color  Doppler .     Mitral Valve  The mitral valve is normal. Trace mitral insufficiency is present.        Aortic Valve  Aortic valve is normal in structure and function. The aortic valve is  tricuspid.     Tricuspid Valve  The tricuspid valve is normal. Trace tricuspid insufficiency is present. Right  ventricular systolic pressure is 28mmHg above the right atrial pressure.     Pulmonic Valve  The pulmonic valve is normal.     Vessels  The aorta root is normal. The thoracic aorta is normal. The inferior vena cava  was normal in size with preserved respiratory variability. Estimated mean  right atrial pressure is 3 mmHg (normal).     Pericardium  No pericardial effusion is present.        Compared to Previous Study  This study was compared to a TTE from 9/22/2017. There has been no significant  change.     Attestation  I have personally viewed the imaging and agree with the interpretation and  report as documented by the fellow,  Kalyan Aragon, and/or edited by me.  _____________________________________________________________________________  __     MMode/2D Measurements & Calculations  IVSd: 1.3 cm  LVIDd: 3.5 cm  LVIDs: 2.4 cm  LVPWd: 1.3 cm  FS: 31.8 %  LV mass(C)d: 163.5 grams  LV mass(C)dI: 79.1 grams/m2  Ao root diam: 3.6 cm  LA dimension: 3.0 cm  asc Aorta Diam: 3.6 cm  LA/Ao: 0.84  LVOT diam: 2.3 cm  LVOT area: 4.1 cm2  LA Volume (BP): 27.8 ml     LA Volume Index (BP): 13.4 ml/m2  RWT: 0.75        Doppler Measurements & Calculations  MV E max xavi: 64.0 cm/sec  MV A max xavi: 53.1 cm/sec  MV E/A: 1.2  MV dec time: 0.16 sec  PA acc time: 0.05 sec  TR max xavi: 264.2 cm/sec  TR max P.9 mmHg  E/E' av.6  Lateral E/e': 5.5  Medial E/e': 11.6  AsPressures Phase: Baseline      Time Systolic Diastolic Mean A Wave V Wave EDP Max dp/dt HR   RA Pressures 11:48 AM   5 mmHg    10 mmHg    5 mmHg      83 bpm      RV Pressures 11:36 AM       1008 mmHg/sec        11:48 AM 81 mmHg    1 mmHg       10 mmHg     81 bpm      PA Pressures 11:49 AM 81 mmHg    40 mmHg    58 mmHg        83 bpm      PCW Pressures 11:50 AM   12 mmHg    12 mmHg    17 mmHg      79 bpm      Blood Flow Results Phase: Baseline      Time Results Indexed Values   QP 11:36 AM 5.16 L/min    2.56 L/min/m2      QS 11:36 AM 5.16 L/min    2.56 L/min/m2      Blood Oximetry Phase: Baseline      Time Hb SAT(%) PO2 Content PA Sat   PA 11:36 AM  68.9 %      68.9 %      Art 11:36 AM  96 %     16.84 mL/dL       Cardiac Output Phase: Baseline      Time TDCO TDCI Lesia C.O. Lesia C.I. Lesia HR   Cardiac Output Results 11:36 AM 5.3 L/min    2.63 L/min/m2    5.16 L/min    2.56 L/min/m2        11:51 AM 5.3 L/min          Resistance Results Phase: Baseline      Time PVR SVR PVR-I SVR-I TPR TVR TPR-I TVR-I PVR/SVR TPR/TVR   Resistance Results (Metric) 11:36 .35 dsc-5     1436.72 dsc-5/m2     899.45 dsc-5     1811.52 dsc-5/m2         Resistance Results (Wood) 11:36 AM 8.92 MANRIQUE     17.96 MANRIQUE/m2     11.25  MANRIQUE     22.65 MANRIQUE/m2           Asessment/Plan     The patient is doing well and appreciates not having peak trough effect of Tyvaso    He has developed some fluid retention taken care of by furosemide    He will return for RHC and clinical assessment in early December.

## 2019-09-25 ENCOUNTER — OFFICE VISIT (OUTPATIENT)
Dept: CARDIOLOGY | Facility: CLINIC | Age: 59
End: 2019-09-25
Attending: INTERNAL MEDICINE
Payer: COMMERCIAL

## 2019-09-25 VITALS
HEART RATE: 77 BPM | SYSTOLIC BLOOD PRESSURE: 126 MMHG | WEIGHT: 177.3 LBS | OXYGEN SATURATION: 96 % | HEIGHT: 72 IN | BODY MASS INDEX: 24.01 KG/M2 | DIASTOLIC BLOOD PRESSURE: 82 MMHG

## 2019-09-25 DIAGNOSIS — R06.02 SOB (SHORTNESS OF BREATH): Primary | ICD-10-CM

## 2019-09-25 DIAGNOSIS — Z79.899 ENCOUNTER FOR MONITORING DIGOXIN THERAPY: ICD-10-CM

## 2019-09-25 DIAGNOSIS — Z51.81 ENCOUNTER FOR MONITORING DIGOXIN THERAPY: ICD-10-CM

## 2019-09-25 DIAGNOSIS — R06.02 SOB (SHORTNESS OF BREATH): ICD-10-CM

## 2019-09-25 DIAGNOSIS — I27.20 PULMONARY HYPERTENSION (H): ICD-10-CM

## 2019-09-25 DIAGNOSIS — Z13.21 ENCOUNTER FOR VITAMIN DEFICIENCY SCREENING: ICD-10-CM

## 2019-09-25 LAB
ALBUMIN SERPL-MCNC: 3.9 G/DL (ref 3.4–5)
ALP SERPL-CCNC: 72 U/L (ref 40–150)
ALT SERPL W P-5'-P-CCNC: 21 U/L (ref 0–70)
ANION GAP SERPL CALCULATED.3IONS-SCNC: 5 MMOL/L (ref 3–14)
AST SERPL W P-5'-P-CCNC: 16 U/L (ref 0–45)
BILIRUB SERPL-MCNC: 0.4 MG/DL (ref 0.2–1.3)
BUN SERPL-MCNC: 30 MG/DL (ref 7–30)
CALCIUM SERPL-MCNC: 9.4 MG/DL (ref 8.5–10.1)
CHLORIDE SERPL-SCNC: 102 MMOL/L (ref 94–109)
CO2 SERPL-SCNC: 29 MMOL/L (ref 20–32)
CREAT SERPL-MCNC: 1.62 MG/DL (ref 0.66–1.25)
DEPRECATED CALCIDIOL+CALCIFEROL SERPL-MC: 30 UG/L (ref 20–75)
DIGOXIN SERPL-MCNC: 0.8 UG/L (ref 0.5–2)
ERYTHROCYTE [DISTWIDTH] IN BLOOD BY AUTOMATED COUNT: 12.5 % (ref 10–15)
GFR SERPL CREATININE-BSD FRML MDRD: 45 ML/MIN/{1.73_M2}
GLUCOSE SERPL-MCNC: 90 MG/DL (ref 70–99)
HCT VFR BLD AUTO: 42.6 % (ref 40–53)
HGB BLD-MCNC: 13.8 G/DL (ref 13.3–17.7)
MCH RBC QN AUTO: 28.9 PG (ref 26.5–33)
MCHC RBC AUTO-ENTMCNC: 32.4 G/DL (ref 31.5–36.5)
MCV RBC AUTO: 89 FL (ref 78–100)
NT-PROBNP SERPL-MCNC: 1027 PG/ML (ref 0–125)
PLATELET # BLD AUTO: 149 10E9/L (ref 150–450)
POTASSIUM SERPL-SCNC: 4.1 MMOL/L (ref 3.4–5.3)
PROT SERPL-MCNC: 7.6 G/DL (ref 6.8–8.8)
RBC # BLD AUTO: 4.78 10E12/L (ref 4.4–5.9)
SODIUM SERPL-SCNC: 136 MMOL/L (ref 133–144)
WBC # BLD AUTO: 6.5 10E9/L (ref 4–11)

## 2019-09-25 PROCEDURE — 85027 COMPLETE CBC AUTOMATED: CPT | Performed by: INTERNAL MEDICINE

## 2019-09-25 PROCEDURE — 99213 OFFICE O/P EST LOW 20 MIN: CPT | Mod: ZP | Performed by: INTERNAL MEDICINE

## 2019-09-25 PROCEDURE — 80162 ASSAY OF DIGOXIN TOTAL: CPT | Performed by: INTERNAL MEDICINE

## 2019-09-25 PROCEDURE — 83880 ASSAY OF NATRIURETIC PEPTIDE: CPT | Performed by: INTERNAL MEDICINE

## 2019-09-25 PROCEDURE — 82306 VITAMIN D 25 HYDROXY: CPT | Performed by: INTERNAL MEDICINE

## 2019-09-25 PROCEDURE — 80053 COMPREHEN METABOLIC PANEL: CPT | Performed by: INTERNAL MEDICINE

## 2019-09-25 PROCEDURE — 36415 COLL VENOUS BLD VENIPUNCTURE: CPT | Performed by: INTERNAL MEDICINE

## 2019-09-25 PROCEDURE — G0463 HOSPITAL OUTPT CLINIC VISIT: HCPCS | Mod: ZF

## 2019-09-25 RX ORDER — TADALAFIL 20 MG/1
TABLET ORAL
COMMUNITY
End: 2019-12-18

## 2019-09-25 RX ORDER — LIDOCAINE 40 MG/G
CREAM TOPICAL
Status: CANCELLED | OUTPATIENT
Start: 2019-09-25

## 2019-09-25 ASSESSMENT — PAIN SCALES - GENERAL: PAINLEVEL: NO PAIN (0)

## 2019-09-25 ASSESSMENT — PATIENT HEALTH QUESTIONNAIRE - PHQ9: SUM OF ALL RESPONSES TO PHQ QUESTIONS 1-9: 0

## 2019-09-25 ASSESSMENT — MIFFLIN-ST. JEOR: SCORE: 1657.23

## 2019-09-25 NOTE — LETTER
9/25/2019      RE: Jimmy Isaac  [id number] [address] [address]       Dear Colleague,    Thank you for the opportunity to participate in the care of your patient, Jimmy Isaac, at the [facility]. Please see a copy of my visit note below.    Khang Lindsey M.D.  Cardiovascular Medicine    I personally saw and examined this patient, discussed care with housestaff and other consultants, reviewed current laboratories and imaging studies, and conveyed impression and diagnostic/therapeutic plan to patient.    Problem List  1. Pulmonary arterial hypertension  2. Chronic, oxygen dependent respiratory failure  3. Sarcoidosis without cardiac involvement  4. Nephrolithiasis  5. Prednisone related fluid retention  6. Nephrolitiasis  7. Aseptic necrosis of femoral heads  8. CKD  History  There is no interim history of increasing shortness of breath, orthopnea, PND, ankle edema, increasing abdominal girth, palpitation, pre-syncope, syncope, bleeding or thromboembolic complications.  The patient is taking medication regularly, following a low salt diet, and exercising regularly as outlined. He has had some fluid reteniton with higher doses of Uptravi  Alert, oriented, normal gait and station, normal mental, JVP within normal limits, HJR negative,thyroid not enlarged, mucous membranes clear, abdomen without tenderness, rebound or guarding, skin clear of lesion, PMI normal, S1 S2 regular rhythm, no gallop, trace edema    Wt Readings from Last 24 Encounters:   09/25/19 80.4 kg (177 lb 4.8 oz)   06/19/19 80.3 kg (177 lb 1.6 oz)   06/19/19 79.4 kg (175 lb)   03/05/19 80.1 kg (176 lb 8 oz)   01/09/19 82.1 kg (181 lb)   12/26/18 82.5 kg (181 lb 12.8 oz)   10/29/18 81.6 kg (180 lb)   05/30/18 84.6 kg (186 lb 9.6 oz)   05/30/18 84.8 kg (187 lb)   02/27/18 84.6 kg (186 lb 8 oz)   02/05/18 85.8 kg (189 lb 1.6 oz)   12/12/17 79.4 kg (175 lb)   11/21/17 85.3 kg (188  lb)   11/16/17 85.3 kg (188 lb)   11/01/17 85.3 kg (188 lb)   11/01/17 85.2 kg (187 lb 14.4 oz)   10/18/17 87.8 kg (193 lb 9.6 oz)   09/07/17 84.9 kg (187 lb 1.6 oz)   08/01/17 84.5 kg (186 lb 3.2 oz)   06/20/17 83.5 kg (184 lb)   06/02/17 79.4 kg (175 lb)   05/25/17 79.4 kg (175 lb)   05/16/17 84.8 kg (187 lb)   10/06/16 79.4 kg (175 lb)     Results for MATTY LOPEZ (MRN [id number]) as of 9/25/2019 14:32   Ref. Range 1/9/2019 11:52 3/5/2019 09:50 6/19/2019 10:02 6/19/2019 12:05 9/25/2019 12:09   Sodium Latest Ref Range: 133 - 144 mmol/L 138 138 140  136   Potassium Latest Ref Range: 3.4 - 5.3 mmol/L 4.2 4.0 4.6  4.1   Chloride Latest Ref Range: 94 - 109 mmol/L 102 102 105  102   Carbon Dioxide Latest Ref Range: 20 - 32 mmol/L 29 29 31  29   Urea Nitrogen Latest Ref Range: 7 - 30 mg/dL 34 (H) 32 (H) 25  30   Creatinine Latest Ref Range: 0.66 - 1.25 mg/dL 1.92 (H) 1.95 (H) 1.80 (H)  1.62 (H)   GFR Estimate Latest Ref Range: >60 mL/min/1.73_m2 37 (L) 37 (L) 40 (L)  45 (L)   GFR Estimate If Black Latest Ref Range: >60 mL/min/1.73_m2 43 (L) 42 (L) 47 (L)  53 (L)   Calcium Latest Ref Range: 8.5 - 10.1 mg/dL 10.9 (H) 9.1 9.2  9.4   Anion Gap Latest Ref Range: 3 - 14 mmol/L 7 7 4  5   Albumin Latest Ref Range: 3.4 - 5.0 g/dL 3.8    3.9   Protein Total Latest Ref Range: 6.8 - 8.8 g/dL 7.6    7.6   Bilirubin Total Latest Ref Range: 0.2 - 1.3 mg/dL 0.6    0.4   Alkaline Phosphatase Latest Ref Range: 40 - 150 U/L 76    72   ALT Latest Ref Range: 0 - 70 U/L 22    21   AST Latest Ref Range: 0 - 45 U/L 26    16   Bilirubin Direct Latest Ref Range: 0.0 - 0.2 mg/dL 0.2       N-Terminal Pro Bnp Latest Ref Range: 0 - 125 pg/mL  508 (H) 541 (H)  1,027 (H)   Glucose Latest Ref Range: 70 - 99 mg/dL 87 92 92  90   WBC Latest Ref Range: 4.0 - 11.0 10e9/L 6.5 6.7 6.4  6.5   Hemoglobin Latest Ref Range: 13.3 - 17.7 g/dL 13.7 13.0 (L) 13.3  13.8   Hematocrit Latest Ref Range: 40.0 - 53.0 % 42.6 38.9 (L) 41.3  42.6   Platelet Count Latest  Ref Range: 150 - 450 10e9/L 169 145 (L) 177  149 (L)   RBC Count Latest Ref Range: 4.4 - 5.9 10e12/L 4.66 4.33 (L) 4.35 (L)  4.78   MCV Latest Ref Range: 78 - 100 fl 91 90 95  89   MCH Latest Ref Range: 26.5 - 33.0 pg 29.4 30.0 30.6  28.9   MCHC Latest Ref Range: 31.5 - 36.5 g/dL 32.2 33.4 32.2  32.4   RDW Latest Ref Range: 10.0 - 15.0 % 12.1 12.6 12.3  12.5   Diff Method Unknown Automated Method       % Neutrophils Latest Units: % 76.4       % Lymphocytes Latest Units: % 9.9       % Monocytes Latest Units: % 10.2       % Eosinophils Latest Units: % 2.6       % Basophils Latest Units: % 0.6       % Immature Granulocytes Latest Units: % 0.3       Nucleated RBCs Latest Ref Range: 0 /100 0       Absolute Neutrophil Latest Ref Range: 1.6 - 8.3 10e9/L 5.0       Absolute Lymphocytes Latest Ref Range: 0.8 - 5.3 10e9/L 0.6 (L)           Meds  Current Outpatient Medications   Medication     digoxin (LANOXIN) 125 MCG tablet     furosemide (LASIX) 20 MG tablet     LETAIRIS 10 MG tablet     PROAIR  (90 Base) MCG/ACT inhaler     selexipag (UPTRAVI) 1400 MCG tablet     tadalafil, PAH, (ADCIRCA) 20 MG TABS     tadalafil, PAH, (ALYQ) 20 MG TABS     treprostinil (TYVASO) 0.6 MG/ML SOLN     No current facility-administered medications for this visit.      Imaging   Name: MATTY LOPEZ  MRN: [id number]  : 1960  Study Date: 10/29/2018 01:19 PM  Age: 57 yrs  Gender: Male  Patient Location: [facility] [place]  Reason For Study: Pulmonary hypertension (H), Sarcoidosis, Dyspnea on exertion  Ordering Physician: DUY MIDDLETON  Referring Physician: DUY MIDDLETON  Performed By: Radha Dawson, [facility]     BSA: 2.1 m2  Height: 72 in  Weight: 186 lb  BP: 119/70 mmHg  _____________________________________________________________________________  __        Procedure  Echocardiogram with two-dimensional, color and spectral Doppler performed.  Optison (NDC #0795-2098-65) given intravenously. Patient was given 3.0 ml  mixture of 3 ml  Optison and 6 ml saline. 6.0 ml wasted. IV start location  LAC .  _____________________________________________________________________________  __        Interpretation Summary  Technically difficult study.  Global and regional left ventricular function is normal with an EF of 55-60%.  Mild right ventricular dilation is present.  Global right ventricular function is normal.  Right ventricular systolic pressure is 28mmHg above the right atrial pressure.  No significant valvular abnormalities.  The inferior vena cava was normal in size with preserved respiratory  variability.  No pericardial effusion is present.  This study was compared to a TTE from 9/22/2017. There has been no significant  change.  _____________________________________________________________________________  __        Left Ventricle  Left ventricular size is normal. Global and regional left ventricular function  is normal with an EF of 55-60%. Relative wall thickness is increased  consistent with concentric remodeling. Left ventricular diastolic function is  indeterminate. Flattened septum is consistent with right ventricular pressure  overload.     Right Ventricle  Global right ventricular function is normal. Mild right ventricular dilation  is present.     Atria  Both atria appear normal. The atrial septum is intact as assessed by color  Doppler .     Mitral Valve  The mitral valve is normal. Trace mitral insufficiency is present.        Aortic Valve  Aortic valve is normal in structure and function. The aortic valve is  tricuspid.     Tricuspid Valve  The tricuspid valve is normal. Trace tricuspid insufficiency is present. Right  ventricular systolic pressure is 28mmHg above the right atrial pressure.     Pulmonic Valve  The pulmonic valve is normal.     Vessels  The aorta root is normal. The thoracic aorta is normal. The inferior vena cava  was normal in size with preserved respiratory variability. Estimated mean  right atrial pressure is 3  mmHg (normal).     Pericardium  No pericardial effusion is present.        Compared to Previous Study  This study was compared to a TTE from 2017. There has been no significant  change.     Attestation  I have personally viewed the imaging and agree with the interpretation and  report as documented by the fellow, Kalyan Aragon, and/or edited by me.  _____________________________________________________________________________  __     MMode/2D Measurements & Calculations  IVSd: 1.3 cm  LVIDd: 3.5 cm  LVIDs: 2.4 cm  LVPWd: 1.3 cm  FS: 31.8 %  LV mass(C)d: 163.5 grams  LV mass(C)dI: 79.1 grams/m2  Ao root diam: 3.6 cm  LA dimension: 3.0 cm  asc Aorta Diam: 3.6 cm  LA/Ao: 0.84  LVOT diam: 2.3 cm  LVOT area: 4.1 cm2  LA Volume (BP): 27.8 ml     LA Volume Index (BP): 13.4 ml/m2  RWT: 0.75        Doppler Measurements & Calculations  MV E max xavi: 64.0 cm/sec  MV A max xavi: 53.1 cm/sec  MV E/A: 1.2  MV dec time: 0.16 sec  PA acc time: 0.05 sec  TR max xavi: 264.2 cm/sec  TR max P.9 mmHg  E/E' av.6  Lateral E/e': 5.5  Medial E/e': 11.6  AsPressures Phase: Baseline      Time Systolic Diastolic Mean A Wave V Wave EDP Max dp/dt HR   RA Pressures 11:48 AM   5 mmHg    10 mmHg    5 mmHg      83 bpm      RV Pressures 11:36 AM       1008 mmHg/sec        11:48 AM 81 mmHg    1 mmHg       10 mmHg     81 bpm      PA Pressures 11:49 AM 81 mmHg    40 mmHg    58 mmHg        83 bpm      PCW Pressures 11:50 AM   12 mmHg    12 mmHg    17 mmHg      79 bpm      Blood Flow Results Phase: Baseline      Time Results Indexed Values   QP 11:36 AM 5.16 L/min    2.56 L/min/m2      QS 11:36 AM 5.16 L/min    2.56 L/min/m2      Blood Oximetry Phase: Baseline      Time Hb SAT(%) PO2 Content PA Sat   PA 11:36 AM  68.9 %      68.9 %      Art 11:36 AM  96 %     16.84 mL/dL       Cardiac Output Phase: Baseline      Time TDCO TDCI Lesia C.O. Lesia C.I. Lesia HR   Cardiac Output Results 11:36 AM 5.3 L/min    2.63 L/min/m2    5.16 L/min    2.56 L/min/m2         11:51 AM 5.3 L/min          Resistance Results Phase: Baseline      Time PVR SVR PVR-I SVR-I TPR TVR TPR-I TVR-I PVR/SVR TPR/TVR   Resistance Results (Metric) 11:36 .35 dsc-5     1436.72 dsc-5/m2     899.45 dsc-5     1811.52 dsc-5/m2         Resistance Results (Wood) 11:36 AM 8.92 MANRIQUE     17.96 MANRIQUE/m2     11.25 MANRIQUE     22.65 MANRIQUE/m2           Asessment/Plan     The patient is doing well and appreciates not having peak trough effect of Tyvaso    He has developed some fluid retention taken care of by furosemide    He will return for RHC and clinical assessment in early December.      Please do not hesitate to contact me if you have any questions/concerns.     Sincerely,     Khang Lindsey MD

## 2019-09-25 NOTE — NURSING NOTE
Right Heart Catheterization: Patient was instructed regarding right heart catheterization, including discussion of the procedure, preparation, intra-procedural steps, and recovery at home. Patient demonstrated understanding of this information and agreed to call with further questions or concerns.    Medication Change: Patient was educated regarding prescribed medication change, including discussion of the indication, administration, side effects, and when to report to MD or RN. Patient demonstrated understanding of this information and agreed to call with further questions or concerns.    Labs: Patient was given results of the laboratory testing obtained today. Patient demonstrated understanding of this information and agreed to call with further questions or concerns.     Diet: Patient instructed regarding a heart healthy diet, including discussion of reduced fat and sodium intake. Patient demonstrated understanding of this information and agreed to call with further questions or concerns.    Return Appointment: Patient given instructions regarding scheduling next clinic visit. Patient demonstrated understanding of this information and agreed to call with further questions or concerns.    Patient stated he understood all health information given and agreed to call with further questions or concerns.    Medication Changes:  - Go up to 1600mcg on your Uptravi.     Patient Instructions:  1. Continue staying active and eat a heart healthy diet.    2. Please keep current list of medications with you at all times.    3. Remember to weigh yourself daily after voiding and before you consume any food or beverages and log the numbers.  If you have gained 2 pounds overnight or 5 pounds in a week contact us immediately for medication adjustments or further instructions.    4. **Please call us immediately if you have any syncope (fainting or passing out), chest pain, edema (swelling or weight gain), or decline in your  functional status (general decline in how you are feeling).    Follow up Appointment Information:  - Right heart catheterization on December 11th at 10:30 at the hospital ([address] [place] [address])  - Follow up with Dr. Martinez same day at 3:00pm      Check-In  Time Check-In Location Estimated Length Procedure   Name     Dec. 11  10:30AM   [facility]  waiting room 60-90 minutes Right Heart Catheterization**     Procedure Preparations & Instructions     This is an invasive procedure that DOES require preparation:    - Nothing to eat for 6 hours   - You may have clear liquids up until the time of your procedure  - A ride should be arranged for you in the instance you are unable to drive home, however you should be able to function as you normally would after the procedure     Results:  Component      Latest Ref Rng & Units 9/25/2019   Sodium      133 - 144 mmol/L 136   Potassium      3.4 - 5.3 mmol/L 4.1   Chloride      94 - 109 mmol/L 102   Carbon Dioxide      20 - 32 mmol/L 29   Anion Gap      3 - 14 mmol/L 5   Glucose      70 - 99 mg/dL 90   Urea Nitrogen      7 - 30 mg/dL 30   Creatinine      0.66 - 1.25 mg/dL 1.62 (H)   GFR Estimate      >60 mL/min/1.73:m2 45 (L)   GFR Estimate If Black      >60 mL/min/1.73:m2 53 (L)   Calcium      8.5 - 10.1 mg/dL 9.4   Bilirubin Total      0.2 - 1.3 mg/dL 0.4   Albumin      3.4 - 5.0 g/dL 3.9   Protein Total      6.8 - 8.8 g/dL 7.6   Alkaline Phosphatase      40 - 150 U/L 72   ALT      0 - 70 U/L 21   AST      0 - 45 U/L 16   WBC      4.0 - 11.0 10e9/L 6.5   RBC Count      4.4 - 5.9 10e12/L 4.78   Hemoglobin      13.3 - 17.7 g/dL 13.8   Hematocrit      40.0 - 53.0 % 42.6   MCV      78 - 100 fl 89   MCH      26.5 - 33.0 pg 28.9   MCHC      31.5 - 36.5 g/dL 32.4   RDW      10.0 - 15.0 % 12.5   Platelet Count      150 - 450 10e9/L 149 (L)   N-Terminal Pro Bnp      0 - 125 pg/mL 1,027 (H)     **Pt verbalized that he will call if he has any adverse side effects with the  increase in Uptravi. Kassandra Westfall RN on 9/25/2019 at 2:56 PM

## 2019-09-25 NOTE — PATIENT INSTRUCTIONS
Medication Changes:  - Go up to 1600mcg on your Uptravi.     Patient Instructions:  1. Continue staying active and eat a heart healthy diet.    2. Please keep current list of medications with you at all times.    3. Remember to weigh yourself daily after voiding and before you consume any food or beverages and log the numbers.  If you have gained 2 pounds overnight or 5 pounds in a week contact us immediately for medication adjustments or further instructions.    4. **Please call us immediately if you have any syncope (fainting or passing out), chest pain, edema (swelling or weight gain), or decline in your functional status (general decline in how you are feeling).    Follow up Appointment Information:  - Right heart catheterization on December 11th at 10:30 at the hospital ([address] [place] [address])  - Follow up with Dr. Martinez same day at 3:00pm      Check-In  Time Check-In Location Estimated Length Procedure   Name     Dec. 11  10:30AM   [facility]  waiting room 60-90 minutes Right Heart Catheterization**     Procedure Preparations & Instructions     This is an invasive procedure that DOES require preparation:    - Nothing to eat for 6 hours   - You may have clear liquids up until the time of your procedure  - A ride should be arranged for you in the instance you are unable to drive home, however you should be able to function as you normally would after the procedure     Results:  Component      Latest Ref Rng & Units 9/25/2019   Sodium      133 - 144 mmol/L 136   Potassium      3.4 - 5.3 mmol/L 4.1   Chloride      94 - 109 mmol/L 102   Carbon Dioxide      20 - 32 mmol/L 29   Anion Gap      3 - 14 mmol/L 5   Glucose      70 - 99 mg/dL 90   Urea Nitrogen      7 - 30 mg/dL 30   Creatinine      0.66 - 1.25 mg/dL 1.62 (H)   GFR Estimate      >60 mL/min/1.73:m2 45 (L)   GFR Estimate If Black      >60 mL/min/1.73:m2 53 (L)   Calcium      8.5 - 10.1 mg/dL 9.4   Bilirubin Total      0.2 - 1.3 mg/dL 0.4    Albumin      3.4 - 5.0 g/dL 3.9   Protein Total      6.8 - 8.8 g/dL 7.6   Alkaline Phosphatase      40 - 150 U/L 72   ALT      0 - 70 U/L 21   AST      0 - 45 U/L 16   WBC      4.0 - 11.0 10e9/L 6.5   RBC Count      4.4 - 5.9 10e12/L 4.78   Hemoglobin      13.3 - 17.7 g/dL 13.8   Hematocrit      40.0 - 53.0 % 42.6   MCV      78 - 100 fl 89   MCH      26.5 - 33.0 pg 28.9   MCHC      31.5 - 36.5 g/dL 32.4   RDW      10.0 - 15.0 % 12.5   Platelet Count      150 - 450 10e9/L 149 (L)   N-Terminal Pro Bnp      0 - 125 pg/mL 1,027 (H)     We are located on the third floor of the [facility] on the [facility].  Our address is     [address] [place] [address] [place] [address] [place] [address]    Thank you for allowing us to be a part of your care here at the [facility]    If you have questions or concerns please contact us at:    Obdulia Rome RN, BSN, PHN  Samantha Chandra (Schedule,Prior Auth)  Nurse Coordinator     Clinic   Pulmonary Hypertension   Pulmonary Hypertension  [facility]  (Phone)[phone]   (Phone) [phone]        (Fax) [phone]    ** Please note that you will NOT receive a reminder call regarding your scheduled testing, reminder calls are for provider appointments only.  If you are scheduled for testing within the [facility] system you may receive a call regarding pre-registration for billing purposes only.**     Remember to weigh yourself daily after voiding and before you consume any food or beverages and log the numbers.  If you have gained/lost 2 pounds overnight or 5 pounds in a week contact us immediately for medication adjustments or further instructions.   **Please call us immediately if you have any syncope, chest pain, edema, or decline in your functional status.    Support Group:  Pulmonary Hypertension  Association  Https://www.phassociation.org/  **Look at the Events Tab** They even have Support Groups that you can call into    [facility] Support Group  Second Saturday of the Month from 1-3 PM   [facility] [address]  Leader: Sandra Rollins  Phone: [phone] or [phone]  Email: [email or web address]

## 2019-09-29 ENCOUNTER — HEALTH MAINTENANCE LETTER (OUTPATIENT)
Age: 59
End: 2019-09-29

## 2019-10-10 NOTE — TELEPHONE ENCOUNTER
Pt saw Dr. Lindsey on 09/25/2019 after starting Uptravi.    Pt is scheduled for a right heart catheterization to check pt's pressure while on the medication and follow-up to review pt's results w/ Dr. Lindsey on 12/11/2019.     Samantha Chanrda  Clinic   Pulmonary Hypertension  [facility]  (P) [phone]

## 2019-10-17 DIAGNOSIS — I27.20 PULMONARY HYPERTENSION (H): Primary | ICD-10-CM

## 2019-10-17 NOTE — TELEPHONE ENCOUNTER
Patient has reached max dose of Uptravi 1600mcg, so new Erx was sent to pharmacy for final tablet size. Obdulia Rome RN on 10/17/2019 at 10:31 AM

## 2019-11-22 DIAGNOSIS — I27.20 PULMONARY HYPERTENSION (H): ICD-10-CM

## 2019-11-22 DIAGNOSIS — Z79.899 ENCOUNTER FOR MONITORING DIGOXIN THERAPY: ICD-10-CM

## 2019-11-22 DIAGNOSIS — Z51.81 ENCOUNTER FOR MONITORING DIGOXIN THERAPY: ICD-10-CM

## 2019-11-23 RX ORDER — DIGOXIN 125 MCG
125 TABLET ORAL DAILY
Qty: 90 TABLET | Refills: 0 | Status: SHIPPED | OUTPATIENT
Start: 2019-11-23 | End: 2019-12-06

## 2019-11-23 NOTE — TELEPHONE ENCOUNTER
digoxin (LANOXIN) 125 MCG tablet  Last Written Prescription Date: 8/12/19  Last Fill Quantity: 90  # refills: 0  Last Office Visit : 9/25/19  Future Office visit:  12/11/19    Varun SOOD   Notes recorded by Kassandra Westfall RN on 9/27/2019 at 9:19 AM CDT  Results discussed directly with patient by provider while patient was present in clinic.  Any further details documented in the note.    Kassandra Westfall RN    No change on med list    90 day to pharmacy

## 2019-12-01 ASSESSMENT — ENCOUNTER SYMPTOMS
SHORTNESS OF BREATH: 1
INCREASED ENERGY: 1
HALLUCINATIONS: 0
SLEEP DISTURBANCES DUE TO BREATHING: 1
HEMOPTYSIS: 0
DECREASED APPETITE: 0
ORTHOPNEA: 1
POLYPHAGIA: 0
COUGH DISTURBING SLEEP: 0
HYPERTENSION: 0
ALTERED TEMPERATURE REGULATION: 0
WHEEZING: 1
PALPITATIONS: 1
DYSPNEA ON EXERTION: 1
POSTURAL DYSPNEA: 1
FEVER: 0
LEG PAIN: 0
HYPOTENSION: 0
FATIGUE: 1
WEIGHT LOSS: 0
SNORES LOUDLY: 0
EXERCISE INTOLERANCE: 1
LIGHT-HEADEDNESS: 0
POLYDIPSIA: 0
WEIGHT GAIN: 0
COUGH: 1
SYNCOPE: 0
NIGHT SWEATS: 0
CHILLS: 0
SPUTUM PRODUCTION: 1

## 2019-12-06 DIAGNOSIS — I27.20 PULMONARY HYPERTENSION (H): ICD-10-CM

## 2019-12-06 DIAGNOSIS — Z51.81 ENCOUNTER FOR MONITORING DIGOXIN THERAPY: ICD-10-CM

## 2019-12-06 DIAGNOSIS — Z79.899 ENCOUNTER FOR MONITORING DIGOXIN THERAPY: ICD-10-CM

## 2019-12-09 RX ORDER — DIGOXIN 125 MCG
125 TABLET ORAL DAILY
Qty: 90 TABLET | Refills: 0 | Status: SHIPPED | OUTPATIENT
Start: 2019-12-09 | End: 2020-01-20

## 2019-12-09 NOTE — TELEPHONE ENCOUNTER
Last Clinic Visit: 9/25/2019  [facility]    Abnormal lab-Creatinine.      Creatinine   Date Value Ref Range Status   09/25/2019 1.62 (H) 0.66 - 1.25 mg/dL Final       Lab result reviewed at 9/25/19 clinic visit with patient- no med changes at that time.  Refill sent 30 days--  Clinic appointment 12/11/19

## 2019-12-11 ENCOUNTER — APPOINTMENT (OUTPATIENT)
Dept: MEDSURG UNIT | Facility: CLINIC | Age: 59
End: 2019-12-11
Payer: COMMERCIAL

## 2019-12-11 ENCOUNTER — APPOINTMENT (OUTPATIENT)
Dept: LAB | Facility: CLINIC | Age: 59
End: 2019-12-11
Payer: COMMERCIAL

## 2019-12-11 ENCOUNTER — OFFICE VISIT (OUTPATIENT)
Dept: CARDIOLOGY | Facility: CLINIC | Age: 59
End: 2019-12-11
Attending: INTERNAL MEDICINE
Payer: COMMERCIAL

## 2019-12-11 ENCOUNTER — HOSPITAL ENCOUNTER (OUTPATIENT)
Facility: CLINIC | Age: 59
Discharge: HOME | End: 2019-12-11
Attending: INTERNAL MEDICINE | Admitting: INTERNAL MEDICINE
Payer: COMMERCIAL

## 2019-12-11 VITALS
DIASTOLIC BLOOD PRESSURE: 65 MMHG | HEIGHT: 72 IN | RESPIRATION RATE: 18 BRPM | WEIGHT: 173 LBS | BODY MASS INDEX: 23.43 KG/M2 | OXYGEN SATURATION: 96 % | HEART RATE: 74 BPM | TEMPERATURE: 97.9 F | SYSTOLIC BLOOD PRESSURE: 118 MMHG

## 2019-12-11 VITALS
SYSTOLIC BLOOD PRESSURE: 103 MMHG | WEIGHT: 168.4 LBS | HEIGHT: 72 IN | BODY MASS INDEX: 22.81 KG/M2 | HEART RATE: 91 BPM | DIASTOLIC BLOOD PRESSURE: 61 MMHG | OXYGEN SATURATION: 94 %

## 2019-12-11 DIAGNOSIS — R06.02 SOB (SHORTNESS OF BREATH): ICD-10-CM

## 2019-12-11 DIAGNOSIS — I27.20 PULMONARY HYPERTENSION (H): ICD-10-CM

## 2019-12-11 LAB
ALBUMIN SERPL-MCNC: 3.6 G/DL (ref 3.4–5)
ALP SERPL-CCNC: 71 U/L (ref 40–150)
ALT SERPL W P-5'-P-CCNC: 28 U/L (ref 0–70)
ANION GAP SERPL CALCULATED.3IONS-SCNC: 3 MMOL/L (ref 3–14)
AST SERPL W P-5'-P-CCNC: 20 U/L (ref 0–45)
BILIRUB SERPL-MCNC: 0.8 MG/DL (ref 0.2–1.3)
BUN SERPL-MCNC: 32 MG/DL (ref 7–30)
CALCIUM SERPL-MCNC: 8.9 MG/DL (ref 8.5–10.1)
CHLORIDE SERPL-SCNC: 109 MMOL/L (ref 94–109)
CO2 SERPL-SCNC: 28 MMOL/L (ref 20–32)
CREAT SERPL-MCNC: 1.85 MG/DL (ref 0.66–1.25)
ERYTHROCYTE [DISTWIDTH] IN BLOOD BY AUTOMATED COUNT: 14.1 % (ref 10–15)
GFR SERPL CREATININE-BSD FRML MDRD: 39 ML/MIN/{1.73_M2}
GLUCOSE SERPL-MCNC: 78 MG/DL (ref 70–99)
HCT VFR BLD AUTO: 41.5 % (ref 40–53)
HGB BLD-MCNC: 13 G/DL (ref 13.3–17.7)
MCH RBC QN AUTO: 28.1 PG (ref 26.5–33)
MCHC RBC AUTO-ENTMCNC: 31.3 G/DL (ref 31.5–36.5)
MCV RBC AUTO: 90 FL (ref 78–100)
NT-PROBNP SERPL-MCNC: 1026 PG/ML (ref 0–125)
PLATELET # BLD AUTO: 170 10E9/L (ref 150–450)
POTASSIUM SERPL-SCNC: 4.3 MMOL/L (ref 3.4–5.3)
PROT SERPL-MCNC: 6.6 G/DL (ref 6.8–8.8)
RBC # BLD AUTO: 4.62 10E12/L (ref 4.4–5.9)
SODIUM SERPL-SCNC: 140 MMOL/L (ref 133–144)
WBC # BLD AUTO: 6.4 10E9/L (ref 4–11)

## 2019-12-11 PROCEDURE — 93451 RIGHT HEART CATH: CPT | Mod: 26 | Performed by: INTERNAL MEDICINE

## 2019-12-11 PROCEDURE — 40000166 ZZH STATISTIC PP CARE STAGE 1

## 2019-12-11 PROCEDURE — 80053 COMPREHEN METABOLIC PANEL: CPT | Performed by: INTERNAL MEDICINE

## 2019-12-11 PROCEDURE — 36415 COLL VENOUS BLD VENIPUNCTURE: CPT | Performed by: INTERNAL MEDICINE

## 2019-12-11 PROCEDURE — 25000125 ZZHC RX 250: Performed by: INTERNAL MEDICINE

## 2019-12-11 PROCEDURE — 83880 ASSAY OF NATRIURETIC PEPTIDE: CPT | Performed by: INTERNAL MEDICINE

## 2019-12-11 PROCEDURE — 99214 OFFICE O/P EST MOD 30 MIN: CPT | Mod: ZP | Performed by: INTERNAL MEDICINE

## 2019-12-11 PROCEDURE — C1894 INTRO/SHEATH, NON-LASER: HCPCS | Performed by: INTERNAL MEDICINE

## 2019-12-11 PROCEDURE — 85027 COMPLETE CBC AUTOMATED: CPT | Performed by: INTERNAL MEDICINE

## 2019-12-11 PROCEDURE — 93451 RIGHT HEART CATH: CPT | Performed by: INTERNAL MEDICINE

## 2019-12-11 PROCEDURE — 27210788 ZZH MANIFOLD CR3: Performed by: INTERNAL MEDICINE

## 2019-12-11 PROCEDURE — 27210794 ZZH OR GENERAL SUPPLY STERILE: Performed by: INTERNAL MEDICINE

## 2019-12-11 PROCEDURE — G0463 HOSPITAL OUTPT CLINIC VISIT: HCPCS | Mod: 25

## 2019-12-11 RX ORDER — LIDOCAINE 40 MG/G
CREAM TOPICAL
Status: COMPLETED | OUTPATIENT
Start: 2019-12-11 | End: 2019-12-11

## 2019-12-11 RX ORDER — BUDESONIDE 0.25 MG/2ML
0.25 INHALANT ORAL 2 TIMES DAILY
COMMUNITY
End: 2020-03-12

## 2019-12-11 RX ADMIN — LIDOCAINE: 40 CREAM TOPICAL at 11:58

## 2019-12-11 ASSESSMENT — MIFFLIN-ST. JEOR
SCORE: 1611.86
SCORE: 1632.85

## 2019-12-11 ASSESSMENT — PAIN SCALES - GENERAL: PAINLEVEL: NO PAIN (0)

## 2019-12-11 NOTE — NURSING NOTE
Labs: Patient was given results of the laboratory testing obtained today. Patient demonstrated understanding of this information and agreed to call with further questions or concerns.     Diet: Patient instructed regarding a heart healthy diet, including discussion of reduced fat and sodium intake. Patient demonstrated understanding of this information and agreed to call with further questions or concerns.    Return Appointment: Patient given instructions regarding scheduling next clinic visit. Patient demonstrated understanding of this information and agreed to call with further questions or concerns.    Patient stated he understood all health information given and agreed to call with further questions or concerns.    Medication Changes:  - No medication changes at this time. Continue current medication regiment.     Patient Instructions:  1. Continue staying active and eat a heart healthy diet.    2. Please keep current list of medications with you at all times.    3. Remember to weigh yourself daily after voiding and before you consume any food or beverages and log the numbers.  If you have gained 2 pounds overnight or 5 pounds in a week contact us immediately for medication adjustments or further instructions.    4. **Please call us immediately if you have any syncope (fainting or passing out), chest pain, edema (swelling or weight gain), or decline in your functional status (general decline in how you are feeling).    Follow up Appointment Information:  - Follow up with Dr. Lindsey in 3 months with labs prior    Results:  Component      Latest Ref Rng & Units 12/11/2019   Sodium      133 - 144 mmol/L 140   Potassium      3.4 - 5.3 mmol/L 4.3   Chloride      94 - 109 mmol/L 109   Carbon Dioxide      20 - 32 mmol/L 28   Anion Gap      3 - 14 mmol/L 3   Glucose      70 - 99 mg/dL 78   Urea Nitrogen      7 - 30 mg/dL 32 (H)   Creatinine      0.66 - 1.25 mg/dL 1.85 (H)   GFR Estimate      >60 mL/min/1.73:m2 39 (L)    GFR Estimate If Black      >60 mL/min/1.73:m2 45 (L)   Calcium      8.5 - 10.1 mg/dL 8.9   Bilirubin Total      0.2 - 1.3 mg/dL 0.8   Albumin      3.4 - 5.0 g/dL 3.6   Protein Total      6.8 - 8.8 g/dL 6.6 (L)   Alkaline Phosphatase      40 - 150 U/L 71   ALT      0 - 70 U/L 28   AST      0 - 45 U/L 20   WBC      4.0 - 11.0 10e9/L 6.4   RBC Count      4.4 - 5.9 10e12/L 4.62   Hemoglobin      13.3 - 17.7 g/dL 13.0 (L)   Hematocrit      40.0 - 53.0 % 41.5   MCV      78 - 100 fl 90   MCH      26.5 - 33.0 pg 28.1   MCHC      31.5 - 36.5 g/dL 31.3 (L)   RDW      10.0 - 15.0 % 14.1   Platelet Count      150 - 450 10e9/L 170   N-Terminal Pro Bnp      0 - 125 pg/mL 1,026 (H)

## 2019-12-11 NOTE — DISCHARGE INSTRUCTIONS
[facility]                        Interventional Cardiology  Discharge Instructions   Post Right Heart Cath      AFTER YOU GO HOME:    DO drink plenty of fluids    DO resume your regular diet and medications unless otherwise instructed by your Primary Physician    Do Not scrub the procedure site vigorously    No lotion or powder to the puncture site for 3 days    CALL YOUR PRIMARY PHYSICIAN IF: You may resume all normal activity.  Monitor neck site for bleeding, swelling, or voice changes. If you notice bleeding or swelling immediately apply pressure to the site and call number below to speak with Cardiology Fellow.  If you experience any changes in your breathing you should call your doctor immediately or come to the closest Emergency Department.  Do not drive yourself.    ADDITIONAL INSTRUCTIONS: Medications: You are to resume all home medications including anticoagulation therapy unless otherwise advised by your primary cardiologist or nurse coordinator.    Follow Up: Per your primary cardiology team    If you have any questions or concerns regarding your procedure site please call [phone] at anytime and ask for Cardiology Fellow on call.  They are available 24 hours a day.  You may also contact the Cardiology Clinic after hours number at [phone].                                                       Telephone Numbers [phone] Monday-Friday 8:00 am to 4:30 pm    [phone] After 4:30 pm Monday-Friday, Weekends & Holidays  Ask for Interventional Cardiologist on call. Someone is on call 24 hours/day   [facility] toll free number [phone] Monday-Friday 8:00 am to 4:30 pm   [facility] Emergency Dept [phone]

## 2019-12-11 NOTE — PROGRESS NOTES
Patient tolerated recovery stage well. VSS, right neck site clean/dry/intact, no hematoma, and denies pain. Patient tolerated PO food and fluids. Teaching was done and discharge instructions were given and pt signed sheet. Patient ambulated with oxygen. Patient discharged from the hospital with his home oxygen to the clinic for an appointment with Dr Lindsey.

## 2019-12-11 NOTE — LETTER
12/11/2019      RE: Jimmy Isaac  [id number] [address] [address]       Dear Colleague,    Thank you for the opportunity to participate in the care of your patient, Jimmy Isaac, at the [facility] at [facility]. Please see a copy of my visit note below.    Khang Lindsey M.D.  Cardiovascular Medicine    I personally saw and examined this patient, discussed care with housestaff and other consultants, reviewed current laboratories and imaging studies, and conveyed impression and diagnostic/therapeutic plan to patient.    Problem List  1. Pulmonary arterial hypertension  2. Chronic, oxygen dependent respiratory failure  3. Sarcoidosis without cardiac involvement  4. Nephrolithiasis  5. Prednisone related fluid retention  6. Nephrolitiasis  7. Aseptic necrosis of femoral heads  8. CKD  History  There is no interim history of increasing shortness of breath, orthopnea, PND, ankle edema, increasing abdominal girth, palpitation, pre-syncope, syncope, bleeding or thromboembolic complications.  The patient is taking medication regularly, following a low salt diet, and exercising regularly as outlined. He has had some fluid reteniton with higher doses of Uptravi  Alert, oriented, normal gait and station, normal mental, JVP within normal limits, HJR negative,thyroid not enlarged, mucous membranes clear, abdomen without tenderness, rebound or guarding, skin clear of lesion, PMI normal, S1 S2 regular rhythm, no gallop, trace edema    Wt Readings from Last 24 Encounters:   12/11/19 76.4 kg (168 lb 6.4 oz)   12/11/19 78.5 kg (173 lb)   09/25/19 80.4 kg (177 lb 4.8 oz)   06/19/19 80.3 kg (177 lb 1.6 oz)   06/19/19 79.4 kg (175 lb)   03/05/19 80.1 kg (176 lb 8 oz)   01/09/19 82.1 kg (181 lb)   12/26/18 82.5 kg (181 lb 12.8 oz)   10/29/18 81.6 kg (180 lb)   05/30/18 84.6 kg (186 lb 9.6 oz)   05/30/18 84.8 kg (187 lb)   02/27/18 84.6 kg (186 lb 8 oz)   02/05/18 85.8 kg (189  lb 1.6 oz)   17 79.4 kg (175 lb)   17 85.3 kg (188 lb)   17 85.3 kg (188 lb)   17 85.3 kg (188 lb)   17 85.2 kg (187 lb 14.4 oz)   10/18/17 87.8 kg (193 lb 9.6 oz)   17 84.9 kg (187 lb 1.6 oz)   17 84.5 kg (186 lb 3.2 oz)   17 83.5 kg (184 lb)   17 79.4 kg (175 lb)   17 79.4 kg (175 lb)         Meds  Current Outpatient Medications   Medication     budesonide (PULMICORT) 0.25 MG/2ML neb solution     digoxin (LANOXIN) 125 MCG tablet     furosemide (LASIX) 20 MG tablet     LETAIRIS 10 MG tablet     PROAIR  (90 Base) MCG/ACT inhaler     selexipag (UPTRAVI) 1600 MCG tablet     tadalafil, PAH, (ALYQ) 20 MG TABS     No current facility-administered medications for this visit.      Imaging   Name: MATTY LOPEZ  MRN: [id number]  : 1960  Study Date: 10/29/2018 01:19 PM  Age: 57 yrs  Gender: Male  [place] [facility]  Reason For Study: Pulmonary hypertension (H), Sarcoidosis, Dyspnea on exertion  Ordering Physician: DUY MIDDLETON  Referring Physician: DUY MIDDLETON  Performed By: Radha Dawson RDCS     BSA: 2.1 m2  Height: 72 in  Weight: 186 lb  BP: 119/70 mmHg  _____________________________________________________________________________  __        Procedure  Echocardiogram with two-dimensional, color and spectral Doppler performed.  Optison (NDC #6117-9386-20) given intravenously. Patient was given 3.0 ml  mixture of 3 ml Optison and 6 ml saline. 6.0 ml wasted. IV start location  LAC .  _____________________________________________________________________________  __        Interpretation Summary  Technically difficult study.  Global and regional left ventricular function is normal with an EF of 55-60%.  Mild right ventricular dilation is present.  Global right ventricular function is normal.  Right ventricular systolic pressure is 28mmHg above the right atrial pressure.  No significant valvular abnormalities.  The inferior vena  cava was normal in size with preserved respiratory  variability.  No pericardial effusion is present.  This study was compared to a TTE from 9/22/2017. There has been no significant  change.  _____________________________________________________________________________  __        Left Ventricle  Left ventricular size is normal. Global and regional left ventricular function  is normal with an EF of 55-60%. Relative wall thickness is increased  consistent with concentric remodeling. Left ventricular diastolic function is  indeterminate. Flattened septum is consistent with right ventricular pressure  overload.     Right Ventricle  Global right ventricular function is normal. Mild right ventricular dilation  is present.     Atria  Both atria appear normal. The atrial septum is intact as assessed by color  Doppler .     Mitral Valve  The mitral valve is normal. Trace mitral insufficiency is present.        Aortic Valve  Aortic valve is normal in structure and function. The aortic valve is  tricuspid.     Tricuspid Valve  The tricuspid valve is normal. Trace tricuspid insufficiency is present. Right  ventricular systolic pressure is 28mmHg above the right atrial pressure.     Pulmonic Valve  The pulmonic valve is normal.     Vessels  The aorta root is normal. The thoracic aorta is normal. The inferior vena cava  was normal in size with preserved respiratory variability. Estimated mean  right atrial pressure is 3 mmHg (normal).     Pericardium  No pericardial effusion is present.        Compared to Previous Study  This study was compared to a TTE from 9/22/2017. There has been no significant  change.     Attestation  I have personally viewed the imaging and agree with the interpretation and  report as documented by the fellow, Kalyan Aragon, and/or edited by me.  _____________________________________________________________________________  __     MMode/2D Measurements & Calculations  IVSd: 1.3 cm  LVIDd: 3.5 cm  LVIDs: 2.4  cm  LVPWd: 1.3 cm  FS: 31.8 %  LV mass(C)d: 163.5 grams  LV mass(C)dI: 79.1 grams/m2  Ao root diam: 3.6 cm  LA dimension: 3.0 cm  asc Aorta Diam: 3.6 cm  LA/Ao: 0.84  LVOT diam: 2.3 cm  LVOT area: 4.1 cm2  LA Volume (BP): 27.8 ml     LA Volume Index (BP): 13.4 ml/m2  RWT: 0.75        Doppler Measurements & Calculations  MV E max xavi: 64.0 cm/sec  MV A max xavi: 53.1 cm/sec  MV E/A: 1.2  MV dec time: 0.16 sec  PA acc time: 0.05 sec  TR max xavi: 264.2 cm/sec  TR max P.9 mmHg  E/E' av.6  Lateral E/e': 5.5  Medial E/e': 11.6      Heart Catherization 2019    RA 5, PA 73/38 mean 50 PCP 15, CO TD 4.32/ Lesia measured 5.13    Heart Catherization 2018     Time Systolic Diastolic Mean A Wave V Wave EDP Max dp/dt HR   RA Pressures 11:48 AM   5 mmHg    10 mmHg    5 mmHg      83 bpm      RV Pressures 11:36 AM       1008 mmHg/sec        11:48 AM 81 mmHg    1 mmHg       10 mmHg     81 bpm      PA Pressures 11:49 AM 81 mmHg    40 mmHg    58 mmHg        83 bpm      PCW Pressures 11:50 AM   12 mmHg    12 mmHg    17 mmHg      79 bpm      Blood Flow Results Phase: Baseline      Time Results Indexed Values   QP 11:36 AM 5.16 L/min    2.56 L/min/m2      QS 11:36 AM 5.16 L/min    2.56 L/min/m2      Blood Oximetry Phase: Baseline      Time Hb SAT(%) PO2 Content PA Sat   PA 11:36 AM  68.9 %      68.9 %      Art 11:36 AM  96 %     16.84 mL/dL       Cardiac Output Phase: Baseline      Time TDCO TDCI Lesia C.O. Lesia C.I. Lesia HR   Cardiac Output Results 11:36 AM 5.3 L/min    2.63 L/min/m2    5.16 L/min    2.56 L/min/m2        11:51 AM 5.3 L/min          Resistance Results Phase: Baseline      Time PVR SVR PVR-I SVR-I TPR TVR TPR-I TVR-I PVR/SVR TPR/TVR   Resistance Results (Metric) 11:36 .35 dsc-5     1436.72 dsc-5/m2     899.45 dsc-5     1811.52 dsc-5/m2         Resistance Results (Wood) 11:36 AM 8.92 MANRIQUE     17.96 MANRIQUE/m2     11.25 MANRIQUE     22.65 MANRIQUE/m2           Asessment/Plan     The patient is doing well and  appreciates not having peak trough effect of Tyvaso    He has developed some fluid retention taken care of by furosemide    He will return for RHC and clinical assessment in early December.      Please do not hesitate to contact me if you have any questions/concerns.     Sincerely,     Khang Lindsey MD

## 2019-12-11 NOTE — PROGRESS NOTES
Khang Lindsey M.D.  Cardiovascular Medicine    I personally saw and examined this patient, discussed care with housestaff and other consultants, reviewed current laboratories and imaging studies, and conveyed impression and diagnostic/therapeutic plan to patient.    Problem List  1. Pulmonary arterial hypertension  2. Chronic, oxygen dependent respiratory failure  3. Sarcoidosis without cardiac involvement  4. Nephrolithiasis  5. Prednisone related fluid retention  6. Nephrolitiasis  7. Aseptic necrosis of femoral heads  8. CKD  History  There is no interim history of increasing shortness of breath, orthopnea, PND, ankle edema, increasing abdominal girth, palpitation, pre-syncope, syncope, bleeding or thromboembolic complications.  The patient is taking medication regularly, following a low salt diet, and exercising regularly as outlined. He has had some fluid reteniton with higher doses of Uptravi  Alert, oriented, normal gait and station, normal mental, JVP within normal limits, HJR negative,thyroid not enlarged, mucous membranes clear, abdomen without tenderness, rebound or guarding, skin clear of lesion, PMI normal, S1 S2 regular rhythm, no gallop, trace edema    Wt Readings from Last 24 Encounters:   12/11/19 76.4 kg (168 lb 6.4 oz)   12/11/19 78.5 kg (173 lb)   09/25/19 80.4 kg (177 lb 4.8 oz)   06/19/19 80.3 kg (177 lb 1.6 oz)   06/19/19 79.4 kg (175 lb)   03/05/19 80.1 kg (176 lb 8 oz)   01/09/19 82.1 kg (181 lb)   12/26/18 82.5 kg (181 lb 12.8 oz)   10/29/18 81.6 kg (180 lb)   05/30/18 84.6 kg (186 lb 9.6 oz)   05/30/18 84.8 kg (187 lb)   02/27/18 84.6 kg (186 lb 8 oz)   02/05/18 85.8 kg (189 lb 1.6 oz)   12/12/17 79.4 kg (175 lb)   11/21/17 85.3 kg (188 lb)   11/16/17 85.3 kg (188 lb)   11/01/17 85.3 kg (188 lb)   11/01/17 85.2 kg (187 lb 14.4 oz)   10/18/17 87.8 kg (193 lb 9.6 oz)   09/07/17 84.9 kg (187 lb 1.6 oz)   08/01/17 84.5 kg (186 lb 3.2 oz)   06/20/17 83.5 kg (184 lb)   06/02/17 79.4 kg (175 lb)    17 79.4 kg (175 lb)         Meds  Current Outpatient Medications   Medication     budesonide (PULMICORT) 0.25 MG/2ML neb solution     digoxin (LANOXIN) 125 MCG tablet     furosemide (LASIX) 20 MG tablet     LETAIRIS 10 MG tablet     PROAIR  (90 Base) MCG/ACT inhaler     selexipag (UPTRAVI) 1600 MCG tablet     tadalafil, PAH, (ALYQ) 20 MG TABS     No current facility-administered medications for this visit.      Imaging   Name: MATTY LOPEZ  MRN: [id number]  : 1960  Study Date: 10/29/2018 01:19 PM  Age: 57 yrs  Gender: Male  Patient Location: [facility]  Reason For Study: Pulmonary hypertension (H), Sarcoidosis, Dyspnea on exertion  Ordering Physician: DUY MIDDLETON  Referring Physician: DUY MIDDLETON  Performed By: Radha Dawson [facility]     BSA: 2.1 m2  Height: 72 in  Weight: 186 lb  BP: 119/70 mmHg  _____________________________________________________________________________  __        Procedure  Echocardiogram with two-dimensional, color and spectral Doppler performed.  Optison (NDC #1779-7289-07) given intravenously. Patient was given 3.0 ml  mixture of 3 ml Optison and 6 ml saline. 6.0 ml wasted. IV start location  LAC .  _____________________________________________________________________________  __        Interpretation Summary  Technically difficult study.  Global and regional left ventricular function is normal with an EF of 55-60%.  Mild right ventricular dilation is present.  Global right ventricular function is normal.  Right ventricular systolic pressure is 28mmHg above the right atrial pressure.  No significant valvular abnormalities.  The inferior vena cava was normal in size with preserved respiratory  variability.  No pericardial effusion is present.  This study was compared to a TTE from 2017. There has been no significant  change.  _____________________________________________________________________________  __        Left Ventricle  Left ventricular size is  normal. Global and regional left ventricular function  is normal with an EF of 55-60%. Relative wall thickness is increased  consistent with concentric remodeling. Left ventricular diastolic function is  indeterminate. Flattened septum is consistent with right ventricular pressure  overload.     Right Ventricle  Global right ventricular function is normal. Mild right ventricular dilation  is present.     Atria  Both atria appear normal. The atrial septum is intact as assessed by color  Doppler .     Mitral Valve  The mitral valve is normal. Trace mitral insufficiency is present.        Aortic Valve  Aortic valve is normal in structure and function. The aortic valve is  tricuspid.     Tricuspid Valve  The tricuspid valve is normal. Trace tricuspid insufficiency is present. Right  ventricular systolic pressure is 28mmHg above the right atrial pressure.     Pulmonic Valve  The pulmonic valve is normal.     Vessels  The aorta root is normal. The thoracic aorta is normal. The inferior vena cava  was normal in size with preserved respiratory variability. Estimated mean  right atrial pressure is 3 mmHg (normal).     Pericardium  No pericardial effusion is present.        Compared to Previous Study  This study was compared to a TTE from 9/22/2017. There has been no significant  change.     Attestation  I have personally viewed the imaging and agree with the interpretation and  report as documented by the fellow, Kalyan Aragon, and/or edited by me.  _____________________________________________________________________________  __     MMode/2D Measurements & Calculations  IVSd: 1.3 cm  LVIDd: 3.5 cm  LVIDs: 2.4 cm  LVPWd: 1.3 cm  FS: 31.8 %  LV mass(C)d: 163.5 grams  LV mass(C)dI: 79.1 grams/m2  Ao root diam: 3.6 cm  LA dimension: 3.0 cm  asc Aorta Diam: 3.6 cm  LA/Ao: 0.84  LVOT diam: 2.3 cm  LVOT area: 4.1 cm2  LA Volume (BP): 27.8 ml     LA Volume Index (BP): 13.4 ml/m2  RWT: 0.75        Doppler Measurements & Calculations  MV  E max xavi: 64.0 cm/sec  MV A max xavi: 53.1 cm/sec  MV E/A: 1.2  MV dec time: 0.16 sec  PA acc time: 0.05 sec  TR max xavi: 264.2 cm/sec  TR max P.9 mmHg  E/E' av.6  Lateral E/e': 5.5  Medial E/e': 11.6      Heart Catherization 2019    RA 5, PA 73/38 mean 50 PCP 15, CO TD 4.32/ Lesia measured 5.13    Heart Catherization 2018     Time Systolic Diastolic Mean A Wave V Wave EDP Max dp/dt HR   RA Pressures 11:48 AM   5 mmHg    10 mmHg    5 mmHg      83 bpm      RV Pressures 11:36 AM       1008 mmHg/sec        11:48 AM 81 mmHg    1 mmHg       10 mmHg     81 bpm      PA Pressures 11:49 AM 81 mmHg    40 mmHg    58 mmHg        83 bpm      PCW Pressures 11:50 AM   12 mmHg    12 mmHg    17 mmHg      79 bpm      Blood Flow Results Phase: Baseline      Time Results Indexed Values   QP 11:36 AM 5.16 L/min    2.56 L/min/m2      QS 11:36 AM 5.16 L/min    2.56 L/min/m2      Blood Oximetry Phase: Baseline      Time Hb SAT(%) PO2 Content PA Sat   PA 11:36 AM  68.9 %      68.9 %      Art 11:36 AM  96 %     16.84 mL/dL       Cardiac Output Phase: Baseline      Time TDCO TDCI Lesia C.O. Lesia C.I. Lesia HR   Cardiac Output Results 11:36 AM 5.3 L/min    2.63 L/min/m2    5.16 L/min    2.56 L/min/m2        11:51 AM 5.3 L/min          Resistance Results Phase: Baseline      Time PVR SVR PVR-I SVR-I TPR TVR TPR-I TVR-I PVR/SVR TPR/TVR   Resistance Results (Metric) 11:36 .35 dsc-5     1436.72 dsc-5/m2     899.45 dsc-5     1811.52 dsc-5/m2         Resistance Results (Wood) 11:36 AM 8.92 MANRIQUE     17.96 MANRIQUE/m2     11.25 MANRIQUE     22.65 MANRIQUE/m2           Asessment/Plan     The patient is doing well and appreciates not having peak trough effect of Tyvaso    He has developed some fluid retention taken care of by furosemide    He will return for RHC and clinical assessment in early December.         no Mood episode

## 2019-12-11 NOTE — IP AVS SNAPSHOT
[facility], [facility],  Heart Cath Lab  [address] [address]  Phone:  [phone]                                    After Visit Summary   12/11/2019    Jimmy Isaac    MRN: [id number]           After Visit Summary Signature Page    I have received my discharge instructions, and my questions have been answered. I have discussed any challenges I see with this plan with the nurse or doctor.    ..........................................................................................................................................  Patient/Patient Representative Signature      ..........................................................................................................................................  Patient Representative Print Name and Relationship to Patient    ..................................................               ................................................  Date                                   Time    ..........................................................................................................................................  Reviewed by Signature/Title    ...................................................              ..............................................  Date                                               Time          22EPIC Rev 08/18

## 2019-12-11 NOTE — PATIENT INSTRUCTIONS
Medication Changes:  - No medication changes at this time. Continue current medication regiment.     Patient Instructions:  1. Continue staying active and eat a heart healthy diet.    2. Please keep current list of medications with you at all times.    3. Remember to weigh yourself daily after voiding and before you consume any food or beverages and log the numbers.  If you have gained 2 pounds overnight or 5 pounds in a week contact us immediately for medication adjustments or further instructions.    4. **Please call us immediately if you have any syncope (fainting or passing out), chest pain, edema (swelling or weight gain), or decline in your functional status (general decline in how you are feeling).    Follow up Appointment Information:  - Follow up with Dr. Lindsey in 3 months with labs prior    Results:  Component      Latest Ref Rng & Units 12/11/2019   Sodium      133 - 144 mmol/L 140   Potassium      3.4 - 5.3 mmol/L 4.3   Chloride      94 - 109 mmol/L 109   Carbon Dioxide      20 - 32 mmol/L 28   Anion Gap      3 - 14 mmol/L 3   Glucose      70 - 99 mg/dL 78   Urea Nitrogen      7 - 30 mg/dL 32 (H)   Creatinine      0.66 - 1.25 mg/dL 1.85 (H)   GFR Estimate      >60 mL/min/1.73:m2 39 (L)   GFR Estimate If Black      >60 mL/min/1.73:m2 45 (L)   Calcium      8.5 - 10.1 mg/dL 8.9   Bilirubin Total      0.2 - 1.3 mg/dL 0.8   Albumin      3.4 - 5.0 g/dL 3.6   Protein Total      6.8 - 8.8 g/dL 6.6 (L)   Alkaline Phosphatase      40 - 150 U/L 71   ALT      0 - 70 U/L 28   AST      0 - 45 U/L 20   WBC      4.0 - 11.0 10e9/L 6.4   RBC Count      4.4 - 5.9 10e12/L 4.62   Hemoglobin      13.3 - 17.7 g/dL 13.0 (L)   Hematocrit      40.0 - 53.0 % 41.5   MCV      78 - 100 fl 90   MCH      26.5 - 33.0 pg 28.1   MCHC      31.5 - 36.5 g/dL 31.3 (L)   RDW      10.0 - 15.0 % 14.1   Platelet Count      150 - 450 10e9/L 170   N-Terminal Pro Bnp      0 - 125 pg/mL 1,026 (H)     We are located on the third floor of the [facility].  Our address is     [address] [place] [address] [place] [address] [place] [address]    Thank you for allowing us to be a part of your care here at the [facility]    If you have questions or concerns please contact us at:    Obdulia Rome, RN, BSN, PHN Samantha Chandra (Schedule,Prior Auth)  Nurse Coordinator     Clinic   Pulmonary Hypertension   Pulmonary Hypertension  [facility]  (Phone)[phone]   (Phone) [phone]        (Fax) [phone]    ** Please note that you will NOT receive a reminder call regarding your scheduled testing, reminder calls are for provider appointments only.  If you are scheduled for testing within the [facility] system you may receive a call regarding pre-registration for billing purposes only.**     Remember to weigh yourself daily after voiding and before you consume any food or beverages and log the numbers.  If you have gained/lost 2 pounds overnight or 5 pounds in a week contact us immediately for medication adjustments or further instructions.   **Please call us immediately if you have any syncope, chest pain, edema, or decline in your functional status.    Support Group:  Pulmonary Hypertension Association  Https://www.phassociation.org/  **Look at the Events Tab** They even have Support Groups that you can call into    [facility] Support Group  Second Saturday of the Month from 1-3 PM   Location: [address] [place] [address]  Leader: Sandra Jacques and Cinda Rollins  Phone: [phone] or [phone]  Email: [email or web address]

## 2019-12-18 DIAGNOSIS — I27.20 PULMONARY HYPERTENSION (H): Primary | ICD-10-CM

## 2019-12-18 RX ORDER — TADALAFIL 20 MG/1
40 TABLET ORAL DAILY
Qty: 180 TABLET | Refills: 3 | Status: SHIPPED | OUTPATIENT
Start: 2019-12-18 | End: 2020-01-20

## 2019-12-18 NOTE — TELEPHONE ENCOUNTER
Medication Refill double check:    Last office visit was on 12/11/19 with .    Follow up was recommended for 3 months.    Any additional encounters with changes to requested med? no    Authorizing provider is: Dr. Lindsey    Refill was approved.     Additional orders/notes:       Obdulia Rome RN on 12/18/2019 at 4:50 PM

## 2019-12-19 ENCOUNTER — TELEPHONE (OUTPATIENT)
Dept: CARDIOLOGY | Facility: CLINIC | Age: 59
End: 2019-12-19

## 2020-01-20 DIAGNOSIS — Z51.81 ENCOUNTER FOR MONITORING DIGOXIN THERAPY: ICD-10-CM

## 2020-01-20 DIAGNOSIS — I27.20 PULMONARY HYPERTENSION (H): ICD-10-CM

## 2020-01-20 DIAGNOSIS — Z79.899 ENCOUNTER FOR MONITORING DIGOXIN THERAPY: ICD-10-CM

## 2020-01-20 RX ORDER — DIGOXIN 125 MCG
125 TABLET ORAL DAILY
Qty: 90 TABLET | Refills: 3 | Status: SHIPPED | OUTPATIENT
Start: 2020-01-20 | End: 2020-06-16

## 2020-01-20 RX ORDER — TADALAFIL 20 MG/1
40 TABLET ORAL DAILY
Qty: 180 TABLET | Refills: 3 | Status: SHIPPED | OUTPATIENT
Start: 2020-01-20 | End: 2020-01-24

## 2020-01-20 NOTE — TELEPHONE ENCOUNTER
Medication Refill double check:    Last office visit was on 12/11/19 with .    Follow up was recommended for 3months.    Any additional encounters with changes to requested med? no    Authorizing provider is: Dr. Lindsey    Refill was approved.     Additional orders/notes: Creatinine stable      Obdulia Rome RN on 1/20/2020 at 3:36 PM

## 2020-01-20 NOTE — TELEPHONE ENCOUNTER
digoxin (LANOXIN) 125 MCG tablet      Last Written Prescription Date:  12/9/19  Last Fill Quantity: 90,   # refills: 0  Failed protocol: abnormal lab-Creatinine    tadalafil, PAH, (ALYQ) 20 MG TABS      Last Written Prescription Date:  12/18/19  Last Fill Quantity: 180,   # refills: 3  1. PHTN Medication not on the Cardiology refill protocol.  2. New pharmacy requesting a prescription.    Last Office Visit : 12/11/19  Future Office visit:  3/17/20

## 2020-01-23 ENCOUNTER — TELEPHONE (OUTPATIENT)
Dept: CARDIOLOGY | Facility: CLINIC | Age: 60
End: 2020-01-23

## 2020-01-23 NOTE — TELEPHONE ENCOUNTER
PA Initiation    Medication: Uptravi 1600mcg  Insurance Company: [employer] ([facility]) - Phone [phone] Fax [phone]  Pharmacy Filling the Rx: [facility] - [place], [place] - [address]  Start Date: 1/23/2020    *Prior authorization completed and submitted through CoverMyMeds for expedited review along with clinic note.

## 2020-01-23 NOTE — TELEPHONE ENCOUNTER
PA Initiation    Medication: Tadalafil 20mg  Insurance Company: [employer]umRX ([facility]) - Phone [phone] Fax [phone]  Pharmacy Filling the Rx: [facility] - [place] - [address]  Start Date: 1/23/2020    *Prior authorization completed and submitted through CoverMyMeds along with clinic note.

## 2020-01-24 NOTE — TELEPHONE ENCOUNTER
Received a call from the pt stating that he is having issues filling his Tadalafil at [facility]. Stated to pt that a prior authorization is not needed, as one was completed yesterday and the pt's insurance stated that Tadalafil is a preferred medication. Pt stated that per the pharmacy, they are getting a rejected for the medication and he is not sure why. Stated to pt that contact will be made for clarification on the rejection.  -------------------------------------------  Contacted [facility] for some clarifications on the pt's rejection. Spoke w/ Louisa Lisa who stated that the pt's Tadalafil is being rejected, but she is unable to confirm why as the script was sent to [facility]. Call was transferred to [facility].     Spoke w/ Geeta from [facility] who stated that the script is being ran for 90 day supply and it's rejecting because that is exceeding the pt's plan limit for day supply. Requested Geeta run a test claim for 60 tabs/30 days. Geeta ran the test claim and it paid out, but the pt's co-pay was $400.00. Geeta asked if pt's medication should be shipped. Stated to Geeta that a shipment will not be requested at this time as pt cannot afford the co-pay. Geeta put the order on hold until further notice.  -----------------------------------------------  Contacted pt and stated that pt's Tadalafil was being rejected because it was being ran for a 90 day supply, when pt's plan only allows for a 30 day supply. Pt stated that it not an issue and he is okay with a 30 day supply.    Stated to pt that his medication will cost him $400.00. Pt stated he cannot afford the medication. Requested pt contact [employer] ([employer] for assistance ([phone]). Pt stated he will contact them today. Stated to pt that the turn around may be 72 hours. Pt verbalized understanding. Stated to pt, if needed, pt could fill his medication at a local pharmacy using GoodRx. Pt states  he will go through [employer] co-pay assistance program.    Samantha Chandra  Clinic   Pulmonary Hypertension  [facility]  (P) [phone]

## 2020-01-24 NOTE — TELEPHONE ENCOUNTER
Reached out to [facility] to see if they are the filling pharmacy for the pt's Uptravi. Wendy stated that they are. Requested an urgent contact as pt will be running out of medication on Monday. Wendy stated that the pt has new insurance. Stated to pt that the prior authorization is still current and provided the information to Wendy. Wendy stated they have attempted to contact the pt.  -----------------------  Spoke w/ pt and stated a prior authorization is not needed and [facility] is still his filling pharmacy for Uptravi. Pt states that he used to use GoodRx with them. Stated to pt that GoodRx cannot be combined with his insurance and stated that pt probably qualified for [employer] co-pay card. Pt verbalized understanding and states that his co-pay is about $10.00 for the medication.    Stated to pt that [facility] just tried contacting him. Pt states that his fiance just informed him as well. Pt stated that he will call them SAMUEL Chandra  Clinic   Pulmonary Hypertension  [facility]  P) [phone]

## 2020-01-24 NOTE — TELEPHONE ENCOUNTER
Prior Authorization Not Needed per Insurance    Medication: Uptravi 1600mcg - PA Not Needed  Insurance Company: [employer] ([facility]) - Phone [phone] Fax [phone]  Pharmacy Filling the Rx: [facility] - [place], [place] - [address]  Pharmacy Notified:  Yes  Patient Notified:  Yes    *Prior authorization not needed per insurance. Contacted pt and provided him that information.

## 2020-01-24 NOTE — TELEPHONE ENCOUNTER
Prior Authorization Not Needed per Insurance    Medication: Tadalafil 20mg - PA Not Needed  Insurance Company: [employer]ne ([facility]) - Phone [phone] Fax [phone]  Expected CoPay:      Pharmacy Filling the Rx: [facility] - [place], [place] - [facility]  Pharmacy Notified:  Yes  Patient Notified:  Yes

## 2020-01-28 ENCOUNTER — MYC REFILL (OUTPATIENT)
Dept: CARDIOLOGY | Facility: CLINIC | Age: 60
End: 2020-01-28

## 2020-01-28 DIAGNOSIS — I27.20 PULMONARY HYPERTENSION (H): ICD-10-CM

## 2020-01-28 RX ORDER — TADALAFIL 20 MG/1
40 TABLET ORAL DAILY
Qty: 60 TABLET | Refills: 11 | Status: CANCELLED | OUTPATIENT
Start: 2020-01-28

## 2020-01-29 NOTE — TELEPHONE ENCOUNTER
tadalafil, PAH, (ALYQ) 20 MG TABS: RF available,Pharm trans, my chart request      Last Written Prescription Date:  1-24-20  Last Fill Quantity: 60,   # refills: 11  Last Office Visit : 12-11-19  Rx fax to [facility]: [phone]

## 2020-02-03 ENCOUNTER — TELEPHONE (OUTPATIENT)
Dept: CARDIOLOGY | Facility: CLINIC | Age: 60
End: 2020-02-03

## 2020-02-03 DIAGNOSIS — I27.20 PULMONARY HYPERTENSION (H): ICD-10-CM

## 2020-02-03 RX ORDER — TADALAFIL 20 MG/1
40 TABLET ORAL DAILY
Qty: 180 TABLET | Refills: 3 | Status: SHIPPED | OUTPATIENT
Start: 2020-02-03 | End: 2020-02-04

## 2020-02-03 NOTE — TELEPHONE ENCOUNTER
Received VM from [facility] stating this Rx has been transferred to them, but they need a new prescription before prescribing.    New Erx sent. Obdulia Rome RN on 2/4/2020 at 2:53 PM    -----------------------------------------  pharmacy change per pt call.

## 2020-02-03 NOTE — TELEPHONE ENCOUNTER
[facility] Call Center    Phone Message    May a detailed message be left on voicemail: yes    Reason for Call: Medication Refill Request    Has the patient contacted the pharmacy for the refill? Yes   Name of medication being requested: tadalafil, PAH, (ALYQ) 20 MG TABS  Provider who prescribed the medication: Rosalia  [facility]acy:[facility] - [place], [place] - [address] [place] [address] RD  Date medication is needed:as soon as possible [facility] medication so [employer] needs a new prescription sent over to them          Action Taken: Message routed to:  [facility] ([facility] cardio

## 2020-02-04 RX ORDER — TADALAFIL 20 MG/1
40 TABLET ORAL DAILY
Qty: 180 TABLET | Refills: 3 | Status: SHIPPED | OUTPATIENT
Start: 2020-02-04 | End: 2020-02-25

## 2020-02-25 ENCOUNTER — TELEPHONE (OUTPATIENT)
Dept: CARDIOLOGY | Facility: CLINIC | Age: 60
End: 2020-02-25

## 2020-02-25 DIAGNOSIS — I27.20 PULMONARY HYPERTENSION (H): ICD-10-CM

## 2020-02-25 RX ORDER — TADALAFIL 20 MG/1
40 TABLET ORAL DAILY
Qty: 60 TABLET | Refills: 11 | Status: SHIPPED | OUTPATIENT
Start: 2020-02-25 | End: 2020-06-16

## 2020-02-25 NOTE — TELEPHONE ENCOUNTER
[facility] Call Center    Phone Message    May a detailed message be left on voicemail: yes     Reason for Call: Other: Pts pharmacy called. State that he has been off of tadalafil for a month due to affordability issues. Pt also told pharmacy that he is noticing a worsening of symptoms. Please follow up with patient.      Action Taken: Message routed to:  [facility] ([facility]): CVC    Travel Screening: Not Applicable

## 2020-03-07 ENCOUNTER — MYC MEDICAL ADVICE (OUTPATIENT)
Dept: CARDIOLOGY | Facility: CLINIC | Age: 60
End: 2020-03-07

## 2020-03-13 DIAGNOSIS — J44.9 COPD (CHRONIC OBSTRUCTIVE PULMONARY DISEASE) (H): ICD-10-CM

## 2020-03-13 DIAGNOSIS — E78.2 MIXED HYPERLIPIDEMIA: ICD-10-CM

## 2020-03-13 DIAGNOSIS — I27.20 PULMONARY HYPERTENSION (H): ICD-10-CM

## 2020-03-13 RX ORDER — ALBUTEROL SULFATE 90 UG/1
2 AEROSOL, METERED RESPIRATORY (INHALATION) EVERY 6 HOURS PRN
Qty: 18 G | Refills: 11 | Status: SHIPPED | OUTPATIENT
Start: 2020-03-13 | End: 2020-06-16

## 2020-03-13 RX ORDER — ALBUTEROL SULFATE 90 UG/1
4-6 AEROSOL, METERED RESPIRATORY (INHALATION) EVERY 6 HOURS PRN
Qty: 18 G | Refills: 11 | Status: SHIPPED | OUTPATIENT
Start: 2020-03-13 | End: 2020-03-13

## 2020-03-17 NOTE — TELEPHONE ENCOUNTER
[place], signed and mailed to patient. Obdulia Rome RN on 3/17/2020 at 10:32 AM

## 2020-03-19 DIAGNOSIS — I27.20 PULMONARY HYPERTENSION (H): ICD-10-CM

## 2020-03-19 NOTE — TELEPHONE ENCOUNTER
[facility] Call Center    Phone Message    May a detailed message be left on voicemail: yes     Reason for Call: Medication Refill Request    Has the patient contacted the pharmacy for the refill? Yes   Name of medication being requested: LETAIRIS 10 MG tablet   Provider who prescribed the medication: Dr. Lindsey  Pharmacy: [facility] - [place], [place] - [address] [place] [address] RD   Date medication is needed: 3/19/20         Action Taken: Message routed to:  [facility] ([facility]): MARYANN Cardio    Travel Screening: Not Applicable

## 2020-03-19 NOTE — TELEPHONE ENCOUNTER
" Centralized Medication Refill Team note:     LETAIRIS 10 MG tablet    Last Written Prescription Date:  3/27/2019  Last Fill Quantity: 30,   # refills: 11  Last Office Visit : 12/11/2019  Future Office visit: 6/3/20 Melody PULmonary HTN clinci    Routing refill request to provider for review/approval because:     Pulmonary HTN medication LETAIRIS is not on protocol for centralized refill team.  MYChart to patient\"Your refill request has been entered and sent to your physician for authorization. \"          "

## 2020-03-20 ENCOUNTER — TELEPHONE (OUTPATIENT)
Dept: CARDIOLOGY | Facility: CLINIC | Age: 60
End: 2020-03-20

## 2020-03-20 RX ORDER — AMBRISENTAN 10 MG/1
10 TABLET, FILM COATED ORAL DAILY
Qty: 30 TABLET | Refills: 7 | Status: SHIPPED | OUTPATIENT
Start: 2020-03-20 | End: 2020-06-16

## 2020-03-20 NOTE — TELEPHONE ENCOUNTER
Medication Refill double check:    Last office visit was on 12/11/19 with .    Follow up was recommended for 3 months.    Any additional encounters with changes to requested med? no    Authorizing provider is: Dr. Lindsey    Refill was approved.     Additional orders/notes: patient has appt scheduled, but     He will receive refills though Dec which will have been 1 year since last office visit.    Obdulia Rome RN on 3/20/2020 at 10:18 AM

## 2020-03-20 NOTE — TELEPHONE ENCOUNTER
[facility] Call Center    Phone Message    May a detailed message be left on voicemail: yes     Reason for Call: Medication Question or concern regarding medication   Prescription Clarification  Name of Medication: LETAIRIS 10 MG tablet   Prescribing Provider:    [facility]: [facility] - [place], [place] - [facility] [address]    What on the order needs clarification? Pharmacy calling in to inform that the pt typically take the generic brand and was wondering if this order was correct           Action Taken: Message routed to:  [facility] ([facility] cardio     Travel Screening: Not Applicable           --------------------------------------------  Called  Pharmacy back they stated the most recent Rx came through as ABHINAV.  I could not see a reason why it had to be brand, so I gave the OK to fill withGeneric.  Obdulia Rome, RN on 3/23/2020 at 4:25 PM

## 2020-03-23 ENCOUNTER — MYC MEDICAL ADVICE (OUTPATIENT)
Dept: CARDIOLOGY | Facility: CLINIC | Age: 60
End: 2020-03-23

## 2020-03-25 ENCOUNTER — TELEPHONE (OUTPATIENT)
Dept: CARDIOLOGY | Facility: CLINIC | Age: 60
End: 2020-03-25

## 2020-03-25 NOTE — TELEPHONE ENCOUNTER
PA Initiation    Medication: Ambrisentan 10mg  Insurance Company: [employer] ([facility]) - Phone [phone] Fax [phone]  Pharmacy Filling the Rx: [facility] - [place], [place] - [address]  Filling Pharmacy Phone:    Filling Pharmacy Fax:    Start Date: 3/25/2020    *Prior authorization completed and submitted through CoverMyMeds for expedited review along with right heart catheterization report and clinic note.

## 2020-03-25 NOTE — TELEPHONE ENCOUNTER
Prior Authorization Approval    Authorization Effective Date: 3/25/2020  Authorization Expiration Date: 9/25/2020  Medication: Ambrisentan 10mg - Approved  Approved Dose/Quantity: 30 tablets/30 days  Reference #: [id number]   Insurance Company: [employer] ([facility]) - Phone [phone] Fax [phone]   Which Pharmacy is filling the prescription (Not needed for infusion/clinic administered): [facility] - [place], [place] - [facility] [address]  Pharmacy Notified:  Yes  Patient Notified:  Requested pharmacy reach out to the patient *was sent as urgent request*.   ----------------------------  Insurance: Adrianne  BIN: [id number]  PCN: [id number]  ID#: [id number]  GRP#: [id number]

## 2020-06-01 ENCOUNTER — TELEPHONE (OUTPATIENT)
Dept: CARDIOLOGY | Facility: CLINIC | Age: 60
End: 2020-06-01

## 2020-06-01 NOTE — TELEPHONE ENCOUNTER
----- Message from Elin Dowell CMA sent at 6/1/2020 11:57 AM CDT -----  Regarding: FW: Lab orders    ----- Message -----  From: Diana White  Sent: 6/1/2020  11:50 AM CDT  To: Khang Lindsey MD, #  Subject: Lab orders                                       Hello,    I spoke with this patient to convert their appt with Dr. Lindsey to a video visit on 6/3. They would like the orders for their lab work to be sent to the [facility] today so they can get those done. Please let the patient know when these are sent so they can make an appt for their lab work before 6/3. Thank you, Maryam SEGURA    Address: [address] [address]  Phone: [phone]; Fax: [phone]  -------------------------    Sent e-mail to Samantha with lab orders for patient to be faxed to [facility]. Obdulia Rome RN on 6/1/2020 at 12:15 PM    Called patient and advised him I had sent the e-mail to samantha to be faxed so he can call later today to schedule.  Patient verbalized understanding, agreed with plan and denied any further questions. Obdulia Rome RN on 6/1/2020 at 12:18 PM

## 2020-06-03 ENCOUNTER — VIRTUAL VISIT (OUTPATIENT)
Dept: CARDIOLOGY | Facility: CLINIC | Age: 60
End: 2020-06-03
Attending: INTERNAL MEDICINE
Payer: COMMERCIAL

## 2020-06-03 DIAGNOSIS — I27.20 PULMONARY HYPERTENSION (H): ICD-10-CM

## 2020-06-03 DIAGNOSIS — R06.02 SOB (SHORTNESS OF BREATH): ICD-10-CM

## 2020-06-03 PROCEDURE — 99214 OFFICE O/P EST MOD 30 MIN: CPT | Mod: 95 | Performed by: INTERNAL MEDICINE

## 2020-06-03 NOTE — LETTER
6/3/2020      RE: Jimmy Isaac  [address] [address] [address]       Dear Colleague,    Thank you for the opportunity to participate in the care of your patient, Jimmy Isaac, at the [facility]. Please see a copy of my visit note below.    Jo-Ann Ashley M.D.    Telephone visit x 23 minutes by patient preference        1. Pulmonary arterial hypertension  2. Chronic, oxygen dependent respiratory failure  3. Sarcoidosis without cardiac involvement  4. Nephrolithiasis  5. Prednisone related fluid retention  6. Nephrolitiasis  7. Aseptic necrosis of femoral heads  8. CKD    Plan:  1. Laboratories: CBC, iron, iron binding capacity, IL-6, CRP-infl, haptoglobin, CMP, CNP  2. Discuss lung transplant/sarcoid clinic visit, please arrange visits  3. Patient will be seen at [place] for experimental PH drug  4. Patient feels somewhat worse than previously with increasing shortness of breath and decreased exercise tolerance. Will defer decision making until seen at [place]  5. Labs to be drawn at [place] and sent to us.    The patient returns for follow-up of sarcoid associated PAH on three drugs. He has chronic severe oxygen dependent respiratory failure.  There is no interim history of chest pain, tightness, paroxysmal nocturnal dyspnea, orthopnea, peripheral edema, palpitation, pre-syncope, syncope, device discharge.  Exercise tolerance is declining.  The patient is attempting to exercise regularly and following a sodium restricted, calorically appropriate diet.  Medications are reviewed and the patient is taking medications as prescribed.  The patient is generally sleeping well.     Constitutional: weight loss, fever, chills, night sweats  HEENT: without visual changes, swallow difficulties  Pulmonary: without shortness of breath, cough, wheeze, hemoptysis  Cardiac: without chest pain, HOSKINS, PND, orthopnea, edema, palpitation, pre-syncope, syncope,  GI:  without diarrhea, constipation, jaundice, melena, GERD, hematemesis  : without frequency, urgency, dysuria, hematuria  Skin: rash, bruise, open lesions  Neuro: without TIA, focal neurologic complaints, seizure, trauma  Ortho: without pain, swelling, mobility impairment  Endocrine: diabetes, thyroid, heat/cold intolerance, polyuria, polyphagia, change bowel habits.  Sleep:no BILLY, periodic breathing, fatigue        Current Outpatient Medications   Medication     budesonide (PULMICORT) 0.25 MG/2ML neb solution     digoxin (LANOXIN) 125 MCG tablet     furosemide (LASIX) 20 MG tablet     LETAIRIS 10 MG tablet     PROAIR  (90 Base) MCG/ACT inhaler     selexipag (UPTRAVI) 1600 MCG tablet     tadalafil, PAH, (ALYQ) 20 MG TABS     No current facility-administered medications for this visit.        Results for MATTY LOPEZ (MRN [id number]) as of 6/3/2020 09:56   Ref. Range 12/11/2019 11:09   Sodium Latest Ref Range: 133 - 144 mmol/L 140   Potassium Latest Ref Range: 3.4 - 5.3 mmol/L 4.3   Chloride Latest Ref Range: 94 - 109 mmol/L 109   Carbon Dioxide Latest Ref Range: 20 - 32 mmol/L 28   Urea Nitrogen Latest Ref Range: 7 - 30 mg/dL 32 (H)   Creatinine Latest Ref Range: 0.66 - 1.25 mg/dL 1.85 (H)   GFR Estimate Latest Ref Range: >60 mL/min/1.73_m2 39 (L)   GFR Estimate If Black Latest Ref Range: >60 mL/min/1.73_m2 45 (L)   Calcium Latest Ref Range: 8.5 - 10.1 mg/dL 8.9   Anion Gap Latest Ref Range: 3 - 14 mmol/L 3   Albumin Latest Ref Range: 3.4 - 5.0 g/dL 3.6   Protein Total Latest Ref Range: 6.8 - 8.8 g/dL 6.6 (L)   Bilirubin Total Latest Ref Range: 0.2 - 1.3 mg/dL 0.8   Alkaline Phosphatase Latest Ref Range: 40 - 150 U/L 71   ALT Latest Ref Range: 0 - 70 U/L 28   AST Latest Ref Range: 0 - 45 U/L 20   N-Terminal Pro Bnp Latest Ref Range: 0 - 125 pg/mL 1,026 (H)   Glucose Latest Ref Range: 70 - 99 mg/dL 78   WBC Latest Ref Range: 4.0 - 11.0 10e9/L 6.4   Hemoglobin Latest Ref Range: 13.3 - 17.7 g/dL 13.0 (L)    Hematocrit Latest Ref Range: 40.0 - 53.0 % 41.5   Platelet Count Latest Ref Range: 150 - 450 10e9/L 170   RBC Count Latest Ref Range: 4.4 - 5.9 10e12/L 4.62   MCV Latest Ref Range: 78 - 100 fl 90   MCH Latest Ref Range: 26.5 - 33.0 pg 28.1   MCHC Latest Ref Range: 31.5 - 36.5 g/dL 31.3 (L)   RDW Latest Ref Range: 10.0 - 15.0 % 14.1       Severe pulmonary arterial hypertension    Normal right sided filling pressure and high normal left sided filling pressure    Normal Cardiac output    No signficant change when compared to his last hemodynamic assessment in July 2019          Pressures(don't display values >20,000) Phase: Baseline      Time  Systolic  Diastolic  Mean  A Wave  V Wave  EDP  Max dp/dt  HR    RA Pressures   1:03 PM    5 mmHg     5 mmHg     7 mmHg       77 bpm       RV Pressures  12:52 PM        624 mmHg/sec          1:03 PM  70 mmHg         5 mmHg      64 bpm       PA Pressures   1:05 PM  73 mmHg     38 mmHg     50 mmHg         76 bpm       PCW Pressures   1:09 PM    15 mmHg     22 mmHg     22 mmHg       77 bpm       Blood Flow Results Phase: Baseline      Time  Results  Indexed Values    QP  12:52 PM  5.13 L/min     2.56 L/min/m2       QS  12:52 PM  5.13 L/min     2.56 L/min/m2       Blood Flow Results Phase: Phase 2      Time  Results  Indexed Values    QP   1:20 PM  5.6 L/min     2.79 L/min/m2       QS   1:20 PM  5.6 L/min     2.79 L/min/m2       Blood Oximetry Phase: Baseline      Time  Hb  SAT(%)  PO2  Content  PA Sat    PA  12:52 PM   70.8 %       70.8 %       Art  12:52 PM   97 %      17.15 mL/dL        Blood Oximetry Phase: Phase 2      Time  Hb  SAT(%)  PO2  Content  PA Sat    PA   1:20 PM   70.8 %       70.8 %       Art   1:20 PM   90 %      15.91 mL/dL        Cardiac Output Phase: Baseline      Time  TDCO  TDCI  Lesia C.O.  Lesia C.I.  Lesia HR    Cardiac Output Results  12:52 PM  4.32 L/min     2.16 L/min/m2     5.13 L/min     2.56 L/min/m2          1:11 PM  4.32 L/min           Cardiac Output  Phase: Phase 2      Time  TDCO  TDCI  Lesia C.O.  Lesia C.I.  Lesia HR    Cardiac Output Results   1:20 PM    5.6 L/min     2.79 L/min/m2        Resistance Results Phase: Baseline      Time  PVR  SVR  TPR  TVR  PVR/SVR  TPR/TVR    Resistance Results (Metric)  12:52 PM  545.34 dsc-5      779.05 dsc-5          Resistance Results (Wood)  12:52 PM  6.82 MANRIQUE              Name: MATTY LOPEZ  MRN: [id number]  : 1960  Study Date: 10/29/2018 01:19 PM  Age: 57 yrs  Gender: Male  Patient Location: [facility]  Reason For Study: Pulmonary hypertension (H), Sarcoidosis, Dyspnea on exertion  Ordering Physician: DUY LINDSEY  Referring Physician: DUY LINDSEY  Performed By: Radha Dawson RDCS     BSA: 2.1 m2  Height: 72 in  Weight: 186 lb  BP: 119/70 mmHg  _____________________________________________________________________________  __        Procedure  Echocardiogram with two-dimensional, color and spectral Doppler performed.  Optison (NDC #2071-3203-08) given intravenously. Patient was given 3.0 ml  mixture of 3 ml Optison and 6 ml saline. 6.0 ml wasted. IV start location  LAC .  _____________________________________________________________________________  __        Interpretation Summary  Technically difficult study.  Global and regional left ventricular function is normal with an EF of 55-60%.  Mild right ventricular dilation is present.  Global right ventricular function is normal.  Right ventricular systolic pressure is 28mmHg above the right atrial pressure.  No significant valvular abnormalities.  The inferior vena cava was normal in size with preserved respiratory  variability.  No pericardial effusion is present.  This study was compared to a TTE from 2017. There has been no significant  change.      Please do not hesitate to contact me if you have any questions/concerns.     Sincerely,     Duy Lindsey MD

## 2020-06-03 NOTE — PROGRESS NOTES
Jo-Ann Ashley M.D.    Telephone visit x 23 minutes by patient preference        1. Pulmonary arterial hypertension  2. Chronic, oxygen dependent respiratory failure  3. Sarcoidosis without cardiac involvement  4. Nephrolithiasis  5. Prednisone related fluid retention  6. Nephrolitiasis  7. Aseptic necrosis of femoral heads  8. CKD    Plan:  1. Laboratories: CBC, iron, iron binding capacity, IL-6, CRP-infl, haptoglobin, CMP, CNP  2. Discuss lung transplant/sarcoid clinic visit, please arrange visits  3. Patient will be seen at [place] for experimental PH drug  4. Patient feels somewhat worse than previously with increasing shortness of breath and decreased exercise tolerance. Will defer decision making until seen at [place]  5. Labs to be drawn at [place] and sent to us.    The patient returns for follow-up of sarcoid associated PAH on three drugs. He has chronic severe oxygen dependent respiratory failure.  There is no interim history of chest pain, tightness, paroxysmal nocturnal dyspnea, orthopnea, peripheral edema, palpitation, pre-syncope, syncope, device discharge.  Exercise tolerance is declining.  The patient is attempting to exercise regularly and following a sodium restricted, calorically appropriate diet.  Medications are reviewed and the patient is taking medications as prescribed.  The patient is generally sleeping well.     Constitutional: weight loss, fever, chills, night sweats  HEENT: without visual changes, swallow difficulties  Pulmonary: without shortness of breath, cough, wheeze, hemoptysis  Cardiac: without chest pain, HOSKINS, PND, orthopnea, edema, palpitation, pre-syncope, syncope,  GI: without diarrhea, constipation, jaundice, melena, GERD, hematemesis  : without frequency, urgency, dysuria, hematuria  Skin: rash, bruise, open lesions  Neuro: without TIA, focal neurologic complaints, seizure, trauma  Ortho: without pain, swelling, mobility impairment  Endocrine: diabetes, thyroid, heat/cold  intolerance, polyuria, polyphagia, change bowel habits.  Sleep:no BILLY, periodic breathing, fatigue        Current Outpatient Medications   Medication     budesonide (PULMICORT) 0.25 MG/2ML neb solution     digoxin (LANOXIN) 125 MCG tablet     furosemide (LASIX) 20 MG tablet     LETAIRIS 10 MG tablet     PROAIR  (90 Base) MCG/ACT inhaler     selexipag (UPTRAVI) 1600 MCG tablet     tadalafil, PAH, (ALYQ) 20 MG TABS     No current facility-administered medications for this visit.        Results for MATTY LOPEZ (MRN [id number]) as of 6/3/2020 09:56   Ref. Range 12/11/2019 11:09   Sodium Latest Ref Range: 133 - 144 mmol/L 140   Potassium Latest Ref Range: 3.4 - 5.3 mmol/L 4.3   Chloride Latest Ref Range: 94 - 109 mmol/L 109   Carbon Dioxide Latest Ref Range: 20 - 32 mmol/L 28   Urea Nitrogen Latest Ref Range: 7 - 30 mg/dL 32 (H)   Creatinine Latest Ref Range: 0.66 - 1.25 mg/dL 1.85 (H)   GFR Estimate Latest Ref Range: >60 mL/min/1.73_m2 39 (L)   GFR Estimate If Black Latest Ref Range: >60 mL/min/1.73_m2 45 (L)   Calcium Latest Ref Range: 8.5 - 10.1 mg/dL 8.9   Anion Gap Latest Ref Range: 3 - 14 mmol/L 3   Albumin Latest Ref Range: 3.4 - 5.0 g/dL 3.6   Protein Total Latest Ref Range: 6.8 - 8.8 g/dL 6.6 (L)   Bilirubin Total Latest Ref Range: 0.2 - 1.3 mg/dL 0.8   Alkaline Phosphatase Latest Ref Range: 40 - 150 U/L 71   ALT Latest Ref Range: 0 - 70 U/L 28   AST Latest Ref Range: 0 - 45 U/L 20   N-Terminal Pro Bnp Latest Ref Range: 0 - 125 pg/mL 1,026 (H)   Glucose Latest Ref Range: 70 - 99 mg/dL 78   WBC Latest Ref Range: 4.0 - 11.0 10e9/L 6.4   Hemoglobin Latest Ref Range: 13.3 - 17.7 g/dL 13.0 (L)   Hematocrit Latest Ref Range: 40.0 - 53.0 % 41.5   Platelet Count Latest Ref Range: 150 - 450 10e9/L 170   RBC Count Latest Ref Range: 4.4 - 5.9 10e12/L 4.62   MCV Latest Ref Range: 78 - 100 fl 90   MCH Latest Ref Range: 26.5 - 33.0 pg 28.1   MCHC Latest Ref Range: 31.5 - 36.5 g/dL 31.3 (L)   RDW Latest Ref Range:  10.0 - 15.0 % 14.1       Severe pulmonary arterial hypertension    Normal right sided filling pressure and high normal left sided filling pressure    Normal Cardiac output    No signficant change when compared to his last hemodynamic assessment in July 2019          Pressures(don't display values >20,000) Phase: Baseline      Time  Systolic  Diastolic  Mean  A Wave  V Wave  EDP  Max dp/dt  HR    RA Pressures   1:03 PM    5 mmHg     5 mmHg     7 mmHg       77 bpm       RV Pressures  12:52 PM        624 mmHg/sec          1:03 PM  70 mmHg         5 mmHg      64 bpm       PA Pressures   1:05 PM  73 mmHg     38 mmHg     50 mmHg         76 bpm       PCW Pressures   1:09 PM    15 mmHg     22 mmHg     22 mmHg       77 bpm       Blood Flow Results Phase: Baseline      Time  Results  Indexed Values    QP  12:52 PM  5.13 L/min     2.56 L/min/m2       QS  12:52 PM  5.13 L/min     2.56 L/min/m2       Blood Flow Results Phase: Phase 2      Time  Results  Indexed Values    QP   1:20 PM  5.6 L/min     2.79 L/min/m2       QS   1:20 PM  5.6 L/min     2.79 L/min/m2       Blood Oximetry Phase: Baseline      Time  Hb  SAT(%)  PO2  Content  PA Sat    PA  12:52 PM   70.8 %       70.8 %       Art  12:52 PM   97 %      17.15 mL/dL        Blood Oximetry Phase: Phase 2      Time  Hb  SAT(%)  PO2  Content  PA Sat    PA   1:20 PM   70.8 %       70.8 %       Art   1:20 PM   90 %      15.91 mL/dL        Cardiac Output Phase: Baseline      Time  TDCO  TDCI  Lesia C.O.  Lesia C.I.  Lesia HR    Cardiac Output Results  12:52 PM  4.32 L/min     2.16 L/min/m2     5.13 L/min     2.56 L/min/m2          1:11 PM  4.32 L/min           Cardiac Output Phase: Phase 2      Time  TDCO  TDCI  Lesia C.O.  Lesia C.I.  Lesia HR    Cardiac Output Results   1:20 PM    5.6 L/min     2.79 L/min/m2        Resistance Results Phase: Baseline      Time  PVR  SVR  TPR  TVR  PVR/SVR  TPR/TVR    Resistance Results (Metric)  12:52 PM  545.34 dsc-5      779.05 dsc-5           Resistance Results (Wood)  12:52 PM  6.82 MANRIQUE              Name: MATTY LOPEZ  MRN: [id number]  : 1960  Study Date: 10/29/2018 01:19 PM  Age: 57 yrs  Gender: Male  Patient Location: [facility] [place]  Reason For Study: Pulmonary hypertension (H), Sarcoidosis, Dyspnea on exertion  Ordering Physician: DUY MIDDLETON  Referring Physician: DUY MIDDLETON  Performed By: Radha Dawson LISETTE     BSA: 2.1 m2  Height: 72 in  Weight: 186 lb  BP: 119/70 mmHg  _____________________________________________________________________________  __        Procedure  Echocardiogram with two-dimensional, color and spectral Doppler performed.  Optison (NDC #4727-8120-96) given intravenously. Patient was given 3.0 ml  mixture of 3 ml Optison and 6 ml saline. 6.0 ml wasted. IV start location  LAC .  _____________________________________________________________________________  __        Interpretation Summary  Technically difficult study.  Global and regional left ventricular function is normal with an EF of 55-60%.  Mild right ventricular dilation is present.  Global right ventricular function is normal.  Right ventricular systolic pressure is 28mmHg above the right atrial pressure.  No significant valvular abnormalities.  The inferior vena cava was normal in size with preserved respiratory  variability.  No pericardial effusion is present.  This study was compared to a TTE from 2017. There has been no significant  change.

## 2020-06-11 DIAGNOSIS — Z79.899 ENCOUNTER FOR MONITORING DIGOXIN THERAPY: ICD-10-CM

## 2020-06-11 DIAGNOSIS — I27.20 PULMONARY HYPERTENSION (H): ICD-10-CM

## 2020-06-11 DIAGNOSIS — Z51.81 ENCOUNTER FOR MONITORING DIGOXIN THERAPY: ICD-10-CM

## 2020-06-15 ENCOUNTER — TELEPHONE (OUTPATIENT)
Dept: CARDIOLOGY | Facility: CLINIC | Age: 60
End: 2020-06-15

## 2020-06-15 DIAGNOSIS — I27.20 PULMONARY HYPERTENSION (H): Primary | ICD-10-CM

## 2020-06-15 RX ORDER — DIGOXIN 125 MCG
125 TABLET ORAL DAILY
Qty: 90 TABLET | Refills: 3 | OUTPATIENT
Start: 2020-06-15

## 2020-06-15 RX ORDER — AMBRISENTAN 10 MG/1
10 TABLET, FILM COATED ORAL DAILY
Qty: 30 TABLET | Refills: 7 | OUTPATIENT
Start: 2020-06-15

## 2020-06-15 NOTE — TELEPHONE ENCOUNTER
[facility] Call Center    Phone Message    May a detailed message be left on voicemail: yes     Reason for Call: Other: Pharmacy would like a call back for the medication tadalafil diangosis code     Action Taken: Message routed to:  [facility] Cardio    Travel Screening: Not Applicable        Sent new Erx for Tadalafil.  Obdulia [place] RN on 6/16/2020 at 12:45 PM

## 2020-06-16 DIAGNOSIS — Z79.899 ENCOUNTER FOR MONITORING DIGOXIN THERAPY: ICD-10-CM

## 2020-06-16 DIAGNOSIS — E78.2 MIXED HYPERLIPIDEMIA: ICD-10-CM

## 2020-06-16 DIAGNOSIS — Z51.81 ENCOUNTER FOR MONITORING DIGOXIN THERAPY: ICD-10-CM

## 2020-06-16 DIAGNOSIS — I27.20 PULMONARY HYPERTENSION (H): ICD-10-CM

## 2020-06-16 DIAGNOSIS — J44.9 COPD (CHRONIC OBSTRUCTIVE PULMONARY DISEASE) (H): ICD-10-CM

## 2020-06-16 RX ORDER — AMBRISENTAN 10 MG/1
10 TABLET, FILM COATED ORAL DAILY
Qty: 30 TABLET | Refills: 4 | Status: SHIPPED | OUTPATIENT
Start: 2020-06-16 | End: 2020-10-20

## 2020-06-16 RX ORDER — DIGOXIN 125 MCG
125 TABLET ORAL DAILY
Qty: 90 TABLET | Refills: 2 | Status: SHIPPED | OUTPATIENT
Start: 2020-06-16 | End: 2021-03-05

## 2020-06-16 RX ORDER — TADALAFIL 20 MG/1
40 TABLET ORAL DAILY
Qty: 60 TABLET | Refills: 11 | Status: SHIPPED | OUTPATIENT
Start: 2020-06-16 | End: 2021-07-12

## 2020-06-16 RX ORDER — ALBUTEROL SULFATE 90 UG/1
2 AEROSOL, METERED RESPIRATORY (INHALATION) EVERY 6 HOURS PRN
Qty: 18 G | Refills: 11 | Status: SHIPPED | OUTPATIENT
Start: 2020-06-16 | End: 2021-07-13

## 2020-06-16 NOTE — TELEPHONE ENCOUNTER
[facility] is insisting they do not have RX's for these 3 medications to send, per our RP. If you'd like you may call our pharmacy to discuss further with an [facility] at [phone]. Apologies for the continued requests.    Thank you,  Estela Jackman  PTSR III   Team

## 2020-06-19 ENCOUNTER — TELEPHONE (OUTPATIENT)
Dept: CARDIOLOGY | Facility: CLINIC | Age: 60
End: 2020-06-19

## 2020-06-19 NOTE — TELEPHONE ENCOUNTER
Prior Authorization Approval    Authorization Effective Date: 6/18/2020  Authorization Expiration Date: 6/18/2022  Medication: Tadalafil approved  Approved Dose/Quantity: 60 for 30  Reference #: [id number]   Insurance Company: [employer] - Phone [phone] Fax [phone]  Expected CoPay: $0     CoPay Card Available:      Foundation Assistance Needed: The assistance fund  Which Pharmacy is filling the prescription (Not needed for infusion/clinic administered): [facility] - [place], [place] - [address]  Pharmacy Notified: Yes  Patient Notified: Yes

## 2020-07-27 ENCOUNTER — TELEPHONE (OUTPATIENT)
Dept: CARDIOLOGY | Facility: CLINIC | Age: 60
End: 2020-07-27

## 2020-07-27 NOTE — TELEPHONE ENCOUNTER
Called [facility] and requested referral to their PH department for a 2nd opinion.    Pulmonary Hypertension Appt # [phone], I can also give this to patient.  I was transferred to this number with coordinator.  Lakshmi     [place] patient ID #[id number]    Confirmed testing dates of last [facility].  It appears patient last saw Dr. Carlin on 7/8/20 so he is an established patient already.  Nothing further needed. Obdulia [place] RN on 7/27/2020 at 1:42 PM

## 2020-10-02 DIAGNOSIS — I27.20 PULMONARY HYPERTENSION (H): Primary | ICD-10-CM

## 2020-10-02 RX ORDER — FUROSEMIDE 20 MG
20 TABLET ORAL DAILY
Status: ON HOLD | COMMUNITY
Start: 2020-10-02 | End: 2021-08-12

## 2020-10-03 NOTE — TELEPHONE ENCOUNTER
Medication Refill double check:    Last virtual visit was on 6/3/20 with .    Follow up was recommended for after [facility] referral.    Any additional encounters with changes to requested med? no    Authorizing provider is: Dr. Lindsey    Refill was approved.     Additional orders/notes:       Obdulia Rome RN on 10/2/2020 at 7:32 PM

## 2020-10-17 DIAGNOSIS — I27.20 PULMONARY HYPERTENSION (H): ICD-10-CM

## 2020-10-20 NOTE — TELEPHONE ENCOUNTER
LETAIRIS 10MG  Last Written Prescription Date:  6/16/2020  Last Fill Quantity: 30,   # refills: 4  Last Office Visit : 6/3/2020  Future Office visit: None  Routing refill request to provider for review/approval because:  Drug not on the [facility], P or  Health refill protocol or controlled substance    Genevieve Snyder RN  Central Triage Red Flags/Med Refills

## 2020-10-28 RX ORDER — AMBRISENTAN 10 MG/1
10 TABLET, FILM COATED ORAL DAILY
Qty: 30 TABLET | Refills: 1 | Status: SHIPPED | OUTPATIENT
Start: 2020-10-28 | End: 2021-04-15 | Stop reason: ALTCHOICE

## 2020-10-28 NOTE — TELEPHONE ENCOUNTER
Medication Refill double check:    Last virtual visit was on 6/3/20 with .    Follow up was recommended for after assessment at [facility].    Any additional encounters with changes to requested med? no    Authorizing provider is: Dr. Lindsey    Limited refill was approved.     Additional orders/notes: Only supplied limited refill so patient doesn't get lost to follow up as there are no additional notes about his candidacy for [facility] trial.      Obdulia Rome RN on 10/28/2020 at 3:03 PM

## 2020-12-08 ENCOUNTER — TELEPHONE (OUTPATIENT)
Dept: CARDIOLOGY | Facility: CLINIC | Age: 60
End: 2020-12-08

## 2020-12-08 NOTE — TELEPHONE ENCOUNTER
Called patient and asked how the outcome of the [place] referral went, as I didn't see any f/u.  He advised me that per the research coordinator, his PFT's and Scleroderma disqualified him from being part of the study.    patient agreed to a video visit with Dr. Lindsey for 12/10/20 @ 4:30pm. Obdulia Rmoe RN on 12/8/2020 at 12:32 PM

## 2020-12-10 ENCOUNTER — VIRTUAL VISIT (OUTPATIENT)
Dept: CARDIOLOGY | Facility: CLINIC | Age: 60
End: 2020-12-10
Attending: INTERNAL MEDICINE
Payer: COMMERCIAL

## 2020-12-10 DIAGNOSIS — D86.9 SARCOIDOSIS: ICD-10-CM

## 2020-12-10 DIAGNOSIS — N18.4 CKD (CHRONIC KIDNEY DISEASE) STAGE 4, GFR 15-29 ML/MIN (H): ICD-10-CM

## 2020-12-10 DIAGNOSIS — I27.20 PULMONARY HYPERTENSION (H): Primary | ICD-10-CM

## 2020-12-10 PROCEDURE — 99214 OFFICE O/P EST MOD 30 MIN: CPT | Mod: 95 | Performed by: INTERNAL MEDICINE

## 2020-12-10 NOTE — LETTER
"12/10/2020      RE: Jimmy Isaac  [address] [address] [address]       Dear Colleague,    Thank you for the opportunity to participate in the care of your patient, Jimmy Isaac, at the [facility] HEART [facility] at [facility]. Please see a copy of my visit note below.    Jimmy Isaac is a 60 year old male who is being evaluated via a billable video visit.      The patient has been notified of following:     \"This video visit will be conducted via a call between you and your physician/provider. We have found that certain health care needs can be provided without the need for an in-person physical exam.  This service lets us provide the care you need with a video conversation.  If a prescription is necessary we can send it directly to your pharmacy.  If lab work is needed we can place an order for that and you can then stop by our lab to have the test done at a later time.    Facetime video visit with patient permission 9:00-9:20    Video visits are billed at different rates depending on your insurance coverage.  Please reach out to your insurance provider with any questions.      1. Chronic oxygen dependent respiratory failure  2. Sarcoid   3. CKD  4. Nephrolithiasis with left hydronephrosis      I saw this patient for evaluation of advanced sarcoid/pulmonary hypertension.  Intercurrent medical problems in weight/dietary sodium and CHO and lipid status, rhythm management for arrhythmia, chronic kidney disease,       Current medication and dosing in relationship to blood pressure, weight changes, renal function and fluid status.  1. I reviewed with the patient the need for on-going daily weight determinations, low salt, carbohydrate restricted diet.  2. I reviewed the patients exertion levels, frequency and duration of exercise and adjusted in relationship to current status and outlined appropriate exercise intensity and frequency  3. I examined the " reports for the implanted rhythm management system for evidence of arrhythmia and appropriate function  4. I personally reviewed interim imaging results from the on-line system  5. I assessed the patients renal function and electrolytes in terms of diuretic dosing and frequency to achieve optimal volume status with preservation of renal function.  He has left hydronephrosis reported by [place]  6. I assessed the patient for evidence of anxiety and depression regarding the patient's current health status.  7. I reviewed need for on-going COVID 19 precautions  9.I arranged for follow-up laboratories and testing to assess and maintain optimal status:  a. Laboratories: CBC, CMP, BNP, CRP inflammation, iron status,   b. Echocardiography  c. Return visits  d. Urology referral [facility] for nephrolithiasis and left hydronephrosis, will need KUB US immediately prior to visit (same day pssibly)      The patient returns for follow-up of sarcoid associated pulmonary hypertension and chronic respiratory failure..  There is no interim history of chest pain, tightness, paroxysmal nocturnal dyspnea, orthopnea, peripheral edema, palpitation, pre-syncope, syncope, .  Exercise tolerance is stable but severely reduced.  The patient is attempting to exercise regularly and following a sodium restricted, calorically appropriate diet.  Medications are reviewed and the patient is taking medications as prescribed.  The patient is generally sleeping well.   His history is significant for hypertension, pulmonary sarcoidosis (without cardiac involvement), sarcoid-associated pulmonary hypertension, chronic hypoxemic respiratory failure on supplemental oxygen, chronic kidney disease, prednisone-related fluid retention, aseptic necrosis of the femoral heads, and nephrolithiasis.         Current Outpatient Medications   Medication     budesonide (PULMICORT) 0.25 MG/2ML neb solution     digoxin (LANOXIN) 125 MCG tablet     furosemide (LASIX) 20 MG tablet      LETAIRIS 10 MG tablet     PROAIR  (90 Base) MCG/ACT inhaler     selexipag (UPTRAVI) 1600 MCG tablet     tadalafil, PAH, (ALYQ) 20 MG TABS     No current facility-administered medications for this visit.                      Please do not hesitate to contact me if you have any questions/concerns.     Sincerely,     Khang Lindsey MD

## 2020-12-10 NOTE — PROGRESS NOTES
"Jimmy Isaac is a 60 year old male who is being evaluated via a billable video visit.      The patient has been notified of following:     \"This video visit will be conducted via a call between you and your physician/provider. We have found that certain health care needs can be provided without the need for an in-person physical exam.  This service lets us provide the care you need with a video conversation.  If a prescription is necessary we can send it directly to your pharmacy.  If lab work is needed we can place an order for that and you can then stop by our lab to have the test done at a later time.    Facetime video visit with patient permission 9:00-9:20    Video visits are billed at different rates depending on your insurance coverage.  Please reach out to your insurance provider with any questions.      1. Chronic oxygen dependent respiratory failure  2. Sarcoid   3. CKD  4. Nephrolithiasis with left hydronephrosis      I saw this patient for evaluation of advanced sarcoid/pulmonary hypertension.  Intercurrent medical problems in weight/dietary sodium and CHO and lipid status, rhythm management for arrhythmia, chronic kidney disease,       Current medication and dosing in relationship to blood pressure, weight changes, renal function and fluid status.  1. I reviewed with the patient the need for on-going daily weight determinations, low salt, carbohydrate restricted diet.  2. I reviewed the patients exertion levels, frequency and duration of exercise and adjusted in relationship to current status and outlined appropriate exercise intensity and frequency  3. I examined the reports for the implanted rhythm management system for evidence of arrhythmia and appropriate function  4. I personally reviewed interim imaging results from the on-line system  5. I assessed the patients renal function and electrolytes in terms of diuretic dosing and frequency to achieve optimal volume status with preservation of renal " function.  He has left hydronephrosis reported by [place]  6. I assessed the patient for evidence of anxiety and depression regarding the patient's current health status.  7. I reviewed need for on-going COVID 19 precautions  9.I arranged for follow-up laboratories and testing to assess and maintain optimal status:  a. Laboratories: CBC, CMP, BNP, CRP inflammation, iron status,   b. Echocardiography  c. Return visits  d. Urology referral [facility] for nephrolithiasis and left hydronephrosis, will need KUB US immediately prior to visit (same day pssibly)      The patient returns for follow-up of sarcoid associated pulmonary hypertension and chronic respiratory failure..  There is no interim history of chest pain, tightness, paroxysmal nocturnal dyspnea, orthopnea, peripheral edema, palpitation, pre-syncope, syncope, .  Exercise tolerance is stable but severely reduced.  The patient is attempting to exercise regularly and following a sodium restricted, calorically appropriate diet.  Medications are reviewed and the patient is taking medications as prescribed.  The patient is generally sleeping well.   His history is significant for hypertension, pulmonary sarcoidosis (without cardiac involvement), sarcoid-associated pulmonary hypertension, chronic hypoxemic respiratory failure on supplemental oxygen, chronic kidney disease, prednisone-related fluid retention, aseptic necrosis of the femoral heads, and nephrolithiasis.         Current Outpatient Medications   Medication     budesonide (PULMICORT) 0.25 MG/2ML neb solution     digoxin (LANOXIN) 125 MCG tablet     furosemide (LASIX) 20 MG tablet     LETAIRIS 10 MG tablet     PROAIR  (90 Base) MCG/ACT inhaler     selexipag (UPTRAVI) 1600 MCG tablet     tadalafil, PAH, (ALYQ) 20 MG TABS     No current facility-administered medications for this visit.

## 2020-12-16 PROBLEM — N18.4 CKD (CHRONIC KIDNEY DISEASE) STAGE 4, GFR 15-29 ML/MIN (H): Status: ACTIVE | Noted: 2020-12-16

## 2020-12-20 NOTE — TELEPHONE ENCOUNTER
Medication Refill double check:    Last office visit was on 3/5/19  with HELIO Pineda.    Follow up was recommended for 3 months.    Any additional encounters with changes to requested med?     Authorizing provider is: Dr. Rosalia Lira was approved.     Additional orders/notes:       Obdulia Rome RN on 4/17/2019 at 4:23 PM       No

## 2021-01-01 ENCOUNTER — LAB (OUTPATIENT)
Dept: LAB | Facility: CLINIC | Age: 61
End: 2021-01-01
Attending: INTERNAL MEDICINE
Payer: COMMERCIAL

## 2021-01-01 ENCOUNTER — ANCILLARY PROCEDURE (OUTPATIENT)
Dept: CARDIOLOGY | Facility: CLINIC | Age: 61
End: 2021-01-01
Payer: COMMERCIAL

## 2021-01-01 ENCOUNTER — ANCILLARY PROCEDURE (OUTPATIENT)
Dept: CARDIOLOGY | Facility: CLINIC | Age: 61
End: 2021-01-01
Attending: INTERNAL MEDICINE
Payer: COMMERCIAL

## 2021-01-01 ENCOUNTER — OFFICE VISIT (OUTPATIENT)
Dept: PULMONOLOGY | Facility: CLINIC | Age: 61
End: 2021-01-01
Attending: INTERNAL MEDICINE
Payer: COMMERCIAL

## 2021-01-01 ENCOUNTER — ANCILLARY PROCEDURE (OUTPATIENT)
Dept: CT IMAGING | Facility: CLINIC | Age: 61
End: 2021-01-01
Attending: INTERNAL MEDICINE
Payer: COMMERCIAL

## 2021-01-01 ENCOUNTER — VIRTUAL VISIT (OUTPATIENT)
Dept: CARDIOLOGY | Facility: CLINIC | Age: 61
End: 2021-01-01
Attending: PHYSICIAN ASSISTANT
Payer: COMMERCIAL

## 2021-01-01 ENCOUNTER — OFFICE VISIT (OUTPATIENT)
Dept: CARDIOLOGY | Facility: CLINIC | Age: 61
End: 2021-01-01
Attending: NURSE PRACTITIONER
Payer: COMMERCIAL

## 2021-01-01 ENCOUNTER — TELEPHONE (OUTPATIENT)
Dept: NEPHROLOGY | Facility: CLINIC | Age: 61
End: 2021-01-01
Payer: COMMERCIAL

## 2021-01-01 ENCOUNTER — LAB (OUTPATIENT)
Dept: LAB | Facility: CLINIC | Age: 61
End: 2021-01-01
Attending: PHYSICIAN ASSISTANT
Payer: COMMERCIAL

## 2021-01-01 ENCOUNTER — PATIENT OUTREACH (OUTPATIENT)
Dept: NEPHROLOGY | Facility: CLINIC | Age: 61
End: 2021-01-01
Payer: COMMERCIAL

## 2021-01-01 ENCOUNTER — LAB (OUTPATIENT)
Dept: LAB | Facility: CLINIC | Age: 61
End: 2021-01-01
Payer: COMMERCIAL

## 2021-01-01 ENCOUNTER — VIRTUAL VISIT (OUTPATIENT)
Dept: NEPHROLOGY | Facility: CLINIC | Age: 61
End: 2021-01-01
Attending: PHYSICIAN ASSISTANT
Payer: COMMERCIAL

## 2021-01-01 ENCOUNTER — MEDICAL CORRESPONDENCE (OUTPATIENT)
Dept: HEALTH INFORMATION MANAGEMENT | Facility: CLINIC | Age: 61
End: 2021-01-01

## 2021-01-01 VITALS
DIASTOLIC BLOOD PRESSURE: 61 MMHG | BODY MASS INDEX: 21.65 KG/M2 | SYSTOLIC BLOOD PRESSURE: 101 MMHG | HEART RATE: 82 BPM | OXYGEN SATURATION: 100 % | WEIGHT: 159.61 LBS | OXYGEN SATURATION: 100 % | SYSTOLIC BLOOD PRESSURE: 101 MMHG | BODY MASS INDEX: 21.66 KG/M2 | DIASTOLIC BLOOD PRESSURE: 61 MMHG | HEART RATE: 82 BPM | WEIGHT: 159.7 LBS

## 2021-01-01 VITALS
BODY MASS INDEX: 20.99 KG/M2 | SYSTOLIC BLOOD PRESSURE: 111 MMHG | HEIGHT: 72 IN | DIASTOLIC BLOOD PRESSURE: 63 MMHG | WEIGHT: 155 LBS | HEART RATE: 78 BPM | OXYGEN SATURATION: 100 %

## 2021-01-01 DIAGNOSIS — Z95.0 CARDIAC PACEMAKER IN SITU: ICD-10-CM

## 2021-01-01 DIAGNOSIS — Z95.0 S/P PLACEMENT OF CARDIAC PACEMAKER: ICD-10-CM

## 2021-01-01 DIAGNOSIS — R35.0 BENIGN PROSTATIC HYPERPLASIA WITH URINARY FREQUENCY: ICD-10-CM

## 2021-01-01 DIAGNOSIS — I27.20 PULMONARY HYPERTENSION (H): ICD-10-CM

## 2021-01-01 DIAGNOSIS — E55.9 VITAMIN D DEFICIENCY: ICD-10-CM

## 2021-01-01 DIAGNOSIS — I44.2 COMPLETE HEART BLOCK (H): Primary | ICD-10-CM

## 2021-01-01 DIAGNOSIS — J84.9 ILD (INTERSTITIAL LUNG DISEASE) (H): Primary | ICD-10-CM

## 2021-01-01 DIAGNOSIS — D86.9 SARCOIDOSIS: Primary | ICD-10-CM

## 2021-01-01 DIAGNOSIS — R60.9 EDEMA, UNSPECIFIED TYPE: ICD-10-CM

## 2021-01-01 DIAGNOSIS — N18.4 CKD (CHRONIC KIDNEY DISEASE) STAGE 4, GFR 15-29 ML/MIN (H): ICD-10-CM

## 2021-01-01 DIAGNOSIS — D86.9 SARCOIDOSIS: ICD-10-CM

## 2021-01-01 DIAGNOSIS — N40.1 BENIGN PROSTATIC HYPERPLASIA WITH URINARY FREQUENCY: ICD-10-CM

## 2021-01-01 DIAGNOSIS — N20.0 NEPHROLITHIASIS: ICD-10-CM

## 2021-01-01 DIAGNOSIS — R06.02 SOB (SHORTNESS OF BREATH): ICD-10-CM

## 2021-01-01 DIAGNOSIS — Z23 NEED FOR VACCINATION: ICD-10-CM

## 2021-01-01 DIAGNOSIS — I27.0 PRIMARY PULMONARY HYPERTENSION (H): ICD-10-CM

## 2021-01-01 DIAGNOSIS — N18.4 CKD (CHRONIC KIDNEY DISEASE) STAGE 4, GFR 15-29 ML/MIN (H): Primary | ICD-10-CM

## 2021-01-01 DIAGNOSIS — I44.39 HIGH DEGREE ATRIOVENTRICULAR BLOCK: ICD-10-CM

## 2021-01-01 DIAGNOSIS — E78.2 MIXED HYPERLIPIDEMIA: ICD-10-CM

## 2021-01-01 LAB
1,25(OH)2D SERPL-MCNC: 13.6 PG/ML (ref 19.9–79.3)
ACE SERPL-CCNC: 44 U/L
ACE SERPL-CCNC: 45 U/L
ALBUMIN SERPL-MCNC: 3.6 G/DL (ref 3.4–5)
ALBUMIN SERPL-MCNC: 3.6 G/DL (ref 3.4–5)
ALBUMIN SERPL-MCNC: 3.7 G/DL (ref 3.4–5)
ALP SERPL-CCNC: 68 U/L (ref 40–150)
ALP SERPL-CCNC: 72 U/L (ref 40–150)
ALT SERPL W P-5'-P-CCNC: 17 U/L (ref 0–70)
ALT SERPL W P-5'-P-CCNC: 18 U/L (ref 0–70)
ANION GAP SERPL CALCULATED.3IONS-SCNC: 10 MMOL/L (ref 3–14)
ANION GAP SERPL CALCULATED.3IONS-SCNC: 5 MMOL/L (ref 3–14)
ANION GAP SERPL CALCULATED.3IONS-SCNC: 9 MMOL/L (ref 3–14)
AST SERPL W P-5'-P-CCNC: 14 U/L (ref 0–45)
AST SERPL W P-5'-P-CCNC: 14 U/L (ref 0–45)
BACTERIA UR CULT: NO GROWTH
BASOPHILS # BLD AUTO: 0 10E3/UL (ref 0–0.2)
BASOPHILS NFR BLD AUTO: 1 %
BILIRUB SERPL-MCNC: 0.4 MG/DL (ref 0.2–1.3)
BILIRUB SERPL-MCNC: 0.4 MG/DL (ref 0.2–1.3)
BUN SERPL-MCNC: 47 MG/DL (ref 7–30)
BUN SERPL-MCNC: 47 MG/DL (ref 7–30)
BUN SERPL-MCNC: 59 MG/DL (ref 7–30)
CALCIUM SERPL-MCNC: 9 MG/DL (ref 8.5–10.1)
CALCIUM SERPL-MCNC: 9.5 MG/DL (ref 8.5–10.1)
CALCIUM SERPL-MCNC: 9.6 MG/DL (ref 8.5–10.1)
CHLORIDE BLD-SCNC: 101 MMOL/L (ref 94–109)
CHLORIDE BLD-SCNC: 105 MMOL/L (ref 94–109)
CHLORIDE BLD-SCNC: 99 MMOL/L (ref 94–109)
CO2 SERPL-SCNC: 28 MMOL/L (ref 20–32)
CO2 SERPL-SCNC: 30 MMOL/L (ref 20–32)
CO2 SERPL-SCNC: 30 MMOL/L (ref 20–32)
CREAT SERPL-MCNC: 2.66 MG/DL (ref 0.66–1.25)
CREAT SERPL-MCNC: 2.92 MG/DL (ref 0.66–1.25)
CREAT SERPL-MCNC: 3.04 MG/DL (ref 0.66–1.25)
CREAT UR-MCNC: 21 MG/DL
DEPRECATED CALCIDIOL+CALCIFEROL SERPL-MC: 18 UG/L (ref 20–75)
DLCOUNC-%PRED-PRE: 23 %
DLCOUNC-PRE: 6.83 ML/MIN/MMHG
DLCOUNC-PRED: 28.99 ML/MIN/MMHG
EOSINOPHIL # BLD AUTO: 0.1 10E3/UL (ref 0–0.7)
EOSINOPHIL NFR BLD AUTO: 2 %
ERV-%PRED-PRE: 62 %
ERV-PRE: 1.09 L
ERV-PRED: 1.74 L
ERYTHROCYTE [DISTWIDTH] IN BLOOD BY AUTOMATED COUNT: 14.3 % (ref 10–15)
ERYTHROCYTE [DISTWIDTH] IN BLOOD BY AUTOMATED COUNT: 14.6 % (ref 10–15)
EXPTIME-PRE: 13.07 SEC
FEF2575-%PRED-PRE: 8 %
FEF2575-PRE: 0.27 L/SEC
FEF2575-PRED: 3.01 L/SEC
FEFMAX-%PRED-PRE: 31 %
FEFMAX-PRE: 2.98 L/SEC
FEFMAX-PRED: 9.53 L/SEC
FERRITIN SERPL-MCNC: 15 NG/ML (ref 26–388)
FEV1-%PRED-PRE: 25 %
FEV1-PRE: 0.97 L
FEV1FEV6-PRE: 46 %
FEV1FEV6-PRED: 79 %
FEV1FVC-PRE: 38 %
FEV1FVC-PRED: 77 %
FEV1SVC-PRE: 36 %
FEV1SVC-PRED: 71 %
FIFMAX-PRE: 2.62 L/SEC
FRCPLETH-%PRED-PRE: 165 %
FRCPLETH-PRE: 6.22 L
FRCPLETH-PRED: 3.75 L
FVC-%PRED-PRE: 52 %
FVC-PRE: 2.56 L
FVC-PRED: 4.89 L
GFR SERPL CREATININE-BSD FRML MDRD: 21 ML/MIN/1.73M2
GFR SERPL CREATININE-BSD FRML MDRD: 22 ML/MIN/1.73M2
GFR SERPL CREATININE-BSD FRML MDRD: 26 ML/MIN/1.73M2
GLUCOSE BLD-MCNC: 117 MG/DL (ref 70–99)
GLUCOSE BLD-MCNC: 118 MG/DL (ref 70–99)
GLUCOSE BLD-MCNC: 90 MG/DL (ref 70–99)
HCT VFR BLD AUTO: 34.1 % (ref 40–53)
HCT VFR BLD AUTO: 34.8 % (ref 40–53)
HGB BLD-MCNC: 10.5 G/DL (ref 13.3–17.7)
HGB BLD-MCNC: 10.9 G/DL (ref 13.3–17.7)
IC-%PRED-PRE: 44 %
IC-PRE: 1.58 L
IC-PRED: 3.56 L
IGG SERPL-MCNC: 802 MG/DL (ref 610–1616)
IMM GRANULOCYTES # BLD: 0 10E3/UL
IMM GRANULOCYTES NFR BLD: 1 %
IRON SATN MFR SERPL: 9 % (ref 15–46)
IRON SERPL-MCNC: 28 UG/DL (ref 35–180)
LYMPHOCYTES # BLD AUTO: 0.5 10E3/UL (ref 0.8–5.3)
LYMPHOCYTES NFR BLD AUTO: 7 %
MCH RBC QN AUTO: 27.2 PG (ref 26.5–33)
MCH RBC QN AUTO: 27.3 PG (ref 26.5–33)
MCHC RBC AUTO-ENTMCNC: 30.8 G/DL (ref 31.5–36.5)
MCHC RBC AUTO-ENTMCNC: 31.3 G/DL (ref 31.5–36.5)
MCV RBC AUTO: 87 FL (ref 78–100)
MCV RBC AUTO: 89 FL (ref 78–100)
MDC_IDC_EPISODE_DTM: NORMAL
MDC_IDC_EPISODE_DTM: NORMAL
MDC_IDC_EPISODE_DURATION: 183 S
MDC_IDC_EPISODE_DURATION: 2 S
MDC_IDC_EPISODE_ID: 3
MDC_IDC_EPISODE_ID: 4
MDC_IDC_EPISODE_TYPE: NORMAL
MDC_IDC_EPISODE_TYPE: NORMAL
MDC_IDC_LEAD_IMPLANT_DT: NORMAL
MDC_IDC_LEAD_LOCATION: NORMAL
MDC_IDC_LEAD_LOCATION_DETAIL_1: NORMAL
MDC_IDC_LEAD_MFG: NORMAL
MDC_IDC_LEAD_MODEL: NORMAL
MDC_IDC_LEAD_POLARITY_TYPE: NORMAL
MDC_IDC_LEAD_SERIAL: NORMAL
MDC_IDC_LEAD_SPECIAL_FUNCTION: NORMAL
MDC_IDC_MSMT_BATTERY_DTM: NORMAL
MDC_IDC_MSMT_BATTERY_DTM: NORMAL
MDC_IDC_MSMT_BATTERY_REMAINING_LONGEVITY: 143 MO
MDC_IDC_MSMT_BATTERY_REMAINING_LONGEVITY: 146 MO
MDC_IDC_MSMT_BATTERY_RRT_TRIGGER: 2.62
MDC_IDC_MSMT_BATTERY_RRT_TRIGGER: 2.62
MDC_IDC_MSMT_BATTERY_STATUS: NORMAL
MDC_IDC_MSMT_BATTERY_STATUS: NORMAL
MDC_IDC_MSMT_BATTERY_VOLTAGE: 3.18 V
MDC_IDC_MSMT_BATTERY_VOLTAGE: 3.19 V
MDC_IDC_MSMT_LEADCHNL_RA_IMPEDANCE_VALUE: 304 OHM
MDC_IDC_MSMT_LEADCHNL_RA_IMPEDANCE_VALUE: 323 OHM
MDC_IDC_MSMT_LEADCHNL_RA_IMPEDANCE_VALUE: 380 OHM
MDC_IDC_MSMT_LEADCHNL_RA_IMPEDANCE_VALUE: 456 OHM
MDC_IDC_MSMT_LEADCHNL_RA_PACING_THRESHOLD_AMPLITUDE: 0.75 V
MDC_IDC_MSMT_LEADCHNL_RA_PACING_THRESHOLD_AMPLITUDE: 0.75 V
MDC_IDC_MSMT_LEADCHNL_RA_PACING_THRESHOLD_PULSEWIDTH: 0.4 MS
MDC_IDC_MSMT_LEADCHNL_RA_PACING_THRESHOLD_PULSEWIDTH: 0.4 MS
MDC_IDC_MSMT_LEADCHNL_RA_SENSING_INTR_AMPL: 2.38 MV
MDC_IDC_MSMT_LEADCHNL_RA_SENSING_INTR_AMPL: 2.5 MV
MDC_IDC_MSMT_LEADCHNL_RA_SENSING_INTR_AMPL: 3 MV
MDC_IDC_MSMT_LEADCHNL_RA_SENSING_INTR_AMPL: 3.25 MV
MDC_IDC_MSMT_LEADCHNL_RV_IMPEDANCE_VALUE: 380 OHM
MDC_IDC_MSMT_LEADCHNL_RV_IMPEDANCE_VALUE: 418 OHM
MDC_IDC_MSMT_LEADCHNL_RV_IMPEDANCE_VALUE: 418 OHM
MDC_IDC_MSMT_LEADCHNL_RV_IMPEDANCE_VALUE: 494 OHM
MDC_IDC_MSMT_LEADCHNL_RV_PACING_THRESHOLD_AMPLITUDE: 0.5 V
MDC_IDC_MSMT_LEADCHNL_RV_PACING_THRESHOLD_AMPLITUDE: 0.5 V
MDC_IDC_MSMT_LEADCHNL_RV_PACING_THRESHOLD_PULSEWIDTH: 0.4 MS
MDC_IDC_MSMT_LEADCHNL_RV_PACING_THRESHOLD_PULSEWIDTH: 0.4 MS
MDC_IDC_MSMT_LEADCHNL_RV_SENSING_INTR_AMPL: 10.12 MV
MDC_IDC_MSMT_LEADCHNL_RV_SENSING_INTR_AMPL: 11 MV
MDC_IDC_MSMT_LEADCHNL_RV_SENSING_INTR_AMPL: 12.12 MV
MDC_IDC_MSMT_LEADCHNL_RV_SENSING_INTR_AMPL: 12.38 MV
MDC_IDC_PG_IMPLANT_DTM: NORMAL
MDC_IDC_PG_IMPLANT_DTM: NORMAL
MDC_IDC_PG_MFG: NORMAL
MDC_IDC_PG_MFG: NORMAL
MDC_IDC_PG_MODEL: NORMAL
MDC_IDC_PG_MODEL: NORMAL
MDC_IDC_PG_SERIAL: NORMAL
MDC_IDC_PG_SERIAL: NORMAL
MDC_IDC_PG_TYPE: NORMAL
MDC_IDC_PG_TYPE: NORMAL
MDC_IDC_SESS_CLINIC_NAME: NORMAL
MDC_IDC_SESS_CLINIC_NAME: NORMAL
MDC_IDC_SESS_DTM: NORMAL
MDC_IDC_SESS_DTM: NORMAL
MDC_IDC_SESS_TYPE: NORMAL
MDC_IDC_SESS_TYPE: NORMAL
MDC_IDC_SET_BRADY_AT_MODE_SWITCH_RATE: 171 {BEATS}/MIN
MDC_IDC_SET_BRADY_AT_MODE_SWITCH_RATE: 171 {BEATS}/MIN
MDC_IDC_SET_BRADY_HYSTRATE: NORMAL
MDC_IDC_SET_BRADY_HYSTRATE: NORMAL
MDC_IDC_SET_BRADY_LOWRATE: 60 {BEATS}/MIN
MDC_IDC_SET_BRADY_LOWRATE: 60 {BEATS}/MIN
MDC_IDC_SET_BRADY_MAX_SENSOR_RATE: 130 {BEATS}/MIN
MDC_IDC_SET_BRADY_MAX_SENSOR_RATE: 130 {BEATS}/MIN
MDC_IDC_SET_BRADY_MAX_TRACKING_RATE: 130 {BEATS}/MIN
MDC_IDC_SET_BRADY_MAX_TRACKING_RATE: 130 {BEATS}/MIN
MDC_IDC_SET_BRADY_MODE: NORMAL
MDC_IDC_SET_BRADY_MODE: NORMAL
MDC_IDC_SET_BRADY_PAV_DELAY_LOW: 180 MS
MDC_IDC_SET_BRADY_PAV_DELAY_LOW: 180 MS
MDC_IDC_SET_BRADY_SAV_DELAY_LOW: 150 MS
MDC_IDC_SET_BRADY_SAV_DELAY_LOW: 150 MS
MDC_IDC_SET_LEADCHNL_RA_PACING_AMPLITUDE: 1.5 V
MDC_IDC_SET_LEADCHNL_RA_PACING_AMPLITUDE: 1.5 V
MDC_IDC_SET_LEADCHNL_RA_PACING_ANODE_ELECTRODE_1: NORMAL
MDC_IDC_SET_LEADCHNL_RA_PACING_ANODE_ELECTRODE_1: NORMAL
MDC_IDC_SET_LEADCHNL_RA_PACING_ANODE_LOCATION_1: NORMAL
MDC_IDC_SET_LEADCHNL_RA_PACING_ANODE_LOCATION_1: NORMAL
MDC_IDC_SET_LEADCHNL_RA_PACING_CAPTURE_MODE: NORMAL
MDC_IDC_SET_LEADCHNL_RA_PACING_CAPTURE_MODE: NORMAL
MDC_IDC_SET_LEADCHNL_RA_PACING_CATHODE_ELECTRODE_1: NORMAL
MDC_IDC_SET_LEADCHNL_RA_PACING_CATHODE_ELECTRODE_1: NORMAL
MDC_IDC_SET_LEADCHNL_RA_PACING_CATHODE_LOCATION_1: NORMAL
MDC_IDC_SET_LEADCHNL_RA_PACING_CATHODE_LOCATION_1: NORMAL
MDC_IDC_SET_LEADCHNL_RA_PACING_POLARITY: NORMAL
MDC_IDC_SET_LEADCHNL_RA_PACING_POLARITY: NORMAL
MDC_IDC_SET_LEADCHNL_RA_PACING_PULSEWIDTH: 0.4 MS
MDC_IDC_SET_LEADCHNL_RA_PACING_PULSEWIDTH: 0.4 MS
MDC_IDC_SET_LEADCHNL_RA_SENSING_ANODE_ELECTRODE_1: NORMAL
MDC_IDC_SET_LEADCHNL_RA_SENSING_ANODE_ELECTRODE_1: NORMAL
MDC_IDC_SET_LEADCHNL_RA_SENSING_ANODE_LOCATION_1: NORMAL
MDC_IDC_SET_LEADCHNL_RA_SENSING_ANODE_LOCATION_1: NORMAL
MDC_IDC_SET_LEADCHNL_RA_SENSING_CATHODE_ELECTRODE_1: NORMAL
MDC_IDC_SET_LEADCHNL_RA_SENSING_CATHODE_ELECTRODE_1: NORMAL
MDC_IDC_SET_LEADCHNL_RA_SENSING_CATHODE_LOCATION_1: NORMAL
MDC_IDC_SET_LEADCHNL_RA_SENSING_CATHODE_LOCATION_1: NORMAL
MDC_IDC_SET_LEADCHNL_RA_SENSING_POLARITY: NORMAL
MDC_IDC_SET_LEADCHNL_RA_SENSING_POLARITY: NORMAL
MDC_IDC_SET_LEADCHNL_RA_SENSING_SENSITIVITY: 0.3 MV
MDC_IDC_SET_LEADCHNL_RA_SENSING_SENSITIVITY: 0.3 MV
MDC_IDC_SET_LEADCHNL_RV_PACING_AMPLITUDE: 2 V
MDC_IDC_SET_LEADCHNL_RV_PACING_AMPLITUDE: 2 V
MDC_IDC_SET_LEADCHNL_RV_PACING_ANODE_ELECTRODE_1: NORMAL
MDC_IDC_SET_LEADCHNL_RV_PACING_ANODE_ELECTRODE_1: NORMAL
MDC_IDC_SET_LEADCHNL_RV_PACING_ANODE_LOCATION_1: NORMAL
MDC_IDC_SET_LEADCHNL_RV_PACING_ANODE_LOCATION_1: NORMAL
MDC_IDC_SET_LEADCHNL_RV_PACING_CAPTURE_MODE: NORMAL
MDC_IDC_SET_LEADCHNL_RV_PACING_CAPTURE_MODE: NORMAL
MDC_IDC_SET_LEADCHNL_RV_PACING_CATHODE_ELECTRODE_1: NORMAL
MDC_IDC_SET_LEADCHNL_RV_PACING_CATHODE_ELECTRODE_1: NORMAL
MDC_IDC_SET_LEADCHNL_RV_PACING_CATHODE_LOCATION_1: NORMAL
MDC_IDC_SET_LEADCHNL_RV_PACING_CATHODE_LOCATION_1: NORMAL
MDC_IDC_SET_LEADCHNL_RV_PACING_POLARITY: NORMAL
MDC_IDC_SET_LEADCHNL_RV_PACING_POLARITY: NORMAL
MDC_IDC_SET_LEADCHNL_RV_PACING_PULSEWIDTH: 0.4 MS
MDC_IDC_SET_LEADCHNL_RV_PACING_PULSEWIDTH: 0.4 MS
MDC_IDC_SET_LEADCHNL_RV_SENSING_ANODE_ELECTRODE_1: NORMAL
MDC_IDC_SET_LEADCHNL_RV_SENSING_ANODE_ELECTRODE_1: NORMAL
MDC_IDC_SET_LEADCHNL_RV_SENSING_ANODE_LOCATION_1: NORMAL
MDC_IDC_SET_LEADCHNL_RV_SENSING_ANODE_LOCATION_1: NORMAL
MDC_IDC_SET_LEADCHNL_RV_SENSING_CATHODE_ELECTRODE_1: NORMAL
MDC_IDC_SET_LEADCHNL_RV_SENSING_CATHODE_ELECTRODE_1: NORMAL
MDC_IDC_SET_LEADCHNL_RV_SENSING_CATHODE_LOCATION_1: NORMAL
MDC_IDC_SET_LEADCHNL_RV_SENSING_CATHODE_LOCATION_1: NORMAL
MDC_IDC_SET_LEADCHNL_RV_SENSING_POLARITY: NORMAL
MDC_IDC_SET_LEADCHNL_RV_SENSING_POLARITY: NORMAL
MDC_IDC_SET_LEADCHNL_RV_SENSING_SENSITIVITY: 0.9 MV
MDC_IDC_SET_LEADCHNL_RV_SENSING_SENSITIVITY: 0.9 MV
MDC_IDC_SET_ZONE_DETECTION_INTERVAL: 350 MS
MDC_IDC_SET_ZONE_DETECTION_INTERVAL: 350 MS
MDC_IDC_SET_ZONE_DETECTION_INTERVAL: 400 MS
MDC_IDC_SET_ZONE_DETECTION_INTERVAL: 400 MS
MDC_IDC_SET_ZONE_TYPE: NORMAL
MDC_IDC_STAT_AT_BURDEN_PERCENT: 0 %
MDC_IDC_STAT_AT_BURDEN_PERCENT: 0.1 %
MDC_IDC_STAT_AT_DTM_END: NORMAL
MDC_IDC_STAT_AT_DTM_END: NORMAL
MDC_IDC_STAT_AT_DTM_START: NORMAL
MDC_IDC_STAT_AT_DTM_START: NORMAL
MDC_IDC_STAT_BRADY_AP_VP_PERCENT: 5.55 %
MDC_IDC_STAT_BRADY_AP_VP_PERCENT: 6.25 %
MDC_IDC_STAT_BRADY_AP_VS_PERCENT: 0.07 %
MDC_IDC_STAT_BRADY_AP_VS_PERCENT: 0.19 %
MDC_IDC_STAT_BRADY_AS_VP_PERCENT: 91.61 %
MDC_IDC_STAT_BRADY_AS_VP_PERCENT: 91.66 %
MDC_IDC_STAT_BRADY_AS_VS_PERCENT: 2.02 %
MDC_IDC_STAT_BRADY_AS_VS_PERCENT: 2.65 %
MDC_IDC_STAT_BRADY_DTM_END: NORMAL
MDC_IDC_STAT_BRADY_DTM_END: NORMAL
MDC_IDC_STAT_BRADY_DTM_START: NORMAL
MDC_IDC_STAT_BRADY_DTM_START: NORMAL
MDC_IDC_STAT_BRADY_RA_PERCENT_PACED: 5.83 %
MDC_IDC_STAT_BRADY_RA_PERCENT_PACED: 6.65 %
MDC_IDC_STAT_BRADY_RV_PERCENT_PACED: 97.16 %
MDC_IDC_STAT_BRADY_RV_PERCENT_PACED: 97.91 %
MDC_IDC_STAT_EPISODE_RECENT_COUNT: 0
MDC_IDC_STAT_EPISODE_RECENT_COUNT: 1
MDC_IDC_STAT_EPISODE_RECENT_COUNT: 1
MDC_IDC_STAT_EPISODE_RECENT_COUNT_DTM_END: NORMAL
MDC_IDC_STAT_EPISODE_RECENT_COUNT_DTM_START: NORMAL
MDC_IDC_STAT_EPISODE_TOTAL_COUNT: 0
MDC_IDC_STAT_EPISODE_TOTAL_COUNT: 0
MDC_IDC_STAT_EPISODE_TOTAL_COUNT: 2
MDC_IDC_STAT_EPISODE_TOTAL_COUNT_DTM_END: NORMAL
MDC_IDC_STAT_EPISODE_TOTAL_COUNT_DTM_START: NORMAL
MDC_IDC_STAT_EPISODE_TYPE: NORMAL
MONOCYTES # BLD AUTO: 0.5 10E3/UL (ref 0–1.3)
MONOCYTES NFR BLD AUTO: 7 %
NEUTROPHILS # BLD AUTO: 5.3 10E3/UL (ref 1.6–8.3)
NEUTROPHILS NFR BLD AUTO: 82 %
NRBC # BLD AUTO: 0 10E3/UL
NRBC BLD AUTO-RTO: 0 /100
NT-PROBNP SERPL-MCNC: 5470 PG/ML (ref 0–125)
PHOSPHATE SERPL-MCNC: 2.8 MG/DL (ref 2.5–4.5)
PHOSPHATE SERPL-MCNC: 3.7 MG/DL (ref 2.5–4.5)
PLATELET # BLD AUTO: 155 10E3/UL (ref 150–450)
PLATELET # BLD AUTO: 165 10E3/UL (ref 150–450)
POTASSIUM BLD-SCNC: 3.5 MMOL/L (ref 3.4–5.3)
POTASSIUM BLD-SCNC: 3.6 MMOL/L (ref 3.4–5.3)
POTASSIUM BLD-SCNC: 4.1 MMOL/L (ref 3.4–5.3)
PROT SERPL-MCNC: 7.4 G/DL (ref 6.8–8.8)
PROT SERPL-MCNC: 7.4 G/DL (ref 6.8–8.8)
PROT UR-MCNC: 0.23 G/L
PROT/CREAT 24H UR: 1.1 G/G CR (ref 0–0.2)
PTH-INTACT SERPL-MCNC: 13 PG/ML (ref 18–80)
RBC # BLD AUTO: 3.85 10E6/UL (ref 4.4–5.9)
RBC # BLD AUTO: 4.01 10E6/UL (ref 4.4–5.9)
RVPLETH-%PRED-PRE: 202 %
RVPLETH-PRE: 5.13 L
RVPLETH-PRED: 2.53 L
SCANNED LAB RESULT: ABNORMAL
SODIUM SERPL-SCNC: 138 MMOL/L (ref 133–144)
SODIUM SERPL-SCNC: 139 MMOL/L (ref 133–144)
SODIUM SERPL-SCNC: 140 MMOL/L (ref 133–144)
TIBC SERPL-MCNC: 314 UG/DL (ref 240–430)
TLCPLETH-%PRED-PRE: 103 %
TLCPLETH-PRE: 7.79 L
TLCPLETH-PRED: 7.53 L
TPMT BLD-CCNC: 36.6 U/ML
VA-%PRED-PRE: 60 %
VA-PRE: 4.22 L
VC-%PRED-PRE: 50 %
VC-PRE: 2.66 L
VC-PRED: 5.3 L
WBC # BLD AUTO: 5 10E3/UL (ref 4–11)
WBC # BLD AUTO: 6.4 10E3/UL (ref 4–11)

## 2021-01-01 PROCEDURE — 99000 SPECIMEN HANDLING OFFICE-LAB: CPT | Performed by: PATHOLOGY

## 2021-01-01 PROCEDURE — 85027 COMPLETE CBC AUTOMATED: CPT | Performed by: PATHOLOGY

## 2021-01-01 PROCEDURE — 83970 ASSAY OF PARATHORMONE: CPT | Performed by: PHYSICIAN ASSISTANT

## 2021-01-01 PROCEDURE — 93280 PM DEVICE PROGR EVAL DUAL: CPT | Performed by: INTERNAL MEDICINE

## 2021-01-01 PROCEDURE — G0463 HOSPITAL OUTPT CLINIC VISIT: HCPCS | Mod: 25

## 2021-01-01 PROCEDURE — 36415 COLL VENOUS BLD VENIPUNCTURE: CPT | Performed by: PATHOLOGY

## 2021-01-01 PROCEDURE — 82652 VIT D 1 25-DIHYDROXY: CPT | Performed by: INTERNAL MEDICINE

## 2021-01-01 PROCEDURE — 99442 PR PHYSICIAN TELEPHONE EVALUATION 11-20 MIN: CPT | Mod: 95 | Performed by: PHYSICIAN ASSISTANT

## 2021-01-01 PROCEDURE — 94726 PLETHYSMOGRAPHY LUNG VOLUMES: CPT | Performed by: INTERNAL MEDICINE

## 2021-01-01 PROCEDURE — 99214 OFFICE O/P EST MOD 30 MIN: CPT | Mod: 95 | Performed by: PHYSICIAN ASSISTANT

## 2021-01-01 PROCEDURE — 87086 URINE CULTURE/COLONY COUNT: CPT | Performed by: UROLOGY

## 2021-01-01 PROCEDURE — 83880 ASSAY OF NATRIURETIC PEPTIDE: CPT | Performed by: PATHOLOGY

## 2021-01-01 PROCEDURE — 85025 COMPLETE CBC W/AUTO DIFF WBC: CPT | Performed by: PATHOLOGY

## 2021-01-01 PROCEDURE — 94375 RESPIRATORY FLOW VOLUME LOOP: CPT | Performed by: INTERNAL MEDICINE

## 2021-01-01 PROCEDURE — 83550 IRON BINDING TEST: CPT | Performed by: PATHOLOGY

## 2021-01-01 PROCEDURE — 80053 COMPREHEN METABOLIC PANEL: CPT | Performed by: PATHOLOGY

## 2021-01-01 PROCEDURE — 99214 OFFICE O/P EST MOD 30 MIN: CPT | Mod: 25 | Performed by: INTERNAL MEDICINE

## 2021-01-01 PROCEDURE — 90682 RIV4 VACC RECOMBINANT DNA IM: CPT | Performed by: INTERNAL MEDICINE

## 2021-01-01 PROCEDURE — 82728 ASSAY OF FERRITIN: CPT | Performed by: PATHOLOGY

## 2021-01-01 PROCEDURE — 71250 CT THORAX DX C-: CPT | Mod: GC | Performed by: RADIOLOGY

## 2021-01-01 PROCEDURE — 84156 ASSAY OF PROTEIN URINE: CPT | Performed by: PATHOLOGY

## 2021-01-01 PROCEDURE — G0008 ADMIN INFLUENZA VIRUS VAC: HCPCS | Performed by: INTERNAL MEDICINE

## 2021-01-01 PROCEDURE — 82784 ASSAY IGA/IGD/IGG/IGM EACH: CPT | Performed by: INTERNAL MEDICINE

## 2021-01-01 PROCEDURE — 80069 RENAL FUNCTION PANEL: CPT

## 2021-01-01 PROCEDURE — 82164 ANGIOTENSIN I ENZYME TEST: CPT | Mod: 90 | Performed by: PATHOLOGY

## 2021-01-01 PROCEDURE — 82657 ENZYME CELL ACTIVITY: CPT | Mod: 90 | Performed by: PATHOLOGY

## 2021-01-01 PROCEDURE — 84100 ASSAY OF PHOSPHORUS: CPT | Performed by: PATHOLOGY

## 2021-01-01 PROCEDURE — 94729 DIFFUSING CAPACITY: CPT | Performed by: INTERNAL MEDICINE

## 2021-01-01 PROCEDURE — 99213 OFFICE O/P EST LOW 20 MIN: CPT | Mod: 25 | Performed by: NURSE PRACTITIONER

## 2021-01-01 PROCEDURE — 82306 VITAMIN D 25 HYDROXY: CPT | Performed by: PHYSICIAN ASSISTANT

## 2021-01-01 PROCEDURE — 250N000011 HC RX IP 250 OP 636: Performed by: INTERNAL MEDICINE

## 2021-01-01 PROCEDURE — 36415 COLL VENOUS BLD VENIPUNCTURE: CPT

## 2021-01-01 PROCEDURE — 83520 IMMUNOASSAY QUANT NOS NONAB: CPT | Performed by: INTERNAL MEDICINE

## 2021-01-01 PROCEDURE — G0463 HOSPITAL OUTPT CLINIC VISIT: HCPCS

## 2021-01-01 RX ORDER — PREDNISONE 10 MG/1
10 TABLET ORAL DAILY
Qty: 90 TABLET | Refills: 3 | Status: SHIPPED | OUTPATIENT
Start: 2021-01-01 | End: 2022-01-01

## 2021-01-01 RX ORDER — ALBUTEROL SULFATE 90 UG/1
2 AEROSOL, METERED RESPIRATORY (INHALATION) EVERY 6 HOURS PRN
Qty: 18 G | Refills: 11 | Status: SHIPPED | OUTPATIENT
Start: 2021-01-01 | End: 2022-01-01

## 2021-01-01 RX ORDER — VITAMIN B COMPLEX
25 TABLET ORAL EVERY OTHER DAY
Qty: 45 TABLET | Refills: 3 | Status: SHIPPED | OUTPATIENT
Start: 2021-01-01 | End: 2022-01-01

## 2021-01-01 RX ADMIN — INFLUENZA A VIRUS A/WISCONSIN/588/2019 (H1N1) RECOMBINANT HEMAGGLUTININ ANTIGEN, INFLUENZA A VIRUS A/TASMANIA/503/2020 (H3N2) RECOMBINANT HEMAGGLUTININ ANTIGEN, INFLUENZA B VIRUS B/WASHINGTON/02/2019 RECOMBINANT HEMAGGLUTININ ANTIGEN, AND INFLUENZA B VIRUS B/PHUKET/3073/2013 RECOMBINANT HEMAGGLUTININ ANTIGEN 0.5 ML: 45; 45; 45; 45 INJECTION INTRAMUSCULAR at 11:18

## 2021-01-01 ASSESSMENT — ENCOUNTER SYMPTOMS
SKIN CHANGES: 1
NERVOUS/ANXIOUS: 0
NECK PAIN: 0
ARTHRALGIAS: 1
MUSCLE CRAMPS: 0
NECK PAIN: 0
LIGHT-HEADEDNESS: 0
HEMATURIA: 0
PANIC: 0
MUSCLE WEAKNESS: 1
ABDOMINAL PAIN: 1
SKIN CHANGES: 1
DIFFICULTY URINATING: 0
BLOATING: 0
WHEEZING: 1
DEPRESSION: 1
ALTERED TEMPERATURE REGULATION: 0
ARTHRALGIAS: 1
ORTHOPNEA: 1
POLYDIPSIA: 0
STIFFNESS: 1
INCREASED ENERGY: 0
SYNCOPE: 0
DYSPNEA ON EXERTION: 1
EXERCISE INTOLERANCE: 1
COUGH DISTURBING SLEEP: 1
WEIGHT GAIN: 1
WEIGHT LOSS: 0
HYPOTENSION: 0
SPUTUM PRODUCTION: 1
SMELL DISTURBANCE: 0
NECK MASS: 0
TROUBLE SWALLOWING: 0
PANIC: 0
LIGHT-HEADEDNESS: 0
NERVOUS/ANXIOUS: 0
TROUBLE SWALLOWING: 0
DEPRESSION: 1
POLYDIPSIA: 0
FATIGUE: 1
ORTHOPNEA: 1
BLOATING: 0
HEMOPTYSIS: 0
HALLUCINATIONS: 0
HEARTBURN: 0
MYALGIAS: 1
ALTERED TEMPERATURE REGULATION: 0
SORE THROAT: 0
CONSTIPATION: 0
HOARSE VOICE: 0
SLEEP DISTURBANCES DUE TO BREATHING: 1
DYSURIA: 0
SLEEP DISTURBANCES DUE TO BREATHING: 1
BACK PAIN: 1
MYALGIAS: 1
RECTAL PAIN: 0
FLANK PAIN: 1
HALLUCINATIONS: 0
BOWEL INCONTINENCE: 0
COUGH DISTURBING SLEEP: 1
CONSTIPATION: 0
BACK PAIN: 1
POSTURAL DYSPNEA: 1
INSOMNIA: 1
SPUTUM PRODUCTION: 1
LEG PAIN: 0
WEIGHT LOSS: 0
DECREASED APPETITE: 0
DIARRHEA: 1
WEIGHT GAIN: 1
COUGH: 1
DECREASED CONCENTRATION: 0
HOARSE VOICE: 0
SHORTNESS OF BREATH: 1
POLYPHAGIA: 0
NECK MASS: 0
FATIGUE: 1
MUSCLE CRAMPS: 0
NIGHT SWEATS: 0
FEVER: 0
MUSCLE WEAKNESS: 1
SINUS CONGESTION: 1
JOINT SWELLING: 0
DECREASED CONCENTRATION: 0
BOWEL INCONTINENCE: 0
COUGH: 1
NAUSEA: 0
VOMITING: 0
SINUS PAIN: 1
SORE THROAT: 0
DYSURIA: 0
NAUSEA: 0
FLANK PAIN: 1
CHILLS: 0
RECTAL PAIN: 0
SMELL DISTURBANCE: 0
JAUNDICE: 0
HEARTBURN: 0
INCREASED ENERGY: 0
POLYPHAGIA: 0
POOR WOUND HEALING: 0
TASTE DISTURBANCE: 0
PALPITATIONS: 1
DECREASED APPETITE: 0
DIFFICULTY URINATING: 0
HEMOPTYSIS: 0
BLOOD IN STOOL: 0
LEG PAIN: 0
INSOMNIA: 1
DYSPNEA ON EXERTION: 1
HYPERTENSION: 1
WHEEZING: 1
NAIL CHANGES: 0
TASTE DISTURBANCE: 0
FEVER: 0
POSTURAL DYSPNEA: 1
JOINT SWELLING: 0
STIFFNESS: 1
SHORTNESS OF BREATH: 1
SNORES LOUDLY: 0
EXERCISE INTOLERANCE: 1
HYPERTENSION: 1
CHILLS: 0
ABDOMINAL PAIN: 1
HEMATURIA: 0
SNORES LOUDLY: 0
POOR WOUND HEALING: 0
SINUS CONGESTION: 1
HYPOTENSION: 0
DIARRHEA: 1
SINUS PAIN: 1
VOMITING: 0
NIGHT SWEATS: 0
NAIL CHANGES: 0
JAUNDICE: 0
BLOOD IN STOOL: 0
PALPITATIONS: 1
SYNCOPE: 0

## 2021-01-01 ASSESSMENT — MIFFLIN-ST. JEOR: SCORE: 1541.08

## 2021-01-01 ASSESSMENT — PAIN SCALES - GENERAL
PAINLEVEL: NO PAIN (0)

## 2021-01-15 ENCOUNTER — HEALTH MAINTENANCE LETTER (OUTPATIENT)
Age: 61
End: 2021-01-15

## 2021-01-19 ENCOUNTER — TELEPHONE (OUTPATIENT)
Dept: CARDIOLOGY | Facility: CLINIC | Age: 61
End: 2021-01-19

## 2021-01-19 DIAGNOSIS — N13.30 HYDRONEPHROSIS OF LEFT KIDNEY: Primary | ICD-10-CM

## 2021-01-19 DIAGNOSIS — N20.0 NEPHROLITHIASIS: ICD-10-CM

## 2021-01-19 NOTE — TELEPHONE ENCOUNTER
----- Message from Khang Lindsey MD sent at 1/18/2021  6:55 AM CST -----  Please see my note.  Needs urology visit and kUB us for left hydronephrosis  ----------------  Referral & US ordered. Obdulia Rome RN on 1/19/2021 at 1:29 PM

## 2021-01-20 ENCOUNTER — TELEPHONE (OUTPATIENT)
Dept: CARDIOLOGY | Facility: CLINIC | Age: 61
End: 2021-01-20

## 2021-01-20 NOTE — TELEPHONE ENCOUNTER
Patient called and said he needs a refill of his Letairis.  He tried to do it through [facility], but it was having problems.  Advised patient I would call and give TORB.  Patient verbalized understanding, agreed with plan and denied any further questions. Obdulia Rome RN on 1/20/2021 at 1:20 PM    ---------------------  Called [facility] and gave TORB for Letairis (ABHINAV) 10mg once daily disp;30 tabs, Refill 11.    Obdulia Rome RN on 1/20/2021 at 1:21 PM

## 2021-03-03 DIAGNOSIS — Z79.899 ENCOUNTER FOR MONITORING DIGOXIN THERAPY: ICD-10-CM

## 2021-03-03 DIAGNOSIS — I27.20 PULMONARY HYPERTENSION (H): ICD-10-CM

## 2021-03-03 DIAGNOSIS — Z51.81 ENCOUNTER FOR MONITORING DIGOXIN THERAPY: ICD-10-CM

## 2021-03-05 ENCOUNTER — TELEPHONE (OUTPATIENT)
Dept: CARDIOLOGY | Facility: CLINIC | Age: 61
End: 2021-03-05

## 2021-03-05 DIAGNOSIS — R06.02 SOB (SHORTNESS OF BREATH): ICD-10-CM

## 2021-03-05 DIAGNOSIS — Z79.899 ENCOUNTER FOR MONITORING DIGOXIN THERAPY: ICD-10-CM

## 2021-03-05 DIAGNOSIS — D86.9 SARCOIDOSIS: ICD-10-CM

## 2021-03-05 DIAGNOSIS — I27.20 PULMONARY HYPERTENSION (H): Primary | ICD-10-CM

## 2021-03-05 DIAGNOSIS — Z51.81 ENCOUNTER FOR MONITORING DIGOXIN THERAPY: ICD-10-CM

## 2021-03-05 RX ORDER — DIGOXIN 125 MCG
125 TABLET ORAL DAILY
Qty: 90 TABLET | Refills: 0 | Status: SHIPPED | OUTPATIENT
Start: 2021-03-05 | End: 2021-06-07

## 2021-03-05 NOTE — TELEPHONE ENCOUNTER
I arranged for follow-up laboratories and testing to assess and maintain optimal status:  a. Laboratories: CBC, CMP, BNP, CRP inflammation, iron status,   b. Echocardiography  c. Return visits  d. Urology referral [facility] for nephrolithiasis and left hydronephrosis, will need KUB US immediately prior to visit (same day pssibly)      -------------------------------  Orders for Urology & KUB US in January, but patient has not had appt or teting yet.    Today I placed orders for above labs, Echo & follow up visit.  Sent patient Qwite message updating him to this plan and providing him with the phone number to Urology to call and make appt.    Advised him that Samantha would be calling him to help set up all other testing & f/u visit.    Sent Samantha staff mag asking her to help patient set up testing & visit on same day. Obdulia Rome RN on 3/5/2021 at 10:17 AM

## 2021-03-05 NOTE — TELEPHONE ENCOUNTER
Medication Refill double check:    Last virtual visit was on 12/10/20 with .    Follow up was recommended for after testing.    Any additional encounters with changes to requested med? no    Authorizing provider is: Dr. Lindsey    Limited refill was approved.     Additional orders/notes: patient is significantly overdue for a digoxin level.  He is also supposed to have had an Echo, ABD US, Labs & follow up which has not been completed.  Separate encounter created.      Obdulia Rome RN on 3/5/2021 at 9:48 AM

## 2021-04-07 DIAGNOSIS — J44.9 COPD (CHRONIC OBSTRUCTIVE PULMONARY DISEASE) (H): ICD-10-CM

## 2021-04-07 DIAGNOSIS — D86.9 SARCOIDOSIS: ICD-10-CM

## 2021-04-07 RX ORDER — BUDESONIDE 0.25 MG/2ML
0.25 INHALANT ORAL 2 TIMES DAILY
Qty: 120 ML | Refills: 4 | Status: SHIPPED | OUTPATIENT
Start: 2021-04-07 | End: 2021-11-02

## 2021-04-13 DIAGNOSIS — D86.9 SARCOIDOSIS: Primary | ICD-10-CM

## 2021-04-15 DIAGNOSIS — I27.20 PULMONARY HYPERTENSION (H): Primary | ICD-10-CM

## 2021-04-15 RX ORDER — AMBRISENTAN 10 MG/1
10 TABLET, FILM COATED ORAL EVERY MORNING
Qty: 30 TABLET | Refills: 11 | COMMUNITY
Start: 2021-04-15 | End: 2022-01-01

## 2021-06-02 DIAGNOSIS — I27.20 PULMONARY HYPERTENSION (H): ICD-10-CM

## 2021-06-02 DIAGNOSIS — Z51.81 ENCOUNTER FOR MONITORING DIGOXIN THERAPY: ICD-10-CM

## 2021-06-02 DIAGNOSIS — Z79.899 ENCOUNTER FOR MONITORING DIGOXIN THERAPY: ICD-10-CM

## 2021-06-07 NOTE — CONFIDENTIAL NOTE
digoxin (LANOXIN) 125 MCG tablet  Last Written Prescription Date:  3/5/21  Last Fill Quantity: 90,   # refills: 0  Last Office Visit :12/10/20  Future Office visit:  7/14/21      Routing refill request to provider for review/approval because: 7/6/20  [facility] every where. Digoxin past due. Orders in epic, not drawn

## 2021-06-08 RX ORDER — DIGOXIN 125 MCG
125 TABLET ORAL DAILY
Qty: 90 TABLET | Refills: 0 | Status: SHIPPED | OUTPATIENT
Start: 2021-06-08 | End: 2021-07-27

## 2021-06-08 NOTE — TELEPHONE ENCOUNTER
Medication Refill double check:    Last virtual visit was on 12/10/20 with .    Follow up was recommended for 4 months.    Any additional encounters with changes to requested med? no    Authorizing provider is: Dr. Lindsey    Refill was approved.     Additional orders/notes: patient overdue for Digoxin Level which is already ordered.  Sent msg to Samantha to assist in scheduling.      Obdulia Rome RN on 6/8/2021 at 9:09 AM

## 2021-06-15 ENCOUNTER — TELEPHONE (OUTPATIENT)
Dept: CARDIOLOGY | Facility: CLINIC | Age: 61
End: 2021-06-15

## 2021-06-16 ENCOUNTER — OFFICE VISIT (OUTPATIENT)
Dept: UROLOGY | Facility: CLINIC | Age: 61
End: 2021-06-16
Payer: COMMERCIAL

## 2021-06-16 VITALS
HEIGHT: 72 IN | WEIGHT: 165 LBS | SYSTOLIC BLOOD PRESSURE: 110 MMHG | BODY MASS INDEX: 22.35 KG/M2 | DIASTOLIC BLOOD PRESSURE: 70 MMHG

## 2021-06-16 DIAGNOSIS — N20.0 NEPHROLITHIASIS: ICD-10-CM

## 2021-06-16 DIAGNOSIS — N20.0 NEPHROLITHIASIS: Primary | ICD-10-CM

## 2021-06-16 DIAGNOSIS — R35.0 BENIGN PROSTATIC HYPERPLASIA WITH URINARY FREQUENCY: ICD-10-CM

## 2021-06-16 DIAGNOSIS — N40.1 BENIGN PROSTATIC HYPERPLASIA WITH URINARY FREQUENCY: ICD-10-CM

## 2021-06-16 DIAGNOSIS — D86.9 SARCOIDOSIS: ICD-10-CM

## 2021-06-16 DIAGNOSIS — N18.4 CKD (CHRONIC KIDNEY DISEASE) STAGE 4, GFR 15-29 ML/MIN (H): ICD-10-CM

## 2021-06-16 LAB
ALBUMIN UR-MCNC: 100 MG/DL
APPEARANCE UR: CLEAR
BILIRUB UR QL STRIP: NEGATIVE
COLOR UR AUTO: YELLOW
GLUCOSE UR STRIP-MCNC: NEGATIVE MG/DL
HGB UR QL STRIP: ABNORMAL
KETONES UR STRIP-MCNC: NEGATIVE MG/DL
LEUKOCYTE ESTERASE UR QL STRIP: ABNORMAL
NITRATE UR QL: NEGATIVE
PH UR STRIP: 5.5 PH (ref 5–7)
SOURCE: ABNORMAL
SP GR UR STRIP: 1.01 (ref 1–1.03)
UROBILINOGEN UR STRIP-ACNC: 0.2 EU/DL (ref 0.2–1)

## 2021-06-16 PROCEDURE — 80048 BASIC METABOLIC PNL TOTAL CA: CPT | Performed by: PHYSICIAN ASSISTANT

## 2021-06-16 PROCEDURE — 81003 URINALYSIS AUTO W/O SCOPE: CPT | Mod: QW | Performed by: PHYSICIAN ASSISTANT

## 2021-06-16 PROCEDURE — 99204 OFFICE O/P NEW MOD 45 MIN: CPT | Performed by: PHYSICIAN ASSISTANT

## 2021-06-16 PROCEDURE — 84153 ASSAY OF PSA TOTAL: CPT | Performed by: PHYSICIAN ASSISTANT

## 2021-06-16 PROCEDURE — 36415 COLL VENOUS BLD VENIPUNCTURE: CPT | Performed by: PHYSICIAN ASSISTANT

## 2021-06-16 RX ORDER — TAMSULOSIN HYDROCHLORIDE 0.4 MG/1
0.4 CAPSULE ORAL DAILY
Qty: 30 CAPSULE | Refills: 0 | Status: SHIPPED | OUTPATIENT
Start: 2021-06-16 | End: 2022-01-01

## 2021-06-16 ASSESSMENT — PATIENT HEALTH QUESTIONNAIRE - PHQ9: SUM OF ALL RESPONSES TO PHQ QUESTIONS 1-9: 19

## 2021-06-16 ASSESSMENT — PAIN SCALES - GENERAL: PAINLEVEL: NO PAIN (0)

## 2021-06-16 ASSESSMENT — MIFFLIN-ST. JEOR: SCORE: 1591.44

## 2021-06-16 NOTE — NURSING NOTE
Chief Complaint   Patient presents with     Kidney Stone Related     Here to discuss kidney stones.   Ewelina Fernandes LPN

## 2021-06-16 NOTE — LETTER
6/16/2021       RE: Jimmy Isaac  [address] [address] [address]     Dear Colleague,    Thank you for referring your patient, Jimmy Isaac, to the [facility] UROLOGY CLINIC [place] at [facility]. Please see a copy of my visit note below.    Subjective      REQUESTING PROVIDER   Khang Lindsey     REASON FOR CONSULT   Nephrolithiaisis    HISTORY OF PRESENT ILLNESS   Mr. Isaac is a very pleasant 60-year-old gentleman, who presents today for repeat evaluation of nephrolithiasis.  He was last seen by Dr. Moura in November 2017.  Medical history was significant for sarcoidosis diagnosed in 1992.  He was requiring continuous oxygen treatment.  Plan was to continue to monitor his nephrolithiasis given extreme risk of surgical intervention given his pulmonary function and anesthesia.  They had discussed possible surgical intervention.  He was seeing Dr. Ba in nephrology too.  He has not continued with follow-up.    Patient was seen in July 2020 at [facility] for evaluation of a possible research study regarding his lung function.  As part of that evaluation, he underwent a nuclear med SPECT CT.  This identified incidentally increased bilateral stone burden with partially visualized moderate to advanced hydronephrosis on the left.  There was recommendation for urology consultation.  Patient presents today now.    Reviewing historical data, patient's kidney function has continued to decrease over the last several years.  Creatinine in 2018 was 1.40 with EGFR 55.  December 2019: Creatinine 1.87 EGFR 39.  July 6 2020: Creatinine 3.03 with a EGFR of 21.    Patient endorses some occasional right flank pain.  He endorses some occasional abdominal pain.  He has continued to pass some stones.  Patient continues to require oxygen.  He typically will be on 4.5 L overnight when he is sleeping.  When he is ambulating, such as coming in from the  parking lot, he required 12 L of oxygen.    Patient denies any gross hematuria or dysuria.  He denies any urinary tract infections.  Postvoid residual is 24 mL.  He does endorse urgency and frequency of urination but no urinary incontinence.  He would categorize his stream is adequate in strength.  He is uncertain if he empties his bladder completely.  He has nocturia x4.    Patient also does not have a primary care provider and does have positive screening questions for depression.  His significant other also endorses that he would benefit from establishing with a primary care provider and with treatment for his mental health.    First stone: 6730-2610.   Number of stone surgeries: None   Number of stones passed spontaneously: Several, most recent stone passed 1 year ago  History of UTI: None  Prior Stone Analysis: Calcium oxalate  Family History Nephrolithasis: None  Stone Risk Factors: Sarcoid  Prior Metabolic Workup: YEs, did not show hypercalciuria, followed with Dr. Ba    The following portions of the patient's history were reviewed and updated as appropriate: allergies, current medications, past family history, past medical history, past social history, past surgical history and problem list.     REVIEW OF SYSTEMS   Review of Systems   Constitutional: Negative for chills and fever.   Respiratory: Positive for shortness of breath.    Cardiovascular: Negative for chest pain.   Gastrointestinal: Negative for nausea and vomiting.   Genitourinary: Positive for frequency and urgency. Negative for dysuria and hematuria.   Musculoskeletal: Positive for gait problem.   Psychiatric/Behavioral:        Depressed.      Per HPI.     Patient Active Problem List   Diagnosis     Pulmonary hypertension (H)     Sarcoidosis     Renal insufficiency due to nephrolithiasis     Dyspnea on exertion     CKD (chronic kidney disease) stage 4, GFR 15-29 ml/min (H)      Past Medical History:   Diagnosis Date     Chronic sinusitis       CKD (chronic kidney disease)      Heart disease      Hypertension      Keratosis     frontal scalp, treated with liquid nitrogen at [facility]     Malignant neoplasm (H)      Pneumonia a few times     Pulmonary sarcoidosis (H)       Past Surgical History:   Procedure Laterality Date     COLONOSCOPY       CV RIGHT HEART CATH MEASUREMENTS RECORDED N/A 6/19/2019    Procedure: CV RIGHT HEART CATH;  Surgeon: Kale Delgado MD;  Location:  HEART CARDIAC CATH LAB     CV RIGHT HEART CATH MEASUREMENTS RECORDED N/A 12/11/2019    Procedure: CV RIGHT HEART CATH;  Surgeon: Ion Lemon MD;  Location:  HEART CARDIAC CATH LAB     ENT SURGERY      wisdom teeth extraction     TONSILLECTOMY  1964      Social History:   Patient has a long-term fiancé.  Former smoker.  Quit in 2010.  He smoked for 35 years, 1 pack/day.    Family History:   Family history of prostate cancer in a maternal uncle.  No other family history of  malignancy.    Objective      PHYSICAL EXAM   /70   Ht 1.829 m (6')   Wt 74.8 kg (165 lb)   BMI 22.38 kg/m     Physical Exam  Constitutional:       Appearance: Normal appearance.   HENT:      Head: Normocephalic.      Nose: Nose normal.   Eyes:      General: No scleral icterus.  Neck:      Musculoskeletal: Normal range of motion.   Pulmonary:      Comments: Increased respiratory effort.  Patient has nasal cannula with oxygen flowing.  Abdominal:      Tenderness: There is no abdominal tenderness. There is no right CVA tenderness or left CVA tenderness.   Genitourinary:     Comments: Prostate: Approximately 70 to 80 g.  No palpable masses, nodules, or induration.  Nontender.  Rectum: Normal rectal tone.  Musculoskeletal:      Comments: Antalgic gait noted with ambulation.   Skin:     General: Skin is warm.   Neurological:      General: No focal deficit present.      Mental Status: He is alert.   Psychiatric:         Behavior: Behavior normal.        LABORATORY   Recent Labs    Lab Test 06/16/21  1522 10/18/17  1125   COLOR Yellow Yellow   APPEARANCE Clear Clear   URINEGLC Negative Negative   URINEBILI Negative Negative   URINEKETONE Negative Negative   SG 1.015 1.011   UBLD Small* Moderate*   URINEPH 5.5 6.0   PROTEIN 100* Negative   UROBILINOGEN 0.2  --    NITRITE Negative Negative   LEUKEST Moderate* Negative   RBCU  --  25*   WBCU  --  2     Creatinine most recently 07/6/2020 3.03 eGFR 21.    IMAGING     Images not reviewed.  NM Lung Perfusion only SPECT CT  1. Progressive perihilar fibrosis and advanced lower lobe predominant  emphysematous changes with enlarging mediastinal and bilateral hilar  lymphadenopathy in this patient with reported history of sarcoidosis.   Patchy heterogeneous perfusion throughout both lungs with larger defects corresponding to areas of structural lung disease.  2. Bilateral renal stone burden, increased since the comparison CT, with new partially visualized moderate to advanced hydronephrosis on the left. Consider urology consult or additional cross-sectional imaging in further workup.  3. New sclerosis in the right humeral metaphysis is indeterminate and   Could represent an indeterminate chondroid lesion, bone infarct, or bony Involvement of sarcoidosis. A right humerus MRI could be considered in further workup if clinically indicated.    Assessment & Plan    1. Nephrolithiasis    2. CKD (chronic kidney disease) stage 4, GFR 15-29 ml/min (H)    3. Benign prostatic hyperplasia with urinary frequency    4. Sarcoidosis      I had the pleasure today of meeting with Mr. Isaac and his significant other to discuss his nephrolithiasis, lower urinary tract symptoms, and kidney function.  Additionally, we discussed briefly his depression.    The largest concern overall is with increasing nephrolithiasis seen on previous scan, which I do not have access to the actual images.  Additionally, this is almost a year old.  Patient has had increasing decline in his  overall kidney function with most recent EGFR of 21.    Based upon these findings, I have concern that he has obstruction from nephrolithiasis which is slowly causing more more deterioration to his kidney function.  We discussed that given that this is likely been longstanding, kidney function may not be recoverable at this time.  However, we would like to stabilize this due to him getting very close to dialysis.    -Recommendation with referral back to nephrology for establishing care for nephrolithiasis and chronic kidney disease stage IV.    -CT abdomen pelvis without contrast to look at degree of hydronephrosis and kidney stone burden.    -Blood draw for a BMP and PSA.  Need to get updated kidney function and make sure electrolytes are within normal limits.  PSA for prostate cancer screening and BPH management.    -Given his lower urinary tract symptoms, trial of Flomax to help with urgency, frequency, and nocturia.  He is emptying his bladder well.    -Patient was given several providers names to establish primary care.  We discussed the importance of this to have some looking at the big picture.  Additionally, he may be able to help with his decreased mood.  He and his significant other voiced understanding.    We discussed complicated issues given his need for oxygen and anesthesia risks.  We also discussed the concern with his kidney function has significantly decreased.    Signed by:     Marcela Donnelly PA-C

## 2021-06-17 DIAGNOSIS — N20.0 NEPHROLITHIASIS: Primary | ICD-10-CM

## 2021-06-17 DIAGNOSIS — N18.4 CKD (CHRONIC KIDNEY DISEASE) STAGE 4, GFR 15-29 ML/MIN (H): ICD-10-CM

## 2021-06-17 LAB
ANION GAP SERPL CALCULATED.3IONS-SCNC: 9 MMOL/L (ref 3–14)
BUN SERPL-MCNC: 51 MG/DL (ref 7–30)
CALCIUM SERPL-MCNC: 9.3 MG/DL (ref 8.5–10.1)
CHLORIDE SERPL-SCNC: 106 MMOL/L (ref 94–109)
CO2 SERPL-SCNC: 23 MMOL/L (ref 20–32)
CREAT SERPL-MCNC: 3.65 MG/DL (ref 0.66–1.25)
GFR SERPL CREATININE-BSD FRML MDRD: 17 ML/MIN/{1.73_M2}
GLUCOSE SERPL-MCNC: 96 MG/DL (ref 70–99)
POTASSIUM SERPL-SCNC: 4.4 MMOL/L (ref 3.4–5.3)
PSA SERPL-MCNC: 1.67 UG/L (ref 0–4)
SODIUM SERPL-SCNC: 138 MMOL/L (ref 133–144)

## 2021-06-21 DIAGNOSIS — N18.4 CKD (CHRONIC KIDNEY DISEASE) STAGE 4, GFR 15-29 ML/MIN (H): Primary | ICD-10-CM

## 2021-06-23 ENCOUNTER — HOSPITAL ENCOUNTER (OUTPATIENT)
Dept: CT IMAGING | Facility: CLINIC | Age: 61
Discharge: HOME | End: 2021-06-23
Attending: PHYSICIAN ASSISTANT | Admitting: PHYSICIAN ASSISTANT
Payer: COMMERCIAL

## 2021-06-23 DIAGNOSIS — N20.0 NEPHROLITHIASIS: ICD-10-CM

## 2021-06-23 PROCEDURE — 74176 CT ABD & PELVIS W/O CONTRAST: CPT

## 2021-06-24 ENCOUNTER — VIRTUAL VISIT (OUTPATIENT)
Dept: PHARMACY | Facility: CLINIC | Age: 61
End: 2021-06-24
Payer: COMMERCIAL

## 2021-06-24 DIAGNOSIS — N40.0 BPH (BENIGN PROSTATIC HYPERPLASIA): ICD-10-CM

## 2021-06-24 DIAGNOSIS — I27.20 PULMONARY HYPERTENSION (H): ICD-10-CM

## 2021-06-24 DIAGNOSIS — N18.4 CKD (CHRONIC KIDNEY DISEASE) STAGE 4, GFR 15-29 ML/MIN (H): Primary | ICD-10-CM

## 2021-06-24 DIAGNOSIS — N20.0 RENAL CALCULUS: ICD-10-CM

## 2021-06-24 DIAGNOSIS — D86.9 SARCOIDOSIS: ICD-10-CM

## 2021-06-24 PROCEDURE — 99607 MTMS BY PHARM ADDL 15 MIN: CPT | Performed by: PHARMACIST

## 2021-06-24 PROCEDURE — 99605 MTMS BY PHARM NP 15 MIN: CPT | Performed by: PHARMACIST

## 2021-06-24 NOTE — Clinical Note
Hi Dr. Ruvalcaba,    I recently did a med review with patient - with the humid weather he has been using his albuterol inhaler more frequently, up to 10 puffs per day.  I think he said that when he last went in for refills he was told it was too soon by something like 42 days -- I didn't know if you wanted to reach out to pt to discuss other options?    Thanks!  Charity Staton, PharmD, BCACP

## 2021-06-24 NOTE — TELEPHONE ENCOUNTER
PA Initiation    Medication: Uptravi 1600mcg   Insurance Company: [employer] - Phone [phone] Fax [phone]  Pharmacy Filling the Rx: [facility] - [place], [place] - Formerly [facility] [address] [facility] [address]  Start Date: 6/15/2021    *Prior authorization completed and submitted through CoverMyMeds for expedited review along with right heart catheterization report.

## 2021-06-24 NOTE — PROGRESS NOTES
Medication Therapy Management (MTM) Encounter    ASSESSMENT/PLAN:                            Medication Adherence/Access: No issues identified    CKD/Hx of nephrolithiasis:  Possible that furosemide can contribute to nephrolithiasis, otherwise no other overt nephrotoxic agents.    PAH: Follows closely with cardiology. He is overdue for a digoxin level, due to worsening kidney function there is concern for digoxin accumulation/toxicity.  Digoxin toxicity can also be enhanced by the use of furosemide.    Pulmonary Sarcoidosis: He is running out of his albuterol inhaler well before refill is due - will update his pulmonary team.    BPH: Pt would prefer to wait to start tamsulosin 0.4 mg daily until after nephrology visit due to concerns regarding kidney function, no interaction between tadalafil and tamsulosin resulting in the potentiation of hypotensive effects and that  when agents are given may reduce risk of adverse effects.  Per [employer]: several published studies have reported that combined use of the specific alpha 1-inhibitor tamsulosin and tadalafil was well tolerated in patients with BPH or for expulsion of ureteric stones.  Regarding kidney function itself, should be ok to use tamsulosin (no dose adjustment required if CrCl > 10 ml/min).    Follow-up: as needed    Charity Staton, PharmD, BCACP    SUBJECTIVE/OBJECTIVE:                          Jimmy Isaac is a 60 year old male called for an initial visit. He was referred to me from Nephrology/Dr. Ba      Allergies/ADRs: None  Tobacco: He reports that he quit smoking about 11 years ago. His smoking use included cigarettes. He started smoking about 45 years ago. He has a 30.00 pack-year smoking history. He has never used smokeless tobacco.  Alcohol: none  Illicit Drug Use: denies  Diet: Eating normal, not following any special diet - trying to incorporate more vegetables and less red mat, feels that he is staying well hydrated  Physical  "Activity: Has 4 horses he cares for and feels he has the energy/stamina to do so    Reason for visit: Medication review regarding nephrotoxic agents.  He has been followed by nephrology in the past and has a history of nephrolithiasis.    Medication Adherence/Access:   Patient uses pill box(es).  Patient takes medications 2 time(s) per day.   Per patient, misses medication 0 times per week.   Medication barriers: none.   The patient fills medications at [facility]: YES, also at [facility] for Specialty medications.    PAH  Selexipag 6000 mcg - 1 tablet twice daily, 8 AM and 7 PM  Ambrisentan 10 mg - 1 tablet daily, 8 AM   Tadalafil 20 mg - 2 tablets daily, 8 AM  Furosemide 20 mg - 1 tablet daily, 8 AM  Digoxin 125 mcg - 1 tablet daily    Pulmonary Sarcoidosis  Budesonide 0.25 mg/2 ml - nebulizes twice daily (8 AM and 7 PM)  Albuterol inhaler - as needed, 4 puffs in the morning, 2 in the afternoon, 4 in the evening    Has been needing his albuterol inhaler much more frequently recently due to the humid weather, he went to  a refill recently and it was \"refill too soon\" per the pharmacy by weeks.  Albuterol inhaler is effective and helps him breathe, especially after he completes budesonide nebs.  He is on supplemental oxygen due to chronic hypoxemic respiratory failure.  He also uses Connell Menthol Cough Drops throughout the day as he feels this helps him breathe.      BPH  Tamsulosin 0.4 mg - not yet started    Recently prescribed for urinary frequency and urgency, however he wanted to wait until after nephrology visit as he read this could be bad for the kidneys.      Pt denies taking any other over-the-counter medications, vitamins or supplements.  He is overall feeling well -- no OTCs needed for stomach upset, acid reflux, nausea, vomiting, constipation, diarrhea, allergies, headaches, pain.    Today's Vitals: There were no vitals taken for this visit.     BP Readings from Last 3 Encounters:   06/16/21 110/70 "   12/11/19 103/61   12/11/19 118/65      Recent Labs   Lab Test 10/29/18  1308 11/01/17  1047 06/04/14  1012 06/04/14  1012 12/18/13  1007   CHOL 188 234*   < > 206* 196   HDL 34* 91   < > 35* 35*   * 129*   < > 144* 136*   TRIG 174* 68   < > 137 128   CHOLHDLRATIO  --   --   --  5.9* 5.6*    < > = values in this interval not displayed.     Lab Results   Component Value Date    A1C 5.8 10/29/2018    A1C 6.2 11/01/2017    A1C 6.2 08/23/2016      Sodium   Date Value Ref Range Status   06/16/2021 138 133 - 144 mmol/L Final     Potassium   Date Value Ref Range Status   06/16/2021 4.4 3.4 - 5.3 mmol/L Final     Chloride   Date Value Ref Range Status   06/16/2021 106 94 - 109 mmol/L Final     Carbon Dioxide   Date Value Ref Range Status   06/16/2021 23 20 - 32 mmol/L Final     Anion Gap   Date Value Ref Range Status   06/16/2021 9 3 - 14 mmol/L Final     Glucose   Date Value Ref Range Status   06/16/2021 96 70 - 99 mg/dL Final     Urea Nitrogen   Date Value Ref Range Status   06/16/2021 51 (H) 7 - 30 mg/dL Final     Creatinine   Date Value Ref Range Status   06/16/2021 3.65 (H) 0.66 - 1.25 mg/dL Final     GFR Estimate   Date Value Ref Range Status   06/16/2021 17 (L) >60 mL/min/[1.73_m2] Final     Comment:     Non  GFR Calc  Starting 12/18/2018, serum creatinine based estimated GFR (eGFR) will be   calculated using the Chronic Kidney Disease Epidemiology Collaboration   (CKD-EPI) equation.       Calcium   Date Value Ref Range Status   06/16/2021 9.3 8.5 - 10.1 mg/dL Final     ----------------    I spent 17 minutes with this patient today. I offer these suggestions for consideration by nephrology and pulmonary teams. A copy of the visit note was provided to the patient's referring provider.  The patient was sent via [employer] a summary of these recommendations.     Telemedicine Visit Details  Type of service:  Telephone visit  Start Time: 1:06 PM  End Time: 1:23 PM  Originating Location (patient  location): Home  Distant Location (provider location):  [facility] AND INFECTIOUS DISEASES MTM      Medication Therapy Recommendations  BPH (benign prostatic hyperplasia)    Current Medication: tamsulosin (FLOMAX) 0.4 MG capsule   Rationale: Patient prefers not to take - Adherence - Adherence   Recommendation: Provide Education - tamsulosin 0.4 MG capsule - Ok to hold until seen by nephrology   Status: Patient Agreed - Adherence/Education         Pulmonary hypertension (H)    Current Medication: digoxin (LANOXIN) 125 MCG tablet   Rationale: Medication requires monitoring - Needs additional monitoring   Recommendation: Continue to Monitor - digoxin 125 MCG tablet - Due for digoxin level, concern for accumulation given worsening renal function   Status: Accepted - no CPA Needed          Current Medication: furosemide (LASIX) 20 MG tablet   Rationale: Undesirable effect - Adverse medication event - Safety   Recommendation: Provide Education - furosemide 20 MG tablet - Possible contributors nephrolithiasis   Status: Patient Agreed - Adherence/Education         Sarcoidosis    Current Medication: PROAIR  (90 Base) MCG/ACT inhaler   Rationale: Synergistic therapy - Needs additional medication therapy - Indication   Recommendation: Make Appt with PCP - ANGELA to pulmonary regarding increased frequency of albuterol use   Status: Contact Provider - Awaiting Response

## 2021-06-24 NOTE — Clinical Note
Hi Dr. Ba,    I did a med review with Jimmy -- aside from the furosemide no other nephrotoxic agents including OTCs or illicit drug use.  I am worried about digoxin use given his kidney function and asked him to update lab work when he is in next week.  He has not started his tamsulosin as he read it could be bad for the kidneys - I let him know he could discuss with you, but I think it should be ok.    Let me know if there is anything else I can help with!  Charity

## 2021-06-24 NOTE — TELEPHONE ENCOUNTER
Prior Authorization Approval    Authorization Effective Date: 6/18/2021  Authorization Expiration Date: 6/18/2022  Medication: Uptravi 1600mcg - Approved  Approved Dose/Quantity: 60 tablets/30 days  Reference #: [id number]/A   Insurance Company: [employer] - Phone [phone] Fax [phone]  Which Pharmacy is filling the prescription (Not needed for infusion/clinic administered): [facility] - [place], [place] - [address]  Pharmacy Notified:  Yes  ----------------------------  Insurance: [employer]  BIN: N/A  PCN: N/A  ID#: [id number]  GRP#: N/[id number]

## 2021-06-25 ENCOUNTER — VIRTUAL VISIT (OUTPATIENT)
Dept: UROLOGY | Facility: CLINIC | Age: 61
End: 2021-06-25
Payer: COMMERCIAL

## 2021-06-25 DIAGNOSIS — R39.12 BENIGN PROSTATIC HYPERPLASIA WITH WEAK URINARY STREAM: ICD-10-CM

## 2021-06-25 DIAGNOSIS — N40.1 BENIGN PROSTATIC HYPERPLASIA WITH WEAK URINARY STREAM: ICD-10-CM

## 2021-06-25 DIAGNOSIS — N20.0 NEPHROLITHIASIS: ICD-10-CM

## 2021-06-25 DIAGNOSIS — N20.0 RENAL CALCULUS: ICD-10-CM

## 2021-06-25 DIAGNOSIS — D86.9 SARCOIDOSIS: ICD-10-CM

## 2021-06-25 DIAGNOSIS — N18.4 CKD (CHRONIC KIDNEY DISEASE) STAGE 4, GFR 15-29 ML/MIN (H): Primary | ICD-10-CM

## 2021-06-25 PROCEDURE — 99213 OFFICE O/P EST LOW 20 MIN: CPT | Mod: TEL | Performed by: PHYSICIAN ASSISTANT

## 2021-06-25 NOTE — LETTER
6/25/2021       RE: Jimmy Isaac  [id number] [address] [address]     Dear Colleague,    Thank you for referring your patient, Jimmy Isaac, to the [facility] [place] at [facility]. Please see a copy of my visit note below.    Phone return  On: 1408  Off: 1418    CHIEF COMPLAINT/REASON FOR VISIT   Follow up on testing    HISTORY OF PRESENT ILLNESS   Mr. Isaac is a very pleasant 60-year-old gentleman, who presents today via phone visit for follow-up discussion regarding testing.  I previously saw him for hydronephrosis and nephrolithiasis earlier this month.  Patient had imaging in July 2020 which showed new hydronephrosis.  His kidney function has continued to decline over the last several years.  He is oxygen dependent and has sarcoidosis.    He also was noting some lower urinary tract symptoms at that time.  I gave him prescription of Flomax to trial starting.  He met with pharmacy and has decided to hold off on this.    Given his history of nephrolithiasis with increased stone burden and declining kidney function, recommendation was also made to visit with nephrology.  This is scheduled for next week.    Patient also underwent digital rectal examination and obtained a PSA later for prostate cancer screening.  He also had updated blood work.  He notes some occasional back pain and shortness of breath.  He denies any dysuria.  No elen hematuria.    The following portions of the patient's history were reviewed and updated as appropriate: allergies, current medications, past family history, past medical history, past social history, past surgical history, and problem list.     REVIEW OF SYSTEMS   Review of Systems   Respiratory: Positive for shortness of breath.    Genitourinary: Positive for flank pain. Negative for hematuria.   Musculoskeletal: Positive for back pain.      Per HPI.     Patient Active Problem List   Diagnosis      Pulmonary hypertension (H)     Sarcoidosis     Renal insufficiency due to nephrolithiasis     Dyspnea on exertion     CKD (chronic kidney disease) stage 4, GFR 15-29 ml/min (H)      Past Medical History:   Diagnosis Date     Chronic sinusitis      CKD (chronic kidney disease)      Heart disease      Hypertension      Keratosis     frontal scalp, treated with liquid nitrogen at [facility]     Malignant neoplasm (H)      Pneumonia a few times     Pulmonary sarcoidosis (H)         Objective      PHYSICAL EXAM   GENERAL: alert and no distress  RESP: Normal respiratory effort.  PSYCH: mentation appears normal     LABORATORY     Recent Labs   Lab Test 06/16/21  1522 10/18/17  1125   COLOR Yellow Yellow   APPEARANCE Clear Clear   URINEGLC Negative Negative   URINEBILI Negative Negative   URINEKETONE Negative Negative   SG 1.015 1.011   UBLD Small* Moderate*   URINEPH 5.5 6.0   PROTEIN 100* Negative   UROBILINOGEN 0.2  --    NITRITE Negative Negative   LEUKEST Moderate* Negative   RBCU  --  25*   WBCU  --  2     Creatinine 3.65 with EGFR of 17.  PSA 1.67.    IMAGING     I personally reviewed the images.     Ct Abdomen Pelvis W/o Contrast    Result Date: 6/23/2021  CT ABDOMEN PELVIS W/O CONTRAST 6/23/2021 1:43 PM CLINICAL HISTORY: Flank pain, kidney stone suspected; Bilateral nephrolithiasis; obstruction, stone burden???; Nephrolithiasis. Left abdominal pain. TECHNIQUE: CT scan of the abdomen and pelvis was performed without IV contrast. Multiplanar reformats were obtained. Dose reduction techniques were used. CONTRAST: None. COMPARISON: 11/6/2017 FINDINGS: LOWER CHEST: Bullous emphysematous changes in the visualized lung bases. HEPATOBILIARY: Normal. PANCREAS: Stable calcifications in the pancreatic head, likely the sequela of chronic pancreatitis. SPLEEN: Normal. ADRENAL GLANDS: Normal. KIDNEYS/BLADDER: Multiple nonobstructing staghorn right renal calculi with overall increased burden of calculi since 2017. For  example, there is a 1.8 x 1.4 cm calculus at the lower pole of the right kidney (series 4, image 73), previously measuring 1.1 x 0.7 cm and a 1.9 x 1.5 cm A 1.9 x 1.5 cm calculus at the upper pole of the right kidney (series 4, image [id number]) previously measured 1.2 x 1.4 cm. No hydronephrosis in the right kidney. Large, 1.3 x 1.2 x 1.8 cm obstructing calculus in the left ureteropelvic junction/proximal ureter is new with severe left hydronephrosis and mild perinephric stranding. This is likely passed from the left renal lower pole. Other nonobstructing left renal calculi are overall stable in burden. The largest left renal calculus measures 1.4 x 0.9 cm. No calculi in the urinary bladder. No renal masses evident on noncontrast CT. BOWEL: No obstruction or inflammatory change. LYMPH NODES: Stable mildly enlarged gastrohepatic lymph nodes measuring up to 1.2 cm (series 4, image 43). A few calcified retroperitoneal and mesenteric lymph nodes, likely the sequela of prior granulomatous disease. VASCULATURE: No abdominal aortic aneurysm. PELVIC ORGANS: Mild prostatomegaly. Stable atrophy of the right seminal vesicle. OTHER: No free fluid. 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     IMPRESSION: 1.  A large, 1.3 x 1.2 x 1.8 cm obstructing calculus in the left ureterovesicular junction/proximal ureter with severe left hydronephrosis. Otherwise stable burden nonobstructing left renal calculi. 2.  Increased burden of multiple staghorn right renal nonobstructing calculi. 3.  Stable mildly enlarged upper abdominal lymphadenopathy, nonspecific and likely reactive. 4.  Stable partly visualized bullous emphysema in the lung bases. COSMO PHIPPS MD     Assessment & Plan    1. CKD (chronic kidney disease) stage 4, GFR 15-29 ml/min (H)    2. Renal insufficiency due to nephrolithiasis    3. Nephrolithiasis    4. Sarcoidosis    5. Benign prostatic hyperplasia with  weak urinary stream      I the pleasure today meeting with Mr. Isaac to discuss his multiple concerns including nephrolithiasis, lower urinary tract symptoms, and CKD.  I discussed with him that his kidney function has even further decline from last year.  Creatinine is 3.65.  He is scheduled to see nephrology next week to reestablish given worsening kidney function and known nephrolithiasis.    I discussed with him that his CT imaging shows an obstructing stone in the left ureter.  He has had significant increase in stone burden bilaterally.  I discussed with him that I do not know if he will have recoverable kidney function, but we should try to unobstruct his kidney.  I have contacted Dr. Moura who he previously has seen given the significant stone burden.  He will reviewed the testing and follow-up with me on Monday.  We discussed possibility of percutaneous nephrostomy tube as well as the possibility of indwelling ureteral stent.  We again discussed that at this point, if obstruction has been longstanding, which we feared is, there may not be significant recoverable function, but we would like to preserve his remaining function.    Patient can continue to hold off on starting Flomax for his lower urinary tract symptoms until he meets with nephrology next week.    We will plan on following up with the results of Dr. Morelos's recommendations when he has had a chance to formally review the imaging.    Digital rectal examination did not have concerning findings.  PSA is within normal limits at 1.67.    Signed by:       Marcela Donnelly PA-C 6/29/2021 10:55 AM

## 2021-06-25 NOTE — PATIENT INSTRUCTIONS
Recommendations from today's MTM visit:                                                      1) Update lab work - make sure you also update Dr. Lindsey's labs when you go in.    2) Ok to wait until you see the kidney doctor before starting the tamsulosin, although I do think this will be ok for you to take.  Because of an interaction between the tamsulosin (new prostate medication) and tadalafil, you will want to monitor for low pressure -- symptoms might include lightheadedness or dizziness.    3) I will send a message to the lung doctor letting them know about the increased frequency of albuterol use.    Next MTM visit: as needed    To schedule another MTM appointment, please call the clinic directly or you may call the MTM scheduling line at [phone] or toll-free at [phone].  If you would like to speak with me I can be directly reached at [phone].    I value your experience and would be very thankful for your time with providing feedback on our clinic survey. You may receive a survey via email or text message in the next few days.     Please feel free to contact me with any questions or concerns you have.      Take care!  Charity Staton, PharmD, BCACP

## 2021-06-29 DIAGNOSIS — N20.0 KIDNEY STONE: Primary | ICD-10-CM

## 2021-06-29 ASSESSMENT — ENCOUNTER SYMPTOMS
HEMATURIA: 0
BACK PAIN: 1
SHORTNESS OF BREATH: 1
FLANK PAIN: 1

## 2021-06-29 NOTE — PROGRESS NOTES
Spoke to patient about obstructed left kidney.  Discussed stent v neph tube in effort to preserve renal function.  He would like definitive treatment of stone.  Recommend neph tube with potential for staged PCNL pending medical clearance and renal functional return.

## 2021-06-29 NOTE — PATIENT INSTRUCTIONS
Keep nephrology appointment next week due to significant increase in stones and decreasing kidney function.    We will contact you with recommendations based upon Dr. Moura's review.  Possibility of indwelling ureteral stent or percutaneous nephrostomy tube.

## 2021-06-29 NOTE — PROGRESS NOTES
Phone return  On: 1408  Off: 1418    CHIEF COMPLAINT/REASON FOR VISIT   Follow up on testing    HISTORY OF PRESENT ILLNESS   Mr. Isaac is a very pleasant 60-year-old gentleman, who presents today via phone visit for follow-up discussion regarding testing.  I previously saw him for hydronephrosis and nephrolithiasis earlier this month.  Patient had imaging in July 2020 which showed new hydronephrosis.  His kidney function has continued to decline over the last several years.  He is oxygen dependent and has sarcoidosis.    He also was noting some lower urinary tract symptoms at that time.  I gave him prescription of Flomax to trial starting.  He met with pharmacy and has decided to hold off on this.    Given his history of nephrolithiasis with increased stone burden and declining kidney function, recommendation was also made to visit with nephrology.  This is scheduled for next week.    Patient also underwent digital rectal examination and obtained a PSA later for prostate cancer screening.  He also had updated blood work.  He notes some occasional back pain and shortness of breath.  He denies any dysuria.  No elen hematuria.    The following portions of the patient's history were reviewed and updated as appropriate: allergies, current medications, past family history, past medical history, past social history, past surgical history, and problem list.     REVIEW OF SYSTEMS   Review of Systems   Respiratory: Positive for shortness of breath.    Genitourinary: Positive for flank pain. Negative for hematuria.   Musculoskeletal: Positive for back pain.      Per HPI.     Patient Active Problem List   Diagnosis     Pulmonary hypertension (H)     Sarcoidosis     Renal insufficiency due to nephrolithiasis     Dyspnea on exertion     CKD (chronic kidney disease) stage 4, GFR 15-29 ml/min (H)      Past Medical History:   Diagnosis Date     Chronic sinusitis      CKD (chronic kidney disease)      Heart disease      Hypertension       Keratosis     frontal scalp, treated with liquid nitrogen at [facility]     Malignant neoplasm (H)      Pneumonia a few times     Pulmonary sarcoidosis (H)         Objective      PHYSICAL EXAM   GENERAL: alert and no distress  RESP: Normal respiratory effort.  PSYCH: mentation appears normal     LABORATORY     Recent Labs   Lab Test 06/16/21  1522 10/18/17  1125   COLOR Yellow Yellow   APPEARANCE Clear Clear   URINEGLC Negative Negative   URINEBILI Negative Negative   URINEKETONE Negative Negative   SG 1.015 1.011   UBLD Small* Moderate*   URINEPH 5.5 6.0   PROTEIN 100* Negative   UROBILINOGEN 0.2  --    NITRITE Negative Negative   LEUKEST Moderate* Negative   RBCU  --  25*   WBCU  --  2     Creatinine 3.65 with EGFR of 17.  PSA 1.67.    IMAGING     I personally reviewed the images.     Ct Abdomen Pelvis W/o Contrast    Result Date: 6/23/2021  CT ABDOMEN PELVIS W/O CONTRAST 6/23/2021 1:43 PM CLINICAL HISTORY: Flank pain, kidney stone suspected; Bilateral nephrolithiasis; obstruction, stone burden???; Nephrolithiasis. Left abdominal pain. TECHNIQUE: CT scan of the abdomen and pelvis was performed without IV contrast. Multiplanar reformats were obtained. Dose reduction techniques were used. CONTRAST: None. COMPARISON: 11/6/2017 FINDINGS: LOWER CHEST: Bullous emphysematous changes in the visualized lung bases. HEPATOBILIARY: Normal. PANCREAS: Stable calcifications in the pancreatic head, likely the sequela of chronic pancreatitis. SPLEEN: Normal. ADRENAL GLANDS: Normal. KIDNEYS/BLADDER: Multiple nonobstructing staghorn right renal calculi with overall increased burden of calculi since 2017. For example, there is a 1.8 x 1.4 cm calculus at the lower pole of the right kidney (series 4, image 73), previously measuring 1.1 x 0.7 cm and a 1.9 x 1.5 cm A 1.9 x 1.5 cm calculus at the upper pole of the right kidney (series 4, image [id number]) previously measured 1.2 x 1.4 cm. No hydronephrosis in the right kidney.  Large, 1.3 x 1.2 x 1.8 cm obstructing calculus in the left ureteropelvic junction/proximal ureter is new with severe left hydronephrosis and mild perinephric stranding. This is likely passed from the left renal lower pole. Other nonobstructing left renal calculi are overall stable in burden. The largest left renal calculus measures 1.4 x 0.9 cm. No calculi in the urinary bladder. No renal masses evident on noncontrast CT. BOWEL: No obstruction or inflammatory change. LYMPH NODES: Stable mildly enlarged gastrohepatic lymph nodes measuring up to 1.2 cm (series 4, image 43). A few calcified retroperitoneal and mesenteric lymph nodes, likely the sequela of prior granulomatous disease. VASCULATURE: No abdominal aortic aneurysm. PELVIC ORGANS: Mild prostatomegaly. Stable atrophy of the right seminal vesicle. OTHER: No free fluid. 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     IMPRESSION: 1.  A large, 1.3 x 1.2 x 1.8 cm obstructing calculus in the left ureterovesicular junction/proximal ureter with severe left hydronephrosis. Otherwise stable burden nonobstructing left renal calculi. 2.  Increased burden of multiple staghorn right renal nonobstructing calculi. 3.  Stable mildly enlarged upper abdominal lymphadenopathy, nonspecific and likely reactive. 4.  Stable partly visualized bullous emphysema in the lung bases. COSMO PHIPPS MD     Assessment & Plan    1. CKD (chronic kidney disease) stage 4, GFR 15-29 ml/min (H)    2. Renal insufficiency due to nephrolithiasis    3. Nephrolithiasis    4. Sarcoidosis    5. Benign prostatic hyperplasia with weak urinary stream      I the pleasure today meeting with Mr. Isaac to discuss his multiple concerns including nephrolithiasis, lower urinary tract symptoms, and CKD.  I discussed with him that his kidney function has even further decline from last year.  Creatinine is 3.65.  He is scheduled to see nephrology next  week to reestablish given worsening kidney function and known nephrolithiasis.    I discussed with him that his CT imaging shows an obstructing stone in the left ureter.  He has had significant increase in stone burden bilaterally.  I discussed with him that I do not know if he will have recoverable kidney function, but we should try to unobstruct his kidney.  I have contacted Dr. Moura who he previously has seen given the significant stone burden.  He will reviewed the testing and follow-up with me on Monday.  We discussed possibility of percutaneous nephrostomy tube as well as the possibility of indwelling ureteral stent.  We again discussed that at this point, if obstruction has been longstanding, which we feared is, there may not be significant recoverable function, but we would like to preserve his remaining function.    Patient can continue to hold off on starting Flomax for his lower urinary tract symptoms until he meets with nephrology next week.    We will plan on following up with the results of Dr. Morelos's recommendations when he has had a chance to formally review the imaging.    Digital rectal examination did not have concerning findings.  PSA is within normal limits at 1.67.    Signed by:       Marcela Donnelly PA-C 6/29/2021 10:55 AM

## 2021-06-30 ASSESSMENT — ENCOUNTER SYMPTOMS
NAUSEA: 0
CHILLS: 0
DYSURIA: 0
FEVER: 0
FREQUENCY: 1
VOMITING: 0
HEMATURIA: 0
SHORTNESS OF BREATH: 1

## 2021-06-30 NOTE — PROGRESS NOTES
Subjective      REQUESTING PROVIDER   Khang Lindsey     REASON FOR CONSULT   Nephrolithiaisis    HISTORY OF PRESENT ILLNESS   Mr. Isaac is a very pleasant 60-year-old gentleman, who presents today for repeat evaluation of nephrolithiasis.  He was last seen by Dr. Moura in November 2017.  Medical history was significant for sarcoidosis diagnosed in 1992.  He was requiring continuous oxygen treatment.  Plan was to continue to monitor his nephrolithiasis given extreme risk of surgical intervention given his pulmonary function and anesthesia.  They had discussed possible surgical intervention.  He was seeing Dr. Ba in nephrology too.  He has not continued with follow-up.    Patient was seen in July 2020 at [facility] for evaluation of a possible research study regarding his lung function.  As part of that evaluation, he underwent a nuclear med SPECT CT.  This identified incidentally increased bilateral stone burden with partially visualized moderate to advanced hydronephrosis on the left.  There was recommendation for urology consultation.  Patient presents today now.    Reviewing historical data, patient's kidney function has continued to decrease over the last several years.  Creatinine in 2018 was 1.40 with EGFR 55.  December 2019: Creatinine 1.87 EGFR 39.  July 6 2020: Creatinine 3.03 with a EGFR of 21.    Patient endorses some occasional right flank pain.  He endorses some occasional abdominal pain.  He has continued to pass some stones.  Patient continues to require oxygen.  He typically will be on 4.5 L overnight when he is sleeping.  When he is ambulating, such as coming in from the parking lot, he required 12 L of oxygen.    Patient denies any gross hematuria or dysuria.  He denies any urinary tract infections.  Postvoid residual is 24 mL.  He does endorse urgency and frequency of urination but no urinary incontinence.  He would categorize his stream is adequate in strength.  He is uncertain if  he empties his bladder completely.  He has nocturia x4.    Patient also does not have a primary care provider and does have positive screening questions for depression.  His significant other also endorses that he would benefit from establishing with a primary care provider and with treatment for his mental health.    First stone: 5931-1787.   Number of stone surgeries: None   Number of stones passed spontaneously: Several, most recent stone passed 1 year ago  History of UTI: None  Prior Stone Analysis: Calcium oxalate  Family History Nephrolithasis: None  Stone Risk Factors: Sarcoid  Prior Metabolic Workup: YEs, did not show hypercalciuria, followed with Dr. Ba    The following portions of the patient's history were reviewed and updated as appropriate: allergies, current medications, past family history, past medical history, past social history, past surgical history and problem list.     REVIEW OF SYSTEMS   Review of Systems   Constitutional: Negative for chills and fever.   Respiratory: Positive for shortness of breath.    Cardiovascular: Negative for chest pain.   Gastrointestinal: Negative for nausea and vomiting.   Genitourinary: Positive for frequency and urgency. Negative for dysuria and hematuria.   Musculoskeletal: Positive for gait problem.   Psychiatric/Behavioral:        Depressed.      Per HPI.     Patient Active Problem List   Diagnosis     Pulmonary hypertension (H)     Sarcoidosis     Renal insufficiency due to nephrolithiasis     Dyspnea on exertion     CKD (chronic kidney disease) stage 4, GFR 15-29 ml/min (H)      Past Medical History:   Diagnosis Date     Chronic sinusitis      CKD (chronic kidney disease)      Heart disease      Hypertension      Keratosis     frontal scalp, treated with liquid nitrogen at [facility]     Malignant neoplasm (H)      Pneumonia a few times     Pulmonary sarcoidosis (H)       Past Surgical History:   Procedure Laterality Date     COLONOSCOPY        CV RIGHT HEART CATH MEASUREMENTS RECORDED N/A 6/19/2019    Procedure: CV RIGHT HEART CATH;  Surgeon: Kale Delgado MD;  [facility]     CV RIGHT HEART CATH MEASUREMENTS RECORDED N/A 12/11/2019    Procedure: CV RIGHT HEART CATH;  Surgeon: Ion Lemon MD;  Location:  [facility]     ENT SURGERY      wisdom teeth extraction     TONSILLECTOMY  1964      Social History:   Patient has a long-term fiancé.  Former smoker.  Quit in 2010.  He smoked for 35 years, 1 pack/day.    Family History:   Family history of prostate cancer in a maternal uncle.  No other family history of  malignancy.    Objective      PHYSICAL EXAM   /70   Ht 1.829 m (6')   Wt 74.8 kg (165 lb)   BMI 22.38 kg/m     Physical Exam  Constitutional:       Appearance: Normal appearance.   HENT:      Head: Normocephalic.      Nose: Nose normal.   Eyes:      General: No scleral icterus.  Neck:      Musculoskeletal: Normal range of motion.   Pulmonary:      Comments: Increased respiratory effort.  Patient has nasal cannula with oxygen flowing.  Abdominal:      Tenderness: There is no abdominal tenderness. There is no right CVA tenderness or left CVA tenderness.   Genitourinary:     Comments: Prostate: Approximately 70 to 80 g.  No palpable masses, nodules, or induration.  Nontender.  Rectum: Normal rectal tone.  Musculoskeletal:      Comments: Antalgic gait noted with ambulation.   Skin:     General: Skin is warm.   Neurological:      General: No focal deficit present.      Mental Status: He is alert.   Psychiatric:         Behavior: Behavior normal.        LABORATORY   Recent Labs   Lab Test 06/16/21  1522 10/18/17  1125   COLOR Yellow Yellow   APPEARANCE Clear Clear   URINEGLC Negative Negative   URINEBILI Negative Negative   URINEKETONE Negative Negative   SG 1.015 1.011   UBLD Small* Moderate*   URINEPH 5.5 6.0   PROTEIN 100* Negative   UROBILINOGEN 0.2  --    NITRITE Negative Negative   LEUKEST  Moderate* Negative   RBCU  --  25*   WBCU  --  2     Creatinine most recently 07/6/2020 3.03 eGFR 21.    IMAGING     Images not reviewed.  NM Lung Perfusion only SPECT CT  1. Progressive perihilar fibrosis and advanced lower lobe predominant  emphysematous changes with enlarging mediastinal and bilateral hilar  lymphadenopathy in this patient with reported history of sarcoidosis.   Patchy heterogeneous perfusion throughout both lungs with larger defects corresponding to areas of structural lung disease.  2. Bilateral renal stone burden, increased since the comparison CT, with new partially visualized moderate to advanced hydronephrosis on the left. Consider urology consult or additional cross-sectional imaging in further workup.  3. New sclerosis in the right humeral metaphysis is indeterminate and   Could represent an indeterminate chondroid lesion, bone infarct, or bony Involvement of sarcoidosis. A right humerus MRI could be considered in further workup if clinically indicated.    Assessment & Plan    1. Nephrolithiasis    2. CKD (chronic kidney disease) stage 4, GFR 15-29 ml/min (H)    3. Benign prostatic hyperplasia with urinary frequency    4. Sarcoidosis      I had the pleasure today of meeting with Mr. Isaac and his significant other to discuss his nephrolithiasis, lower urinary tract symptoms, and kidney function.  Additionally, we discussed briefly his depression.    The largest concern overall is with increasing nephrolithiasis seen on previous scan, which I do not have access to the actual images.  Additionally, this is almost a year old.  Patient has had increasing decline in his overall kidney function with most recent EGFR of 21.    Based upon these findings, I have concern that he has obstruction from nephrolithiasis which is slowly causing more more deterioration to his kidney function.  We discussed that given that this is likely been longstanding, kidney function may not be recoverable at this  time.  However, we would like to stabilize this due to him getting very close to dialysis.    -Recommendation with referral back to nephrology for establishing care for nephrolithiasis and chronic kidney disease stage IV.    -CT abdomen pelvis without contrast to look at degree of hydronephrosis and kidney stone burden.    -Blood draw for a BMP and PSA.  Need to get updated kidney function and make sure electrolytes are within normal limits.  PSA for prostate cancer screening and BPH management.    -Given his lower urinary tract symptoms, trial of Flomax to help with urgency, frequency, and nocturia.  He is emptying his bladder well.    -Patient was given several providers names to establish primary care.  We discussed the importance of this to have some looking at the big picture.  Additionally, he may be able to help with his decreased mood.  He and his significant other voiced understanding.    We discussed complicated issues given his need for oxygen and anesthesia risks.  We also discussed the concern with his kidney function has significantly decreased.    Signed by:     Marcela Donnelly PA-C

## 2021-07-01 ENCOUNTER — OFFICE VISIT (OUTPATIENT)
Dept: NEPHROLOGY | Facility: CLINIC | Age: 61
End: 2021-07-01
Attending: PHYSICIAN ASSISTANT
Payer: COMMERCIAL

## 2021-07-01 VITALS
HEIGHT: 72 IN | DIASTOLIC BLOOD PRESSURE: 70 MMHG | WEIGHT: 167.5 LBS | BODY MASS INDEX: 22.69 KG/M2 | RESPIRATION RATE: 22 BRPM | OXYGEN SATURATION: 98 % | TEMPERATURE: 97.7 F | HEART RATE: 74 BPM | SYSTOLIC BLOOD PRESSURE: 114 MMHG

## 2021-07-01 DIAGNOSIS — I27.20 PULMONARY HYPERTENSION (H): ICD-10-CM

## 2021-07-01 DIAGNOSIS — Z51.81 ENCOUNTER FOR MONITORING DIGOXIN THERAPY: ICD-10-CM

## 2021-07-01 DIAGNOSIS — N20.0 NEPHROLITHIASIS: ICD-10-CM

## 2021-07-01 DIAGNOSIS — D86.9 SARCOIDOSIS: ICD-10-CM

## 2021-07-01 DIAGNOSIS — N17.9 AKI (ACUTE KIDNEY INJURY) (H): ICD-10-CM

## 2021-07-01 DIAGNOSIS — N18.4 CKD (CHRONIC KIDNEY DISEASE) STAGE 4, GFR 15-29 ML/MIN (H): ICD-10-CM

## 2021-07-01 DIAGNOSIS — R06.02 SOB (SHORTNESS OF BREATH): ICD-10-CM

## 2021-07-01 DIAGNOSIS — N18.4 CKD (CHRONIC KIDNEY DISEASE) STAGE 4, GFR 15-29 ML/MIN (H): Primary | ICD-10-CM

## 2021-07-01 DIAGNOSIS — Z79.899 ENCOUNTER FOR MONITORING DIGOXIN THERAPY: ICD-10-CM

## 2021-07-01 LAB
ALBUMIN SERPL-MCNC: 3.5 G/DL (ref 3.4–5)
ALP SERPL-CCNC: 68 U/L (ref 40–150)
ALT SERPL W P-5'-P-CCNC: 27 U/L (ref 0–70)
ANION GAP SERPL CALCULATED.3IONS-SCNC: 7 MMOL/L (ref 3–14)
AST SERPL W P-5'-P-CCNC: 20 U/L (ref 0–45)
BILIRUB SERPL-MCNC: 0.2 MG/DL (ref 0.2–1.3)
BUN SERPL-MCNC: 50 MG/DL (ref 7–30)
CALCIUM SERPL-MCNC: 8.7 MG/DL (ref 8.5–10.1)
CHLORIDE SERPL-SCNC: 110 MMOL/L (ref 94–109)
CO2 SERPL-SCNC: 25 MMOL/L (ref 20–32)
CREAT SERPL-MCNC: 3.27 MG/DL (ref 0.66–1.25)
CREAT UR-MCNC: 40 MG/DL
CRP SERPL-MCNC: 9.2 MG/L (ref 0–8)
DIGOXIN SERPL-MCNC: 0.9 UG/L (ref 0.5–2)
ERYTHROCYTE [DISTWIDTH] IN BLOOD BY AUTOMATED COUNT: 13.2 % (ref 10–15)
FERRITIN SERPL-MCNC: 33 NG/ML (ref 26–388)
GFR SERPL CREATININE-BSD FRML MDRD: 19 ML/MIN/{1.73_M2}
GLUCOSE SERPL-MCNC: 95 MG/DL (ref 70–99)
HCT VFR BLD AUTO: 36.5 % (ref 40–53)
HGB BLD-MCNC: 11.4 G/DL (ref 13.3–17.7)
IRON SATN MFR SERPL: 12 % (ref 15–46)
IRON SERPL-MCNC: 28 UG/DL (ref 35–180)
MCH RBC QN AUTO: 29.2 PG (ref 26.5–33)
MCHC RBC AUTO-ENTMCNC: 31.2 G/DL (ref 31.5–36.5)
MCV RBC AUTO: 93 FL (ref 78–100)
NT-PROBNP SERPL-MCNC: 8465 PG/ML (ref 0–125)
PHOSPHATE SERPL-MCNC: 3.8 MG/DL (ref 2.5–4.5)
PLATELET # BLD AUTO: 141 10E9/L (ref 150–450)
POTASSIUM SERPL-SCNC: 4 MMOL/L (ref 3.4–5.3)
PROT SERPL-MCNC: 6.8 G/DL (ref 6.8–8.8)
PROT UR-MCNC: 0.36 G/L
PROT/CREAT 24H UR: 0.91 G/G CR (ref 0–0.2)
RBC # BLD AUTO: 3.91 10E12/L (ref 4.4–5.9)
SODIUM SERPL-SCNC: 142 MMOL/L (ref 133–144)
TIBC SERPL-MCNC: 241 UG/DL (ref 240–430)
WBC # BLD AUTO: 5.7 10E9/L (ref 4–11)

## 2021-07-01 PROCEDURE — 82728 ASSAY OF FERRITIN: CPT | Performed by: PATHOLOGY

## 2021-07-01 PROCEDURE — 84100 ASSAY OF PHOSPHORUS: CPT | Performed by: PATHOLOGY

## 2021-07-01 PROCEDURE — 83550 IRON BINDING TEST: CPT | Performed by: PATHOLOGY

## 2021-07-01 PROCEDURE — 83540 ASSAY OF IRON: CPT | Performed by: PATHOLOGY

## 2021-07-01 PROCEDURE — G0463 HOSPITAL OUTPT CLINIC VISIT: HCPCS

## 2021-07-01 PROCEDURE — 84238 ASSAY NONENDOCRINE RECEPTOR: CPT | Mod: 90 | Performed by: PATHOLOGY

## 2021-07-01 PROCEDURE — 36415 COLL VENOUS BLD VENIPUNCTURE: CPT | Performed by: PATHOLOGY

## 2021-07-01 PROCEDURE — 80162 ASSAY OF DIGOXIN TOTAL: CPT | Mod: 90 | Performed by: PATHOLOGY

## 2021-07-01 PROCEDURE — 86140 C-REACTIVE PROTEIN: CPT | Performed by: PATHOLOGY

## 2021-07-01 PROCEDURE — 84156 ASSAY OF PROTEIN URINE: CPT | Performed by: PATHOLOGY

## 2021-07-01 PROCEDURE — 80053 COMPREHEN METABOLIC PANEL: CPT | Performed by: PATHOLOGY

## 2021-07-01 PROCEDURE — 85027 COMPLETE CBC AUTOMATED: CPT | Performed by: PATHOLOGY

## 2021-07-01 PROCEDURE — 83880 ASSAY OF NATRIURETIC PEPTIDE: CPT | Performed by: PATHOLOGY

## 2021-07-01 PROCEDURE — 99215 OFFICE O/P EST HI 40 MIN: CPT | Performed by: PHYSICIAN ASSISTANT

## 2021-07-01 ASSESSMENT — MIFFLIN-ST. JEOR: SCORE: 1602.91

## 2021-07-01 ASSESSMENT — PAIN SCALES - GENERAL: PAINLEVEL: MODERATE PAIN (4)

## 2021-07-01 NOTE — LETTER
7/1/2021      RE: Jimmy Isaac  [address] [address] [id number]       Nephrology Progress Note  07/01/2021           Reason for Visit: Follow up CKD, kidney stones    ASSESSMENT/RECOMMENDATIONS:  Jimmy Isaac is a 61 year old male with a history of Sarcoidosis, CKD and Nephrolithiasis who presents for routine follow up. Sarcoidosis was diagnosed in 1991 complicated by pulmonary hypertension, hypoxia on home oxygen when ambulating and nephrolithiasis (calcium oxalate).    #CKD 4  - baseline creatinine 1.4 in 2018, increasing to ~ 1.8-1.9 in 2019, Then creatinine increased to ~ 3 in July 2020. Recent creatinine up to 3.65  - today (7/1/21) creatinine is 3.27, eGFR 19, likely new baseline.  - check urine prot/cr ratio  - Discussed kidney function in detail and possibility that kidney does not completely recover.   - Review with Dr. Ba for possible further evaluation   - Definitive treatment of stone asap      #BP/CV  - normotensive, history of hypertension/pulmonary hypertension, does not take any medications at this time  - ECHO 10/29/18  Interpretation Summary  Technically difficult study.  Global and regional left ventricular function is normal with an EF of 55-60%.  Mild right ventricular dilation is present.  Global right ventricular function is normal.  Right ventricular systolic pressure is 28mmHg above the right atrial pressure.  No significant valvular abnormalities.  The inferior vena cava was normal in size with preserved respiratory  variability.  No pericardial effusion is present.  This study was compared to a TTE from 9/22/2017. There has been no significant  Change.  - ECHO and followed up with cardiology planned on 7/14/21      #Volume  - weight stable  - takes lasix 20 mg daily, pharmacy feels this could contribute to stones      #Electrolytes/Acid/Base  - 7/1/21 potassium 4.4, sodium 138, bicarb 23  - no acute concerns      #Anemia  - 7/1/21 Hgb 11.4  - iron 28, iron sat 12 %,  ferritin 33      #Bone/mineral disease:  - 7/1/21 Calcium 8.7      #Nephrolithiasis   - following with urology  - left Nephrostomy tube placement with definitive treatment of stone planned  - patient and his significant other had multiple questions about upcoming surgery. Advised to contact their office.       #Disposition: check urine prot/cr ratio. Review with Dr. Ba for any further recommendations. Follow with urology for definitive treatment of left ureteral calculus.     Recommendations reviewed with patient. All questions answered to their satisfaction. They will call with any new or worsening symptoms.       HISTORY OF PRESENT ILLNESS  Jimmy Isaac is a 89 year old male with a history of Sarcoidosis, CKD and Nephrolithiasis who presents for routine follow up. Sarcoidosis was diagnosed in 1991 complicated by pulmonary hypertension, hypoxia on home oxygen when ambulating and nephrolithiasis (calcium oxalate).     He was initially diagnosed with kidney stones in 1991 with new diagnosis of sarcoidosis and was told to drink a lot of water. Stones were noted again in 2012 incidentally on imaging for pulmonary hypertension but he has also passed stones which were analyzed in 2012 and showed calcium oxalate.    CKD was stable from 2012 until ~ 2018. He was last seen by Dr. Ba 12/26/2018. Baseline creatinine at that time was 1.4 and eGFR 55. He did not follow up. Recently he was evaluated in urology and noted to have creatinine of 3.65 with eGFR 17. Chart review showed creatinine in July 2020 of ~ 3. CT stone was obtained and showed a large obstructing left ureteral calculus, left intrarenal calculi, and increased burden of right staghorn calculi. After discussion with urology team, plan is to place left nephrostomy tube and have staged definitive treatment of the obstructing stone.        PAST MEDICAL/SURGICAL HISTORY  - Reviewed    ALLERGIES and MEDICATIONS  - Reviewed per EMR    REVIEW OF SYSTEMS:   -  Comprehensive system review obtained, negative unless noted in HPI      PHYSICAL EXAM:   Vitals: /70  Pulse 74   Resp 22  SpO2 98%,  Weight 22.71 kg  General: Awake and alert, appears to be in no acute distress  Skin: warm and dry  Heart: regular rate and rythm  Lungs: clear bilaterally  Extremities: trace lower extremity edema    LABS  All labs reviewed by me  Electrolytes/Renal -   Recent Labs   Lab Test 06/16/21  1630 12/11/19  1109 09/25/19  1209 12/26/18  1318 12/26/18  1318 05/30/18  1025 05/30/18  1025 11/01/17  1047 11/01/17  1047    140 136   < > 139   < > 138   < > 136   POTASSIUM 4.4 4.3 4.1   < > 3.8   < > 4.2   < > 4.2   CHLORIDE 106 109 102   < > 102   < > 104   < > 98   CO2 23 28 29   < > 31   < > 28   < > 30   BUN 51* 32* 30   < > 32*   < > 24   < > 28   CR 3.65* 1.85* 1.62*   < > 1.40*   < > 1.34*   < > 1.30*   GLC 96 78 90   < > 122*   < > 100*   < > 146*   TERI 9.3 8.9 9.4   < > 9.1   < > 9.2   < > 8.7   PHOS  --   --   --   --  3.3  --  2.4*  --  1.7*    < > = values in this interval not displayed.       CBC -   Recent Labs   Lab Test 12/11/19 1109 09/25/19  1209 06/19/19  1002   WBC 6.4 6.5 6.4   HGB 13.0* 13.8 13.3    149* 177       LFTs -   Recent Labs   Lab Test 12/11/19  1109 09/25/19  1209 01/09/19  1152   ALKPHOS 71 72 76   BILITOTAL 0.8 0.4 0.6   ALT 28 21 22   AST 20 16 26   PROTTOTAL 6.6* 7.6 7.6   ALBUMIN 3.6 3.9 3.8       Iron Panel -   Recent Labs   Lab Test 10/18/17  1121   IRON 82   IRONSAT 29   MIHIR 41         Imaging: Reviewed with patient   CT abdomen/pelvis 6/23/21  IMPRESSION:   1.  A large, 1.3 x 1.2 x 1.8 cm obstructing calculus in the left  ureterovesicular junction/proximal ureter with severe left  hydronephrosis. Otherwise stable burden nonobstructing left renal  calculi.  2.  Increased burden of multiple staghorn right renal nonobstructing  calculi.  3.  Stable mildly enlarged upper abdominal lymphadenopathy,  nonspecific and likely reactive.  4.   Stable partly visualized bullous emphysema in the lung bases.      ALIA MASON PA-C     Face to Face time 35 minutes. An additional 25 minutes was spent on date of service in chart review, documentation, and other activities as noted.     The author of this note documented a reason for not sharing it with the patient.      ALIA MASON PA-C

## 2021-07-01 NOTE — NURSING NOTE
"Chief Complaint   Patient presents with     RECHECK     CKD       Vital signs:  Temp: 97.7  F (36.5  C) Temp src: Oral BP: 114/70 Pulse: 74   Resp: 22 SpO2: 98 %(6 lpm via NC)     Height: 182.9 cm (6' 0.01\") Weight: 76 kg (167 lb 8 oz)  Estimated body mass index is 22.71 kg/m  as calculated from the following:    Height as of this encounter: 1.829 m (6' 0.01\").    Weight as of this encounter: 76 kg (167 lb 8 oz).        Sharon De Los Santos, [facility]  7/1/2021 1:49 PM      "

## 2021-07-01 NOTE — PROGRESS NOTES
Nephrology Progress Note  07/01/2021             Reason for Visit: Follow up CKD, kidney stones    ASSESSMENT/RECOMMENDATIONS:  Jimmy Isaac is a 61 year old male with a history of Sarcoidosis, CKD and Nephrolithiasis who presents for routine follow up. Sarcoidosis was diagnosed in 1991 complicated by pulmonary hypertension, hypoxia on home oxygen when ambulating and nephrolithiasis (calcium oxalate).    #CKD 4  - baseline creatinine 1.4 in 2018, increasing to ~ 1.8-1.9 in 2019, Then creatinine increased to ~ 3 in July 2020. Recent creatinine up to 3.65  - today (7/1/21) creatinine is 3.27, eGFR 19, likely new baseline.  - check urine prot/cr ratio  - Discussed kidney function in detail and possibility that kidney does not completely recover.   - Review with Dr. Ba for possible further evaluation   - Definitive treatment of stone asap      #BP/CV  - normotensive, history of hypertension/pulmonary hypertension, does not take any medications at this time  - ECHO 10/29/18  Interpretation Summary  Technically difficult study.  Global and regional left ventricular function is normal with an EF of 55-60%.  Mild right ventricular dilation is present.  Global right ventricular function is normal.  Right ventricular systolic pressure is 28mmHg above the right atrial pressure.  No significant valvular abnormalities.  The inferior vena cava was normal in size with preserved respiratory  variability.  No pericardial effusion is present.  This study was compared to a TTE from 9/22/2017. There has been no significant  Change.  - ECHO and followed up with cardiology planned on 7/14/21      #Volume  - weight stable  - takes lasix 20 mg daily, pharmacy feels this could contribute to stones      #Electrolytes/Acid/Base  - 7/1/21 potassium 4.4, sodium 138, bicarb 23  - no acute concerns      #Anemia  - 7/1/21 Hgb 11.4  - iron 28, iron sat 12 %, ferritin 33      #Bone/mineral disease:  - 7/1/21 Calcium 8.7      #Nephrolithiasis    - following with urology  - left Nephrostomy tube placement with definitive treatment of stone planned  - patient and his significant other had multiple questions about upcoming surgery. Advised to contact their office.       #Disposition: check urine prot/cr ratio. Review with Dr. Ba for any further recommendations. Follow with urology for definitive treatment of left ureteral calculus.     Recommendations reviewed with patient. All questions answered to their satisfaction. They will call with any new or worsening symptoms.       HISTORY OF PRESENT ILLNESS  Jimmy Isaac is a 89 year old male with a history of Sarcoidosis, CKD and Nephrolithiasis who presents for routine follow up. Sarcoidosis was diagnosed in 1991 complicated by pulmonary hypertension, hypoxia on home oxygen when ambulating and nephrolithiasis (calcium oxalate).     He was initially diagnosed with kidney stones in 1991 with new diagnosis of sarcoidosis and was told to drink a lot of water. Stones were noted again in 2012 incidentally on imaging for pulmonary hypertension but he has also passed stones which were analyzed in 2012 and showed calcium oxalate.    CKD was stable from 2012 until ~ 2018. He was last seen by Dr. Ba 12/26/2018. Baseline creatinine at that time was 1.4 and eGFR 55. He did not follow up. Recently he was evaluated in urology and noted to have creatinine of 3.65 with eGFR 17. Chart review showed creatinine in July 2020 of ~ 3. CT stone was obtained and showed a large obstructing left ureteral calculus, left intrarenal calculi, and increased burden of right staghorn calculi. After discussion with urology team, plan is to place left nephrostomy tube and have staged definitive treatment of the obstructing stone.        PAST MEDICAL/SURGICAL HISTORY  - Reviewed    ALLERGIES and MEDICATIONS  - Reviewed per EMR    REVIEW OF SYSTEMS:   - Comprehensive system review obtained, negative unless noted in HPI      PHYSICAL  EXAM:   Vitals: /70  Pulse 74   Resp 22  SpO2 98%,  Weight 22.71 kg  General: Awake and alert, appears to be in no acute distress  Skin: warm and dry  Heart: regular rate and rythm  Lungs: clear bilaterally  Extremities: trace lower extremity edema    LABS  All labs reviewed by me  Electrolytes/Renal -   Recent Labs   Lab Test 06/16/21  1630 12/11/19  1109 09/25/19  1209 12/26/18  1318 12/26/18  1318 05/30/18  1025 05/30/18  1025 11/01/17  1047 11/01/17  1047    140 136   < > 139   < > 138   < > 136   POTASSIUM 4.4 4.3 4.1   < > 3.8   < > 4.2   < > 4.2   CHLORIDE 106 109 102   < > 102   < > 104   < > 98   CO2 23 28 29   < > 31   < > 28   < > 30   BUN 51* 32* 30   < > 32*   < > 24   < > 28   CR 3.65* 1.85* 1.62*   < > 1.40*   < > 1.34*   < > 1.30*   GLC 96 78 90   < > 122*   < > 100*   < > 146*   TERI 9.3 8.9 9.4   < > 9.1   < > 9.2   < > 8.7   PHOS  --   --   --   --  3.3  --  2.4*  --  1.7*    < > = values in this interval not displayed.       CBC -   Recent Labs   Lab Test 12/11/19  1109 09/25/19  1209 06/19/19  1002   WBC 6.4 6.5 6.4   HGB 13.0* 13.8 13.3    149* 177       LFTs -   Recent Labs   Lab Test 12/11/19  1109 09/25/19  1209 01/09/19  1152   ALKPHOS 71 72 76   BILITOTAL 0.8 0.4 0.6   ALT 28 21 22   AST 20 16 26   PROTTOTAL 6.6* 7.6 7.6   ALBUMIN 3.6 3.9 3.8       Iron Panel -   Recent Labs   Lab Test 10/18/17  1121   IRON 82   IRONSAT 29   MIHIR 41         Imaging: Reviewed with patient   CT abdomen/pelvis 6/23/21  IMPRESSION:   1.  A large, 1.3 x 1.2 x 1.8 cm obstructing calculus in the left  ureterovesicular junction/proximal ureter with severe left  hydronephrosis. Otherwise stable burden nonobstructing left renal  calculi.  2.  Increased burden of multiple staghorn right renal nonobstructing  calculi.  3.  Stable mildly enlarged upper abdominal lymphadenopathy,  nonspecific and likely reactive.  4.  Stable partly visualized bullous emphysema in the lung bases.      ALIA MASON,  ADOLFO     Face to Face time 35 minutes. An additional 25 minutes was spent on date of service in chart review, documentation, and other activities as noted.     The author of this note documented a reason for not sharing it with the patient.

## 2021-07-01 NOTE — LETTER
7/1/2021       RE: Jimmy Isaac  [address] [address] [address]     Dear Colleague,    Thank you for referring your patient, Jimmy Isaac, to the [facility] [place] [facility]. Please see a copy of my visit note below.    Nephrology Progress Note  07/01/2021             Reason for Visit: Follow up CKD, kidney stones    ASSESSMENT/RECOMMENDATIONS:  Jimmy Isaac is a 61 year old male with a history of Sarcoidosis, CKD and Nephrolithiasis who presents for routine follow up. Sarcoidosis was diagnosed in 1991 complicated by pulmonary hypertension, hypoxia on home oxygen when ambulating and nephrolithiasis (calcium oxalate).    #CKD 4  - baseline creatinine 1.4 in 2018, increasing to ~ 1.8-1.9 in 2019, Then creatinine increased to ~ 3 in July 2020. Recent creatinine up to 3.65  - today (7/1/21) creatinine is 3.27, eGFR 19, likely new baseline.  - check urine prot/cr ratio  - Discussed kidney function in detail and possibility that kidney does not completely recover.   - Review with Dr. Ba for possible further evaluation   - Definitive treatment of stone asap      #BP/CV  - normotensive, history of hypertension/pulmonary hypertension, does not take any medications at this time  - ECHO 10/29/18  Interpretation Summary  Technically difficult study.  Global and regional left ventricular function is normal with an EF of 55-60%.  Mild right ventricular dilation is present.  Global right ventricular function is normal.  Right ventricular systolic pressure is 28mmHg above the right atrial pressure.  No significant valvular abnormalities.  The inferior vena cava was normal in size with preserved respiratory  variability.  No pericardial effusion is present.  This study was compared to a TTE from 9/22/2017. There has been no significant  Change.  - ECHO and followed up with cardiology planned on 7/14/21      #Volume  - weight  stable  - takes lasix 20 mg daily, pharmacy feels this could contribute to stones      #Electrolytes/Acid/Base  - 7/1/21 potassium 4.4, sodium 138, bicarb 23  - no acute concerns      #Anemia  - 7/1/21 Hgb 11.4  - iron 28, iron sat 12 %, ferritin 33      #Bone/mineral disease:  - 7/1/21 Calcium 8.7      #Nephrolithiasis   - following with urology  - left Nephrostomy tube placement with definitive treatment of stone planned  - patient and his significant other had multiple questions about upcoming surgery. Advised to contact their office.       #Disposition: check urine prot/cr ratio. Review with Dr. Ba for any further recommendations. Follow with urology for definitive treatment of left ureteral calculus.     Recommendations reviewed with patient. All questions answered to their satisfaction. They will call with any new or worsening symptoms.       HISTORY OF PRESENT ILLNESS  Jimmy Isaac is a 89 year old male with a history of Sarcoidosis, CKD and Nephrolithiasis who presents for routine follow up. Sarcoidosis was diagnosed in 1991 complicated by pulmonary hypertension, hypoxia on home oxygen when ambulating and nephrolithiasis (calcium oxalate).     He was initially diagnosed with kidney stones in 1991 with new diagnosis of sarcoidosis and was told to drink a lot of water. Stones were noted again in 2012 incidentally on imaging for pulmonary hypertension but he has also passed stones which were analyzed in 2012 and showed calcium oxalate.    CKD was stable from 2012 until ~ 2018. He was last seen by Dr. Ba 12/26/2018. Baseline creatinine at that time was 1.4 and eGFR 55. He did not follow up. Recently he was evaluated in urology and noted to have creatinine of 3.65 with eGFR 17. Chart review showed creatinine in July 2020 of ~ 3. CT stone was obtained and showed a large obstructing left ureteral calculus, left intrarenal calculi, and increased burden of right staghorn calculi. After discussion with  urology team, plan is to place left nephrostomy tube and have staged definitive treatment of the obstructing stone.        PAST MEDICAL/SURGICAL HISTORY  - Reviewed    ALLERGIES and MEDICATIONS  - Reviewed per EMR    REVIEW OF SYSTEMS:   - Comprehensive system review obtained, negative unless noted in HPI      PHYSICAL EXAM:   Vitals: /70  Pulse 74   Resp 22  SpO2 98%,  Weight 22.71 kg  General: Awake and alert, appears to be in no acute distress  Skin: warm and dry  Heart: regular rate and rythm  Lungs: clear bilaterally  Extremities: trace lower extremity edema    LABS  All labs reviewed by me  Electrolytes/Renal -   Recent Labs   Lab Test 06/16/21  1630 12/11/19  1109 09/25/19  1209 12/26/18  1318 12/26/18  1318 05/30/18  1025 05/30/18  1025 11/01/17  1047 11/01/17  1047    140 136   < > 139   < > 138   < > 136   POTASSIUM 4.4 4.3 4.1   < > 3.8   < > 4.2   < > 4.2   CHLORIDE 106 109 102   < > 102   < > 104   < > 98   CO2 23 28 29   < > 31   < > 28   < > 30   BUN 51* 32* 30   < > 32*   < > 24   < > 28   CR 3.65* 1.85* 1.62*   < > 1.40*   < > 1.34*   < > 1.30*   GLC 96 78 90   < > 122*   < > 100*   < > 146*   TERI 9.3 8.9 9.4   < > 9.1   < > 9.2   < > 8.7   PHOS  --   --   --   --  3.3  --  2.4*  --  1.7*    < > = values in this interval not displayed.       CBC -   Recent Labs   Lab Test 12/11/19 1109 09/25/19  1209 06/19/19  1002   WBC 6.4 6.5 6.4   HGB 13.0* 13.8 13.3    149* 177       LFTs -   Recent Labs   Lab Test 12/11/19 1109 09/25/19  1209 01/09/19  1152   ALKPHOS 71 72 76   BILITOTAL 0.8 0.4 0.6   ALT 28 21 22   AST 20 16 26   PROTTOTAL 6.6* 7.6 7.6   ALBUMIN 3.6 3.9 3.8       Iron Panel -   Recent Labs   Lab Test 10/18/17  1121   IRON 82   IRONSAT 29   MIHIR 41         Imaging: Reviewed with patient   CT abdomen/pelvis 6/23/21  IMPRESSION:   1.  A large, 1.3 x 1.2 x 1.8 cm obstructing calculus in the left  ureterovesicular junction/proximal ureter with severe left  hydronephrosis.  Otherwise stable burden nonobstructing left renal  calculi.  2.  Increased burden of multiple staghorn right renal nonobstructing  calculi.  3.  Stable mildly enlarged upper abdominal lymphadenopathy,  nonspecific and likely reactive.  4.  Stable partly visualized bullous emphysema in the lung bases.      ALIA MASON PA-C     Face to Face time 35 minutes. An additional 25 minutes was spent on date of service in chart review, documentation, and other activities as noted.     The author of this note documented a reason for not sharing it with the patient.      Again, thank you for allowing me to participate in the care of your patient.      Sincerely,    ALIA MASON PA-C

## 2021-07-02 ENCOUNTER — PATIENT OUTREACH (OUTPATIENT)
Dept: UROLOGY | Facility: CLINIC | Age: 61
End: 2021-07-02

## 2021-07-02 DIAGNOSIS — Z11.59 ENCOUNTER FOR SCREENING FOR OTHER VIRAL DISEASES: Primary | ICD-10-CM

## 2021-07-02 LAB — STFR SERPL-SCNC: 3.3 MG/L (ref 2.2–5)

## 2021-07-02 NOTE — PROGRESS NOTES
Outpatient IR Referral    Patient is a 60 year old male with history of sarcoidosis, CKD, nephrolithiasis, and pulmonary HTN. His creatinine increased recently to 3.56 with eGFR 17. CT stone was obtained and showed a large obstructing left ureteral calculus, left intrarenal calculi. Per Dr Moura, plan for left PNT placement with staged PCNL. There are some non obstructing stones on the right as well. Patient is on home oxygen when ambulating.    Case and imaging was reviewed with Dr Barroso from IR. Approved for LEFT PNT placement          Requesting Dr Moura.    Sharon Shepherd PA-C  Interventional Radiology   IR on-call pager: [phone]

## 2021-07-05 ENCOUNTER — HOSPITAL ENCOUNTER (OUTPATIENT)
Dept: LAB | Facility: CLINIC | Age: 61
Discharge: HOME | End: 2021-07-05
Attending: FAMILY MEDICINE | Admitting: UROLOGY
Payer: COMMERCIAL

## 2021-07-05 ENCOUNTER — TELEPHONE (OUTPATIENT)
Dept: INTERVENTIONAL RADIOLOGY/VASCULAR | Facility: CLINIC | Age: 61
End: 2021-07-05

## 2021-07-05 DIAGNOSIS — Z11.59 ENCOUNTER FOR SCREENING FOR OTHER VIRAL DISEASES: ICD-10-CM

## 2021-07-05 LAB
SARS-COV-2 RNA RESP QL NAA+PROBE: NORMAL
SPECIMEN SOURCE: NORMAL

## 2021-07-05 PROCEDURE — U0005 INFEC AGEN DETEC AMPLI PROBE: HCPCS | Performed by: UROLOGY

## 2021-07-05 PROCEDURE — U0003 INFECTIOUS AGENT DETECTION BY NUCLEIC ACID (DNA OR RNA); SEVERE ACUTE RESPIRATORY SYNDROME CORONAVIRUS 2 (SARS-COV-2) (CORONAVIRUS DISEASE [COVID-19]), AMPLIFIED PROBE TECHNIQUE, MAKING USE OF HIGH THROUGHPUT TECHNOLOGIES AS DESCRIBED BY CMS-2020-01-R: HCPCS | Performed by: UROLOGY

## 2021-07-06 LAB
LABORATORY COMMENT REPORT: NORMAL
SARS-COV-2 RNA RESP QL NAA+PROBE: NEGATIVE
SPECIMEN SOURCE: NORMAL

## 2021-07-07 ENCOUNTER — TELEPHONE (OUTPATIENT)
Dept: CARDIOLOGY | Facility: CLINIC | Age: 61
End: 2021-07-07

## 2021-07-09 ENCOUNTER — HOSPITAL ENCOUNTER (OUTPATIENT)
Facility: CLINIC | Age: 61
Discharge: HOME | End: 2021-07-09
Attending: UROLOGY | Admitting: RADIOLOGY
Payer: COMMERCIAL

## 2021-07-09 ENCOUNTER — APPOINTMENT (OUTPATIENT)
Dept: INTERVENTIONAL RADIOLOGY/VASCULAR | Facility: CLINIC | Age: 61
End: 2021-07-09
Attending: UROLOGY
Payer: COMMERCIAL

## 2021-07-09 ENCOUNTER — APPOINTMENT (OUTPATIENT)
Dept: MEDSURG UNIT | Facility: CLINIC | Age: 61
End: 2021-07-09
Attending: UROLOGY
Payer: COMMERCIAL

## 2021-07-09 VITALS
HEART RATE: 75 BPM | DIASTOLIC BLOOD PRESSURE: 62 MMHG | BODY MASS INDEX: 22.69 KG/M2 | RESPIRATION RATE: 18 BRPM | OXYGEN SATURATION: 95 % | SYSTOLIC BLOOD PRESSURE: 101 MMHG | TEMPERATURE: 98 F | HEIGHT: 72 IN | WEIGHT: 167.5 LBS

## 2021-07-09 DIAGNOSIS — N20.0 KIDNEY STONE: ICD-10-CM

## 2021-07-09 LAB
ALBUMIN UR-MCNC: 10 MG/DL
APPEARANCE UR: CLEAR
B-HCG FREE SERPL-ACNC: 1.2 [IU]/L (ref 0.86–1.14)
BILIRUB UR QL STRIP: NEGATIVE
COLOR UR AUTO: ABNORMAL
GLUCOSE UR STRIP-MCNC: NEGATIVE MG/DL
HGB UR QL STRIP: NEGATIVE
KETONES UR STRIP-MCNC: NEGATIVE MG/DL
LEUKOCYTE ESTERASE UR QL STRIP: ABNORMAL
NITRATE UR QL: NEGATIVE
PH UR STRIP: 5 PH (ref 5–7)
RBC #/AREA URNS AUTO: <1 /HPF (ref 0–2)
SOURCE: ABNORMAL
SP GR UR STRIP: 1.01 (ref 1–1.03)
SQUAMOUS #/AREA URNS AUTO: 0 /HPF (ref 0–1)
UROBILINOGEN UR STRIP-MCNC: NORMAL MG/DL (ref 0–2)
WBC #/AREA URNS AUTO: 12 /HPF (ref 0–5)

## 2021-07-09 PROCEDURE — 999N000133 HC STATISTIC PP CARE STAGE 2

## 2021-07-09 PROCEDURE — 99152 MOD SED SAME PHYS/QHP 5/>YRS: CPT | Mod: GC | Performed by: RADIOLOGY

## 2021-07-09 PROCEDURE — 250N000011 HC RX IP 250 OP 636: Performed by: STUDENT IN AN ORGANIZED HEALTH CARE EDUCATION/TRAINING PROGRAM

## 2021-07-09 PROCEDURE — 250N000009 HC RX 250: Performed by: STUDENT IN AN ORGANIZED HEALTH CARE EDUCATION/TRAINING PROGRAM

## 2021-07-09 PROCEDURE — 50432 PLMT NEPHROSTOMY CATHETER: CPT

## 2021-07-09 PROCEDURE — C1729 CATH, DRAINAGE: HCPCS

## 2021-07-09 PROCEDURE — 85610 PROTHROMBIN TIME: CPT

## 2021-07-09 PROCEDURE — 50432 PLMT NEPHROSTOMY CATHETER: CPT | Mod: LT | Performed by: RADIOLOGY

## 2021-07-09 PROCEDURE — 87086 URINE CULTURE/COLONY COUNT: CPT | Performed by: UROLOGY

## 2021-07-09 PROCEDURE — 99152 MOD SED SAME PHYS/QHP 5/>YRS: CPT

## 2021-07-09 PROCEDURE — 258N000003 HC RX IP 258 OP 636: Performed by: PHYSICIAN ASSISTANT

## 2021-07-09 PROCEDURE — 81001 URINALYSIS AUTO W/SCOPE: CPT | Performed by: UROLOGY

## 2021-07-09 PROCEDURE — 250N000011 HC RX IP 250 OP 636: Performed by: PHYSICIAN ASSISTANT

## 2021-07-09 PROCEDURE — C1769 GUIDE WIRE: HCPCS

## 2021-07-09 PROCEDURE — 272N000508 HC NEEDLE CR8

## 2021-07-09 RX ORDER — NALOXONE HYDROCHLORIDE 0.4 MG/ML
0.4 INJECTION, SOLUTION INTRAMUSCULAR; INTRAVENOUS; SUBCUTANEOUS
Status: DISCONTINUED | OUTPATIENT
Start: 2021-07-09 | End: 2021-07-09 | Stop reason: HOSPADM

## 2021-07-09 RX ORDER — AMPICILLIN 1 G/1
1 INJECTION, POWDER, FOR SOLUTION INTRAMUSCULAR; INTRAVENOUS
Status: COMPLETED | OUTPATIENT
Start: 2021-07-09 | End: 2021-07-09

## 2021-07-09 RX ORDER — FENTANYL CITRATE 50 UG/ML
25-50 INJECTION, SOLUTION INTRAMUSCULAR; INTRAVENOUS EVERY 5 MIN PRN
Status: DISCONTINUED | OUTPATIENT
Start: 2021-07-09 | End: 2021-07-09 | Stop reason: HOSPADM

## 2021-07-09 RX ORDER — FLUMAZENIL 0.1 MG/ML
0.2 INJECTION, SOLUTION INTRAVENOUS
Status: DISCONTINUED | OUTPATIENT
Start: 2021-07-09 | End: 2021-07-09 | Stop reason: HOSPADM

## 2021-07-09 RX ORDER — NALOXONE HYDROCHLORIDE 0.4 MG/ML
0.2 INJECTION, SOLUTION INTRAMUSCULAR; INTRAVENOUS; SUBCUTANEOUS
Status: DISCONTINUED | OUTPATIENT
Start: 2021-07-09 | End: 2021-07-09 | Stop reason: HOSPADM

## 2021-07-09 RX ORDER — LIDOCAINE 40 MG/G
CREAM TOPICAL
Status: DISCONTINUED | OUTPATIENT
Start: 2021-07-09 | End: 2021-07-09 | Stop reason: HOSPADM

## 2021-07-09 RX ORDER — IODIXANOL 320 MG/ML
50 INJECTION, SOLUTION INTRAVASCULAR ONCE
Status: DISCONTINUED | OUTPATIENT
Start: 2021-07-09 | End: 2021-07-09 | Stop reason: HOSPADM

## 2021-07-09 RX ORDER — ACETAMINOPHEN 325 MG/1
650 TABLET ORAL
Status: DISCONTINUED | OUTPATIENT
Start: 2021-07-09 | End: 2021-07-09 | Stop reason: HOSPADM

## 2021-07-09 RX ADMIN — MIDAZOLAM 0.5 MG: 1 INJECTION INTRAMUSCULAR; INTRAVENOUS at 13:17

## 2021-07-09 RX ADMIN — AMPICILLIN SODIUM 1 G: 1 INJECTION, POWDER, FOR SOLUTION INTRAMUSCULAR; INTRAVENOUS at 11:57

## 2021-07-09 RX ADMIN — LIDOCAINE HYDROCHLORIDE 7 ML: 10 INJECTION, SOLUTION EPIDURAL; INFILTRATION; INTRACAUDAL; PERINEURAL at 13:37

## 2021-07-09 RX ADMIN — FENTANYL CITRATE 25 MCG: 50 INJECTION, SOLUTION INTRAMUSCULAR; INTRAVENOUS at 13:07

## 2021-07-09 RX ADMIN — MIDAZOLAM 0.5 MG: 1 INJECTION INTRAMUSCULAR; INTRAVENOUS at 13:13

## 2021-07-09 RX ADMIN — MIDAZOLAM 0.5 MG: 1 INJECTION INTRAMUSCULAR; INTRAVENOUS at 13:07

## 2021-07-09 RX ADMIN — GENTAMICIN SULFATE 120 MG: 40 INJECTION, SOLUTION INTRAMUSCULAR; INTRAVENOUS at 13:00

## 2021-07-09 RX ADMIN — FENTANYL CITRATE 25 MCG: 50 INJECTION, SOLUTION INTRAMUSCULAR; INTRAVENOUS at 13:14

## 2021-07-09 ASSESSMENT — MIFFLIN-ST. JEOR: SCORE: 1602.91

## 2021-07-09 NOTE — PRE-PROCEDURE
GENERAL PRE-PROCEDURE:   Procedure:  Left nephrostomy placement  Date/Time:  7/9/2021 11:43 AM    Verbal consent obtained?: Yes    Written consent obtained?: Yes    Risks and benefits: Risks, benefits and alternatives were discussed    Consent given by:  Patient  Patient states understanding of procedure being performed: Yes    Patient's understanding of procedure matches consent: Yes    Procedure consent matches procedure scheduled: Yes    Expected level of sedation:  Moderate  Appropriately NPO:  Yes  ASA Class:  Class 3- Severe systemic disease, definite functional limitations  Mallampati  :  Grade 2- soft palate, base of uvula, tonsillar pillars, and portion of posterior pharyngeal wall visible  Lungs:  Lungs clear with good breath sounds bilaterally  Heart:  Normal heart sounds and rate  History & Physical reviewed:  History and physical reviewed and no updates needed  Statement of review:  I have reviewed the lab findings, diagnostic data, medications, and the plan for sedation

## 2021-07-09 NOTE — IP AVS SNAPSHOT
[facility] Interventional Radiology  [address] [facility] [address]  Phone: [phone]                                    After Visit Summary   7/9/2021    Jimmy Isaac    MRN: [id number]           After Visit Summary Signature Page    I have received my discharge instructions, and my questions have been answered. I have discussed any challenges I see with this plan with the nurse or doctor.    ..........................................................................................................................................  Patient/Patient Representative Signature      ..........................................................................................................................................  Patient Representative Print Name and Relationship to Patient    ..................................................               ................................................  Date                                   Time    ..........................................................................................................................................  Reviewed by Signature/Title    ...................................................              ..............................................  Date                                               Time          22EPIC Rev 08/18

## 2021-07-09 NOTE — DISCHARGE INSTRUCTIONS
"[facility]  Discharge Instructions for   Percutaneous Nephrostomy   Tube Placement    After you go home:    Have an adult stay with you for 24 hours.    Drink plenty of fluids.    Resume a regular diet unless otherwise ordered by your physician.      For 24 Hours:    Relax and take it easy.    Do not drive or operate machines at home or at work.    No alcohol for 24 hours.    Do not make any important or legal decisions.    Do not do any strenuous exercise or lifting greater that 10 lbs for at least 2 days following your procedure.    CALL THE PHYSICIAN IF:    You start bleeding from the procedure site. If you do start to bleed from the site lie down and hold some pressure on the site. Your physician will tell you if you need to return to the hospital.    You develop nausea or vomiting.    You develop hives or a rash or any unexplained itching.    ADDITIONAL INSTRUCTIONS:  Please call for the following problems:  1. No urine draining from the nephrostomy tube. Check that tube is not kinked.  2. Urine leaking around tube.  3. Urine becomes very foul smelling or new or fresh blood in urine  4. The skin around the tube is red, painful, or has drainage.  5. You have pain in your back, over your kidney.  6. You have a fever of 100.5 F and chills  7. You feel nauseated and \"just not right.\"    Change the dressing initially the next day to check the insertion site.  After that change every other day.  Clean around tube site with washcloth and antibacterial soap.      [facility] INTERVENTIONAL RADIOLOGY DEPARTMENT  Procedure Physician: Chio Moreira and Dr. Drew  Date:July 9, 2021  Telephone Numbers:  [phone]     Monday-Friday 8:00AM-4:30PM   [phone]   After 4:30 PM Monday-Friday, Weekends and Holidays. Ask for Interventional Radiologist on Call. Someone is available 24 hours a day. [facility] toll free number: [phone] Monday- Friday 8:00AM -4:30PM.      "

## 2021-07-09 NOTE — PROGRESS NOTES
Prep for left nephrostomy tube placement complete. Patient accompanied by sree, both had many questions and concerns that were appropriately answered by provider. PIV placed. Ampicillin complete. Waiting for gentamicin from pharmacy. Consent signed. VSS. Patient on 4 liters oxygen via nasal cannula, this is his baseline.

## 2021-07-09 NOTE — PROGRESS NOTES
Pt arrived via cart with RN from IR s/p L nephrostomy tube placement. VSS. L flank site CDI, no hematoma. Patient denies pain.  wife at bedside.

## 2021-07-09 NOTE — IR NOTE
Patient Name: Jimmy Isaac  Medical Record Number: [id number]  Today's Date: 7/9/2021    Procedure: left nephrostomy tube placement  Proceduralist: Dr. Spencer    Procedure Start: 1307  Procedure end: 1332  Sedation medications administered: versed 1.5 mg., fentanyl 50 mcg.    Report given to: 2A RN    Other Notes: Pt arrived to IR room 1 from . Consent reviewed. Pt denies any questions or concerns regarding procedure. Pt positioned prone  and monitored per protocol. Pt tolerated procedure without any noted complications. Pt transferred back to .

## 2021-07-09 NOTE — PROGRESS NOTES
Patient meets all discharge criteria and is ready to discharge. Patient and significant other educated on nephrostomy tube care and discharge instructions, both verbalized understanding. PIV removed. Left flank site remains CDI with no bleeding or issues. Patient demonstrated he is able to empty bag successfully. Patient escorted to lobby with all belongings, including home O2, and accompanied by his significant other and staff.

## 2021-07-09 NOTE — IP AVS SNAPSHOT
After Visit Summary Template Not Found    This Print Group is only intended to be used in the After Visit Summary and can only be used in a report that uses a released After Visit Summary Template.                       MRN:[id number]                      After Visit Summary   7/9/2021    Jimmy Isaac    MRN: [id number]           Visit Information        Department      7/9/2021 10:54 AM [facility] Interventional Radiology          Review of your medicines      UNREVIEWED medicines. Ask your doctor about these medicines       Dose / Directions   ambrisentan 10 MG tablet  Commonly known as: LETAIRIS  Used for: Pulmonary hypertension (H)      Dose: 10 mg  Take 1 tablet (10 mg) by mouth daily  Quantity: 30 tablet  Refills: 11     budesonide 0.25 MG/2ML neb solution  Commonly known as: PULMICORT  Used for: Sarcoidosis, COPD (chronic obstructive pulmonary disease) (H)      Dose: 0.25 mg  Take 2 mLs (0.25 mg) by nebulization 2 times daily  Quantity: 120 mL  Refills: 4     digoxin 125 MCG tablet  Commonly known as: LANOXIN  Used for: Pulmonary hypertension (H), Encounter for monitoring digoxin therapy      Dose: 125 mcg  Take 1 tablet (125 mcg) by mouth daily More refills after dig level completed.  Quantity: 90 tablet  Refills: 0     furosemide 20 MG tablet  Commonly known as: LASIX  Used for: Pulmonary hypertension (H)      Dose: 20 mg  Take 1 tablet (20 mg) by mouth daily  Refills: 0     ProAir  (90 Base) MCG/ACT inhaler  Used for: Pulmonary hypertension (H), Mixed hyperlipidemia, COPD (chronic obstructive pulmonary disease) (H)  Generic drug: albuterol      Dose: 2 puff  Inhale 2 puffs into the lungs every 6 hours as needed for shortness of breath / dyspnea  Quantity: 18 g  Refills: 11     selexipag 1600 MCG tablet  Commonly known as: UPTRAVI  Used for: Pulmonary hypertension (H)      Dose: 1,600 mcg  Take 1 tablet (1,600 mcg) by mouth every 12 hours  Quantity: 60 tablet  Refills: 11     tadalafil  (PAH) 20 MG Tabs  Commonly known as: Alyq  Used for: Pulmonary hypertension (H)      Dose: 40 mg  Take 2 tablets (40 mg) by mouth daily  Quantity: 60 tablet  Refills: 11     tamsulosin 0.4 MG capsule  Commonly known as: FLOMAX  Used for: Benign prostatic hyperplasia with urinary frequency      Dose: 0.4 mg  Take 1 capsule (0.4 mg) by mouth daily  Quantity: 30 capsule  Refills: 0     UNABLE TO FIND      MEDICATION NAME: Topical skin tag remover  Refills: 0              Protect others around you: Learn how to safely use, store and throw away your medicines at www.disposemymeds.org.       Follow-ups after your visit       Your next 10 appointments already scheduled    Jul 14, 2021  9:30 AM  (Arrive by 9:15 AM)  SIX MINUTE WALK with  PFL A,  PFL 6 MINUTE WALK 2  [facility] Pulmonary Function Testing [place] ([facility] ) [address] [place] [address]  3rd Floor  [facility] [address]  [phone]      Jul 14, 2021 11:00 AM  (Arrive by 10:45 AM)  Return Interstitial Lung with Shane Ruvalcaba MD  [facility] [place] ([facility] ) [address] [place] [id number]  [phone]      Jul 14, 2021 12:00 PM  LAB with  LAB  [facility] ([facility] ) [address] [place] [address]  1st Floor  [facility] [address]  [phone]   Please do not eat 10-12 hours before your appointment if you are coming in fasting for labs on lipids, cholesterol, or glucose (sugar). Does not apply to pregnant women. Water, tea and black coffee (with nothing added) is okay. Do not drink other fluids, diet soda or gum. If you have concerns about taking your medications, please send a message by clicking on Secure Messaging, Message Your Care Team.     Jul 14, 2021  2:00 PM  Echo Complete with UCECHCR2  [facility] Heart Clinic [place] ([facility] ) [address] [facility]  3rd [facility] [id number] [phone]   1. Please bring or wear a comfortable two-piece outfit.  2. You may eat, drink and take your normal medicines.  3. Please do not apply perfumes or lotions on the day of your exam.   4. For any questions that cannot be answered, please contact the ordering physician     Jul 14, 2021  3:00 PM  (Arrive by 2:45 PM)  RETURN PRIMARY PULMONARY with Khang Lindsey MD  [facility] ([facility] ) [address] [place] [id number] [phone]         Care Instructions       Further instructions from your care team       [facility]  Discharge Instructions for   Percutaneous Nephrostomy   Tube Placement    After you go home:    Have an adult stay with you for 24 hours.    Drink plenty of fluids.    Resume a regular diet unless otherwise ordered by your physician.      For 24 Hours:    Relax and take it easy.    Do not drive or operate machines at home or at work.    No alcohol for 24 hours.    Do not make any important or legal decisions.    Do not do any strenuous exercise or lifting greater that 10 lbs for at least 2 days following your procedure.    CALL THE PHYSICIAN IF:    You start bleeding from the procedure site. If you do start to bleed from the site lie down and hold some pressure on the site. Your physician will tell you if you need to return to the hospital.    You develop nausea or vomiting.    You develop hives or a rash or any unexplained itching.    ADDITIONAL INSTRUCTIONS:  Please call for the following problems:  1. No urine draining from the nephrostomy tube. Check that tube is not kinked.  2. Urine leaking around tube.  3. Urine becomes very foul smelling or new or fresh blood in urine  4. The skin around the tube is red, painful, or has drainage.  5. You have pain in your back, over your kidney.  6. You have a fever  "of 100.5 F and chills  7. You feel nauseated and \"just not right.\"    Change the dressing initially the next day to check the insertion site.  After that change every other day.  Clean around tube site with washcloth and antibacterial soap.      [facility] INTERVENTIONAL RADIOLOGY DEPARTMENT  Procedure Physician: Chio Moreira and Dr. Drew  Date:July 9, 2021  Telephone Numbers:  [phone]     Monday-Friday 8:00AM-4:30PM   [phone]   After 4:30 PM Monday-Friday, Weekends and Holidays. Ask for Interventional Radiologist on Call. Someone is available 24 hours a day. [facility] toll free number: [phone] Monday- Friday 8:00AM -4:30PM.        Additional Information About Your Visit       MyChart Information    [employer] gives you secure access to your electronic health record. If you see a primary care provider, you can also send messages to your care team and make appointments. If you have questions, please call your primary care clinic.  If you do not have a primary care provider, please call [phone] and they will assist you.       Care EveryWhere ID    This is your Care EveryWhere ID. This could be used by other organizations to access your [facility] medical records  [id number]       Your Vitals Were  Most recent update: 7/9/2021  2:32 PM    Blood Pressure   94/57          Pulse   73          Temperature   98  F (36.7  C) (Oral)          Respirations   18          Height   1.829 m (6' 0.01\")             Weight   76 kg (167 lb 8 oz)    Pulse Oximetry   97%    BMI (Body Mass Index)   22.71 kg/m           Primary Care Provider    Cristhian Busch MD      Equal Access to Services    [facility]: Hadii mehran collins Solayo, waaxda luqadaha, qaybta kaalmada jeanie, silvano youngblood. So [facility] [phone].    ATENCIÓN: Si habla español, tiene a hutton disposición servicios gratuitos de asistencia lingüística. Llame al [phone].    We comply with applicable federal and state civil rights " laws, including the [place] Human Rights Act. We do not discriminate on the basis of race, color, creed, Bahai, national origin, marital status, age, disability, sex, sexual orientation, or gender identity.    If you would like an itemization of your charges they will now be available in [employer] 30 days after discharge. To access the itemized statements in [employer] go to billing/billing summary. From there select view account. There will be multiple tabs showing an overview of your account, detail, payments, and communications. From the communications tab you can see your monthly statements, your itemized statements, and any billing letters generated for your account. If you do not have a [employer] account and need help getting access please contact [employer] support at [phone].  If you would prefer to have your itemized statements mailed please contact our automated itemized bill request line at [phone] option  2.       Thank you!    Thank you for choosing [facility] for your care. Our goal is always to provide you with excellent care. Hearing back from our patients is one way we can continue to improve our services. Please take a few minutes to complete the written survey that you may receive in the mail after you visit with us. Thank you!            Medication List      ASK your doctor about these medications          Morning Afternoon Evening Bedtime As Needed    ambrisentan 10 MG tablet  Also known as: LETAIRIS  INSTRUCTIONS: Take 1 tablet (10 mg) by mouth daily                     budesonide 0.25 MG/2ML neb solution  Also known as: PULMICORT  INSTRUCTIONS: Take 2 mLs (0.25 mg) by nebulization 2 times daily                     digoxin 125 MCG tablet  Also known as: LANOXIN  INSTRUCTIONS: Take 1 tablet (125 mcg) by mouth daily More refills after dig level completed.                     furosemide 20 MG tablet  Also known as: LASIX  INSTRUCTIONS: Take 1 tablet (20 mg) by mouth daily                      ProAir  (90 Base) MCG/ACT inhaler  INSTRUCTIONS: Inhale 2 puffs into the lungs every 6 hours as needed for shortness of breath / dyspnea  Doctor's comments: Pharmacy may dispense brand covered by insurance (Proair, or proventil or ventolin or generic albuterol inhaler)  Generic drug: albuterol                     selexipag 1600 MCG tablet  Also known as: UPTRAVI  INSTRUCTIONS: Take 1 tablet (1,600 mcg) by mouth every 12 hours                     tadalafil (PAH) 20 MG Tabs  Also known as: Alyq  INSTRUCTIONS: Take 2 tablets (40 mg) by mouth daily                     tamsulosin 0.4 MG capsule  Also known as: FLOMAX  INSTRUCTIONS: Take 1 capsule (0.4 mg) by mouth daily                     UNABLE TO FIND  INSTRUCTIONS: MEDICATION NAME: Topical skin tag remover

## 2021-07-10 LAB
BACTERIA SPEC CULT: NO GROWTH
SPECIMEN SOURCE: NORMAL

## 2021-07-12 DIAGNOSIS — I27.20 PULMONARY HYPERTENSION (H): ICD-10-CM

## 2021-07-12 RX ORDER — TADALAFIL 20 MG/1
40 TABLET ORAL DAILY
Qty: 60 TABLET | Refills: 5 | Status: SHIPPED | OUTPATIENT
Start: 2021-07-12 | End: 2021-11-01

## 2021-07-12 NOTE — TELEPHONE ENCOUNTER
Medication Refill double check:    Last virtual visit was on 12/10/20 with .    Follow up was recommended for July.    Any additional encounters with changes to requested med? no    Authorizing provider is: Dr. Lindsey    Refill was approved.     Additional orders/notes:       Obdulia Rome RN on 7/12/2021 at 6:05 PM

## 2021-07-13 ENCOUNTER — VIRTUAL VISIT (OUTPATIENT)
Dept: UROLOGY | Facility: CLINIC | Age: 61
End: 2021-07-13
Payer: COMMERCIAL

## 2021-07-13 VITALS — BODY MASS INDEX: 22.35 KG/M2 | HEIGHT: 72 IN | WEIGHT: 165 LBS

## 2021-07-13 DIAGNOSIS — E78.2 MIXED HYPERLIPIDEMIA: ICD-10-CM

## 2021-07-13 DIAGNOSIS — N13.2 HYDRONEPHROSIS WITH URINARY OBSTRUCTION DUE TO URETERAL CALCULUS: Primary | ICD-10-CM

## 2021-07-13 DIAGNOSIS — I27.20 PULMONARY HYPERTENSION (H): ICD-10-CM

## 2021-07-13 DIAGNOSIS — J44.9 COPD (CHRONIC OBSTRUCTIVE PULMONARY DISEASE) (H): ICD-10-CM

## 2021-07-13 PROCEDURE — 99214 OFFICE O/P EST MOD 30 MIN: CPT | Mod: 95 | Performed by: UROLOGY

## 2021-07-13 RX ORDER — ALBUTEROL SULFATE 90 UG/1
2 AEROSOL, METERED RESPIRATORY (INHALATION) EVERY 6 HOURS PRN
Qty: 18 G | Refills: 11 | Status: SHIPPED | OUTPATIENT
Start: 2021-07-13 | End: 2021-01-01

## 2021-07-13 ASSESSMENT — PAIN SCALES - GENERAL: PAINLEVEL: NO PAIN (0)

## 2021-07-13 ASSESSMENT — MIFFLIN-ST. JEOR: SCORE: 1591.44

## 2021-07-13 NOTE — LETTER
7/13/2021       RE: Jimmy Isaac  [address] [address] [address]     Dear Colleague,    Thank you for referring your patient, Jimmy Isaac, to the [facility]OLOG[facility] [place] at [facility]. Please see a copy of my visit note below.    Jimmy is a 60 year old who is being evaluated via a billable telephone visit.                UROLOGY TELEPHONE FOLLOW-UP NOTE           Chief Complaint:   Left ureteral stone         Interval Update    Jimmy Isaac is a very pleasant 60-year-old gentleman with a history of pulmonary hypertension, severe pulmonary disease, chronic kidney disease, and sarcoidosis.  He has a large volume of bilateral renal stones which we have been monitoring over the past several years.  Recently he was noted to have passed one of the stones from the kidney into his proximal left ureter.  The stone is quite large over 2 cm and is associated with severe hydronephrosis of the left kidney.  We had recommended percutaneous nephrostomy tube placement which he successfully underwent last week.  He is tolerating the nephrostomy tube well.  We are awaiting updated renal function labs to see whether or not it has made an improvement in his creatinine.        Labs and Pathology:    I personally reviewed all applicable laboratory data and went over findings with patient  Significant for:    CBC RESULTS:  Recent Labs   Lab Test 07/01/21  1336 12/11/19  1109 09/25/19  1209 06/19/19  1002   WBC 5.7 6.4 6.5 6.4   HGB 11.4* 13.0* 13.8 13.3   * 170 149* 177        BMP RESULTS:  Recent Labs   Lab Test 07/01/21  1336 06/16/21  1630 12/11/19  1109 09/25/19  1209    138 140 136   POTASSIUM 4.0 4.4 4.3 4.1   CHLORIDE 110* 106 109 102   CO2 25 23 28 29   ANIONGAP 7 9 3 5   GLC 95 96 78 90   BUN 50* 51* 32* 30   CR 3.27* 3.65* 1.85* 1.62*   GFRESTIMATED 19* 17* 39* 45*   GFRESTBLACK 22* 20* 45* 53*   TERI 8.7 9.3 8.9 9.4       UA  RESULTS:   Recent Labs   Lab Test 07/09/21  1320 06/16/21  1522 10/18/17  1125   SG 1.010 1.015 1.011   URINEPH 5.0 5.5 6.0   NITRITE Negative Negative Negative   RBCU <1  --  25*   WBCU 12*  --  2       PSA RESULTS  PSA   Date Value Ref Range Status   06/16/2021 1.67 0 - 4 ug/L Final     Comment:     Assay Method:  Chemiluminescence using Siemens Vista analyzer   05/30/2018 1.63 0 - 4 ug/L Final     Comment:     Assay Method:  Chemiluminescence using Siemens Vista analyzer           Imaging:    I personally reviewed all applicable imaging and went over the below findings with patient.    Results for orders placed or performed during the hospital encounter of 07/09/21   IR Nephrostomy Tube Placement Left    Narrative    PROCEDURES :  1. Percutaneous antegrade pyelogram (through needle).  2. Percutaneous nephrostomy tube placement.    Clinical History: Kidney stone    Comparisons: CT 6/23/2021    Staff Radiologist: Dr. Barroso    Fellow/Resident: Charles Drew MD    Procedure performed by Dr. Drew under my supervision. I, Dr. Matias Barroso, was present for the entire procedure.  Monitoring: Patient was placed on continuous monitoring with  intravenous conscious sedation administered by the IR nursing staff  and supervised by the IR attending. Patient remained stable throughout  the procedure.     Medications:  1. Versed IV: 1.5 mg  2. Fentanyl IV: 50 mcg    Sedation time: 20 minutes face-to-face.  Fluoro time: 2.6 minutes     Contrast: No intravenous contrast administered.    PROCEDURE: The patient understood the limitations, alternatives, and  risks of the procedure and requested the procedure be performed. Both  written and oral consent were obtained.    Patient placed prone on the interventional table. The left flank was  prepped and draped in the usual sterile fashion. 1% lidocaine is used  for local anesthesia. Under ultrasound guidance, 21 gauge needle  nephrostomy was made into a posterior calyx.      Antegrade pyelography was performed through the needle.    Needle exchanged over 0.018 guidewire for the [employer]  AccuStick II sheath/cannula. Cannula and inner coaxial 4 Libyan  dilator removed over guidewire. 6 Libyan sheath removed over 0.035 and  0.018 guidewires. 0.018 guidewire left as a safety wire. Track dilated  over 0.035 guidewire to 8 Libyan size. 8 Libyan Skater locking pigtail  catheter was advanced over the guidewire into the collecting system  under fluoroscopic guidance. Guidewire removed. Pigtail formed and  locked in the renal pelvis. Position documented with contrast. 2-0  nylon catheter-retaining suture and sterile dressing applied. Catheter  to gravity drainage. No immediate complication.     FINDINGS: Severe hydronephrosis secondary to a large obstructing stone  in the proximal left ureter. Additional nonobstructing calyceal stones  noted in the interpolar region.      Impression    IMPRESSION:   1. Percutaneous antegrade pyelogram performed, demonstrating severe  hydronephrosis and obstructing left ureteral stone.  2. 8 Libyan Skater locking pigtail catheter placed as left  percutaneous nephrostomy tube. Catheter to gravity drainage.  3. Multiple nonobstructing calyceal based left renal calculi.    I have personally reviewed the examination and initial interpretation  and I agree with the findings.    [facility]         SYSTEM ID:  [id number]              Assessment/Plan   60 year old male with obstructive nephropathy and bilateral renal as well as left ureteral stone  -We reviewed the multiple treatment options including continued nephrostomy tube drainage with intermittent changes, observation, and surgical removal via various approaches of his stones.  Given the very large size of his stones a percutaneous approach would typically be recommended but must be viewed in the context of his advanced pulmonary disease.  We ultimately made the shared decision to have him  proceed with a preanesthesia care evaluation.  Should he be deemed a suitable candidate for general anesthesia we would recommend proceeding with a left percutaneous nephrolithotomy.  Risks, benefits, alternatives were reviewed.    Complex Medical Decision Making: Yes.  Chronic Obstructive Pulmonary Disease  Discussed with patient the higher potential risk of perioperative and post-procedural complications as a result of comorbid illnesses and   Discussed with patient the higher potential risk of prolonged post-operative recovery as a result of comorbid illnesses                 Past Medical History:     Past Medical History:   Diagnosis Date     Chronic sinusitis      CKD (chronic kidney disease)      Heart disease      Hypertension      Keratosis     frontal scalp, treated with liquid nitrogen at [facility]     Malignant neoplasm (H)      Pneumonia a few times     Pulmonary sarcoidosis (H)             Past Surgical History:     Past Surgical History:   Procedure Laterality Date     COLONOSCOPY       CV RIGHT HEART CATH MEASUREMENTS RECORDED N/A 6/19/2019    Procedure: CV RIGHT HEART CATH;  Surgeon: Kale Delgado MD;  Location:  HEART CARDIAC CATH LAB     CV RIGHT HEART CATH MEASUREMENTS RECORDED N/A 12/11/2019    Procedure: CV RIGHT HEART CATH;  Surgeon: Ion Lemon MD;  Location:  HEART CARDIAC CATH LAB     ENT SURGERY      wisdom teeth extraction     IR NEPHROSTOMY TUBE PLACEMENT LEFT  7/9/2021     TONSILLECTOMY  1964            Medications     Current Outpatient Medications   Medication     ambrisentan (LETAIRIS) 10 MG tablet     budesonide (PULMICORT) 0.25 MG/2ML neb solution     digoxin (LANOXIN) 125 MCG tablet     furosemide (LASIX) 20 MG tablet     PROAIR  (90 Base) MCG/ACT inhaler     selexipag (UPTRAVI) 1600 MCG tablet     tadalafil, PAH, (ALYQ) 20 MG TABS     tamsulosin (FLOMAX) 0.4 MG capsule     UNABLE TO FIND     No current facility-administered medications for  this visit.            Family History:     Family History   Problem Relation Age of Onset     Coronary Artery Disease Father      Heart Disease Father         heart attack age 35-40     Hypertension Mother      Hyperlipidemia Mother      Cerebrovascular Disease Mother      Dementia Mother      Lupus Sister      Glaucoma No family hx of      Macular Degeneration No family hx of             Social History:     Social History     Socioeconomic History     Marital status: Single     Spouse name: Not on file     Number of children: Not on file     Years of education: Not on file     Highest education level: Not on file   Occupational History     Not on file   Tobacco Use     Smoking status: Former Smoker     Packs/day: 1.00     Years: 30.00     Pack years: 30.00     Types: Cigarettes     Start date: 10/10/1975     Quit date: 3/1/2010     Years since quittin.3     Smokeless tobacco: Never Used   Substance and Sexual Activity     Alcohol use: Yes     Alcohol/week: 0.0 standard drinks     Comment: stated 2 bottles of beer occ     Drug use: Not Currently     Comment: past use of marijuana     Sexual activity: Not Currently     Partners: Female     Birth control/protection: Abstinence, Female Surgical   Other Topics Concern     Parent/sibling w/ CABG, MI or angioplasty before 65F 55M? Not Asked      Service Not Asked     Blood Transfusions Not Asked     Caffeine Concern Yes     Occupational Exposure Not Asked     Hobby Hazards Not Asked     Sleep Concern Not Asked     Stress Concern Not Asked     Weight Concern Not Asked     Special Diet Not Asked     Back Care Not Asked     Exercise Yes     Bike Helmet Not Asked     Seat Belt Not Asked     Self-Exams Not Asked   Social History Narrative     Not on file     Social Determinants of Health     Financial Resource Strain:      Difficulty of Paying Living Expenses:    Food Insecurity:      Worried About Running Out of Food in the Last Year:      Ran Out of Food in the  Last Year:    Transportation Needs:      Lack of Transportation (Medical):      Lack of Transportation (Non-Medical):    Physical Activity:      Days of Exercise per Week:      Minutes of Exercise per Session:    Stress:      Feeling of Stress :    Social Connections:      Frequency of Communication with Friends and Family:      Frequency of Social Gatherings with Friends and Family:      Attends [employer] Services:      Active Member of Clubs or Organizations:      Attends Club or Organization Meetings:      Marital Status:    Intimate Partner Violence:      Fear of Current or Ex-Partner:      Emotionally Abused:      Physically Abused:      Sexually Abused:             Allergies:   Nkda [no known drug allergies]         Review of Systems:  From intake questionnaire   Negative 14 system review except as noted on HPI, nurse's note.        CC:  Cristhian Busch      What phone number would you like to be contacted at? [phone]  How would you like to obtain your AVS? Carlos Alberto  Phone call duration: 16 minutes    Again, thank you for allowing me to participate in the care of your patient.      Sincerely,    Zackery Moura MD

## 2021-07-13 NOTE — PROGRESS NOTES
Jimmy is a 60 year old who is being evaluated via a billable telephone visit.                UROLOGY TELEPHONE FOLLOW-UP NOTE           Chief Complaint:   Left ureteral stone         Interval Update    Jimmy Isaac is a very pleasant 60-year-old gentleman with a history of pulmonary hypertension, severe pulmonary disease, chronic kidney disease, and sarcoidosis.  He has a large volume of bilateral renal stones which we have been monitoring over the past several years.  Recently he was noted to have passed one of the stones from the kidney into his proximal left ureter.  The stone is quite large over 2 cm and is associated with severe hydronephrosis of the left kidney.  We had recommended percutaneous nephrostomy tube placement which he successfully underwent last week.  He is tolerating the nephrostomy tube well.  We are awaiting updated renal function labs to see whether or not it has made an improvement in his creatinine.        Labs and Pathology:    I personally reviewed all applicable laboratory data and went over findings with patient  Significant for:    CBC RESULTS:  Recent Labs   Lab Test 07/01/21  1336 12/11/19  1109 09/25/19  1209 06/19/19  1002   WBC 5.7 6.4 6.5 6.4   HGB 11.4* 13.0* 13.8 13.3   * 170 149* 177        BMP RESULTS:  Recent Labs   Lab Test 07/01/21  1336 06/16/21  1630 12/11/19  1109 09/25/19  1209    138 140 136   POTASSIUM 4.0 4.4 4.3 4.1   CHLORIDE 110* 106 109 102   CO2 25 23 28 29   ANIONGAP 7 9 3 5   GLC 95 96 78 90   BUN 50* 51* 32* 30   CR 3.27* 3.65* 1.85* 1.62*   GFRESTIMATED 19* 17* 39* 45*   GFRESTBLACK 22* 20* 45* 53*   TERI 8.7 9.3 8.9 9.4       UA RESULTS:   Recent Labs   Lab Test 07/09/21  1320 06/16/21  1522 10/18/17  1125   SG 1.010 1.015 1.011   URINEPH 5.0 5.5 6.0   NITRITE Negative Negative Negative   RBCU <1  --  25*   WBCU 12*  --  2       PSA RESULTS  PSA   Date Value Ref Range Status   06/16/2021 1.67 0 - 4 ug/L Final     Comment:     Assay Method:   Chemiluminescence using Siemens Vista analyzer   05/30/2018 1.63 0 - 4 ug/L Final     Comment:     Assay Method:  Chemiluminescence using Siemens Vista analyzer           Imaging:    I personally reviewed all applicable imaging and went over the below findings with patient.    Results for orders placed or performed during the hospital encounter of 07/09/21   IR Nephrostomy Tube Placement Left    Narrative    PROCEDURES :  1. Percutaneous antegrade pyelogram (through needle).  2. Percutaneous nephrostomy tube placement.    Clinical History: Kidney stone    Comparisons: CT 6/23/2021    Staff Radiologist: Dr. Barroso    Fellow/Resident: Charles Drew MD    Procedure performed by Dr. Drew under my supervision. I, Dr. Matias Barroso, was present for the entire procedure.  Monitoring: Patient was placed on continuous monitoring with  intravenous conscious sedation administered by the IR nursing staff  and supervised by the IR attending. Patient remained stable throughout  the procedure.     Medications:  1. Versed IV: 1.5 mg  2. Fentanyl IV: 50 mcg    Sedation time: 20 minutes face-to-face.  Fluoro time: 2.6 minutes     Contrast: No intravenous contrast administered.    PROCEDURE: The patient understood the limitations, alternatives, and  risks of the procedure and requested the procedure be performed. Both  written and oral consent were obtained.    Patient placed prone on the interventional table. The left flank was  prepped and draped in the usual sterile fashion. 1% lidocaine is used  for local anesthesia. Under ultrasound guidance, 21 gauge needle  nephrostomy was made into a posterior calyx.     Antegrade pyelography was performed through the needle.    Needle exchanged over 0.018 guidewire for the [employer]  AccuStick II sheath/cannula. Cannula and inner coaxial 4 English  dilator removed over guidewire. 6 English sheath removed over 0.035 and  0.018 guidewires. 0.018 guidewire left as a safety wire.  Track dilated  over 0.035 guidewire to 8 Togolese size. 8 Togolese Skater locking pigtail  catheter was advanced over the guidewire into the collecting system  under fluoroscopic guidance. Guidewire removed. Pigtail formed and  locked in the renal pelvis. Position documented with contrast. 2-0  nylon catheter-retaining suture and sterile dressing applied. Catheter  to gravity drainage. No immediate complication.     FINDINGS: Severe hydronephrosis secondary to a large obstructing stone  in the proximal left ureter. Additional nonobstructing calyceal stones  noted in the interpolar region.      Impression    IMPRESSION:   1. Percutaneous antegrade pyelogram performed, demonstrating severe  hydronephrosis and obstructing left ureteral stone.  2. 8 Togolese Skater locking pigtail catheter placed as left  percutaneous nephrostomy tube. Catheter to gravity drainage.  3. Multiple nonobstructing calyceal based left renal calculi.    I have personally reviewed the examination and initial interpretation  and I agree with the findings.    [employer]         SYSTEM ID:  [id number]              Assessment/Plan   60 year old male with obstructive nephropathy and bilateral renal as well as left ureteral stone  -We reviewed the multiple treatment options including continued nephrostomy tube drainage with intermittent changes, observation, and surgical removal via various approaches of his stones.  Given the very large size of his stones a percutaneous approach would typically be recommended but must be viewed in the context of his advanced pulmonary disease.  We ultimately made the shared decision to have him proceed with a preanesthesia care evaluation.  Should he be deemed a suitable candidate for general anesthesia we would recommend proceeding with a left percutaneous nephrolithotomy.  Risks, benefits, alternatives were reviewed.    Complex Medical Decision Making: Yes.  Chronic Obstructive Pulmonary Disease  Discussed with  patient the higher potential risk of perioperative and post-procedural complications as a result of comorbid illnesses and   Discussed with patient the higher potential risk of prolonged post-operative recovery as a result of comorbid illnesses                 Past Medical History:     Past Medical History:   Diagnosis Date     Chronic sinusitis      CKD (chronic kidney disease)      Heart disease      Hypertension      Keratosis     frontal scalp, treated with liquid nitrogen at [facility]     Malignant neoplasm (H)      Pneumonia a few times     Pulmonary sarcoidosis (H)             Past Surgical History:     Past Surgical History:   Procedure Laterality Date     COLONOSCOPY       CV RIGHT HEART CATH MEASUREMENTS RECORDED N/A 6/19/2019    Procedure: CV RIGHT HEART CATH;  Surgeon: Kale Delgado MD;  Location:  HEART CARDIAC CATH LAB     CV RIGHT HEART CATH MEASUREMENTS RECORDED N/A 12/11/2019    Procedure: CV RIGHT HEART CATH;  Surgeon: Ion Lemon MD;  Location:  HEART CARDI[facility] CATH LAB     ENT SURGERY      wisdom teeth extraction     IR NEPHROSTOMY TUBE PLACEMENT LEFT  7/9/2021     TONSILLECTOMY  1964            Medications     Current Outpatient Medications   Medication     ambrisentan (LETAIRIS) 10 MG tablet     budesonide (PULMICORT) 0.25 MG/2ML neb solution     digoxin (LANOXIN) 125 MCG tablet     furosemide (LASIX) 20 MG tablet     PROAIR  (90 Base) MCG/ACT inhaler     selexipag (UPTRAVI) 1600 MCG tablet     tadalafil, PAH, (ALYQ) 20 MG TABS     tamsulosin (FLOMAX) 0.4 MG capsule     UNABLE TO FIND     No current facility-administered medications for this visit.            Family History:     Family History   Problem Relation Age of Onset     Coronary Artery Disease Father      Heart Disease Father         heart attack age 35-40     Hypertension Mother      Hyperlipidemia Mother      Cerebrovascular Disease Mother      Dementia Mother      Lupus Sister      Glaucoma No  family hx of      Macular Degeneration No family hx of             Social History:     Social History     Socioeconomic History     Marital status: Single     Spouse name: Not on file     Number of children: Not on file     Years of education: Not on file     Highest education level: Not on file   Occupational History     Not on file   Tobacco Use     Smoking status: Former Smoker     Packs/day: 1.00     Years: 30.00     Pack years: 30.00     Types: Cigarettes     Start date: 10/10/1975     Quit date: 3/1/2010     Years since quittin.3     Smokeless tobacco: Never Used   Substance and Sexual Activity     Alcohol use: Yes     Alcohol/week: 0.0 standard drinks     Comment: stated 2 bottles of beer occ     Drug use: Not Currently     Comment: past use of marijuana     Sexual activity: Not Currently     Partners: Female     Birth control/protection: Abstinence, Female Surgical   Other Topics Concern     Parent/sibling w/ CABG, MI or angioplasty before 65F 55M? Not Asked      Service Not Asked     Blood Transfusions Not Asked     Caffeine Concern Yes     Occupational Exposure Not Asked     Hobby Hazards Not Asked     Sleep Concern Not Asked     Stress Concern Not Asked     Weight Concern Not Asked     Special Diet Not Asked     Back Care Not Asked     Exercise Yes     Bike Helmet Not Asked     Seat Belt Not Asked     Self-Exams Not Asked   Social History Narrative     Not on file     Social Determinants of Health     Financial Resource Strain:      Difficulty of Paying Living Expenses:    Food Insecurity:      Worried About Running Out of Food in the Last Year:      Ran Out of Food in the Last Year:    Transportation Needs:      Lack of Transportation (Medical):      Lack of Transportation (Non-Medical):    Physical Activity:      Days of Exercise per Week:      Minutes of Exercise per Session:    Stress:      Feeling of Stress :    Social Connections:      Frequency of Communication with Friends and Family:       Frequency of Social Gatherings with Friends and Family:      Attends Orthodox Services:      Active Member of Clubs or Organizations:      Attends Club or Organization Meetings:      Marital Status:    Intimate Partner Violence:      Fear of Current or Ex-Partner:      Emotionally Abused:      Physically Abused:      Sexually Abused:             Allergies:   Nkda [no known drug allergies]         Review of Systems:  From intake questionnaire   Negative 14 system review except as noted on HPI, nurse's note.        CC:  Cristhian Busch      What phone number would you like to be contacted at? [phone]  How would you like to obtain your AVS? Carlos Alberto  Phone call duration: 16 minutes

## 2021-07-13 NOTE — NURSING NOTE
Chief Complaint   Patient presents with     Follow Up     Imaging       Height 1.829 m (6'), weight 74.8 kg (165 lb). Body mass index is 22.38 kg/m .    Patient Active Problem List   Diagnosis     Pulmonary hypertension (H)     Sarcoidosis     Renal insufficiency due to nephrolithiasis     Dyspnea on exertion     CKD (chronic kidney disease) stage 4, GFR 15-29 ml/min (H)       Allergies   Allergen Reactions     Nkda [No Known Drug Allergies]        Current Outpatient Medications   Medication Sig Dispense Refill     ambrisentan (LETAIRIS) 10 MG tablet Take 1 tablet (10 mg) by mouth daily 30 tablet 11     budesonide (PULMICORT) 0.25 MG/2ML neb solution Take 2 mLs (0.25 mg) by nebulization 2 times daily 120 mL 4     digoxin (LANOXIN) 125 MCG tablet Take 1 tablet (125 mcg) by mouth daily More refills after dig level completed. 90 tablet 0     furosemide (LASIX) 20 MG tablet Take 1 tablet (20 mg) by mouth daily       PROAIR  (90 Base) MCG/ACT inhaler Inhale 2 puffs into the lungs every 6 hours as needed for shortness of breath / dyspnea 18 g 11     selexipag (UPTRAVI) 1600 MCG tablet Take 1 tablet (1,600 mcg) by mouth every 12 hours 60 tablet 11     tadalafil, PAH, (ALYQ) 20 MG TABS Take 2 tablets (40 mg) by mouth daily 60 tablet 5     tamsulosin (FLOMAX) 0.4 MG capsule Take 1 capsule (0.4 mg) by mouth daily (Patient not taking: Reported on 2021) 30 capsule 0     UNABLE TO FIND MEDICATION NAME: Topical skin tag remover         Social History     Tobacco Use     Smoking status: Former Smoker     Packs/day: 1.00     Years: 30.00     Pack years: 30.00     Types: Cigarettes     Start date: 10/10/1975     Quit date: 3/1/2010     Years since quittin.3     Smokeless tobacco: Never Used   Substance Use Topics     Alcohol use: Yes     Alcohol/week: 0.0 standard drinks     Comment: stated 2 bottles of beer occ     Drug use: Not Currently     Comment: past use of marijuana       BETHANY Arteaga  2021  12:43  PM

## 2021-07-15 ENCOUNTER — TELEPHONE (OUTPATIENT)
Dept: UROLOGY | Facility: CLINIC | Age: 61
End: 2021-07-15

## 2021-07-15 NOTE — TELEPHONE ENCOUNTER
Spoke to patient to schedule him for surgery with Dr. Moura for a PCNL. Per Dr. Moura patient would need an in person PAC visit to get clearance for his severe pulmonary disease. Patient informed me that he missed his cardiology, pulmonary, and echo appointment that were scheduled on 07/14/21. He has not rescheduled those yet. I let patient know that PAC would want him to have those visits completed before they could clear him for surgery. I gave patient my direct extension to call when he has rescheduled those appointments. Then I will schedule him for an in person PAC eval and will also reschedule surgery at that time based off OR availability at [place]. Patient was in agreement with this plan. Patricia VELOZ Melba-Operative Coordinator for General and Urology Surgery

## 2021-07-22 DIAGNOSIS — N13.2 HYDRONEPHROSIS WITH URINARY OBSTRUCTION DUE TO URETERAL CALCULUS: Primary | ICD-10-CM

## 2021-07-22 DIAGNOSIS — Z11.59 ENCOUNTER FOR SCREENING FOR OTHER VIRAL DISEASES: ICD-10-CM

## 2021-07-22 NOTE — TELEPHONE ENCOUNTER
Patient is scheduled for surgery with Dr. Moura     Spoke with: Patient     Date of Surgery: Wednesday 08/11/21     Location: [place] OR     Informed patient they will need an adult  Yes    Pre op with Provider eliana    H&P: Scheduled with In person PAC appointment Monday 08/02/21 @ 7:30am     Pre-procedure COVID-19 Test: Monday 08/09/21 @ 3:00pm [facility]     Additional imaging/appointments: eliana     Surgery packet: mailed to patient 07/22/21, verified address on file is current and correct.      Additional comments: Ioana Churchill, Patricia Reyna; Shane Ruvalcaba MD; [facility] Patricia,     I saw your recent message to patient. Dr Ruvalcaba is not able to get him back in prior to his surgery.     Dr Ruvalcaba stated the patient's risk factor will not change, the patient is at high risk for pulmonary complications with surgery. But if the surgery is necessary he is okay to get it.     Not sure if you need more from us?     Pao Churchill RN   ILD Care Coordinator   [phone]

## 2021-07-22 NOTE — TELEPHONE ENCOUNTER
FUTURE VISIT INFORMATION      SURGERY INFORMATION:    Date: 21    Location: [facility] or    Surgeon:  Zackery Moura MD    Anesthesia Type:  general    Procedure: NEPHROLITHOTOMY, PERCUTANEOUS, USING HOLMIUM LASER    Consult: virtual visit 21    RECORDS REQUESTED FROM:       Primary Care Provider: Cristhian Busch MD- [place]    Pertinent Medical History: pulmonary hypertension     Most recent EKG+ Tracin20- [place]    Most recent ECHO: 3/5/21    Most recent Coronary Angiogram: 3/17/13    Most recent PFT's: 20- [place]

## 2021-07-23 ASSESSMENT — ENCOUNTER SYMPTOMS
PALPITATIONS: 1
LIGHT-HEADEDNESS: 0
BLOOD IN STOOL: 0
WHEEZING: 1
MUSCLE CRAMPS: 0
VOMITING: 0
SPUTUM PRODUCTION: 1
ORTHOPNEA: 1
POOR WOUND HEALING: 0
SKIN CHANGES: 1
SORE THROAT: 0
SINUS CONGESTION: 1
POSTURAL DYSPNEA: 1
DEPRESSION: 1
DIARRHEA: 1
HEARTBURN: 0
STIFFNESS: 0
INSOMNIA: 1
NAUSEA: 0
NAIL CHANGES: 0
BOWEL INCONTINENCE: 0
SNORES LOUDLY: 0
POLYPHAGIA: 0
SLEEP DISTURBANCES DUE TO BREATHING: 1
NIGHT SWEATS: 0
HALLUCINATIONS: 0
HYPERTENSION: 0
DYSURIA: 0
TASTE DISTURBANCE: 0
SHORTNESS OF BREATH: 1
TROUBLE SWALLOWING: 1
RECTAL PAIN: 0
HOARSE VOICE: 0
ARTHRALGIAS: 0
SYNCOPE: 0
MYALGIAS: 0
COUGH: 1
COUGH DISTURBING SLEEP: 0
NERVOUS/ANXIOUS: 1
SMELL DISTURBANCE: 0
WEIGHT GAIN: 0
ABDOMINAL PAIN: 0
HYPOTENSION: 0
HEMATURIA: 0
DECREASED CONCENTRATION: 0
BRUISES/BLEEDS EASILY: 0
MUSCLE WEAKNESS: 0
CONSTIPATION: 0
PANIC: 0
SINUS PAIN: 1
POLYDIPSIA: 0
JAUNDICE: 0
FLANK PAIN: 1
WEIGHT LOSS: 0
BACK PAIN: 1
EXERCISE INTOLERANCE: 1
SWOLLEN GLANDS: 0
HEMOPTYSIS: 0
NECK PAIN: 0
DECREASED APPETITE: 0
JOINT SWELLING: 0
DYSPNEA ON EXERTION: 1
ALTERED TEMPERATURE REGULATION: 1
CHILLS: 1
FEVER: 0
BLOATING: 0
LEG PAIN: 0
NECK MASS: 0
FATIGUE: 1
DIFFICULTY URINATING: 0
INCREASED ENERGY: 1

## 2021-07-26 NOTE — PROGRESS NOTES
"    Date of Service: 07/27/2021      [facility]      Primary PH cardiologist: Dr. Lindsey      HPI:  Mr. Jimmy Isaac is a pleasant 60 year old male with a past medical history of hypertension, CKD, and pulmonary sarcoidosis (without cardiac involvement). He is followed her in our clinic by Dr. Lindsey for associated pulmonary hypertension and is maintained on combination therapy with Letairis, tadalafil, and selexipag. He is also oxygen dependent.     He was seen last by Dr. Lindsey via virtual visit in December of 2020. At that time, no medication changes were made, though he was referred for further evaluation of hydronephrosis. He did not see urology until June, but there was concern that he was having more obstruction from nephrolithiasis causing more more deterioration to his kidney function. He had CT imaging which showed an obstructing stone in the left ureter. As this was felt to possibly be longstanding, it was unclear whether renal function could be recovered. On 7/9 he underwent a left nephrostomy tube placement. Urology then recommended left percutaneous nephrolithotomy, but would need further evaluation regarding anesthesia. He was to return to see Dr. Lindsey a few weeks ago with a repeat echocardiogram, but did not show for this appointment.     Today, I am seeing him in clinic for urgent evaluation prior to his anesthesia clearance visit. His surgery is tentatively scheduled for 8/11/21. In regard to his breathing, he tells me that \"this has been a tough summer\" for him mostly due to the high heat and humidity. He denies any new chest pain, but does have pressure in his chest at times mostly when he's having some issues with his breathing. He also notes that at times (nearly daily), his pulse feels irregular like it \"skips a beat.\" He denies any significant dizziness or presyncope. He has occasional mild lower extremity edema, R>L which sounds like it " is mostly toward the end of the day. Currently, he remains on Lasix 20mg daily.     EKG done in clinic today showed marked sinus bradycardia with 1st degree AV block with a heart rate of 41. His digoxin level from 7/1 was 0.9, at which point his Scr was 3.27. For completeness, a new dig level is being added today after our visit.     He had an echocardiogram done prior to our visit today which was reviewed: EF normal at 55-60%. Flattened septum c/w RV pressure and volume overload. His RV function is mild to moderately reduced with a TAPSE of 2.1cm. No new significant valvular abnormalities. His PASP was unable to be assessed due to lack of TR Jet. High RA pressure was estimated at 15mmHg or greater. He had no pericardial effusion. When compared to 2018, the RV function has declined, though primarily due to was was reported as reduced RV free wall contraction.    Labs were performed prior to our visit today and were reviewed: Na 138, K 4.4, BUN 27, SCR 3.03. NT-proBNP 8,834.      CURRENT PULMONARY HYPERTENSION REGIMEN:     PAH Rx: Selexipag 1600mcg BID, Letairs 10mg daily, and Adcirca 40mg daily   (previously on Tyvaso)     Diuretics: Lasix 20mg daily      Oxygen: 4.5LPM at rest, will go up to 8-10L with significant exertion     Anticoagulation: none        ASSESSMENT/PLAN:      1. Pulmonary arterial hypertension.              --In the setting of pulmonary sarcoid and chronic hypoxemia. He remains on triple combination therapy with tadalafil, ambrisentan, and selexipag. Clinically, he has had a difficult summer due to the high humidity and heat but it is unclear whether his overall baseline breathing has significantly worsened. However, of concern is that echocardiogram done prior to our visit today showed worsening RV function when compared to prior. High RA pressure was suggested as well.   --While he does not appear decompensated on exam on Lasix 20mg daily, RV function as well as his underlying lung disease and  high oxygen requirements make him high risk for general anesthesia. NT-proBNP is significantly elevated at >8k. As Dr. Lindsey was unavailable, I reviewed his case with Dr. Lemon. Prior to consideration of surgical intervention, it is recommended that he have a repeat RHC which we will expedite. This will help sort out further whether transition to IV prostacyclin in the perioperative period would be a safer alternative for him (selexipag can also not be crushed should he require intubation/NG placement more than just short term), and whether additional diuresis is necessary. Overall, if local/regional anesthesia is possible, this would be preferred over GA. I have reached out to Dr. Moura to discuss recommendations.    --Notably, he has marked sinus bradycardia today as well with 1st degree AVB. Given worsening renal function, will stop his digoxin for now (which he was on for RV support). Recheck a dig level today as an add on. Should he develop any new dizziness or presyncope/syncope, would have a low threshold to admit for close monitoring.       Follow up plan:  Follow up early next week to see Dr. Lindsey to discuss results of RHC and further surgical recommendations from a cardiovascular standpoint.         Orders this Visit:  Orders Placed This Encounter   Procedures     N terminal pro BNP outpatient     Digoxin level     EKG 12-lead, tracing only (Same Day)     No orders of the defined types were placed in this encounter.    Medications Discontinued During This Encounter   Medication Reason     digoxin (LANOXIN) 125 MCG tablet Stop at Discharge           CURRENT MEDICATIONS:  Current Outpatient Medications   Medication Sig Dispense Refill     ambrisentan (LETAIRIS) 10 MG tablet Take 1 tablet (10 mg) by mouth daily 30 tablet 11     budesonide (PULMICORT) 0.25 MG/2ML neb solution Take 2 mLs (0.25 mg) by nebulization 2 times daily 120 mL 4     furosemide (LASIX) 20 MG tablet Take 1 tablet (20 mg) by  mouth daily       PROAIR  (90 Base) MCG/ACT inhaler Inhale 2 puffs into the lungs every 6 hours as needed for shortness of breath / dyspnea 18 g 11     selexipag (UPTRAVI) 1600 MCG tablet Take 1 tablet (1,600 mcg) by mouth every 12 hours 60 tablet 11     tadalafil, PAH, (ALYQ) 20 MG TABS Take 2 tablets (40 mg) by mouth daily 60 tablet 5     UNABLE TO FIND MEDICATION NAME: Topical skin tag remover       tamsulosin (FLOMAX) 0.4 MG capsule Take 1 capsule (0.4 mg) by mouth daily (Patient not taking: Reported on 7/1/2021) 30 capsule 0       ALLERGIES     Allergies   Allergen Reactions     Nkda [No Known Drug Allergies]        PAST MEDICAL HISTORY:  Past Medical History:   Diagnosis Date     Chronic sinusitis      CKD (chronic kidney disease)      Heart disease      Hypertension      Keratosis     frontal scalp, treated with liquid nitrogen at [facility]     Malignant neoplasm (H)      Pneumonia a few times     Pulmonary sarcoidosis (H)          Review of Systems:  Cardiovascular: negative for chest pain, pos occasional palpitations, neg orthopnea, pos occasional ankle edema.  Constitutional: negative for chills, sweats, fevers   Resp: Negative for dyspnea at rest, pos dyspnea on exertion, known chronic lung disease  HEENT: Negative for new visual changes, frequent headaches  Gastrointestinal: negative for abdominal pain, diarrhea, blood in stool, nausea, vomiting  Hematologic/lymphatic: negative for current systemic anticoagulation, hx of blood clots  Neurological: negative for focal weakness, LOC, seizures, syncope/presyncope       Physical Exam:  Vitals: BP 99/53 (BP Location: Right arm, Patient Position: Chair, Cuff Size: Adult Regular)   Pulse 61   Ht 1.829 m (6')   Wt 74.8 kg (165 lb)   SpO2 98%   BMI 22.38 kg/m     Wt Readings from Last 4 Encounters:   07/27/21 74.8 kg (165 lb)   07/13/21 74.8 kg (165 lb)   07/09/21 76 kg (167 lb 8 oz)   07/01/21 76 kg (167 lb 8 oz)       GEN:  In general,  this is a well nourished  male in no acute distress on NC with portable oxygen tanks.  Patient ambulatory, unaccompanied.   HEENT:  Pupils grossly equal, sclerae nonicteric.   NECK: Supple, trachea midline. No JVD while upright.   C/V:  Regular rate and rhythm on exam currently. No murmur, rub or gallop. No S3 or RV heave.   RESP: Respirations are unlabored. No use of accessory muscles. Diminished in bases but otherwise clear to auscultation without wheezing, rales, or rhonchi.  GI: Abdomen soft, nontender, nondistended.   EXTREM: No edema on exam today. No cyanosis or clubbing.  NEURO: Alert and oriented, cooperative. Gait not formally assessed. No obvious focal deficits.   SKIN: Warm and dry.       Recent Lab Results:  LIVER ENZYME RESULTS:  Lab Results   Component Value Date    AST 20 07/01/2021    ALT 27 07/01/2021       CBC RESULTS:  Lab Results   Component Value Date    WBC 5.7 07/01/2021    RBC 3.91 (L) 07/01/2021    HGB 11.4 (L) 07/01/2021    HCT 36.5 (L) 07/01/2021    MCV 93 07/01/2021    MCH 29.2 07/01/2021    MCHC 31.2 (L) 07/01/2021    RDW 13.2 07/01/2021     (L) 07/01/2021       BMP RESULTS:  Lab Results   Component Value Date     07/27/2021     07/01/2021    POTASSIUM 4.4 07/27/2021    POTASSIUM 4.0 07/01/2021    CHLORIDE 107 07/27/2021    CHLORIDE 110 (H) 07/01/2021    CO2 27 07/27/2021    CO2 25 07/01/2021    ANIONGAP 4 07/27/2021    ANIONGAP 7 07/01/2021     (H) 07/27/2021    GLC 95 07/01/2021    BUN 45 (H) 07/27/2021    BUN 50 (H) 07/01/2021    CR 3.03 (H) 07/27/2021    CR 3.27 (H) 07/01/2021    GFRESTIMATED 21 (L) 07/27/2021    GFRESTIMATED 19 (L) 07/01/2021    GFRESTBLACK 22 (L) 07/01/2021    TERI 9.1 07/27/2021    TERI 8.7 07/01/2021        Recent Labs   Lab Test 07/27/21  0742 07/01/21  1336 12/11/19  1109 04/12/16  1450   NTBNPI  --   --   --  260   NTBNP 8,834* 8,465* 1,026*  --          Most recent testing:      Echocardiogram  7/27/2021  Interpretation  Summary  Global and regional left ventricular function is normal with an EF of 55-60%.  Flattened septum is consistent with right ventricular pressure and volume  overload.  Global right ventricular function is mildly to moderately reduced. The  longitudinal excursion is preserved (TAPSE 2.1 cm and S 11.0 cm/s) but the  free wall contraction is reduced. The RV FAC was 25% but the basal segment of  the RV was not clearly visualized.  No significant valvular abnormality.  Pulmonary artery systolic pressure cannot be assessed due to inadequate TR  jet.  IVC diameter >2.1 cm collapsing <50% with sniff suggests a high RA pressure  estimated at 15 mmHg or greater.  No pericardial effusion is present.  This study was compared with the study from 10/29/2018. The RV function  appears to have declined based on direct visual and quantitative comparison.  This is primarily due to reduced RV free wall contraction.      [facility]  12/11/2019    Severe pulmonary arterial hypertension    Normal right sided filling pressure and high normal left sided filling pressure    Normal Cardiac output    No signficant change when compared to his last hemodynamic assessment in July 2019         NYHA Functional Class:  Functional class 3      A total of 50 minutes was spent today performing chart and history review, gathering HPI, physical exam, pre and post visit documentation, and care coordination.      Gauri Laurent PA-C  [facility]  Pager [phone]

## 2021-07-27 ENCOUNTER — LAB (OUTPATIENT)
Dept: LAB | Facility: CLINIC | Age: 61
End: 2021-07-27
Attending: PHYSICIAN ASSISTANT
Payer: COMMERCIAL

## 2021-07-27 ENCOUNTER — OFFICE VISIT (OUTPATIENT)
Dept: CARDIOLOGY | Facility: CLINIC | Age: 61
End: 2021-07-27
Attending: PHYSICIAN ASSISTANT
Payer: COMMERCIAL

## 2021-07-27 ENCOUNTER — ANCILLARY PROCEDURE (OUTPATIENT)
Dept: CARDIOLOGY | Facility: CLINIC | Age: 61
End: 2021-07-27
Attending: INTERNAL MEDICINE
Payer: COMMERCIAL

## 2021-07-27 VITALS
DIASTOLIC BLOOD PRESSURE: 53 MMHG | SYSTOLIC BLOOD PRESSURE: 99 MMHG | BODY MASS INDEX: 22.35 KG/M2 | HEIGHT: 72 IN | WEIGHT: 165 LBS | HEART RATE: 61 BPM | OXYGEN SATURATION: 98 %

## 2021-07-27 DIAGNOSIS — I27.20 PULMONARY HYPERTENSION (H): ICD-10-CM

## 2021-07-27 DIAGNOSIS — Z11.59 ENCOUNTER FOR SCREENING FOR OTHER VIRAL DISEASES: Primary | ICD-10-CM

## 2021-07-27 DIAGNOSIS — R06.02 SOB (SHORTNESS OF BREATH): ICD-10-CM

## 2021-07-27 DIAGNOSIS — I27.20 PULMONARY HYPERTENSION (H): Primary | ICD-10-CM

## 2021-07-27 DIAGNOSIS — Z51.81 ENCOUNTER FOR MONITORING DIGOXIN THERAPY: ICD-10-CM

## 2021-07-27 DIAGNOSIS — Z11.59 ENCOUNTER FOR SCREENING FOR OTHER VIRAL DISEASES: ICD-10-CM

## 2021-07-27 DIAGNOSIS — D86.9 SARCOIDOSIS: ICD-10-CM

## 2021-07-27 DIAGNOSIS — Z79.899 ENCOUNTER FOR MONITORING DIGOXIN THERAPY: ICD-10-CM

## 2021-07-27 DIAGNOSIS — N13.2 HYDRONEPHROSIS WITH URINARY OBSTRUCTION DUE TO URETERAL CALCULUS: ICD-10-CM

## 2021-07-27 DIAGNOSIS — R00.2 PALPITATIONS: ICD-10-CM

## 2021-07-27 LAB
ALBUMIN UR-MCNC: 100 MG/DL
ANION GAP SERPL CALCULATED.3IONS-SCNC: 4 MMOL/L (ref 3–14)
APPEARANCE UR: ABNORMAL
BACTERIA #/AREA URNS HPF: ABNORMAL /HPF
BILIRUB UR QL STRIP: NEGATIVE
BUN SERPL-MCNC: 45 MG/DL (ref 7–30)
CALCIUM SERPL-MCNC: 9.1 MG/DL (ref 8.5–10.1)
CHLORIDE BLD-SCNC: 107 MMOL/L (ref 94–109)
CO2 SERPL-SCNC: 27 MMOL/L (ref 20–32)
COLOR UR AUTO: YELLOW
CREAT SERPL-MCNC: 3.03 MG/DL (ref 0.66–1.25)
GFR SERPL CREATININE-BSD FRML MDRD: 21 ML/MIN/1.73M2
GLUCOSE BLD-MCNC: 110 MG/DL (ref 70–99)
GLUCOSE UR STRIP-MCNC: NEGATIVE MG/DL
HGB UR QL STRIP: ABNORMAL
HYALINE CASTS: 1 /LPF
KETONES UR STRIP-MCNC: NEGATIVE MG/DL
LEUKOCYTE ESTERASE UR QL STRIP: ABNORMAL
LVEF ECHO: NORMAL
MUCOUS THREADS #/AREA URNS LPF: PRESENT /LPF
NITRATE UR QL: NEGATIVE
NT-PROBNP SERPL-MCNC: 8834 PG/ML (ref 0–125)
PH UR STRIP: 5 [PH] (ref 5–7)
POTASSIUM BLD-SCNC: 4.4 MMOL/L (ref 3.4–5.3)
RBC URINE: 42 /HPF
SODIUM SERPL-SCNC: 138 MMOL/L (ref 133–144)
SP GR UR STRIP: 1.01 (ref 1–1.03)
UROBILINOGEN UR STRIP-MCNC: NORMAL MG/DL
WBC CLUMPS #/AREA URNS HPF: PRESENT /HPF
WBC URINE: 45 /HPF

## 2021-07-27 PROCEDURE — 87086 URINE CULTURE/COLONY COUNT: CPT | Mod: 90 | Performed by: PATHOLOGY

## 2021-07-27 PROCEDURE — 81001 URINALYSIS AUTO W/SCOPE: CPT | Performed by: PATHOLOGY

## 2021-07-27 PROCEDURE — G0463 HOSPITAL OUTPT CLINIC VISIT: HCPCS | Mod: 25

## 2021-07-27 PROCEDURE — 36415 COLL VENOUS BLD VENIPUNCTURE: CPT | Performed by: PATHOLOGY

## 2021-07-27 PROCEDURE — 99215 OFFICE O/P EST HI 40 MIN: CPT | Mod: 25 | Performed by: PHYSICIAN ASSISTANT

## 2021-07-27 PROCEDURE — 93005 ELECTROCARDIOGRAM TRACING: CPT

## 2021-07-27 PROCEDURE — 87186 SC STD MICRODIL/AGAR DIL: CPT | Mod: 90 | Performed by: PATHOLOGY

## 2021-07-27 PROCEDURE — 93306 TTE W/DOPPLER COMPLETE: CPT | Mod: GC | Performed by: STUDENT IN AN ORGANIZED HEALTH CARE EDUCATION/TRAINING PROGRAM

## 2021-07-27 PROCEDURE — 83880 ASSAY OF NATRIURETIC PEPTIDE: CPT | Performed by: PHYSICIAN ASSISTANT

## 2021-07-27 PROCEDURE — 87088 URINE BACTERIA CULTURE: CPT | Mod: 90 | Performed by: PATHOLOGY

## 2021-07-27 PROCEDURE — 80048 BASIC METABOLIC PNL TOTAL CA: CPT | Performed by: PATHOLOGY

## 2021-07-27 RX ORDER — LIDOCAINE 40 MG/G
CREAM TOPICAL
Status: CANCELLED | OUTPATIENT
Start: 2021-07-27

## 2021-07-27 ASSESSMENT — PAIN SCALES - GENERAL: PAINLEVEL: NO PAIN (0)

## 2021-07-27 ASSESSMENT — MIFFLIN-ST. JEOR: SCORE: 1591.44

## 2021-07-27 NOTE — LETTER
"7/27/2021      RE: Jimmy Isaac  [id number] [address] [address]       Dear Colleague,    Thank you for the opportunity to participate in the care of your patient, Jimmy Isaac, at the [facility] [place] at [facility]. Please see a copy of my visit note below.        Date of Service: 07/27/2021      [facility]      Primary PH cardiologist: Dr. Lindsey      HPI:  Mr. Jimmy Isaac is a pleasant 60 year old male with a past medical history of hypertension, CKD, and pulmonary sarcoidosis (without cardiac involvement). He is followed her in our clinic by Dr. Lindsey for associated pulmonary hypertension and is maintained on combination therapy with Letairis, tadalafil, and selexipag. He is also oxygen dependent.     He was seen last by Dr. Lindsey via virtual visit in December of 2020. At that time, no medication changes were made, though he was referred for further evaluation of hydronephrosis. He did not see urology until June, but there was concern that he was having more obstruction from nephrolithiasis causing more more deterioration to his kidney function. He had CT imaging which showed an obstructing stone in the left ureter. As this was felt to possibly be longstanding, it was unclear whether renal function could be recovered. On 7/9 he underwent a left nephrostomy tube placement. Urology then recommended left percutaneous nephrolithotomy, but would need further evaluation regarding anesthesia. He was to return to see Dr. Lindsey a few weeks ago with a repeat echocardiogram, but did not show for this appointment.     Today, I am seeing him in clinic for urgent evaluation prior to his anesthesia clearance visit. His surgery is tentatively scheduled for 8/11/21. In regard to his breathing, he tells me that \"this has been a tough summer\" for him mostly due to the high heat and humidity. He " "denies any new chest pain, but does have pressure in his chest at times mostly when he's having some issues with his breathing. He also notes that at times (nearly daily), his pulse feels irregular like it \"skips a beat.\" He denies any significant dizziness or presyncope. He has occasional mild lower extremity edema, R>L which sounds like it is mostly toward the end of the day. Currently, he remains on Lasix 20mg daily.     EKG done in clinic today showed marked sinus bradycardia with 1st degree AV block with a heart rate of 41. His digoxin level from 7/1 was 0.9, at which point his Scr was 3.27. For completeness, a new dig level is being added today after our visit.     He had an echocardiogram done prior to our visit today which was reviewed: EF normal at 55-60%. Flattened septum c/w RV pressure and volume overload. His RV function is mild to moderately reduced with a TAPSE of 2.1cm. No new significant valvular abnormalities. His PASP was unable to be assessed due to lack of TR Jet. High RA pressure was estimated at 15mmHg or greater. He had no pericardial effusion. When compared to 2018, the RV function has declined, though primarily due to was was reported as reduced RV free wall contraction.    Labs were performed prior to our visit today and were reviewed: Na 138, K 4.4, BUN 27, SCR 3.03. NT-proBNP 8,834.      CURRENT PULMONARY HYPERTENSION REGIMEN:     PAH Rx: Selexipag 1600mcg BID, Letairs 10mg daily, and Adcirca 40mg daily   (previously on Tyvaso)     Diuretics: Lasix 20mg daily      Oxygen: 4.5LPM at rest, will go up to 8-10L with significant exertion     Anticoagulation: none        ASSESSMENT/PLAN:      1. Pulmonary arterial hypertension.              --In the setting of pulmonary sarcoid and chronic hypoxemia. He remains on triple combination therapy with tadalafil, ambrisentan, and selexipag. Clinically, he has had a difficult summer due to the high humidity and heat but it is unclear whether his " overall baseline breathing has significantly worsened. However, of concern is that echocardiogram done prior to our visit today showed worsening RV function when compared to prior. High RA pressure was suggested as well.   --While he does not appear decompensated on exam on Lasix 20mg daily, RV function as well as his underlying lung disease and high oxygen requirements make him high risk for general anesthesia. NT-proBNP is significantly elevated at >8k. As Dr. Lindsey was unavailable, I reviewed his case with Dr. Lemon. Prior to consideration of surgical intervention, it is recommended that he have a repeat RHC which we will expedite. This will help sort out further whether transition to IV prostacyclin in the perioperative period would be a safer alternative for him (selexipag can also not be crushed should he require intubation/NG placement more than just short term), and whether additional diuresis is necessary. Overall, if local/regional anesthesia is possible, this would be preferred over GA. I have reached out to Dr. Moura to discuss recommendations.    --Notably, he has marked sinus bradycardia today as well with 1st degree AVB. Given worsening renal function, will stop his digoxin for now (which he was on for RV support). Recheck a dig level today as an add on. Should he develop any new dizziness or presyncope/syncope, would have a low threshold to admit for close monitoring.       Follow up plan:  Follow up early next week to see Dr. Lindsey to discuss results of RHC and further surgical recommendations from a cardiovascular standpoint.         Orders this Visit:  Orders Placed This Encounter   Procedures     N terminal pro BNP outpatient     Digoxin level     EKG 12-lead, tracing only (Same Day)     No orders of the defined types were placed in this encounter.    Medications Discontinued During This Encounter   Medication Reason     digoxin (LANOXIN) 125 MCG tablet Stop at Discharge            CURRENT MEDICATIONS:  Current Outpatient Medications   Medication Sig Dispense Refill     ambrisentan (LETAIRIS) 10 MG tablet Take 1 tablet (10 mg) by mouth daily 30 tablet 11     budesonide (PULMICORT) 0.25 MG/2ML neb solution Take 2 mLs (0.25 mg) by nebulization 2 times daily 120 mL 4     furosemide (LASIX) 20 MG tablet Take 1 tablet (20 mg) by mouth daily       PROAIR  (90 Base) MCG/ACT inhaler Inhale 2 puffs into the lungs every 6 hours as needed for shortness of breath / dyspnea 18 g 11     selexipag (UPTRAVI) 1600 MCG tablet Take 1 tablet (1,600 mcg) by mouth every 12 hours 60 tablet 11     tadalafil, PAH, (ALYQ) 20 MG TABS Take 2 tablets (40 mg) by mouth daily 60 tablet 5     UNABLE TO FIND MEDICATION NAME: Topical skin tag remover       tamsulosin (FLOMAX) 0.4 MG capsule Take 1 capsule (0.4 mg) by mouth daily (Patient not taking: Reported on 7/1/2021) 30 capsule 0       ALLERGIES     Allergies   Allergen Reactions     Nkda [No Known Drug Allergies]        PAST MEDICAL HISTORY:  Past Medical History:   Diagnosis Date     Chronic sinusitis      CKD (chronic kidney disease)      Heart disease      Hypertension      Keratosis     frontal scalp, treated with liquid nitrogen at [facility]     Malignant neoplasm (H)      Pneumonia a few times     Pulmonary sarcoidosis (H)          Review of Systems:  Cardiovascular: negative for chest pain, pos occasional palpitations, neg orthopnea, pos occasional ankle edema.  Constitutional: negative for chills, sweats, fevers   Resp: Negative for dyspnea at rest, pos dyspnea on exertion, known chronic lung disease  HEENT: Negative for new visual changes, frequent headaches  Gastrointestinal: negative for abdominal pain, diarrhea, blood in stool, nausea, vomiting  Hematologic/lymphatic: negative for current systemic anticoagulation, hx of blood clots  Neurological: negative for focal weakness, LOC, seizures, syncope/presyncope       Physical  Exam:  Vitals: BP 99/53 (BP Location: Right arm, Patient Position: Chair, Cuff Size: Adult Regular)   Pulse 61   Ht 1.829 m (6')   Wt 74.8 kg (165 lb)   SpO2 98%   BMI 22.38 kg/m     Wt Readings from Last 4 Encounters:   07/27/21 74.8 kg (165 lb)   07/13/21 74.8 kg (165 lb)   07/09/21 76 kg (167 lb 8 oz)   07/01/21 76 kg (167 lb 8 oz)       GEN:  In general, this is a well nourished  male in no acute distress on NC with portable oxygen tanks.  Patient ambulatory, unaccompanied.   HEENT:  Pupils grossly equal, sclerae nonicteric.   NECK: Supple, trachea midline. No JVD while upright.   C/V:  Regular rate and rhythm on exam currently. No murmur, rub or gallop. No S3 or RV heave.   RESP: Respirations are unlabored. No use of accessory muscles. Diminished in bases but otherwise clear to auscultation without wheezing, rales, or rhonchi.  GI: Abdomen soft, nontender, nondistended.   EXTREM: No edema on exam today. No cyanosis or clubbing.  NEURO: Alert and oriented, cooperative. Gait not formally assessed. No obvious focal deficits.   SKIN: Warm and dry.       Recent Lab Results:  LIVER ENZYME RESULTS:  Lab Results   Component Value Date    AST 20 07/01/2021    ALT 27 07/01/2021       CBC RESULTS:  Lab Results   Component Value Date    WBC 5.7 07/01/2021    RBC 3.91 (L) 07/01/2021    HGB 11.4 (L) 07/01/2021    HCT 36.5 (L) 07/01/2021    MCV 93 07/01/2021    MCH 29.2 07/01/2021    MCHC 31.2 (L) 07/01/2021    RDW 13.2 07/01/2021     (L) 07/01/2021       BMP RESULTS:  Lab Results   Component Value Date     07/27/2021     07/01/2021    POTASSIUM 4.4 07/27/2021    POTASSIUM 4.0 07/01/2021    CHLORIDE 107 07/27/2021    CHLORIDE 110 (H) 07/01/2021    CO2 27 07/27/2021    CO2 25 07/01/2021    ANIONGAP 4 07/27/2021    ANIONGAP 7 07/01/2021     (H) 07/27/2021    GLC 95 07/01/2021    BUN 45 (H) 07/27/2021    BUN 50 (H) 07/01/2021    CR 3.03 (H) 07/27/2021    CR 3.27 (H) 07/01/2021     GFRESTIMATED 21 (L) 07/27/2021    GFRESTIMATED 19 (L) 07/01/2021    GFRESTBLACK 22 (L) 07/01/2021    TERI 9.1 07/27/2021    TERI 8.7 07/01/2021        Recent Labs   Lab Test 07/27/21  0742 07/01/21  1336 12/11/19  1109 04/12/16  1450   NTBNPI  --   --   --  260   NTBNP 8,834* 8,465* 1,026*  --          Most recent testing:      Echocardiogram  7/27/2021  Interpretation Summary  Global and regional left ventricular function is normal with an EF of 55-60%.  Flattened septum is consistent with right ventricular pressure and volume  overload.  Global right ventricular function is mildly to moderately reduced. The  longitudinal excursion is preserved (TAPSE 2.1 cm and S 11.0 cm/s) but the  free wall contraction is reduced. The RV FAC was 25% but the basal segment of  the RV was not clearly visualized.  No significant valvular abnormality.  Pulmonary artery systolic pressure cannot be assessed due to inadequate TR  jet.  IVC diameter >2.1 cm collapsing <50% with sniff suggests a high RA pressure  estimated at 15 mmHg or greater.  No pericardial effusion is present.  This study was compared with the study from 10/29/2018. The RV function  appears to have declined based on direct visual and quantitative comparison.  This is primarily due to reduced RV free wall contraction.      [facility]  12/11/2019    Severe pulmonary arterial hypertension    Normal right sided filling pressure and high normal left sided filling pressure    Normal Cardiac output    No signficant change when compared to his last hemodynamic assessment in July 2019         NYHA Functional Class:  Functional class 3      A total of 50 minutes was spent today performing chart and history review, gathering HPI, physical exam, pre and post visit documentation, and care coordination.      Gauri Laurent PA-C  [facility]  Pager [phone]

## 2021-07-27 NOTE — NURSING NOTE
Pt left the clinic before I was able to review his Right heart catheterization instructions and schedule his COVID screen.  He is currently scheduled for his Right heart catheterization on Friday 7/30 at 8:30 AM.  I called the pts cell phone to try to reach him and left a voicemail requesting he call back.  Bernarda Wilson RN on 7/27/2021 at 10:26 AM

## 2021-07-27 NOTE — PATIENT INSTRUCTIONS
Thank you for visiting the [facility] today.      Today we discussed:     We will need to postpone your surgery for now, I will reach out to Dr Moura to update him.  We will work on arranging a heart catheterization as soon as possible.    STOP the digoxin.    Dr Lindsey will review everything early next week.       Follow up Appointment Information:  We will arrange pending further testing.     *Mandatory COVID Testing:   Pt will need to complete a COVID test no sooner than 4 days prior to their procedure.      To schedule COVID testing Please call [phone]    If you want to complete this at an outside facility please call them to find out if they will have the results within the appropriate time frame and their fax number.  You will need to provide us with that information so we can send them the order.    They will need to fax the results to [phone]    If you are running into and issues please call us.      Check-In  Time Check-In Location Estimated Length Procedure   Name        [facility]  waiting room 60-90 minutes Right Heart Catheterization**     Procedure Preparations & Instructions     This is an invasive procedure that DOES require preparation:    - Nothing to eat for 6 hours   - You may have clear liquids up until the time of your procedure  - You can take your morning medications (except diabetic and blood thinners) with sips of water  - Please arrange for a ride to drop you off and pick you up in the instance you are unable to drive home, however you should be able to function as you normally would after the procedure       Additional Instructions:    1. Continue staying active and eat a heart healthy, low sodium diet.     2. Please keep current list of medications with you at all times.     3. Remember to weigh yourself daily after voiding and write it down on a log. If you have gained/lost 2 pounds overnight or 5 pounds in a week contact us for medication adjustments or  further instructions.    4. Please call us immediately if you have syncope (fainting or passing out), chest pain, worsening edema (swelling or weight gain), or general worsening in how you are feeling.         ---------------------------------------------------------------------------------------------------------------    If you have questions or concerns please contact us at:        Obdulia Rome, RN, BSN, PHN  Samantha Chandra  (Schedule,Prior Auth)  Nurse Coordinator     Clinic   Pulmonary Hypertension   Pulmonary Hypertension  [facility]  [phone])[phone]    (P) [phone]        (F) [phone]      ** Please note that you will NOT receive a reminder call regarding your scheduled testing, reminder calls are for provider appointments only.  If you are scheduled for testing within the [employer] system you may receive a call regarding pre-registration for billing purposes only.**     ------------------------------------------------------------------------------------------------------------    Interested in joining a support group?    Pulmonary Hypertension Association  [email or web address].phassociation.org/  **Look at the Events Tab** They even have Support Groups that you can call into    [facility] Support Group  Second Saturday of the Month from 1-3 PM   Location: [address] [place] [id number]  Leader: Sandra Jacques and Cinda Rollins  Phone: [phone] or [phone]  Email: [email or web address]

## 2021-07-27 NOTE — NURSING NOTE
Chief Complaint   Patient presents with     Follow Up      Return for 6 month PH F/U with echo and labs prior; needs surgery clearance      Vitals were taken and medications were reconciled.     Kate Burgess

## 2021-07-28 ENCOUNTER — LAB (OUTPATIENT)
Dept: LAB | Facility: CLINIC | Age: 61
End: 2021-07-28
Attending: UROLOGY
Payer: COMMERCIAL

## 2021-07-28 DIAGNOSIS — Z11.59 ENCOUNTER FOR SCREENING FOR OTHER VIRAL DISEASES: ICD-10-CM

## 2021-07-28 LAB
ATRIAL RATE - MUSE: 41 BPM
DIASTOLIC BLOOD PRESSURE - MUSE: NORMAL MMHG
INTERPRETATION ECG - MUSE: NORMAL
P AXIS - MUSE: 88 DEGREES
PR INTERVAL - MUSE: 422 MS
QRS DURATION - MUSE: 144 MS
QT - MUSE: 450 MS
QTC - MUSE: 371 MS
R AXIS - MUSE: 119 DEGREES
SARS-COV-2 RNA RESP QL NAA+PROBE: NEGATIVE
SYSTOLIC BLOOD PRESSURE - MUSE: NORMAL MMHG
T AXIS - MUSE: -28 DEGREES
VENTRICULAR RATE- MUSE: 41 BPM

## 2021-07-28 PROCEDURE — U0003 INFECTIOUS AGENT DETECTION BY NUCLEIC ACID (DNA OR RNA); SEVERE ACUTE RESPIRATORY SYNDROME CORONAVIRUS 2 (SARS-COV-2) (CORONAVIRUS DISEASE [COVID-19]), AMPLIFIED PROBE TECHNIQUE, MAKING USE OF HIGH THROUGHPUT TECHNOLOGIES AS DESCRIBED BY CMS-2020-01-R: HCPCS

## 2021-07-28 PROCEDURE — U0005 INFEC AGEN DETEC AMPLI PROBE: HCPCS

## 2021-07-29 ENCOUNTER — TELEPHONE (OUTPATIENT)
Dept: CARDIOLOGY | Facility: CLINIC | Age: 61
End: 2021-07-29

## 2021-07-29 LAB
BACTERIA UR CULT: ABNORMAL
BACTERIA UR CULT: ABNORMAL

## 2021-07-30 ENCOUNTER — HOSPITAL ENCOUNTER (INPATIENT)
Facility: CLINIC | Age: 61
LOS: 13 days | Discharge: HOME | DRG: 242 | End: 2021-08-12
Attending: INTERNAL MEDICINE | Admitting: INTERNAL MEDICINE
Payer: COMMERCIAL

## 2021-07-30 ENCOUNTER — APPOINTMENT (OUTPATIENT)
Dept: LAB | Facility: CLINIC | Age: 61
DRG: 242 | End: 2021-07-30
Attending: INTERNAL MEDICINE
Payer: COMMERCIAL

## 2021-07-30 ENCOUNTER — APPOINTMENT (OUTPATIENT)
Dept: MEDSURG UNIT | Facility: CLINIC | Age: 61
DRG: 242 | End: 2021-07-30
Attending: INTERNAL MEDICINE
Payer: COMMERCIAL

## 2021-07-30 DIAGNOSIS — N10 ACUTE PYELONEPHRITIS: ICD-10-CM

## 2021-07-30 DIAGNOSIS — N18.4 CKD (CHRONIC KIDNEY DISEASE) STAGE 4, GFR 15-29 ML/MIN (H): Primary | ICD-10-CM

## 2021-07-30 DIAGNOSIS — I27.20 PULMONARY HYPERTENSION (H): ICD-10-CM

## 2021-07-30 DIAGNOSIS — I44.39 HIGH DEGREE ATRIOVENTRICULAR BLOCK: ICD-10-CM

## 2021-07-30 LAB
ALBUMIN SERPL-MCNC: 3.2 G/DL (ref 3.4–5)
ALP SERPL-CCNC: 76 U/L (ref 40–150)
ALT SERPL W P-5'-P-CCNC: 21 U/L (ref 0–70)
ANION GAP SERPL CALCULATED.3IONS-SCNC: 5 MMOL/L (ref 3–14)
AST SERPL W P-5'-P-CCNC: 15 U/L (ref 0–45)
BILIRUB SERPL-MCNC: 0.5 MG/DL (ref 0.2–1.3)
BUN SERPL-MCNC: 65 MG/DL (ref 7–30)
CALCIUM SERPL-MCNC: 9.2 MG/DL (ref 8.5–10.1)
CHLORIDE BLD-SCNC: 104 MMOL/L (ref 94–109)
CO2 SERPL-SCNC: 26 MMOL/L (ref 20–32)
CREAT SERPL-MCNC: 3.75 MG/DL (ref 0.66–1.25)
DIGOXIN SERPL-MCNC: 0.7 UG/L
ERYTHROCYTE [DISTWIDTH] IN BLOOD BY AUTOMATED COUNT: 13.4 % (ref 10–15)
GFR SERPL CREATININE-BSD FRML MDRD: 16 ML/MIN/1.73M2
GLUCOSE BLD-MCNC: 105 MG/DL (ref 70–99)
HCT VFR BLD AUTO: 34.9 % (ref 40–53)
HGB BLD-MCNC: 10.7 G/DL (ref 13.3–17.7)
HGB BLD-MCNC: 11 G/DL (ref 13.3–17.7)
HOLD SPECIMEN: NORMAL
MCH RBC QN AUTO: 29.1 PG (ref 26.5–33)
MCHC RBC AUTO-ENTMCNC: 31.5 G/DL (ref 31.5–36.5)
MCV RBC AUTO: 92 FL (ref 78–100)
NT-PROBNP SERPL-MCNC: ABNORMAL PG/ML (ref 0–125)
OXYHGB MFR BLDA: 56 % (ref 92–100)
PLATELET # BLD AUTO: 144 10E3/UL (ref 150–450)
POTASSIUM BLD-SCNC: 4.2 MMOL/L (ref 3.4–5.3)
PROT SERPL-MCNC: 6.9 G/DL (ref 6.8–8.8)
PTH-INTACT SERPL-MCNC: 13 PG/ML (ref 18–80)
RBC # BLD AUTO: 3.78 10E6/UL (ref 4.4–5.9)
SODIUM SERPL-SCNC: 135 MMOL/L (ref 133–144)
TSH SERPL DL<=0.005 MIU/L-ACNC: 2.4 MU/L (ref 0.4–4)
WBC # BLD AUTO: 7.6 10E3/UL (ref 4–11)

## 2021-07-30 PROCEDURE — 999N000142 HC STATISTIC PROCEDURE PREP ONLY

## 2021-07-30 PROCEDURE — 999N000157 HC STATISTIC RCP TIME EA 10 MIN

## 2021-07-30 PROCEDURE — 4A023N6 MEASUREMENT OF CARDIAC SAMPLING AND PRESSURE, RIGHT HEART, PERCUTANEOUS APPROACH: ICD-10-PCS | Performed by: INTERNAL MEDICINE

## 2021-07-30 PROCEDURE — 250N000011 HC RX IP 250 OP 636

## 2021-07-30 PROCEDURE — 93451 RIGHT HEART CATH: CPT | Performed by: INTERNAL MEDICINE

## 2021-07-30 PROCEDURE — 250N000009 HC RX 250: Performed by: STUDENT IN AN ORGANIZED HEALTH CARE EDUCATION/TRAINING PROGRAM

## 2021-07-30 PROCEDURE — 250N000009 HC RX 250: Performed by: INTERNAL MEDICINE

## 2021-07-30 PROCEDURE — 80162 ASSAY OF DIGOXIN TOTAL: CPT | Performed by: STUDENT IN AN ORGANIZED HEALTH CARE EDUCATION/TRAINING PROGRAM

## 2021-07-30 PROCEDURE — 99291 CRITICAL CARE FIRST HOUR: CPT | Mod: 25 | Performed by: INTERNAL MEDICINE

## 2021-07-30 PROCEDURE — 94640 AIRWAY INHALATION TREATMENT: CPT | Mod: 76

## 2021-07-30 PROCEDURE — 85014 HEMATOCRIT: CPT | Performed by: INTERNAL MEDICINE

## 2021-07-30 PROCEDURE — 83880 ASSAY OF NATRIURETIC PEPTIDE: CPT | Performed by: INTERNAL MEDICINE

## 2021-07-30 PROCEDURE — 84443 ASSAY THYROID STIM HORMONE: CPT | Performed by: NURSE PRACTITIONER

## 2021-07-30 PROCEDURE — 272N000001 HC OR GENERAL SUPPLY STERILE: Performed by: INTERNAL MEDICINE

## 2021-07-30 PROCEDURE — 82810 BLOOD GASES O2 SAT ONLY: CPT

## 2021-07-30 PROCEDURE — 120N000005 HC R&B MS OVERFLOW UMMC

## 2021-07-30 PROCEDURE — 36415 COLL VENOUS BLD VENIPUNCTURE: CPT | Performed by: INTERNAL MEDICINE

## 2021-07-30 PROCEDURE — 83970 ASSAY OF PARATHORMONE: CPT | Performed by: NURSE PRACTITIONER

## 2021-07-30 PROCEDURE — 82040 ASSAY OF SERUM ALBUMIN: CPT | Performed by: INTERNAL MEDICINE

## 2021-07-30 PROCEDURE — 93010 ELECTROCARDIOGRAM REPORT: CPT | Mod: 76 | Performed by: INTERNAL MEDICINE

## 2021-07-30 PROCEDURE — 250N000011 HC RX IP 250 OP 636: Performed by: STUDENT IN AN ORGANIZED HEALTH CARE EDUCATION/TRAINING PROGRAM

## 2021-07-30 PROCEDURE — 250N000013 HC RX MED GY IP 250 OP 250 PS 637: Performed by: STUDENT IN AN ORGANIZED HEALTH CARE EDUCATION/TRAINING PROGRAM

## 2021-07-30 PROCEDURE — 99221 1ST HOSP IP/OBS SF/LOW 40: CPT | Mod: 25 | Performed by: INTERNAL MEDICINE

## 2021-07-30 PROCEDURE — 85018 HEMOGLOBIN: CPT

## 2021-07-30 RX ORDER — LIDOCAINE 40 MG/G
CREAM TOPICAL
Status: DISCONTINUED | OUTPATIENT
Start: 2021-07-30 | End: 2021-08-12 | Stop reason: HOSPADM

## 2021-07-30 RX ORDER — TADALAFIL 20 MG/1
40 TABLET ORAL DAILY
Status: DISCONTINUED | OUTPATIENT
Start: 2021-07-31 | End: 2021-07-31

## 2021-07-30 RX ORDER — FUROSEMIDE 10 MG/ML
80 INJECTION INTRAMUSCULAR; INTRAVENOUS ONCE
Status: DISCONTINUED | OUTPATIENT
Start: 2021-07-30 | End: 2021-07-30

## 2021-07-30 RX ORDER — BISACODYL 5 MG
10 TABLET, DELAYED RELEASE (ENTERIC COATED) ORAL DAILY PRN
Status: DISCONTINUED | OUTPATIENT
Start: 2021-07-30 | End: 2021-08-12 | Stop reason: HOSPADM

## 2021-07-30 RX ORDER — ALBUTEROL SULFATE 90 UG/1
2 AEROSOL, METERED RESPIRATORY (INHALATION) EVERY 6 HOURS PRN
Status: DISCONTINUED | OUTPATIENT
Start: 2021-07-30 | End: 2021-08-12 | Stop reason: HOSPADM

## 2021-07-30 RX ORDER — BISACODYL 5 MG
15 TABLET, DELAYED RELEASE (ENTERIC COATED) ORAL DAILY PRN
Status: DISCONTINUED | OUTPATIENT
Start: 2021-07-30 | End: 2021-08-12 | Stop reason: HOSPADM

## 2021-07-30 RX ORDER — ACETAMINOPHEN 650 MG/1
650 SUPPOSITORY RECTAL EVERY 4 HOURS PRN
Status: DISCONTINUED | OUTPATIENT
Start: 2021-07-30 | End: 2021-08-12 | Stop reason: HOSPADM

## 2021-07-30 RX ORDER — AMBRISENTAN 10 MG/1
10 TABLET, FILM COATED ORAL DAILY
Status: DISCONTINUED | OUTPATIENT
Start: 2021-07-31 | End: 2021-08-12 | Stop reason: HOSPADM

## 2021-07-30 RX ORDER — BUDESONIDE 0.25 MG/2ML
0.25 INHALANT ORAL 2 TIMES DAILY
Status: DISCONTINUED | OUTPATIENT
Start: 2021-07-30 | End: 2021-08-12 | Stop reason: HOSPADM

## 2021-07-30 RX ORDER — HEPARIN SODIUM 5000 [USP'U]/.5ML
5000 INJECTION, SOLUTION INTRAVENOUS; SUBCUTANEOUS EVERY 12 HOURS
Status: DISCONTINUED | OUTPATIENT
Start: 2021-07-30 | End: 2021-08-12 | Stop reason: HOSPADM

## 2021-07-30 RX ORDER — FUROSEMIDE 10 MG/ML
40 INJECTION INTRAMUSCULAR; INTRAVENOUS ONCE
Status: COMPLETED | OUTPATIENT
Start: 2021-07-30 | End: 2021-07-30

## 2021-07-30 RX ORDER — BISACODYL 5 MG
5 TABLET, DELAYED RELEASE (ENTERIC COATED) ORAL DAILY PRN
Status: DISCONTINUED | OUTPATIENT
Start: 2021-07-30 | End: 2021-08-12 | Stop reason: HOSPADM

## 2021-07-30 RX ORDER — ACETAMINOPHEN 325 MG/1
650 TABLET ORAL EVERY 4 HOURS PRN
Status: DISCONTINUED | OUTPATIENT
Start: 2021-07-30 | End: 2021-08-03

## 2021-07-30 RX ORDER — POLYETHYLENE GLYCOL 3350 17 G/17G
17 POWDER, FOR SOLUTION ORAL DAILY PRN
Status: DISCONTINUED | OUTPATIENT
Start: 2021-07-30 | End: 2021-08-12 | Stop reason: HOSPADM

## 2021-07-30 RX ORDER — PANTOPRAZOLE SODIUM 40 MG/1
40 TABLET, DELAYED RELEASE ORAL
Status: DISCONTINUED | OUTPATIENT
Start: 2021-07-31 | End: 2021-07-31

## 2021-07-30 RX ORDER — TAMSULOSIN HYDROCHLORIDE 0.4 MG/1
0.4 CAPSULE ORAL DAILY
Status: DISCONTINUED | OUTPATIENT
Start: 2021-07-31 | End: 2021-08-12 | Stop reason: HOSPADM

## 2021-07-30 RX ORDER — LIDOCAINE 40 MG/G
CREAM TOPICAL
Status: DISCONTINUED | OUTPATIENT
Start: 2021-07-30 | End: 2021-07-30 | Stop reason: HOSPADM

## 2021-07-30 RX ADMIN — FUROSEMIDE 40 MG: 10 INJECTION, SOLUTION INTRAVENOUS at 18:38

## 2021-07-30 RX ADMIN — CEFEPIME HYDROCHLORIDE 2 G: 2 INJECTION, POWDER, FOR SOLUTION INTRAVENOUS at 21:07

## 2021-07-30 RX ADMIN — SELEXIPAG 1600 MCG: 200 TABLET, COATED ORAL at 20:19

## 2021-07-30 RX ADMIN — HEPARIN SODIUM 5000 UNITS: 10000 INJECTION, SOLUTION INTRAVENOUS; SUBCUTANEOUS at 20:23

## 2021-07-30 RX ADMIN — BUDESONIDE 0.25 MG: 0.25 INHALANT RESPIRATORY (INHALATION) at 19:58

## 2021-07-30 ASSESSMENT — ACTIVITIES OF DAILY LIVING (ADL)
ADLS_ACUITY_SCORE: 14
ADLS_ACUITY_SCORE: 16

## 2021-07-30 ASSESSMENT — MIFFLIN-ST. JEOR: SCORE: 1593.13

## 2021-07-30 NOTE — PROGRESS NOTES
Brief Urology Note    61yo M with hx of pulmonary HTN, severe pulm disease, CKD, and sarcoidosis who has large volume bilateral renal stones now admitted with complete heart block and new O2 requirements. Has left PNT in place for severe left hydronephrosis 2/2 very large proximal ureteral stones. Currently has PCNL scheduled for 8/11, likely under local anesthesia.     Asked by primary team to consider moving up his PCNL given his tenuous cardiopulmonary status. Discussed this option with staff Dr Moura. We would prefer that this patient be optimized while he is in-house prior to his scheduled surgery. We will not be performing stone surgery during this hospitalization. If anything we would delay his surgery given his good tolerance of the PNT and stable creatinine.     Will sign off.    Discussed with staff Dr Moura.    Aliyah Ulloa MD  Urology PGY5

## 2021-07-30 NOTE — PROGRESS NOTES
[facility]   Interventional Cardiology        Consenting/Education for Right Heart Catheterization     Patient understands due to their history of PH and ongoing SOB we would like to perform right heart catheterization.  This procedure will be performed by Dr. Barone    Patient understands during the right heart catheterization, a fine tube (catheter) is put into the vein of the groin/neck.  It is carefully passed along until it reaches the heart and then goes up into the blood vessels of the lungs. This is done to measure a variety of pressures in your heart and can tell us how well the heart is filling and emptying, as well as monitor fluid status. This is usually painless. The doctor uses x-ray imaging to see the catheter. Afterwards, the catheter is removed, and incision site covered before leaving the cath lab. This procedure is done with no sedation, usually only requiring Lidocaine.     Patient also understands risks and complications of the procedure which include, but are not limited to bruising/swelling around the incision site, infection, bleeding, allergic reaction to local anesthetic, air embolism, arterial puncture, stroke, heart attack.       Patient verbalized understanding of risks and benefits of the right heart catheterization and has elected to proceed with right heart catheterization.     Patient, and pt wife, also inquiring about TSH and PTH check, requesting these labs be added on to BMP.     Charla CARSON, PILLO  [facility] Interventional Cardiology  [phone]

## 2021-07-30 NOTE — Clinical Note
Tested the right ventricular lead. See vendor printout/MD dictation. Both A and V lead now tested through device

## 2021-07-30 NOTE — Clinical Note
dry, intact, no bleeding and no hematoma. Incision left upper chest covered with primapore drsg. And steristrips

## 2021-07-30 NOTE — PLAN OF CARE
Pt admit from heart cath lab. Pt denies any c/o c/p or chest discomfort, just shortness of breath w/ exertion. Urology consulted and here to see pt regarding his Left Nephrostomy tube, progress notes. EP consulted and seen pt regarding tele heart rhythm of CHB, 2:1 AV Block, RBBB, see EKG and progress notes.

## 2021-07-30 NOTE — PROGRESS NOTES
Arrived from home for a RHC.  Extremely dyspneic with any movement.  Arrived with 6 liters nasal cannula, and could not catch his breath.  Breathing comfortably at rest with 10 liters face mask.  Consent obtained.  H&P current.  Resting in bed.

## 2021-07-30 NOTE — Clinical Note
The ECG shows an A-V block. ECG rate  = 30 bpm. Seems to have heart block at times-  EP consult ordered

## 2021-07-31 PROBLEM — I44.39 HIGH DEGREE ATRIOVENTRICULAR BLOCK: Status: ACTIVE | Noted: 2021-07-27

## 2021-07-31 LAB
ALBUMIN SERPL-MCNC: 2.8 G/DL (ref 3.4–5)
ALP SERPL-CCNC: 66 U/L (ref 40–150)
ALT SERPL W P-5'-P-CCNC: 18 U/L (ref 0–70)
ANION GAP SERPL CALCULATED.3IONS-SCNC: 7 MMOL/L (ref 3–14)
ANION GAP SERPL CALCULATED.3IONS-SCNC: 9 MMOL/L (ref 3–14)
AST SERPL W P-5'-P-CCNC: 11 U/L (ref 0–45)
BILIRUB SERPL-MCNC: 0.5 MG/DL (ref 0.2–1.3)
BUN SERPL-MCNC: 73 MG/DL (ref 7–30)
BUN SERPL-MCNC: 79 MG/DL (ref 7–30)
CALCIUM SERPL-MCNC: 8.6 MG/DL (ref 8.5–10.1)
CALCIUM SERPL-MCNC: 9 MG/DL (ref 8.5–10.1)
CHLORIDE BLD-SCNC: 104 MMOL/L (ref 94–109)
CHLORIDE BLD-SCNC: 104 MMOL/L (ref 94–109)
CO2 SERPL-SCNC: 23 MMOL/L (ref 20–32)
CO2 SERPL-SCNC: 24 MMOL/L (ref 20–32)
CREAT SERPL-MCNC: 3.93 MG/DL (ref 0.66–1.25)
CREAT SERPL-MCNC: 4.07 MG/DL (ref 0.66–1.25)
ERYTHROCYTE [DISTWIDTH] IN BLOOD BY AUTOMATED COUNT: 13.5 % (ref 10–15)
GFR SERPL CREATININE-BSD FRML MDRD: 15 ML/MIN/1.73M2
GFR SERPL CREATININE-BSD FRML MDRD: 15 ML/MIN/1.73M2
GLUCOSE BLD-MCNC: 114 MG/DL (ref 70–99)
GLUCOSE BLD-MCNC: 93 MG/DL (ref 70–99)
HCT VFR BLD AUTO: 30.6 % (ref 40–53)
HGB BLD-MCNC: 9.5 G/DL (ref 13.3–17.7)
MCH RBC QN AUTO: 28.4 PG (ref 26.5–33)
MCHC RBC AUTO-ENTMCNC: 31 G/DL (ref 31.5–36.5)
MCV RBC AUTO: 91 FL (ref 78–100)
PLATELET # BLD AUTO: 134 10E3/UL (ref 150–450)
POTASSIUM BLD-SCNC: 4 MMOL/L (ref 3.4–5.3)
POTASSIUM BLD-SCNC: 4 MMOL/L (ref 3.4–5.3)
PROT SERPL-MCNC: 6.1 G/DL (ref 6.8–8.8)
RBC # BLD AUTO: 3.35 10E6/UL (ref 4.4–5.9)
SODIUM SERPL-SCNC: 135 MMOL/L (ref 133–144)
SODIUM SERPL-SCNC: 136 MMOL/L (ref 133–144)
WBC # BLD AUTO: 6.9 10E3/UL (ref 4–11)

## 2021-07-31 PROCEDURE — 250N000013 HC RX MED GY IP 250 OP 250 PS 637: Performed by: STUDENT IN AN ORGANIZED HEALTH CARE EDUCATION/TRAINING PROGRAM

## 2021-07-31 PROCEDURE — 94640 AIRWAY INHALATION TREATMENT: CPT | Mod: 76

## 2021-07-31 PROCEDURE — 200N000002 HC R&B ICU UMMC

## 2021-07-31 PROCEDURE — 99291 CRITICAL CARE FIRST HOUR: CPT | Mod: GC | Performed by: INTERNAL MEDICINE

## 2021-07-31 PROCEDURE — 250N000011 HC RX IP 250 OP 636: Performed by: STUDENT IN AN ORGANIZED HEALTH CARE EDUCATION/TRAINING PROGRAM

## 2021-07-31 PROCEDURE — 94640 AIRWAY INHALATION TREATMENT: CPT

## 2021-07-31 PROCEDURE — 250N000009 HC RX 250: Performed by: STUDENT IN AN ORGANIZED HEALTH CARE EDUCATION/TRAINING PROGRAM

## 2021-07-31 PROCEDURE — 93010 ELECTROCARDIOGRAM REPORT: CPT | Performed by: INTERNAL MEDICINE

## 2021-07-31 PROCEDURE — 82310 ASSAY OF CALCIUM: CPT | Performed by: STUDENT IN AN ORGANIZED HEALTH CARE EDUCATION/TRAINING PROGRAM

## 2021-07-31 PROCEDURE — 999N000157 HC STATISTIC RCP TIME EA 10 MIN

## 2021-07-31 PROCEDURE — 93005 ELECTROCARDIOGRAM TRACING: CPT

## 2021-07-31 PROCEDURE — 85027 COMPLETE CBC AUTOMATED: CPT | Performed by: STUDENT IN AN ORGANIZED HEALTH CARE EDUCATION/TRAINING PROGRAM

## 2021-07-31 PROCEDURE — 250N000011 HC RX IP 250 OP 636

## 2021-07-31 PROCEDURE — 80053 COMPREHEN METABOLIC PANEL: CPT | Performed by: STUDENT IN AN ORGANIZED HEALTH CARE EDUCATION/TRAINING PROGRAM

## 2021-07-31 RX ORDER — ATROPINE SULFATE 0.1 MG/ML
INJECTION INTRAVENOUS
Status: DISCONTINUED
Start: 2021-07-31 | End: 2021-07-31 | Stop reason: HOSPADM

## 2021-07-31 RX ORDER — FUROSEMIDE 10 MG/ML
80 INJECTION INTRAMUSCULAR; INTRAVENOUS ONCE
Status: COMPLETED | OUTPATIENT
Start: 2021-07-31 | End: 2021-07-31

## 2021-07-31 RX ORDER — SODIUM CHLORIDE 9 MG/ML
INJECTION, SOLUTION INTRAVENOUS CONTINUOUS
Status: CANCELLED | OUTPATIENT
Start: 2021-07-31

## 2021-07-31 RX ORDER — TADALAFIL 20 MG/1
40 TABLET ORAL DAILY
Status: DISCONTINUED | OUTPATIENT
Start: 2021-07-31 | End: 2021-08-12 | Stop reason: HOSPADM

## 2021-07-31 RX ORDER — LIDOCAINE 40 MG/G
CREAM TOPICAL
Status: CANCELLED | OUTPATIENT
Start: 2021-07-31

## 2021-07-31 RX ORDER — CEFAZOLIN SODIUM 2 G/100ML
2 INJECTION, SOLUTION INTRAVENOUS
Status: CANCELLED | OUTPATIENT
Start: 2021-07-31

## 2021-07-31 RX ADMIN — AMBRISENTAN 10 MG: 10 TABLET, FILM COATED ORAL at 07:20

## 2021-07-31 RX ADMIN — TADALAFIL 40 MG: 20 TABLET, FILM COATED ORAL at 08:09

## 2021-07-31 RX ADMIN — TAMSULOSIN HYDROCHLORIDE 0.4 MG: 0.4 CAPSULE ORAL at 07:20

## 2021-07-31 RX ADMIN — BUDESONIDE 0.25 MG: 0.25 INHALANT RESPIRATORY (INHALATION) at 08:16

## 2021-07-31 RX ADMIN — ACETAMINOPHEN 650 MG: 325 TABLET, FILM COATED ORAL at 15:11

## 2021-07-31 RX ADMIN — HEPARIN SODIUM 5000 UNITS: 10000 INJECTION, SOLUTION INTRAVENOUS; SUBCUTANEOUS at 07:20

## 2021-07-31 RX ADMIN — FUROSEMIDE 80 MG: 10 INJECTION, SOLUTION INTRAVENOUS at 07:18

## 2021-07-31 RX ADMIN — BUDESONIDE 0.25 MG: 0.25 INHALANT RESPIRATORY (INHALATION) at 20:52

## 2021-07-31 RX ADMIN — SELEXIPAG 1600 MCG: 200 TABLET, COATED ORAL at 20:27

## 2021-07-31 RX ADMIN — ACETAMINOPHEN 650 MG: 325 TABLET, FILM COATED ORAL at 00:58

## 2021-07-31 RX ADMIN — PANTOPRAZOLE SODIUM 40 MG: 40 TABLET, DELAYED RELEASE ORAL at 07:20

## 2021-07-31 RX ADMIN — HEPARIN SODIUM 5000 UNITS: 10000 INJECTION, SOLUTION INTRAVENOUS; SUBCUTANEOUS at 19:37

## 2021-07-31 RX ADMIN — SELEXIPAG 1600 MCG: 200 TABLET, COATED ORAL at 07:19

## 2021-07-31 RX ADMIN — CEFEPIME HYDROCHLORIDE 2 G: 2 INJECTION, POWDER, FOR SOLUTION INTRAVENOUS at 19:37

## 2021-07-31 ASSESSMENT — ACTIVITIES OF DAILY LIVING (ADL)
ADLS_ACUITY_SCORE: 13

## 2021-07-31 ASSESSMENT — MIFFLIN-ST. JEOR: SCORE: 1596

## 2021-07-31 NOTE — PROGRESS NOTES
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    Plan:    - Watch patient over the weekend to see if he converts out of CHB   - Monday: permanent pacemaker placement scheduled   - Pacing/atropine/epinephrine if patient becomes unstable, including temporary venous pacemaker   - Continue PTA medications for PAH of budenoside inhaler, ambrisentan, tadalafil, and selexipag   - IV 40 Lasix yesterday afternoon, IV 80 Lasix this morning with goal of 1L of UOP    - Currently at 6L; continue to wean to baseline if O2 saturations > 90    # Complete Heart Block (New 7/30/21)  - HR in the 40's today, asymptomatic and stable  - TSH normal  - Stopped digoxin on 7/27/21; digoxin level today is 0.7 (0.9 one month ago) in setting of CKD stage IV  - pro-BNP at 28,841 (previously 8,834 three days ago)  - H/O pulmonary sarcoidosis with last PET scan in 2017 and cardiac MRI in 2012     # PAH 2/2 Pulmonary Sarcoidosis  - RHC 7/30/21 showing:  RA - 12  RV - 77/16  PA - 77/20/38  PCWP - 17  Hgb - 10.7  PA - 55.6%  Lesia CO/CI - 4.62/2.36 Right sided filling pressures are mildly elevated. Left sided filling pressures are mildly elevated. Moderately elevated pulmonary artery hypertension. Normal cardiac output level.  - Previous RHC (12/11/19) showing similar numbers, most notable is a CVP of 5 previously and now 12  - ECHO (7/27/21) showing:       - LVEF 55-60%, flattened septum consistent with RV pressure and volume overload       - RV function is mild/mod reduced  - Baseline O2 of 4.5 L at home, 8-10 L with exertion  - Breathing is less labored today, no significant changes overnight    # Complicated Urinary Tract Infection with Pan-Senstive Pseudomonas (Urine Culture from  7/27/21)   - Started 2g cefepime IV q24 hours (7/30/21) due to CHB     # CKD Stage IV with H/O Bilateral Kidney Stones   - New baseline Cr of approximately 3 (only two lab values for the last year)   - Cr today is 3.93 from 3.75 - Will see if Cr continues to rise tomorrow   - Large amount of bilateral renal stones with left percutaneous nephrostomy tube present due to severe left hydronephrosis (placed 7/9/21)   - Left nephrostomy tube still draining urine; spoke to urology and will postpone stone extraction to a later date due to this admission with new CHB     # BPH   - Tamsulosin 0.4 PTA     DVT Prophylaxis: Enoxaparin (Lovenox) SQ  Chong Catheter: in place, indication: Strict 1-2 Hour I&O, Strict 1-2 Hour I&O  Code Status: Full Code  Lines: Right CVC, Left pIV, Left nephrostomy tube        Patient discussed with staff attending, Dr. Lemon.     Sathya Short MD  Internal Medicine Resident PGY1  Pager: [phone]      Interval History   Jimmy is doing well overnight. No acute events and has maintained the ventricular escape rate in the 40's. No new complaints. Continues to sleep upright and denies any headaches, dizziness, diarrhea, CP, constipation, fevers, chills, nausea, and vomiting.    Physical Exam   Temp: 99.3  F (37.4  C) Temp src: Oral BP: (!) 83/49 Pulse: (!) 40   Resp: 16 SpO2: 100 % O2 Device: Oxymask Oxygen Delivery: 6 LPM  Vitals:    07/30/21 1155 07/31/21 0200   Weight: 75 kg (165 lb 5.5 oz) 75.3 kg (166 lb 0.1 oz)     Vital Signs with Ranges  Temp:  [97.7  F (36.5  C)-99.3  F (37.4  C)] 99.3  F (37.4  C)  Pulse:  [37-49] 40  Resp:  [16-24] 16  BP: ()/(48-77) 83/49  SpO2:  [92 %-100 %] 100 %  I/O last 3 completed shifts:  In: 905 [P.O.:800; I.V.:105]  Out: 820 [Urine:595; Drains:100; Stool:125]     , Blood pressure (!) 83/49, pulse (!) 40, temperature 99.3  F (37.4  C), temperature source Oral, resp. rate 16, height 1.829 m (6'), weight 75.3 kg (166 lb 0.1 oz), SpO2 100 %.  166 lbs .1  oz  GEN:  Alert, oriented x 3, appears comfortable, NAD.  CV:  Irregular rhythm and bradycardia (40's), no murmur or JVD.   LUNGS:  Clear to auscultation bilaterally, no rhonchi, rales, or wheezes  ABD:  Active bowel sounds, soft, non-tender/non-distended.  No rebound/guarding/rigidity.  EXT:  +1 lower extremity edema, palpable pulses    Medications     - MEDICATION INSTRUCTIONS -         ambrisentan  10 mg Oral Daily     atropine         budesonide  0.25 mg Nebulization BID     ceFEPIme (MAXIPIME) IV  2 g Intravenous Q24H     heparin ANTICOAGULANT  5,000 Units Subcutaneous Q12H     selexipag  1,600 mcg Oral Q12H     sodium chloride (PF)  3 mL Intracatheter Q8H     tadalafil  40 mg Oral Daily     tamsulosin  0.4 mg Oral Daily       Data   Recent Labs   Lab 07/31/21  0353 07/30/21  1319 07/30/21  1047 07/27/21  0742   WBC 6.9  --  7.6  --    HGB 9.5* 10.7* 11.0*  --    MCV 91  --  92  --    *  --  144*  --      --  135 138   POTASSIUM 4.0  --  4.2 4.4   CHLORIDE 104  --  104 107   CO2 23  --  26 27   BUN 73*  --  65* 45*   CR 3.93*  --  3.75* 3.03*   ANIONGAP 9  --  5 4   TERI 9.0  --  9.2 9.1   GLC 93  --  105* 110*   ALBUMIN 2.8*  --  3.2*  --    PROTTOTAL 6.1*  --  6.9  --    BILITOTAL 0.5  --  0.5  --    ALKPHOS 66  --  76  --    ALT 18  --  21  --    AST 11  --  15  --        Recent Results (from the past 24 hour(s))   Cardiac Catheterization    Narrative      Moderately elevated pulmonary artery hypertension.    Right sided filling pressures are mildly elevated.    Left sided filling pressures are mildly elevated.    Normal cardiac output level.    Complete Heart Block

## 2021-07-31 NOTE — PROGRESS NOTES
EP Progress Note    Patient seen and examined.  Currently 2:1 AV block and asymptomatic.      He is complaining of mild back discomfort.    Will plan for PPM implant on Monday.    I discussed risks and benefits.  NPO after midnight Sunday.    Kin Mathew   EP fellow

## 2021-07-31 NOTE — PLAN OF CARE
"D: 61 yo male admitted yesterday after found to be in 3 degree HB.    I/A: Transitioned to Sinus Bradycardia overnight into this AM- 2:1 A:V with 1degree block- but with adequate BP. Given 80 mg lasix this AM with sluggish response.  On 6L via FM all day along with 4-6L via NC that patient requests to wear in his mouth to \"feel it blowing\". Dyspeic o exertion but OOB and up to chair/commode with stand by assistance.  EMERALD TABOR present in afternoon- consent signed I chart for PM placement on Monday.    P: Pt remains in ICU with plan to place permanent pacemaker on Monday.  "

## 2021-07-31 NOTE — H&P
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    # Complete Heart Block (New 21)  - EKG from [place]  showing 72 bpm and NSR  - EKG from 21 showing first degree heart block with a rate in the 40's  - HR in the 40's today  - Stopped digoxin on 21; digoxin level today is 0.7 (0.9 one month ago) in the setting of CKD stage IV  - TSH normal  - pro-BNP at 28,841 (previously 8,834 three days ago)  - H/O pulmonary sarcoidosis with last PET scan in  and cardiac MRI in   - Plan: Watch patient over the weekend to see if he converts out of CHB       - Monday ICD placement scheduled       - Pacing/atropine/epinephrine if patient becomes unstable    # PAH 2/2 Pulmonary Sarcoidosis  - RHC today showing:  RA - 12  RV - 77/16  PA - 77/20/38  PCWP - 17  Hgb - 10.7  PA - 55.6%  Lesia CO/CI - 4.62/2.36 Right sided filling pressures are mildly elevated. Left sided filling pressures are mildly elevated. Moderately elevated pulmonary artery hypertension. Normal cardiac output level.  - Previous RHC (19) showing similar numbers, most notable is a CVP of 5 previously and now 12  - ECHO (21) showing:       - LVEF 55-60%, flattened septum consistent with RV pressure and volume overload       - RV function is mild/mod reduced  - Baseline O2 of 4.5 L at home, 8-10 L with exertion  - Currently at 6L; continue to wean to baseline if O2 saturations > 90  - Continue PTA medications of budenoside inhaler, ambrisentan, tadalafil, and selexipag  - IV 40 Lasix tonight, will continue 40 IV BID if BP's are stable    # Urinary Tract Infection with Pan-Senstive Pseudomonas  (Urine Culture from 7/27/21)   - Started 1g cefepime IV q12 hours (7/30/21) due to CHB    # CKD Stage IV with H/O Bilateral Kidney Stones   - New baseline Cr of approximately 3 (only two lab values for the last year)   - Cr today is 3.75   - Large amount of bilateral renal stones with left percutaneous nephrostomy tube present due to severe left hydronephrosis (placed 7/9/21)   - Left nephrostomy tube still draining urine; spoke to urology and will postpone stone extraction to a later date due to this admission with new CHB    # BPH   - Tamsulosin 0.4 PTA    DVT Prophylaxis: Enoxaparin (Lovenox) SQ  Chong Catheter: in place, indication: Strict 1-2 Hour I&O, Strict 1-2 Hour I&O  Code Status: Full Code  Lines: Right CVC, Left pIV, Left nephrostomy tube       Patient discussed with staff attending, Dr. Lemon.    Sathya Short MD  Internal Medicine Resident PGY1  Pager: [phone]            Chief Complaint:     Newly found complete heart block while performing RHC.          History of Present Illness:     History is obtained from the patient, patient's partner Sena, and the medical record.    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 He was undergoing a RHC today to determine clearance for a kidney stone extraction surgery per urology and was found to have a complete heart block with heart rate in the 40's.     Previous to this admission, he was evaluated at [facility] for possible research study (July 2020) and was found to have an increased bilateral stone burden with moderate to advance hydronephrosis on the left. He was referred to urology which evaluated his stone burden and decided to place a left percutaneous nephrostomy tube on 7/9/21. The plan was to undergo a preanesthesia care evaluation (with the RHC) and later a left percutaneous nephrolithotomy.     One month before this admission, Jimmy states the he has had worsening  SOB.  This occurs about every three steps within his house and sometimes while at rest. He cannot lay flat at night while sleeping and has waken up wheezing multiple nights. He uses 4.5 L of O2 at baseline and 8-10 L when doing any activities.    He also states that he has a stabbing pain in his chest that occur for only a few seconds and then disappears. Other symptoms over the last month include nausea, chills, dry heaves, headaches, lower extremity edema, bloating, cramping, and sinusitis.     Patient denies any fevers, dizziness, or lightheadedness.     A 13-point ROS is negative except as mentioned above          Past Medical History:     Past Medical History:   Diagnosis Date     Chronic sinusitis      CKD (chronic kidney disease)      Heart disease      Hypertension      Keratosis     frontal scalp, treated with liquid nitrogen at [facility]     Malignant neoplasm (H)      Pneumonia a few times     Pulmonary sarcoidosis (H)               Past Surgical History:      Past Surgical History:   Procedure Laterality Date     COLONOSCOPY       CV RIGHT HEART CATH MEASUREMENTS RECORDED N/A 6/19/2019    Procedure: CV RIGHT HEART CATH;  Surgeon: Kale Delgado MD;  Location:  HEART CARDIAC CATH LAB     CV RIGHT HEART CATH MEASUREMENTS RECORDED N/A 12/11/2019    Procedure: CV RIGHT HEART CATH;  Surgeon: Ion Lemon MD;  Location:  HEART CARDIAC CATH LAB     ENT SURGERY      wisdom teeth extraction     IR NEPHROSTOMY TUBE PLACEMENT LEFT  7/9/2021     TONSILLECTOMY  1964              Social History:     Social History     Socioeconomic History     Marital status: Single     Spouse name: Not on file     Number of children: Not on file     Years of education: Not on file     Highest education level: Not on file   Occupational History     Not on file   Tobacco Use     Smoking status: Former Smoker     Packs/day: 1.00     Years: 30.00     Pack years: 30.00     Types: Cigarettes     Start date:  10/10/1975     Quit date: 3/1/2010     Years since quittin.4     Smokeless tobacco: Never Used   Substance and Sexual Activity     Alcohol use: Yes     Alcohol/week: 0.0 standard drinks     Comment: stated 2 bottles of beer occ     Drug use: Not Currently     Comment: past use of marijuana     Sexual activity: Not Currently     Partners: Female     Birth control/protection: Abstinence, Female Surgical   Other Topics Concern     Parent/sibling w/ CABG, MI or angioplasty before 65F 55M? Not Asked      Service Not Asked     Blood Transfusions Not Asked     Caffeine Concern Yes     Occupational Exposure Not Asked     Hobby Hazards Not Asked     Sleep Concern Not Asked     Stress Concern Not Asked     Weight Concern Not Asked     Special Diet Not Asked     Back Care Not Asked     Exercise Yes     Bike Helmet Not Asked     Seat Belt Not Asked     Self-Exams Not Asked   Social History Narrative     Not on file     Social Determinants of Health     Financial Resource Strain:      Difficulty of Paying Living Expenses:    Food Insecurity:      Worried About Running Out of Food in the Last Year:      Ran Out of Food in the Last Year:    Transportation Needs:      Lack of Transportation (Medical):      Lack of Transportation (Non-Medical):    Physical Activity:      Days of Exercise per Week:      Minutes of Exercise per Session:    Stress:      Feeling of Stress :    Social Connections:      Frequency of Communication with Friends and Family:      Frequency of Social Gatherings with Friends and Family:      Attends Christianity Services:      Active Member of Clubs or Organizations:      Attends Club or Organization Meetings:      Marital Status:    Intimate Partner Violence:      Fear of Current or Ex-Partner:      Emotionally Abused:      Physically Abused:      Sexually Abused:               Family History:     Family History   Problem Relation Age of Onset     Coronary Artery Disease Father      Heart Disease  Father         heart attack age 35-40     Hypertension Mother      Hyperlipidemia Mother      Cerebrovascular Disease Mother      Dementia Mother      Lupus Sister      Glaucoma No family hx of      Macular Degeneration No family hx of      Family history reviewed and updated in EPIC          Allergies:     Allergies   Allergen Reactions     Nkda [No Known Drug Allergies]               Medications:     Medications Prior to Admission   Medication Sig Dispense Refill Last Dose     ambrisentan (LETAIRIS) 10 MG tablet Take 1 tablet (10 mg) by mouth daily 30 tablet 11 7/29/2021 at Unknown time     budesonide (PULMICORT) 0.25 MG/2ML neb solution Take 2 mLs (0.25 mg) by nebulization 2 times daily 120 mL 4 7/29/2021 at Unknown time     furosemide (LASIX) 20 MG tablet Take 1 tablet (20 mg) by mouth daily   7/29/2021 at Unknown time     PROAIR  (90 Base) MCG/ACT inhaler Inhale 2 puffs into the lungs every 6 hours as needed for shortness of breath / dyspnea 18 g 11 7/29/2021 at Unknown time     selexipag (UPTRAVI) 1600 MCG tablet Take 1 tablet (1,600 mcg) by mouth every 12 hours 60 tablet 11 7/29/2021 at Unknown time     tadalafil, PAH, (ALYQ) 20 MG TABS Take 2 tablets (40 mg) by mouth daily 60 tablet 5 7/29/2021 at Unknown time     tamsulosin (FLOMAX) 0.4 MG capsule Take 1 capsule (0.4 mg) by mouth daily 30 capsule 0      UNABLE TO FIND MEDICATION NAME: Topical skin tag remover                       Physical Exam:     B/P: 107/56, T: 98.3, P: 44, R: 18    Wt Readings from Last 4 Encounters:   07/30/21 75 kg (165 lb 5.5 oz)   07/27/21 74.8 kg (165 lb)   07/13/21 74.8 kg (165 lb)   07/09/21 76 kg (167 lb 8 oz)         Intake/Output Summary (Last 24 hours) at 7/30/2021 1926  Last data filed at 7/30/2021 1700  Gross per 24 hour   Intake 120 ml   Output 400 ml   Net -280 ml       Gen: No acute distress  HEENT: PERRL, EOM intact, OP without exudates  PULM/THORAX: Clear to auscultation bilaterally, no  rales/rhonchi/wheezes  CV: Irregular rhythm, bradycardic (40 bpm), no murmurs or rubs. No JVD  ABD: Soft, NTND, bowel sounds present, no masses  EXT:  +1 LE edema. Palpable pulses  NEURO: CN II-XII grossly intact. A&Ox3  PSYCH: normal affect and mentation  SKIN: no rashes or lesions    Lines: Right CVC, Left pIV, left nephrostomy tube          Data:     Labs Reviewed on Admission  Pertinent for:  Troponin No results found for: TROPI, TROPONIN, TROPR, TROPN  BMP  Recent Labs   Lab 07/30/21  1047 07/27/21  0742    138   POTASSIUM 4.2 4.4   CHLORIDE 104 107   TERI 9.2 9.1   CO2 26 27   BUN 65* 45*   CR 3.75* 3.03*   * 110*     CBC  Recent Labs   Lab 07/30/21  1319 07/30/21  1047   WBC  --  7.6   RBC  --  3.78*   HGB 10.7* 11.0*   HCT  --  34.9*   MCV  --  92   MCH  --  29.1   MCHC  --  31.5   RDW  --  13.4   PLT  --  144*     INRNo lab results found in last 7 days.   Hepatic Panel   Lab Results   Component Value Date    AST 15 07/30/2021    AST 20 07/01/2021     Lab Results   Component Value Date    ALT 21 07/30/2021    ALT 27 07/01/2021     Lab Results   Component Value Date    BILICONJ 0.0 01/12/2012      Lab Results   Component Value Date    BILITOTAL 0.5 07/30/2021    BILITOTAL 0.2 07/01/2021     Lab Results   Component Value Date    ALBUMIN 3.2 07/30/2021    ALBUMIN 3.5 07/01/2021     Lab Results   Component Value Date    PROTTOTAL 6.9 07/30/2021    PROTTOTAL 6.8 07/01/2021      Lab Results   Component Value Date    ALKPHOS 76 07/30/2021    ALKPHOS 68 07/01/2021

## 2021-07-31 NOTE — PLAN OF CARE
Major Shift Events: Writer took over care at 0100. No acute changes overnight. Neuro intact. Mild soreness/pain in neck/back treated with PRN Tylenol. Complete heart block for most of shift with frequent PVC's. HR 30's-50's. SOB reported with anxiety and exertion. 6L NC to 8L Oxymask. Minimal urine output overnight.   Plan: Monitor heart block over weekend. Permanent pacemaker if patient's rhythm does not improve. 1L negative goal.   For vital signs and complete assessments, please see documentation flowsheets.

## 2021-08-01 ENCOUNTER — APPOINTMENT (OUTPATIENT)
Dept: ULTRASOUND IMAGING | Facility: CLINIC | Age: 61
DRG: 242 | End: 2021-08-01
Attending: INTERNAL MEDICINE
Payer: COMMERCIAL

## 2021-08-01 ENCOUNTER — APPOINTMENT (OUTPATIENT)
Dept: CT IMAGING | Facility: CLINIC | Age: 61
DRG: 242 | End: 2021-08-01
Attending: INTERNAL MEDICINE
Payer: COMMERCIAL

## 2021-08-01 LAB
ALBUMIN SERPL-MCNC: 2.8 G/DL (ref 3.4–5)
ALP SERPL-CCNC: 67 U/L (ref 40–150)
ALT SERPL W P-5'-P-CCNC: 16 U/L (ref 0–70)
ANION GAP SERPL CALCULATED.3IONS-SCNC: 7 MMOL/L (ref 3–14)
AST SERPL W P-5'-P-CCNC: 10 U/L (ref 0–45)
ATRIAL RATE - MUSE: 88 BPM
BILIRUB SERPL-MCNC: 0.6 MG/DL (ref 0.2–1.3)
BUN SERPL-MCNC: 84 MG/DL (ref 7–30)
CALCIUM SERPL-MCNC: 8.4 MG/DL (ref 8.5–10.1)
CHLORIDE BLD-SCNC: 102 MMOL/L (ref 94–109)
CO2 SERPL-SCNC: 24 MMOL/L (ref 20–32)
CREAT SERPL-MCNC: 4.05 MG/DL (ref 0.66–1.25)
DIASTOLIC BLOOD PRESSURE - MUSE: NORMAL MMHG
ERYTHROCYTE [DISTWIDTH] IN BLOOD BY AUTOMATED COUNT: 13.4 % (ref 10–15)
GFR SERPL CREATININE-BSD FRML MDRD: 15 ML/MIN/1.73M2
GLUCOSE BLD-MCNC: 98 MG/DL (ref 70–99)
HCT VFR BLD AUTO: 30.8 % (ref 40–53)
HGB BLD-MCNC: 9.8 G/DL (ref 13.3–17.7)
INTERPRETATION ECG - MUSE: NORMAL
MCH RBC QN AUTO: 28.7 PG (ref 26.5–33)
MCHC RBC AUTO-ENTMCNC: 31.8 G/DL (ref 31.5–36.5)
MCV RBC AUTO: 90 FL (ref 78–100)
P AXIS - MUSE: NORMAL DEGREES
PLATELET # BLD AUTO: 130 10E3/UL (ref 150–450)
POTASSIUM BLD-SCNC: 3.9 MMOL/L (ref 3.4–5.3)
PR INTERVAL - MUSE: 312 MS
PROT SERPL-MCNC: 6.1 G/DL (ref 6.8–8.8)
QRS DURATION - MUSE: 122 MS
QT - MUSE: 542 MS
QTC - MUSE: 457 MS
R AXIS - MUSE: 267 DEGREES
RADIOLOGIST FLAGS: ABNORMAL
RBC # BLD AUTO: 3.41 10E6/UL (ref 4.4–5.9)
SARS-COV-2 RNA RESP QL NAA+PROBE: NEGATIVE
SODIUM SERPL-SCNC: 133 MMOL/L (ref 133–144)
SYSTOLIC BLOOD PRESSURE - MUSE: NORMAL MMHG
T AXIS - MUSE: 93 DEGREES
VENTRICULAR RATE- MUSE: 43 BPM
WBC # BLD AUTO: 6.3 10E3/UL (ref 4–11)

## 2021-08-01 PROCEDURE — 250N000009 HC RX 250: Performed by: STUDENT IN AN ORGANIZED HEALTH CARE EDUCATION/TRAINING PROGRAM

## 2021-08-01 PROCEDURE — 74176 CT ABD & PELVIS W/O CONTRAST: CPT | Mod: 26 | Performed by: RADIOLOGY

## 2021-08-01 PROCEDURE — 74176 CT ABD & PELVIS W/O CONTRAST: CPT

## 2021-08-01 PROCEDURE — 76770 US EXAM ABDO BACK WALL COMP: CPT | Mod: 26 | Performed by: RADIOLOGY

## 2021-08-01 PROCEDURE — 99291 CRITICAL CARE FIRST HOUR: CPT | Mod: GC | Performed by: INTERNAL MEDICINE

## 2021-08-01 PROCEDURE — 85027 COMPLETE CBC AUTOMATED: CPT | Performed by: STUDENT IN AN ORGANIZED HEALTH CARE EDUCATION/TRAINING PROGRAM

## 2021-08-01 PROCEDURE — 94640 AIRWAY INHALATION TREATMENT: CPT

## 2021-08-01 PROCEDURE — 250N000013 HC RX MED GY IP 250 OP 250 PS 637

## 2021-08-01 PROCEDURE — 94640 AIRWAY INHALATION TREATMENT: CPT | Mod: 76

## 2021-08-01 PROCEDURE — 200N000002 HC R&B ICU UMMC

## 2021-08-01 PROCEDURE — 250N000011 HC RX IP 250 OP 636: Performed by: STUDENT IN AN ORGANIZED HEALTH CARE EDUCATION/TRAINING PROGRAM

## 2021-08-01 PROCEDURE — 250N000013 HC RX MED GY IP 250 OP 250 PS 637: Performed by: STUDENT IN AN ORGANIZED HEALTH CARE EDUCATION/TRAINING PROGRAM

## 2021-08-01 PROCEDURE — 76770 US EXAM ABDO BACK WALL COMP: CPT

## 2021-08-01 PROCEDURE — 999N000157 HC STATISTIC RCP TIME EA 10 MIN

## 2021-08-01 PROCEDURE — 250N000011 HC RX IP 250 OP 636

## 2021-08-01 PROCEDURE — U0005 INFEC AGEN DETEC AMPLI PROBE: HCPCS | Performed by: STUDENT IN AN ORGANIZED HEALTH CARE EDUCATION/TRAINING PROGRAM

## 2021-08-01 PROCEDURE — 80053 COMPREHEN METABOLIC PANEL: CPT | Performed by: STUDENT IN AN ORGANIZED HEALTH CARE EDUCATION/TRAINING PROGRAM

## 2021-08-01 RX ORDER — LIDOCAINE 4 G/G
2 PATCH TOPICAL
Status: DISCONTINUED | OUTPATIENT
Start: 2021-08-01 | End: 2021-08-12 | Stop reason: HOSPADM

## 2021-08-01 RX ORDER — FUROSEMIDE 10 MG/ML
80 INJECTION INTRAMUSCULAR; INTRAVENOUS ONCE
Status: COMPLETED | OUTPATIENT
Start: 2021-08-01 | End: 2021-08-01

## 2021-08-01 RX ADMIN — SELEXIPAG 1600 MCG: 200 TABLET, COATED ORAL at 09:20

## 2021-08-01 RX ADMIN — HEPARIN SODIUM 5000 UNITS: 10000 INJECTION, SOLUTION INTRAVENOUS; SUBCUTANEOUS at 09:08

## 2021-08-01 RX ADMIN — TADALAFIL 40 MG: 20 TABLET, FILM COATED ORAL at 09:12

## 2021-08-01 RX ADMIN — BUDESONIDE 0.25 MG: 0.25 INHALANT RESPIRATORY (INHALATION) at 19:40

## 2021-08-01 RX ADMIN — FUROSEMIDE 80 MG: 10 INJECTION, SOLUTION INTRAVENOUS at 13:38

## 2021-08-01 RX ADMIN — CEFEPIME HYDROCHLORIDE 2 G: 2 INJECTION, POWDER, FOR SOLUTION INTRAVENOUS at 20:26

## 2021-08-01 RX ADMIN — TAMSULOSIN HYDROCHLORIDE 0.4 MG: 0.4 CAPSULE ORAL at 09:12

## 2021-08-01 RX ADMIN — HEPARIN SODIUM 5000 UNITS: 10000 INJECTION, SOLUTION INTRAVENOUS; SUBCUTANEOUS at 20:22

## 2021-08-01 RX ADMIN — ACETAMINOPHEN 650 MG: 325 TABLET, FILM COATED ORAL at 10:03

## 2021-08-01 RX ADMIN — BUDESONIDE 0.25 MG: 0.25 INHALANT RESPIRATORY (INHALATION) at 07:55

## 2021-08-01 RX ADMIN — SELEXIPAG 1600 MCG: 200 TABLET, COATED ORAL at 20:24

## 2021-08-01 RX ADMIN — LIDOCAINE 2 PATCH: 560 PATCH PERCUTANEOUS; TOPICAL; TRANSDERMAL at 14:31

## 2021-08-01 RX ADMIN — AMBRISENTAN 10 MG: 10 TABLET, FILM COATED ORAL at 09:20

## 2021-08-01 ASSESSMENT — ACTIVITIES OF DAILY LIVING (ADL)
ADLS_ACUITY_SCORE: 13

## 2021-08-01 ASSESSMENT — MIFFLIN-ST. JEOR: SCORE: 1606

## 2021-08-01 NOTE — PLAN OF CARE
Major Shift Events: No acute changes overnight. Neuro intact. No pain reported overnight. Pt in and out of complete heart block and 2:1 AV 1st degree block for most of shift with occasional PVC's. HR 30's-50's. Hemodynamically stable. SOB reported with anxiety and exertion. 6L NC. Minimal urine output overnight.   Plan: Permanent pacer placement early this week.   For vital signs and complete assessments, please see documentation flowsheets.      Griseofulvin Counseling:  I discussed with the patient the risks of griseofulvin including but not limited to photosensitivity, cytopenia, liver damage, nausea/vomiting and severe allergy.  The patient understands that this medication is best absorbed when taken with a fatty meal (e.g., ice cream or french fries).

## 2021-08-01 NOTE — PLAN OF CARE
Major Shift Events: Pt assisted to chair and back to bed throughout shift. Dyspnea upon exertion. HR remained 39-50 throughout shift in between complete heart block, type I and type II. Lasix 80 mg given X1. Pt remained alert/oriented during entire shift and able to vocalize all needs. Pt has been updating family on phone. CT and US completed today for nephrology.  NPO at midnight.  Plan: Pacemaker placement tomorrow 8/2.  For vital signs and complete assessments, please see documentation flowsheets.

## 2021-08-01 NOTE — PROGRESS NOTES
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    Plan:    - Watch patient over the weekend to see if he converts out of CHB, NPO at midnight   - Monday: permanent pacemaker placement scheduled   - Pacing/atropine/epinephrine if patient becomes unstable, including temporary venous pacemaker   - Continue PTA medications for PAH of budenoside inhaler, ambrisentan, tadalafil, and selexipag   - IV 80 Lasix yesterday morning, IV 80 Lasix this afternoon with goal of 1L of UOP    - Currently at 6L; continue to wean to baseline if O2 saturations > 90    - Consulted urology: Getting CT Ab/Pelv without contrast to evaluate the rise in creatinine and kidney stone burden. Will see patient tomorrow in person for evaluation    # Complete Heart Block (New 7/30/21)  - HR in the 40's today, asymptomatic and stable  - TSH normal  - Stopped digoxin on 7/27/21; digoxin level today is 0.7 (0.9 one month ago) in setting of CKD stage IV  - pro-BNP at 28,841 (previously 8,834 three days ago)  - H/O pulmonary sarcoidosis with last PET scan in 2017 and cardiac MRI in 2012     # PAH 2/2 Pulmonary Sarcoidosis  - RHC 7/30/21 showing:  RA - 12  RV - 77/16  PA - 77/20/38  PCWP - 17  Hgb - 10.7  PA - 55.6%  Lesia CO/CI - 4.62/2.36 Right sided filling pressures are mildly elevated. Left sided filling pressures are mildly elevated. Moderately elevated pulmonary artery hypertension. Normal cardiac output level.  - Previous RHC (12/11/19) showing similar numbers, most notable is a CVP of 5 previously and now 12  - ECHO (7/27/21) showing:       - LVEF 55-60%, flattened septum consistent with RV pressure and volume overload       - RV function is mild/mod reduced  - Baseline O2 of 4.5 L  at home, 8-10 L with exertion  - Breathing is less labored today, no significant changes overnight    # Complicated Urinary Tract Infection with Pan-Senstive Pseudomonas (Urine Culture from 7/27/21)   - Started 2g cefepime IV q24 hours (7/30/21) due to CHB, day 3 of treatment     # CKD Stage IV with H/O Bilateral Kidney Stones   - New baseline Cr of approximately 3 (only two lab values for the last year)   - Cr today is 4.05 from 3.93 - urology consulted for evaluation   - Large amount of bilateral renal stones with left percutaneous nephrostomy tube present due to severe left hydronephrosis (placed 7/9/21)   - Left nephrostomy tube still draining urine; spoke to urology and will postpone stone extraction to a later date due to this admission with new CHB     # BPH   - Tamsulosin 0.4 PTA     DVT Prophylaxis: Enoxaparin (Lovenox) SQ  Chong Catheter: in place, indication: Strict 1-2 Hour I&O, Strict 1-2 Hour I&O  Code Status: Full Code  Lines: Right CVC, Left pIV, Left nephrostomy tube        Patient discussed with staff attending, Dr. Lemon.     Sathya Short MD  Internal Medicine Resident PGY1  Pager: [phone]      Interval History   Jimmy is doing well overnight. No acute events and has maintained the ventricular escape rate in the 40's. No new complaints. Continues to sleep upright and denies any headaches, dizziness, diarrhea, CP, constipation, fevers, chills, nausea, and vomiting.    Physical Exam   Temp: 97.8  F (36.6  C) Temp src: Axillary BP: (!) 82/40 Pulse: (!) 45   Resp: 18 SpO2: 99 % O2 Device: Oxymask Oxygen Delivery: 6 LPM  Vitals:    07/30/21 1155 07/31/21 0200   Weight: 75 kg (165 lb 5.5 oz) 75.3 kg (166 lb 0.1 oz)     Vital Signs with Ranges  Temp:  [97.5  F (36.4  C)-98.6  F (37  C)] 97.8  F (36.6  C)  Pulse:  [41-50] 45  Resp:  [14-20] 18  BP: ()/(40-77) 82/40  SpO2:  [95 %-100 %] 99 %  I/O last 3 completed shifts:  In: 1115 [P.O.:1015; I.V.:100]  Out: 1540 [Urine:1540]     , Blood  pressure (!) 82/40, pulse (!) 45, temperature 97.8  F (36.6  C), temperature source Axillary, resp. rate 18, height 1.829 m (6'), weight 75.3 kg (166 lb 0.1 oz), SpO2 99 %.  166 lbs .1 oz  GEN:  Alert, oriented x 3, appears comfortable, NAD.  CV:  Irregular rhythm and bradycardia (40's), no murmur or JVD.   LUNGS:  Clear to auscultation bilaterally, no rhonchi, rales, or wheezes  ABD:  Active bowel sounds, soft, non-tender/non-distended.  No rebound/guarding/rigidity.  EXT:  +1 lower extremity edema, palpable pulses    Medications     - MEDICATION INSTRUCTIONS -         ambrisentan  10 mg Oral Daily     budesonide  0.25 mg Nebulization BID     ceFEPIme (MAXIPIME) IV  2 g Intravenous Q24H     furosemide  80 mg Intravenous Once     heparin ANTICOAGULANT  5,000 Units Subcutaneous Q12H     selexipag  1,600 mcg Oral Q12H     sodium chloride (PF)  3 mL Intracatheter Q8H     tadalafil  40 mg Oral Daily     tamsulosin  0.4 mg Oral Daily       Data   Recent Labs   Lab 08/01/21  0329 07/31/21  1552 07/31/21  0353 07/30/21  1319 07/30/21  1047   WBC 6.3  --  6.9  --  7.6   HGB 9.8*  --  9.5* 10.7* 11.0*   MCV 90  --  91  --  92   *  --  134*  --  144*    135 136  --  135   POTASSIUM 3.9 4.0 4.0  --  4.2   CHLORIDE 102 104 104  --  104   CO2 24 24 23  --  26   BUN 84* 79* 73*  --  65*   CR 4.05* 4.07* 3.93*  --  3.75*   ANIONGAP 7 7 9  --  5   TERI 8.4* 8.6 9.0  --  9.2   GLC 98 114* 93  --  105*   ALBUMIN 2.8*  --  2.8*  --  3.2*   PROTTOTAL 6.1*  --  6.1*  --  6.9   BILITOTAL 0.6  --  0.5  --  0.5   ALKPHOS 67  --  66  --  76   ALT 16  --  18  --  21   AST 10  --  11  --  15       Recent Results (from the past 24 hour(s))   US Renal Complete   Result Value    Radiologist flags Moderate left hydronephrosis (Urgent)    Narrative    Exam: US RENAL COMPLETE, 8/1/2021 11:35 AM    Indication: SISI, nephrolithiasis    COMPARISON: 6/23/2021    TECHNIQUE: The kidneys and bladder were scanned in the standard  fashion with  specialized ultrasound transducer(s) using both gray  scale and limited color/spectral Doppler techniques.    FINDINGS:    Right kidney: Measures 10.3 cm in length. Parenchyma is of normal  thickness and echogenicity. Multiple echogenic, shadowing calyceal  stones are visualized within the right kidney, similar to comparison  CT 6/23/2021. No focal mass. No hydronephrosis.    Left kidney: Measures 9.9 cm in length. Parenchyma is of normal  thickness and echogenicity. Moderate left hydronephrosis. Shadowing,  echogenic stone in the left midpole renal calyx. No focal mass.     Bladder: Not visualized      Impression    IMPRESSION:   1.  Moderate left hydronephrosis, similar to CT 6/23/2021. Left  percutaneous nephrostomy tube is not visualized.  2.  Bilateral nephrolithiasis, right greater than left.    [Urgent Result: Moderate left hydronephrosis]    Finding was identified on 8/1/2021 9:16 AM.     Dr. Loyola was contacted by Dr. Curtis at 8/1/2021 11:20 AM and  verbalized understanding of the urgent finding.     I have personally reviewed the examination and initial interpretation  and I agree with the findings.    LEONILA NORRIS,          SYSTEM ID:  [id number]

## 2021-08-02 ENCOUNTER — PRE VISIT (OUTPATIENT)
Dept: SURGERY | Facility: CLINIC | Age: 61
End: 2021-08-02

## 2021-08-02 ENCOUNTER — APPOINTMENT (OUTPATIENT)
Dept: GENERAL RADIOLOGY | Facility: CLINIC | Age: 61
DRG: 242 | End: 2021-08-02
Attending: NURSE PRACTITIONER
Payer: COMMERCIAL

## 2021-08-02 LAB
ALBUMIN SERPL-MCNC: 2.7 G/DL (ref 3.4–5)
ALP SERPL-CCNC: 70 U/L (ref 40–150)
ALT SERPL W P-5'-P-CCNC: 14 U/L (ref 0–70)
ANION GAP SERPL CALCULATED.3IONS-SCNC: 10 MMOL/L (ref 3–14)
AST SERPL W P-5'-P-CCNC: 9 U/L (ref 0–45)
BILIRUB SERPL-MCNC: 0.4 MG/DL (ref 0.2–1.3)
BUN SERPL-MCNC: 86 MG/DL (ref 7–30)
CALCIUM SERPL-MCNC: 8.3 MG/DL (ref 8.5–10.1)
CHLORIDE BLD-SCNC: 102 MMOL/L (ref 94–109)
CO2 SERPL-SCNC: 22 MMOL/L (ref 20–32)
CREAT SERPL-MCNC: 4.2 MG/DL (ref 0.66–1.25)
ERYTHROCYTE [DISTWIDTH] IN BLOOD BY AUTOMATED COUNT: 13.3 % (ref 10–15)
GFR SERPL CREATININE-BSD FRML MDRD: 14 ML/MIN/1.73M2
GLUCOSE BLD-MCNC: 92 MG/DL (ref 70–99)
HCT VFR BLD AUTO: 29.6 % (ref 40–53)
HGB BLD-MCNC: 9.4 G/DL (ref 13.3–17.7)
MCH RBC QN AUTO: 28.7 PG (ref 26.5–33)
MCHC RBC AUTO-ENTMCNC: 31.8 G/DL (ref 31.5–36.5)
MCV RBC AUTO: 90 FL (ref 78–100)
PLATELET # BLD AUTO: 117 10E3/UL (ref 150–450)
POTASSIUM BLD-SCNC: 3.9 MMOL/L (ref 3.4–5.3)
PROT SERPL-MCNC: 6.4 G/DL (ref 6.8–8.8)
RBC # BLD AUTO: 3.28 10E6/UL (ref 4.4–5.9)
SODIUM SERPL-SCNC: 134 MMOL/L (ref 133–144)
WBC # BLD AUTO: 5.3 10E3/UL (ref 4–11)

## 2021-08-02 PROCEDURE — 93010 ELECTROCARDIOGRAM REPORT: CPT | Performed by: INTERNAL MEDICINE

## 2021-08-02 PROCEDURE — 94640 AIRWAY INHALATION TREATMENT: CPT

## 2021-08-02 PROCEDURE — 02H63JZ INSERTION OF PACEMAKER LEAD INTO RIGHT ATRIUM, PERCUTANEOUS APPROACH: ICD-10-PCS | Performed by: INTERNAL MEDICINE

## 2021-08-02 PROCEDURE — 93010 ELECTROCARDIOGRAM REPORT: CPT | Mod: 59 | Performed by: INTERNAL MEDICINE

## 2021-08-02 PROCEDURE — 250N000009 HC RX 250: Performed by: INTERNAL MEDICINE

## 2021-08-02 PROCEDURE — 250N000011 HC RX IP 250 OP 636

## 2021-08-02 PROCEDURE — C1894 INTRO/SHEATH, NON-LASER: HCPCS | Performed by: INTERNAL MEDICINE

## 2021-08-02 PROCEDURE — 250N000011 HC RX IP 250 OP 636: Performed by: INTERNAL MEDICINE

## 2021-08-02 PROCEDURE — 272N000001 HC OR GENERAL SUPPLY STERILE: Performed by: INTERNAL MEDICINE

## 2021-08-02 PROCEDURE — 02HK3JZ INSERTION OF PACEMAKER LEAD INTO RIGHT VENTRICLE, PERCUTANEOUS APPROACH: ICD-10-PCS | Performed by: INTERNAL MEDICINE

## 2021-08-02 PROCEDURE — 85027 COMPLETE CBC AUTOMATED: CPT | Performed by: STUDENT IN AN ORGANIZED HEALTH CARE EDUCATION/TRAINING PROGRAM

## 2021-08-02 PROCEDURE — 80053 COMPREHEN METABOLIC PANEL: CPT | Performed by: STUDENT IN AN ORGANIZED HEALTH CARE EDUCATION/TRAINING PROGRAM

## 2021-08-02 PROCEDURE — 250N000009 HC RX 250: Performed by: STUDENT IN AN ORGANIZED HEALTH CARE EDUCATION/TRAINING PROGRAM

## 2021-08-02 PROCEDURE — 250N000013 HC RX MED GY IP 250 OP 250 PS 637: Performed by: STUDENT IN AN ORGANIZED HEALTH CARE EDUCATION/TRAINING PROGRAM

## 2021-08-02 PROCEDURE — 214N000001 HC R&B CCU UMMC

## 2021-08-02 PROCEDURE — 0JH606Z INSERTION OF PACEMAKER, DUAL CHAMBER INTO CHEST SUBCUTANEOUS TISSUE AND FASCIA, OPEN APPROACH: ICD-10-PCS | Performed by: INTERNAL MEDICINE

## 2021-08-02 PROCEDURE — 999N000128 HC STATISTIC PERIPHERAL IV START W/O US GUIDANCE

## 2021-08-02 PROCEDURE — 99291 CRITICAL CARE FIRST HOUR: CPT | Mod: 25 | Performed by: INTERNAL MEDICINE

## 2021-08-02 PROCEDURE — 250N000013 HC RX MED GY IP 250 OP 250 PS 637

## 2021-08-02 PROCEDURE — 71045 X-RAY EXAM CHEST 1 VIEW: CPT | Mod: 26 | Performed by: RADIOLOGY

## 2021-08-02 PROCEDURE — 93005 ELECTROCARDIOGRAM TRACING: CPT

## 2021-08-02 PROCEDURE — C1898 LEAD, PMKR, OTHER THAN TRANS: HCPCS | Performed by: INTERNAL MEDICINE

## 2021-08-02 PROCEDURE — 33208 INSRT HEART PM ATRIAL & VENT: CPT | Mod: KX | Performed by: INTERNAL MEDICINE

## 2021-08-02 PROCEDURE — 99221 1ST HOSP IP/OBS SF/LOW 40: CPT | Performed by: UROLOGY

## 2021-08-02 PROCEDURE — C1785 PMKR, DUAL, RATE-RESP: HCPCS | Performed by: INTERNAL MEDICINE

## 2021-08-02 PROCEDURE — 99152 MOD SED SAME PHYS/QHP 5/>YRS: CPT | Performed by: INTERNAL MEDICINE

## 2021-08-02 PROCEDURE — 99153 MOD SED SAME PHYS/QHP EA: CPT | Performed by: INTERNAL MEDICINE

## 2021-08-02 PROCEDURE — 999N000065 XR CHEST PORT 1 VIEW

## 2021-08-02 DEVICE — IMP LEAD PACING BIPOLAR CAPSUREFIX NOVUS 58CM 5076-58: Type: IMPLANTABLE DEVICE | Status: FUNCTIONAL

## 2021-08-02 DEVICE — PACEMAKER AZURE MRI XT DR: Type: IMPLANTABLE DEVICE | Status: FUNCTIONAL

## 2021-08-02 DEVICE — IMP LEAD PACING BIPOLAR CAPSUREFIX NOVUS 52CM 5076-52: Type: IMPLANTABLE DEVICE | Status: FUNCTIONAL

## 2021-08-02 RX ORDER — NALOXONE HYDROCHLORIDE 0.4 MG/ML
0.4 INJECTION, SOLUTION INTRAMUSCULAR; INTRAVENOUS; SUBCUTANEOUS
Status: DISCONTINUED | OUTPATIENT
Start: 2021-08-02 | End: 2021-08-12 | Stop reason: HOSPADM

## 2021-08-02 RX ORDER — NALOXONE HYDROCHLORIDE 0.4 MG/ML
0.2 INJECTION, SOLUTION INTRAMUSCULAR; INTRAVENOUS; SUBCUTANEOUS
Status: DISCONTINUED | OUTPATIENT
Start: 2021-08-02 | End: 2021-08-12 | Stop reason: HOSPADM

## 2021-08-02 RX ORDER — CLOTRIMAZOLE 10 MG/1
1 LOZENGE ORAL 3 TIMES DAILY
Status: DISCONTINUED | OUTPATIENT
Start: 2021-08-03 | End: 2021-08-12 | Stop reason: HOSPADM

## 2021-08-02 RX ORDER — FENTANYL CITRATE 50 UG/ML
INJECTION, SOLUTION INTRAMUSCULAR; INTRAVENOUS
Status: DISCONTINUED | OUTPATIENT
Start: 2021-08-02 | End: 2021-08-02 | Stop reason: HOSPADM

## 2021-08-02 RX ORDER — CEFAZOLIN SODIUM 2 G/100ML
INJECTION, SOLUTION INTRAVENOUS CONTINUOUS PRN
Status: COMPLETED | OUTPATIENT
Start: 2021-08-02 | End: 2021-08-02

## 2021-08-02 RX ORDER — OXYCODONE AND ACETAMINOPHEN 5; 325 MG/1; MG/1
1 TABLET ORAL EVERY 4 HOURS PRN
Status: DISCONTINUED | OUTPATIENT
Start: 2021-08-02 | End: 2021-08-12 | Stop reason: HOSPADM

## 2021-08-02 RX ORDER — LIDOCAINE HYDROCHLORIDE 20 MG/ML
INJECTION, SOLUTION INFILTRATION; PERINEURAL
Status: DISCONTINUED | OUTPATIENT
Start: 2021-08-02 | End: 2021-08-02 | Stop reason: HOSPADM

## 2021-08-02 RX ORDER — FUROSEMIDE 10 MG/ML
80 INJECTION INTRAMUSCULAR; INTRAVENOUS ONCE
Status: COMPLETED | OUTPATIENT
Start: 2021-08-03 | End: 2021-08-03

## 2021-08-02 RX ORDER — FUROSEMIDE 10 MG/ML
80 INJECTION INTRAMUSCULAR; INTRAVENOUS ONCE
Status: COMPLETED | OUTPATIENT
Start: 2021-08-02 | End: 2021-08-02

## 2021-08-02 RX ORDER — CEFAZOLIN SODIUM 1 G/3ML
1 INJECTION, POWDER, FOR SOLUTION INTRAMUSCULAR; INTRAVENOUS EVERY 8 HOURS
Status: DISCONTINUED | OUTPATIENT
Start: 2021-08-02 | End: 2021-08-02

## 2021-08-02 RX ADMIN — ACETAMINOPHEN 650 MG: 325 TABLET, FILM COATED ORAL at 13:20

## 2021-08-02 RX ADMIN — CEFEPIME HYDROCHLORIDE 2 G: 2 INJECTION, POWDER, FOR SOLUTION INTRAVENOUS at 20:02

## 2021-08-02 RX ADMIN — SELEXIPAG 1600 MCG: 200 TABLET, COATED ORAL at 21:20

## 2021-08-02 RX ADMIN — TAMSULOSIN HYDROCHLORIDE 0.4 MG: 0.4 CAPSULE ORAL at 08:17

## 2021-08-02 RX ADMIN — CLOTRIMAZOLE 1 LOZENGE: 10 LOZENGE ORAL at 23:53

## 2021-08-02 RX ADMIN — TADALAFIL 40 MG: 20 TABLET, FILM COATED ORAL at 08:17

## 2021-08-02 RX ADMIN — SELEXIPAG 1600 MCG: 200 TABLET, COATED ORAL at 08:18

## 2021-08-02 RX ADMIN — BUDESONIDE 0.25 MG: 0.25 INHALANT RESPIRATORY (INHALATION) at 09:18

## 2021-08-02 RX ADMIN — AMBRISENTAN 10 MG: 10 TABLET, FILM COATED ORAL at 08:18

## 2021-08-02 RX ADMIN — FUROSEMIDE 80 MG: 10 INJECTION, SOLUTION INTRAVENOUS at 16:34

## 2021-08-02 RX ADMIN — ACETAMINOPHEN 650 MG: 325 TABLET, FILM COATED ORAL at 20:28

## 2021-08-02 RX ADMIN — LIDOCAINE 2 PATCH: 560 PATCH PERCUTANEOUS; TOPICAL; TRANSDERMAL at 14:55

## 2021-08-02 ASSESSMENT — ACTIVITIES OF DAILY LIVING (ADL)
ADLS_ACUITY_SCORE: 13

## 2021-08-02 NOTE — DISCHARGE INSTRUCTIONS
Home Care after a Pacemaker Implant    Wound care:  Keep your incision (surgery wound) dry for 3 days.  After 3 days, you may remove the outer bandage.  Keep the strips of tape on.  They will be removed at your clinic visit.  Check for signs of infection each day.  These include increased redness, swelling, drainage, bleeding or a fever over 101 F (38.3 C).  Call us immediately if you see any of these signs.  If there are no signs of infection, you may shower in 3 days.  Do not submerge the incision (in a bath tub, hot tub, or swimming pool) until fully healed.    Pain:   You may have mild to moderate pain for 3 to 5 days.  Take acetaminophen (Tylenol) or ibuprofen (Advil) for the pain.  Call us if the pain is severe or lasts more than 5 days.    Activity:  After 24 hours, slowly return to your normal activities.   Healing will take 4 to 6 weeks.  No driving for 3 days  Avoid climbing a ladder alone.  It is best to stay within 4 feet of the ground.  Avoid anything that may cause rough contact or a hard hit to your chest.  This includes football, hockey, and other contact sports.  For at least 4 weeks:  Do not raise your affected arm above your shoulder.  Do not use your affected arm to push, pull, or lift anything over 10 pounds.  Avoid repetitive upper body activities for 6 weeks (ie: golf, swimming, and weight lifting)    Follow Up Visits:  Return to the clinic in 7 to 10 days to have your device and wound checked.      Telling others about your device:  Before you have any medical tests or treatments, tell the doctors, dentists, and other care providers about your device.  There are a few tests and treatments that may interfere with your device.  (These include MRI, radiation therapy, electrocautery, and others.)  Your care team may need to take special steps to keep you safe.  Before you leave the hospital, you will receive a temporary ID card.  A permanent card will be mailed to you about 6 to 8 weeks later.   Always carry the ID card with you.  It has important details about your device.  You should also get a MedicAlert ID.  Please ask us for a MedicAlert brochure, or go to [email or web address].    Safety near electrical equipment:  All of these are safe to use when in good repair:    Microwaves    Radios    Cordless phone    Remote controls    Small electrical tools  Cell phones: Keep cell phones at least 6 inches from your device.  Do not carry the phone in a pocket near your device.  Security tan: It is okay to walk through security tan at the airports and department stores.  Tell airport security that you have a pacemaker.  They should keep the screening wand at least 6 inches from your device.  Full-body scanners are safe.  Avoid the following:        Arc welding, chain saws and high-powered industrial or commercial tools.    Power lines, power plants and large power generators.    Electric body fat scales.    Magnetic mattress pads or pillow.    Questions?  Please call [facility].    Device Nurse:          Business Hours:  [phone]                       After Hours:  [phone]   Choose option 4, then ask for the on-call device nurse at job code [phone].    Please schedule an appointment to see your primary care physician in 7-10 days for an incision check.   Please schedule a pacemaker check at the [facility] in the next 4-6 weeks.   ___________________________ at _____________.            [facility]  [facility] [address]  [address] [place], [place] [id number]

## 2021-08-02 NOTE — PLAN OF CARE
D: Patient admitted 7/30 for CHB after routine RHC. Now s/p PPM placement today. 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    I: Monitored vitals and assessed patient status. Encouraged activity.   Changed: none  Running: PIV saline locked  PRN: none  Tele: paced  O2: 5L NC  Mobility: SBA     A: VSS, afebrile. A&Ox4. [place]apore on left chest from pacemaker placement today.    P: Anticipate x-ray and device check in AM, likely discharge home tomorrow. Continue to monitor patient status and report changes to treatment team.

## 2021-08-02 NOTE — PROGRESS NOTES
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 He is getting a pacemaker placed today, 8/2/21, due to persistent heart block.    Plan:    - Patient is transferring to 6c floor after pacemaker is placed    - Continue PTA medications for PAH of budenoside inhaler, ambrisentan, tadalafil, and selexipag    - Currently at 6L; continue to wean to baseline if O2 saturations > 90    - Consulted urology: Appreciate recommendations for increasing creatinine    # Complete Heart Block (New 7/30/21)  - HR in the 40's today, asymptomatic and stable  - TSH normal  - Stopped digoxin on 7/27/21; digoxin level today is 0.7 (0.9 one month ago) in setting of CKD stage IV  - pro-BNP at 28,841 (previously 8,834 three days ago)  - H/O pulmonary sarcoidosis with last PET scan in 2017 and cardiac MRI in 2012  - Pacemaker placed 8/2/21     # PAH 2/2 Pulmonary Sarcoidosis  - RHC 7/30/21 showing:  RA - 12  RV - 77/16  PA - 77/20/38  PCWP - 17  Hgb - 10.7  PA - 55.6%  Lesia CO/CI - 4.62/2.36 Right sided filling pressures are mildly elevated. Left sided filling pressures are mildly elevated. Moderately elevated pulmonary artery hypertension. Normal cardiac output level.  - Previous RHC (12/11/19) showing similar numbers, most notable is a CVP of 5 previously and now 12  - ECHO (7/27/21) showing:       - LVEF 55-60%, flattened septum consistent with RV pressure and volume overload       - RV function is mild/mod reduced  - Baseline O2 of 4.5 L at home, 8-10 L with exertion  - Breathing is less labored today, no significant changes overnight    # Complicated Urinary Tract Infection with Pan-Senstive Pseudomonas (Urine Culture from 7/27/21)   - Started 2g cefepime IV q24 hours  (7/30/21) due to CHB, day 3 of treatment     # CKD Stage IV with H/O Bilateral Kidney Stones   - New baseline Cr of approximately 3 (only two lab values for the last year prior to admission)   - Cr today is 4.20 from 4.05 (has been increasing during admission)   - Large amount of bilateral renal stones with left percutaneous nephrostomy tube present due to severe left hydronephrosis (placed 7/9/21)    - Left nephrostomy tube still draining urine; spoke to urology and will postpone stone extraction to a later date due to this admission with new CHB   - Kidney U/S 7/30/21: Moderate left hydronephrosis   - CT Ab/Pelv 7/30/21:          1.  Decreased left hydronephrosis status post interval placement of  percutaneous nephrostomy tube. Bilateral renal calculi and large  calculus in the proximal left ureter are unchanged.          2.  Compared to prior CT, there is ascites, diffuse mesenteric,  retroperitoneal and presacral edema, likely due to fluid third  spacing.   - Awaiting further recommendations from urology for increasing creatinine      # BPH   - Tamsulosin 0.4 PTA     DVT Prophylaxis: Enoxaparin (Lovenox) SQ  Chong Catheter: in place, indication: Strict 1-2 Hour I&O, Strict 1-2 Hour I&O  Code Status: Full Code  Lines: Right CVC, Left pIV, Left nephrostomy tube        Patient discussed with staff attending, Dr. Lemon.     Sathya Short MD  Internal Medicine Resident PGY1  Pager: [phone]      Interval History   Jimmy is doing well overnight. No acute events and has maintained the ventricular escape rate in the 40's. No new complaints. Continues to sleep upright and denies any headaches, dizziness, diarrhea, CP, constipation, fevers, chills, nausea, and vomiting. Patient will get pacemaker this morning and transfer to 6c post-op.    Physical Exam   Temp: 97.8  F (36.6  C) Temp src: Axillary BP: 103/71 Pulse: 93   Resp: 22 SpO2: 96 % O2 Device: Nasal cannula Oxygen Delivery: 5 LPM  Vitals:    07/30/21 1155  07/31/21 0200 08/01/21 1300   Weight: 75 kg (165 lb 5.5 oz) 75.3 kg (166 lb 0.1 oz) 76.3 kg (168 lb 3.4 oz)     Vital Signs with Ranges  Temp:  [97.8  F (36.6  C)-98.7  F (37.1  C)] 97.8  F (36.6  C)  Pulse:  [41-93] 93  Resp:  [16-25] 22  BP: ()/(43-71) 103/71  SpO2:  [92 %-100 %] 96 %  I/O last 3 completed shifts:  In: 1930 [P.O.:1800; I.V.:130]  Out: 1020 [Urine:1020]     , Blood pressure 103/71, pulse 93, temperature 97.8  F (36.6  C), temperature source Axillary, resp. rate 22, height 1.829 m (6'), weight 76.3 kg (168 lb 3.4 oz), SpO2 96 %.  168 lbs 3.38 oz  GEN:  Alert, oriented x 3, appears comfortable, NAD.  CV:  Irregular rhythm and bradycardia (40's), no murmur or JVD.   LUNGS:  Clear to auscultation bilaterally, no rhonchi, rales, or wheezes  ABD:  Active bowel sounds, soft, non-tender/non-distended.  No rebound/guarding/rigidity.  EXT:  +1 lower extremity edema, palpable pulses    Medications     - MEDICATION INSTRUCTIONS -         ambrisentan  10 mg Oral Daily     budesonide  0.25 mg Nebulization BID     ceFEPIme (MAXIPIME) IV  2 g Intravenous Q24H     heparin ANTICOAGULANT  5,000 Units Subcutaneous Q12H     lidocaine  2 patch Transdermal Q24h    And     lidocaine   Transdermal Q8H     selexipag  1,600 mcg Oral Q12H     sodium chloride (PF)  3 mL Intracatheter Q8H     tadalafil  40 mg Oral Daily     tamsulosin  0.4 mg Oral Daily       Data   Recent Labs   Lab 08/02/21  0304 08/01/21  0329 07/31/21  1552 07/31/21  0353   WBC 5.3 6.3  --  6.9   HGB 9.4* 9.8*  --  9.5*   MCV 90 90  --  91   * 130*  --  134*    133 135 136   POTASSIUM 3.9 3.9 4.0 4.0   CHLORIDE 102 102 104 104   CO2 22 24 24 23   BUN 86* 84* 79* 73*   CR 4.20* 4.05* 4.07* 3.93*   ANIONGAP 10 7 7 9   TERI 8.3* 8.4* 8.6 9.0   GLC 92 98 114* 93   ALBUMIN 2.7* 2.8*  --  2.8*   PROTTOTAL 6.4* 6.1*  --  6.1*   BILITOTAL 0.4 0.6  --  0.5   ALKPHOS 70 67  --  66   ALT 14 16  --  18   AST 9 10  --  11       Recent Results (from the  past 24 hour(s))   CT Abdomen Pelvis w/o Contrast    Narrative    EXAMINATION: CT ABDOMEN PELVIS W/O CONTRAST, 8/1/2021 2:56 PM    TECHNIQUE:  Helical CT images from the lung bases through the  symphysis pubis were obtained  without contrast using renal stone  protocol.     COMPARISON: CT abdomen and pelvis 6/23/2021    HISTORY: Urinary tract stone, symptomatic/complicated    FINDINGS:    Right kidney: Multiple staghorn calculi are unchanged compared to  prior study from 6/23/2021. There is no hydronephrosis.    Left kidney: Unchanged calculi in the left kidney, largest measuring  1.3 cm in the temporal region, with decreased hydronephrosis status  post interval replacement of percutaneous nephrostomy tube. Stable 2.1  cm calculus in the proximal ureter. No new ureteral calculus.    Urinary bladder: Normal.    Remainder of the abdomen and pelvis: The prostate is mildly enlarged.  Normal noncontrast appearance of the liver, spleen and adrenals. The  gallbladder is decompressed and contains sludge and no pericholecystic  fluid. Mild diffuse pancreatic atrophy with calcifications in the  pancreatic head. No dilated bowel. No free air or loculated fluid  collection. Trace perihepatic ascites, diffuse mesenteric and  retroperitoneal edema. Stable mildly analyzed retrocrural,  retroperitoneal and gastrohepatic node measuring up to 1.3 cm (series  5, image 93).     Lung bases:  Severe emphysema with bullous changes in the lung bases.    Bones and soft tissues: No suspicious osseous lesions. Stable  avascular necrosis of the femoral heads. Mild degenerative changes in  the spine.      Impression    IMPRESSION:   1.  Decreased left hydronephrosis status post interval placement of  percutaneous nephrostomy tube. Bilateral renal calculi and large  calculus in the proximal left ureter are unchanged.   2.  Compared to prior CT, there is ascites, diffuse mesenteric,  retroperitoneal and presacral edema, likely due to fluid  third  spacing.  3.  Stable enlarged abdominal lymph nodes, probably reactive.  4.  Stable chronic findings including bullous emphysema of the lung  bases, changes of chronic calcific pancreatitis.     SOUTH TROY MD         SYSTEM ID:  [id number]

## 2021-08-02 NOTE — PLAN OF CARE
Major Shift Events:  Asymptomatic bradycardia with rates in the 40's. Complete heart block, intermittent 1st deg AV block and 2nd degree AV block type 2. NPO since midnight.  Plan: Permanent pacemaker placement today.  For vital signs and complete assessments, please see documentation flowsheets.

## 2021-08-02 NOTE — PLAN OF CARE
Major Shift Events: Pt in 2nd degree/complete heart block this AM. Went to cath lab for PPM placement. No complications noted. Pt alert and oriented, 100% v-paced with some a paced beats. BP stable. On 5L NC oxygen per home regimen pt requests oxymask with activity for SOB. Pt voiding in the urinal, had full lunch meal, no BM.    Plan: Transfer to  today and discharge when stable.     For vital signs and complete assessments, please see documentation flowsheets.

## 2021-08-03 ENCOUNTER — ANCILLARY PROCEDURE (OUTPATIENT)
Dept: CARDIOLOGY | Facility: CLINIC | Age: 61
DRG: 242 | End: 2021-08-03
Attending: NURSE PRACTITIONER
Payer: COMMERCIAL

## 2021-08-03 ENCOUNTER — APPOINTMENT (OUTPATIENT)
Dept: GENERAL RADIOLOGY | Facility: CLINIC | Age: 61
DRG: 242 | End: 2021-08-03
Attending: INTERNAL MEDICINE
Payer: COMMERCIAL

## 2021-08-03 ENCOUNTER — APPOINTMENT (OUTPATIENT)
Dept: GENERAL RADIOLOGY | Facility: CLINIC | Age: 61
DRG: 242 | End: 2021-08-03
Attending: NURSE PRACTITIONER
Payer: COMMERCIAL

## 2021-08-03 LAB
ALBUMIN SERPL-MCNC: 2.4 G/DL (ref 3.4–5)
ALP SERPL-CCNC: 65 U/L (ref 40–150)
ALT SERPL W P-5'-P-CCNC: 10 U/L (ref 0–70)
ANION GAP SERPL CALCULATED.3IONS-SCNC: 11 MMOL/L (ref 3–14)
ANION GAP SERPL CALCULATED.3IONS-SCNC: 8 MMOL/L (ref 3–14)
AST SERPL W P-5'-P-CCNC: 12 U/L (ref 0–45)
ATRIAL RATE - MUSE: 39 BPM
ATRIAL RATE - MUSE: 91 BPM
BASE EXCESS BLDA CALC-SCNC: -5.7 MMOL/L (ref -9–1.8)
BILIRUB SERPL-MCNC: 0.8 MG/DL (ref 0.2–1.3)
BUN SERPL-MCNC: 95 MG/DL (ref 7–30)
BUN SERPL-MCNC: 96 MG/DL (ref 7–30)
CALCIUM SERPL-MCNC: 8.1 MG/DL (ref 8.5–10.1)
CALCIUM SERPL-MCNC: 8.3 MG/DL (ref 8.5–10.1)
CHLORIDE BLD-SCNC: 104 MMOL/L (ref 94–109)
CHLORIDE BLD-SCNC: 104 MMOL/L (ref 94–109)
CO2 SERPL-SCNC: 21 MMOL/L (ref 20–32)
CO2 SERPL-SCNC: 23 MMOL/L (ref 20–32)
CREAT SERPL-MCNC: 3.92 MG/DL (ref 0.66–1.25)
CREAT SERPL-MCNC: 4.12 MG/DL (ref 0.66–1.25)
DIASTOLIC BLOOD PRESSURE - MUSE: NORMAL MMHG
DIASTOLIC BLOOD PRESSURE - MUSE: NORMAL MMHG
ERYTHROCYTE [DISTWIDTH] IN BLOOD BY AUTOMATED COUNT: 13.2 % (ref 10–15)
GFR SERPL CREATININE-BSD FRML MDRD: 15 ML/MIN/1.73M2
GFR SERPL CREATININE-BSD FRML MDRD: 16 ML/MIN/1.73M2
GLUCOSE BLD-MCNC: 104 MG/DL (ref 70–99)
GLUCOSE BLD-MCNC: 104 MG/DL (ref 70–99)
HCO3 BLD-SCNC: 20 MMOL/L (ref 21–28)
HCT VFR BLD AUTO: 27.3 % (ref 40–53)
HGB BLD-MCNC: 8.8 G/DL (ref 13.3–17.7)
INTERPRETATION ECG - MUSE: NORMAL
INTERPRETATION ECG - MUSE: NORMAL
MAGNESIUM SERPL-MCNC: 2.2 MG/DL (ref 1.6–2.3)
MCH RBC QN AUTO: 28.9 PG (ref 26.5–33)
MCHC RBC AUTO-ENTMCNC: 32.2 G/DL (ref 31.5–36.5)
MCV RBC AUTO: 90 FL (ref 78–100)
O2/TOTAL GAS SETTING VFR VENT: 30 %
OXYHGB MFR BLD: 93 % (ref 92–100)
P AXIS - MUSE: 83 DEGREES
P AXIS - MUSE: 86 DEGREES
PCO2 BLD: 40 MM HG (ref 35–45)
PH BLD: 7.31 [PH] (ref 7.35–7.45)
PLATELET # BLD AUTO: 131 10E3/UL (ref 150–450)
PO2 BLD: 71 MM HG (ref 80–105)
POTASSIUM BLD-SCNC: 3.4 MMOL/L (ref 3.4–5.3)
POTASSIUM BLD-SCNC: 3.4 MMOL/L (ref 3.4–5.3)
POTASSIUM BLD-SCNC: 3.5 MMOL/L (ref 3.4–5.3)
POTASSIUM BLD-SCNC: 4.5 MMOL/L (ref 3.4–5.3)
PR INTERVAL - MUSE: 200 MS
PR INTERVAL - MUSE: 424 MS
PROT SERPL-MCNC: 5.9 G/DL (ref 6.8–8.8)
QRS DURATION - MUSE: 148 MS
QRS DURATION - MUSE: 152 MS
QT - MUSE: 436 MS
QT - MUSE: 536 MS
QTC - MUSE: 431 MS
QTC - MUSE: 536 MS
R AXIS - MUSE: 116 DEGREES
R AXIS - MUSE: 264 DEGREES
RBC # BLD AUTO: 3.05 10E6/UL (ref 4.4–5.9)
SODIUM SERPL-SCNC: 135 MMOL/L (ref 133–144)
SODIUM SERPL-SCNC: 136 MMOL/L (ref 133–144)
SYSTOLIC BLOOD PRESSURE - MUSE: NORMAL MMHG
SYSTOLIC BLOOD PRESSURE - MUSE: NORMAL MMHG
T AXIS - MUSE: -12 DEGREES
T AXIS - MUSE: 80 DEGREES
VENTRICULAR RATE- MUSE: 39 BPM
VENTRICULAR RATE- MUSE: 91 BPM
WBC # BLD AUTO: 6 10E3/UL (ref 4–11)

## 2021-08-03 PROCEDURE — 93280 PM DEVICE PROGR EVAL DUAL: CPT | Mod: 26 | Performed by: INTERNAL MEDICINE

## 2021-08-03 PROCEDURE — 82040 ASSAY OF SERUM ALBUMIN: CPT | Performed by: STUDENT IN AN ORGANIZED HEALTH CARE EDUCATION/TRAINING PROGRAM

## 2021-08-03 PROCEDURE — 214N000001 HC R&B CCU UMMC

## 2021-08-03 PROCEDURE — 71045 X-RAY EXAM CHEST 1 VIEW: CPT

## 2021-08-03 PROCEDURE — 250N000011 HC RX IP 250 OP 636

## 2021-08-03 PROCEDURE — 71046 X-RAY EXAM CHEST 2 VIEWS: CPT

## 2021-08-03 PROCEDURE — 250N000013 HC RX MED GY IP 250 OP 250 PS 637: Performed by: STUDENT IN AN ORGANIZED HEALTH CARE EDUCATION/TRAINING PROGRAM

## 2021-08-03 PROCEDURE — 84132 ASSAY OF SERUM POTASSIUM: CPT

## 2021-08-03 PROCEDURE — 250N000013 HC RX MED GY IP 250 OP 250 PS 637: Performed by: NURSE PRACTITIONER

## 2021-08-03 PROCEDURE — 999N000157 HC STATISTIC RCP TIME EA 10 MIN

## 2021-08-03 PROCEDURE — 5A09357 ASSISTANCE WITH RESPIRATORY VENTILATION, LESS THAN 24 CONSECUTIVE HOURS, CONTINUOUS POSITIVE AIRWAY PRESSURE: ICD-10-PCS | Performed by: INTERNAL MEDICINE

## 2021-08-03 PROCEDURE — 93005 ELECTROCARDIOGRAM TRACING: CPT

## 2021-08-03 PROCEDURE — 94660 CPAP INITIATION&MGMT: CPT

## 2021-08-03 PROCEDURE — 82805 BLOOD GASES W/O2 SATURATION: CPT | Performed by: STUDENT IN AN ORGANIZED HEALTH CARE EDUCATION/TRAINING PROGRAM

## 2021-08-03 PROCEDURE — 36415 COLL VENOUS BLD VENIPUNCTURE: CPT | Performed by: STUDENT IN AN ORGANIZED HEALTH CARE EDUCATION/TRAINING PROGRAM

## 2021-08-03 PROCEDURE — 84132 ASSAY OF SERUM POTASSIUM: CPT | Performed by: STUDENT IN AN ORGANIZED HEALTH CARE EDUCATION/TRAINING PROGRAM

## 2021-08-03 PROCEDURE — 83735 ASSAY OF MAGNESIUM: CPT

## 2021-08-03 PROCEDURE — 93280 PM DEVICE PROGR EVAL DUAL: CPT

## 2021-08-03 PROCEDURE — 94640 AIRWAY INHALATION TREATMENT: CPT | Mod: 76

## 2021-08-03 PROCEDURE — 99233 SBSQ HOSP IP/OBS HIGH 50: CPT | Mod: 25 | Performed by: INTERNAL MEDICINE

## 2021-08-03 PROCEDURE — 93010 ELECTROCARDIOGRAM REPORT: CPT | Performed by: INTERNAL MEDICINE

## 2021-08-03 PROCEDURE — 250N000013 HC RX MED GY IP 250 OP 250 PS 637: Performed by: INTERNAL MEDICINE

## 2021-08-03 PROCEDURE — 71046 X-RAY EXAM CHEST 2 VIEWS: CPT | Mod: 26 | Performed by: RADIOLOGY

## 2021-08-03 PROCEDURE — 36415 COLL VENOUS BLD VENIPUNCTURE: CPT

## 2021-08-03 PROCEDURE — 99232 SBSQ HOSP IP/OBS MODERATE 35: CPT | Mod: 25 | Performed by: NURSE PRACTITIONER

## 2021-08-03 PROCEDURE — 85027 COMPLETE CBC AUTOMATED: CPT | Performed by: STUDENT IN AN ORGANIZED HEALTH CARE EDUCATION/TRAINING PROGRAM

## 2021-08-03 PROCEDURE — 250N000009 HC RX 250: Performed by: STUDENT IN AN ORGANIZED HEALTH CARE EDUCATION/TRAINING PROGRAM

## 2021-08-03 PROCEDURE — 36600 WITHDRAWAL OF ARTERIAL BLOOD: CPT

## 2021-08-03 PROCEDURE — 71045 X-RAY EXAM CHEST 1 VIEW: CPT | Mod: 26 | Performed by: RADIOLOGY

## 2021-08-03 PROCEDURE — 250N000013 HC RX MED GY IP 250 OP 250 PS 637

## 2021-08-03 RX ORDER — POTASSIUM CHLORIDE 750 MG/1
10 TABLET, EXTENDED RELEASE ORAL ONCE
Status: COMPLETED | OUTPATIENT
Start: 2021-08-03 | End: 2021-08-03

## 2021-08-03 RX ORDER — POTASSIUM CHLORIDE 20MEQ/15ML
60 LIQUID (ML) ORAL ONCE
Status: COMPLETED | OUTPATIENT
Start: 2021-08-03 | End: 2021-08-03

## 2021-08-03 RX ORDER — FUROSEMIDE 10 MG/ML
80 INJECTION INTRAMUSCULAR; INTRAVENOUS ONCE
Status: COMPLETED | OUTPATIENT
Start: 2021-08-03 | End: 2021-08-03

## 2021-08-03 RX ORDER — SODIUM CHLORIDE 9 MG/ML
INJECTION, SOLUTION INTRAVENOUS CONTINUOUS
Status: DISCONTINUED | OUTPATIENT
Start: 2021-08-03 | End: 2021-08-03 | Stop reason: CLARIF

## 2021-08-03 RX ORDER — HYDROXYZINE HYDROCHLORIDE 10 MG/1
10 TABLET, FILM COATED ORAL EVERY 6 HOURS PRN
Status: DISCONTINUED | OUTPATIENT
Start: 2021-08-03 | End: 2021-08-04

## 2021-08-03 RX ORDER — POTASSIUM CHLORIDE 750 MG/1
40 TABLET, EXTENDED RELEASE ORAL ONCE
Status: COMPLETED | OUTPATIENT
Start: 2021-08-03 | End: 2021-08-03

## 2021-08-03 RX ORDER — HYDROXYZINE HYDROCHLORIDE 10 MG/1
10 TABLET, FILM COATED ORAL ONCE
Status: COMPLETED | OUTPATIENT
Start: 2021-08-03 | End: 2021-08-03

## 2021-08-03 RX ADMIN — CEFEPIME HYDROCHLORIDE 2 G: 2 INJECTION, POWDER, FOR SOLUTION INTRAVENOUS at 20:38

## 2021-08-03 RX ADMIN — LIDOCAINE 2 PATCH: 560 PATCH PERCUTANEOUS; TOPICAL; TRANSDERMAL at 14:58

## 2021-08-03 RX ADMIN — ALBUTEROL SULFATE 2 PUFF: 90 AEROSOL, METERED RESPIRATORY (INHALATION) at 12:57

## 2021-08-03 RX ADMIN — TADALAFIL 40 MG: 20 TABLET, FILM COATED ORAL at 07:54

## 2021-08-03 RX ADMIN — AMBRISENTAN 10 MG: 10 TABLET, FILM COATED ORAL at 10:06

## 2021-08-03 RX ADMIN — HYDROXYZINE HYDROCHLORIDE 10 MG: 10 TABLET, FILM COATED ORAL at 13:21

## 2021-08-03 RX ADMIN — ALBUTEROL SULFATE 2 PUFF: 90 AEROSOL, METERED RESPIRATORY (INHALATION) at 01:40

## 2021-08-03 RX ADMIN — POTASSIUM CHLORIDE 60 MEQ: 20 SOLUTION ORAL at 15:56

## 2021-08-03 RX ADMIN — TAMSULOSIN HYDROCHLORIDE 0.4 MG: 0.4 CAPSULE ORAL at 07:55

## 2021-08-03 RX ADMIN — BUDESONIDE 0.25 MG: 0.25 INHALANT RESPIRATORY (INHALATION) at 20:17

## 2021-08-03 RX ADMIN — FUROSEMIDE 80 MG: 10 INJECTION, SOLUTION INTRAVENOUS at 07:55

## 2021-08-03 RX ADMIN — SELEXIPAG 1600 MCG: 200 TABLET, COATED ORAL at 20:38

## 2021-08-03 RX ADMIN — POTASSIUM CHLORIDE 10 MEQ: 750 TABLET, EXTENDED RELEASE ORAL at 07:55

## 2021-08-03 RX ADMIN — BUDESONIDE 0.25 MG: 0.25 INHALANT RESPIRATORY (INHALATION) at 01:27

## 2021-08-03 RX ADMIN — FUROSEMIDE 80 MG: 10 INJECTION, SOLUTION INTRAVENOUS at 14:57

## 2021-08-03 RX ADMIN — POTASSIUM CHLORIDE 40 MEQ: 750 TABLET, EXTENDED RELEASE ORAL at 11:21

## 2021-08-03 RX ADMIN — SELEXIPAG 1600 MCG: 200 TABLET, COATED ORAL at 07:53

## 2021-08-03 RX ADMIN — BUDESONIDE 0.25 MG: 0.25 INHALANT RESPIRATORY (INHALATION) at 08:05

## 2021-08-03 RX ADMIN — ACETAMINOPHEN 650 MG: 325 TABLET, FILM COATED ORAL at 02:41

## 2021-08-03 RX ADMIN — OXYCODONE HYDROCHLORIDE AND ACETAMINOPHEN 1 TABLET: 5; 325 TABLET ORAL at 14:57

## 2021-08-03 RX ADMIN — ACETAMINOPHEN 650 MG: 325 TABLET, FILM COATED ORAL at 11:38

## 2021-08-03 RX ADMIN — CLOTRIMAZOLE 1 LOZENGE: 10 LOZENGE ORAL at 14:56

## 2021-08-03 ASSESSMENT — ACTIVITIES OF DAILY LIVING (ADL)
ADLS_ACUITY_SCORE: 14
VISION_MANAGEMENT: GLASSES
HEARING_DIFFICULTY_OR_DEAF: NO
WALKING_OR_CLIMBING_STAIRS_DIFFICULTY: NO
ADLS_ACUITY_SCORE: 14
DIFFICULTY_EATING/SWALLOWING: NO
ADLS_ACUITY_SCORE: 14
DIFFICULTY_COMMUNICATING: NO
TOILETING_ISSUES: NO
FALL_HISTORY_WITHIN_LAST_SIX_MONTHS: NO
WEAR_GLASSES_OR_BLIND: YES
ADLS_ACUITY_SCORE: 13
CONCENTRATING,_REMEMBERING_OR_MAKING_DECISIONS_DIFFICULTY: NO
DRESSING/BATHING_DIFFICULTY: NO
DOING_ERRANDS_INDEPENDENTLY_DIFFICULTY: NO

## 2021-08-03 ASSESSMENT — MIFFLIN-ST. JEOR: SCORE: 1578.28

## 2021-08-03 NOTE — PROGRESS NOTES
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 A pacemaker was placed 8/2/21 due to persistent heart block.    Plan:    - With Cr stabilizing today at 4.12 (4.20 yesterday), we will continue with IV diuresis (80 lasix at 0800 and 1400) to help resolve current SISI. Will possibly transition to lasix drip tomorrow    - Continue PTA medications for PAH of budenoside inhaler, ambrisentan, tadalafil, and selexipag    - Currently at 6L; continue to wean to baseline if O2 saturations > 90    - Urology consult: SISI not likely related to stones/obstructive process, left hydronephrosis is chronic, and hold off on PCNL with F/U in clinic for further management after discharge    # Complete Heart Block (New 7/30/21)  - TSH normal  - Stopped digoxin on 7/27/21; digoxin level today is 0.7 (0.9 one month ago) in setting of CKD stage IV  - pro-BNP at 28,841 (previously 8,834 three days ago)  - H/O pulmonary sarcoidosis with last PET scan in 2017 and cardiac MRI in 2012  - Pacemaker placed 8/2/21     # PAH 2/2 Pulmonary Sarcoidosis  - RHC 7/30/21 showing:  RA - 12  RV - 77/16  PA - 77/20/38  PCWP - 17  Hgb - 10.7  PA - 55.6%  Lesia CO/CI - 4.62/2.36 Right sided filling pressures are mildly elevated. Left sided filling pressures are mildly elevated. Moderately elevated pulmonary artery hypertension. Normal cardiac output level.  - Previous RHC (12/11/19) showing similar numbers, most notable is a CVP of 5 previously and now 12  - ECHO (7/27/21) showing:       - LVEF 55-60%, flattened septum consistent with RV pressure and volume overload       - RV function is mild/mod reduced  - Baseline O2 of 4.5 L at home, 8-10 L with exertion    # Complicated Urinary Tract Infection  with Pan-Senstive Pseudomonas (Urine Culture from 7/27/21)   - Started 2g cefepime IV q24 hours (7/30/21) due to CHB, day 5 of treatment     # CKD Stage IV with H/O Bilateral Kidney Stones  #SISI   - New baseline Cr of approximately 3 (only two lab values for the last year prior to admission)   - Cr today is 4.12 from 4.20 (has been increasing during admission)   - Continue diuresis with IV lasix, may use lasix drip tomorrow if needed     - Large amount of bilateral renal stones with left percutaneous nephrostomy tube present due to severe left hydronephrosis (placed 7/9/21)    - Left nephrostomy tube still draining urine; spoke to urology and will postpone stone extraction to a later date due to this admission with new CHB   - Kidney U/S 7/30/21: Moderate left hydronephrosis   - CT Ab/Pelv 7/30/21:          1.  Decreased left hydronephrosis status post interval placement of  percutaneous nephrostomy tube. Bilateral renal calculi and large  calculus in the proximal left ureter are unchanged.          2.  Compared to prior CT, there is ascites, diffuse mesenteric,  retroperitoneal and presacral edema, likely due to fluid third  spacing.   - Urology saw patient and will F/U as an outpatient for further management post-discharge. No changes to current management     # BPH   - Tamsulosin 0.4 PTA     DVT Prophylaxis: Enoxaparin (Lovenox) subcutaneous - Held currently post-pacemaker placement until 8/5/21 at 1200  Chong Catheter: in place, indication: Strict 1-2 Hour I&O, Strict 1-2 Hour I&O  Code Status: Full Code  Lines: Right CVC, Left pIV, Left nephrostomy tube     Patient discussed with staff attending, Dr. Lemon.     Sathya Short MD  Internal Medicine Resident PGY1  Pager: [phone]      Interval History   Jimmy woke up last night around 0400 and was SOB and anxious. Patient stated that the room was very stuffy and the air conditioning was not on and caused him to get nauseated and hard for him to breathe. A  CXR, an increased in O2 to 10 L, and a nebulizer was able to calm him down and resolved the SOB episode. This morning when I saw him, he was doing better. No more SOB, but very tired due to poor sleep overnight. Slight pain over the incision for the pacemaker. Incision site was clean and no concerns for infection. Understands the need for diuresis and no further questions. Currently back down to 6 L of O2 and saturating well.    Patient denied CP, dizziness, headache, diarrhea, constipation, fevers, chills, or skin changes.    Physical Exam   Temp: 97.5  F (36.4  C) Temp src: Oral BP: 112/56 Pulse: 92   Resp: 20 SpO2: 92 % O2 Device: Nasal cannula Oxygen Delivery: 6 LPM  Vitals:    07/31/21 0200 08/01/21 1300 08/03/21 0645   Weight: 75.3 kg (166 lb 0.1 oz) 76.3 kg (168 lb 3.4 oz) 73.5 kg (162 lb 1.6 oz)     Vital Signs with Ranges  Temp:  [97.4  F (36.3  C)-98  F (36.7  C)] 97.5  F (36.4  C)  Pulse:  [68-92] 92  Resp:  [18-24] 20  BP: ()/(45-67) 112/56  SpO2:  [92 %-100 %] 92 %  I/O last 3 completed shifts:  In: 1060 [P.O.:1060]  Out: 1470 [Urine:1470]     , Blood pressure 112/56, pulse 92, temperature 97.5  F (36.4  C), temperature source Oral, resp. rate 20, height 1.829 m (6'), weight 73.5 kg (162 lb 1.6 oz), SpO2 92 %.  162 lbs 1.6 oz  GEN:  Alert, oriented x 3, appears comfortable, NAD.  CV:  Regular rate and rhythm, no murmur.  LUNGS:  Crackles heard bilaterally (more in lower lung fields). Able to move oxygen well with deep breaths.  ABD:  Active bowel sounds, soft, non-tender/non-distended.  No rebound/guarding/rigidity.  EXT:  +1 lower extremity edema, palpable pulses    Medications     - MEDICATION INSTRUCTIONS -       - MEDICATION INSTRUCTIONS -       - MEDICATION INSTRUCTIONS -         ambrisentan  10 mg Oral Daily     budesonide  0.25 mg Nebulization BID     ceFEPIme (MAXIPIME) IV  2 g Intravenous Q24H     clotrimazole  1 lozenge Buccal TID     furosemide  80 mg Intravenous Once     [Held by  provider] heparin ANTICOAGULANT  5,000 Units Subcutaneous Q12H     lidocaine  2 patch Transdermal Q24h    And     lidocaine   Transdermal Q8H     selexipag  1,600 mcg Oral Q12H     sodium chloride (PF)  3 mL Intracatheter Q8H     tadalafil  40 mg Oral Daily     tamsulosin  0.4 mg Oral Daily       Data   Recent Labs   Lab 08/03/21  0609 08/03/21  0443 08/02/21  0304 08/01/21  0329   WBC  --  6.0 5.3 6.3   HGB  --  8.8* 9.4* 9.8*   MCV  --  90 90 90   PLT  --  131* 117* 130*   NA  --  136 134 133   POTASSIUM 3.4 3.5 3.9 3.9   CHLORIDE  --  104 102 102   CO2  --  21 22 24   BUN  --  95* 86* 84*   CR  --  4.12* 4.20* 4.05*   ANIONGAP  --  11 10 7   TERI  --  8.1* 8.3* 8.4*   GLC  --  104* 92 98   ALBUMIN  --  2.4* 2.7* 2.8*   PROTTOTAL  --  5.9* 6.4* 6.1*   BILITOTAL  --  0.8 0.4 0.6   ALKPHOS  --  65 70 67   ALT  --  10 14 16   AST  --  12 9 10       Recent Results (from the past 24 hour(s))   XR Chest Port 1 View    Narrative    EXAM: XR CHEST PORT 1 VIEW  8/3/2021 3:14 AM     HISTORY:  dyspnea       COMPARISON:  Chest x-ray 8/2/2021    FINDINGS: AP radiograph of the chest. Left chest wall pacemaker with  intact leads overlying the right atrium and right ventricle.    Stable cardiomediastinal silhouette. Continued high lung volumes. No  pneumothorax or significant pleural effusion. Continued perihilar  predominant interstitial opacities and scarring. No new focal airspace  opacity. Visualized upper abdomen is unremarkable. Bones are stable.      Impression    IMPRESSION:  1. No new focal airspace opacity.  2. Continued interstitial opacities and scarring consistent with  interstitial lung disease in this patient with history of sarcoidosis.    I have personally reviewed the examination and initial interpretation  and I agree with the findings.    LEONILA NORRIS DO         SYSTEM ID:  [id number]   X-ray Chest 2 vws*    Narrative    EXAM: XR CHEST 2 VW  8/3/2021 8:55 AM     HISTORY:  Status post pacer/ICD        COMPARISON:  Chest radiograph 8/3/2021    FINDINGS:   2 views the chest. Left chest wall pacemaker with intact leads  projecting over right atrial appendage and right ventricular apex.  High lung volumes with grossly unchanged appearance of perihilar  interstitial and patchy airspace opacities. No new focal airspace  opacities, pneumothorax, or pleural effusion. Stable cardiomediastinal  silhouette. Bones are grossly intact.      Impression    IMPRESSION:   1. Left chest wall pacemaker with leads projecting over right image  and right ventricular apex.   2. High lung volumes with similar appearance of interstitial and  patchy airspace opacities likely consistent with scarring setting of  known interstitial lung disease with history of sarcoidosis.

## 2021-08-03 NOTE — PLAN OF CARE
Paged as patient is having a sense of impending doom and unable to breathe. Also reports orthopnea.  POD 1 dual chamber PPM for CHB  EKG requested and reviewed remotely which reveals an A sensed V paced rhythm, appropriate atrial sensing and appropriate ventricular capture. No significant change from prior.  PPM was interrogated this afternoon without issue.  Recommend repeating a CXR.  Primary team to examine volume status as he may benefit from diuresis.

## 2021-08-03 NOTE — PLAN OF CARE
D: Patient admitted 7/30 for CHB after routine RHC. Now s/p PPM placement 8/2/2021. 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    I: Monitored vitals and assessed pt status. Encouraged rest.     Changed: 5 L to 10 L O2. Pt doesn't feel comfortable weaning down.  PRN: Tylenol, Albuterol inhaler.  Tele: DDD, vpaced.  O2: 10 L  Mobility: SBA/A1     A: A&Ox4. VSS. Afebrile. Pt was having troubles breathing, so RT gave scheduled neb and nurse gave PRN inhaler. Didn't seem to help. Paged team and received orders for BiPAP, pt didn't tolerate well but kept it on for 1 hour. Xray overnight showed no new issues. Neph tube has good drainage.    P: Continue to monitor pt status and report changes to Cards 2. Xray and device check this AM, potential discharge home today.

## 2021-08-03 NOTE — CONSULTS
UROLOGY Consult Note          Chief Complaint:   Nephrolithiasis         Interval Update    Jimmy Isaac is a very pleasant 61 yo M with hx of extensive bilateral renal stones.  He was recently found to have left hydronephrosis secondary to a large obstructing 2+ cm proximal ureteral stone.  He was in renal failure and a PNT was placed in hopes his creatinine would improve.  It marginally improved from mid to high 3's to low 3s.  He has tolerated it well.  A plan had been made to proceed with left percutaneous nephrolithotomy but upon preop workup was noted to have arrythmia and is now admitted for cardiac cath and pacemaker which he had performed earlier today.    He notes that he is feeling generally ok.  Somewhat deconditioned from several nights in the hospital.  He denies fevers, chills, flank pain.  MIld neph tube irritation but it has been draining well and he is tolerating it ok.    I was called by his primary cardiologist Dr. Lemon in regards to his rising Cr despite nephrostomy tube being in place and draining.  Rise since hospitalization is noted.  A CT was performed yesterday which is personally reviewed and does not show any evidence for obstruction.  Left PNT appears well positioned and there is some improvement in hydro.       On exam he is well appearing, using supplemental oxygen, left PNT is drianing clear yellow urine  Vital signs:  Temp: 97.4  F (36.3  C) Temp src: Oral BP: 108/54 Pulse: 91   Resp: 20 SpO2: 99 % O2 Device: Nasal cannula Oxygen Delivery: 6 LPM Height: 182.9 cm (6') Weight: 73.5 kg (162 lb 1.6 oz)  Estimated body mass index is 21.98 kg/m  as calculated from the following:    Height as of this encounter: 1.829 m (6').    Weight as of this encounter: 73.5 kg (162 lb 1.6 oz).        Labs and Pathology:    I personally reviewed all applicable laboratory data and went over findings with patient  Significant for:    CBC RESULTS:  Recent Labs   Lab Test 08/03/21  7287  08/02/21  0304 08/01/21  0329 07/31/21  0353   WBC 6.0 5.3 6.3 6.9   HGB 8.8* 9.4* 9.8* 9.5*   * 117* 130* 134*        BMP RESULTS:  Recent Labs   Lab Test 08/03/21  0609 08/03/21  0443 08/02/21  0304 08/01/21  0329 07/31/21  1552 07/01/21  1336 06/16/21  1630 06/16/21  1630 12/11/19  1109 09/25/19  1209   NA  --  136 134 133 135 142   < > 138 140 136   POTASSIUM 3.4 3.5 3.9 3.9 4.0 4.0   < > 4.4 4.3 4.1   CHLORIDE  --  104 102 102 104 110*   < > 106 109 102   CO2  --  21 22 24 24 25   < > 23 28 29   ANIONGAP  --  11 10 7 7 7   < > 9 3 5   GLC  --  104* 92 98 114* 95   < > 96 78 90   BUN  --  95* 86* 84* 79* 50*   < > 51* 32* 30   CR  --  4.12* 4.20* 4.05* 4.07* 3.27*   < > 3.65* 1.85* 1.62*   GFRESTIMATED  --  15* 14* 15* 15* 19*   < > 17* 39* 45*   GFRESTBLACK  --   --   --   --   --  22*  --  20* 45* 53*   TERI  --  8.1* 8.3* 8.4* 8.6 8.7   < > 9.3 8.9 9.4    < > = values in this interval not displayed.       UA RESULTS:   Recent Labs   Lab Test 07/27/21  0747 07/09/21  1320 06/16/21  1522 10/18/17  1125   SG 1.011 1.010 1.015 1.011   URINEPH 5.0 5.0 5.5 6.0   NITRITE Negative Negative Negative Negative   RBCU 42* <1  --  25*   WBCU 45* 12*  --  2       PSA RESULTS  PSA   Date Value Ref Range Status   06/16/2021 1.67 0 - 4 ug/L Final     Comment:     Assay Method:  Chemiluminescence using Siemens Vista analyzer   05/30/2018 1.63 0 - 4 ug/L Final     Comment:     Assay Method:  Chemiluminescence using Siemens Vista analyzer           Imaging:    I personally reviewed all applicable imaging and went over the below findings with patient.    Results for orders placed or performed during the hospital encounter of 07/30/21   US Renal Complete     Value    Radiologist flags Moderate left hydronephrosis (Urgent)    Narrative    Exam: US RENAL COMPLETE, 8/1/2021 11:35 AM    Indication: SISI, nephrolithiasis    COMPARISON: 6/23/2021    TECHNIQUE: The kidneys and bladder were scanned in the standard  fashion with  specialized ultrasound transducer(s) using both gray  scale and limited color/spectral Doppler techniques.    FINDINGS:    Right kidney: Measures 10.3 cm in length. Parenchyma is of normal  thickness and echogenicity. Multiple echogenic, shadowing calyceal  stones are visualized within the right kidney, similar to comparison  CT 6/23/2021. No focal mass. No hydronephrosis.    Left kidney: Measures 9.9 cm in length. Parenchyma is of normal  thickness and echogenicity. Moderate left hydronephrosis. Shadowing,  echogenic stone in the left midpole renal calyx. No focal mass.     Bladder: Not visualized      Impression    IMPRESSION:   1.  Moderate left hydronephrosis, similar to CT 6/23/2021. Left  percutaneous nephrostomy tube is not visualized.  2.  Bilateral nephrolithiasis, right greater than left.    [Urgent Result: Moderate left hydronephrosis]    Finding was identified on 8/1/2021 9:16 AM.     Dr. Loyola was contacted by Dr. Curtis at 8/1/2021 11:20 AM and  verbalized understanding of the urgent finding.     I have personally reviewed the examination and initial interpretation  and I agree with the findings.    LEONILA NORRIS          SYSTEM ID:  [id number]   CT Abdomen Pelvis w/o Contrast    Narrative    EXAMINATION: CT ABDOMEN PELVIS W/O CONTRAST, 8/1/2021 2:56 PM    TECHNIQUE:  Helical CT images from the lung bases through the  symphysis pubis were obtained  without contrast using renal stone  protocol.     COMPARISON: CT abdomen and pelvis 6/23/2021    HISTORY: Urinary tract stone, symptomatic/complicated    FINDINGS:    Right kidney: Multiple staghorn calculi are unchanged compared to  prior study from 6/23/2021. There is no hydronephrosis.    Left kidney: Unchanged calculi in the left kidney, largest measuring  1.3 cm in the temporal region, with decreased hydronephrosis status  post interval replacement of percutaneous nephrostomy tube. Stable 2.1  cm calculus in the proximal ureter. No new ureteral  calculus.    Urinary bladder: Normal.    Remainder of the abdomen and pelvis: The prostate is mildly enlarged.  Normal noncontrast appearance of the liver, spleen and adrenals. The  gallbladder is decompressed and contains sludge and no pericholecystic  fluid. Mild diffuse pancreatic atrophy with calcifications in the  pancreatic head. No dilated bowel. No free air or loculated fluid  collection. Trace perihepatic ascites, diffuse mesenteric and  retroperitoneal edema. Stable mildly analyzed retrocrural,  retroperitoneal and gastrohepatic node measuring up to 1.3 cm (series  5, image 93).     Lung bases:  Severe emphysema with bullous changes in the lung bases.    Bones and soft tissues: No suspicious osseous lesions. Stable  avascular necrosis of the femoral heads. Mild degenerative changes in  the spine.      Impression    IMPRESSION:   1.  Decreased left hydronephrosis status post interval placement of  percutaneous nephrostomy tube. Bilateral renal calculi and large  calculus in the proximal left ureter are unchanged.   2.  Compared to prior CT, there is ascites, diffuse mesenteric,  retroperitoneal and presacral edema, likely due to fluid third  spacing.  3.  Stable enlarged abdominal lymph nodes, probably reactive.  4.  Stable chronic findings including bullous emphysema of the lung  bases, changes of chronic calcific pancreatitis.     SOUTH TROY MD         SYSTEM ID:  [id number]   X-ray Chest 1 vw port    Narrative    EXAM: XR CHEST PORT 1 VIEW  8/2/2021 1:24 PM     HISTORY:  Status post pacer/ICD       COMPARISON:  CT chest 11/16/2017    FINDINGS:   Portable AP upright view the chest. Right IJ catheter with distal tip  projecting over the high SVC. Cardiac ICD with leads projecting over  the right atrial appendage and right ventricular apex. High lung  volumes with mildly worsening diffuse perihilar and interstitial  opacities. No new focal airspace opacities or pneumothorax. Stable  cardiac size and  pulmonary vasculature.      Impression    IMPRESSION:   1. Cardiac ICD with leads projecting over the right atrial appendage  and right ventricular apex. No pneumothorax.  2. Mildly worsening diffuse perihilar and interstitial opacities  consistent with interstitial fibrosis in setting of known sarcoidosis    I have personally reviewed the examination and initial interpretation  and I agree with the findings.    GISELA MACK MD         SYSTEM ID:  [id number]   XR Chest Port 1 View    Narrative    EXAM: XR CHEST PORT 1 VIEW  8/3/2021 3:14 AM     HISTORY:  dyspnea       COMPARISON:  Chest x-ray 8/2/2021    FINDINGS: AP radiograph of the chest. Left chest wall pacemaker with  intact leads overlying the right atrium and right ventricle.    Stable cardiomediastinal silhouette. Continued high lung volumes. No  pneumothorax or significant pleural effusion. Continued perihilar  predominant interstitial opacities and scarring. No new focal airspace  opacity. Visualized upper abdomen is unremarkable. Bones are stable.      Impression    IMPRESSION:  1. No new focal airspace opacity.  2. Continued interstitial opacities and scarring consistent with  interstitial lung disease in this patient with history of sarcoidosis.    I have personally reviewed the examination and initial interpretation  and I agree with the findings.    LEONILA NORRIS DO         SYSTEM ID:  [id number]   EP Device    Narrative    EP PROCEDURE NOTE    Procedures:  Dual chamber PPM device implantation.    Attending: Dora Fontanez MD  Procedure Date: 8/2/2021    Pre-operative Diagnosis:  Complete heart block  Device Indication: same  Post-operative diagnosis:   Successful implantation of PPM.  Complications:  None.     Fluoroscopy time/dose:See procedure log     Clinical Profile:  61 yo with severe pulmonary hypertension on home O2, admitted with   dyspnea, found to have sinus with prolonged TN interval and 2:1 AV block   in setting of RBBB. Hemodynamically  stable. He had previously been on   digoxin and now has acute renal failure due to nephrolithiasis, digoxin   was held without improvement in conduction. In the EP lab, his initial   rhythm was sinus with 2:1 AVB with RBBB, alternating with CHB and a LBBB   escape rhythm at 40 bpm.     PROCEDURE  The risks and benefits of the procedure were explained to the patient in   full.  The risks of the procedure include, but are not limited to: pain,   bleeding, blood transfusion reactions, infection, pneumothorax,   perforation of vessels or heart, pericardial effusion, and death. Informed   Consent was obtained. The patient was brought to the EP lab in a fasting   and hemodynamically stable condition. The patient was prepped and draped   in a sterile fashion.     Sedation: This procedure was performed under moderate sedation under the   supervision of the the Staff Physician. The patient was assessed   immediately prior to administering sedation. Minimal sedation was given   given pulmonary hypertension. Heart rate, blood pressure, oxygen   saturation, and patient responses were monitored throughout the procedure   with the assistance of the RN.    The left chest wall was vigorously washed, prepped, and sterilely draped.   The chest wall was anesthetized with 2% lidocaine.    A 5 cm incision was made approximately 2 fingerbreadths from the clavicle.   The subcutaneous tissue was carefully dissected down to the pectoral   fascia. The dissection was carried inferiorly to form a subcutaneous   pocket with adequate hemostasis provided by electrocautery. The subclavian   vein was accessed via the pocket and over the first rib under fluoroscopic   guidance, and two guidewires were inserted down into the level of the IVC.  Two peel away sheaths were inserted over the guidewires. The guidewires   and dilators were removed. A right ventricular lead was inserted through   the sheath down to the RV apical septum and was screwed into  the   myocardium. There was very good sensing and pacing threshold at this   position. Pacing at maximum output was performed without diaphragmatic   stimulation. QRS morphology had right axis deviation and R wave in V1   consistent with patient's right ventricular hypertrophy. The position of   the RV lead in the right ventricular septum was verified in [place]ese   projection. Initial R waves were 7-8 mV, measuring the LBBB escape rhythm.   Subsequently R waves were noted to be diminished to 2-3 mV without any   changes in position of the lead by fluoroscopy. These smaller R waves were   the native RBBB conducted beats.    A right atrial lead was inserted through the sheath and positioned the   lead at the RA lateral wall. The lead was screwed into the myocardium.   There was very good sensing and pacing threshold at this position. Ten   volt pacing did not produce diaphragmatic stimulation.    Both sheaths were then peeled away, and leads sutured down into the   underlying muscle using 0-ethibond.  There was significant bleeding from   the venous puncture site, and a pursestring suture was required to achieve   hemostasis.    The pocket was then vigorously washed with antibiotic solution. The right   atrial and right ventricular leads were inserted into the pulse generator.   The pulse generator and the leads were inserted into a Tyrx pouch, and the   entire system inserted into the subcutaneous pocket.    The pocket was then closed in 3 layers using interrupted 2-0 Vicryl for   the deep layer, continuous 3-0 Vicryl for the middle layer, and continuous   4-0 Vicryl for the subcuticular layer. Steri-Strips and a dressing were   then applied over the incision site, and the patient was transported to a   monitored bed.               FINAL PROGRAMMING  Jose Cruz Settings:  -Pacing mode: DDD  -Lower Rate Limit: 60 ppm.  -Upper Rate Limit: 130 ppm.    PLAN  1. Wound care.  2. Antibiotics.  3. CXR and Device interrogation in a.m.    Cardiac Catheterization    Narrative      Moderately elevated pulmonary artery hypertension.    Right sided filling pressures are mildly elevated.    Left sided filling pressures are mildly elevated.    Normal cardiac output level.    Complete Heart Block                 Assessment/Plan   60 year old male with extensive bilateral renal stones, multiple medical comorbidities, left PNT  -SISI not likely related to stones or obstructive process  -Suspect left hydro despite neph tube is chronic and related to longstanding obstruction  -Monitor I/O, can flush left neph tube if decreased output or discomfort  -Shared plan made to hold off on PCNL at this moment given recent medical issues, will readdress this at later date once out of the hospital and recovered               Past Medical History:     Past Medical History:   Diagnosis Date     Chronic sinusitis      CKD (chronic kidney disease)      Heart disease      Hypertension      Keratosis     frontal scalp, treated with liquid nitrogen at [facility]     Malignant neoplasm (H)      Pneumonia a few times     Pulmonary sarcoidosis (H)             Past Surgical History:     Past Surgical History:   Procedure Laterality Date     COLONOSCOPY       CV RIGHT HEART CATH MEASUREMENTS RECORDED N/A 6/19/2019    Procedure: CV RIGHT HEART CATH;  Surgeon: Kale Delgado MD;  Location:  HEART CARDIAC CATH LAB     CV RIGHT HEART CATH MEASUREMENTS RECORDED N/A 12/11/2019    Procedure: CV RIGHT HEART CATH;  Surgeon: Ion Lemon MD;  Location:  HEART CARDIAC CATH LAB     ENT SURGERY      wisdom teeth extraction     EP PACEMAKER Left 8/2/2021    Procedure: EP Pacemaker;  Surgeon: Dora Fontanez MD;  Location:  HEART CARDIAC CATH LAB     IR NEPHROSTOMY TUBE PLACEMENT LEFT  7/9/2021     TONSILLECTOMY  1964            Medications     Current Facility-Administered Medications   Medication     acetaminophen (TYLENOL) Suppository 650 mg     acetaminophen (TYLENOL)  tablet 650 mg     albuterol (PROAIR HFA/PROVENTIL HFA/VENTOLIN HFA) 108 (90 Base) MCG/ACT inhaler 2 puff     ambrisentan (LETAIRIS) tablet 10 mg     bisacodyl (DULCOLAX) EC tablet 5 mg    Or     bisacodyl (DULCOLAX) EC tablet 10 mg    Or     bisacodyl (DULCOLAX) EC tablet 15 mg     budesonide (PULMICORT) neb solution 0.25 mg     ceFEPIme (MAXIPIME) 2 g vial to attach to  ml bag for ADULTS or 50 ml bag for PEDS     clotrimazole (MYCELEX) lozenge 1 lozenge     [Held by provider] heparin ANTICOAGULANT injection 5,000 Units     HOLD: enoxaparin (LOVENOX) Post Procedure     HOLD: heparin (IV or Subcutaneous) Post Procedure     HOLD: metformin and metformin containing medications on day of procedure and 48 hours after IV contrast given     Lidocaine (LIDOCARE) 4 % Patch 2 patch    And     lidocaine patch in PLACE     lidocaine (LMX4) cream     lidocaine 1 % 0.1-1 mL     medication instruction     naloxone (NARCAN) injection 0.2 mg    Or     naloxone (NARCAN) injection 0.4 mg    Or     naloxone (NARCAN) injection 0.2 mg    Or     naloxone (NARCAN) injection 0.4 mg     No lozenges or gum should be given while patient on BIPAP/AVAPS/AVAPS AE     oxyCODONE-acetaminophen (PERCOCET) 5-325 MG per tablet 1 tablet     Patient may continue current oral medications     polyethylene glycol (MIRALAX) Packet 17 g     selexipag (UPTRAVI) tablet 1,600 mcg     sodium chloride (PF) 0.9% PF flush 3 mL     sodium chloride (PF) 0.9% PF flush 3 mL     tadalafil (ADCIRCA/CIALIS) tablet     tamsulosin (FLOMAX) capsule 0.4 mg            Family History:     Family History   Problem Relation Age of Onset     Coronary Artery Disease Father      Heart Disease Father         heart attack age 35-40     Hypertension Mother      Hyperlipidemia Mother      Cerebrovascular Disease Mother      Dementia Mother      Lupus Sister      Glaucoma No family hx of      Macular Degeneration No family hx of             Social History:     Social History      Socioeconomic History     Marital status: Single     Spouse name: Not on file     Number of children: Not on file     Years of education: Not on file     Highest education level: Not on file   Occupational History     Not on file   Tobacco Use     Smoking status: Former Smoker     Packs/day: 1.00     Years: 30.00     Pack years: 30.00     Types: Cigarettes     Start date: 10/10/1975     Quit date: 3/1/2010     Years since quittin.4     Smokeless tobacco: Never Used   Substance and Sexual Activity     Alcohol use: Yes     Alcohol/week: 0.0 standard drinks     Comment: stated 2 bottles of beer occ     Drug use: Not Currently     Comment: past use of marijuana     Sexual activity: Not Currently     Partners: Female     Birth control/protection: Abstinence, Female Surgical   Other Topics Concern     Parent/sibling w/ CABG, MI or angioplasty before 65F 55M? Not Asked      Service Not Asked     Blood Transfusions Not Asked     Caffeine Concern Yes     Occupational Exposure Not Asked     Hobby Hazards Not Asked     Sleep Concern Not Asked     Stress Concern Not Asked     Weight Concern Not Asked     Special Diet Not Asked     Back Care Not Asked     Exercise Yes     Bike Helmet Not Asked     Seat Belt Not Asked     Self-Exams Not Asked   Social History Narrative     Not on file     Social Determinants of Health     Financial Resource Strain:      Difficulty of Paying Living Expenses:    Food Insecurity:      Worried About Running Out of Food in the Last Year:      Ran Out of Food in the Last Year:    Transportation Needs:      Lack of Transportation (Medical):      Lack of Transportation (Non-Medical):    Physical Activity:      Days of Exercise per Week:      Minutes of Exercise per Session:    Stress:      Feeling of Stress :    Social Connections:      Frequency of Communication with Friends and Family:      Frequency of Social Gatherings with Friends and Family:      Attends Sikh Services:       Active Member of Clubs or Organizations:      Attends Club or Organization Meetings:      Marital Status:    Intimate Partner Violence:      Fear of Current or Ex-Partner:      Emotionally Abused:      Physically Abused:      Sexually Abused:             Allergies:   Nkda [no known drug allergies]         Review of Systems:  From intake questionnaire   Negative 14 system review except as noted on HPI, nurse's note.        15 minutes spent on consult, reviewing images, labs, in discussion and examination with patient, documenting

## 2021-08-03 NOTE — PROGRESS NOTES
Electrophysiology Post Procedure Follow Up Note  Date of Procedure: 8/2/21  DOS: 8/3/2021     Pre-operative Diagnosis:  Complete heart block  Post-operative diagnosis:   Successful implantation of PPM.    Hospital Course:  Mr. Isaac is a 60 year old male who has a past medical history significant for severe PH (on home O2) 2/2 pulmonary sarcoidosis, HTN, CKD, and nephrolithiasis s/p left nephrostomy 7/2021. HE has been having about 1 month symptom of fatigue. He was seen in PH clinic 7/27/2021 with dyspnea, had chronic PH on home O2. EKG at that time showed bradycardia. His digoxin was held due to SISI. He was scheduled for RHC today, noted to be CHB during procedure. He remained in high degree AVB and decision was made to pursue PPM.   Patient underwent a successful dual chamber PPM implant yesterday. He had an uneventful overnight stay.Device interrogation shows normal device function and R waves decreased since implant. Chest x-ray demonstrates no evidence of pneumothorax. Left subclavian site is CDI, no bleeding, and no hematoma. Patient is tolerating oral intake, ambulating at baseline, and voiding without difficulties. Patient remains hemodynamically stable.     ROS:   10 point ROS neg other than the symptoms noted above.   Exam:  /54 (BP Location: Right arm)   Pulse 91   Temp 97.4  F (36.3  C) (Oral)   Resp 20   Ht 1.829 m (6')   Wt 73.5 kg (162 lb 1.6 oz)   SpO2 99%   BMI 21.98 kg/m      Constitutional: alert and no distress  Head: Normocephalic.   Neck: Neck supple.  Carotids without bruits.  ENT: ENT exam normal, no neck nodes or sinus tenderness  Cardiovascular: negative, PMI normal. No lifts, heaves, or thrills. RRR. No murmurs, clicks gallops or rub  Respiratory: negative, Percussion normal. Good diaphragmatic excursion. Lungs clear  Gastrointestinal: Abdomen soft, non-tender. BS normal.   : Deferred  Musculoskeletal: extremities normal- no gross deformities noted, gait normal and normal  muscle tone  Skin: no suspicious lesions or rashes  Neurologic: Gait normal. Reflexes normal and symmetric. Sensation grossly WNL.  Psychiatric: mentation appears normal and affect normal/bright    Medications:  Current Facility-Administered Medications   Medication     acetaminophen (TYLENOL) Suppository 650 mg     acetaminophen (TYLENOL) tablet 650 mg     albuterol (PROAIR HFA/PROVENTIL HFA/VENTOLIN HFA) 108 (90 Base) MCG/ACT inhaler 2 puff     ambrisentan (LETAIRIS) tablet 10 mg     bisacodyl (DULCOLAX) EC tablet 5 mg    Or     bisacodyl (DULCOLAX) EC tablet 10 mg    Or     bisacodyl (DULCOLAX) EC tablet 15 mg     budesonide (PULMICORT) neb solution 0.25 mg     ceFEPIme (MAXIPIME) 2 g vial to attach to  ml bag for ADULTS or 50 ml bag for PEDS     clotrimazole (MYCELEX) lozenge 1 lozenge     [Held by provider] heparin ANTICOAGULANT injection 5,000 Units     HOLD: enoxaparin (LOVENOX) Post Procedure     HOLD: heparin (IV or Subcutaneous) Post Procedure     HOLD: metformin and metformin containing medications on day of procedure and 48 hours after IV contrast given     Lidocaine (LIDOCARE) 4 % Patch 2 patch    And     lidocaine patch in PLACE     lidocaine (LMX4) cream     lidocaine 1 % 0.1-1 mL     medication instruction     naloxone (NARCAN) injection 0.2 mg    Or     naloxone (NARCAN) injection 0.4 mg    Or     naloxone (NARCAN) injection 0.2 mg    Or     naloxone (NARCAN) injection 0.4 mg     No lozenges or gum should be given while patient on BIPAP/AVAPS/AVAPS AE     oxyCODONE-acetaminophen (PERCOCET) 5-325 MG per tablet 1 tablet     Patient may continue current oral medications     polyethylene glycol (MIRALAX) Packet 17 g     selexipag (UPTRAVI) tablet 1,600 mcg     sodium chloride (PF) 0.9% PF flush 3 mL     sodium chloride (PF) 0.9% PF flush 3 mL     tadalafil (ADCIRCA/CIALIS) tablet     tamsulosin (FLOMAX) capsule 0.4 mg       Labs:  [facility] Labs   Lab 08/03/21  0609 08/03/21  0983 08/02/21  2998  08/01/21  0329 07/31/21  1552   NA  --  136 134 133 135   POTASSIUM 3.4 3.5 3.9 3.9 4.0   CHLORIDE  --  104 102 102 104   TERI  --  8.1* 8.3* 8.4* 8.6   CO2  --  21 22 24 24   BUN  --  95* 86* 84* 79*   CR  --  4.12* 4.20* 4.05* 4.07*   GLC  --  104* 92 98 114*     CBC  Recent Labs   Lab 08/03/21  0443 08/02/21  0304 08/01/21  0329 07/31/21  0353   WBC 6.0 5.3 6.3 6.9   RBC 3.05* 3.28* 3.41* 3.35*   HGB 8.8* 9.4* 9.8* 9.5*   HCT 27.3* 29.6* 30.8* 30.6*   MCV 90 90 90 91   MCH 28.9 28.7 28.7 28.4   MCHC 32.2 31.8 31.8 31.0*   RDW 13.2 13.3 13.4 13.5   * 117* 130* 134*       Plan & Follow up:    Follow up with device clinic in 7-10 days    Oral antibiotics x 5 days    Keep incision clean and dry for 72 hours post procedure    No lifting > 10lbs or over shoulder level with left arm for 4 weeks    Notify device clinic/EP immediately for any redness, swelling, bleeding, discharge, fevers or chills.     Discharge to home.     ALLI Osman CNP  Electrophysiology Consult Service  Pager: [phone]

## 2021-08-03 NOTE — PLAN OF CARE
"DX: pulmonary HTN   PMH: s/p pacemaker insertion      Code Status: full  Team: CVTS    Cardiac: Paced, SR. Soft Bps when laying down. Team is aware.   Respiratory: crackles in bases of lung. Pt was short of breath overnight and felt he needed 10 LPM via face mask. After pt received morning meds, he was able to switch back to 6 LPM via NC. Pt stated that he was feeling high in anxiety and was having a panic attack and stating feeling \"impending doom\" around 1300 despite having O2 sats >90%. Team was notified and PRN atarax was given.   Neuro: AxO x4  Pain: pt states he has pressure in his chest, but it has been there since his surgery. 5 mg oxy+tylenol was given for shoulder pain and that helped.   GI/: regular Bms and adequate urine output. 80mg lasix was given x2 to help get fluid off lungs.   Diet: 2 gram Na  Skin: no edema in legs.   Activity: SBA, assist of 1    Gtts/fluid: n/a    Plan: control pulmonary HTN before discharge    "

## 2021-08-04 DIAGNOSIS — I44.39 HIGH DEGREE ATRIOVENTRICULAR BLOCK: Primary | ICD-10-CM

## 2021-08-04 DIAGNOSIS — Z95.0 CARDIAC PACEMAKER IN SITU: ICD-10-CM

## 2021-08-04 LAB
ALBUMIN SERPL-MCNC: 2.7 G/DL (ref 3.4–5)
ALBUMIN UR-MCNC: 50 MG/DL
ALP SERPL-CCNC: 71 U/L (ref 40–150)
ALT SERPL W P-5'-P-CCNC: 12 U/L (ref 0–70)
ANION GAP SERPL CALCULATED.3IONS-SCNC: 12 MMOL/L (ref 3–14)
ANION GAP SERPL CALCULATED.3IONS-SCNC: 9 MMOL/L (ref 3–14)
APPEARANCE UR: ABNORMAL
AST SERPL W P-5'-P-CCNC: 16 U/L (ref 0–45)
ATRIAL RATE - MUSE: 87 BPM
BASE EXCESS BLDA CALC-SCNC: -5.4 MMOL/L (ref -9–1.8)
BILIRUB SERPL-MCNC: 0.4 MG/DL (ref 0.2–1.3)
BILIRUB UR QL STRIP: NEGATIVE
BUN SERPL-MCNC: 88 MG/DL (ref 7–30)
BUN SERPL-MCNC: 89 MG/DL (ref 7–30)
CALCIUM SERPL-MCNC: 8.7 MG/DL (ref 8.5–10.1)
CALCIUM SERPL-MCNC: 9 MG/DL (ref 8.5–10.1)
CHLORIDE BLD-SCNC: 104 MMOL/L (ref 94–109)
CHLORIDE BLD-SCNC: 104 MMOL/L (ref 94–109)
CO2 SERPL-SCNC: 19 MMOL/L (ref 20–32)
CO2 SERPL-SCNC: 22 MMOL/L (ref 20–32)
COLOR UR AUTO: ABNORMAL
CREAT SERPL-MCNC: 3.83 MG/DL (ref 0.66–1.25)
CREAT SERPL-MCNC: 3.84 MG/DL (ref 0.66–1.25)
DIASTOLIC BLOOD PRESSURE - MUSE: NORMAL MMHG
ERYTHROCYTE [DISTWIDTH] IN BLOOD BY AUTOMATED COUNT: 13.4 % (ref 10–15)
GFR SERPL CREATININE-BSD FRML MDRD: 16 ML/MIN/1.73M2
GFR SERPL CREATININE-BSD FRML MDRD: 16 ML/MIN/1.73M2
GLUCOSE BLD-MCNC: 106 MG/DL (ref 70–99)
GLUCOSE BLD-MCNC: 115 MG/DL (ref 70–99)
GLUCOSE BLDC GLUCOMTR-MCNC: 133 MG/DL (ref 70–99)
GLUCOSE UR STRIP-MCNC: NEGATIVE MG/DL
HCO3 BLD-SCNC: 20 MMOL/L (ref 21–28)
HCT VFR BLD AUTO: 29.9 % (ref 40–53)
HGB BLD-MCNC: 9.6 G/DL (ref 13.3–17.7)
HGB UR QL STRIP: ABNORMAL
INTERPRETATION ECG - MUSE: NORMAL
KETONES UR STRIP-MCNC: NEGATIVE MG/DL
LEUKOCYTE ESTERASE UR QL STRIP: ABNORMAL
MAGNESIUM SERPL-MCNC: 2.1 MG/DL (ref 1.6–2.3)
MAGNESIUM SERPL-MCNC: 2.1 MG/DL (ref 1.6–2.3)
MCH RBC QN AUTO: 29.1 PG (ref 26.5–33)
MCHC RBC AUTO-ENTMCNC: 32.1 G/DL (ref 31.5–36.5)
MCV RBC AUTO: 91 FL (ref 78–100)
NITRATE UR QL: NEGATIVE
O2/TOTAL GAS SETTING VFR VENT: 48 %
P AXIS - MUSE: 83 DEGREES
PCO2 BLD: 39 MM HG (ref 35–45)
PH BLD: 7.32 [PH] (ref 7.35–7.45)
PH UR STRIP: 5.5 [PH] (ref 5–7)
PLATELET # BLD AUTO: 133 10E3/UL (ref 150–450)
PO2 BLD: 67 MM HG (ref 80–105)
POTASSIUM BLD-SCNC: 4 MMOL/L (ref 3.4–5.3)
POTASSIUM BLD-SCNC: 4.3 MMOL/L (ref 3.4–5.3)
PR INTERVAL - MUSE: 202 MS
PROT SERPL-MCNC: 6.6 G/DL (ref 6.8–8.8)
QRS DURATION - MUSE: 148 MS
QT - MUSE: 426 MS
QTC - MUSE: 512 MS
R AXIS - MUSE: 248 DEGREES
RBC # BLD AUTO: 3.3 10E6/UL (ref 4.4–5.9)
RBC URINE: >182 /HPF
RENAL TUB EPI: 1 /HPF
SODIUM SERPL-SCNC: 135 MMOL/L (ref 133–144)
SODIUM SERPL-SCNC: 135 MMOL/L (ref 133–144)
SP GR UR STRIP: 1.01 (ref 1–1.03)
SYSTOLIC BLOOD PRESSURE - MUSE: NORMAL MMHG
T AXIS - MUSE: 69 DEGREES
TRANSITIONAL EPI: <1 /HPF
UROBILINOGEN UR STRIP-MCNC: NORMAL MG/DL
VENTRICULAR RATE- MUSE: 87 BPM
WBC # BLD AUTO: 6.8 10E3/UL (ref 4–11)
WBC URINE: 86 /HPF

## 2021-08-04 PROCEDURE — 81001 URINALYSIS AUTO W/SCOPE: CPT | Performed by: STUDENT IN AN ORGANIZED HEALTH CARE EDUCATION/TRAINING PROGRAM

## 2021-08-04 PROCEDURE — 83735 ASSAY OF MAGNESIUM: CPT | Performed by: STUDENT IN AN ORGANIZED HEALTH CARE EDUCATION/TRAINING PROGRAM

## 2021-08-04 PROCEDURE — 250N000013 HC RX MED GY IP 250 OP 250 PS 637: Performed by: STUDENT IN AN ORGANIZED HEALTH CARE EDUCATION/TRAINING PROGRAM

## 2021-08-04 PROCEDURE — 250N000011 HC RX IP 250 OP 636: Performed by: STUDENT IN AN ORGANIZED HEALTH CARE EDUCATION/TRAINING PROGRAM

## 2021-08-04 PROCEDURE — 85014 HEMATOCRIT: CPT | Performed by: STUDENT IN AN ORGANIZED HEALTH CARE EDUCATION/TRAINING PROGRAM

## 2021-08-04 PROCEDURE — 82040 ASSAY OF SERUM ALBUMIN: CPT | Performed by: STUDENT IN AN ORGANIZED HEALTH CARE EDUCATION/TRAINING PROGRAM

## 2021-08-04 PROCEDURE — 94640 AIRWAY INHALATION TREATMENT: CPT | Mod: 76

## 2021-08-04 PROCEDURE — 214N000001 HC R&B CCU UMMC

## 2021-08-04 PROCEDURE — 250N000011 HC RX IP 250 OP 636

## 2021-08-04 PROCEDURE — 250N000009 HC RX 250: Performed by: STUDENT IN AN ORGANIZED HEALTH CARE EDUCATION/TRAINING PROGRAM

## 2021-08-04 PROCEDURE — 94640 AIRWAY INHALATION TREATMENT: CPT

## 2021-08-04 PROCEDURE — 82803 BLOOD GASES ANY COMBINATION: CPT

## 2021-08-04 PROCEDURE — 999N000157 HC STATISTIC RCP TIME EA 10 MIN

## 2021-08-04 PROCEDURE — 36415 COLL VENOUS BLD VENIPUNCTURE: CPT | Performed by: STUDENT IN AN ORGANIZED HEALTH CARE EDUCATION/TRAINING PROGRAM

## 2021-08-04 PROCEDURE — 99233 SBSQ HOSP IP/OBS HIGH 50: CPT | Mod: GC | Performed by: INTERNAL MEDICINE

## 2021-08-04 PROCEDURE — 250N000013 HC RX MED GY IP 250 OP 250 PS 637

## 2021-08-04 PROCEDURE — 87086 URINE CULTURE/COLONY COUNT: CPT | Performed by: STUDENT IN AN ORGANIZED HEALTH CARE EDUCATION/TRAINING PROGRAM

## 2021-08-04 RX ORDER — QUETIAPINE FUMARATE 25 MG/1
25 TABLET, FILM COATED ORAL ONCE
Status: COMPLETED | OUTPATIENT
Start: 2021-08-04 | End: 2021-08-04

## 2021-08-04 RX ORDER — LORAZEPAM 2 MG/ML
0.5 INJECTION INTRAMUSCULAR
Status: COMPLETED | OUTPATIENT
Start: 2021-08-04 | End: 2021-08-04

## 2021-08-04 RX ORDER — POTASSIUM CHLORIDE 20MEQ/15ML
40 LIQUID (ML) ORAL ONCE
Status: COMPLETED | OUTPATIENT
Start: 2021-08-04 | End: 2021-08-04

## 2021-08-04 RX ORDER — FUROSEMIDE 10 MG/ML
80 INJECTION INTRAMUSCULAR; INTRAVENOUS ONCE
Status: COMPLETED | OUTPATIENT
Start: 2021-08-04 | End: 2021-08-04

## 2021-08-04 RX ORDER — HALOPERIDOL 5 MG/ML
5 INJECTION INTRAMUSCULAR ONCE
Status: COMPLETED | OUTPATIENT
Start: 2021-08-04 | End: 2021-08-04

## 2021-08-04 RX ORDER — QUETIAPINE FUMARATE 25 MG/1
25 TABLET, FILM COATED ORAL 2 TIMES DAILY
Status: DISCONTINUED | OUTPATIENT
Start: 2021-08-04 | End: 2021-08-04

## 2021-08-04 RX ORDER — QUETIAPINE FUMARATE 25 MG/1
25 TABLET, FILM COATED ORAL 3 TIMES DAILY
Status: DISCONTINUED | OUTPATIENT
Start: 2021-08-04 | End: 2021-08-04

## 2021-08-04 RX ADMIN — CEFEPIME HYDROCHLORIDE 2 G: 2 INJECTION, POWDER, FOR SOLUTION INTRAVENOUS at 20:13

## 2021-08-04 RX ADMIN — TADALAFIL 40 MG: 20 TABLET, FILM COATED ORAL at 09:03

## 2021-08-04 RX ADMIN — QUETIAPINE FUMARATE 25 MG: 25 TABLET ORAL at 08:41

## 2021-08-04 RX ADMIN — SELEXIPAG 1600 MCG: 200 TABLET, COATED ORAL at 20:12

## 2021-08-04 RX ADMIN — FUROSEMIDE 80 MG: 10 INJECTION, SOLUTION INTRAVENOUS at 18:09

## 2021-08-04 RX ADMIN — FUROSEMIDE 80 MG: 10 INJECTION, SOLUTION INTRAVENOUS at 13:03

## 2021-08-04 RX ADMIN — POTASSIUM CHLORIDE 40 MEQ: 40 SOLUTION ORAL at 11:40

## 2021-08-04 RX ADMIN — HALOPERIDOL LACTATE 5 MG: 5 INJECTION, SOLUTION INTRAMUSCULAR at 03:54

## 2021-08-04 RX ADMIN — TAMSULOSIN HYDROCHLORIDE 0.4 MG: 0.4 CAPSULE ORAL at 11:39

## 2021-08-04 RX ADMIN — Medication 5 MG: at 01:09

## 2021-08-04 RX ADMIN — BUDESONIDE 0.25 MG: 0.25 INHALANT RESPIRATORY (INHALATION) at 20:25

## 2021-08-04 RX ADMIN — QUETIAPINE FUMARATE 25 MG: 25 TABLET ORAL at 18:07

## 2021-08-04 RX ADMIN — LORAZEPAM 0.5 MG: 2 INJECTION INTRAMUSCULAR; INTRAVENOUS at 05:40

## 2021-08-04 RX ADMIN — LIDOCAINE 2 PATCH: 560 PATCH PERCUTANEOUS; TOPICAL; TRANSDERMAL at 16:40

## 2021-08-04 RX ADMIN — QUETIAPINE FUMARATE 25 MG: 25 TABLET ORAL at 21:08

## 2021-08-04 RX ADMIN — Medication 5 MG: at 21:08

## 2021-08-04 RX ADMIN — QUETIAPINE FUMARATE 25 MG: 25 TABLET ORAL at 01:50

## 2021-08-04 RX ADMIN — AMBRISENTAN 10 MG: 10 TABLET, FILM COATED ORAL at 11:39

## 2021-08-04 RX ADMIN — SELEXIPAG 1600 MCG: 200 TABLET, COATED ORAL at 08:29

## 2021-08-04 RX ADMIN — BUDESONIDE 0.25 MG: 0.25 INHALANT RESPIRATORY (INHALATION) at 09:30

## 2021-08-04 ASSESSMENT — ACTIVITIES OF DAILY LIVING (ADL)
ADLS_ACUITY_SCORE: 15
ADLS_ACUITY_SCORE: 14
ADLS_ACUITY_SCORE: 15
ADLS_ACUITY_SCORE: 15

## 2021-08-04 NOTE — PLAN OF CARE
Temp: 98.1  F (36.7  C) Temp src: Oral BP: 117/66 Pulse: 104   Resp: 24 SpO2: 95 % O2 Device: Nasal cannula Oxygen Delivery: 6 LPM    PRN: Melatonin, Seroquel, Haldol, Ativan      Change: First two PRN did not help. Pt became more confused and agitated. Provider notified. Haldol given and was not effective. Ativan given. Pt was able to sleep for about 30 min and woke up agitated and slid to the foot of the bed stating he needs to have a BM. Nurse and CNA assisted pt to the commode. Did not have a BM. Would not recommend getting pt up.    A:   Neuro: A/O to self only. Does not use call light at this time. Confused and agitated. Continues to attempt to get up. Now has a bedside attendant   Cardiac/Tele:  VVS. Paced. Afebrile. Denies chest pain   Respiratory: 6L NC. Tolerates well when pt keeps NC on  GI/: Continent. Urinal at bedside. Neph tube draining well  Diet/Appetite: 2 gram Na+ diet. 2L FR  Skin: No new deficits noted  LDAs: PIV-SL  Activity: Assist of one  Pain: Denies pain    P: Continue to monitor and notify team with changes.

## 2021-08-04 NOTE — PLAN OF CARE
Admitted on 7/30 for removal of kidney stones, found to be in complete heart block, s/p pacemaker 8/2. Pmhx: pulmonary sarcoidosis, pulmonary HTN, CKD 4.    Code status: full     Team: Cards 2  Changed: Scheduled seroquel. IV Lasix x2.    Neuro: Confused and restless. A&Ox1. Impulsive. Continues to have attendant at bedside.  Cardiac/Tele:  V paced.   Respiratory: 7L NC. Reports SOB but tolerates well when keeps NC in place.  GI/: Continent, urinating via urinal at bedside.  Diet/Appetite: 2g Na diet. 2L FR. Poor appetite.  LDAs: PIV-SL. L nephrostomy tube.  Activity: Assist of one.  Pain: Denies pain.     Plan: Continue to monitor and notify team with changes.    Cristal Mims, Student Nurse Intern.  Time cared for 0700 - 1930.

## 2021-08-04 NOTE — PROGRESS NOTES
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 A pacemaker was placed 8/2/21 due to persistent heart block.    Plan:    - With Cr continuing to stabilize today at 3.83 (4.12 yesterday), we will continue with IV diuresis (80 lasix at 1200 and 1800) to help resolve current SISI.    - Continue PTA medications for PAH of budenoside inhaler, ambrisentan, tadalafil, and selexipag    - Currently at 6L; continue to wean to baseline if O2 saturations > 90    - With new-onset delirium, scheduled seroquel, nighttime melatonin, and making sure patient environment reflects the time of day. No disruptions at night. Will have 1:1 sitter    - If delirium resolves, will have PT/OT evaluate to determine proper discharge    # Complete Heart Block (New 7/30/21)  - TSH normal  - Stopped digoxin on 7/27/21; digoxin level today is 0.7 (0.9 one month ago) in setting of CKD stage IV  - pro-BNP at 28,841 (previously 8,834 three days ago)  - H/O pulmonary sarcoidosis with last PET scan in 2017 and cardiac MRI in 2012  - Pacemaker placed 8/2/21    # Delirium (New 8/3/21)  - Night of 8/3/21, patient started to become delirious requiring haldol, seroquel, and ativan to settle down  - Seroquel scheduled (25 mg TID) and melatonin 3 mg at bedtime  - Continue to keep environment reflective of the time of day, decrease interruptions at nighttime  - 1:1 Sitter started  - U/A showed a complicated UTI, culture pending, currently on IV cefepime for 6 days     # PAH 2/2 Pulmonary Sarcoidosis  - RHC 7/30/21 showing:  RA - 12  RV - 77/16  PA - 77/20/38  PCWP - 17  Hgb - 10.7  PA - 55.6%  Lesia CO/CI - 4.62/2.36 Right sided filling pressures are mildly elevated. Left sided filling pressures are  mildly elevated. Moderately elevated pulmonary artery hypertension. Normal cardiac output level.  - Previous RHC (12/11/19) showing similar numbers, most notable is a CVP of 5 previously and now 12  - ECHO (7/27/21) showing:       - LVEF 55-60%, flattened septum consistent with RV pressure and volume overload       - RV function is mild/mod reduced  - Baseline O2 of 4.5 L at home, 8-10 L with exertion    # Complicated Urinary Tract Infection with Pan-Senstive Pseudomonas (Urine Culture from 7/27/21)   - Started 2g cefepime IV q24 hours (7/30/21) due to CHB, day 6 of treatment   - Will finish ABX after seven days of treatment (stop on 8/5/21)     - U/A 8/4/21 showed a complicated UTI, culture pending     # CKD Stage IV with H/O Bilateral Kidney Stones  #SISI   - New baseline Cr of approximately 3 (only two lab values for the last year prior to admission)   - Cr today is 3.83 from 4.12    - Continue diuresis with IV 80 lasix BID      - Large amount of bilateral renal stones with left percutaneous nephrostomy tube present due to severe left hydronephrosis (placed 7/9/21)    - Left nephrostomy tube still draining urine; spoke to urology and will postpone stone extraction to a later date due to this admission with new CHB   - Kidney U/S 7/30/21: Moderate left hydronephrosis   - CT Ab/Pelv 7/30/21:          1.  Decreased left hydronephrosis status post interval placement of  percutaneous nephrostomy tube. Bilateral renal calculi and large  calculus in the proximal left ureter are unchanged.          2.  Compared to prior CT, there is ascites, diffuse mesenteric,  retroperitoneal and presacral edema, likely due to fluid third  spacing.   - Urology saw patient and will F/U as an outpatient for further management post-discharge. No changes to current management     # BPH   - Tamsulosin 0.4 PTA     DVT Prophylaxis: Enoxaparin (Lovenox) subcutaneous - Held currently post-pacemaker placement until 8/5/21 at 1200  Chong Catheter:  in place, indication: Strict 1-2 Hour I&O, Strict 1-2 Hour I&O  Code Status: Full Code  Lines: Right CVC, Left pIV, Left nephrostomy tube     Patient discussed with staff attending, Dr. Lemon.     Sathya Short MD  Internal Medicine Resident PGY1  Pager: [phone]      Interval History   Jimmy developed delirium during the night and required haldol, seroquel, and ativan to settle down. His delirium has fluctuated throughout the day. Per questioning, still orientated to time, place, and person, but gets very aggressive and wants to get out of bed continuously. Patient was not able to answer any questions after getting answers for orientation due to delirium.     Physical Exam   Temp: 97.7  F (36.5  C) Temp src: Oral BP: 95/52 Pulse: 96   Resp: 20 SpO2: 98 % O2 Device: Nasal cannula with humidification Oxygen Delivery: 7 LPM  Vitals:    07/31/21 0200 08/01/21 1300 08/03/21 0645   Weight: 75.3 kg (166 lb 0.1 oz) 76.3 kg (168 lb 3.4 oz) 73.5 kg (162 lb 1.6 oz)     Vital Signs with Ranges  Temp:  [97.3  F (36.3  C)-98.1  F (36.7  C)] 97.7  F (36.5  C)  Pulse:  [] 96  Resp:  [20-24] 20  BP: ()/(46-66) 95/52  SpO2:  [89 %-100 %] 98 %  I/O last 3 completed shifts:  In: 1140 [P.O.:1140]  Out: 2100 [Urine:2100]     , Blood pressure 95/52, pulse 96, temperature 97.7  F (36.5  C), temperature source Oral, resp. rate 20, height 1.829 m (6'), weight 73.5 kg (162 lb 1.6 oz), SpO2 98 %.  162 lbs 1.6 oz  GEN:  Anxious, delirious, in distress  CV:  Regular rate and rhythm, no murmur.  LUNGS:  Crackles heard bilaterally (more in lower lung fields). Able to move oxygen well with deep breaths.  ABD:  Active bowel sounds, soft, non-tender/non-distended.  No rebound/guarding/rigidity.  EXT:  No lower extremity edema, palpable pulses    Medications     - MEDICATION INSTRUCTIONS -       - MEDICATION INSTRUCTIONS -       - MEDICATION INSTRUCTIONS -         ambrisentan  10 mg Oral Daily     budesonide  0.25 mg Nebulization  BID     ceFEPIme (MAXIPIME) IV  2 g Intravenous Q24H     clotrimazole  1 lozenge Buccal TID     furosemide  80 mg Intravenous Once     [Held by provider] heparin ANTICOAGULANT  5,000 Units Subcutaneous Q12H     lidocaine  2 patch Transdermal Q24h    And     lidocaine   Transdermal Q8H     melatonin  5 mg Oral At Bedtime     QUEtiapine  25 mg Oral BID     selexipag  1,600 mcg Oral Q12H     sodium chloride (PF)  3 mL Intracatheter Q8H     tadalafil  40 mg Oral Daily     tamsulosin  0.4 mg Oral Daily       Data   Recent Labs   Lab 08/04/21  0524 08/04/21  0115 08/03/21  1714 08/03/21  1347 08/03/21  0443 08/02/21  0304   WBC 6.8  --   --   --  6.0 5.3   HGB 9.6*  --   --   --  8.8* 9.4*   MCV 91  --   --   --  90 90   *  --   --   --  131* 117*     --  135  --  136 134   POTASSIUM 4.0  --  4.5 3.4 3.5 3.9   CHLORIDE 104  --  104  --  104 102   CO2 19*  --  23  --  21 22   BUN 88*  --  96*  --  95* 86*   CR 3.83*  --  3.92*  --  4.12* 4.20*   ANIONGAP 12  --  8  --  11 10   TERI 8.7  --  8.3*  --  8.1* 8.3*   * 133* 104*  --  104* 92   ALBUMIN 2.7*  --   --   --  2.4* 2.7*   PROTTOTAL 6.6*  --   --   --  5.9* 6.4*   BILITOTAL 0.4  --   --   --  0.8 0.4   ALKPHOS 71  --   --   --  65 70   ALT 12  --   --   --  10 14   AST 16  --   --   --  12 9       Recent Results (from the past 24 hour(s))   Cardiac Device Check - Inpatient   Result Value    Date Time Interrogation Session [id number]    Implantable Pulse Generator  Medtronic    Implantable Pulse Generator Model W1DR01 [place]    Implantable Pulse Generator Serial Number [id number]    Type Interrogation Session In Clinic    Clinic Name [facility]    Implantable Pulse Generator Type Pacemaker    Implantable Pulse Generator Implant Date 20210802    Implantable Lead  Medtronic    Implantable Lead [facility]    Implantable Lead Serial Number [id number]    Implantable  Lead Implant Date 20210802    Implantable Lead Polarity Type Bipolar Lead    Implantable Lead Location Detail 1 UNKNOWN    Implantable Lead Special Function 52 cm    Implantable Lead Location Right Atrium    Implantable Lead  Medtronic    Implantable Lead Model 5076 [employer] Nov[employer] MRI SureScan    Implantable Lead Serial Number [id number]    Implantable Lead Implant Date 20210802    Implantable Lead Polarity Type Bipolar Lead    Implantable Lead Location Detail 1 UNKNOWN    Implantable Lead Special Function 58 cm    Implantable Lead Location Right Ventricle    Jose Cruz Setting Mode (NBG Code) DDD    Jose Cruz Setting Lower Rate Limit 60    Jose Cruz Setting Maximum Tracking Rate 130    Jose Cruz Setting Maximum Sensor Rate 130    Jose Cruz Setting Hysterisis Rate DISABLED    Jose Cruz Setting ELIAS Delay Low 150    Jose Cruz Setting PAV Delay Low 180    Jose Cruz Setting AT Mode Switch Rate 171    Lead Channel Setting Sensing Polarity Bipolar    Lead Channel Setting Sensing Anode Location Right Atrium    Lead Channel Setting Sensing Anode Terminal Ring    Lead Channel Setting Sensing Cathode Location Right Atrium    Lead Channel Setting Sensing Cathode Terminal Tip    Lead Channel Setting Sensing Sensitivity 0.3    Lead Channel Setting Sensing Polarity Bipolar    Lead Channel Setting Sensing Anode Location Right Ventricle    Lead Channel Setting Sensing Anode Terminal Ring    Lead Channel Setting Sensing Cathode Location Right Ventricle    Lead Channel Setting Sensing Cathode Terminal Tip    Lead Channel Setting Sensing Sensitivity 0.9    Lead Channel Setting Pacing Polarity Bipolar    Lead Channel Setting Pacing Anode Location Right Atrium    Lead Channel Setting Pacing Anode Terminal Ring    Lead Channel Setting Sensing Cathode Location Right Atrium    Lead Channel Setting Sensing Cathode Terminal Tip    Lead Channel Setting Pacing Pulse Width 0.4    Lead Channel Setting Pacing Amplitude 3.5    Lead Channel Setting Pacing Capture  Mode Adaptive    Lead Channel Setting Pacing Polarity Bipolar    Lead Channel Setting Pacing Anode Location Right Ventricle    Lead Channel Setting Pacing Anode Terminal Ring    Lead Channel Setting Sensing Cathode Location Right Ventricle    Lead Channel Setting Sensing Cathode Terminal Tip    Lead Channel Setting Pacing Pulse Width 0.4    Lead Channel Setting Pacing Amplitude 3.5    Lead Channel Setting Pacing Capture Mode Adaptive    Zone Setting Type Category VF    Zone Setting Type Category VT    Zone Setting Type Category VT    Zone Setting Type Category VT    Zone Setting Detection Interval 400    Zone Setting Type Category ATRIAL_FIBRILLATION    Zone Setting Type Category AT/AF    Zone Setting Detection Interval 350    Lead Channel Impedance Value 380    Lead Channel Impedance Value 304    Lead Channel Sensing Intrinsic Amplitude 2.625    Lead Channel Sensing Intrinsic Amplitude 3    Lead Channel Pacing Threshold Amplitude 0.5    Lead Channel Pacing Threshold Pulse Width 0.4    Lead Channel Impedance Value 418    Lead Channel Impedance Value 342    Lead Channel Sensing Intrinsic Amplitude 1.375    Lead Channel Pacing Threshold Amplitude 0.5    Lead Channel Pacing Threshold Pulse Width 0.4    Battery Date Time of Measurements 20210803081502    Battery Status OK    Battery RRT Trigger 2.625    Battery Voltage 3.20    Jose Cruz Statistic Date Time Start [id number]    Jose Cruz Statistic Date Time End [id number]    Jose Cruz Statistic RA Percent Paced 8.56    Jose Cruz Statistic RV Percent Paced 98.4    Jose Cruz Statistic AP  Percent 8.66    Jose Cruz Statistic AS  Percent 89.74    Jose Cruz Statistic AP VS Percent 0    Jose Cruz Statistic AS VS Percent 1.6    Atrial Tachy Statistic Date Time Start [id number]    Atrial Tachy Statistic Date Time End [id number]    Atrial Tachy Statistic AT/AF [place] Percent 0    Episode Statistic Recent Count 0    Episode Statistic Type Category AT/AF    Episode Statistic Recent Count 0     Episode Statistic Type Category VT    Episode Statistic Recent Count 0    Episode Statistic Type Category VT    Episode Statistic Recent Date Time Start 20210802120413    Episode Statistic Recent Date Time End 20210803085944    Episode Statistic Recent Date Time Start 20210802120413    Episode Statistic Recent Date Time End 20210803085944    Episode Statistic Recent Date Time Start 20210802120413    Episode Statistic Recent Date Time End 20210803085944    Episode Statistic Total Count 0    Episode Statistic Type Category AT/AF    Episode Statistic Total Count 0    Episode Statistic Type Category VT    Episode Statistic Total Count 0    Episode Statistic Type Category VT    Episode Statistic Total Date Time Start 20210802120413    Episode Statistic Total Date Time End [id number]    Episode Statistic Total Date Time Start 20210802120413    Episode Statistic Total Date Time End [id number]    Episode Statistic Total Date Time Start 20210802120413    Episode Statistic Total Date Time End [id number]    Narrative    Patient seen on 6C for evaluation and iterative programming of their pacemaker per MD orders. Patient is s/p pacemaker implant on 8/2/21. Pacemaker discharge teaching provided in written and verbal form. Patient verbalized understanding of instructions.    Device: [employer] W1DR01 [place] XT DR MRI  Normal device function  Mode: DDD  bpm  AP: 8.6%  : 98.4%  Intrinsic Rhythm: NSR with 2nd degree AVB Type II, vent rate ~ 44 bpm  Short V-V intervals: 0  R waves measure 1.4-1.6 mV today.  Battery voltage = 3.20 V.   Atrial Arrhythmia: 0  Ventricular Arrhythmia: 0  Setting Changes: None  Sejal Rose, EMERALD NP notified of interrogation results and R wave measurements.    Plan: Patient has requested to see his primary care physician in 7-10 days for an incision check and then have his device checked at the [facility] in the future.  LUTHER Schultz RN    Dual lead pacemaker    I have  reviewed and interpreted the device interrogation, settings, programming and nurse's summary. The device is functioning within normal device parameters. I agree with the current findings, assessment and plan.

## 2021-08-04 NOTE — PROGRESS NOTES
Antimicrobial Stewardship Team Note    Antimicrobial Stewardship Program - A joint venture between [facility] and  Physicians to optimize antibiotic management.  NOT a formal consult - Restricted Antimicrobial Review     Patient: Jimmy Isaac  MRN: [id number]  Allergies: Nkda [no known drug allergies]    Brief Summary: Jimmy Isaac is a 60 year old male with sarcoidosis associated pulmonary HTN, acute on chronic kidney disease due to bilateral renal stones, and recent severe left-sided hydronephrosis from large proximal ureteral stones s/p nephrostomy tube placement with planned PCNL with urology on 8/11 . Patient was admitted on 7/30 after findings of complete heart block during right heart catheterization and new O2 requirements (on home O2 4.5 L at home, 8-10 L with exertion). Patient underwent pacemaker placement on 8/2. Patient had an outpatient visit on 7/27 for evaluation prior to his anesthesia clearance visit. Routine UA/Ucx performed on 7/27 positive with 42 RBC, 45 WBC, large LE, and moderate bacteria with cultures growing pan-sensitive Pseudomonas fluorescens. Of note, patient did not have any signs or symptoms of UTI at this time. Cefepime was started 7/30 given positive urine culture. Repeat UA on 8/4 showed continued elevated WBC (86), large leukocyte esterase, and >182 RBC. Urine culture pending.         Active Anti-infective Medications   (From admission, onward)                 Start     Stop    07/30/21 2000  ceFEPIme  2 g,   Intravenous,   EVERY 24 HOURS     Urinary Tract Infection        --                  Assessment: Asymptomatic bacteruria in the setting of ureteral stone   Patient remains afebrile and hemodynamically stable with normal WBC and no reports of UTI signs or symptoms (dysuria, frequency, urgency, suprapubic pain, or flank pain). Patient is currently on day 6 of cefepime. Based on sensitivity data of  Pseudomonas fluorescens, no real options for de-escalating to  oral therapy with fluoroquinolones given prolonged QTc of 536 ms on EKG on 8/3. Since the patient has already received 6 days of cefepime treatment, their UA is unchanged, and are asymptomatic/hemodynamically stable, would recommend completing a total course of 7 days, then discontinue.  Consider repeating UA/Ucx prior to PCNL procedure scheduled for 8/11 as patient is at high risk for sepsis post-procedure given presence of ureteral stone which may serve as a nidus for infection.     Recommendations:  Continue cefepime for 1 more day to complete a total course of 7 days (stop date 8/5).  Consider repeating UA/Ucx prior to PCNL procedure due to high risk for sepsis post procedure.     Pharmacy took the following actions: Called/paged provider, Electronic note created.    Discussed with ID Staff: Roney Workman MD, M.Med.Sc. and Latanya Treadwell, RastaD, BCIDP  Pager: [phone]    Thank You,  Lilly Staton, PharmD IV Student  Antimicrobial Stewardship Team  Office Ph: [phone]    Vital Signs/Clinical Features:  Vitals         08/02 0700  -  08/03 0659 08/03 0700  -  08/04 0659 08/04 0700  -  08/04 1534   Most Recent    Temp ( F) 97.4 -  98.2    97.3 -  98.1    97 -  97.7     97 (36.1)    Pulse 41 -  93    89 -  104    94 -  112     94    Resp 18 -  25    20 -  24    18 -  24     18    BP 91/47 -  118/54    85/46 -  117/66    95/52 -  118/52     95/52    SpO2 (%) 94 -  100    92 -  100    89 -  98     96          Labs  Estimated Creatinine Clearance: 21.1 mL/min (A) (based on SCr of 3.83 mg/dL (H)).  Recent Labs   Lab Test 07/31/21  1552 08/01/21  0329 08/02/21  0304 08/03/21  0443 08/03/21  1714 08/04/21  0524   CR 4.07* 4.05* 4.20* 4.12* 3.92* 3.83*     Recent Labs   Lab Test 03/29/16  1656 04/12/16  1450 09/22/17  1227 10/18/17  1121 01/09/19  1152 03/05/19  0950 07/30/21  1047 07/30/21  1319 07/31/21  0353 08/01/21  0329 08/02/21  0304 08/03/21  0443 08/04/21  0524   WBC 6.2 6.5 5.4   < > 6.5  --  7.6   --  6.9 6.3 5.3 6.0 6.8   ANEU 4.7 4.8 4.2  --  5.0  --   --   --   --   --   --   --   --    ALYM 0.8 0.9 0.6*  --  0.6*  --   --   --   --   --   --   --   --    ANABELLE 0.6 0.6 0.4  --  0.7  --   --   --   --   --   --   --   --    AEOS 0.1 0.1 0.1  --  0.2  --   --   --   --   --   --   --   --    HGB 15.4 15.5 14.0  --  13.7  --  11.0* 10.7* 9.5* 9.8* 9.4* 8.8* 9.6*   HCT 46.5 45.4 43.3   < > 42.6  --  34.9*  --  30.6* 30.8* 29.6* 27.3* 29.9*   MCV 92 90 93   < > 91  --  92  --  91 90 90 90 91    151 131*   < > 169   < > 144*  --  134* 130* 117* 131* 133*    < > = values in this interval not displayed.     Recent Labs   Lab Test 07/30/21  1047 07/31/21  0353 08/01/21  0329 08/02/21  0304 08/03/21  0443 08/04/21  0524   BILITOTAL 0.5 0.5 0.6 0.4 0.8 0.4   ALKPHOS 76 66 67 70 65 71   ALBUMIN 3.2* 2.8* 2.8* 2.7* 2.4* 2.7*   AST 15 11 10 9 12 16   ALT 21 18 16 14 10 12     Recent Labs   Lab Test 10/16/13  0924 05/16/17  1159 09/22/17  1227 07/01/21  1336   CRP 13.1* 8.0 5.1 9.2*   SED  --   --  8  --      Culture Results:  7-Day Micro Results       Procedure Component Value Units Date/Time    Urine Culture [[id number]] Collected: 08/04/21 0546    Order Status: Sent Lab Status: In process Updated: 08/04/21 0645    Specimen: Urine, Clean Catch     Asymptomatic COVID-19 Virus (Coronavirus) by PCR Nasopharyngeal [[id number]]  (Normal) Collected: 08/01/21 1436    Order Status: Completed Lab Status: Final result Updated: 08/01/21 6057    Specimen: Swab from Nasopharyngeal     Narrative:      The following orders were created for panel order Asymptomatic COVID-19 Virus (Coronavirus) by PCR Nasopharyngeal.  Procedure                               Abnormality         Status                     ---------                               -----------         ------                     SARS-COV2 (COVID-19) Vir...[[id number]]  Normal              Final result                 Please view results for these tests on the  individual orders.          Recent Labs   Lab Test 10/18/17  1125 06/16/21  1522 07/09/21  1320 07/27/21  0747 08/04/21  0546   URINEPH 6.0 5.5 5.0 5.0 5.5   NITRITE Negative Negative Negative Negative Negative   LEUKEST Negative Moderate* Moderate* Large* Large*   WBCU 2  --  12* 45* 86*     Imaging: X-ray Chest 2 vws*    Result Date: 8/3/2021  EXAM: XR CHEST 2 VW  8/3/2021 8:55 AM HISTORY:  Status post pacer/ICD   COMPARISON:  Chest radiograph 8/3/2021 FINDINGS: 2 views the chest. Left chest wall pacemaker with intact leads projecting over right atrial appendage and right ventricular apex. High lung volumes with grossly unchanged appearance of perihilar interstitial and patchy airspace opacities. No new focal airspace opacities, pneumothorax, or pleural effusion. Stable cardiomediastinal silhouette. Bones are grossly intact.     IMPRESSION: 1. Left chest wall pacemaker with leads projecting over right image and right ventricular apex. 2. High lung volumes with similar appearance of interstitial and patchy airspace opacities likely consistent with scarring setting of known interstitial lung disease with history of sarcoidosis. I have personally reviewed the examination and initial interpretation and I agree with the findings. SHYANNE WINN MD   SYSTEM ID:  [id number]    Cardiac Catheterization    Result Date: 7/30/2021    Moderately elevated pulmonary artery hypertension.   Right sided filling pressures are mildly elevated.   Left sided filling pressures are mildly elevated.   Normal cardiac output level.   Complete Heart Block      EP Device    Result Date: 8/2/2021  EP PROCEDURE NOTE Procedures: Dual chamber PPM device implantation. Attending: Dora Fontanez MD Procedure Date: 8/2/2021 Pre-operative Diagnosis:  Complete heart block Device Indication: same Post-operative diagnosis:   Successful implantation of PPM. Complications:  None.   Fluoroscopy time/dose:See procedure log Clinical Profile: 61 yo with severe  pulmonary hypertension on home O2, admitted with dyspnea, found to have sinus with prolonged OK interval and 2:1 AV block in setting of RBBB. Hemodynamically stable. He had previously been on digoxin and now has acute renal failure due to nephrolithiasis, digoxin was held without improvement in conduction. In the EP lab, his initial rhythm was sinus with 2:1 AVB with RBBB, alternating with CHB and a LBBB escape rhythm at 40 bpm. PROCEDURE The risks and benefits of the procedure were explained to the patient in full.  The risks of the procedure include, but are not limited to: pain, bleeding, blood transfusion reactions, infection, pneumothorax, perforation of vessels or heart, pericardial effusion, and death. Informed Consent was obtained. The patient was brought to the EP lab in a fasting and hemodynamically stable condition. The patient was prepped and draped in a sterile fashion. Sedation: This procedure was performed under moderate sedation under the supervision of the the Staff Physician. The patient was assessed immediately prior to administering sedation. Minimal sedation was given given pulmonary hypertension. Heart rate, blood pressure, oxygen saturation, and patient responses were monitored throughout the procedure with the assistance of the RN. The left chest wall was vigorously washed, prepped, and sterilely draped. The chest wall was anesthetized with 2% lidocaine. A 5 cm incision was made approximately 2 fingerbreadths from the clavicle. The subcutaneous tissue was carefully dissected down to the pectoral fascia. The dissection was carried inferiorly to form a subcutaneous pocket with adequate hemostasis provided by electrocautery. The subclavian vein was accessed via the pocket and over the first rib under fluoroscopic guidance, and two guidewires were inserted down into the level of the IVC. Two peel away sheaths were inserted over the guidewires. The guidewires and dilators were removed. A right  ventricular lead was inserted through the sheath down to the RV apical septum and was screwed into the myocardium. There was very good sensing and pacing threshold at this position. Pacing at maximum output was performed without diaphragmatic stimulation. QRS morphology had right axis deviation and R wave in V1 consistent with patient's right ventricular hypertrophy. The position of the RV lead in the right ventricular septum was verified in [place] projection. Initial R waves were 7-8 mV, measuring the LBBB escape rhythm. Subsequently R waves were noted to be diminished to 2-3 mV without any changes in position of the lead by fluoroscopy. These smaller R waves were the native RBBB conducted beats. A right atrial lead was inserted through the sheath and positioned the lead at the RA lateral wall. The lead was screwed into the myocardium. There was very good sensing and pacing threshold at this position. Ten volt pacing did not produce diaphragmatic stimulation. Both sheaths were then peeled away, and leads sutured down into the underlying muscle using 0-ethibond.  There was significant bleeding from the venous puncture site, and a pursestring suture was required to achieve hemostasis. The pocket was then vigorously washed with antibiotic solution. The right atrial and right ventricular leads were inserted into the pulse generator. The pulse generator and the leads were inserted into a Tyrx pouch, and the entire system inserted into the subcutaneous pocket. The pocket was then closed in 3 layers using interrupted 2-0 Vicryl for the deep layer, continuous 3-0 Vicryl for the middle layer, and continuous 4-0 Vicryl for the subcuticular layer. Steri-Strips and a dressing were then applied over the incision site, and the patient was transported to a monitored bed.  FINAL PROGRAMMING Jose Cruz Settings: -Pacing mode: DDD -Lower Rate Limit: 60 ppm. -Upper Rate Limit: 130 ppm. PLAN 1. Wound care. 2. Antibiotics. 3. CXR and Device  interrogation in a.m.    Cardiac Device Check - Inpatient    Result Date: 8/3/2021  Patient seen on 6C for evaluation and iterative programming of their pacemaker per MD orders. Patient is s/p pacemaker implant on 8/2/21. Pacemaker discharge teaching provided in written and verbal form. Patient verbalized understanding of instructions. Device: Medtronic [id number] Condon XT DR GODWIN Normal device function Mode: DDD  bpm AP: 8.6% : 98.4% Intrinsic Rhythm: NSR with 2nd degree AVB Type II, vent rate ~ 44 bpm Short V-V intervals: 0 R waves measure 1.4-1.6 mV today. Battery voltage = 3.20 V. Atrial Arrhythmia: 0 Ventricular Arrhythmia: 0 Setting Changes: None EMERALD Osman NP notified of interrogation results and R wave measurements. Plan: Patient has requested to see his primary care physician in 7-10 days for an incision check and then have his device checked at the [facility] in the future. LUTHRE Schultz RN Dual lead pacemaker I have reviewed and interpreted the device interrogation, settings, programming and nurse's summary. The device is functioning within normal device parameters. I agree with the current findings, assessment and plan.    US Renal Complete    Result Date: 8/1/2021  Exam: US RENAL COMPLETE, 8/1/2021 11:35 AM Indication: SISI, nephrolithiasis COMPARISON: 6/23/2021 TECHNIQUE: The kidneys and bladder were scanned in the standard fashion with specialized ultrasound transducer(s) using both gray scale and limited color/spectral Doppler techniques. FINDINGS: Right kidney: Measures 10.3 cm in length. Parenchyma is of normal thickness and echogenicity. Multiple echogenic, shadowing calyceal stones are visualized within the right kidney, similar to comparison CT 6/23/2021. No focal mass. No hydronephrosis. Left kidney: Measures 9.9 cm in length. Parenchyma is of normal thickness and echogenicity. Moderate left hydronephrosis. Shadowing, echogenic stone in the left midpole renal calyx. No  focal mass. Bladder: Not visualized     IMPRESSION: 1.  Moderate left hydronephrosis, similar to CT 6/23/2021. Left percutaneous nephrostomy tube is not visualized. 2.  Bilateral nephrolithiasis, right greater than left. [Urgent Result: Moderate left hydronephrosis] Finding was identified on 8/1/2021 9:16 AM. Dr. Loyola was contacted by Dr. Curtis at 8/1/2021 11:20 AM and verbalized understanding of the urgent finding. I have personally reviewed the examination and initial interpretation and I agree with the findings. LEONILA NORRIS DO   SYSTEM ID:  [id number]    XR Chest Port 1 View    Result Date: 8/3/2021  EXAM: XR CHEST PORT 1 VIEW  8/3/2021 3:14 AM HISTORY:  dyspnea   COMPARISON:  Chest x-ray 8/2/2021 FINDINGS: AP radiograph of the chest. Left chest wall pacemaker with intact leads overlying the right atrium and right ventricle. Stable cardiomediastinal silhouette. Continued high lung volumes. No pneumothorax or significant pleural effusion. Continued perihilar predominant interstitial opacities and scarring. No new focal airspace opacity. Visualized upper abdomen is unremarkable. Bones are stable.     IMPRESSION: 1. No new focal airspace opacity. 2. Continued interstitial opacities and scarring consistent with interstitial lung disease in this patient with history of sarcoidosis. I have personally reviewed the examination and initial interpretation and I agree with the findings. LEONILA NORRIS DO   SYSTEM ID:  [id number]    X-ray Chest 1 vw port    Result Date: 8/2/2021  EXAM: XR CHEST PORT 1 VIEW  8/2/2021 1:24 PM HISTORY:  Status post pacer/ICD   COMPARISON:  CT chest 11/16/2017 FINDINGS: Portable AP upright view the chest. Right IJ catheter with distal tip projecting over the high SVC. Cardiac ICD with leads projecting over the right atrial appendage and right ventricular apex. High lung volumes with mildly worsening diffuse perihilar and interstitial opacities. No new focal airspace opacities or  pneumothorax. Stable cardiac size and pulmonary vasculature.     IMPRESSION: 1. Cardiac ICD with leads projecting over the right atrial appendage and right ventricular apex. No pneumothorax. 2. Mildly worsening diffuse perihilar and interstitial opacities consistent with interstitial fibrosis in setting of known sarcoidosis I have personally reviewed the examination and initial interpretation and I agree with the findings. GISELA MACK MD   SYSTEM ID:  [id number]    CT Abdomen Pelvis w/o Contrast    Result Date: 8/1/2021  EXAMINATION: CT ABDOMEN PELVIS W/O CONTRAST, 8/1/2021 2:56 PM TECHNIQUE:  Helical CT images from the lung bases through the symphysis pubis were obtained  without contrast using renal stone protocol. COMPARISON: CT abdomen and pelvis 6/23/2021 HISTORY: Urinary tract stone, symptomatic/complicated FINDINGS: Right kidney: Multiple staghorn calculi are unchanged compared to prior study from 6/23/2021. There is no hydronephrosis. Left kidney: Unchanged calculi in the left kidney, largest measuring 1.3 cm in the temporal region, with decreased hydronephrosis status post interval replacement of percutaneous nephrostomy tube. Stable 2.1 cm calculus in the proximal ureter. No new ureteral calculus. Urinary bladder: Normal. Remainder of the abdomen and pelvis: The prostate is mildly enlarged. Normal noncontrast appearance of the liver, spleen and adrenals. The gallbladder is decompressed and contains sludge and no pericholecystic fluid. Mild diffuse pancreatic atrophy with calcifications in the pancreatic head. No dilated bowel. No free air or loculated fluid collection. Trace perihepatic ascites, diffuse mesenteric and retroperitoneal edema. Stable mildly analyzed retrocrural, retroperitoneal and gastrohepatic node measuring up to 1.3 cm (series 5, image 93). Lung bases:  Severe emphysema with bullous changes in the lung bases. Bones and soft tissues: No suspicious osseous lesions. 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 Mild degenerative changes in the spine.     IMPRESSION: 1.  Decreased left hydronephrosis status post interval placement of percutaneous nephrostomy tube. Bilateral renal calculi and large calculus in the proximal left ureter are unchanged. 2.  Compared to prior CT, there is ascites, diffuse mesenteric, retroperitoneal and presacral edema, likely due to fluid third spacing. 3.  Stable enlarged abdominal lymph nodes, probably reactive. 4.  Stable chronic findings including bullous emphysema of the lung bases, changes of chronic calcific pancreatitis. SOUTH TROY MD   SYSTEM ID:  [id number]

## 2021-08-04 NOTE — PROGRESS NOTES
Received request to schedule patient for a post implant 7-10 day PPM device check at the [facility].  We have one opening on 8/11/21 at 1540 that pt is tentatively scheduled for.  Message sent back to [place] device team to see if patient plans on transferring care to the [place] location.  Patient is currently followed at the [place].      LUCINA Mejia

## 2021-08-05 LAB
ALBUMIN SERPL-MCNC: 2.6 G/DL (ref 3.4–5)
ALP SERPL-CCNC: 66 U/L (ref 40–150)
ALT SERPL W P-5'-P-CCNC: 14 U/L (ref 0–70)
ANION GAP SERPL CALCULATED.3IONS-SCNC: 10 MMOL/L (ref 3–14)
ANION GAP SERPL CALCULATED.3IONS-SCNC: 14 MMOL/L (ref 3–14)
AST SERPL W P-5'-P-CCNC: 32 U/L (ref 0–45)
BACTERIA UR CULT: NO GROWTH
BILIRUB SERPL-MCNC: 0.6 MG/DL (ref 0.2–1.3)
BUN SERPL-MCNC: 31 MG/DL (ref 7–30)
BUN SERPL-MCNC: 89 MG/DL (ref 7–30)
CALCIUM SERPL-MCNC: 8.7 MG/DL (ref 8.5–10.1)
CALCIUM SERPL-MCNC: 8.8 MG/DL (ref 8.5–10.1)
CHLORIDE BLD-SCNC: 102 MMOL/L (ref 94–109)
CHLORIDE BLD-SCNC: 106 MMOL/L (ref 94–109)
CO2 SERPL-SCNC: 19 MMOL/L (ref 20–32)
CO2 SERPL-SCNC: 20 MMOL/L (ref 20–32)
CREAT SERPL-MCNC: 4.2 MG/DL (ref 0.66–1.25)
CREAT SERPL-MCNC: 4.21 MG/DL (ref 0.66–1.25)
ERYTHROCYTE [DISTWIDTH] IN BLOOD BY AUTOMATED COUNT: 13.7 % (ref 10–15)
GFR SERPL CREATININE-BSD FRML MDRD: 14 ML/MIN/1.73M2
GFR SERPL CREATININE-BSD FRML MDRD: 14 ML/MIN/1.73M2
GLUCOSE BLD-MCNC: 102 MG/DL (ref 70–99)
GLUCOSE BLD-MCNC: 96 MG/DL (ref 70–99)
HCT VFR BLD AUTO: 28.2 % (ref 40–53)
HGB BLD-MCNC: 8.8 G/DL (ref 13.3–17.7)
MAGNESIUM SERPL-MCNC: 2 MG/DL (ref 1.6–2.3)
MAGNESIUM SERPL-MCNC: 2.2 MG/DL (ref 1.6–2.3)
MCH RBC QN AUTO: 28 PG (ref 26.5–33)
MCHC RBC AUTO-ENTMCNC: 31.2 G/DL (ref 31.5–36.5)
MCV RBC AUTO: 90 FL (ref 78–100)
PLATELET # BLD AUTO: 125 10E3/UL (ref 150–450)
POTASSIUM BLD-SCNC: 4.2 MMOL/L (ref 3.4–5.3)
POTASSIUM BLD-SCNC: 4.7 MMOL/L (ref 3.4–5.3)
PROT SERPL-MCNC: 6.5 G/DL (ref 6.8–8.8)
RBC # BLD AUTO: 3.14 10E6/UL (ref 4.4–5.9)
SODIUM SERPL-SCNC: 135 MMOL/L (ref 133–144)
SODIUM SERPL-SCNC: 136 MMOL/L (ref 133–144)
WBC # BLD AUTO: 5.8 10E3/UL (ref 4–11)

## 2021-08-05 PROCEDURE — 85027 COMPLETE CBC AUTOMATED: CPT | Performed by: STUDENT IN AN ORGANIZED HEALTH CARE EDUCATION/TRAINING PROGRAM

## 2021-08-05 PROCEDURE — 99233 SBSQ HOSP IP/OBS HIGH 50: CPT | Mod: GC | Performed by: INTERNAL MEDICINE

## 2021-08-05 PROCEDURE — 250N000009 HC RX 250: Performed by: STUDENT IN AN ORGANIZED HEALTH CARE EDUCATION/TRAINING PROGRAM

## 2021-08-05 PROCEDURE — 214N000001 HC R&B CCU UMMC

## 2021-08-05 PROCEDURE — 83735 ASSAY OF MAGNESIUM: CPT | Performed by: STUDENT IN AN ORGANIZED HEALTH CARE EDUCATION/TRAINING PROGRAM

## 2021-08-05 PROCEDURE — 250N000013 HC RX MED GY IP 250 OP 250 PS 637: Performed by: NURSE PRACTITIONER

## 2021-08-05 PROCEDURE — 999N000157 HC STATISTIC RCP TIME EA 10 MIN

## 2021-08-05 PROCEDURE — 250N000013 HC RX MED GY IP 250 OP 250 PS 637: Performed by: STUDENT IN AN ORGANIZED HEALTH CARE EDUCATION/TRAINING PROGRAM

## 2021-08-05 PROCEDURE — 94640 AIRWAY INHALATION TREATMENT: CPT

## 2021-08-05 PROCEDURE — 36415 COLL VENOUS BLD VENIPUNCTURE: CPT | Performed by: STUDENT IN AN ORGANIZED HEALTH CARE EDUCATION/TRAINING PROGRAM

## 2021-08-05 PROCEDURE — 80053 COMPREHEN METABOLIC PANEL: CPT | Performed by: STUDENT IN AN ORGANIZED HEALTH CARE EDUCATION/TRAINING PROGRAM

## 2021-08-05 PROCEDURE — 250N000011 HC RX IP 250 OP 636: Performed by: STUDENT IN AN ORGANIZED HEALTH CARE EDUCATION/TRAINING PROGRAM

## 2021-08-05 PROCEDURE — 82310 ASSAY OF CALCIUM: CPT | Performed by: STUDENT IN AN ORGANIZED HEALTH CARE EDUCATION/TRAINING PROGRAM

## 2021-08-05 PROCEDURE — 86618 LYME DISEASE ANTIBODY: CPT | Performed by: STUDENT IN AN ORGANIZED HEALTH CARE EDUCATION/TRAINING PROGRAM

## 2021-08-05 RX ORDER — DIPHENHYDRAMINE HCL 50 MG
50 CAPSULE ORAL ONCE
Status: COMPLETED | OUTPATIENT
Start: 2021-08-05 | End: 2021-08-05

## 2021-08-05 RX ORDER — FUROSEMIDE 20 MG
20 TABLET ORAL DAILY
Status: DISCONTINUED | OUTPATIENT
Start: 2021-08-05 | End: 2021-08-12 | Stop reason: HOSPADM

## 2021-08-05 RX ADMIN — TADALAFIL 40 MG: 20 TABLET, FILM COATED ORAL at 07:35

## 2021-08-05 RX ADMIN — BUDESONIDE 0.25 MG: 0.25 INHALANT RESPIRATORY (INHALATION) at 08:30

## 2021-08-05 RX ADMIN — FUROSEMIDE 20 MG: 20 TABLET ORAL at 11:57

## 2021-08-05 RX ADMIN — DIPHENHYDRAMINE HYDROCHLORIDE 50 MG: 50 CAPSULE ORAL at 22:39

## 2021-08-05 RX ADMIN — OXYCODONE HYDROCHLORIDE AND ACETAMINOPHEN 1 TABLET: 5; 325 TABLET ORAL at 13:44

## 2021-08-05 RX ADMIN — CEPHALEXIN 250 MG: 250 CAPSULE ORAL at 18:18

## 2021-08-05 RX ADMIN — SELEXIPAG 1600 MCG: 200 TABLET, COATED ORAL at 07:35

## 2021-08-05 RX ADMIN — CLOTRIMAZOLE 1 LOZENGE: 10 LOZENGE ORAL at 20:27

## 2021-08-05 RX ADMIN — Medication 5 MG: at 22:33

## 2021-08-05 RX ADMIN — HEPARIN SODIUM 5000 UNITS: 10000 INJECTION, SOLUTION INTRAVENOUS; SUBCUTANEOUS at 20:27

## 2021-08-05 RX ADMIN — OXYCODONE HYDROCHLORIDE AND ACETAMINOPHEN 1 TABLET: 5; 325 TABLET ORAL at 22:33

## 2021-08-05 RX ADMIN — AMBRISENTAN 10 MG: 10 TABLET, FILM COATED ORAL at 07:35

## 2021-08-05 RX ADMIN — TAMSULOSIN HYDROCHLORIDE 0.4 MG: 0.4 CAPSULE ORAL at 07:35

## 2021-08-05 RX ADMIN — SELEXIPAG 1600 MCG: 200 TABLET, COATED ORAL at 20:27

## 2021-08-05 RX ADMIN — OXYCODONE HYDROCHLORIDE AND ACETAMINOPHEN 1 TABLET: 5; 325 TABLET ORAL at 03:35

## 2021-08-05 ASSESSMENT — ACTIVITIES OF DAILY LIVING (ADL)
ADLS_ACUITY_SCORE: 15

## 2021-08-05 NOTE — PLAN OF CARE
Temp: 97.8  F (36.6  C) Temp src: Axillary BP: 100/51 Pulse: 87   Resp: 20 SpO2: 96 % O2 Device: Nasal cannula with humidification Oxygen Delivery: 7 LPM     PRN: Percocet x1     A:   Neuro: A/O to self only. Does not use call light at this time. Confused, agitated and restless. Continues to get in and out of bed. Has a bedside attendant.   Cardiac/Tele:  VVS. Paced. Afebrile. Denies chest pain   Respiratory: 7L NC. Tolerates well when pt keeps NC on  GI/: Continent. Urinal at bedside. Neph tube draining well  Diet/Appetite: 2 gram Na+ diet. 2L FR  Skin: No new deficits noted  LDAs: PIV-SL  Activity: Assist of one  Pain: Back pain reported. PRN given     P: Continue to monitor and notify team with changes.

## 2021-08-05 NOTE — PLAN OF CARE
DX: pulmonary HTN   PMH: s/p pacemaker insertion for complete heart block, pulmonary sarcoidosis, bilateral kidney stone    Code Status: full  Team: CVTS    Cardiac: Paced, SR/ST  Respiratory: lungs sounds are diminished in the bases. Pt keeps NC in mouth due to a deviated septum.   Neuro: AxO x1. Pt is oriented to person only.   Pain: pt stated that he had pain in his head and neck, oxy+tylenol was given for pain.   GI/: regular Bms and adequate urine output.   Diet: 2 gram Na  Skin: no dependent edema present. neph drain dressing was changed. Site looks WDL  Activity: independent, beside assistant for delirium    Gtts/fluid: n/a    Plan: get pt neuros back to baseline orientation.

## 2021-08-05 NOTE — PROGRESS NOTES
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 A pacemaker was placed 8/2/21 due to persistent heart block.  Patient has become delirious and he has been propertely diuresed.     Plan for today   - Transition to po PTA diuretics   - Continue PTA medications for PAH of budenoside inhaler, ambrisentan, tadalafil, and selexipag  - At baseline home O2 already (currently at 6L)  - Schedule seroquel and  nighttime melatonin. Will try to avoid other medications with non-pharmacologic strategies as making sure patient environment reflects the time of day, maintain lights on while day and turn off while night, avoiding disruptions overnight night. Will continue 1:1 sitter.  - Starting postop prophylaxis with keflex tid for 5 days. (post pacemaker)  - Once delirium improves, will have PT/OT evaluate to determine proper discharge plan    # Complete Heart Block (New 7/30/21)  Patient was admitted for complete heart block. He was found to be hypervolemic on exam and had NT proBNP 28K. His PTA digoxin was discontinue at admission. TSH normal.   He has h/O pulmonary sarcoidosis with last PET scan in 2017 and cardiac MRI in 2012, that might be impacting also heart and conduction  - Successful pacemaker placed on 8/2/21.   - Starting postop prophylaxis with keflex tid for 5 days. (post pacemaker)    # PAH 2/2 Pulmonary Sarcoidosis, at home O2 of 4.5 L at rest and  8-10 L with exertion  # LVEF 55-60%   # RV function mild/moderately reduced.   Patient has history of pulmonary HTN. His last RHC 7/30/21 showed RA - 12, RV - 77/16, PA - 77/20/38, PCWP - 17,   Lesia CO/CI - 4.62/2.36, this translates to mild elevation of right and left filling pressures, moderate elevation  of pulmonary artery hypertension but normal LV cardiac output level. This was compatible with initial vol status assessment and reassure patient required diuresis.   - vol status: euvolemic. New dry weight 162 lbs.   - Transition to PTA po laxis 20mg daily.   - PTA ambrisentan 10mg daily, selexipag 1600 mcg q12 hrs.   - PTA tadalafil  - PTA budesonide inh     # Delirium (New 8/3/21) likely secondary to lack of resting and hospital stay.   Patient become delirious over the night of 8/3/21. He become aggressive, so haldol, seroquel, and ativan was given. Likely this is related to no well resting. He has been confused but not aggressive over last night.   - Non pharmacologic therapy will be preferred: keeping lights on during the day and off overnight. Will avoid disruption overnight. 1:1 sitter while confused today.   - Continue seroquel scheduled (25 mg TID) and melatonin 3 mg at bedtime  - No concern for new acute infection.     # Complicated Urinary Tract Infection with Pan-Senstive Pseudomonas  Urine Culture from 7/27/21 grew pan sensitive pseudomona. Likely a contaminant but we have been treating it since 7/30.   # Bilateral staghorn calculi and L ureter stones. C/B Left hydronephrosis s/p percutaneous drainage (CT 8/1/21).    - Treated with IV cefepime for a total of 6 days. Will discontinue antibiotics at this point.Specially when U/A from  8/4/21, no concerning for infection.      # SISI on CKD Stage IV   Likely multifactorial secondary to cardiorenal and some degree of obstructive uropathy.   Patient has been evaluated by urology during this admission. CT abdomen showed slightly improvement of hydronephrosis and. Urology do not recommend further management of obstructive uropathy at this point.   Initially difficult to diuresed, but with increased diuretics (furosemide IV 80 mg BID) he was able to achieved euvolemic state.   New baseline Cr ~ 3 (only two lab values for the last year prior to admission).   -  Today, creatinine bumped. Likely given new status of euvolemia. Transition to po diuretics as above.    - Urology  will follow patient as an outpatient.      # BPH, continue PTA Tamsulosin 0.4 PTA     DVT Prophylaxis: Enoxaparin (Lovenox) subcutaneous   Chong Catheter: removed.   Code Status: Full Code  Lines: PIV x2, Left nephrostomy tube     Patient discussed with staff attending, Dr. Lemon.     Donna Linda MD  Internal Medicine Resident PGY3  Pager: [phone]      Interval History   NAEO. Patient only orientated in person but was not aggressive. He did not required an extra dose of antipsycotics but was not able to sleep well neither.     Physical Exam   Temp: 97.5  F (36.4  C) Temp src: Axillary BP: 127/62 Pulse: 98   Resp: 22 SpO2: 95 % O2 Device: Nasal cannula with humidification Oxygen Delivery: 7 LPM  Vitals:    07/31/21 0200 08/01/21 1300 08/03/21 0645   Weight: 75.3 kg (166 lb 0.1 oz) 76.3 kg (168 lb 3.4 oz) 73.5 kg (162 lb 1.6 oz)     Vital Signs with Ranges  Temp:  [97  F (36.1  C)-98.6  F (37  C)] 97.5  F (36.4  C)  Pulse:  [] 98  Resp:  [18-24] 22  BP: ()/(51-66) 127/62  SpO2:  [89 %-98 %] 95 %  I/O last 3 completed shifts:  In: 480 [P.O.:480]  Out: 2130 [Urine:2130]     , Blood pressure 127/62, pulse 98, temperature 97.5  F (36.4  C), temperature source Axillary, resp. rate 22, height 1.829 m (6'), weight 73.5 kg (162 lb 1.6 oz), SpO2 95 %.  162 lbs 1.6 oz  GEN:  Calm, sleeping  CV:  Regular rate and rhythm, no murmur.  LUNGS:   Able to move oxygen well with deep breaths.  ABD:  Active bowel sounds, soft, non-tender/non-distended.  No rebound/guarding/rigidity.  EXT:  No lower extremity edema, palpable pulses    Medications     - MEDICATION INSTRUCTIONS -       - MEDICATION INSTRUCTIONS -       - MEDICATION INSTRUCTIONS -         ambrisentan  10 mg Oral Daily     budesonide  0.25 mg Nebulization BID     ceFEPIme (MAXIPIME) IV  2 g Intravenous Q24H     clotrimazole  1  lozenge Buccal TID     [Held by provider] heparin ANTICOAGULANT  5,000 Units Subcutaneous Q12H     lidocaine  2 patch Transdermal Q24h    And     lidocaine   Transdermal Q8H     melatonin  5 mg Oral At Bedtime     selexipag  1,600 mcg Oral Q12H     sodium chloride (PF)  3 mL Intracatheter Q8H     tadalafil  40 mg Oral Daily     tamsulosin  0.4 mg Oral Daily       Data   Recent Labs   Lab 08/05/21  0542 08/04/21  1705 08/04/21  0524 08/03/21  0609 08/03/21  0443   WBC 5.8  --  6.8  --  6.0   HGB 8.8*  --  9.6*  --  8.8*   MCV 90  --  91  --  90   *  --  133*  --  131*    135 135   < > 136   POTASSIUM 4.2 4.3 4.0  --  3.5   CHLORIDE 102 104 104   < > 104   CO2 20 22 19*   < > 21   BUN 89* 89* 88*   < > 95*   CR 4.20* 3.84* 3.83*   < > 4.12*   ANIONGAP 14 9 12   < > 11   TERI 8.7 9.0 8.7   < > 8.1*   * 106* 115*   < > 104*   ALBUMIN 2.6*  --  2.7*  --  2.4*   PROTTOTAL 6.5*  --  6.6*  --  5.9*   BILITOTAL 0.6  --  0.4  --  0.8   ALKPHOS 66  --  71  --  65   ALT 14  --  12  --  10   AST 32  --  16  --  12    < > = values in this interval not displayed.       No results found for this or any previous visit (from the past 24 hour(s)).

## 2021-08-06 ENCOUNTER — APPOINTMENT (OUTPATIENT)
Dept: ULTRASOUND IMAGING | Facility: CLINIC | Age: 61
DRG: 242 | End: 2021-08-06
Attending: INTERNAL MEDICINE
Payer: COMMERCIAL

## 2021-08-06 LAB
ALBUMIN SERPL-MCNC: 2.8 G/DL (ref 3.4–5)
ALBUMIN UR-MCNC: 70 MG/DL
ALP SERPL-CCNC: 66 U/L (ref 40–150)
ALT SERPL W P-5'-P-CCNC: 23 U/L (ref 0–70)
ANION GAP SERPL CALCULATED.3IONS-SCNC: 11 MMOL/L (ref 3–14)
ANION GAP SERPL CALCULATED.3IONS-SCNC: 5 MMOL/L (ref 3–14)
APPEARANCE UR: ABNORMAL
AST SERPL W P-5'-P-CCNC: 51 U/L (ref 0–45)
B BURGDOR IGG+IGM SER QL: 0.06
BACTERIA #/AREA URNS HPF: ABNORMAL /HPF
BILIRUB SERPL-MCNC: 0.6 MG/DL (ref 0.2–1.3)
BILIRUB UR QL STRIP: NEGATIVE
BUN SERPL-MCNC: 93 MG/DL (ref 7–30)
BUN SERPL-MCNC: 95 MG/DL (ref 7–30)
CALCIUM SERPL-MCNC: 9.1 MG/DL (ref 8.5–10.1)
CALCIUM SERPL-MCNC: 9.2 MG/DL (ref 8.5–10.1)
CHLORIDE BLD-SCNC: 104 MMOL/L (ref 94–109)
CHLORIDE BLD-SCNC: 106 MMOL/L (ref 94–109)
CO2 SERPL-SCNC: 19 MMOL/L (ref 20–32)
CO2 SERPL-SCNC: 27 MMOL/L (ref 20–32)
COLOR UR AUTO: YELLOW
CREAT SERPL-MCNC: 4.5 MG/DL (ref 0.66–1.25)
CREAT SERPL-MCNC: 4.56 MG/DL (ref 0.66–1.25)
CREAT UR-MCNC: 122 MG/DL
CREAT UR-MCNC: 122 MG/DL
ERYTHROCYTE [DISTWIDTH] IN BLOOD BY AUTOMATED COUNT: 13.8 % (ref 10–15)
FRACT EXCRET NA UR+SERPL-RTO: 0.9 %
GFR SERPL CREATININE-BSD FRML MDRD: 13 ML/MIN/1.73M2
GFR SERPL CREATININE-BSD FRML MDRD: 13 ML/MIN/1.73M2
GLUCOSE BLD-MCNC: 105 MG/DL (ref 70–99)
GLUCOSE BLD-MCNC: 125 MG/DL (ref 70–99)
GLUCOSE UR STRIP-MCNC: NEGATIVE MG/DL
HCT VFR BLD AUTO: 29.7 % (ref 40–53)
HGB BLD-MCNC: 9.5 G/DL (ref 13.3–17.7)
HGB UR QL STRIP: ABNORMAL
KETONES UR STRIP-MCNC: NEGATIVE MG/DL
LEUKOCYTE ESTERASE UR QL STRIP: ABNORMAL
MAGNESIUM SERPL-MCNC: 2.3 MG/DL (ref 1.6–2.3)
MAGNESIUM SERPL-MCNC: 2.4 MG/DL (ref 1.6–2.3)
MCH RBC QN AUTO: 28.7 PG (ref 26.5–33)
MCHC RBC AUTO-ENTMCNC: 32 G/DL (ref 31.5–36.5)
MCV RBC AUTO: 90 FL (ref 78–100)
MUCOUS THREADS #/AREA URNS LPF: PRESENT /LPF
NITRATE UR QL: NEGATIVE
PH UR STRIP: 5.5 [PH] (ref 5–7)
PLATELET # BLD AUTO: 132 10E3/UL (ref 150–450)
POTASSIUM BLD-SCNC: 4 MMOL/L (ref 3.4–5.3)
POTASSIUM BLD-SCNC: 4.4 MMOL/L (ref 3.4–5.3)
PROT SERPL-MCNC: 6.9 G/DL (ref 6.8–8.8)
PROT UR-MCNC: 1.42 G/L
PROT/CREAT 24H UR: 1.16 G/G CR (ref 0–0.2)
RBC # BLD AUTO: 3.31 10E6/UL (ref 4.4–5.9)
RBC URINE: >182 /HPF
SODIUM SERPL-SCNC: 134 MMOL/L (ref 133–144)
SODIUM SERPL-SCNC: 138 MMOL/L (ref 133–144)
SODIUM UR-SCNC: 35 MMOL/L
SODIUM UR-SCNC: 35 MMOL/L
SP GR UR STRIP: 1.01 (ref 1–1.03)
UROBILINOGEN UR STRIP-MCNC: NORMAL MG/DL
WBC # BLD AUTO: 7.2 10E3/UL (ref 4–11)
WBC CLUMPS #/AREA URNS HPF: PRESENT /HPF
WBC URINE: >182 /HPF

## 2021-08-06 PROCEDURE — 250N000013 HC RX MED GY IP 250 OP 250 PS 637: Performed by: NURSE PRACTITIONER

## 2021-08-06 PROCEDURE — 250N000013 HC RX MED GY IP 250 OP 250 PS 637: Performed by: STUDENT IN AN ORGANIZED HEALTH CARE EDUCATION/TRAINING PROGRAM

## 2021-08-06 PROCEDURE — 36415 COLL VENOUS BLD VENIPUNCTURE: CPT

## 2021-08-06 PROCEDURE — 250N000009 HC RX 250: Performed by: STUDENT IN AN ORGANIZED HEALTH CARE EDUCATION/TRAINING PROGRAM

## 2021-08-06 PROCEDURE — 84156 ASSAY OF PROTEIN URINE: CPT

## 2021-08-06 PROCEDURE — 81001 URINALYSIS AUTO W/SCOPE: CPT

## 2021-08-06 PROCEDURE — 99222 1ST HOSP IP/OBS MODERATE 55: CPT | Mod: 24 | Performed by: INTERNAL MEDICINE

## 2021-08-06 PROCEDURE — 76857 US EXAM PELVIC LIMITED: CPT | Mod: 26 | Performed by: RADIOLOGY

## 2021-08-06 PROCEDURE — 83735 ASSAY OF MAGNESIUM: CPT | Performed by: STUDENT IN AN ORGANIZED HEALTH CARE EDUCATION/TRAINING PROGRAM

## 2021-08-06 PROCEDURE — 84300 ASSAY OF URINE SODIUM: CPT

## 2021-08-06 PROCEDURE — 94664 DEMO&/EVAL PT USE INHALER: CPT

## 2021-08-06 PROCEDURE — 84540 ASSAY OF URINE/UREA-N: CPT | Performed by: INTERNAL MEDICINE

## 2021-08-06 PROCEDURE — 36415 COLL VENOUS BLD VENIPUNCTURE: CPT | Performed by: STUDENT IN AN ORGANIZED HEALTH CARE EDUCATION/TRAINING PROGRAM

## 2021-08-06 PROCEDURE — 999N000157 HC STATISTIC RCP TIME EA 10 MIN

## 2021-08-06 PROCEDURE — 82310 ASSAY OF CALCIUM: CPT | Performed by: STUDENT IN AN ORGANIZED HEALTH CARE EDUCATION/TRAINING PROGRAM

## 2021-08-06 PROCEDURE — 85027 COMPLETE CBC AUTOMATED: CPT | Performed by: STUDENT IN AN ORGANIZED HEALTH CARE EDUCATION/TRAINING PROGRAM

## 2021-08-06 PROCEDURE — 87086 URINE CULTURE/COLONY COUNT: CPT | Performed by: PHYSICIAN ASSISTANT

## 2021-08-06 PROCEDURE — 87040 BLOOD CULTURE FOR BACTERIA: CPT

## 2021-08-06 PROCEDURE — 214N000001 HC R&B CCU UMMC

## 2021-08-06 PROCEDURE — 94640 AIRWAY INHALATION TREATMENT: CPT

## 2021-08-06 PROCEDURE — 94640 AIRWAY INHALATION TREATMENT: CPT | Mod: 76

## 2021-08-06 PROCEDURE — 82570 ASSAY OF URINE CREATININE: CPT

## 2021-08-06 PROCEDURE — 82040 ASSAY OF SERUM ALBUMIN: CPT | Performed by: STUDENT IN AN ORGANIZED HEALTH CARE EDUCATION/TRAINING PROGRAM

## 2021-08-06 PROCEDURE — 99223 1ST HOSP IP/OBS HIGH 75: CPT | Performed by: STUDENT IN AN ORGANIZED HEALTH CARE EDUCATION/TRAINING PROGRAM

## 2021-08-06 PROCEDURE — 99233 SBSQ HOSP IP/OBS HIGH 50: CPT | Mod: GC | Performed by: INTERNAL MEDICINE

## 2021-08-06 PROCEDURE — 250N000011 HC RX IP 250 OP 636: Performed by: STUDENT IN AN ORGANIZED HEALTH CARE EDUCATION/TRAINING PROGRAM

## 2021-08-06 PROCEDURE — 76857 US EXAM PELVIC LIMITED: CPT

## 2021-08-06 RX ADMIN — CEPHALEXIN 250 MG: 250 CAPSULE ORAL at 08:18

## 2021-08-06 RX ADMIN — FUROSEMIDE 20 MG: 20 TABLET ORAL at 08:22

## 2021-08-06 RX ADMIN — HEPARIN SODIUM 5000 UNITS: 10000 INJECTION, SOLUTION INTRAVENOUS; SUBCUTANEOUS at 08:18

## 2021-08-06 RX ADMIN — BUDESONIDE 0.25 MG: 0.25 INHALANT RESPIRATORY (INHALATION) at 21:31

## 2021-08-06 RX ADMIN — CLOTRIMAZOLE 1 LOZENGE: 10 LOZENGE ORAL at 13:16

## 2021-08-06 RX ADMIN — TAMSULOSIN HYDROCHLORIDE 0.4 MG: 0.4 CAPSULE ORAL at 08:18

## 2021-08-06 RX ADMIN — HEPARIN SODIUM 5000 UNITS: 10000 INJECTION, SOLUTION INTRAVENOUS; SUBCUTANEOUS at 20:07

## 2021-08-06 RX ADMIN — SELEXIPAG 1600 MCG: 200 TABLET, COATED ORAL at 20:06

## 2021-08-06 RX ADMIN — SELEXIPAG 1600 MCG: 200 TABLET, COATED ORAL at 08:23

## 2021-08-06 RX ADMIN — Medication 5 MG: at 22:35

## 2021-08-06 RX ADMIN — CLOTRIMAZOLE 1 LOZENGE: 10 LOZENGE ORAL at 20:06

## 2021-08-06 RX ADMIN — CEPHALEXIN 250 MG: 250 CAPSULE ORAL at 13:16

## 2021-08-06 RX ADMIN — AMBRISENTAN 10 MG: 10 TABLET, FILM COATED ORAL at 08:23

## 2021-08-06 RX ADMIN — BUDESONIDE 0.25 MG: 0.25 INHALANT RESPIRATORY (INHALATION) at 00:13

## 2021-08-06 RX ADMIN — CEPHALEXIN 250 MG: 250 CAPSULE ORAL at 20:06

## 2021-08-06 RX ADMIN — TADALAFIL 40 MG: 20 TABLET, FILM COATED ORAL at 08:25

## 2021-08-06 RX ADMIN — OXYCODONE HYDROCHLORIDE AND ACETAMINOPHEN 1 TABLET: 5; 325 TABLET ORAL at 13:16

## 2021-08-06 ASSESSMENT — ACTIVITIES OF DAILY LIVING (ADL)
ADLS_ACUITY_SCORE: 15

## 2021-08-06 ASSESSMENT — MIFFLIN-ST. JEOR: SCORE: 1552

## 2021-08-06 NOTE — PROGRESS NOTES
[facility] [employer]  D/I:; [facility] [employer] CM: David called for update Ph: [phone]. Informed her that PT will evaluate him after his delerium/confusion has cleared and likely will have no needs and follow outpatient.  P: David will call next week if he is still here.

## 2021-08-06 NOTE — PROGRESS NOTES
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 A pacemaker was placed 8/2/21 due to persistent heart block.  Patient has become delirious and he has been propertely diuresed.     Plan for today:  - Continue PO PTA diuretics   - Renal consult today, appreciate recommendations  - Continue PTA medications for PAH of budenoside inhaler, ambrisentan, tadalafil, and selexipag   - At baseline home O2 already (currently at 6L)   - Schedule seroquel and nighttime melatonin. Will try to avoid other medications with non-pharmacologic strategies as making sure patient environment reflects the time of day, maintain lights on while day and turn off while night, avoiding disruptions overnight night. Will continue 1:1 sitter.  - Postop prophylaxis with keflex tid for 5 days. (post pacemaker)  - Once delirium improves, will have PT/OT evaluate to determine proper discharge plan    # Complete Heart Block (New 7/30/21)  Patient was admitted for complete heart block. He was found to be hypervolemic on exam and had NT proBNP 28K. His PTA digoxin was discontinue at admission. TSH normal.   He has h/O pulmonary sarcoidosis with last PET scan in 2017 and cardiac MRI in 2012, that might be impacting also heart and conduction  - Successful pacemaker placed on 8/2/21.   - Starting postop prophylaxis with keflex tid for 5 days. Today is day 2 of TX (post pacemaker)    # PAH 2/2 Pulmonary Sarcoidosis, at home O2 of 4.5 L at rest and  8-10 L with exertion  # LVEF 55-60%   # RV function mild/moderately reduced.   Patient has history of pulmonary HTN. His last RHC 7/30/21 showed RA - 12, RV - 77/16, PA - 77/20/38, PCWP - 17,   Lesia CO/CI - 4.62/2.36, this translates to mild  elevation of right and left filling pressures, moderate elevation of pulmonary artery hypertension but normal LV cardiac output level. This was compatible with initial vol status assessment and reassure patient required diuresis.   - vol status: euvolemic. New dry weight 162 lbs.   - Transition to PTA po laxis 20mg daily.   - PTA ambrisentan 10mg daily, selexipag 1600 mcg q12 hrs.   - PTA tadalafil  - PTA budesonide inh     # Delirium (New 8/3/21) likely secondary to lack of resting and hospital stay.   Patient become delirious over the night of 8/3/21. He become aggressive, so haldol, seroquel, and ativan was given. Likely this is related to no well resting. He has been confused but not aggressive over last night.   - Non pharmacologic therapy will be preferred: keeping lights on during the day and off overnight. Will avoid disruption overnight. 1:1 sitter while confused today.   - Continue seroquel scheduled (25 mg TID) and melatonin 3 mg at bedtime  - No concern for new acute infection.     # Complicated Urinary Tract Infection with Pan-Senstive Pseudomonas  Urine Culture from 7/27/21 grew pan sensitive pseudomona. Likely a contaminant but we have been treating it since 7/30.   # Bilateral staghorn calculi and L ureter stones. C/B Left hydronephrosis s/p percutaneous drainage (CT 8/1/21).    - Treated with IV cefepime for a total of 6 days. Will discontinue antibiotics at this point.Specially when U/A from 8/4/21, no concerning for infection. Will follow results of U/A today (8/6/21) for any sign of new infection    # SISI on CKD Stage IV   Likely multifactorial secondary to cardiorenal and some degree of obstructive uropathy.   Patient has been evaluated by urology during this admission. CT abdomen showed slightly improvement of hydronephrosis and. Urology do not recommend further management of obstructive uropathy at this point.   Initially difficult to diuresed, but with increased diuretics (furosemide IV 80 mg  BID) he was able to achieved euvolemic state.   New baseline Cr ~ 3 (only two lab values for the last year prior to admission).   - Today, creatinine still increasing. Renal consult pending for recommendations   - Urology  will follow patient as an outpatient.      # BPH, continue PTA Tamsulosin 0.4 PTA     DVT Prophylaxis: Enoxaparin (Lovenox) subcutaneous   Chong Catheter: removed.   Code Status: Full Code  Lines: PIV x2, Left nephrostomy tube     Patient discussed with staff attending, Dr. Wheat.     Sathya Short MD  Internal Medicine Resident PGY-1  Pager: [phone]      Interval History   NAEO. Patient orientated, not aggressive, and doing well since his partner Sena and daughter Asiya are here today. Still struggling getting sleep overnight. Otherwise he denies diarrhea, fevers, chills, nausea, vomiting, headaches, dizziness, or skin changes.    Physical Exam   Temp: 97.5  F (36.4  C) Temp src: Axillary BP: 96/51 Pulse: 83   Resp: 16 SpO2: 98 % O2 Device: Nasal cannula Oxygen Delivery: 8 LPM  Vitals:    08/01/21 1300 08/03/21 0645 08/06/21 0610   Weight: 76.3 kg (168 lb 3.4 oz) 73.5 kg (162 lb 1.6 oz) 70.9 kg (156 lb 4.9 oz)     Vital Signs with Ranges  Temp:  [97.4  F (36.3  C)-98.1  F (36.7  C)] 97.5  F (36.4  C)  Pulse:  [] 83  Resp:  [16-22] 16  BP: ()/(51-75) 96/51  SpO2:  [87 %-98 %] 98 %  I/O last 3 completed shifts:  In: 100 [P.O.:100]  Out: 1050 [Urine:1050]     , Blood pressure 96/51, pulse 83, temperature 97.5  F (36.4  C), temperature source Axillary, resp. rate 16, height 1.829 m (6'), weight 70.9 kg (156 lb 4.9 oz), SpO2 98 %.  156 lbs 4.9 oz  GEN:  Calm, slow speaking  CV:  Regular rate and rhythm, no murmur.  LUNGS:   Able to move oxygen well with deep breaths.  ABD:  Active bowel sounds, soft, non-tender/non-distended.  No rebound/guarding/rigidity.  EXT:  No lower extremity edema, palpable pulses    Medications     - MEDICATION INSTRUCTIONS -       - MEDICATION INSTRUCTIONS -        - MEDICATION INSTRUCTIONS -         ambrisentan  10 mg Oral Daily     budesonide  0.25 mg Nebulization BID     cephALEXin  250 mg Oral TID     clotrimazole  1 lozenge Buccal TID     [Held by provider] furosemide  20 mg Oral Daily     heparin ANTICOAGULANT  5,000 Units Subcutaneous Q12H     lidocaine  2 patch Transdermal Q24h    And     lidocaine   Transdermal Q8H     melatonin  5 mg Oral At Bedtime     selexipag  1,600 mcg Oral Q12H     sodium chloride (PF)  3 mL Intracatheter Q8H     tadalafil  40 mg Oral Daily     tamsulosin  0.4 mg Oral Daily       Data   Recent Labs   Lab 08/06/21  0404 08/05/21  1647 08/05/21  0542 08/04/21  0524   WBC 7.2  --  5.8 6.8   HGB 9.5*  --  8.8* 9.6*   MCV 90  --  90 91   *  --  125* 133*    135 136 135   POTASSIUM 4.0 4.7 4.2 4.0   CHLORIDE 104 106 102 104   CO2 19* 19* 20 19*   BUN 93* 31* 89* 88*   CR 4.50* 4.21* 4.20* 3.83*   ANIONGAP 11 10 14 12   TERI 9.1 8.8 8.7 8.7   * 96 102* 115*   ALBUMIN 2.8*  --  2.6* 2.7*   PROTTOTAL 6.9  --  6.5* 6.6*   BILITOTAL 0.6  --  0.6 0.4   ALKPHOS 66  --  66 71   ALT 23  --  14 12   AST 51*  --  32 16       Recent Results (from the past 24 hour(s))   US Bladder w Pre and Post Void Residual    Narrative    Exam: US BLADDER W PRE AND POST VOID RESIDUAL, 8/6/2021 7:46 AM    Indication: concern for obstructive uropathy    Comparison: CT abdomen/pelvis 8/1/2021    Findings:   Focused grayscale imaging of the urinary bladder demonstrates minimal  distention with prevoid volume of 74 mL. Patient was unable to void.      Impression    Impression: Normal grayscale imaging of the urinary bladder with small  prevoid volume. Patient was unable to void.    I have personally reviewed the examination and initial interpretation  and I agree with the findings.    ANN SOSA MD         SYSTEM ID:  [id number]

## 2021-08-06 NOTE — CONSULTS
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 Patient's course complicated by delerium and SISI.    SISI on CKD 4  Patient's renal function has been steadily declining. Cr in 2018 was 1.4 -> Dec 2019 1.9 -> July 2020 ~3 (at [facility]) and was in the 3s June 2021. Cr was 3 upon admission and steadily increased. There was slight improvement with IV lasix. Patient seemed euvolemic on exam. Differential for SISI is broad at this point including sarcoidosis in the kidneys, obstruction due to struvite stones on the right side (since there is a nephrostomy tube on the left), complicated UTI, AIN due to antibiotic use and ATN. We would like additional tests to guide our differential. Patient has mild uremia. He isn't nauseous, experiencing a metallic taste, or smelling musty which would be signs of uremia. He does have a mild tremor. No acute indication for dialysis at this time but patient is at high risk of needing it in the future.  -Patient is at an increased risk for needing dialysis in the upcoming days. He has mild uremia currently and doesn't have an acute indication for dialysis.   -Please order: urine sodium    -UA with micro NOT reflex to micro    -Random urine protein    -FeUrea     -renal U/S (no doppler needed at this time)  -Hold PO lasix  -ID consult for management of complicated UTI as patient doesn't have source control with stones bilaterally      Recommendations were communicated to primary team via note    Seen and discussed with Dr. Charles Jauregui.    Cheyenne West,  MS4   Medical Student      Physician Attestation   I, Charles Jauregui, was present with the medical/[employer] student who participated in the service and in the documentation of the note.  I have verified the history and personally performed the physical exam and medical decision making.  I agree with the assessment and plan of care as documented in the note.      I personally reviewed vital signs, medications, labs and imaging.    Patient with SISI on CKD in the setting of R sided HF/pulm HTN and bradycardia requiring pacing s/p diuresis along with kidney stones s/p PNT (and urology stating they don't see e/o ongoing obstruction despite rising creatinine). We would recommend the above urinary studies and renal U/S to provide some additional workup (renal U/S less for obstruction and more so to look for e/o confirming his reported advanced CKD given infrequent labs in our system). His encephalopathy is of unclear origin, and while uremia is on the DDx, several features (acute onset per family being important) make uremia less likely. Nonetheless, we will continue to follow with this patient to monitor for worsening and the need for starting dialysis, which he remains at high risk for needing. We obtained consent from his family today given this risk and his clear inability to display capacity for his own decisions at this time.  AIN is lower on our DDx given the timing, and should ID think ongoing ABx are needed for his complex UTI, we would not object.   He is currently ordered for low-dose Lasix (or even held), which will likely amount to a passive trial of net positive fluid status, which is reasonable as we workup his SISI that may prove to be pre-renal.     Charles Jauregui MD  Date of Service (when I saw the patient): 08/06/21        REASON FOR CONSULT: SISI on CKD 4    HISTORY OF PRESENT ILLNESS:  Admitting provider and nursing notes reviewed    Jimmy Isaac is a 59 yo male with past medical history  significant for bilateral kidney stones s/p left nephrostomy, CKD Stage IV, PAH 2/2 pulmonary sarcoidosis, who presented with complete heart block after RHC s/p pacemaker placement 8/2. Patient's course complicated by delerium and SISI.    History obtained from chart review and patient's partner (Sena) and daughter (Asiya) who were in the room. Patient has a hx of bilateral stones, had a left nephrostomy tube placed and has PCNL scheduled 8/11. Per patient's partner he seems to have bilateral flank pain as he is sometimes unable to get comfortable while lying in bed. Good PO intake and urine output.  He has discussed dialysis with his partner in the past and was open to it then. Family believes he has been delirious since the pacemaker has been placed. He denies SOB, nausea and metallic taste in his mouth.     PAST MEDICAL HISTORY:  Past Medical History:   Diagnosis Date     Chronic sinusitis      CKD (chronic kidney disease)      Heart disease      Hypertension      Keratosis     frontal scalp, treated with liquid nitrogen at [facility]     Malignant neoplasm (H)      Pneumonia a few times     Pulmonary sarcoidosis (H)        Past Surgical History:   Procedure Laterality Date     COLONOSCOPY       CV RIGHT HEART CATH MEASUREMENTS RECORDED N/A 6/19/2019    Procedure: CV RIGHT HEART CATH;  Surgeon: Kale Delgado MD;  Location:  [facility]ARD[facility] CATH LAB     CV RIGHT HEART CATH MEASUREMENTS RECORDED N/A 12/11/2019    Procedure: CV RIGHT HEART CATH;  Surgeon: Ion Lemon MD;  Location:  [facility] CATH [facility]     CV RIGHT HEART CATH MEASUREMENTS RECORDED N/A 7/30/2021    Procedure: CV RIGHT HEART CATH;  Surgeon: Jhony Barone MD;  Location:  HE[facility]ARD[facility] CATH LAB     ENT SURGERY      wisdom teeth extraction     EP PACEMAKER Left 8/2/2021    Procedure: EP Pacemaker;  Surgeon: Dora Fontanez MD;  Location:  HEART CARDI[facility] CATH LAB     IR NEPHROSTOMY TUBE PLACEMENT LEFT  7/9/2021      TONSILLECTOMY  1964        MEDICATIONS:  PTA Meds  Prior to Admission medications    Medication Sig Last Dose Taking? Auth Provider   ambrisentan (LETAIRIS) 10 MG tablet Take 1 tablet (10 mg) by mouth daily 7/29/2021 at Unknown time Yes Khang Lindsey MD   budesonide (PULMICORT) 0.25 MG/2ML neb solution Take 2 mLs (0.25 mg) by nebulization 2 times daily 7/29/2021 at Unknown time Yes Shane Ruvalcaba MD   furosemide (LASIX) 20 MG tablet Take 1 tablet (20 mg) by mouth daily 7/29/2021 at Unknown time Yes Khang Lindsey MD   PROAIR  (90 Base) MCG/ACT inhaler Inhale 2 puffs into the lungs every 6 hours as needed for shortness of breath / dyspnea 7/29/2021 at Unknown time Yes Shane Ruvalcaba MD   selexipag (UPTRAVI) 1600 MCG tablet Take 1 tablet (1,600 mcg) by mouth every 12 hours 7/29/2021 at Unknown time Yes Khang Lindsey MD   tadalafil, PAH, (ALYQ) 20 MG TABS Take 2 tablets (40 mg) by mouth daily 7/29/2021 at Unknown time Yes Khang Lindsey MD   tamsulosin (FLOMAX) 0.4 MG capsule Take 1 capsule (0.4 mg) by mouth daily   Marcela Donnelly PA-C   UNABLE TO FIND MEDICATION NAME: Topical skin tag remover   Reported, Patient      Current Meds    ambrisentan  10 mg Oral Daily     budesonide  0.25 mg Nebulization BID     cephALEXin  250 mg Oral TID     clotrimazole  1 lozenge Buccal TID     [Held by provider] furosemide  20 mg Oral Daily     heparin ANTICOAGULANT  5,000 Units Subcutaneous Q12H     lidocaine  2 patch Transdermal Q24h    And     lidocaine   Transdermal Q8H     melatonin  5 mg Oral At Bedtime     selexipag  1,600 mcg Oral Q12H     sodium chloride (PF)  3 mL Intracatheter Q8H     tadalafil  40 mg Oral Daily     tamsulosin  0.4 mg Oral Daily     Infusion Meds    - MEDICATION INSTRUCTIONS -       - MEDICATION INSTRUCTIONS -       - MEDICATION INSTRUCTIONS -         ALLERGIES:    Allergies   Allergen Reactions     Nkda [No Known Drug Allergies]        REVIEW OF  SYSTEMS:  A comprehensive of systems was unable to be obtained due to AMS.    SOCIAL HISTORY:   Social History     Socioeconomic History     Marital status: Single     Spouse name: Not on file     Number of children: Not on file     Years of education: Not on file     Highest education level: Not on file   Occupational History     Not on file   Tobacco Use     Smoking status: Former Smoker     Packs/day: 1.00     Years: 30.00     Pack years: 30.00     Types: Cigarettes     Start date: 10/10/1975     Quit date: 3/1/2010     Years since quittin.4     Smokeless tobacco: Never Used   Substance and Sexual Activity     Alcohol use: Yes     Alcohol/week: 0.0 standard drinks     Comment: stated 2 bottles of beer occ     Drug use: Not Currently     Comment: past use of marijuana     Sexual activity: Not Currently     Partners: Female     Birth control/protection: Abstinence, Female Surgical   Other Topics Concern     Parent/sibling w/ CABG, MI or angioplasty before 65F 55M? Not Asked      Service Not Asked     Blood Transfusions Not Asked     Caffeine Concern Yes     Occupational Exposure Not Asked     Hobby Hazards Not Asked     Sleep Concern Not Asked     Stress Concern Not Asked     Weight Concern Not Asked     Special Diet Not Asked     Back Care Not Asked     Exercise Yes     Bike Helmet Not Asked     Seat Belt Not Asked     Self-Exams Not Asked   Social History Narrative     Not on file     Social Determinants of Health     Financial Resource Strain:      Difficulty of Paying Living Expenses:    Food Insecurity:      Worried About Running Out of Food in the Last Year:      Ran Out of Food in the Last Year:    Transportation Needs:      Lack of Transportation (Medical):      Lack of Transportation (Non-Medical):    Physical Activity:      Days of Exercise per Week:      Minutes of Exercise per Session:    Stress:      Feeling of Stress :    Social Connections:      Frequency of Communication with Friends  and Family:      Frequency of Social Gatherings with Friends and Family:      Attends [employer] Services:      Active Member of Clubs or Organizations:      Attends Club or Organization Meetings:      Marital Status:    Intimate Partner Violence:      Fear of Current or Ex-Partner:      Emotionally Abused:      Physically Abused:      Sexually Abused:        FAMILY MEDICAL HISTORY:   Family History   Problem Relation Age of Onset     Coronary Artery Disease Father      Heart Disease Father         heart attack age 35-40     Hypertension Mother      Hyperlipidemia Mother      Cerebrovascular Disease Mother      Dementia Mother      Lupus Sister      Glaucoma No family hx of      Macular Degeneration No family hx of        PHYSICAL EXAM:   Temp  Av.8  F (36.6  C)  Min: 97  F (36.1  C)  Max: 98.7  F (37.1  C)      Pulse  Av.6  Min: 37  Max: 112 Resp  Av.5  Min: 14  Max: 25  SpO2  Av.9 %  Min: 87 %  Max: 100 %       BP 96/51 (BP Location: Right arm)   Pulse 83   Temp 97.5  F (36.4  C) (Axillary)   Resp 16   Ht 1.829 m (6')   Wt 70.9 kg (156 lb 4.9 oz)   SpO2 98%   BMI 21.20 kg/m     Date 21 0700 - 21 0659   Shift 6014-9324 6288-5529 3889-5631 24 Hour Total   INTAKE   P.O. 240   240   Shift Total(mL/kg) 240(3.39)   240(3.39)   OUTPUT   Urine 50   50   Shift Total(mL/kg) 50(0.71)   50(0.71)   Weight (kg) 70.9 70.9 70.9 70.9      Admit Weight: 75 kg (165 lb 5.5 oz)     GENERAL APPEARANCE: Awake, in no acute distress, oriented to self only  EYES: no scleral icterus, pupils equal  Endo: no goiter, no moon facies  Lymphatics: no cervical or supraclavicular LAD  Pulmonary: lungs clear to auscultation with equal breath sounds bilaterally, no clubbing  CV: regular rhythm, normal rate, no rub   - JVD not appreciated   - Edema +1  GI: soft, nontender, normal bowel sounds  MS: no evidence of inflammation in joints, no muscle tenderness  : urinal at bedside  SKIN: no rash, warm, dry, no  cyanosis  NEURO: face symmetric, no asterixis     LABS:   CMP  Recent Labs   Lab 08/06/21  0404 08/05/21  1647 08/05/21  0542 08/04/21  1705 08/04/21  0524 08/03/21  0609 08/03/21  0443    135 136 135 135   < > 136   POTASSIUM 4.0 4.7 4.2 4.3 4.0  --  3.5   CHLORIDE 104 106 102 104 104   < > 104   CO2 19* 19* 20 22 19*   < > 21   ANIONGAP 11 10 14 9 12   < > 11   * 96 102* 106* 115*   < > 104*   BUN 93* 31* 89* 89* 88*   < > 95*   CR 4.50* 4.21* 4.20* 3.84* 3.83*   < > 4.12*   GFRESTIMATED 13* 14* 14* 16* 16*   < > 15*   TERI 9.1 8.8 8.7 9.0 8.7   < > 8.1*   MAG 2.3 2.2 2.0 2.1 2.1   < >  --    PROTTOTAL 6.9  --  6.5*  --  6.6*  --  5.9*   ALBUMIN 2.8*  --  2.6*  --  2.7*  --  2.4*   BILITOTAL 0.6  --  0.6  --  0.4  --  0.8   ALKPHOS 66  --  66  --  71  --  65   AST 51*  --  32  --  16  --  12   ALT 23  --  14  --  12  --  10    < > = values in this interval not displayed.     CBC  Recent Labs   Lab 08/06/21  0404 08/05/21  0542 08/04/21  0524 08/03/21  0443   HGB 9.5* 8.8* 9.6* 8.8*   WBC 7.2 5.8 6.8 6.0   RBC 3.31* 3.14* 3.30* 3.05*   HCT 29.7* 28.2* 29.9* 27.3*   MCV 90 90 91 90   MCH 28.7 28.0 29.1 28.9   MCHC 32.0 31.2* 32.1 32.2   RDW 13.8 13.7 13.4 13.2   * 125* 133* 131*     INRNo lab results found in last 7 days.  ABG  Recent Labs   Lab 08/04/21  1028 08/03/21  0444   PH 7.32* 7.31*   PCO2 39 40   PO2 67* 71*   HCO3 20* 20*   O2PER 48 30      URINE STUDIES  Recent Labs   Lab Test 08/04/21  0546 07/27/21  0747 07/09/21  1320 06/16/21  1522 10/18/17  1125   COLOR Light Yellow Yellow Light Yellow Yellow Yellow   APPEARANCE Slightly Cloudy* Slightly Cloudy* Clear Clear Clear   URINEGLC Negative Negative Negative Negative Negative   URINEBILI Negative Negative Negative Negative Negative   URINEKETONE Negative Negative Negative Negative Negative   SG 1.012 1.011 1.010 1.015 1.011   UBLD Large* Moderate* Negative Small* Moderate*   URINEPH 5.5 5.0 5.0 5.5 6.0   PROTEIN 50 * 100 * 10* 100*  Negative   UROBILINOGEN  --   --   --  0.2  --    NITRITE Negative Negative Negative Negative Negative   LEUKEST Large* Large* Moderate* Moderate* Negative   RBCU >182* 42* <1  --  25*   WBCU 86* 45* 12*  --  2     Recent Labs   Lab Test 07/01/21  1500 12/26/18  1335 05/30/18  1034 10/18/17  1125   UTPG 0.91* 0.64* 0.29* 0.19     PTH  Recent Labs   Lab Test 07/30/21  1047 10/18/17  1121   PTHI 13* 29     IRON STUDIES  Recent Labs   Lab Test 07/01/21  1336 10/18/17  1121   IRON 28* 82    285   IRONSAT 12* 29   MIHIR 33 41       IMAGING:  All imaging studies reviewed by me.     Cheyenne West

## 2021-08-06 NOTE — CONSULTS
[facility] GENERAL INFECTIOUS DISEASES CONSULTATION     Patient:  Jimmy Isaac   Date of birth 1960, Medical record number [id number]  Date of Visit:  08/06/2021  Date of Admission: 7/30/2021  Consult Requested by:Ion Lemon MD  Reason for Consult:  complicated UTI without source control          Assessment and Recommendations:   ASSESSMENT:  1. UTI / Pyelonephritis   - Urine culture on 7/27/21 grew >100K CFU/ml Pseudomonas fluorescence/putida group and <10K CFU/ml mixed urogenital efrem. It was pansensitive.    - started on Cefepime 2 gram IV daily on 7/30/21 and completed 6 days of therapy on 8/4/21.   - repeat UC 8/4/21 clean catch - no growth  2. Bilateral kidney stones   3. Moderate to advance left hydronephrosis s/p left percutaneous nephrostomy tube placement on 7/9/21.   4. Complete heart block and admitted on 7/30/21 s/p Pacemaker on 8/2/21.   5. Confusion and oriented to self since 8/4/21 per chart review  - due to adverse effects of medications/ sedatives/ narcotics, Cefepime neurotoxicity / renal failure/ infection   6. Pulmonary scarcoidosis   7. PAH   8. SISI on CKD stage 4    RECOMMENDATION:  - start Meropenem ( pharmacy to dose per CrCl - dose for normal Crcl 1 gram TID )    - check blood cultures x 2 , UA, UC from nephrostomy tube and midstream  - repeat CXR to evaluate for possible pneumonia  - adjust antibiotic per cx result    ID will continue to follow. Plan discussed with Primary team resident    Dr Ruiz pager [phone] will be on call this weekend and Dr Dhaliwal  ( pager [phone]) will take over the Yellow service on Monday    Thank you for allowing us to participate in the care of this patient    Roney Workman MD, M.Med.Sc  Staff, Infectious Diseases   Pager : [phone]         History of Present Illness:     Jimmy Isaac is a 60 year-old male with medical history significant for  PAH secondary to pulmonary sarcoidosis, CKD stage 4,  bilateral kidney stones with moderate to  advance left hydronephrosis s/p left percutaneous nephrostomy tube placement on 7/9/21. Preop work-up for left percutaneous nephrolithotomy revealed complete heart block and admitted on 7/30/21. Pacemaker was placed on 8/2/21.     Urine culture on 7/27/21 grew >100K CFU/ml Pseudomonas fluorescence/putida group and <10K CFU/ml mixed urogenital efrem. It was pansensitive.  He was started on Cefepime 2 gram IV daily on 7/30/21 and completed 6 days of therapy on 8/4/21. repeat UC 8/4/21 clean catch - no growth    Per chart, he became more confused, agitated and oriented to self since 8/4/21. Per RN, he is oriented to self only.   he was on lidocaine patch 8/1-8/4, percocet q4hr PRN since 8/2 , benadryl 50 mg 1x on 8/5, haldol PRN since 8/4/21 , lorazepam 0.5 mg PRN     Antimicrobials:  Cefepime 2 gram IV daily on 7/30/21 -  8/4/21.   Cephalexin 8/5-present     Imaging:   CXR 8/3/21  IMPRESSION:   1. Left chest wall pacemaker with leads projecting over right image and right ventricular apex.   2. High lung volumes with similar appearance of interstitial and patchy airspace opacities likely consistent with scarring setting of  known interstitial lung disease with history of sarcoidosis.    Renal US 8/1/21  FINDINGS:  Right kidney: Measures 10.3 cm in length. Parenchyma is of normal thickness and echogenicity. Multiple echogenic, shadowing calyceal stones are visualized within the right kidney, similar to comparison CT 6/23/2021. No focal mass. No hydronephrosis.   Left kidney: Measures 9.9 cm in length. Parenchyma is of normal thickness and echogenicity. Moderate left hydronephrosis. Shadowing, echogenic stone in the left midpole renal calyx. No focal mass.   Bladder: Not visualized     IMPRESSION:   1.  Moderate left hydronephrosis, similar to CT 6/23/2021. Left percutaneous nephrostomy tube is not visualized.  2.  Bilateral nephrolithiasis, right greater than left.    CT abdomen/pelvis wo contrast 8/1/21  IMPRESSION:    1.  Decreased left hydronephrosis status post interval placement of percutaneous nephrostomy tube. Bilateral renal calculi and large calculus in the proximal left ureter are unchanged.   2.  Compared to prior CT, there is ascites, diffuse mesenteric, retroperitoneal and presacral edema, likely due to fluid third  spacing.  3.  Stable enlarged abdominal lymph nodes, probably reactive.  4.  Stable chronic findings including bullous emphysema of the lung bases, changes of chronic calcific pancreatitis.      Recent culture results include:  Culture Micro   Date Value Ref Range Status   07/09/2021 No growth  Final   11/01/2017 (A)  Final    Light growth  Achromobacter xylosoxidans/denitrificans     11/01/2017 Scedosporium species (A)  Final   11/01/2017 Unable to hold 4 weeks due to overgrowth of fungus  Final          Review of Systems:   CONSTITUTIONAL:  No fevers or chills  EYES: negative for icterus  ENT:  negative for hearing loss, tinnitus and sore throat  RESPIRATORY:  negative for cough with sputum and dyspnea  CARDIOVASCULAR:  negative for chest pain, dyspnea  GASTROINTESTINAL:  negative for nausea, vomiting, diarrhea and constipation  GENITOURINARY:  see HPI   HEME:  No easy bruising  INTEGUMENT:  negative for rash and pruritus  NEURO: see HPI          Past Medical History:     Past Medical History:   Diagnosis Date     Chronic sinusitis      CKD (chronic kidney disease)      Heart disease      Hypertension      Keratosis     frontal scalp, treated with liquid nitrogen at [facility]     Malignant neoplasm (H)      Pneumonia a few times     Pulmonary sarcoidosis (H)             Past Surgical History:     Past Surgical History:   Procedure Laterality Date     COLONOSCOPY       CV RIGHT HEART CATH MEASUREMENTS RECORDED N/A 6/19/2019    Procedure: CV RIGHT HEART CATH;  Surgeon: Kale Delgado MD;  Location:  HEART CARDIAC CATH LAB     CV RIGHT HEART CATH MEASUREMENTS RECORDED N/A 12/11/2019     Procedure: CV RIGHT HEART CATH;  Surgeon: Ion Lemon MD;  Location:  HEART CARDIAC CATH LAB     CV RIGHT HEART CATH MEASUREMENTS RECORDED N/A 2021    Procedure: CV RIGHT HEART CATH;  Surgeon: Jhony Barone MD;  Location:  HEART CARDIAC CATH LAB     ENT SURGERY      wisdom teeth extraction     EP PACEMAKER Left 2021    Procedure: EP Pacemaker;  Surgeon: Dora Fontanez MD;  Location:  HEART CARDIAC CATH LAB     IR NEPHROSTOMY TUBE PLACEMENT LEFT  2021     TONSILLECTOMY  1964            Family History:     Family History   Problem Relation Age of Onset     Coronary Artery Disease Father      Heart Disease Father         heart attack age 35-40     Hypertension Mother      Hyperlipidemia Mother      Cerebrovascular Disease Mother      Dementia Mother      Lupus Sister      Glaucoma No family hx of      Macular Degeneration No family hx of             Social History:     Social History     Tobacco Use     Smoking status: Former Smoker     Packs/day: 1.00     Years: 30.00     Pack years: 30.00     Types: Cigarettes     Start date: 10/10/1975     Quit date: 3/1/2010     Years since quittin.4     Smokeless tobacco: Never Used   Substance Use Topics     Alcohol use: Yes     Alcohol/week: 0.0 standard drinks     Comment: stated 2 bottles of beer occ     History   Sexual Activity     Sexual activity: Not Currently     Partners: Female     Birth control/ protection: Abstinence, Female Surgical            Current Medications (antimicrobials listed in bold):       ambrisentan  10 mg Oral Daily     budesonide  0.25 mg Nebulization BID     cephALEXin  250 mg Oral TID     clotrimazole  1 lozenge Buccal TID     [Held by provider] furosemide  20 mg Oral Daily     heparin ANTICOAGULANT  5,000 Units Subcutaneous Q12H     lidocaine  2 patch Transdermal Q24h    And     lidocaine   Transdermal Q8H     melatonin  5 mg Oral At Bedtime     selexipag  1,600 mcg Oral Q12H     sodium chloride (PF)  3 mL  Intracatheter Q8H     tadalafil  40 mg Oral Daily     tamsulosin  0.4 mg Oral Daily            Allergies:     Allergies   Allergen Reactions     Nkda [No Known Drug Allergies]             Physical Exam:   Vitals were reviewed  Patient Vitals for the past 24 hrs:   BP Temp Temp src Pulse Resp SpO2 Weight   08/06/21 1552 101/60 97.4  F (36.3  C) Oral 100 16 98 % --   08/06/21 1300 -- -- -- -- -- 98 % --   08/06/21 1146 96/51 97.5  F (36.4  C) Axillary 83 16 92 % --   08/06/21 1130 -- -- -- -- -- 95 % --   08/06/21 0728 102/62 98.1  F (36.7  C) Oral 86 18 96 % --   08/06/21 0610 -- -- -- -- -- -- 70.9 kg (156 lb 4.9 oz)   08/06/21 0600 -- -- -- -- -- (!) 88 % --   08/06/21 0500 -- -- -- -- -- 92 % --   08/06/21 0400 -- -- -- -- -- 93 % --   08/06/21 0300 -- -- -- -- -- (!) 87 % --   08/06/21 0200 -- -- -- -- -- 93 % --   08/06/21 0100 -- -- -- -- -- (!) 89 % --   08/06/21 0020 -- 97.5  F (36.4  C) Oral 103 18 91 % --   08/06/21 0000 -- -- -- -- -- 92 % --   08/05/21 2300 -- -- -- -- -- 91 % --   08/05/21 2200 -- -- -- -- -- 94 % --   08/05/21 2100 108/65 98.1  F (36.7  C) Axillary 106 22 96 % --   08/05/21 2000 -- -- -- -- -- 94 % --   08/05/21 1900 -- -- -- -- -- 96 % --     Ranges for his vital signs:  Temp:  [97.4  F (36.3  C)-98.1  F (36.7  C)] 97.4  F (36.3  C)  Pulse:  [] 100  Resp:  [16-22] 16  BP: ()/(51-65) 101/60  SpO2:  [87 %-98 %] 98 %  Physical Examination:  aGENERAL:  awake, lethargy, oriented to self,  in bed in no acute distress.   HEENT:  Head is normocephalic, atraumatic   EYES:  Eyes have anicteric sclerae without conjunctival injection     NECK:  Supple. No  Cervical lymphadenopathy  LUNGS:  Clear to auscultation bilateral, decreased breath sounds in bases  CARDIOVASCULAR:  Regular rate and rhythm with no murmurs, gallops or rubs.  ABDOMEN:  Normal bowel sounds, soft, nontender. No appreciable hepatosplenomegaly right flank tenderness. Left PNT   SKIN:  No acute rashes.    Line(s) are in  place without any surrounding erythema or exudate. No stigmata of endocarditis.  NEUROLOGIC: lethary, oriented to self, able to answer some questions          Laboratory Data:     Inflammatory Markers    Recent Labs   Lab Test 07/01/21  1336 09/22/17  1227 05/16/17  1159 10/16/13  0924   SED  --  8  --   --    CRP 9.2* 5.1 8.0 13.1*     Hematology Studies    Recent Labs   Lab Test 08/06/21  0404 08/05/21  0542 08/04/21  0524 08/03/21  0443 08/02/21  0304 08/01/21  0329 03/05/19  0950 01/09/19  1152 10/18/17  1121 09/22/17  1227 04/12/16  1450 03/29/16  1656   WBC 7.2 5.8 6.8 6.0 5.3 6.3   < > 6.5   < > 5.4 6.5 6.2   ANEU  --   --   --   --   --   --   --  5.0  --  4.2 4.8 4.7   AEOS  --   --   --   --   --   --   --  0.2  --  0.1 0.1 0.1   HGB 9.5* 8.8* 9.6* 8.8* 9.4* 9.8*   < > 13.7  --  14.0 15.5 15.4   MCV 90 90 91 90 90 90   < > 91   < > 93 90 92   * 125* 133* 131* 117* 130*   < > 169   < > 131* 151 159    < > = values in this interval not displayed.     Immune Globulin Studies    Recent Labs   Lab Test 09/22/17  1227   *     Metabolic Studies     Recent Labs   Lab Test 08/06/21  0404 08/05/21  1647 08/05/21  0542 08/04/21  1705 08/04/21  0524    135 136 135 135   POTASSIUM 4.0 4.7 4.2 4.3 4.0   CHLORIDE 104 106 102 104 104   CO2 19* 19* 20 22 19*   BUN 93* 31* 89* 89* 88*   CR 4.50* 4.21* 4.20* 3.84* 3.83*   GFRESTIMATED 13* 14* 14* 16* 16*     Hepatic Studies    Recent Labs   Lab Test 08/06/21  0404 08/05/21  0542 08/04/21  0524 08/03/21  0443 08/02/21  0304 08/01/21  0329   BILITOTAL 0.6 0.6 0.4 0.8 0.4 0.6   ALKPHOS 66 66 71 65 70 67   ALBUMIN 2.8* 2.6* 2.7* 2.4* 2.7* 2.8*   AST 51* 32 16 12 9 10   ALT 23 14 12 10 14 16     Thyroid Studies    Recent Labs   Lab Test 07/30/21  1047   TSH 2.40     Microbiology:  Culture Micro   Date Value Ref Range Status   07/09/2021 No growth  Final   11/01/2017 (A)  Final    Light growth  Achromobacter xylosoxidans/denitrificans     11/01/2017  Scedosporium species (A)  Final   11/01/2017 Unable to hold 4 weeks due to overgrowth of fungus  Final     Urine Studies    Recent Labs   Lab Test 08/04/21  0546 07/27/21  0747 07/09/21  1320 06/16/21  1522 10/18/17  1125   LEUKEST Large* Large* Moderate* Moderate* Negative   WBCU 86* 45* 12*  --  2     Hepatitis C Testing     Hepatitis C Antibody   Date Value Ref Range Status   01/12/2012 (A) NEG Final    Positive   Low sample/cutoff ratio, should do confirmatory testing with another method.

## 2021-08-06 NOTE — PROGRESS NOTES
Pt A&O x1, sitter at beside for delirium, ind in movement  Anxious   Pain noted in neck- prn meds given  Vitals stable, paced  NC 6L  Voiding small amount, also has lest nephrostomy tube  2 GM NA and 2L fluid restriction    Will cont to monitor

## 2021-08-06 NOTE — PLAN OF CARE
"Admitted on 7/30 complete heart block after undergoing a scheduled RHC s/p PPM. Pmhx: PAH 2/2 pulmonary sarcoidosis, history of bilateral kidney stones, and CKD Stage IV     Code status: Full     Team: Cards 2    Neuro: ao*1, 1:1 at bedside for safety   Cardiac/Tele:  V paced 100%, A paced occasionally, Tachy  Respiratory: 6-8L NC, sating mid 90s  GI/: oliguria, urinating via urinal, no BM this shift   Diet/Appetite: 2g Na+ w/ 2L FR   Skin: generalized bruising, blanchable sacrum  LDAs: L PIV sl, nephrotomy tube (Dressing changed)   Activity: assist of 1, up to chair x 3  Pain: Pt unable to specify where pain is, based on assessment pt getting irritable and fighting. Pt wife states \"he is in pain\". Percocet given, and irritable and fidgeting stopped    Plan: UA need to be collected, start dialysis this admission    Pao Guerra, RN  Time cared for 0700 - 1930      "

## 2021-08-06 NOTE — PROGRESS NOTES
8/6/21 at 1050: Called and left message for the [facility] nurses ([phone]) requesting call back to follow-up on request from 8/4/21. Will discuss follow-up for patient and determine if patient is keeping his cardiology care at the [facility] or planning on transferring care to the [facility].  Device clinic phone number provided.  LUCINA Mejia

## 2021-08-07 ENCOUNTER — APPOINTMENT (OUTPATIENT)
Dept: CT IMAGING | Facility: CLINIC | Age: 61
DRG: 242 | End: 2021-08-07
Attending: INTERNAL MEDICINE
Payer: COMMERCIAL

## 2021-08-07 ENCOUNTER — APPOINTMENT (OUTPATIENT)
Dept: GENERAL RADIOLOGY | Facility: CLINIC | Age: 61
DRG: 242 | End: 2021-08-07
Attending: INTERNAL MEDICINE
Payer: COMMERCIAL

## 2021-08-07 LAB
ALBUMIN SERPL-MCNC: 2.9 G/DL (ref 3.4–5)
ALBUMIN UR-MCNC: 200 MG/DL
ALP SERPL-CCNC: 71 U/L (ref 40–150)
ALT SERPL W P-5'-P-CCNC: 22 U/L (ref 0–70)
ANION GAP SERPL CALCULATED.3IONS-SCNC: 10 MMOL/L (ref 3–14)
ANION GAP SERPL CALCULATED.3IONS-SCNC: 10 MMOL/L (ref 3–14)
APPEARANCE UR: ABNORMAL
AST SERPL W P-5'-P-CCNC: 36 U/L (ref 0–45)
BACTERIA #/AREA URNS HPF: ABNORMAL /HPF
BILIRUB SERPL-MCNC: 0.4 MG/DL (ref 0.2–1.3)
BILIRUB UR QL STRIP: NEGATIVE
BUN SERPL-MCNC: 98 MG/DL (ref 7–30)
BUN SERPL-MCNC: 99 MG/DL (ref 7–30)
CALCIUM SERPL-MCNC: 9.3 MG/DL (ref 8.5–10.1)
CALCIUM SERPL-MCNC: 9.3 MG/DL (ref 8.5–10.1)
CHLORIDE BLD-SCNC: 106 MMOL/L (ref 94–109)
CHLORIDE BLD-SCNC: 108 MMOL/L (ref 94–109)
CO2 SERPL-SCNC: 22 MMOL/L (ref 20–32)
CO2 SERPL-SCNC: 22 MMOL/L (ref 20–32)
COLOR UR AUTO: YELLOW
CREAT SERPL-MCNC: 4.72 MG/DL (ref 0.66–1.25)
CREAT SERPL-MCNC: 4.75 MG/DL (ref 0.66–1.25)
CREAT UR-MCNC: 124 MG/DL
ERYTHROCYTE [DISTWIDTH] IN BLOOD BY AUTOMATED COUNT: 14 % (ref 10–15)
FOLATE SERPL-MCNC: 7.3 NG/ML
GFR SERPL CREATININE-BSD FRML MDRD: 12 ML/MIN/1.73M2
GFR SERPL CREATININE-BSD FRML MDRD: 12 ML/MIN/1.73M2
GLUCOSE BLD-MCNC: 89 MG/DL (ref 70–99)
GLUCOSE BLD-MCNC: 98 MG/DL (ref 70–99)
GLUCOSE UR STRIP-MCNC: NEGATIVE MG/DL
HCT VFR BLD AUTO: 30.5 % (ref 40–53)
HGB BLD-MCNC: 9.5 G/DL (ref 13.3–17.7)
HGB UR QL STRIP: ABNORMAL
KETONES UR STRIP-MCNC: ABNORMAL MG/DL
LEUKOCYTE ESTERASE UR QL STRIP: ABNORMAL
MAGNESIUM SERPL-MCNC: 2.5 MG/DL (ref 1.6–2.3)
MAGNESIUM SERPL-MCNC: 2.5 MG/DL (ref 1.6–2.3)
MCH RBC QN AUTO: 28.5 PG (ref 26.5–33)
MCHC RBC AUTO-ENTMCNC: 31.1 G/DL (ref 31.5–36.5)
MCV RBC AUTO: 92 FL (ref 78–100)
MUCOUS THREADS #/AREA URNS LPF: PRESENT /LPF
NITRATE UR QL: NEGATIVE
PH UR STRIP: 6 [PH] (ref 5–7)
PLATELET # BLD AUTO: 135 10E3/UL (ref 150–450)
POTASSIUM BLD-SCNC: 4.2 MMOL/L (ref 3.4–5.3)
POTASSIUM BLD-SCNC: 4.6 MMOL/L (ref 3.4–5.3)
PROT SERPL-MCNC: 7 G/DL (ref 6.8–8.8)
RBC # BLD AUTO: 3.33 10E6/UL (ref 4.4–5.9)
RBC URINE: 84 /HPF
SODIUM SERPL-SCNC: 138 MMOL/L (ref 133–144)
SODIUM SERPL-SCNC: 140 MMOL/L (ref 133–144)
SP GR UR STRIP: 1.02 (ref 1–1.03)
TRANSITIONAL EPI: 1 /HPF
UROBILINOGEN UR STRIP-MCNC: NORMAL MG/DL
UUN UR-MCNC: 354 MG/DL
UUN/CREAT 24H UR: 3 G/G CR
VIT B12 SERPL-MCNC: 723 PG/ML (ref 193–986)
WBC # BLD AUTO: 6.3 10E3/UL (ref 4–11)
WBC CLUMPS #/AREA URNS HPF: PRESENT /HPF
WBC URINE: >182 /HPF

## 2021-08-07 PROCEDURE — 99233 SBSQ HOSP IP/OBS HIGH 50: CPT | Mod: GC | Performed by: INTERNAL MEDICINE

## 2021-08-07 PROCEDURE — 82040 ASSAY OF SERUM ALBUMIN: CPT | Performed by: STUDENT IN AN ORGANIZED HEALTH CARE EDUCATION/TRAINING PROGRAM

## 2021-08-07 PROCEDURE — 71045 X-RAY EXAM CHEST 1 VIEW: CPT | Mod: 26 | Performed by: RADIOLOGY

## 2021-08-07 PROCEDURE — 82607 VITAMIN B-12: CPT | Performed by: STUDENT IN AN ORGANIZED HEALTH CARE EDUCATION/TRAINING PROGRAM

## 2021-08-07 PROCEDURE — 70450 CT HEAD/BRAIN W/O DYE: CPT | Mod: 26 | Performed by: RADIOLOGY

## 2021-08-07 PROCEDURE — 250N000013 HC RX MED GY IP 250 OP 250 PS 637: Performed by: STUDENT IN AN ORGANIZED HEALTH CARE EDUCATION/TRAINING PROGRAM

## 2021-08-07 PROCEDURE — 82746 ASSAY OF FOLIC ACID SERUM: CPT | Performed by: STUDENT IN AN ORGANIZED HEALTH CARE EDUCATION/TRAINING PROGRAM

## 2021-08-07 PROCEDURE — 214N000001 HC R&B CCU UMMC

## 2021-08-07 PROCEDURE — 87086 URINE CULTURE/COLONY COUNT: CPT | Performed by: PHYSICIAN ASSISTANT

## 2021-08-07 PROCEDURE — 83735 ASSAY OF MAGNESIUM: CPT | Performed by: STUDENT IN AN ORGANIZED HEALTH CARE EDUCATION/TRAINING PROGRAM

## 2021-08-07 PROCEDURE — 84425 ASSAY OF VITAMIN B-1: CPT | Performed by: INTERNAL MEDICINE

## 2021-08-07 PROCEDURE — 99233 SBSQ HOSP IP/OBS HIGH 50: CPT | Mod: 24 | Performed by: PHYSICIAN ASSISTANT

## 2021-08-07 PROCEDURE — 250N000013 HC RX MED GY IP 250 OP 250 PS 637: Performed by: NURSE PRACTITIONER

## 2021-08-07 PROCEDURE — 999N000157 HC STATISTIC RCP TIME EA 10 MIN

## 2021-08-07 PROCEDURE — 99233 SBSQ HOSP IP/OBS HIGH 50: CPT | Mod: GC | Performed by: STUDENT IN AN ORGANIZED HEALTH CARE EDUCATION/TRAINING PROGRAM

## 2021-08-07 PROCEDURE — 250N000009 HC RX 250: Performed by: STUDENT IN AN ORGANIZED HEALTH CARE EDUCATION/TRAINING PROGRAM

## 2021-08-07 PROCEDURE — 250N000011 HC RX IP 250 OP 636: Performed by: INTERNAL MEDICINE

## 2021-08-07 PROCEDURE — 36415 COLL VENOUS BLD VENIPUNCTURE: CPT | Performed by: STUDENT IN AN ORGANIZED HEALTH CARE EDUCATION/TRAINING PROGRAM

## 2021-08-07 PROCEDURE — 85027 COMPLETE CBC AUTOMATED: CPT | Performed by: STUDENT IN AN ORGANIZED HEALTH CARE EDUCATION/TRAINING PROGRAM

## 2021-08-07 PROCEDURE — 71045 X-RAY EXAM CHEST 1 VIEW: CPT

## 2021-08-07 PROCEDURE — 250N000013 HC RX MED GY IP 250 OP 250 PS 637

## 2021-08-07 PROCEDURE — 81001 URINALYSIS AUTO W/SCOPE: CPT | Performed by: PHYSICIAN ASSISTANT

## 2021-08-07 PROCEDURE — 250N000011 HC RX IP 250 OP 636: Performed by: STUDENT IN AN ORGANIZED HEALTH CARE EDUCATION/TRAINING PROGRAM

## 2021-08-07 PROCEDURE — 70450 CT HEAD/BRAIN W/O DYE: CPT

## 2021-08-07 RX ORDER — MEROPENEM 500 MG/1
500 INJECTION, POWDER, FOR SOLUTION INTRAVENOUS EVERY 12 HOURS
Status: DISCONTINUED | OUTPATIENT
Start: 2021-08-07 | End: 2021-08-09

## 2021-08-07 RX ADMIN — CLOTRIMAZOLE 1 LOZENGE: 10 LOZENGE ORAL at 20:10

## 2021-08-07 RX ADMIN — OXYCODONE HYDROCHLORIDE AND ACETAMINOPHEN 1 TABLET: 5; 325 TABLET ORAL at 10:32

## 2021-08-07 RX ADMIN — CEPHALEXIN 250 MG: 250 CAPSULE ORAL at 20:10

## 2021-08-07 RX ADMIN — HEPARIN SODIUM 5000 UNITS: 10000 INJECTION, SOLUTION INTRAVENOUS; SUBCUTANEOUS at 20:10

## 2021-08-07 RX ADMIN — LIDOCAINE 2 PATCH: 560 PATCH PERCUTANEOUS; TOPICAL; TRANSDERMAL at 15:30

## 2021-08-07 RX ADMIN — CEPHALEXIN 250 MG: 250 CAPSULE ORAL at 09:04

## 2021-08-07 RX ADMIN — CLOTRIMAZOLE 1 LOZENGE: 10 LOZENGE ORAL at 01:12

## 2021-08-07 RX ADMIN — BUDESONIDE 0.25 MG: 0.25 INHALANT RESPIRATORY (INHALATION) at 20:47

## 2021-08-07 RX ADMIN — Medication 5 MG: at 21:36

## 2021-08-07 RX ADMIN — TADALAFIL 40 MG: 20 TABLET, FILM COATED ORAL at 09:04

## 2021-08-07 RX ADMIN — AMBRISENTAN 10 MG: 10 TABLET, FILM COATED ORAL at 09:04

## 2021-08-07 RX ADMIN — OXYCODONE HYDROCHLORIDE AND ACETAMINOPHEN 1 TABLET: 5; 325 TABLET ORAL at 01:12

## 2021-08-07 RX ADMIN — TAMSULOSIN HYDROCHLORIDE 0.4 MG: 0.4 CAPSULE ORAL at 09:04

## 2021-08-07 RX ADMIN — HEPARIN SODIUM 5000 UNITS: 10000 INJECTION, SOLUTION INTRAVENOUS; SUBCUTANEOUS at 09:04

## 2021-08-07 RX ADMIN — SELEXIPAG 1600 MCG: 200 TABLET, COATED ORAL at 20:10

## 2021-08-07 RX ADMIN — CEPHALEXIN 250 MG: 250 CAPSULE ORAL at 15:30

## 2021-08-07 RX ADMIN — MEROPENEM 500 MG: 500 INJECTION, POWDER, FOR SOLUTION INTRAVENOUS at 15:29

## 2021-08-07 RX ADMIN — BUDESONIDE 0.25 MG: 0.25 INHALANT RESPIRATORY (INHALATION) at 09:26

## 2021-08-07 RX ADMIN — MEROPENEM 500 MG: 500 INJECTION, POWDER, FOR SOLUTION INTRAVENOUS at 04:01

## 2021-08-07 RX ADMIN — SELEXIPAG 1600 MCG: 200 TABLET, COATED ORAL at 09:03

## 2021-08-07 ASSESSMENT — ACTIVITIES OF DAILY LIVING (ADL)
ADLS_ACUITY_SCORE: 15

## 2021-08-07 ASSESSMENT — MIFFLIN-ST. JEOR: SCORE: 1545

## 2021-08-07 NOTE — PROGRESS NOTES
"     [facility] General ID Service: Follow Up Note      Patient:  Jimmy Isaac   Date of birth 1960, Medical record number [id number]  Date of Visit:  08/07/2021  Date of Admission: 7/30/2021         Assessment and Recommendations:   ID Problem List:  1. Complicated UTI/Pyelonephritis  - 7/27/21 with >100K Pseudomonas fluorescence/putida group (pansensitive) and mixed efrem; tx with 6 days of cefepime  - 8/4/21 cx with no growth  2. Bilateral renal calculi  3. Left hydronephrosis s/p nephrostomy 7/9/21  4. Encephalopathy  5. Pulmonary sarcoidosis   6. Complete heart block s/p pacemaker 8/2/21  7. PAH  8. SISI on CKD4      Recommendations:  1. Continue meropenem 500mg q12hrs (renally adjusted)  2. Added UCx to \"clean catch\" sample from 8/6  3. Send UA with reflex to Cx from L nephrostomy tube, will interpret with caution as taken from tube  4. OK with steroid if felt to be necessary; patient remains afebrile with relatively stable vitals (last pressure soft) and WBC WNL   5. Following previously collected BCx    Discussion:    Jimmy Isaac is a 60 year old male with history of PAH secondary to pulmonary sarcoidosis, CKD stage 4, bilateral staghorn renal calculi with moderate to severe left hydronephrosis s/p left percutaneous nephrostomy tube placement (7/9/21), admitted for complete heart block on 7/30/21 and now s/p pacemaker placement 8/2/21.    Urine culture 7/27 with >100K CFU/ml Pseudomonas fluorescence/putida group (pansensitive) and <10K CFU/ml mixed urogenital efrem. Was treated with 6 days of cefepime, UCx on 8/4/21 with no growth. Was transitioned to Keflex per post-pacemaker procedure. Became more confused and agitated, oriented only to self since then. Has remained afebrile and without leukocytosis. UA was repeated on 8/6 and demonstrated increased WBC from 86 to >182 and new findings of few bacteria and WBC clumps present. Worsening UA concerning for infection. Suspect ongoing complex UTI with " "colonization of kidney stones, which may serve as an ongoing nidus for infection. Was started on meropenem, agree with continuing. Have requested UCx be added to previous sample and would send new UA/UCx from nephrostomy tube; will need to interpret with caution as collecting from existing tubing.     Cardiology anticipates starting steroid for sarcoidosis; OK with starting if felt to be necessary. Is currently on abx that will cover previously cultured organism, afebrile, vitals mostly stable aside from previous soft blood pressure. He is alert but remains quite encephalopathic and oriented to self only, easily distractible. CT head WNL. CXR without infiltrate. Following previously collected BCx.    Recs were discussed with primary team today. Don't hesitate to call with questions.     Attestation:  I have reviewed today's vital signs, medications, labs and imaging.  Ioana Shelton PA-C, Pager # [phone]            Interval History:     States he \"could be better\" today. Easily distracted during visit. Denies pain and nausea. Touches O2 mask and states \"I just need some oxygen.\" When asked additional questions states \"sorry, I was just looking at that picture [on the wall]\" and \"what's cards 2?\" referring to white board. Sats have been mid to high-90's on 4-6L today. No stools charted today. Clear yellow output from L nephrostomy.          Review of Systems:   Limited by encephalopathy.          Current Antimicrobials   Current:  - meropenem (8/7-present)    Prior:  - Keflex (8/5-8/7)  - cefepime (7/30-8/4)  - cefazolin (8/2)         Physical Exam:   Ranges for vital signs:  Temp:  [97.4  F (36.3  C)-98.8  F (37.1  C)] 98.5  F (36.9  C)  Pulse:  [] 100  Resp:  [16-20] 16  BP: ()/(49-65) 84/55  SpO2:  [91 %-100 %] 95 %    Intake/Output Summary (Last 24 hours) at 8/7/2021 1531  Last data filed at 8/7/2021 1200  Gross per 24 hour   Intake 240 ml   Output 865 ml   Net -625 ml     Exam:  GENERAL:  Awake, alert, " oriented to self only. Pleasant and interactive. Easily distracted leading to limited participation in exam.   ENT:  Head is normocephalic, atraumatic. Oropharynx is moist without exudates or ulcers.  EYES:  Eyes have anicteric sclerae, noninjected conjunctivae.    LUNGS:  Clear to auscultation.  CARDIOVASCULAR:  Regular rate and rhythm with no murmurs, gallops or rubs.  ABDOMEN:  Normal bowel sounds, soft, nontender.  : left nephrostomy tube with clear yellow output. Unable to assess for CVA tenderness at this time.  EXT: Extremities warm and without edema.  SKIN:  No acute rashes. Pacemaker pocket healing well. PIV x2 in place without any surrounding erythema.  NEUROLOGIC:  Oriented x1. Awake and alert. Speech clear. No tremor.          Laboratory Data:   Reviewed.  Pertinent for:    Culture data:  Microbiology:  Culture   Date Value Ref Range Status   08/06/2021 No growth after 12 hours  Preliminary   08/06/2021 No growth after 12 hours  Preliminary   08/04/2021 No Growth  Final   07/27/2021 (A)  Final    >100,000 CFU/mL Pseudomonas fluorescens/putida group   07/27/2021 <10,000 CFU/mL Mixture of urogenital efrem  Final       Inflammatory Markers    Recent Labs   Lab Test 07/01/21  1336 09/22/17  1227 05/16/17  1159 10/16/13  0924   SED  --  8  --   --    CRP 9.2* 5.1 8.0 13.1*       Hematology Studies    Recent Labs   Lab Test 08/07/21  0625 08/06/21  0404 08/05/21  0542 08/04/21  0524   WBC 6.3 7.2 5.8 6.8   HGB 9.5* 9.5* 8.8* 9.6*   MCV 92 90 90 91   * 132* 125* 133*     Recent Labs   Lab Test 01/09/19  1152 09/22/17  1227 04/12/16  1450 03/29/16  1656   ANEU 5.0 4.2 4.8 4.7   AEOS 0.2 0.1 0.1 0.1       Metabolic Studies     Recent Labs   Lab Test 08/07/21  0625 08/06/21  1708 08/06/21  0404 08/05/21  1647 12/26/18  1318 10/29/18  1308 10/18/17  1121 09/22/17  1227    138 134 135   < > 138  --  140   POTASSIUM 4.6 4.4 4.0 4.7   < > 3.8  --  4.1   CHLORIDE 108 106 104 106   < > 102  --  103   CO2  22 27 19* 19*   < > 28  --  30   BUN 99* 95* 93* 31*   < > 21  --  24   CR 4.75* 4.56* 4.50* 4.21*   < > 1.31*  --  1.49*   GFRESTIMATED 12* 13* 13* 14*   < > 56*  --  48*   ANIONGAP 10 5 11 10   < > 8  --  7   A1C  --   --   --   --   --  5.8*   < >  --    CKT  --   --   --   --   --   --   --  79    < > = values in this interval not displayed.       Hepatic Studies    Recent Labs   Lab Test 08/07/21  0625 08/06/21  0404 08/05/21  0542   BILITOTAL 0.4 0.6 0.6   ALKPHOS 71 66 66   ALBUMIN 2.9* 2.8* 2.6*   AST 36 51* 32   ALT 22 23 14       Urine Studies     Recent Labs   Lab Test 08/06/21 2022 08/04/21  0546 07/27/21  0747 07/09/21  1320   URINEPH 5.5 5.5 5.0 5.0   NITRITE Negative Negative Negative Negative   LEUKEST Large* Large* Large* Moderate*   WBCU >182* 86* 45* 12*       Last check of C difficile  No results found for: CDBPCT    Drug Level Monitoring  No lab results found.    Invalid input(s): CLAUDIA    COVID-19 Testing  Recent Labs   Lab Test 07/05/21 1332 07/06/20 0927 06/25/20  1338   [id number] Nasopharyngeal  --   --    SARSCOVRES NEGATIVE  --   --    [id number] Nasopharyngeal  --   --    [id number] Test received-See reflex to IDDL test SARS CoV2 (COVID-19) Virus RT-PCR  --   --    COVIDPCREXT  --  Undetected Undetected       Hepatitis B Testing No lab results found.    Hepatitis C Testing     Hepatitis C Antibody   Date Value Ref Range Status   01/12/2012 (A) NEG Final    Positive   Low sample/cutoff ratio, should do confirmatory testing with another method.       COVID-19 Testing  Recent Labs   Lab Test 07/05/21 1332 07/06/20 0927 06/25/20  1338   [id number] Nasopharyngeal  --   --    SARSCOVRES NEGATIVE  --   --    [id number] Nasopharyngeal  --   --    [id number] Test received-See reflex to IDDL test SARS CoV2 (COVID-19) Virus RT-PCR  --   --    COVIDPCREXT  --  Undetected Undetected            Imaging:     CXR (8/7/2021)  IMPRESSION: Patchy and streaky pulmonary opacities grossly similar  from  8/3/2021 likely indicating underlying sarcoidosis given the  patient's history with no obvious acute infiltrate/consolidation.  Pacemaker. Atherosclerosis.    CT Abd/Pelvis (8/1/21)  MPRESSION:   1.  Decreased left hydronephrosis status post interval placement of  percutaneous nephrostomy tube. Bilateral renal calculi and large  calculus in the proximal left ureter are unchanged.   2.  Compared to prior CT, there is ascites, diffuse mesenteric,  retroperitoneal and presacral edema, likely due to fluid third  spacing.  3.  Stable enlarged abdominal lymph nodes, probably reactive.  4.  Stable chronic findings including bullous emphysema of the lung  bases, changes of chronic calcific pancreatitis.

## 2021-08-07 NOTE — PLAN OF CARE
"Admitted on 7/30 complete heart block after undergoing a scheduled RHC s/p PPM. Pmhx: PAH 2/2 pulmonary sarcoidosis, history of bilateral kidney stones, and CKD Stage IV     Code status: Full     Team: Cards 2  Changed: discontinued 1:1, pt more alert and having more coherent conversion. Pt able to verbalize and demonstrate the use of call light. Pt sound \"stuffy\" and states his \"sinsus are stuck\".     Neuro: ao*3-4  Cardiac/Tele:  V paced 100%, A paced occasionally, Tachy  Respiratory: 6-8L NC, sating mid 90s  GI/: oliguria, urinating via urinal, no BM this shift   Diet/Appetite: 2g Na+ w/ 2L FR  Skin: generalized bruising, blanchable sacrum, upper L chest pacemaker dressing changed, increase in edema in LE  LDAs: L PIV sl, nephrotomy tube (Dressing changed)   Activity: assist of 1, up to chair x 2   Pain: Two lidocaine patches located on lower back, one percocet given when pt was agitated     Pao Guerra, RN  Time cared for 0700 - 1930      "

## 2021-08-07 NOTE — PLAN OF CARE
D: Pt admit 7/30/21 for CHB after RHC s/p PPM 8/2/21. 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 Pt arouses to voice/opens eyes spontaneously. Pt following directions. Bedside attendant for increased safety.   VS: VSS. 6-8L NC  Tele: 100% AV/V Paced  Pain: PRN percocet x1 for increased agitation indicating pain per report.   GI/: Urinating into bedside urinal. Urine samples sent to lab, antibiotics started q24hr for UTI. No BM this shift.  Diet: 2g Na w/2L FR  IV/Drips: L PIV SL  Activity: A x1  Skin/drains: L nephrostomy tube CDI, output in flowsheets    P: Plan to begin dialysis this admission time TBD. Continue to monitor pt status and report changes to Cards 2.     Ermelinda Pineda RN

## 2021-08-07 NOTE — PROGRESS NOTES
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 A pacemaker was placed 8/2/21 due to persistent heart block.    Patient has become delirious and he has been propertely diuresed.     Plan for today:  - Holding PTA PO 20 mg Lasix  - Renal Consult: Ordered U/A with micro, Random urine protein, FeUrea, and urine sodium, along with consenting patient for dialysis. Will continue to follow with further recommendations.  - ID Consult: Started IV meropenem (8/6/21), collected blood cultures, and repeat CXR (negative for new infection)  - Continue PTA medications for PAH of budenoside inhaler, ambrisentan, tadalafil, and selexipag   - At baseline home O2 already (currently at 6L)   - Schedule seroquel and nighttime melatonin. Will try to avoid other medications with non-pharmacologic strategies as making sure patient environment reflects the time of day, maintain lights on while day and turn off while night, avoiding disruptions overnight night. Will continue 1:1 sitter.  - Postop prophylaxis with keflex tid for 5 days. (post pacemaker - Today is day 3/5)    # Complete Heart Block (New 7/30/21)  Patient was admitted for complete heart block. He was found to be hypervolemic on exam and had NT proBNP 28K. His PTA digoxin was discontinue at admission. TSH normal.   He has h/O pulmonary sarcoidosis with last PET scan in 2017 and cardiac MRI in 2012, that might be impacting also heart and conduction  - Successful pacemaker placed on 8/2/21.   - Starting postop prophylaxis with keflex tid for 5 days. Today is day 3 of TX (post pacemaker)    # PAH 2/2 Pulmonary Sarcoidosis,   # Acute on Chronic Hypoxic Respiratory Failure (at home O2 of 4.5 L at rest and  8-10 L with  exertion)  # LVEF 55-60%   # RV function mild/moderately reduced.   Patient has history of pulmonary HTN. His last RHC 7/30/21 showed RA - 12, RV - 77/16, PA - 77/20/38, PCWP - 17,   Lesia CO/CI - 4.62/2.36, this translates to mild elevation of right and left filling pressures, moderate elevation of pulmonary artery hypertension but normal LV cardiac output level. This was compatible with initial vol status assessment and reassure patient required diuresis.   - vol status: euvolemic. New dry weight 162 lbs.   - Holding PTA po laxis 20mg daily.   - PTA ambrisentan 10mg daily, selexipag 1600 mcg q12 hrs.   - PTA tadalafil  - PTA budesonide inh   - Outpatient PET Scan to evaluate sarcoidosis    # Delirium (New 8/3/21) likely secondary to lack of resting and hospital stay vs. Uremia vs. Persistent complicated UTI vs. S/E of IV cefepime  Patient become delirious over the night of 8/3/21. He become aggressive, so haldol, seroquel, and ativan was given. Likely this is related to no well resting. He has been confused but not aggressive over last night.   - Non-pharmacologic therapy will be preferred: keeping lights on during the day and off overnight. Will avoid disruption overnight. 1:1 sitter while confused today.   - Melatonin 5 mg at bedtime  - ID and Renal evaluating source control and treatment of complicated UTI  - CT Head w/o Contrast (8/7/21): Pending    # Complicated Urinary Tract Infection with Pan-Senstive Pseudomonas  Urine Culture from 7/27/21 grew pan sensitive pseudomona. Likely a contaminant but we have been treating it since 7/30.   # Bilateral staghorn calculi and L ureter stones. C/B Left hydronephrosis s/p percutaneous drainage (CT 8/1/21).    - Treated with IV cefepime for a total of 6 days (7/30 - 8/4). Specially when U/A from 8/4/21, no concerning for infection.    - U/A (8/6/21): Positive for infection, >182 RBC/WBC's, positive for leukocyte esterase and few bacteria. Concerning for persistent  complicated UTI and lack of source control   - CXR (8/7/21): No new infection, similar to CXR done on 8/3/21    # SISI on CKD Stage IV   Likely multifactorial secondary to cardiorenal and some degree of obstructive uropathy.   Patient has been evaluated by urology during this admission. CT abdomen showed slightly improvement of hydronephrosis and. Urology do not recommend further management of obstructive uropathy at this point.   Initially difficult to diuresed, but with increased diuretics (furosemide IV 80 mg BID) he was able to achieved euvolemic state.   New baseline Cr ~ 3 (only two lab values for the last year prior to admission).   - Today, creatinine still increasing. Renal consult pending for recommendations   - Urology will follow patient as an outpatient.      # BPH, continue PTA Tamsulosin 0.4 PTA     DVT Prophylaxis: Enoxaparin (Lovenox) subcutaneous   Chong Catheter: removed.   Code Status: Full Code  Lines: PIV x2, Left nephrostomy tube     Patient discussed with staff attending, Dr. Wheat.     Sathya Short MD  Internal Medicine Resident PGY-1  Pager: [phone]      Interval History   NAEO. Patient orientated to self, not aggressive, and doing well but struggling getting sleep overnight.    Otherwise he denies diarrhea, fevers, chills, nausea, vomiting, headaches, dizziness, or skin changes.    Physical Exam   Temp: 98.5  F (36.9  C) Temp src: Oral BP: (!) 84/55 Pulse: 100   Resp: 16 SpO2: 95 % O2 Device: Nasal cannula with humidification Oxygen Delivery: 6 LPM  Vitals:    08/03/21 0645 08/06/21 0610 08/07/21 0700   Weight: 73.5 kg (162 lb 1.6 oz) 70.9 kg (156 lb 4.9 oz) 70.2 kg (154 lb 12.2 oz)     Vital Signs with Ranges  Temp:  [97.4  F (36.3  C)-98.8  F (37.1  C)] 98.5  F (36.9  C)  Pulse:  [] 100  Resp:  [16-20] 16  BP: ()/(49-65) 84/55  SpO2:  [91 %-100 %] 95 %  I/O last 3 completed shifts:  In: 480 [P.O.:480]  Out: 690 [Urine:690]     , Blood pressure (!) 84/55, pulse 100,  temperature 98.5  F (36.9  C), temperature source Oral, resp. rate 16, height 1.829 m (6'), weight 70.2 kg (154 lb 12.2 oz), SpO2 95 %.  154 lbs 12.21 oz  GEN:  Calm, slow speaking  CV:  Regular rate and rhythm, no murmur.  LUNGS:   Able to move oxygen well with deep breaths.  ABD:  Active bowel sounds, soft, non-tender/non-distended.  No rebound/guarding/rigidity.  EXT:  No lower extremity edema, palpable pulses    Medications     - MEDICATION INSTRUCTIONS -       - MEDICATION INSTRUCTIONS -       - MEDICATION INSTRUCTIONS -         ambrisentan  10 mg Oral Daily     budesonide  0.25 mg Nebulization BID     cephALEXin  250 mg Oral TID     clotrimazole  1 lozenge Buccal TID     [Held by provider] furosemide  20 mg Oral Daily     heparin ANTICOAGULANT  5,000 Units Subcutaneous Q12H     lidocaine  2 patch Transdermal Q24h    And     lidocaine   Transdermal Q8H     melatonin  5 mg Oral At Bedtime     meropenem  500 mg Intravenous Q12H     selexipag  1,600 mcg Oral Q12H     sodium chloride (PF)  3 mL Intracatheter Q8H     tadalafil  40 mg Oral Daily     tamsulosin  0.4 mg Oral Daily       Data   Recent Labs   Lab 08/07/21  0625 08/06/21  1708 08/06/21  0404 08/05/21  0542   WBC 6.3  --  7.2 5.8   HGB 9.5*  --  9.5* 8.8*   MCV 92  --  90 90   *  --  132* 125*    138 134 136   POTASSIUM 4.6 4.4 4.0 4.2   CHLORIDE 108 106 104 102   CO2 22 27 19* 20   BUN 99* 95* 93* 89*   CR 4.75* 4.56* 4.50* 4.20*   ANIONGAP 10 5 11 14   TERI 9.3 9.2 9.1 8.7   GLC 89 125* 105* 102*   ALBUMIN 2.9*  --  2.8* 2.6*   PROTTOTAL 7.0  --  6.9 6.5*   BILITOTAL 0.4  --  0.6 0.6   ALKPHOS 71  --  66 66   ALT 22  --  23 14   AST 36  --  51* 32       Recent Results (from the past 24 hour(s))   XR Chest Port 1 View    Narrative    Portable chest    INDICATION: Rule out possible pneumonia    Comparisons: 8/3/2021    Findings: Heart size appears normal. Dual-lead left subclavian  transvenous approach pacemaker appears unchanged. Bilateral  perihilar  predominant patchy and somewhat streaky opacities appears similar.  Bony structures appear grossly intact. There is aortic arch  atherosclerosis.      Impression    IMPRESSION: Patchy and streaky pulmonary opacities grossly similar  from 8/3/2021 likely indicating underlying sarcoidosis given the  patient's history with no obvious acute infiltrate/consolidation.  Pacemaker. Atherosclerosis.    GISELA MACK MD         SYSTEM ID:  [id number]

## 2021-08-08 LAB
ALBUMIN SERPL-MCNC: 2.5 G/DL (ref 3.4–5)
ALBUMIN SERPL-MCNC: 2.8 G/DL (ref 3.4–5)
ALP SERPL-CCNC: 61 U/L (ref 40–150)
ALP SERPL-CCNC: 69 U/L (ref 40–150)
ALT SERPL W P-5'-P-CCNC: 18 U/L (ref 0–70)
ALT SERPL W P-5'-P-CCNC: 19 U/L (ref 0–70)
ANION GAP SERPL CALCULATED.3IONS-SCNC: 7 MMOL/L (ref 3–14)
ANION GAP SERPL CALCULATED.3IONS-SCNC: 8 MMOL/L (ref 3–14)
AST SERPL W P-5'-P-CCNC: 22 U/L (ref 0–45)
AST SERPL W P-5'-P-CCNC: 25 U/L (ref 0–45)
BACTERIA UR CULT: NO GROWTH
BILIRUB SERPL-MCNC: 0.3 MG/DL (ref 0.2–1.3)
BILIRUB SERPL-MCNC: 0.4 MG/DL (ref 0.2–1.3)
BUN SERPL-MCNC: 102 MG/DL (ref 7–30)
BUN SERPL-MCNC: 103 MG/DL (ref 7–30)
CALCIUM SERPL-MCNC: 8.9 MG/DL (ref 8.5–10.1)
CALCIUM SERPL-MCNC: 9 MG/DL (ref 8.5–10.1)
CHLORIDE BLD-SCNC: 104 MMOL/L (ref 94–109)
CHLORIDE BLD-SCNC: 105 MMOL/L (ref 94–109)
CO2 SERPL-SCNC: 22 MMOL/L (ref 20–32)
CO2 SERPL-SCNC: 25 MMOL/L (ref 20–32)
CREAT SERPL-MCNC: 4.78 MG/DL (ref 0.66–1.25)
CREAT SERPL-MCNC: 4.8 MG/DL (ref 0.66–1.25)
ERYTHROCYTE [DISTWIDTH] IN BLOOD BY AUTOMATED COUNT: 13.8 % (ref 10–15)
GFR SERPL CREATININE-BSD FRML MDRD: 12 ML/MIN/1.73M2
GFR SERPL CREATININE-BSD FRML MDRD: 12 ML/MIN/1.73M2
GLUCOSE BLD-MCNC: 120 MG/DL (ref 70–99)
GLUCOSE BLD-MCNC: 92 MG/DL (ref 70–99)
HCT VFR BLD AUTO: 26.3 % (ref 40–53)
HGB BLD-MCNC: 8.1 G/DL (ref 13.3–17.7)
MAGNESIUM SERPL-MCNC: 2.4 MG/DL (ref 1.6–2.3)
MAGNESIUM SERPL-MCNC: 2.5 MG/DL (ref 1.6–2.3)
MCH RBC QN AUTO: 28.1 PG (ref 26.5–33)
MCHC RBC AUTO-ENTMCNC: 30.8 G/DL (ref 31.5–36.5)
MCV RBC AUTO: 91 FL (ref 78–100)
PLATELET # BLD AUTO: 115 10E3/UL (ref 150–450)
POTASSIUM BLD-SCNC: 4.4 MMOL/L (ref 3.4–5.3)
POTASSIUM BLD-SCNC: 4.5 MMOL/L (ref 3.4–5.3)
PROT SERPL-MCNC: 6.3 G/DL (ref 6.8–8.8)
PROT SERPL-MCNC: 6.5 G/DL (ref 6.8–8.8)
RBC # BLD AUTO: 2.88 10E6/UL (ref 4.4–5.9)
SARS-COV-2 RNA RESP QL NAA+PROBE: NEGATIVE
SODIUM SERPL-SCNC: 135 MMOL/L (ref 133–144)
SODIUM SERPL-SCNC: 136 MMOL/L (ref 133–144)
WBC # BLD AUTO: 5.5 10E3/UL (ref 4–11)

## 2021-08-08 PROCEDURE — 94640 AIRWAY INHALATION TREATMENT: CPT | Mod: 76

## 2021-08-08 PROCEDURE — 84450 TRANSFERASE (AST) (SGOT): CPT

## 2021-08-08 PROCEDURE — 83735 ASSAY OF MAGNESIUM: CPT | Performed by: STUDENT IN AN ORGANIZED HEALTH CARE EDUCATION/TRAINING PROGRAM

## 2021-08-08 PROCEDURE — 36415 COLL VENOUS BLD VENIPUNCTURE: CPT | Performed by: STUDENT IN AN ORGANIZED HEALTH CARE EDUCATION/TRAINING PROGRAM

## 2021-08-08 PROCEDURE — 250N000011 HC RX IP 250 OP 636: Performed by: INTERNAL MEDICINE

## 2021-08-08 PROCEDURE — 250N000011 HC RX IP 250 OP 636: Performed by: STUDENT IN AN ORGANIZED HEALTH CARE EDUCATION/TRAINING PROGRAM

## 2021-08-08 PROCEDURE — 250N000013 HC RX MED GY IP 250 OP 250 PS 637: Performed by: STUDENT IN AN ORGANIZED HEALTH CARE EDUCATION/TRAINING PROGRAM

## 2021-08-08 PROCEDURE — 85014 HEMATOCRIT: CPT | Performed by: STUDENT IN AN ORGANIZED HEALTH CARE EDUCATION/TRAINING PROGRAM

## 2021-08-08 PROCEDURE — 999N000157 HC STATISTIC RCP TIME EA 10 MIN

## 2021-08-08 PROCEDURE — 250N000013 HC RX MED GY IP 250 OP 250 PS 637

## 2021-08-08 PROCEDURE — 82040 ASSAY OF SERUM ALBUMIN: CPT | Performed by: STUDENT IN AN ORGANIZED HEALTH CARE EDUCATION/TRAINING PROGRAM

## 2021-08-08 PROCEDURE — 36415 COLL VENOUS BLD VENIPUNCTURE: CPT

## 2021-08-08 PROCEDURE — 84155 ASSAY OF PROTEIN SERUM: CPT

## 2021-08-08 PROCEDURE — 214N000001 HC R&B CCU UMMC

## 2021-08-08 PROCEDURE — U0005 INFEC AGEN DETEC AMPLI PROBE: HCPCS

## 2021-08-08 PROCEDURE — 99233 SBSQ HOSP IP/OBS HIGH 50: CPT | Mod: GC | Performed by: INTERNAL MEDICINE

## 2021-08-08 PROCEDURE — 99233 SBSQ HOSP IP/OBS HIGH 50: CPT | Mod: GC | Performed by: STUDENT IN AN ORGANIZED HEALTH CARE EDUCATION/TRAINING PROGRAM

## 2021-08-08 PROCEDURE — 250N000009 HC RX 250: Performed by: STUDENT IN AN ORGANIZED HEALTH CARE EDUCATION/TRAINING PROGRAM

## 2021-08-08 RX ORDER — FUROSEMIDE 10 MG/ML
80 INJECTION INTRAMUSCULAR; INTRAVENOUS ONCE
Status: COMPLETED | OUTPATIENT
Start: 2021-08-08 | End: 2021-08-08

## 2021-08-08 RX ORDER — IODINE/SODIUM IODIDE 2 %
TINCTURE TOPICAL
Status: DISCONTINUED | OUTPATIENT
Start: 2021-08-08 | End: 2021-08-12 | Stop reason: HOSPADM

## 2021-08-08 RX ADMIN — MEROPENEM 500 MG: 500 INJECTION, POWDER, FOR SOLUTION INTRAVENOUS at 16:07

## 2021-08-08 RX ADMIN — SELEXIPAG 1600 MCG: 200 TABLET, COATED ORAL at 20:54

## 2021-08-08 RX ADMIN — BUDESONIDE 0.25 MG: 0.25 INHALANT RESPIRATORY (INHALATION) at 20:50

## 2021-08-08 RX ADMIN — FERRIC OXIDE RED: 8; 8 LOTION TOPICAL at 19:08

## 2021-08-08 RX ADMIN — CLOTRIMAZOLE 1 LOZENGE: 10 LOZENGE ORAL at 13:40

## 2021-08-08 RX ADMIN — CLOTRIMAZOLE 1 LOZENGE: 10 LOZENGE ORAL at 02:11

## 2021-08-08 RX ADMIN — HEPARIN SODIUM 5000 UNITS: 10000 INJECTION, SOLUTION INTRAVENOUS; SUBCUTANEOUS at 08:04

## 2021-08-08 RX ADMIN — CEPHALEXIN 250 MG: 250 CAPSULE ORAL at 08:02

## 2021-08-08 RX ADMIN — BUDESONIDE 0.25 MG: 0.25 INHALANT RESPIRATORY (INHALATION) at 08:11

## 2021-08-08 RX ADMIN — HEPARIN SODIUM 5000 UNITS: 10000 INJECTION, SOLUTION INTRAVENOUS; SUBCUTANEOUS at 20:54

## 2021-08-08 RX ADMIN — FERRIC OXIDE RED: 8; 8 LOTION TOPICAL at 20:58

## 2021-08-08 RX ADMIN — FERRIC OXIDE RED: 8; 8 LOTION TOPICAL at 16:00

## 2021-08-08 RX ADMIN — CEPHALEXIN 250 MG: 250 CAPSULE ORAL at 13:40

## 2021-08-08 RX ADMIN — AMBRISENTAN 10 MG: 10 TABLET, FILM COATED ORAL at 08:03

## 2021-08-08 RX ADMIN — FUROSEMIDE 80 MG: 10 INJECTION, SOLUTION INTRAVENOUS at 13:40

## 2021-08-08 RX ADMIN — SELEXIPAG 1600 MCG: 200 TABLET, COATED ORAL at 08:02

## 2021-08-08 RX ADMIN — CLOTRIMAZOLE 1 LOZENGE: 10 LOZENGE ORAL at 23:50

## 2021-08-08 RX ADMIN — CLOTRIMAZOLE 1 LOZENGE: 10 LOZENGE ORAL at 20:54

## 2021-08-08 RX ADMIN — TAMSULOSIN HYDROCHLORIDE 0.4 MG: 0.4 CAPSULE ORAL at 08:02

## 2021-08-08 RX ADMIN — TADALAFIL 40 MG: 20 TABLET, FILM COATED ORAL at 08:37

## 2021-08-08 RX ADMIN — MEROPENEM 500 MG: 500 INJECTION, POWDER, FOR SOLUTION INTRAVENOUS at 05:59

## 2021-08-08 ASSESSMENT — ACTIVITIES OF DAILY LIVING (ADL)
ADLS_ACUITY_SCORE: 16
ADLS_ACUITY_SCORE: 17
ADLS_ACUITY_SCORE: 16
ADLS_ACUITY_SCORE: 16
ADLS_ACUITY_SCORE: 17
ADLS_ACUITY_SCORE: 17

## 2021-08-08 ASSESSMENT — MIFFLIN-ST. JEOR: SCORE: 1559.68

## 2021-08-08 NOTE — PLAN OF CARE
D: Pt POD 6 PPM implant. Pt stable and comfortable. Pt alert and oriented throughout day. No pain or shortness of breath noted. IV lasix given with increased UOP. L nephrostomy tube WNL. Back itching relieved with calamine lotion.  I: Monitored/assessed pt. Assisted with cares.  A: Pt stable and comfortable.  P: Continue to monitor/assess pt, contact provider with concerns.

## 2021-08-08 NOTE — PROGRESS NOTES
Nephrology Progress Note  08/08/2021         Assessment & Recommendations:     Jimmy Isaac is a 61 yo male with past medical history significant for bilateral kidney stones s/p left nephrostomy, CKD Stage IV, PAH 2/2 pulmonary sarcoidosis, who presented with complete heart block after RHC s/p pacemaker placement 8/2. Patient's course complicated by delerium and SISI.     SISI on CKD 4  Mild -moderate Uremia   Patient's renal function has been steadily declining. Cr in 2018 was 1.4 -> Dec 2019 1.9 -> July 2020 ~3 (at [place]) and was in the 3s June 2021. Cr was 3 upon admission and steadily increased. There was slight improvement with IV lasix, however later on noted to be euvolemic and had worsening creatinine levels. Diuretics are currently being held.    - Differential for SISI is broad at this point including sarcoidosis in the kidneys, obstruction due to struvite stones on the right side (since there is a nephrostomy tube on the left), complicated UTI, AIN due to antibiotic use and ATN.   - Patient has mild uremia. He isn't nauseous, experiencing a metallic taste, or smelling musty which would be signs of uremia.    - No acute indication for dialysis at this time but patient is at high risk of needing it in coming days.  -  Urine sodium - 35    -Urine specific gravity - 1.016    -Protein/Creatinine ratio - 1.16, nephritic range proteinuria     -WBC - 182, RBC - 84. Interpretation of this in setting of stones and UTI difficult, however renal involvement in sarcoidosis would not cause hematuria or high grade proteinuria.    -Renal U/S -Bilateral adrenal ultrasound (8/1)-right kidney measuring 10.3 cm, left kidney measuring 9.9 cm with moderate hydronephrosis and percutaneous nephrostomy tube placement.  Bilateral nephrolithiasis, right greater than left visualized.      Recommendations were communicated to primary team via note.    Seen and discussed with Dr. Juventino Jasso MD   [phone]    Interval History :    Nursing and provider notes from last 24 hours reviewed.    No acute events overnight.  Vital signs remained stable.  More alert and coherent today. Reports increased itching  , creatinine 4.78, stable potassium at 4.4.  Urine output - 870 ml in past 24 hrs and 450 ml since AM    Review of Systems:   I reviewed the following systems:  GI: Decreased appetite.  Denies nausea or vomiting or diarrhea.   Neuro:  Confusion present  Constitutional: No fever or chills  CV: Denies dyspnea or edema.  No chest pain.    Physical Exam:   I/O last 3 completed shifts:  In: 1120 [P.O.:1020; I.V.:100]  Out: 805 [Urine:805]   BP 90/50 (BP Location: Right arm)   Pulse 86   Temp 97.9  F (36.6  C) (Oral)   Resp 16   Ht 1.829 m (6')   Wt 71.7 kg (158 lb)   SpO2 91%   BMI 21.43 kg/m       GENERAL APPEARANCE: Alert, examined sitting in chair.  EYES: No scleral icterus, pupils equal  HENT: mouth without ulcers or lesions  PULM: lungs clear to auscultation bilaterally, equal air movement, no clubbing  CV: regular rhythm, normal rate, no rub     -JVD not distended     -edema absent  GI: soft, non-tender, not distended, bowel sounds are present  INTEGUMENT: no cyanosis, or rash    Labs:   All labs reviewed by me  Electrolytes/Renal -   Recent Labs   Lab Test 08/08/21  0542 08/07/21  1428 08/07/21  0625 07/27/21  0742 07/01/21  1336 01/09/19  1152 12/26/18  1318 05/30/18  1025    138 140  --  142   < > 139 138   POTASSIUM 4.4 4.2 4.6  --  4.0   < > 3.8 4.2   CHLORIDE 105 106 108  --  110*   < > 102 104   CO2 22 22 22  --  25   < > 31 28   * 98* 99*  --  50*   < > 32* 24   CR 4.78* 4.72* 4.75*  --  3.27*   < > 1.40* 1.34*   GLC 92 98 89   < > 95   < > 122* 100*   TERI 8.9 9.3 9.3  --  8.7   < > 9.1 9.2   MAG 2.4* 2.5* 2.5*   < >  --   --   --   --    PHOS  --   --   --   --  3.8  --  3.3 2.4*    < > = values in this interval not displayed.       CBC -   Recent Labs   Lab Test 08/08/21  0542 08/07/21  0625  08/06/21  0404   WBC 5.5 6.3 7.2   HGB 8.1* 9.5* 9.5*   * 135* 132*       LFTs -   Recent Labs   Lab Test 08/08/21  0542 08/07/21  0625 08/06/21  0404   ALKPHOS 61 71 66   BILITOTAL 0.4 0.4 0.6   ALT 18 22 23   AST 22 36 51*   PROTTOTAL 6.3* 7.0 6.9   ALBUMIN 2.5* 2.9* 2.8*       Iron Panel -   Recent Labs   Lab Test 07/01/21  1336 10/18/17  1121   IRON 28* 82   IRONSAT 12* 29   MIHIR 33 41         Imaging:      Current Medications:    ambrisentan  10 mg Oral Daily     budesonide  0.25 mg Nebulization BID     cephALEXin  250 mg Oral TID     clotrimazole  1 lozenge Buccal TID     furosemide  80 mg Intravenous Once     [Held by provider] furosemide  20 mg Oral Daily     heparin ANTICOAGULANT  5,000 Units Subcutaneous Q12H     lidocaine  2 patch Transdermal Q24h    And     lidocaine   Transdermal Q8H     melatonin  5 mg Oral At Bedtime     meropenem  500 mg Intravenous Q12H     selexipag  1,600 mcg Oral Q12H     sodium chloride (PF)  3 mL Intracatheter Q8H     tadalafil  40 mg Oral Daily     tamsulosin  0.4 mg Oral Daily       - MEDICATION INSTRUCTIONS -       - MEDICATION INSTRUCTIONS -       - MEDICATION INSTRUCTIONS -       Vik Jasso MD

## 2021-08-08 NOTE — PROGRESS NOTES
Nephrology Progress Note  08/07/2021         Assessment & Recommendations:     Jimmy Isaac is a 61 yo male with past medical history significant for bilateral kidney stones s/p left nephrostomy, CKD Stage IV, PAH 2/2 pulmonary sarcoidosis, who presented with complete heart block after RHC s/p pacemaker placement 8/2. Patient's course complicated by delerium and SISI.     SISI on CKD 4  Patient's renal function has been steadily declining. Cr in 2018 was 1.4 -> Dec 2019 1.9 -> July 2020 ~3 (at [place]) and was in the 3s June 2021. Cr was 3 upon admission and steadily increased. There was slight improvement with IV lasix. Patient seemed euvolemic on exam.   - Differential for SISI is broad at this point including sarcoidosis in the kidneys, obstruction due to struvite stones on the right side (since there is a nephrostomy tube on the left), complicated UTI, AIN due to antibiotic use and ATN.   - Patient has mild uremia. He isn't nauseous, experiencing a metallic taste, or smelling musty which would be signs of uremia. He does have a mild tremor.   - No acute indication for dialysis at this time but patient is at high risk of needing it in the future.  -  Urine sodium - 35    -Urine specific gravity - 1.016    -Protein/Creatinine ratio - 1.16, nephritic range proteinuria     -WBC - 182, RBC - 84. Interpretation of this in setting of stones and UTI difficult, however renal involvement in sarcoidosis would not cause hematuria or high grade proteinuria.    -Renal U/S -Bilateral adrenal ultrasound (8/1)-right kidney measuring 10.3 cm, left kidney measuring 9.9 cm with moderate hydronephrosis and percutaneous nephrostomy tube placement.  Bilateral nephrolithiasis, right greater than left visualized.        Recommendations were communicated to primary team via note and over phone.    Seen and discussed with Dr. Juventino Jasso MD   [phone]    Interval History :   Nursing and provider notes from last 24 hours  reviewed.    No acute events overnight.  Vital signs remained stable.  BUN 98, creatinine 4.72, stable potassium at 4.2.    Review of Systems:   I reviewed the following systems:  GI: Decreased appetite.  Denies nausea or vomiting or diarrhea.   Neuro:  Confusion present  Constitutional: No fever or chills  CV: Denies dyspnea or edema.  No chest pain.    Physical Exam:   I/O last 3 completed shifts:  In: 240 [P.O.:240]  Out: 865 [Urine:865]   BP 93/51 (BP Location: Left arm)   Pulse 93   Temp 97.8  F (36.6  C) (Oral)   Resp 16   Ht 1.829 m (6')   Wt 70.2 kg (154 lb 12.2 oz)   SpO2 97%   BMI 20.99 kg/m       GENERAL APPEARANCE: Alert, examined sitting in chair.  EYES: No scleral icterus, pupils equal  HENT: mouth without ulcers or lesions  PULM: lungs clear to auscultation bilaterally, equal air movement, no clubbing  CV: regular rhythm, normal rate, no rub     -JVD not distended     -edema absent  GI: soft, non-tender, not distended, bowel sounds are present  INTEGUMENT: no cyanosis, or rash    Labs:   All labs reviewed by me  Electrolytes/Renal - Recent Labs   Lab Test 08/07/21  1428 08/07/21  0625 08/06/21  1708 07/27/21  0742 07/01/21  1336 01/09/19  1152 12/26/18  1318 05/30/18  1025    140 138  --  142   < > 139 138   POTASSIUM 4.2 4.6 4.4  --  4.0   < > 3.8 4.2   CHLORIDE 106 108 106  --  110*   < > 102 104   CO2 22 22 27  --  25   < > 31 28   BUN 98* 99* 95*  --  50*   < > 32* 24   CR 4.72* 4.75* 4.56*  --  3.27*   < > 1.40* 1.34*   GLC 98 89 125*   < > 95   < > 122* 100*   TERI 9.3 9.3 9.2  --  8.7   < > 9.1 9.2   MAG 2.5* 2.5* 2.4*   < >  --   --   --   --    PHOS  --   --   --   --  3.8  --  3.3 2.4*    < > = values in this interval not displayed.       CBC -   Recent Labs   Lab Test 08/07/21 0625 08/06/21  0404 08/05/21  0542   WBC 6.3 7.2 5.8   HGB 9.5* 9.5* 8.8*   * 132* 125*       LFTs -   Recent Labs   Lab Test 08/07/21 0625 08/06/21  0404 08/05/21  0542   ALKPHOS 71 66 66    BILITOTAL 0.4 0.6 0.6   ALT 22 23 14   AST 36 51* 32   PROTTOTAL 7.0 6.9 6.5*   ALBUMIN 2.9* 2.8* 2.6*       Iron Panel -   Recent Labs   Lab Test 07/01/21  1336 10/18/17  1121   IRON 28* 82   IRONSAT 12* 29   MIHIR 33 41         Imaging:      Current Medications:    ambrisentan  10 mg Oral Daily     budesonide  0.25 mg Nebulization BID     cephALEXin  250 mg Oral TID     clotrimazole  1 lozenge Buccal TID     [Held by provider] furosemide  20 mg Oral Daily     heparin ANTICOAGULANT  5,000 Units Subcutaneous Q12H     lidocaine  2 patch Transdermal Q24h    And     lidocaine   Transdermal Q8H     melatonin  5 mg Oral At Bedtime     meropenem  500 mg Intravenous Q12H     selexipag  1,600 mcg Oral Q12H     sodium chloride (PF)  3 mL Intracatheter Q8H     tadalafil  40 mg Oral Daily     tamsulosin  0.4 mg Oral Daily       - MEDICATION INSTRUCTIONS -       - MEDICATION INSTRUCTIONS -       - MEDICATION INSTRUCTIONS -       Vik Jasso MD

## 2021-08-08 NOTE — PLAN OF CARE
D: Pt admit 7/30/21 for CHB after RHC s/p PPM 8/2/21. PMH PAH 2/2 pulmonary sarcoidosis, bilateral kidney stones, CKD IV, BPH     I/A:   Neuro: A&Ox1-3, disoriented to time/situation/place intermittently. Pt arouses to voice/opens eyes spontaneously. Pt following directions & using call light appropriately overnight. Bed alarm activated for increased safety.   VS: BP soft. Afebrile. O2 sat >92% on 6L NC.   Tele: 100% AV/V Paced  Pain: denies  GI/: Oliguric. Urinating into bedside urinal. No BM this shift.  Diet: 2g Na w/2L FR  IV/Drips: L PIV SL  Activity: A x1 w/GB  Skin/drains: L nephrostomy tube CDI, output in flowsheets     P: Continue to monitor pt status and report changes to Cards 2.      Ermelinda Pineda RN

## 2021-08-08 NOTE — PROGRESS NOTES
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 A pacemaker was placed 8/2/21 due to persistent heart block.    Patient has recovered from delirium and is now being diuresed.     Plan:  - Hypervolemic on exam, IV 80 Lasix this afternoon  - PT/OT ordered for possible upcoming discharge  - Renal Consult: Ordered U/A with micro, Random urine protein, FeUrea, and urine sodium, along with consenting patient for dialysis. Will continue to follow with further recommendations.  - ID Consult: Started IV meropenem (8/6/21), collected blood cultures and urine cultures (NG after 1 day), and repeat CXR (negative for new infection)  - Continue PTA medications for PAH of budenoside inhaler, ambrisentan, tadalafil, and selexipag   - At baseline home O2 already (currently at 6L)   - Postop prophylaxis with keflex tid for 5 days. (post pacemaker - Today is day 4/5)    # Complete Heart Block (New 7/30/21)  Patient was admitted for complete heart block. He was found to be hypervolemic on exam and had NT proBNP 28K. His PTA digoxin was discontinue at admission. TSH normal.   He has h/O pulmonary sarcoidosis with last PET scan in 2017 and cardiac MRI in 2012, that might be impacting also heart and conduction  - Successful pacemaker placed on 8/2/21.   - Starting postop prophylaxis with keflex tid for 5 days. Today is day 3 of TX (post pacemaker)    # PAH 2/2 Pulmonary Sarcoidosis,   # Acute on Chronic Hypoxic Respiratory Failure (at home O2 of 4.5 L at rest and  8-10 L with exertion)  # LVEF 55-60%   # RV function mild/moderately reduced.   Patient has history of pulmonary HTN. His last RHC 7/30/21 showed RA - 12, RV - 77/16, PA - 77/20/38, PCWP - 17,   Lesia CO/CI -  4.62/2.36, this translates to mild elevation of right and left filling pressures, moderate elevation of pulmonary artery hypertension but normal LV cardiac output level. This was compatible with initial vol status assessment and reassure patient required diuresis.   - vol status: euvolemic. New dry weight 162 lbs.   - Holding PTA po laxis 20mg daily.   - PTA ambrisentan 10mg daily, selexipag 1600 mcg q12 hrs.   - PTA tadalafil  - PTA budesonide inh   - Outpatient PET Scan to evaluate sarcoidosis    # Delirium (New 8/3/21) likely secondary to lack of resting and hospital stay vs. Uremia vs. Persistent complicated UTI vs. S/E of IV cefepime (Resolved - 8/8/21)  Patient become delirious over the night of 8/3/21. He become aggressive, so haldol, seroquel, and ativan was given. Likely this is related to no well resting. He has been confused but not aggressive over last night.   - Non-pharmacologic therapy will be preferred: keeping lights on during the day and off overnight. Will avoid disruption overnight. 1:1 sitter while confused today.   - Melatonin 5 mg at bedtime  - ID and Renal evaluating source control and treatment of complicated UTI  - CT Head w/o Contrast (8/7/21): Negative  - 8/8/21: Completed orientated to person, place, time. Will continue to monitor for change in mentation    # Complicated Urinary Tract Infection with Pan-Senstive Pseudomonas  Urine Culture from 7/27/21 grew pan sensitive pseudomona. Likely a contaminant but we have been treating it since 7/30.   # Bilateral staghorn calculi and L ureter stones. C/B Left hydronephrosis s/p percutaneous drainage (CT 8/1/21).    - Treated with IV cefepime for a total of 6 days (7/30 - 8/4). Specially when U/A from 8/4/21, no concerning for infection.    - U/A (8/6/21): Positive for infection, >182 RBC/WBC's, positive for leukocyte esterase and few bacteria. Concerning for persistent complicated UTI and lack of source control   - CXR (8/7/21): No new  infection, similar to CXR done on 8/3/21    # SISI on CKD Stage IV   Likely multifactorial secondary to cardiorenal and some degree of obstructive uropathy.   Patient has been evaluated by urology during this admission. CT abdomen showed slightly improvement of hydronephrosis and. Urology do not recommend further management of obstructive uropathy at this point.   Initially difficult to diuresed, but with increased diuretics (furosemide IV 80 mg BID) he was able to achieved euvolemic state.   New baseline Cr ~ 3 (only two lab values for the last year prior to admission).   - Today, creatinine still increasing. Renal consult pending for recommendations   - Urology will follow patient as an outpatient.      # BPH, continue PTA Tamsulosin 0.4 PTA     DVT Prophylaxis: Enoxaparin (Lovenox) subcutaneous   Chong Catheter: removed.   Code Status: Full Code  Lines: PIV x2, Left nephrostomy tube     Patient discussed with staff attending, Dr. Wheat.     Sathya Sohrt MD  Internal Medicine Resident PGY-1  Pager: [phone]      Interval History   NAEO. Patient fully oriented this morning to self, place, and time. Was able to get a good nights sleep and expresses amnesia for the past few days. I explained what has happened over the last few days and patient states that he is feeling much better and has no major concerns besides back itchiness.     Otherwise he denies diarrhea, fevers, chills, nausea, vomiting, headaches, dizziness, or skin changes.    Physical Exam   Temp: 98.7  F (37.1  C) Temp src: Oral BP: 93/52 Pulse: 95   Resp: 16 SpO2: 92 % O2 Device: Nasal cannula with humidification Oxygen Delivery: 6 LPM  Vitals:    08/06/21 0610 08/07/21 0700 08/08/21 0215   Weight: 70.9 kg (156 lb 4.9 oz) 70.2 kg (154 lb 12.2 oz) 71.7 kg (158 lb)     Vital Signs with Ranges  Temp:  [97.7  F (36.5  C)-98.7  F (37.1  C)] 98.7  F (37.1  C)  Pulse:  [] 95  Resp:  [16] 16  BP: ()/(51-63) 93/52  SpO2:  [92 %-97 %] 92 %  I/O  last 3 completed shifts:  In: 1120 [P.O.:1020; I.V.:100]  Out: 805 [Urine:805]     , Blood pressure 93/52, pulse 95, temperature 98.7  F (37.1  C), temperature source Oral, resp. rate 16, height 1.829 m (6'), weight 71.7 kg (158 lb), SpO2 92 %.  158 lbs 0 oz  GEN:  Calm, in no acute distress, AAO x3  CV:  Regular rate and rhythm, no murmur.  LUNGS:   Able to move oxygen well with deep breaths. Clear to auscultation bilaterally  ABD:  Active bowel sounds, soft, non-tender/non-distended.  No rebound/guarding/rigidity.  EXT:  +1 swelling in bilaterally lower extremities, palpable pulses    Medications     - MEDICATION INSTRUCTIONS -       - MEDICATION INSTRUCTIONS -       - MEDICATION INSTRUCTIONS -         ambrisentan  10 mg Oral Daily     budesonide  0.25 mg Nebulization BID     cephALEXin  250 mg Oral TID     clotrimazole  1 lozenge Buccal TID     [Held by provider] furosemide  20 mg Oral Daily     heparin ANTICOAGULANT  5,000 Units Subcutaneous Q12H     lidocaine  2 patch Transdermal Q24h    And     lidocaine   Transdermal Q8H     melatonin  5 mg Oral At Bedtime     meropenem  500 mg Intravenous Q12H     selexipag  1,600 mcg Oral Q12H     sodium chloride (PF)  3 mL Intracatheter Q8H     tadalafil  40 mg Oral Daily     tamsulosin  0.4 mg Oral Daily       Data   Recent Labs   Lab 08/08/21  0542 08/07/21  1428 08/07/21  0625 08/06/21  0404   WBC 5.5  --  6.3 7.2   HGB 8.1*  --  9.5* 9.5*   MCV 91  --  92 90   *  --  135* 132*    138 140 134   POTASSIUM 4.4 4.2 4.6 4.0   CHLORIDE 105 106 108 104   CO2 22 22 22 19*   * 98* 99* 93*   CR 4.78* 4.72* 4.75* 4.50*   ANIONGAP 8 10 10 11   TERI 8.9 9.3 9.3 9.1   GLC 92 98 89 105*   ALBUMIN 2.5*  --  2.9* 2.8*   PROTTOTAL 6.3*  --  7.0 6.9   BILITOTAL 0.4  --  0.4 0.6   ALKPHOS 61  --  71 66   ALT 18  --  22 23   AST 22  --  36 51*       Recent Results (from the past 24 hour(s))   XR Chest Port 1 View    Narrative    Portable chest    INDICATION: Rule out  possible pneumonia    Comparisons: 8/3/2021    Findings: Heart size appears normal. Dual-lead left subclavian  transvenous approach pacemaker appears unchanged. Bilateral perihilar  predominant patchy and somewhat streaky opacities appears similar.  Bony structures appear grossly intact. There is aortic arch  atherosclerosis.      Impression    IMPRESSION: Patchy and streaky pulmonary opacities grossly similar  from 8/3/2021 likely indicating underlying sarcoidosis given the  patient's history with no obvious acute infiltrate/consolidation.  Pacemaker. Atherosclerosis.    GISELA MACK MD         SYSTEM ID:  [id number]   CT Head w/o Contrast    Narrative    CT HEAD W/O CONTRAST 8/7/2021 3:00 PM    History: Mental status change, unknown cause.    Pr EPIC: Medical history significant for??PAH secondary to?pulmonary  sarcoidosis, CKD stage 4,??bilateral kidney stones with moderate to  advance left hydronephrosis s/p?left percutaneous nephrostomy  tube?placement?on 7/9/21. Preop work-up for?left percutaneous  nephrolithotomy?revealed complete heart block and admitted on 7/30/21.  Pacemaker was placed on 8/2/21.   ?  Urine culture on 7/27/21 grew >100K CFU/ml Pseudomonas  fluorescence/putida group.    Comparison: None.    Technique: Using multidetector thin collimation helical acquisition  technique, axial, coronal and sagittal CT images from the skull base  to the vertex were obtained without intravenous contrast.   (topogram) image(s) also obtained and reviewed.    Findings:     No intracranial hemorrhage, mass effect, or midline shift. Gray/white  matter differentiation in both cerebral hemispheres is preserved.  Ventricles are proportionate to the cerebral sulci. The basal cisterns  are clear.    The bony calvaria and the bones of the skull base are normal. Mucosal  thickening of the left maxillary sinus with associated maxillary or  wall thickening. Mastoid air cells are clear.       Impression     Impression:  1. No acute intracranial pathology.  2. Findings of left maxillary chronic sinusitis.     I have personally reviewed the examination and initial interpretation  and I agree with the findings.    ISELA JONES MD         SYSTEM ID:  [id number]

## 2021-08-09 ENCOUNTER — APPOINTMENT (OUTPATIENT)
Dept: OCCUPATIONAL THERAPY | Facility: CLINIC | Age: 61
DRG: 242 | End: 2021-08-09
Attending: INTERNAL MEDICINE
Payer: COMMERCIAL

## 2021-08-09 ENCOUNTER — APPOINTMENT (OUTPATIENT)
Dept: GENERAL RADIOLOGY | Facility: CLINIC | Age: 61
DRG: 242 | End: 2021-08-09
Attending: INTERNAL MEDICINE
Payer: COMMERCIAL

## 2021-08-09 ENCOUNTER — APPOINTMENT (OUTPATIENT)
Dept: PHYSICAL THERAPY | Facility: CLINIC | Age: 61
DRG: 242 | End: 2021-08-09
Attending: INTERNAL MEDICINE
Payer: COMMERCIAL

## 2021-08-09 ENCOUNTER — ANCILLARY PROCEDURE (OUTPATIENT)
Dept: CARDIOLOGY | Facility: CLINIC | Age: 61
DRG: 242 | End: 2021-08-09
Attending: INTERNAL MEDICINE
Payer: COMMERCIAL

## 2021-08-09 LAB
ALBUMIN SERPL-MCNC: 2.6 G/DL (ref 3.4–5)
ALP SERPL-CCNC: 67 U/L (ref 40–150)
ALT SERPL W P-5'-P-CCNC: 19 U/L (ref 0–70)
ANION GAP SERPL CALCULATED.3IONS-SCNC: 7 MMOL/L (ref 3–14)
AST SERPL W P-5'-P-CCNC: 20 U/L (ref 0–45)
BACTERIA UR CULT: NORMAL
BILIRUB SERPL-MCNC: 0.3 MG/DL (ref 0.2–1.3)
BUN SERPL-MCNC: 107 MG/DL (ref 7–30)
CALCIUM SERPL-MCNC: 9.1 MG/DL (ref 8.5–10.1)
CHLORIDE BLD-SCNC: 107 MMOL/L (ref 94–109)
CO2 SERPL-SCNC: 25 MMOL/L (ref 20–32)
CREAT SERPL-MCNC: 4.86 MG/DL (ref 0.66–1.25)
ERYTHROCYTE [DISTWIDTH] IN BLOOD BY AUTOMATED COUNT: 13.8 % (ref 10–15)
GFR SERPL CREATININE-BSD FRML MDRD: 12 ML/MIN/1.73M2
GLUCOSE BLD-MCNC: 102 MG/DL (ref 70–99)
HCT VFR BLD AUTO: 27.5 % (ref 40–53)
HGB BLD-MCNC: 8.6 G/DL (ref 13.3–17.7)
MCH RBC QN AUTO: 28.6 PG (ref 26.5–33)
MCHC RBC AUTO-ENTMCNC: 31.3 G/DL (ref 31.5–36.5)
MCV RBC AUTO: 91 FL (ref 78–100)
MDC_IDC_LEAD_IMPLANT_DT: NORMAL
MDC_IDC_LEAD_IMPLANT_DT: NORMAL
MDC_IDC_LEAD_LOCATION: NORMAL
MDC_IDC_LEAD_LOCATION: NORMAL
MDC_IDC_LEAD_LOCATION_DETAIL_1: NORMAL
MDC_IDC_LEAD_LOCATION_DETAIL_1: NORMAL
MDC_IDC_LEAD_MFG: NORMAL
MDC_IDC_LEAD_MFG: NORMAL
MDC_IDC_LEAD_MODEL: NORMAL
MDC_IDC_LEAD_MODEL: NORMAL
MDC_IDC_LEAD_POLARITY_TYPE: NORMAL
MDC_IDC_LEAD_POLARITY_TYPE: NORMAL
MDC_IDC_LEAD_SERIAL: NORMAL
MDC_IDC_LEAD_SERIAL: NORMAL
MDC_IDC_LEAD_SPECIAL_FUNCTION: NORMAL
MDC_IDC_LEAD_SPECIAL_FUNCTION: NORMAL
MDC_IDC_MSMT_BATTERY_DTM: NORMAL
MDC_IDC_MSMT_BATTERY_RRT_TRIGGER: 2.62
MDC_IDC_MSMT_BATTERY_STATUS: NORMAL
MDC_IDC_MSMT_BATTERY_VOLTAGE: 3.2 V
MDC_IDC_MSMT_LEADCHNL_RA_IMPEDANCE_VALUE: 304 OHM
MDC_IDC_MSMT_LEADCHNL_RA_IMPEDANCE_VALUE: 380 OHM
MDC_IDC_MSMT_LEADCHNL_RA_PACING_THRESHOLD_AMPLITUDE: 0.5 V
MDC_IDC_MSMT_LEADCHNL_RA_PACING_THRESHOLD_PULSEWIDTH: 0.4 MS
MDC_IDC_MSMT_LEADCHNL_RA_SENSING_INTR_AMPL: 2.62 MV
MDC_IDC_MSMT_LEADCHNL_RA_SENSING_INTR_AMPL: 3 MV
MDC_IDC_MSMT_LEADCHNL_RV_IMPEDANCE_VALUE: 342 OHM
MDC_IDC_MSMT_LEADCHNL_RV_IMPEDANCE_VALUE: 418 OHM
MDC_IDC_MSMT_LEADCHNL_RV_PACING_THRESHOLD_AMPLITUDE: 0.5 V
MDC_IDC_MSMT_LEADCHNL_RV_PACING_THRESHOLD_PULSEWIDTH: 0.4 MS
MDC_IDC_MSMT_LEADCHNL_RV_SENSING_INTR_AMPL: 1.38 MV
MDC_IDC_PG_IMPLANT_DTM: NORMAL
MDC_IDC_PG_MFG: NORMAL
MDC_IDC_PG_MODEL: NORMAL
MDC_IDC_PG_SERIAL: NORMAL
MDC_IDC_PG_TYPE: NORMAL
MDC_IDC_SESS_CLINIC_NAME: NORMAL
MDC_IDC_SESS_DTM: NORMAL
MDC_IDC_SESS_TYPE: NORMAL
MDC_IDC_SET_BRADY_AT_MODE_SWITCH_RATE: 171 {BEATS}/MIN
MDC_IDC_SET_BRADY_HYSTRATE: NORMAL
MDC_IDC_SET_BRADY_LOWRATE: 60 {BEATS}/MIN
MDC_IDC_SET_BRADY_MAX_SENSOR_RATE: 130 {BEATS}/MIN
MDC_IDC_SET_BRADY_MAX_TRACKING_RATE: 130 {BEATS}/MIN
MDC_IDC_SET_BRADY_MODE: NORMAL
MDC_IDC_SET_BRADY_PAV_DELAY_LOW: 180 MS
MDC_IDC_SET_BRADY_SAV_DELAY_LOW: 150 MS
MDC_IDC_SET_LEADCHNL_RA_PACING_AMPLITUDE: 3.5 V
MDC_IDC_SET_LEADCHNL_RA_PACING_ANODE_ELECTRODE_1: NORMAL
MDC_IDC_SET_LEADCHNL_RA_PACING_ANODE_LOCATION_1: NORMAL
MDC_IDC_SET_LEADCHNL_RA_PACING_CAPTURE_MODE: NORMAL
MDC_IDC_SET_LEADCHNL_RA_PACING_CATHODE_ELECTRODE_1: NORMAL
MDC_IDC_SET_LEADCHNL_RA_PACING_CATHODE_LOCATION_1: NORMAL
MDC_IDC_SET_LEADCHNL_RA_PACING_POLARITY: NORMAL
MDC_IDC_SET_LEADCHNL_RA_PACING_PULSEWIDTH: 0.4 MS
MDC_IDC_SET_LEADCHNL_RA_SENSING_ANODE_ELECTRODE_1: NORMAL
MDC_IDC_SET_LEADCHNL_RA_SENSING_ANODE_LOCATION_1: NORMAL
MDC_IDC_SET_LEADCHNL_RA_SENSING_CATHODE_ELECTRODE_1: NORMAL
MDC_IDC_SET_LEADCHNL_RA_SENSING_CATHODE_LOCATION_1: NORMAL
MDC_IDC_SET_LEADCHNL_RA_SENSING_POLARITY: NORMAL
MDC_IDC_SET_LEADCHNL_RA_SENSING_SENSITIVITY: 0.3 MV
MDC_IDC_SET_LEADCHNL_RV_PACING_AMPLITUDE: 3.5 V
MDC_IDC_SET_LEADCHNL_RV_PACING_ANODE_ELECTRODE_1: NORMAL
MDC_IDC_SET_LEADCHNL_RV_PACING_ANODE_LOCATION_1: NORMAL
MDC_IDC_SET_LEADCHNL_RV_PACING_CAPTURE_MODE: NORMAL
MDC_IDC_SET_LEADCHNL_RV_PACING_CATHODE_ELECTRODE_1: NORMAL
MDC_IDC_SET_LEADCHNL_RV_PACING_CATHODE_LOCATION_1: NORMAL
MDC_IDC_SET_LEADCHNL_RV_PACING_POLARITY: NORMAL
MDC_IDC_SET_LEADCHNL_RV_PACING_PULSEWIDTH: 0.4 MS
MDC_IDC_SET_LEADCHNL_RV_SENSING_ANODE_ELECTRODE_1: NORMAL
MDC_IDC_SET_LEADCHNL_RV_SENSING_ANODE_LOCATION_1: NORMAL
MDC_IDC_SET_LEADCHNL_RV_SENSING_CATHODE_ELECTRODE_1: NORMAL
MDC_IDC_SET_LEADCHNL_RV_SENSING_CATHODE_LOCATION_1: NORMAL
MDC_IDC_SET_LEADCHNL_RV_SENSING_POLARITY: NORMAL
MDC_IDC_SET_LEADCHNL_RV_SENSING_SENSITIVITY: 0.9 MV
MDC_IDC_SET_ZONE_DETECTION_INTERVAL: 350 MS
MDC_IDC_SET_ZONE_DETECTION_INTERVAL: 400 MS
MDC_IDC_SET_ZONE_TYPE: NORMAL
MDC_IDC_STAT_AT_BURDEN_PERCENT: 0 %
MDC_IDC_STAT_AT_DTM_END: NORMAL
MDC_IDC_STAT_AT_DTM_START: NORMAL
MDC_IDC_STAT_BRADY_AP_VP_PERCENT: 8.66 %
MDC_IDC_STAT_BRADY_AP_VS_PERCENT: 0 %
MDC_IDC_STAT_BRADY_AS_VP_PERCENT: 89.74 %
MDC_IDC_STAT_BRADY_AS_VS_PERCENT: 1.6 %
MDC_IDC_STAT_BRADY_DTM_END: NORMAL
MDC_IDC_STAT_BRADY_DTM_START: NORMAL
MDC_IDC_STAT_BRADY_RA_PERCENT_PACED: 8.56 %
MDC_IDC_STAT_BRADY_RV_PERCENT_PACED: 98.4 %
MDC_IDC_STAT_EPISODE_RECENT_COUNT: 0
MDC_IDC_STAT_EPISODE_RECENT_COUNT_DTM_END: NORMAL
MDC_IDC_STAT_EPISODE_RECENT_COUNT_DTM_START: NORMAL
MDC_IDC_STAT_EPISODE_TOTAL_COUNT: 0
MDC_IDC_STAT_EPISODE_TOTAL_COUNT_DTM_END: NORMAL
MDC_IDC_STAT_EPISODE_TOTAL_COUNT_DTM_START: NORMAL
MDC_IDC_STAT_EPISODE_TYPE: NORMAL
PLATELET # BLD AUTO: 144 10E3/UL (ref 150–450)
POTASSIUM BLD-SCNC: 4.4 MMOL/L (ref 3.4–5.3)
PROT SERPL-MCNC: 6.6 G/DL (ref 6.8–8.8)
RBC # BLD AUTO: 3.01 10E6/UL (ref 4.4–5.9)
SODIUM SERPL-SCNC: 139 MMOL/L (ref 133–144)
WBC # BLD AUTO: 5.5 10E3/UL (ref 4–11)

## 2021-08-09 PROCEDURE — 80053 COMPREHEN METABOLIC PANEL: CPT | Performed by: STUDENT IN AN ORGANIZED HEALTH CARE EDUCATION/TRAINING PROGRAM

## 2021-08-09 PROCEDURE — 99233 SBSQ HOSP IP/OBS HIGH 50: CPT | Mod: GC | Performed by: INTERNAL MEDICINE

## 2021-08-09 PROCEDURE — 250N000011 HC RX IP 250 OP 636: Performed by: INTERNAL MEDICINE

## 2021-08-09 PROCEDURE — 97535 SELF CARE MNGMENT TRAINING: CPT | Mod: GO

## 2021-08-09 PROCEDURE — 97530 THERAPEUTIC ACTIVITIES: CPT | Mod: GO

## 2021-08-09 PROCEDURE — 94640 AIRWAY INHALATION TREATMENT: CPT | Mod: 76

## 2021-08-09 PROCEDURE — 97530 THERAPEUTIC ACTIVITIES: CPT | Mod: GP

## 2021-08-09 PROCEDURE — 99233 SBSQ HOSP IP/OBS HIGH 50: CPT | Mod: 25 | Performed by: INTERNAL MEDICINE

## 2021-08-09 PROCEDURE — 85027 COMPLETE CBC AUTOMATED: CPT | Performed by: STUDENT IN AN ORGANIZED HEALTH CARE EDUCATION/TRAINING PROGRAM

## 2021-08-09 PROCEDURE — 250N000011 HC RX IP 250 OP 636: Performed by: STUDENT IN AN ORGANIZED HEALTH CARE EDUCATION/TRAINING PROGRAM

## 2021-08-09 PROCEDURE — 250N000009 HC RX 250: Performed by: STUDENT IN AN ORGANIZED HEALTH CARE EDUCATION/TRAINING PROGRAM

## 2021-08-09 PROCEDURE — 97116 GAIT TRAINING THERAPY: CPT | Mod: GP

## 2021-08-09 PROCEDURE — 36415 COLL VENOUS BLD VENIPUNCTURE: CPT | Performed by: STUDENT IN AN ORGANIZED HEALTH CARE EDUCATION/TRAINING PROGRAM

## 2021-08-09 PROCEDURE — 94640 AIRWAY INHALATION TREATMENT: CPT

## 2021-08-09 PROCEDURE — 97165 OT EVAL LOW COMPLEX 30 MIN: CPT | Mod: GO

## 2021-08-09 PROCEDURE — 250N000013 HC RX MED GY IP 250 OP 250 PS 637: Performed by: STUDENT IN AN ORGANIZED HEALTH CARE EDUCATION/TRAINING PROGRAM

## 2021-08-09 PROCEDURE — 214N000001 HC R&B CCU UMMC

## 2021-08-09 PROCEDURE — 93280 PM DEVICE PROGR EVAL DUAL: CPT | Mod: 26 | Performed by: INTERNAL MEDICINE

## 2021-08-09 PROCEDURE — 71045 X-RAY EXAM CHEST 1 VIEW: CPT

## 2021-08-09 PROCEDURE — 99233 SBSQ HOSP IP/OBS HIGH 50: CPT | Performed by: INTERNAL MEDICINE

## 2021-08-09 PROCEDURE — 97161 PT EVAL LOW COMPLEX 20 MIN: CPT | Mod: GP

## 2021-08-09 PROCEDURE — 71045 X-RAY EXAM CHEST 1 VIEW: CPT | Mod: 26 | Performed by: RADIOLOGY

## 2021-08-09 PROCEDURE — 250N000011 HC RX IP 250 OP 636

## 2021-08-09 PROCEDURE — 93280 PM DEVICE PROGR EVAL DUAL: CPT

## 2021-08-09 PROCEDURE — 999N000157 HC STATISTIC RCP TIME EA 10 MIN

## 2021-08-09 RX ORDER — FUROSEMIDE 10 MG/ML
80 INJECTION INTRAMUSCULAR; INTRAVENOUS ONCE
Status: COMPLETED | OUTPATIENT
Start: 2021-08-09 | End: 2021-08-09

## 2021-08-09 RX ORDER — CIPROFLOXACIN 2 MG/ML
400 INJECTION, SOLUTION INTRAVENOUS EVERY 24 HOURS
Status: DISCONTINUED | OUTPATIENT
Start: 2021-08-09 | End: 2021-08-12 | Stop reason: HOSPADM

## 2021-08-09 RX ADMIN — AMBRISENTAN 10 MG: 10 TABLET, FILM COATED ORAL at 08:36

## 2021-08-09 RX ADMIN — HEPARIN SODIUM 5000 UNITS: 10000 INJECTION, SOLUTION INTRAVENOUS; SUBCUTANEOUS at 20:13

## 2021-08-09 RX ADMIN — FUROSEMIDE 80 MG: 10 INJECTION, SOLUTION INTRAVENOUS at 13:19

## 2021-08-09 RX ADMIN — FERRIC OXIDE RED: 8; 8 LOTION TOPICAL at 02:20

## 2021-08-09 RX ADMIN — SELEXIPAG 1600 MCG: 200 TABLET, COATED ORAL at 20:14

## 2021-08-09 RX ADMIN — HEPARIN SODIUM 5000 UNITS: 10000 INJECTION, SOLUTION INTRAVENOUS; SUBCUTANEOUS at 08:38

## 2021-08-09 RX ADMIN — ALBUTEROL SULFATE 2 PUFF: 90 AEROSOL, METERED RESPIRATORY (INHALATION) at 02:20

## 2021-08-09 RX ADMIN — ALBUTEROL SULFATE 2 PUFF: 90 AEROSOL, METERED RESPIRATORY (INHALATION) at 05:30

## 2021-08-09 RX ADMIN — MEROPENEM 500 MG: 500 INJECTION, POWDER, FOR SOLUTION INTRAVENOUS at 05:26

## 2021-08-09 RX ADMIN — SELEXIPAG 1600 MCG: 200 TABLET, COATED ORAL at 08:38

## 2021-08-09 RX ADMIN — TAMSULOSIN HYDROCHLORIDE 0.4 MG: 0.4 CAPSULE ORAL at 08:38

## 2021-08-09 RX ADMIN — CIPROFLOXACIN 400 MG: 2 INJECTION, SOLUTION INTRAVENOUS at 20:16

## 2021-08-09 RX ADMIN — TADALAFIL 40 MG: 20 TABLET, FILM COATED ORAL at 08:40

## 2021-08-09 RX ADMIN — CLOTRIMAZOLE 1 LOZENGE: 10 LOZENGE ORAL at 20:13

## 2021-08-09 RX ADMIN — BUDESONIDE 0.25 MG: 0.25 INHALANT RESPIRATORY (INHALATION) at 09:27

## 2021-08-09 RX ADMIN — Medication 5 MG: at 22:29

## 2021-08-09 RX ADMIN — BUDESONIDE 0.25 MG: 0.25 INHALANT RESPIRATORY (INHALATION) at 21:05

## 2021-08-09 ASSESSMENT — ACTIVITIES OF DAILY LIVING (ADL)
ADLS_ACUITY_SCORE: 16

## 2021-08-09 ASSESSMENT — MIFFLIN-ST. JEOR: SCORE: 1564.22

## 2021-08-09 NOTE — PROGRESS NOTES
Nephrology Progress Note  08/09/2021         Assessment & Recommendations:     Jimmy Isaac is a 61 yo male with past medical history significant for bilateral kidney stones s/p left nephrostomy, CKD Stage IV, PAH 2/2 pulmonary sarcoidosis, who presented with complete heart block after RHC s/p pacemaker placement 8/2. Patient's course complicated by delerium and SISI.     SISI on CKD 4  Mild -moderate Uremia   Patient's renal function has been steadily declining. Cr in 2018 was 1.4 -> Dec 2019 1.9 -> July 2020 ~3 (at [facility]) and was in the 3s June 2021. Cr was 3 upon admission and steadily increased. There was slight improvement with IV lasix, however later on noted to be euvolemic and had worsening creatinine levels. Despite holding diuretics, Cr has not improved, though with single dose of lasix yesterday pt had good UOP and received additional dose of lasix today.      - Differential for SISI is broad at this point including sarcoidosis in the kidneys, obstruction due to struvite stones on the right side (since there is a nephrostomy tube on the left), complicated UTI, AIN due to antibiotic use and ATN. He has mild uremic symptoms at this time and no acute indication for dialysis today. Pt would like to delay initiation of HD as long as possible with hope that renal function will improve.   - No acute indication for dialysis at this time but patient is at high risk of needing it in coming days  - Agree with ongoing gentle diuresis per primary  - Strict Is/Os     Recommendations were communicated to primary team via note.    Seen and discussed with Dr. Darrick Parmar MD   [phone]    Interval History :   Nursing and provider notes from last 24 hours reviewed.    No acute events overnight. Pt had good UOP after lasix yesterday. Discussed that he is still at high risk for needing dialysis. Pt would be open to dialysis if needed but would like to exhaust all other options prior to starting HD. No change  in SOB. No CP. No nausea or vomiting. Appetite is good.     Review of Systems:   4pt ROS negative except as above in HPI.    Physical Exam:   I/O last 3 completed shifts:  In: 580 [P.O.:480; I.V.:100]  Out: 1875 [Urine:1875]   /61 (BP Location: Right arm)   Pulse 81   Temp 97.6  F (36.4  C) (Oral)   Resp 18   Ht 1.829 m (6')   Wt 72.1 kg (159 lb)   SpO2 100%   BMI 21.56 kg/m       GENERAL APPEARANCE: Alert, comfortable  PULM: on 6L, clear bilaterally  CV: regular rhythm, normal rate, no rub     -no edema  GI: soft, non-tender, not distended, bowel sounds are present  INTEGUMENT: no cyanosis, or rash    Labs:   All labs reviewed by me  Electrolytes/Renal -   Recent Labs   Lab Test 08/09/21  0543 08/08/21  1633 08/08/21  0542 08/07/21  1428 07/27/21  0742 07/01/21  1336 01/09/19  1152 12/26/18  1318 05/30/18  1025    136 135 138  --  142   < > 139 138   POTASSIUM 4.4 4.5 4.4 4.2  --  4.0   < > 3.8 4.2   CHLORIDE 107 104 105 106  --  110*   < > 102 104   CO2 25 25 22 22  --  25   < > 31 28   * 103* 102* 98*  --  50*   < > 32* 24   CR 4.86* 4.80* 4.78* 4.72*  --  3.27*   < > 1.40* 1.34*   * 120* 92 98   < > 95   < > 122* 100*   TERI 9.1 9.0 8.9 9.3  --  8.7   < > 9.1 9.2   MAG  --  2.5* 2.4* 2.5*   < >  --   --   --   --    PHOS  --   --   --   --   --  3.8  --  3.3 2.4*    < > = values in this interval not displayed.       CBC -   Recent Labs   Lab Test 08/09/21  0543 08/08/21  0542 08/07/21  0625   WBC 5.5 5.5 6.3   HGB 8.6* 8.1* 9.5*   * 115* 135*       LFTs -   Recent Labs   Lab Test 08/09/21  0543 08/08/21  1633 08/08/21  0542   ALKPHOS 67 69 61   BILITOTAL 0.3 0.3 0.4   ALT 19 19 18   AST 20 25 22   PROTTOTAL 6.6* 6.5* 6.3*   ALBUMIN 2.6* 2.8* 2.5*       Iron Panel -   Recent Labs   Lab Test 07/01/21  1336 10/18/17  1121   IRON 28* 82   IRONSAT 12* 29   MIHIR 33 41         Imaging:      Current Medications:    ambrisentan  10 mg Oral Daily     budesonide  0.25 mg Nebulization  BID     ciprofloxacin  400 mg Intravenous Q24H     clotrimazole  1 lozenge Buccal TID     [Held by provider] furosemide  20 mg Oral Daily     heparin ANTICOAGULANT  5,000 Units Subcutaneous Q12H     lidocaine  2 patch Transdermal Q24h    And     lidocaine   Transdermal Q8H     melatonin  5 mg Oral At Bedtime     selexipag  1,600 mcg Oral Q12H     sodium chloride (PF)  3 mL Intracatheter Q8H     tadalafil  40 mg Oral Daily     tamsulosin  0.4 mg Oral Daily       - MEDICATION INSTRUCTIONS -       - MEDICATION INSTRUCTIONS -       - MEDICATION INSTRUCTIONS -       Brandy Parmar MD

## 2021-08-09 NOTE — PROGRESS NOTES
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 A pacemaker was placed 8/2/21 due to persistent heart block.    Patient has recovered from delirium and is now being diuresed.     Plan:  - Hypervolemic on exam, IV 80 Lasix this afternoon  - PT/OT ordered for possible upcoming discharge  - Started ketogenic diet today for PET-scan to evaluate sarcoid on 8/11/21  - Renal Consult: Ordered U/A with micro, Random urine protein, FeUrea, and urine sodium, along with consenting patient for dialysis. Will continue to follow with further recommendations.  - ID Consult: Started IV meropenem (8/6/21), collected blood cultures and urine cultures (NG after 2 days), and repeat CXR (negative for new infection). Will transition from IV meropenem to IV ciprofloxacin. Will transition to PO levaquin/ciprofloxacin upon discharge and continue until 72 hours after the urological procedure   - Continue PTA medications for PAH of budenoside inhaler, ambrisentan, tadalafil, and selexipag   - At baseline home O2 already (currently at 6L)    # Complete Heart Block (New 7/30/21)  Patient was admitted for complete heart block. He was found to be hypervolemic on exam and had NT proBNP 28K. His PTA digoxin was discontinue at admission. TSH normal.   He has h/O pulmonary sarcoidosis with last PET scan in 2017 and cardiac MRI in 2012, that might be impacting also heart and conduction  - Successful pacemaker placed on 8/2/21.     # PAH 2/2 Pulmonary Sarcoidosis,   # Acute on Chronic Hypoxic Respiratory Failure (at home O2 of 4.5 L at rest and  8-10 L with exertion)  # LVEF 55-60%   # RV function mild/moderately reduced.   Patient has history of pulmonary HTN. His last RHC 7/30/21 showed  RA - 12, RV - 77/16, PA - 77/20/38, PCWP - 17,   Lesia CO/CI - 4.62/2.36, this translates to mild elevation of right and left filling pressures, moderate elevation of pulmonary artery hypertension but normal LV cardiac output level. This was compatible with initial vol status assessment and reassure patient required diuresis.   - vol status: euvolemic. New dry weight 162 lbs.   - PTA ambrisentan 10mg daily, selexipag 1600 mcg q12 hrs.   - PTA tadalafil  - PTA budesonide inh   - Inpatient PET Scan to evaluate sarcoidosis on 8/11/21 - placed on ketogenic diet 48 hours prior    # Delirium (New 8/3/21) likely secondary to lack of resting and hospital stay vs. Uremia vs. Persistent complicated UTI vs. S/E of IV cefepime (Resolved - 8/8/21)  Patient become delirious over the night of 8/3/21. He become aggressive, so haldol, seroquel, and ativan was given. Likely this is related to no well resting. He has been confused but not aggressive over last night.   - Non-pharmacologic therapy will be preferred: keeping lights on during the day and off overnight. Will avoid disruption overnight. 1:1 sitter while confused today.   - Melatonin 5 mg at bedtime  - ID and Renal evaluating source control and treatment of complicated UTI  - CT Head w/o Contrast (8/7/21): Negative  - 8/8/21: Completed orientated to person, place, time. Will continue to monitor for change in mentation    # Complicated Urinary Tract Infection with Pan-Senstive Pseudomonas  Urine Culture from 7/27/21 grew pan sensitive pseudomona. Likely a contaminant but we have been treating it since 7/30.   # Bilateral staghorn calculi and L ureter stones. C/B Left hydronephrosis s/p percutaneous drainage (CT 8/1/21).    - Treated with IV cefepime for a total of 6 days (7/30 - 8/4). Specially when U/A from 8/4/21, no concerning for infection.    - U/A (8/6/21): Positive for infection, >182 RBC/WBC's, positive for leukocyte esterase and few bacteria. Concerning for persistent  complicated UTI and lack of source control   - CXR (8/7/21): No new infection, similar to CXR done on 8/3/21   - Narrowed meropenem IV to ciprofloxacin IV on 8/9/21 while inpatient    # SISI on CKD Stage IV   Likely multifactorial secondary to cardiorenal and some degree of obstructive uropathy.   Patient has been evaluated by urology during this admission. CT abdomen showed slightly improvement of hydronephrosis and. Urology do not recommend further management of obstructive uropathy at this point.   Initially difficult to diuresed, but with increased diuretics (furosemide IV 80 mg BID) he was able to achieved euvolemic state.   New baseline Cr ~ 3 (only two lab values for the last year prior to admission).   - Today, creatinine still increasing. Renal consult pending for recommendations   - Urology will follow patient as an outpatient.      # BPH, continue PTA Tamsulosin 0.4 PTA     DVT Prophylaxis: Enoxaparin (Lovenox) subcutaneous   Chong Catheter: removed.   Code Status: Full Code  Lines: PIV x2, Left nephrostomy tube     Patient discussed with staff attending, Dr. Wheat.     Sathya Short MD  Internal Medicine Resident PGY-1  Pager: [phone]      Interval History   NAEO. Patient fully oriented this morning to self, place, and time. Was able to get a good nights sleep. No new complaints this morning and itchiness has resolved with calamine lotion.    Otherwise he denies diarrhea, fevers, chills, nausea, vomiting, headaches, dizziness, or skin changes.    Physical Exam   Temp: 97.8  F (36.6  C) Temp src: Oral BP: 110/62 Pulse: 81   Resp: 18 SpO2: 96 % O2 Device: Nasal cannula Oxygen Delivery: 6 LPM  Vitals:    08/07/21 0700 08/08/21 0215 08/09/21 0226   Weight: 70.2 kg (154 lb 12.2 oz) 71.7 kg (158 lb) 72.1 kg (159 lb)     Vital Signs with Ranges  Temp:  [97.3  F (36.3  C)-98.1  F (36.7  C)] 97.8  F (36.6  C)  Pulse:  [81-91] 81  Resp:  [16-20] 18  BP: ()/(49-69) 110/62  SpO2:  [91 %-98 %] 96 %  I/O  last 3 completed shifts:  In: 460 [P.O.:360; I.V.:100]  Out: 1925 [Urine:1925]     , Blood pressure 110/62, pulse 81, temperature 97.8  F (36.6  C), temperature source Oral, resp. rate 18, height 1.829 m (6'), weight 72.1 kg (159 lb), SpO2 96 %.  159 lbs 0 oz  GEN:  Calm, in no acute distress, AAO x3  CV:  Regular rate and rhythm, no murmur.  LUNGS:   Able to move oxygen well with deep breaths. Clear to auscultation bilaterally  ABD:  Active bowel sounds, soft, non-tender/non-distended.  No rebound/guarding/rigidity.  EXT:  +1 swelling in bilaterally lower extremities, palpable pulses    Medications     - MEDICATION INSTRUCTIONS -       - MEDICATION INSTRUCTIONS -       - MEDICATION INSTRUCTIONS -         ambrisentan  10 mg Oral Daily     budesonide  0.25 mg Nebulization BID     clotrimazole  1 lozenge Buccal TID     [Held by provider] furosemide  20 mg Oral Daily     heparin ANTICOAGULANT  5,000 Units Subcutaneous Q12H     lidocaine  2 patch Transdermal Q24h    And     lidocaine   Transdermal Q8H     melatonin  5 mg Oral At Bedtime     meropenem  500 mg Intravenous Q12H     selexipag  1,600 mcg Oral Q12H     sodium chloride (PF)  3 mL Intracatheter Q8H     tadalafil  40 mg Oral Daily     tamsulosin  0.4 mg Oral Daily       Data   Recent Labs   Lab 08/09/21  0543 08/08/21  1633 08/08/21  0542 08/07/21  0625   WBC 5.5  --  5.5 6.3   HGB 8.6*  --  8.1* 9.5*   MCV 91  --  91 92   *  --  115* 135*    136 135 140   POTASSIUM 4.4 4.5 4.4 4.6   CHLORIDE 107 104 105 108   CO2 25 25 22 22   * 103* 102* 99*   CR 4.86* 4.80* 4.78* 4.75*   ANIONGAP 7 7 8 10   TERI 9.1 9.0 8.9 9.3   * 120* 92 89   ALBUMIN 2.6* 2.8* 2.5* 2.9*   PROTTOTAL 6.6* 6.5* 6.3* 7.0   BILITOTAL 0.3 0.3 0.4 0.4   ALKPHOS 67 69 61 71   ALT 19 19 18 22   AST 20 25 22 36       Recent Results (from the past 24 hour(s))   XR Chest Port 1 View    Narrative    Exam: TEMPORARY, 8/9/2021 2:47 AM    Indication: Shortness of  breath    Comparison: 8/7/2021    Findings:   Portable upright views of the chest. Left chest wall ICD with  sequential leads in stable position. Trachea is midline. Cardiac  silhouette is stable in size. Grossly unchanged streaky perihilar  opacities. No new airspace consolidation. No pleural effusion or  pneumothorax. Osseous structures are within normal limits.      Impression    Impression:   Unchanged streaky perihilar opacities, likely related to underlying  sarcoidosis. No new airspace opacities.     I have personally reviewed the examination and initial interpretation  and I agree with the findings.    RESHMA PHELPS MD         SYSTEM ID:  [id number]

## 2021-08-09 NOTE — PRE-PROCEDURE
Temp: 97.6  F (36.4  C) Temp src: Oral BP: 103/61 Pulse: 81   Resp: 18 SpO2: 100 % O2 Device: Nasal cannula Oxygen Delivery: 6 LPM     Change: Received one time lasix. Started PO antibiotic.    A:   Neuro: A/Ox4. Uses call light appropriately   Cardiac/Tele:  VVS. Paced. Afebrile. Denies chest pain   Respiratory: 6L-8L NC. Tolerates well   GI/: Continent. Urinal at bedside. Reports he had a BM with PT. Neph tube dressing changed.  Diet/Appetite: No sugar, no carb diet. 100% keto diet. 2L FR  Skin: No new deficits noted. Pacemaker site left KIARA. Steri strips removed  LDAs: PIV-SL  Activity: Assist of one with G/W  Pain: Denies pain     P: PET scan on 8/11. Continue to monitor and notify team with changes.

## 2021-08-09 NOTE — PROGRESS NOTES
CLINICAL NUTRITION SERVICES    Reviewed nutrition risk factors due to LOS. Pt is tolerating diet and eating adequately per nursing documentation. Per discussion with pt, decreased appetite PTA (not feeling well with infection and kidney problems) but reports his appetite improved during this hospital stay and is eating normal amounts currently. Averaging 75% of meals per % intake flowsheets. Reviewed wt hx: 75.4 kg (7/8/20), 74.8 kg (11/16/20), 75 kg (7/30/21, admit), 72.1 kg (8/9/21) - No unintentional wt loss PTA. Diuresis this admission.      Follow Up / Monitoring:   Will continue to follow pt via rounds.    Rachel Toth, MS, RD, LD, [facility]   6C Pgr: [phone]

## 2021-08-09 NOTE — PROGRESS NOTES
"CLINICAL NUTRITION SERVICES      Received provider orders for \"Getting PET scan in 48 hours, needs absolute ketotic diet\" and \"PLEASE ASSIST IN ENSURING 100% KETOTIC DIET AS PATIENT TO HAVE PET SCAN IN 48 HRS.\"    Diet: 2 g sodium diet, 2 L fluid restriction.     INTERVENTIONS:  Implementation:  Nutrition education for nutrition relationship to health/disease, Modify composition of meals/snacks: Discussed diet order with team. Team modified diet order to reflect this temporary change in diet order. Updated [employer] (meal ordering system). Discussed diet with pt. Pt states he is aware there are few options available while temporarily on this restrictive diet. Placed order in [employer] to allow additional sodium at meals as long as not going over on his daily sodium restriction.     Rachel Toth, MS, RD, LD, [facility]   6C Pgr: [phone]  "

## 2021-08-09 NOTE — PLAN OF CARE
"D: Pt admit 7/30/21 for CHB after RHC s/p PPM 8/2/21. PMH PAH 2/2 pulmonary sarcoidosis, bilateral kidney stones, CKD IV, BPH     I/A:   Neuro: A&Ox4. Pleasant, makes needs known. Pt following directions & using call light appropriately overnight.   VS: VSS. while sleeping, pt SaO2 dropped <85% but returned >92% when awake. pt endorsed \"waking up gasping for air\" MD notified, CXR done, PRN albuterol inhaler x2.   Tele: 100% AV/V Paced. 1-2+ edema in BLE.  Pain: denies  GI/: Urinating adequately into bedside urinal. No BM this shift. Pt due for BM (unsure when last BM was).   Diet: 2g Na w/2L FR  IV/Drips: L PIV SL  Activity: A x1 w/GB  Skin/drains: Pt endorsed itching on back, PRN calamine cream administered. L nephrostomy tube CDI, output in flowsheets     P: Continue to monitor pt status and report changes to Cards 2.      Ermelinda Pineda RN  "

## 2021-08-09 NOTE — PROGRESS NOTES
GENERAL ID SERVICE: PROGRESS NOTE  Patient:  Jimmy Isaac, Date of birth 1960, Medical record number [id number]  Date of Admission: 7/30/2021  Date of Visit:  8/9/2021         Assessment and Recommendations:   Problem List:    1. UTI / Pyelonephritis   - Urine culture on 7/27/21 grew >100K CFU/ml Pseudomonas fluorescence/putida group and <10K CFU/ml mixed urogenital efrem. It was pansensitive.    - started on Cefepime 2 gram IV daily on 7/30/21 and completed 6 days of therapy on 8/4/21.   - repeat UC 8/4/21 clean catch - no growth  2. Bilateral kidney stones   3. Moderate to advance left hydronephrosis s/p left percutaneous nephrostomy tube placement on 7/9/21.   4. Complete heart block and admitted on 7/30/21 s/p Pacemaker on 8/2/21.   5. Confusion and oriented to self since 8/4/21 per chart review  - due to adverse effects of medications/ sedatives/ narcotics, Cefepime neurotoxicity / renal failure/ infection   6. Pulmonary scarcoidosis   7. PAH   8. SISI on CKD stage 4     RECOMMENDATION:  1.  Given no growth in urine culture from 8/4, I recommend to narrow antibiotic regimen to target the Pseudomonas fluorescens from 7/27/21 (it was pnasusceptible). I recommend to stop meropenem and start IV ciprofloxacin adjusted to the renal function (patient had presumable altered mental status when he was previously on cefepime). The duration will be at least 14 days, but will depend on timing for the final urologic procedure to remove stones and exchange the left PNT. Once the patient is ready to be dismissed, the IV cipro can be transitioned to oral cipro and it will need to be continued for at least 14 days, however if urology procedure happens after the day 14 of treatment, cipro will need to be continued  until 72h after the definitive urologic procedure  - Please monitor QTc while on ciprofloxacin    2.. I don't anticipate need for follow up in ID clinic unless new symptoms arise. Patient will need follow up with  urology and primary care provider     3.  I will sign off at this moment. Please see sign off recommendations above. Please page me ([phone]) if any question.        Recommendations discussed with primary team        Ariana Dhaliwal MD  Date of Service: 08/09/21  Pager: [phone]           Physical Exam:   /61 (BP Location: Right arm)   Pulse 81   Temp 97.6  F (36.4  C) (Oral)   Resp 18   Ht 1.829 m (6')   Wt 72.1 kg (159 lb)   SpO2 100%   BMI 21.56 kg/m         Exam:  GENERAL:  Well-developed, well-nourished, not in acute distress.   HEAD: Normocephalic and atraumatic  ENT:  No hearing impairment  EYES:  Eyes grossly normal to inspection, PERRL and conjunctivae and sclerae normal   NECK:  Supple, no adenopathy, no asymmetry, masses, or scars and thyroid normal to palpation  LUNGS:  Clear to auscultation - no rales, rhonchi or wheezes  CARDIOVASCULAR:  Regular rate and rhythm, normal S1 S2  ABDOMEN:  Soft, nontender, no hepatosplenomegaly, no masses and bowel sounds normal  L PNT in place  EXT: Extremities warm and without edema.  MS: No gross musculoskeletal defects noted, no edema  SKIN:  No acute rashes or suspicious lesions  NEUROLOGIC:  Grossly nonfocal. Normal strength and tone, mentation intact and speech normal  PSYCHIATRIC: Mood stable, mentation appears normal, affect normal             Laboratory Data:     Creatinine   Date Value Ref Range Status   08/09/2021 4.86 (H) 0.66 - 1.25 mg/dL Final   08/08/2021 4.80 (H) 0.66 - 1.25 mg/dL Final   08/08/2021 4.78 (H) 0.66 - 1.25 mg/dL Final   08/07/2021 4.72 (H) 0.66 - 1.25 mg/dL Final   08/07/2021 4.75 (H) 0.66 - 1.25 mg/dL Final   07/01/2021 3.27 (H) 0.66 - 1.25 mg/dL Final   06/16/2021 3.65 (H) 0.66 - 1.25 mg/dL Final   12/11/2019 1.85 (H) 0.66 - 1.25 mg/dL Final   09/25/2019 1.62 (H) 0.66 - 1.25 mg/dL Final   06/19/2019 1.80 (H) 0.66 - 1.25 mg/dL Final     WBC   Date Value Ref Range Status   07/01/2021 5.7 4.0 - 11.0 10e9/L Final   12/11/2019 6.4 4.0 -  11.0 10e9/L Final   09/25/2019 6.5 4.0 - 11.0 10e9/L Final   06/19/2019 6.4 4.0 - 11.0 10e9/L Final   03/05/2019 6.7 4.0 - 11.0 10e9/L Final     WBC Count   Date Value Ref Range Status   08/09/2021 5.5 4.0 - 11.0 10e3/uL Final   08/08/2021 5.5 4.0 - 11.0 10e3/uL Final   08/07/2021 6.3 4.0 - 11.0 10e3/uL Final   08/06/2021 7.2 4.0 - 11.0 10e3/uL Final   08/05/2021 5.8 4.0 - 11.0 10e3/uL Final     Hemoglobin   Date Value Ref Range Status   08/09/2021 8.6 (L) 13.3 - 17.7 g/dL Final   07/01/2021 11.4 (L) 13.3 - 17.7 g/dL Final     Platelet Count   Date Value Ref Range Status   08/09/2021 144 (L) 150 - 450 10e3/uL Final   07/01/2021 141 (L) 150 - 450 10e9/L Final     Lab Results   Component Value Date     08/09/2021     (H) 08/09/2021    CO2 25 08/09/2021     CRP Inflammation   Date Value Ref Range Status   07/01/2021 9.2 (H) 0.0 - 8.0 mg/L Final   09/22/2017 5.1 0.0 - 8.0 mg/L Final   05/16/2017 8.0 0.0 - 8.0 mg/L Final   10/16/2013 13.1 (H) 0.0 - 8.0 mg/L Final   01/12/2012 17.5 (H) 0.0 - 8.0 mg/L Final           Pertinent Recent Microbiology Data:   No results for input(s): CULT, SDES in the last 168 hours.         Imaging:     Recent Results (from the past 48 hour(s))   XR Chest Port 1 View    Narrative    Exam: TEMPORARY, 8/9/2021 2:47 AM    Indication: Shortness of breath    Comparison: 8/7/2021    Findings:   Portable upright views of the chest. Left chest wall ICD with  sequential leads in stable position. Trachea is midline. Cardiac  silhouette is stable in size. Grossly unchanged streaky perihilar  opacities. No new airspace consolidation. No pleural effusion or  pneumothorax. Osseous structures are within normal limits.      Impression    Impression:   Unchanged streaky perihilar opacities, likely related to underlying  sarcoidosis. No new airspace opacities.     I have personally reviewed the examination and initial interpretation  and I agree with the findings.    RESHMA PHELPS MD         SYSTEM  ID:  [id number]   Cardiac Device Check - Inpatient   Result Value    Date Time Interrogation Session [id number]    Implantable Pulse Generator  Medtronic    Implantable Pulse Generator Model [id number] [place] XT DR MRI    Implantable Pulse Generator Serial Number [id number]    Type Interrogation Session In Clinic    Clinic Name [facility]    Implantable Pulse Generator Type Pacemaker    Implantable Pulse Generator Implant Date 20210802    Implantable Lead  Medtronic    Implantable Lead Model 5076 CapSureFix Novus MRI SureScan    Implantable Lead Serial Number [id number]    Implantable Lead Implant Date 20210802    Implantable Lead Polarity Type Bipolar Lead    Implantable Lead Location Detail 1 UNKNOWN    Implantable Lead Special Function 52 cm    Implantable Lead Location Right Atrium    Implantable Lead  Medtronic    Implantable Lead Model 5076 CapSureFix Novus MRI SureScan    Implantable Lead Serial Number [id number]    Implantable Lead Implant Date 20210802    Implantable Lead Polarity Type Bipolar Lead    Implantable Lead Location Detail 1 UNKNOWN    Implantable Lead Special Function 58 cm    Implantable Lead Location Right Ventricle    Jose Cruz Setting Mode (NBG Code) DDD    Jose Cruz Setting Lower Rate Limit 60    Jose Cruz Setting Maximum Tracking Rate 130    Jose Cruz Setting Maximum Sensor Rate 130    Jose Cruz Setting Hysterisis Rate DISABLED    Jose Cruz Setting ELIAS Delay Low 150    Jose Cruz Setting PAV Delay Low 180    Jose Cruz Setting AT Mode Switch Rate 171    Lead Channel Setting Sensing Polarity Bipolar    Lead Channel Setting Sensing Anode Location Right Atrium    Lead Channel Setting Sensing Anode Terminal Ring    Lead Channel Setting Sensing Cathode Location Right Atrium    Lead Channel Setting Sensing Cathode Terminal Tip    Lead Channel Setting Sensing Sensitivity 0.3    Lead Channel Setting Sensing Polarity Bipolar    Lead Channel Setting Sensing Anode Location  Right Ventricle    Lead Channel Setting Sensing Anode Terminal Ring    Lead Channel Setting Sensing Cathode Location Right Ventricle    Lead Channel Setting Sensing Cathode Terminal Tip    Lead Channel Setting Sensing Sensitivity 0.9    Lead Channel Setting Pacing Polarity Bipolar    Lead Channel Setting Pacing Anode Location Right Atrium    Lead Channel Setting Pacing Anode Terminal Ring    Lead Channel Setting Sensing Cathode Location Right Atrium    Lead Channel Setting Sensing Cathode Terminal Tip    Lead Channel Setting Pacing Pulse Width 0.4    Lead Channel Setting Pacing Amplitude 3.5    Lead Channel Setting Pacing Capture Mode Adaptive    Lead Channel Setting Pacing Polarity Bipolar    Lead Channel Setting Pacing Anode Location Right Ventricle    Lead Channel Setting Pacing Anode Terminal Ring    Lead Channel Setting Sensing Cathode Location Right Ventricle    Lead Channel Setting Sensing Cathode Terminal Tip    Lead Channel Setting Pacing Pulse Width 0.4    Lead Channel Setting Pacing Amplitude 3.5    Lead Channel Setting Pacing Capture Mode Adaptive    Zone Setting Type Category VF    Zone Setting Type Category VT    Zone Setting Type Category VT    Zone Setting Type Category VT    Zone Setting Detection Interval 400    Zone Setting Type Category ATRIAL_FIBRILLATION    Zone Setting Type Category AT/AF    Zone Setting Detection Interval 350    Lead Channel Impedance Value 380    Lead Channel Impedance Value 304    Lead Channel Sensing Intrinsic Amplitude 4.5    Lead Channel Pacing Threshold Amplitude 0.75    Lead Channel Pacing Threshold Pulse Width 0.4    Lead Channel Impedance Value 418    Lead Channel Impedance Value 361    Lead Channel Pacing Threshold Amplitude 0.5    Lead Channel Pacing Threshold Pulse Width 0.4    Battery Date Time of Measurements [id number]    Battery Status OK    Battery RRT Trigger 2.625    Battery Remaining Longevity 125    Battery Voltage 3.21    Jose Cruz Statistic Date Time  Start 20210803085944    Jose Cruz Statistic Date Time End 20210809151935    Jose Cruz Statistic RA Percent Paced 5.53    Jose Cruz Statistic RV Percent Paced 98.12    Josec Ruz Statistic AP  Percent 5.6    Jose Cruz Statistic AS  Percent 92.51    Jose Cruz Statistic AP VS Percent 0.02    Jose Cruz Statistic AS VS Percent 1.87    Atrial Tachy Statistic Date Time Start 20210803085944    Atrial Tachy Statistic Date Time End 20210809151935    Atrial Tachy Statistic AT/AF [place] Percent 0    Episode Statistic Recent Count 0    Episode Statistic Type Category AT/AF    Episode Statistic Recent Count 0    Episode Statistic Type Category VT    Episode Statistic Recent Count 0    Episode Statistic Type Category VT    Episode Statistic Recent Date Time Start 20210803085944    Episode Statistic Recent Date Time End 20210809151935    Episode Statistic Recent Date Time Start 20210803085944    Episode Statistic Recent Date Time End 20210809151935    Episode Statistic Recent Date Time Start 20210803085944    Episode Statistic Recent Date Time End 20210809151935    Episode Statistic Total Count 0    Episode Statistic Type Category AT/AF    Episode Statistic Total Count 0    Episode Statistic Type Category VT    Episode Statistic Total Count 0    Episode Statistic Type Category VT    Episode Statistic Total Date Time Start 20210802120413    Episode Statistic Total Date Time End 20210809151935    Episode Statistic Total Date Time Start 20210802120413    Episode Statistic Total Date Time End 20210809151935    Episode Statistic Total Date Time Start 20210802120413    Episode Statistic Total Date Time End 20210809151935    Narrative    Patient seen in [address] [place] [address] for evaluation and iterative programming of their pacemaker, s/p implant on 8/2/21, per MD orders. Steristrips removed without difficulty. Incision is well approximated without redness, drainage or edema.    Device: Medtronic Huong XT   Normal device function  Mode: DDD  bpm  AP: 6%  :  98%  Intrinsic Rhythm: CHB: SR @ 78 bpm/  @ 30 bpm  Short V-V intervals: 0  Lead Trends Appear Stable: yes  Estimated battery longevity to RRT = 10 years. Battery voltage = 3.21V.   Atrial Arrhythmia: 0  AF [place] = 0%  Anticoagulant: none  Ventricular Arrhythmia: 0  Setting Changes: none      Plan: Pt will return to clinic in 3 months.  Logan SILVERMAN RN    dual lead pacemaker    I have reviewed and interpreted the device interrogation, settings, programming and nurse's summary. The device is functioning within normal device parameters. I agree with the current findings, assessment and plan.

## 2021-08-10 LAB
ALBUMIN SERPL-MCNC: 2.6 G/DL (ref 3.4–5)
ALP SERPL-CCNC: 70 U/L (ref 40–150)
ALT SERPL W P-5'-P-CCNC: 18 U/L (ref 0–70)
ANION GAP SERPL CALCULATED.3IONS-SCNC: 11 MMOL/L (ref 3–14)
AST SERPL W P-5'-P-CCNC: 17 U/L (ref 0–45)
BILIRUB SERPL-MCNC: 0.3 MG/DL (ref 0.2–1.3)
BUN SERPL-MCNC: 108 MG/DL (ref 7–30)
CALCIUM SERPL-MCNC: 9.3 MG/DL (ref 8.5–10.1)
CHLORIDE BLD-SCNC: 104 MMOL/L (ref 94–109)
CO2 SERPL-SCNC: 23 MMOL/L (ref 20–32)
CREAT SERPL-MCNC: 4.47 MG/DL (ref 0.66–1.25)
ERYTHROCYTE [DISTWIDTH] IN BLOOD BY AUTOMATED COUNT: 13.9 % (ref 10–15)
GFR SERPL CREATININE-BSD FRML MDRD: 13 ML/MIN/1.73M2
GLUCOSE BLD-MCNC: 85 MG/DL (ref 70–99)
HCT VFR BLD AUTO: 28.3 % (ref 40–53)
HGB BLD-MCNC: 8.6 G/DL (ref 13.3–17.7)
MAGNESIUM SERPL-MCNC: 2.2 MG/DL (ref 1.6–2.3)
MCH RBC QN AUTO: 28.1 PG (ref 26.5–33)
MCHC RBC AUTO-ENTMCNC: 30.4 G/DL (ref 31.5–36.5)
MCV RBC AUTO: 93 FL (ref 78–100)
PLATELET # BLD AUTO: 138 10E3/UL (ref 150–450)
POTASSIUM BLD-SCNC: 4 MMOL/L (ref 3.4–5.3)
POTASSIUM BLD-SCNC: 4.8 MMOL/L (ref 3.4–5.3)
PROT SERPL-MCNC: 6.5 G/DL (ref 6.8–8.8)
RBC # BLD AUTO: 3.06 10E6/UL (ref 4.4–5.9)
SODIUM SERPL-SCNC: 138 MMOL/L (ref 133–144)
WBC # BLD AUTO: 5.2 10E3/UL (ref 4–11)

## 2021-08-10 PROCEDURE — 250N000009 HC RX 250: Performed by: STUDENT IN AN ORGANIZED HEALTH CARE EDUCATION/TRAINING PROGRAM

## 2021-08-10 PROCEDURE — 99233 SBSQ HOSP IP/OBS HIGH 50: CPT | Mod: GC | Performed by: INTERNAL MEDICINE

## 2021-08-10 PROCEDURE — 94640 AIRWAY INHALATION TREATMENT: CPT | Mod: 76

## 2021-08-10 PROCEDURE — 83735 ASSAY OF MAGNESIUM: CPT

## 2021-08-10 PROCEDURE — 36415 COLL VENOUS BLD VENIPUNCTURE: CPT | Performed by: STUDENT IN AN ORGANIZED HEALTH CARE EDUCATION/TRAINING PROGRAM

## 2021-08-10 PROCEDURE — 214N000001 HC R&B CCU UMMC

## 2021-08-10 PROCEDURE — 84132 ASSAY OF SERUM POTASSIUM: CPT

## 2021-08-10 PROCEDURE — 999N000157 HC STATISTIC RCP TIME EA 10 MIN

## 2021-08-10 PROCEDURE — 80053 COMPREHEN METABOLIC PANEL: CPT | Performed by: STUDENT IN AN ORGANIZED HEALTH CARE EDUCATION/TRAINING PROGRAM

## 2021-08-10 PROCEDURE — 85027 COMPLETE CBC AUTOMATED: CPT | Performed by: STUDENT IN AN ORGANIZED HEALTH CARE EDUCATION/TRAINING PROGRAM

## 2021-08-10 PROCEDURE — 250N000011 HC RX IP 250 OP 636

## 2021-08-10 PROCEDURE — 93010 ELECTROCARDIOGRAM REPORT: CPT | Performed by: INTERNAL MEDICINE

## 2021-08-10 PROCEDURE — 36415 COLL VENOUS BLD VENIPUNCTURE: CPT

## 2021-08-10 PROCEDURE — 250N000013 HC RX MED GY IP 250 OP 250 PS 637: Performed by: STUDENT IN AN ORGANIZED HEALTH CARE EDUCATION/TRAINING PROGRAM

## 2021-08-10 PROCEDURE — 94640 AIRWAY INHALATION TREATMENT: CPT

## 2021-08-10 PROCEDURE — 250N000011 HC RX IP 250 OP 636: Performed by: STUDENT IN AN ORGANIZED HEALTH CARE EDUCATION/TRAINING PROGRAM

## 2021-08-10 PROCEDURE — 93005 ELECTROCARDIOGRAM TRACING: CPT

## 2021-08-10 PROCEDURE — 250N000013 HC RX MED GY IP 250 OP 250 PS 637

## 2021-08-10 RX ORDER — POTASSIUM CHLORIDE 750 MG/1
40 TABLET, EXTENDED RELEASE ORAL ONCE
Status: COMPLETED | OUTPATIENT
Start: 2021-08-10 | End: 2021-08-10

## 2021-08-10 RX ORDER — FUROSEMIDE 10 MG/ML
80 INJECTION INTRAMUSCULAR; INTRAVENOUS ONCE
Status: COMPLETED | OUTPATIENT
Start: 2021-08-10 | End: 2021-08-10

## 2021-08-10 RX ADMIN — BUDESONIDE 0.25 MG: 0.25 INHALANT RESPIRATORY (INHALATION) at 07:40

## 2021-08-10 RX ADMIN — CIPROFLOXACIN 400 MG: 2 INJECTION, SOLUTION INTRAVENOUS at 17:54

## 2021-08-10 RX ADMIN — AMBRISENTAN 10 MG: 10 TABLET, FILM COATED ORAL at 08:41

## 2021-08-10 RX ADMIN — Medication 5 MG: at 22:01

## 2021-08-10 RX ADMIN — TAMSULOSIN HYDROCHLORIDE 0.4 MG: 0.4 CAPSULE ORAL at 08:42

## 2021-08-10 RX ADMIN — POTASSIUM CHLORIDE 40 MEQ: 750 TABLET, EXTENDED RELEASE ORAL at 08:41

## 2021-08-10 RX ADMIN — HEPARIN SODIUM 5000 UNITS: 10000 INJECTION, SOLUTION INTRAVENOUS; SUBCUTANEOUS at 08:42

## 2021-08-10 RX ADMIN — SELEXIPAG 1600 MCG: 200 TABLET, COATED ORAL at 20:05

## 2021-08-10 RX ADMIN — HEPARIN SODIUM 5000 UNITS: 10000 INJECTION, SOLUTION INTRAVENOUS; SUBCUTANEOUS at 20:05

## 2021-08-10 RX ADMIN — FUROSEMIDE 80 MG: 10 INJECTION, SOLUTION INTRAVENOUS at 12:21

## 2021-08-10 RX ADMIN — SELEXIPAG 1600 MCG: 200 TABLET, COATED ORAL at 08:41

## 2021-08-10 RX ADMIN — TADALAFIL 40 MG: 20 TABLET, FILM COATED ORAL at 10:27

## 2021-08-10 RX ADMIN — BUDESONIDE 0.25 MG: 0.25 INHALANT RESPIRATORY (INHALATION) at 20:35

## 2021-08-10 RX ADMIN — CLOTRIMAZOLE 1 LOZENGE: 10 LOZENGE ORAL at 20:05

## 2021-08-10 ASSESSMENT — ACTIVITIES OF DAILY LIVING (ADL)
ADLS_ACUITY_SCORE: 14
ADLS_ACUITY_SCORE: 14
ADLS_ACUITY_SCORE: 16
ADLS_ACUITY_SCORE: 14

## 2021-08-10 ASSESSMENT — MIFFLIN-ST. JEOR: SCORE: 1550.61

## 2021-08-10 NOTE — PROGRESS NOTES
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 A pacemaker was placed 8/2/21 due to persistent heart block.    Patient has recovered from delirium and is now being diuresed.     Plan:  - Hypervolemic on exam, IV 80 Lasix this afternoon  - PT/OT ordered for possible upcoming discharge  - Started ketogenic diet for PET-scan to evaluate sarcoid on 8/12/21  - Renal Consult: Will continue to follow with further recommendations. No need for dialysis at this moment.  - ID Consult: Transitioned from IV meropenem to IV ciprofloxacin. Will transition to PO levaquin/ciprofloxacin upon discharge and continue until 72 hours after the urological procedure   - Continue PTA medications for PAH of budenoside inhaler, ambrisentan, tadalafil, and selexipag   - At baseline home O2 already (currently at 6L)    # Complete Heart Block (New 7/30/21)  Patient was admitted for complete heart block. He was found to be hypervolemic on exam and had NT proBNP 28K. His PTA digoxin was discontinue at admission. TSH normal.   He has h/O pulmonary sarcoidosis with last PET scan in 2017 and cardiac MRI in 2012, that might be impacting also heart and conduction  - Successful pacemaker placed on 8/2/21.     # PAH 2/2 Pulmonary Sarcoidosis,   # Acute on Chronic Hypoxic Respiratory Failure (at home O2 of 4.5 L at rest and  8-10 L with exertion)  # LVEF 55-60%   # RV function mild/moderately reduced.   Patient has history of pulmonary HTN. His last RHC 7/30/21 showed RA - 12, RV - 77/16, PA - 77/20/38, PCWP - 17,   Lesia CO/CI - 4.62/2.36, this translates to mild elevation of right and left filling pressures, moderate elevation of pulmonary artery hypertension but normal LV cardiac output  level. This was compatible with initial vol status assessment and reassure patient required diuresis.   - vol status: euvolemic. New dry weight 162 lbs.   - PTA ambrisentan 10mg daily, selexipag 1600 mcg q12 hrs.   - PTA tadalafil  - PTA budesonide inh   - Inpatient PET Scan to evaluate sarcoidosis on 8/12/21 - placed on ketogenic diet with the following schedule:   NPO from 8/10/21 2000 - 8/11/21 0800   Ketogenic diet from 8/11/21 0800 - 8/11/21 2000   NPO from 8/11/21 2000 until PET scan    # Delirium (New 8/3/21) likely secondary to lack of resting and hospital stay vs. Uremia vs. Persistent complicated UTI vs. S/E of IV cefepime (Resolved - 8/8/21)  Patient become delirious over the night of 8/3/21. He become aggressive, so haldol, seroquel, and ativan was given. Likely this is related to no well resting. He has been confused but not aggressive over last night.   - Non-pharmacologic therapy will be preferred: keeping lights on during the day and off overnight. Will avoid disruption overnight. 1:1 sitter while confused today.   - Melatonin 5 mg at bedtime  - ID and Renal evaluating source control and treatment of complicated UTI  - CT Head w/o Contrast (8/7/21): Negative  - 8/8/21: Completed orientated to person, place, time. Will continue to monitor for change in mentation    # Complicated Urinary Tract Infection with Pan-Senstive Pseudomonas  Urine Culture from 7/27/21 grew pan sensitive pseudomona. Likely a contaminant but we have been treating it since 7/30.   # Bilateral staghorn calculi and L ureter stones. C/B Left hydronephrosis s/p percutaneous drainage (CT 8/1/21).    - Treated with IV cefepime for a total of 6 days (7/30 - 8/4). Specially when U/A from 8/4/21, no concerning for infection.    - U/A (8/6/21): Positive for infection, >182 RBC/WBC's, positive for leukocyte esterase and few bacteria. Concerning for persistent complicated UTI and lack of source control   - CXR (8/7/21): No new infection,  similar to CXR done on 8/3/21   - Narrowed meropenem IV to ciprofloxacin IV on 8/9/21 while inpatient due to:    - Urine CX 8/7/21 = < 1,000 urogenital efrem   - Blood CX x2 8/7/21 = NG for 3 days    - EKG 8/10/21 showing QTc of 498, will continue to monitor while on ciprofloxacin    # SISI on CKD Stage IV   Likely multifactorial secondary to cardiorenal and some degree of obstructive uropathy.   Patient has been evaluated by urology during this admission. CT abdomen showed slightly improvement of hydronephrosis and urology did not recommend further management of obstructive uropathy at this point.   Initially difficult to diuresed, but with increased diuretics (furosemide IV 80 mg BID) he was able to achieved euvolemic state. This was discontinued after creatinine continued to rise above 4.  New baseline Cr ~ 3 (only two lab values for the last year prior to admission).   - Today, creatinine has decreased from 4.86 to 4.47. Will continue gentle diuresis of IV 80 lasix each afternoon. Renal consult following for possible need of dialysis.   - Urology will follow patient as an outpatient.      # BPH, continue PTA Tamsulosin 0.4 PTA     DVT Prophylaxis: Enoxaparin (Lovenox) subcutaneous   Chong Catheter: removed.   Code Status: Full Code  Lines: PIV x2, Left nephrostomy tube     Patient discussed with staff attending, Dr. Wheat.     Sathya Short MD  Internal Medicine Resident PGY-1  Pager: [phone]      Interval History   NAEO. Patient fully oriented this morning to self, place, and time. No new complaints this morning and will continue with the ketogenic diet.    Otherwise he denies diarrhea, fevers, chills, nausea, vomiting, headaches, dizziness, or skin changes.    Physical Exam   Temp: 97.5  F (36.4  C) Temp src: Oral BP: 99/58 Pulse: 76   Resp: 20 SpO2: 98 % O2 Device: Nasal cannula (sitting on patient's lower lip) Oxygen Delivery: 8 LPM  Vitals:    08/08/21 0215 08/09/21 0226 08/10/21 0400   Weight: 71.7 kg  (158 lb) 72.1 kg (159 lb) 70.8 kg (156 lb)     Vital Signs with Ranges  Temp:  [97.5  F (36.4  C)-97.7  F (36.5  C)] 97.5  F (36.4  C)  Pulse:  [76-87] 76  Resp:  [18-20] 20  BP: ()/(51-61) 99/58  SpO2:  [91 %-100 %] 98 %  I/O last 3 completed shifts:  In: 480 [P.O.:480]  Out: 1900 [Urine:1900]     , Blood pressure 99/58, pulse 76, temperature 97.5  F (36.4  C), temperature source Oral, resp. rate 20, height 1.829 m (6'), weight 70.8 kg (156 lb), SpO2 98 %.  156 lbs 0 oz  GEN:  Calm, in no acute distress, AAO x3  CV:  Regular rate and rhythm, no murmur.  LUNGS:   Able to move oxygen well with deep breaths. Clear to auscultation bilaterally  ABD:  Active bowel sounds, soft, non-tender/non-distended.  No rebound/guarding/rigidity.  EXT:  +1 swelling in bilaterally lower extremities, palpable pulses    Medications     - MEDICATION INSTRUCTIONS -       - MEDICATION INSTRUCTIONS -       - MEDICATION INSTRUCTIONS -         ambrisentan  10 mg Oral Daily     budesonide  0.25 mg Nebulization BID     ciprofloxacin  400 mg Intravenous Q24H     clotrimazole  1 lozenge Buccal TID     [Held by provider] furosemide  20 mg Oral Daily     heparin ANTICOAGULANT  5,000 Units Subcutaneous Q12H     lidocaine  2 patch Transdermal Q24h    And     lidocaine   Transdermal Q8H     melatonin  5 mg Oral At Bedtime     selexipag  1,600 mcg Oral Q12H     sodium chloride (PF)  3 mL Intracatheter Q8H     tadalafil  40 mg Oral Daily     tamsulosin  0.4 mg Oral Daily       Data   Recent Labs   Lab 08/10/21  0551 08/09/21  0543 08/08/21  1633 08/08/21  0542   WBC 5.2 5.5  --  5.5   HGB 8.6* 8.6*  --  8.1*   MCV 93 91  --  91   * 144*  --  115*    139 136 135   POTASSIUM 4.0 4.4 4.5 4.4   CHLORIDE 104 107 104 105   CO2 23 25 25 22   * 107* 103* 102*   CR 4.47* 4.86* 4.80* 4.78*   ANIONGAP 11 7 7 8   TERI 9.3 9.1 9.0 8.9   GLC 85 102* 120* 92   ALBUMIN 2.6* 2.6* 2.8* 2.5*   PROTTOTAL 6.5* 6.6* 6.5* 6.3*   BILITOTAL 0.3 0.3 0.3  0.4   ALKPHOS 70 67 69 61   ALT 18 19 19 18   AST 17 20 25 22       Recent Results (from the past 24 hour(s))   Cardiac Device Check - Inpatient   Result Value    Date Time Interrogation Session [id number]    Implantable Pulse Generator  Medtronic    Implantable Pulse Generator Model [id number] Huong XT DR MRI    Implantable Pulse Generator Serial Number [id number]    Type Interrogation Session In Clinic    Clinic Name [facility]    Implantable Pulse Generator Type Pacemaker    Implantable Pulse Generator Implant Date 20210802    Implantable Lead  Medtronic    Implantable Lead Model 5076 CapSureFix Novus MRI SureScan    Implantable Lead Serial Number [id number]    Implantable Lead Implant Date 20210802    Implantable Lead Polarity Type Bipolar Lead    Implantable Lead Location Detail 1 UNKNOWN    Implantable Lead Special Function 52 cm    Implantable Lead Location Right Atrium    Implantable Lead  Medtronic    Implantable Lead Model 5076 CapSureFix Novus MRI SureScan    Implantable Lead Serial Number [id number]    Implantable Lead Implant Date 20210802    Implantable Lead Polarity Type Bipolar Lead    Implantable Lead Location Detail 1 UNKNOWN    Implantable Lead Special Function 58 cm    Implantable Lead Location Right Ventricle    Jose Cruz Setting Mode (NBG Code) DDD    Jose Cruz Setting Lower Rate Limit 60    Jose Cruz Setting Maximum Tracking Rate 130    Jose Cruz Setting Maximum Sensor Rate 130    Jose Cruz Setting Hysterisis Rate DISABLED    Jose Cruz Setting ELIAS Delay Low 150    Jose Cruz Setting PAV Delay Low 180    Jose Cruz Setting AT Mode Switch Rate 171    Lead Channel Setting Sensing Polarity Bipolar    Lead Channel Setting Sensing Anode Location Right Atrium    Lead Channel Setting Sensing Anode Terminal Ring    Lead Channel Setting Sensing Cathode Location Right Atrium    Lead Channel Setting Sensing Cathode Terminal Tip    Lead Channel Setting Sensing Sensitivity 0.3     Lead Channel Setting Sensing Polarity Bipolar    Lead Channel Setting Sensing Anode Location Right Ventricle    Lead Channel Setting Sensing Anode Terminal Ring    Lead Channel Setting Sensing Cathode Location Right Ventricle    Lead Channel Setting Sensing Cathode Terminal Tip    Lead Channel Setting Sensing Sensitivity 0.9    Lead Channel Setting Pacing Polarity Bipolar    Lead Channel Setting Pacing Anode Location Right Atrium    Lead Channel Setting Pacing Anode Terminal Ring    Lead Channel Setting Sensing Cathode Location Right Atrium    Lead Channel Setting Sensing Cathode Terminal Tip    Lead Channel Setting Pacing Pulse Width 0.4    Lead Channel Setting Pacing Amplitude 3.5    Lead Channel Setting Pacing Capture Mode Adaptive    Lead Channel Setting Pacing Polarity Bipolar    Lead Channel Setting Pacing Anode Location Right Ventricle    Lead Channel Setting Pacing Anode Terminal Ring    Lead Channel Setting Sensing Cathode Location Right Ventricle    Lead Channel Setting Sensing Cathode Terminal Tip    Lead Channel Setting Pacing Pulse Width 0.4    Lead Channel Setting Pacing Amplitude 3.5    Lead Channel Setting Pacing Capture Mode Adaptive    Zone Setting Type Category VF    Zone Setting Type Category VT    Zone Setting Type Category VT    Zone Setting Type Category VT    Zone Setting Detection Interval 400    Zone Setting Type Category ATRIAL_FIBRILLATION    Zone Setting Type Category AT/AF    Zone Setting Detection Interval 350    Lead Channel Impedance Value 380    Lead Channel Impedance Value 304    Lead Channel Sensing Intrinsic Amplitude 4.5    Lead Channel Pacing Threshold Amplitude 0.75    Lead Channel Pacing Threshold Pulse Width 0.4    Lead Channel Impedance Value 418    Lead Channel Impedance Value 361    Lead Channel Pacing Threshold Amplitude 0.5    Lead Channel Pacing Threshold Pulse Width 0.4    Battery Date Time of Measurements [id number]    Battery Status OK    Battery RRT Trigger  2.625    Battery Remaining Longevity 125    Battery Voltage 3.21    Jose Cruz Statistic Date Time Start 20210803085944    Jose Cruz Statistic Date Time End 20210809151935    Jose Cruz Statistic RA Percent Paced 5.53    Jose Cruz Statistic RV Percent Paced 98.12    Jose Cruz Statistic AP  Percent 5.6    Jose Cruz Statistic AS  Percent 92.51    Jose Cruz Statistic AP VS Percent 0.02    Jose Cruz Statistic AS VS Percent 1.87    Atrial Tachy Statistic Date Time Start 20210803085944    Atrial Tachy Statistic Date Time End 20210809151935    Atrial Tachy Statistic AT/AF [place] Percent 0    Episode Statistic Recent Count 0    Episode Statistic Type Category AT/AF    Episode Statistic Recent Count 0    Episode Statistic Type Category VT    Episode Statistic Recent Count 0    Episode Statistic Type Category VT    Episode Statistic Recent Date Time Start 20210803085944    Episode Statistic Recent Date Time End 20210809151935    Episode Statistic Recent Date Time Start 20210803085944    Episode Statistic Recent Date Time End 20210809151935    Episode Statistic Recent Date Time Start 20210803085944    Episode Statistic Recent Date Time End 20210809151935    Episode Statistic Total Count 0    Episode Statistic Type Category AT/AF    Episode Statistic Total Count 0    Episode Statistic Type Category VT    Episode Statistic Total Count 0    Episode Statistic Type Category VT    Episode Statistic Total Date Time Start 20210802120413    Episode Statistic Total Date Time End 20210809151935    Episode Statistic Total Date Time Start 20210802120413    Episode Statistic Total Date Time End 20210809151935    Episode Statistic Total Date Time Start 20210802120413    Episode Statistic Total Date Time End 20210809151935    Narrative    Patient seen in [address] [place] [address] for evaluation and iterative programming of their pacemaker, s/p implant on 8/2/21, per MD orders. Steristrips removed without difficulty. Incision is well approximated without redness, drainage or  edema.    Device: Medtronic Huong XT   Normal device function  Mode: DDD  bpm  AP: 6%  : 98%  Intrinsic Rhythm: CHB: SR @ 78 bpm/  @ 30 bpm  Short V-V intervals: 0  Lead Trends Appear Stable: yes  Estimated battery longevity to RRT = 10 years. Battery voltage = 3.21V.   Atrial Arrhythmia: 0  AF [place] = 0%  Anticoagulant: none  Ventricular Arrhythmia: 0  Setting Changes: none      Plan: Pt will return to clinic in 3 months.  Logan SILVERMAN RN    dual lead pacemaker    I have reviewed and interpreted the device interrogation, settings, programming and nurse's summary. The device is functioning within normal device parameters. I agree with the current findings, assessment and plan.

## 2021-08-10 NOTE — PLAN OF CARE
Admitted with complete heart block with narrow escape ventricular rhythm in the low 40's after undergoing a RHC. A pacemaker was placed on 8/2/21 due to persistent heart block. 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 Coarse lung sounds on ascultation  GI/: Voids spontaneously using the urinal. Last BM 8/9. Nephrostomy tube dressing intact,bag emptied  Diet/Appetite: No sugar, no carb diet. 100% keto diet and 2L FR  Skin: No new deficits noted. Pacemaker site left KIARA.   LDAs: PIV - SL  Activity: Assist of one with G/W  Pain: Denies pain     Plan: PET scan on 8/11.Continue with POC

## 2021-08-10 NOTE — PROGRESS NOTES
08/09/21 1600   Quick Adds   Type of Visit Initial PT Evaluation   Living Environment   People in home significant other   Current Living Arrangements house   Home Accessibility stairs to enter home;stairs within home   Number of Stairs, Main Entrance 5   Stair Railings, Main Entrance railings safe and in good condition   Number of Stairs, Within Home, Primary greater than 10 stairs  (1 flight to basement)   Transportation Anticipated car, drives self;family or friend will provide   Living Environment Comments Pt lives with mikey in a house, 5 steps to enter with hand rail, 1 flight to basement where laundry is located. Pt has tub shower, no shower chair or grab bars.    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 SO does most of the cooking and cleaning, however SO has had recent health issues and surgeries so may not be able to assist with heavy ADLs/IADLs.   Disability/Function   Hearing Difficulty or Deaf no   Wear Glasses or Blind yes   Vision Management reading glasses   Concentrating, Remembering or Making Decisions Difficulty no   Difficulty Communicating no   Difficulty Eating/Swallowing no   Walking or Climbing Stairs Difficulty no   Dressing/Bathing Difficulty no   Toileting issues no   Doing Errands Independently Difficulty (such as shopping) no   Fall history within last six months no   Change in Functional Status Since Onset of Current Illness/Injury yes   General Information   Onset of Illness/Injury or Date of Surgery 07/30/21   Referring Physician Donna Herrera MD    Patient/Family Therapy Goals Statement (PT) to return home   Pertinent History of Current Problem (include personal factors and/or comorbidities that impact the POC) Jimmy Isaac is a 61 yo male with past medical history significant for 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 A pacemaker was placed 8/2/21 due to persistent heart block.   Existing Precautions/Restrictions pacemaker;oxygen therapy device and L/min   Cognition   Orientation Status (Cognition) oriented x 4   Affect/Mental Status (Cognition) WFL   Follows Commands (Cognition) WFL   Pain Assessment   Patient Currently in Pain No   Posture    Posture Protracted shoulders   Range of Motion (ROM)   ROM Comment BLE WFL   Strength Comprehensive (MMT)   Comment, General Manual Muscle Testing (MMT) Assessment BLE WFL, generalized deconditioning   Bed Mobility   Comment (Bed Mobility) Not assessed, greeted sitting up in chair   Transfers   Transfer Safety Comments sit<>stand SBA   Gait/Stairs (Locomotion)   Comment (Gait/Stairs) Ambulates with unilateral UE support from IV pole and CGA-SBA   Balance   Balance Comments mild dynamic balance impairments, requiring IV pole for support during ambulation   Sensory Examination   Sensory Perception patient reports no sensory changes   Clinical Impression   Criteria for Skilled Therapeutic Intervention yes, treatment indicated   PT Diagnosis (PT) impaired functional mobility   Influenced by the following impairments strength, balance, activity tolerance   Functional limitations due to impairments bed mobility, transfers, gait, stairs, functional endurance   Clinical Presentation Stable/Uncomplicated   Clinical Presentation Rationale PMH/comorbidities, clinical judgement   Clinical Decision Making (Complexity) low complexity   Therapy Frequency (PT) 4x/week    Predicted Duration of Therapy Intervention (days/wks) 1 week   Planned Therapy Interventions (PT) balance training;bed mobility training;gait training;patient/family education;stair training;strengthening;transfer training;progressive activity/exercise;risk factor education   Risk & Benefits of therapy have been explained evaluation/treatment results reviewed;care plan/treatment goals reviewed;participants voiced agreement with care plan;participants included;patient   PT Discharge Planning    PT Discharge Recommendation (DC Rec) home with assist   PT Rationale for DC Rec Patient below baseline but appears to be improving, anticipate will be safe to discharge home with assist as needed.   Total Evaluation Time   Total Evaluation Time (Minutes) 5

## 2021-08-10 NOTE — CONSULTS
Care Management Initial Consult    General Information  Assessment completed with: Patient,    Type of CM/SW Visit: Initial Assessment    Primary Care Provider verified and updated as needed: Yes   Readmission within the last 30 days:        Reason for Consult: discharge planning    Communication Assessment  Patient's communication style: spoken language (English or Bilingual)    Hearing Difficulty or Deaf: no   Wear Glasses or Blind: yes    Cognitive  Cognitive/Neuro/Behavioral: WDL  Level of Consciousness: alert  Arousal Level: opens eyes spontaneously  Orientation: oriented x 4  Mood/Behavior: calm, cooperative, behavior appropriate to situation  Best Language: 0 - No aphasia  Speech: clear, logical, slow    Living Environment:   People in home: significant other     Current living Arrangements: house      Able to return to prior arrangements: yes     Family/Social Support:  Care provided by: self  Provides care for:    Marital Status: Lives with Significant Other  Significant Other          Description of Support System: Supportive, Involved       Current Resources:   Patient receiving home care services: No   Community Resources: None  Equipment currently used at home: none  Supplies currently used at home: Oxygen Tubing/Supplies    Employment/Financial:  Financial Concerns: No concerns identified      Functional Status:  Prior to admission patient needed assistance: Pt states he was independent with his cares prior to admission and feels he his nearing his baseline.     Values/Beliefs:  Spiritual, Cultural Beliefs, Yazidism Practices, Values that affect care: no       Additional Information:  This writer met with pt to introduce self and role of RNCC. OT currently recommending discharge to home with assist as needed. Pt states that his fiance is able to help as needed, but unable to help with heavy tasks. Pt states that he is independent at baseline and drives himself to appointments. Pt currently on 6-8L of  O2 which pt states is near baseline. Pt states that he usually uses 5L at rest and 10L with activity. Pt's home O2 supplier is [employer].   Pt has a nephrostomy tube that he had prior to admission and states he manages independently.   Pt states he does not have any discharge needs or concerns at this time and is hopeful to return home at the end of the week.    CC will continue to monitor patient's medical condition and progress towards discharge.  Grace Patel RN BSN  6C Unit Care Coordinator  Phone number: [phone]  Pager: [phone]

## 2021-08-10 NOTE — PROGRESS NOTES
08/09/21 0948   Quick Adds   Type of Visit Initial Occupational Therapy Evaluation   Living Environment   People in home other (see comments)  (fiance)   Current Living Arrangements house   Home Accessibility stairs to enter home;stairs within home   Number of Stairs, Main Entrance 5   Stair Railings, Main Entrance railings safe and in good condition   Number of Stairs, Within Home, Primary greater than 10 stairs  (1 flight)   Stair Railings, Within Home, Primary railings safe and in good condition   Transportation Anticipated car, drives self;family or friend will provide   Living Environment Comments Pt lives with sree in a house, 5 steps to enter wiht hand rail, 1 flight to basement where laundry is located. Pt has ub shower, no shower chair or grab bars.    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 Pt reporting that SO has health issues and will likely not be able to assist with heavy ADL/IADL   Instrumental Activities of Daily Living (IADL)   Previous Responsibilities laundry;shopping;yardwork;medication management;finances;driving   IADL Comments Filucy able to assist with light IADL tasks, recovering from prolonged hospitalization per pt   Disability/Function   Hearing Difficulty or Deaf no   Wear Glasses or Blind yes   Vision Management readers   Concentrating, Remembering or Making Decisions Difficulty no   Difficulty Communicating no   Difficulty Eating/Swallowing no   Walking or Climbing Stairs Difficulty no   Dressing/Bathing Difficulty no   Toileting issues no   Doing Errands Independently Difficulty (such as shopping) no   Fall history within last six months no   Change in Functional Status Since Onset of Current Illness/Injury yes    General Information   Onset of Illness/Injury or Date of Surgery 07/30/21   Referring Physician HELIO Pineda   Patient/Family Therapy Goal Statement (OT) Regain strength and return to baseline   Additional Occupational Profile Info/Pertinent History of Current Problem Jimmy Isaac is a 59 yo male with past medical history significant for 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 A pacemaker was placed 8/2/21 due to persistent heart block.   Existing Precautions/Restrictions cardiac;pacemaker   Limitations/Impairments safety/cognitive  (Appears to be improving, now at/near baseline)   Left Upper Extremity (Weight-bearing Status) other (see comments)  (10# per PPM precautions)   Right Upper Extremity (Weight-bearing Status) full weight-bearing (FWB)   Left Lower Extremity (Weight-bearing Status) full weight-bearing (FWB)   Right Lower Extremity (Weight-bearing Status) full weight-bearing (FWB)   General Observations and Info Activity: Ambulate   Cognitive Status Examination   Orientation Status orientation to person, place and time   Affect/Mental Status (Cognitive) WNL   Follows Commands WFL   Memory Deficit minimal deficit   Cognitive Status Comments Pt appears at/near baseline, oriented to person/place/time/date/location/reason for admission. Some mild STS deficits likely related to episode of delirium, now clearing. Will continue to monitor   Visual Perception   Visual Impairment/Limitations corrective lenses for reading   Sensory   Sensory Quick Adds No deficits were identified   Pain Assessment   Patient Currently in Pain No   Posture   Posture not impaired   Range of Motion Comprehensive   Comment, General Range of Motion Not formally assessed 2/2 PPM precautions, appears WFL   Strength Comprehensive (MMT)   Comment, General Manual Muscle Testing  (MMT) Assessment Not formally assessed 2/2 precautions, appears WFL   Coordination   Upper Extremity Coordination No deficits were identified   Clinical Impression   Criteria for Skilled Therapeutic Interventions Met (OT) yes;meets criteria;skilled treatment is necessary   OT Diagnosis Decreased ADL/IADL I   OT Problem List-Impairments impacting ADL problems related to;activity tolerance impaired;cognition;strength;post-surgical precautions   Assessment of Occupational Performance 5 or more Performance Deficits   Identified Performance Deficits Toileting, bathing, dressing, g/h, home mgmt, med/financial mgmt   Planned Therapy Interventions (OT) ADL retraining;IADL retraining;cognition;strengthening;transfer training;home program guidelines;progressive activity/exercise;risk factor education   Clinical Decision Making Complexity (OT) low complexity   Therapy Frequency (OT) Daily   Predicted Duration of Therapy 1 week   Anticipated Equipment Needs Upon Discharge (OT) shower chair   Risk & Benefits of therapy have been explained evaluation/treatment results reviewed;care plan/treatment goals reviewed;risks/benefits reviewed;current/potential barriers reviewed;participants voiced agreement with care plan;participants included;patient   Comment-Clinical Impression Pt will benefit from skilled OT services to progress ADL/IADL I and support safe discharge plan   OT Discharge Planning    OT Discharge Recommendation (DC Rec) Home with assist   OT Rationale for DC Rec Pt presents below baseline, limited by deconditioning, post-op precautions and some cognitive deficits, however these appear to be improving. Pt will have assist available from SO upon discharge, however spouse only able to assist with light ADLs/IADLs d/t health concerns. Anticipate with ongoing participation with therapy, pt will be appropriate to d/c home w/ A PRN, will continue to monitor and update recs as indicated.   OT Brief overview of current status   SBA w/ FWW, CGA w/ IV pole   Total Evaluation Time (Minutes)   Total Evaluation Time (Minutes) 5

## 2021-08-10 NOTE — PROGRESS NOTES
Patient BIPAP removed from room due to non-compliance. No documentation for use has existed for several days. RT will continue to follow.

## 2021-08-10 NOTE — PLAN OF CARE
Temp: 97.7  F (36.5  C) Temp src: Oral BP: 93/54 Pulse: 87   Resp: 20 SpO2: 95 % O2 Device: Nasal cannula Oxygen Delivery: 6 LPM     Change: Received one time lasix. Pt will be NPO from [id number] on 8/10. NPO starting 2000-scan time on 8/11    A:   Neuro: A/Ox4. Uses call light appropriately   Cardiac/Tele:  VVS. Paced. Afebrile. Denies chest pain   Respiratory: 6L-8L NC. Tolerates well   GI/: Continent. Urinal at bedside. No BM.  Neph tube dressing changed. Urinating adequately.   Diet/Appetite: No sugar, no carb diet. 100% keto diet. 2L FR  Skin: No new deficits noted. Pacemaker site left KIARA.  LDAs: PIV-SL  Activity: Assist of one with G/W  Pain: Denies pain     P: PET scan on 8/12. Continue to monitor and notify team with changes.

## 2021-08-10 NOTE — PROGRESS NOTES
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 Patient's course complicated by delerium and SISI.     SISI on CKD 4  Mild -moderate Uremia   Patient's renal function has been steadily declining over past 3 years. Cr in 2018 was 1.4 -> Dec 2019 1.9 -> July 2020 ~3 (at [place]) and was in the 3s June 2021. Cr was 3 upon admission and steadily increased to peak of 4.86 on 8/9 and has now started to trend down with increasing UOP on lasix. Differential for SISI is broad at this point including sarcoidosis in the kidneys, obstruction due to struvite stones on the right side (since there is a nephrostomy tube on the left), complicated UTI, AIN due to antibiotic use and ATN. BUN remains elevated and pt still not out of the woods in terms of avoiding dialysis. Will continue to reassess daily.   - No acute indication for dialysis today, will continue daily reassessment   - Agree with ongoing gentle diuresis per primary  - Strict Is/Os     Recommendations were communicated to primary team via note.    Seen and discussed with Dr. Darrick Parmar MD   [phone]    Interval History :   Nursing and provider notes from last 24 hours reviewed.    No acute events overnight. UOP improving, pt receiving lasix. Appetite is good & breathing unchanged. He seemed more fatigued today and reports not sleeping well.     Review of Systems:   4pt ROS negative except as above in HPI.    Physical Exam:   I/O last 3 completed shifts:  In: 480 [P.O.:480]  Out: 1900 [Urine:1900]   BP 95/53 (BP Location: Right arm)   Pulse 87   Temp 98  F (36.7  C) (Oral)   Resp 20   Ht 1.829 m (6')   Wt 70.8 kg (156 lb)   SpO2 96%   BMI 21.16 kg/m       GENERAL APPEARANCE: Fatigued  but awakens easily to voice  PULM: on 6L, clear bilaterally  CV: regular rhythm, normal rate, no rub     -trace edema to mid shin bilaterally  GI: soft, non-tender, not distended  INTEGUMENT: no cyanosis, or rash    Labs:   All labs reviewed by me  Electrolytes/Renal -   Recent Labs   Lab Test 08/10/21  0551 08/09/21  0543 08/08/21  1633 08/08/21  0542 07/27/21  0742 07/01/21  1336 01/09/19  1152 12/26/18  1318 05/30/18  1025    139 136 135  --  142   < > 139 138   POTASSIUM 4.0 4.4 4.5 4.4  --  4.0   < > 3.8 4.2   CHLORIDE 104 107 104 105  --  110*   < > 102 104   CO2 23 25 25 22  --  25   < > 31 28   * 107* 103* 102*  --  50*   < > 32* 24   CR 4.47* 4.86* 4.80* 4.78*  --  3.27*   < > 1.40* 1.34*   GLC 85 102* 120* 92   < > 95   < > 122* 100*   TERI 9.3 9.1 9.0 8.9  --  8.7   < > 9.1 9.2   MAG 2.2  --  2.5* 2.4*   < >  --   --   --   --    PHOS  --   --   --   --   --  3.8  --  3.3 2.4*    < > = values in this interval not displayed.       CBC -   Recent Labs   Lab Test 08/10/21  0551 08/09/21  0543 08/08/21  0542   WBC 5.2 5.5 5.5   HGB 8.6* 8.6* 8.1*   * 144* 115*       LFTs -   Recent Labs   Lab Test 08/10/21  0551 08/09/21  0543 08/08/21  1633   ALKPHOS 70 67 69   BILITOTAL 0.3 0.3 0.3   ALT 18 19 19   AST 17 20 25   PROTTOTAL 6.5* 6.6* 6.5*   ALBUMIN 2.6* 2.6* 2.8*       Iron Panel -   Recent Labs   Lab Test 07/01/21  1336 10/18/17  1121   IRON 28* 82   IRONSAT 12* 29   MIHIR 33 41         Imaging:      Current Medications:    ambrisentan  10 mg Oral Daily     budesonide  0.25 mg Nebulization BID     ciprofloxacin  400 mg Intravenous Q24H     clotrimazole  1 lozenge Buccal TID     [Held by provider] furosemide  20 mg Oral Daily     heparin ANTICOAGULANT  5,000 Units Subcutaneous Q12H     lidocaine  2 patch Transdermal Q24h    And     lidocaine   Transdermal Q8H     melatonin  5 mg Oral At Bedtime     selexipag  1,600 mcg Oral Q12H     sodium chloride (PF)  3 mL Intracatheter Q8H     tadalafil   40 mg Oral Daily     tamsulosin  0.4 mg Oral Daily       - MEDICATION INSTRUCTIONS -       - MEDICATION INSTRUCTIONS -       - MEDICATION INSTRUCTIONS -       Brandy Parmar MD

## 2021-08-11 ENCOUNTER — APPOINTMENT (OUTPATIENT)
Dept: OCCUPATIONAL THERAPY | Facility: CLINIC | Age: 61
DRG: 242 | End: 2021-08-11
Attending: INTERNAL MEDICINE
Payer: COMMERCIAL

## 2021-08-11 ENCOUNTER — APPOINTMENT (OUTPATIENT)
Dept: PHYSICAL THERAPY | Facility: CLINIC | Age: 61
DRG: 242 | End: 2021-08-11
Attending: INTERNAL MEDICINE
Payer: COMMERCIAL

## 2021-08-11 LAB
ALBUMIN SERPL-MCNC: 2.5 G/DL (ref 3.4–5)
ALP SERPL-CCNC: 68 U/L (ref 40–150)
ALT SERPL W P-5'-P-CCNC: 19 U/L (ref 0–70)
ANION GAP SERPL CALCULATED.3IONS-SCNC: 13 MMOL/L (ref 3–14)
AST SERPL W P-5'-P-CCNC: 20 U/L (ref 0–45)
ATRIAL RATE - MUSE: 84 BPM
BACTERIA BLD CULT: NO GROWTH
BACTERIA BLD CULT: NO GROWTH
BILIRUB SERPL-MCNC: 0.4 MG/DL (ref 0.2–1.3)
BUN SERPL-MCNC: 117 MG/DL (ref 7–30)
CALCIUM SERPL-MCNC: 8.6 MG/DL (ref 8.5–10.1)
CHLORIDE BLD-SCNC: 106 MMOL/L (ref 94–109)
CO2 SERPL-SCNC: 20 MMOL/L (ref 20–32)
CREAT SERPL-MCNC: 4.5 MG/DL (ref 0.66–1.25)
DIASTOLIC BLOOD PRESSURE - MUSE: NORMAL MMHG
ERYTHROCYTE [DISTWIDTH] IN BLOOD BY AUTOMATED COUNT: 13.7 % (ref 10–15)
GFR SERPL CREATININE-BSD FRML MDRD: 13 ML/MIN/1.73M2
GLUCOSE BLD-MCNC: 74 MG/DL (ref 70–99)
HCT VFR BLD AUTO: 28.1 % (ref 40–53)
HGB BLD-MCNC: 8.6 G/DL (ref 13.3–17.7)
INTERPRETATION ECG - MUSE: NORMAL
MCH RBC QN AUTO: 28.4 PG (ref 26.5–33)
MCHC RBC AUTO-ENTMCNC: 30.6 G/DL (ref 31.5–36.5)
MCV RBC AUTO: 93 FL (ref 78–100)
P AXIS - MUSE: 74 DEGREES
PLATELET # BLD AUTO: 150 10E3/UL (ref 150–450)
POTASSIUM BLD-SCNC: 4.3 MMOL/L (ref 3.4–5.3)
PR INTERVAL - MUSE: 202 MS
PROT SERPL-MCNC: 6.3 G/DL (ref 6.8–8.8)
QRS DURATION - MUSE: 150 MS
QT - MUSE: 422 MS
QTC - MUSE: 498 MS
R AXIS - MUSE: 262 DEGREES
RBC # BLD AUTO: 3.03 10E6/UL (ref 4.4–5.9)
SODIUM SERPL-SCNC: 139 MMOL/L (ref 133–144)
SYSTOLIC BLOOD PRESSURE - MUSE: NORMAL MMHG
T AXIS - MUSE: 80 DEGREES
VENTRICULAR RATE- MUSE: 84 BPM
WBC # BLD AUTO: 4.6 10E3/UL (ref 4–11)

## 2021-08-11 PROCEDURE — 250N000013 HC RX MED GY IP 250 OP 250 PS 637: Performed by: STUDENT IN AN ORGANIZED HEALTH CARE EDUCATION/TRAINING PROGRAM

## 2021-08-11 PROCEDURE — 999N000157 HC STATISTIC RCP TIME EA 10 MIN

## 2021-08-11 PROCEDURE — 250N000009 HC RX 250: Performed by: STUDENT IN AN ORGANIZED HEALTH CARE EDUCATION/TRAINING PROGRAM

## 2021-08-11 PROCEDURE — 94640 AIRWAY INHALATION TREATMENT: CPT | Mod: 76

## 2021-08-11 PROCEDURE — 99233 SBSQ HOSP IP/OBS HIGH 50: CPT | Mod: GC | Performed by: INTERNAL MEDICINE

## 2021-08-11 PROCEDURE — 97530 THERAPEUTIC ACTIVITIES: CPT | Mod: GO

## 2021-08-11 PROCEDURE — 85027 COMPLETE CBC AUTOMATED: CPT | Performed by: STUDENT IN AN ORGANIZED HEALTH CARE EDUCATION/TRAINING PROGRAM

## 2021-08-11 PROCEDURE — 250N000011 HC RX IP 250 OP 636: Performed by: STUDENT IN AN ORGANIZED HEALTH CARE EDUCATION/TRAINING PROGRAM

## 2021-08-11 PROCEDURE — 94640 AIRWAY INHALATION TREATMENT: CPT

## 2021-08-11 PROCEDURE — 214N000001 HC R&B CCU UMMC

## 2021-08-11 PROCEDURE — 97535 SELF CARE MNGMENT TRAINING: CPT | Mod: GO

## 2021-08-11 PROCEDURE — 80053 COMPREHEN METABOLIC PANEL: CPT | Performed by: STUDENT IN AN ORGANIZED HEALTH CARE EDUCATION/TRAINING PROGRAM

## 2021-08-11 PROCEDURE — 97110 THERAPEUTIC EXERCISES: CPT | Mod: GP | Performed by: PHYSICAL THERAPIST

## 2021-08-11 PROCEDURE — 97116 GAIT TRAINING THERAPY: CPT | Mod: GP | Performed by: PHYSICAL THERAPIST

## 2021-08-11 PROCEDURE — 250N000011 HC RX IP 250 OP 636

## 2021-08-11 PROCEDURE — 36415 COLL VENOUS BLD VENIPUNCTURE: CPT | Performed by: STUDENT IN AN ORGANIZED HEALTH CARE EDUCATION/TRAINING PROGRAM

## 2021-08-11 RX ORDER — FUROSEMIDE 10 MG/ML
80 INJECTION INTRAMUSCULAR; INTRAVENOUS ONCE
Status: COMPLETED | OUTPATIENT
Start: 2021-08-11 | End: 2021-08-11

## 2021-08-11 RX ADMIN — SELEXIPAG 1600 MCG: 200 TABLET, COATED ORAL at 19:46

## 2021-08-11 RX ADMIN — AMBRISENTAN 10 MG: 10 TABLET, FILM COATED ORAL at 07:45

## 2021-08-11 RX ADMIN — FUROSEMIDE 80 MG: 10 INJECTION, SOLUTION INTRAVENOUS at 12:51

## 2021-08-11 RX ADMIN — TADALAFIL 40 MG: 20 TABLET, FILM COATED ORAL at 07:45

## 2021-08-11 RX ADMIN — CLOTRIMAZOLE 1 LOZENGE: 10 LOZENGE ORAL at 23:30

## 2021-08-11 RX ADMIN — CLOTRIMAZOLE 1 LOZENGE: 10 LOZENGE ORAL at 19:46

## 2021-08-11 RX ADMIN — TAMSULOSIN HYDROCHLORIDE 0.4 MG: 0.4 CAPSULE ORAL at 07:45

## 2021-08-11 RX ADMIN — CLOTRIMAZOLE 1 LOZENGE: 10 LOZENGE ORAL at 14:39

## 2021-08-11 RX ADMIN — HEPARIN SODIUM 5000 UNITS: 10000 INJECTION, SOLUTION INTRAVENOUS; SUBCUTANEOUS at 19:55

## 2021-08-11 RX ADMIN — CIPROFLOXACIN 400 MG: 2 INJECTION, SOLUTION INTRAVENOUS at 16:45

## 2021-08-11 RX ADMIN — BUDESONIDE 0.25 MG: 0.25 INHALANT RESPIRATORY (INHALATION) at 20:40

## 2021-08-11 RX ADMIN — SELEXIPAG 1600 MCG: 200 TABLET, COATED ORAL at 07:46

## 2021-08-11 RX ADMIN — HEPARIN SODIUM 5000 UNITS: 10000 INJECTION, SOLUTION INTRAVENOUS; SUBCUTANEOUS at 07:45

## 2021-08-11 RX ADMIN — BUDESONIDE 0.25 MG: 0.25 INHALANT RESPIRATORY (INHALATION) at 08:16

## 2021-08-11 ASSESSMENT — ACTIVITIES OF DAILY LIVING (ADL)
ADLS_ACUITY_SCORE: 14

## 2021-08-11 ASSESSMENT — MIFFLIN-ST. JEOR: SCORE: 1543.81

## 2021-08-11 NOTE — PROGRESS NOTES
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 Patient's course complicated by delerium and SISI.     SISI on CKD 4  Patient's renal function has been steadily declining over past 3 years. Cr in 2018 was 1.4 -> Dec 2019 1.9 -> July 2020 ~3 (at [place] [facility]) and was in the 3s June 2021. Cr was 3 upon admission and steadily increased to peak of 4.86 on 8/9 and has now started to trend down with increasing UOP on lasix. Differential for SISI is broad at this point including sarcoidosis in the kidneys, staghorn calculi (though s/p PNT on left), AIN due to antibiotic use and ATN. Cr unchanged today and BUN continues to rise, though pt denies any uremic symptoms. Previously reported itching resolved after cessation of topical medication for back pain.   - No acute indication for dialysis - if BMP stable and no uremic symptoms develop, then pt could be discharged tomorrow after PET with plan for close outpatient follow up. Nephrology will arrange outpatient visit.  - Agree with ongoing gentle diuresis per primary  - Strict Is/Os     Recommendations were communicated to primary team via phone.    Seen and discussed with Dr. Darrick Parmar MD   [phone]    Interval History :   Nursing and provider notes from last 24 hours reviewed.    No acute events overnight. UOP ~2L on daily lasix. Cr unchanged today and BUN slightly up. No change in breathing. No fevers or chills.     Review of Systems:   4pt ROS negative except as above in HPI.    Physical Exam:   I/O last 3 completed shifts:  In: 960 [P.O.:960]  Out: 1275 [Urine:1275]   BP 92/45 (BP Location: Right arm)   Pulse 88   Temp 97.7  F (36.5  C) (Oral)   Resp 18   Ht 1.829 m (6')   Wt 70.1 kg (154  lb 8 oz)   SpO2 96%   BMI 20.95 kg/m       GENERAL APPEARANCE: alert  PULM: on 6L, clear bilaterally  CV: regular rhythm, normal rate, no rub     -trace edema to mid shin bilaterally  GI: soft, non-tender, not distended  INTEGUMENT: no cyanosis, or rash    Labs:   All labs reviewed by me  Electrolytes/Renal -   Recent Labs   Lab Test 08/11/21  0614 08/10/21  1336 08/10/21  0551 08/09/21  0543 08/08/21  1633 08/08/21  0542 07/27/21  0742 07/01/21  1336 01/09/19  1152 12/26/18  1318 05/30/18  1025     --  138 139 136 135  --  142   < > 139 138   POTASSIUM 4.3 4.8 4.0 4.4 4.5 4.4  --  4.0   < > 3.8 4.2   CHLORIDE 106  --  104 107 104 105  --  110*   < > 102 104   CO2 20  --  23 25 25 22  --  25   < > 31 28   *  --  108* 107* 103* 102*  --  50*   < > 32* 24   CR 4.50*  --  4.47* 4.86* 4.80* 4.78*  --  3.27*   < > 1.40* 1.34*   GLC 74  --  85 102* 120* 92   < > 95   < > 122* 100*   TERI 8.6  --  9.3 9.1 9.0 8.9  --  8.7   < > 9.1 9.2   MAG  --   --  2.2  --  2.5* 2.4*   < >  --   --   --   --    PHOS  --   --   --   --   --   --   --  3.8  --  3.3 2.4*    < > = values in this interval not displayed.       CBC -   Recent Labs   Lab Test 08/11/21  0614 08/10/21  0551 08/09/21  0543   WBC 4.6 5.2 5.5   HGB 8.6* 8.6* 8.6*    138* 144*       LFTs -   Recent Labs   Lab Test 08/11/21 0614 08/10/21  0551 08/09/21  0543   ALKPHOS 68 70 67   BILITOTAL 0.4 0.3 0.3   ALT 19 18 19   AST 20 17 20   PROTTOTAL 6.3* 6.5* 6.6*   ALBUMIN 2.5* 2.6* 2.6*       Iron Panel -   Recent Labs   Lab Test 07/01/21  1336 10/18/17  1121   IRON 28* 82   IRONSAT 12* 29   MIHIR 33 41         Imaging:  No new imaging    Current Medications:    ambrisentan  10 mg Oral Daily     budesonide  0.25 mg Nebulization BID     ciprofloxacin  400 mg Intravenous Q24H     clotrimazole  1 lozenge Buccal TID     [Held by provider] furosemide  20 mg Oral Daily     heparin ANTICOAGULANT  5,000 Units Subcutaneous Q12H     lidocaine  2 patch Transdermal  Q24h    And     lidocaine   Transdermal Q8H     melatonin  5 mg Oral At Bedtime     selexipag  1,600 mcg Oral Q12H     sodium chloride (PF)  3 mL Intracatheter Q8H     tadalafil  40 mg Oral Daily     tamsulosin  0.4 mg Oral Daily       - MEDICATION INSTRUCTIONS -       - MEDICATION INSTRUCTIONS -       - MEDICATION INSTRUCTIONS -       Brandy Parmar MD

## 2021-08-11 NOTE — PLAN OF CARE
Admitted for a complete heart block after a RHC and is now s/p Pacemaker placement on 8/2. Hospital course c/b delirium and SISI.  PMH: bilateral kidney stones s/p left nephrostomy, CKD Stage IV, PAH 2/2 pulmonary sarcoidosis    Code status: Full Code     Team: Cards 2     Neuro: AOx4, Calm and cooperative   Cardiac/Tele: Paced rhythm, Soft BP's, other VSS (see flowsheets)  Respiratory: NC 6-8L. HOSKINS. Lung sounds diminished on ascultation  GI/: Voids spontaneously using the urinal.Left Nephrostomy bag with some output. Last BM 8/10  Diet/Appetite: NPO from 2000 - 0800. Strict keto diet. No carbs or sugar. Prepping for PET scan on 8/12  Skin: No new deficits. Pacemaker site intact KIARA. Nephrostomy site C/D/I. Mild edema to the LE  LDAs: PIV saline locked  Activity: Standby assist and walker  Pain: Denies pain     Plan: NPO again today from 8pm until scan time tomorrow. Strict I/O's and continue POC.

## 2021-08-11 NOTE — PROGRESS NOTES
CLINICAL NUTRITION SERVICES       Received page from RN, pt resuming no carb diet until PET 8/12.     INTERVENTIONS:  Implementation:  Modify composition of meals/snacks: Discussed with RN. Reviewed [employer] (meal ordering system) to ensure carbohydrates are limited.      Rachel Toth, MS, RD, LD, [facility]   6C Pgr: [phone]

## 2021-08-11 NOTE — PLAN OF CARE
BP 96/57 (BP Location: Right arm)   Pulse 84   Temp 97.5  F (36.4  C) (Oral)   Resp 18   Ht 1.829 m (6')   Wt 70.1 kg (154 lb 8 oz)   SpO2 96%   BMI 20.95 kg/m      Shift: 4511-1385  Reason for Admission: complete heart block after RHC s/p pacemaker placement 8/2. Patient's course complicated by delerium and SISI.  VS: VSS on RA. 100% V-paced. Soft BPs  Neuros: A/Ox4, able to make needs known  GI/: 1 BM this shift, voiding adequately   Nutrition: Keto diet, 2L FR. Will be NPO at 8p-8a   Drains/Lines: PIV SL. L. Neph tube  Activity: A1/SBA + GB. Worked with PT and OT today  Pain/Nausea: Denies both  Respiratory: Sats >95% on 4L NC, needs 6-8L when up with activity   Skin: L. Neph tube dressing changed  Plan of care: Plan for PET scan tomorrow. Will continue to monitor and follow POC.

## 2021-08-11 NOTE — PROGRESS NOTES
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 A pacemaker was placed 8/2/21 due to persistent heart block.    Patient has recovered from delirium and is now being gently diuresed.     Plan:  - Hypervolemic on exam, IV 80 Lasix this afternoon  - PT/OT ordered for possible upcoming discharge  - Started ketogenic diet for PET-scan to evaluate sarcoid on 8/12/21  - Renal Consult: Will continue to follow with further recommendations. No need for dialysis at this moment.  - ID Consult: Transitioned from IV meropenem to IV ciprofloxacin. Will transition to PO levaquin/ciprofloxacin upon discharge and continue until 72 hours after the urological procedure   - Continue PTA medications for PAH of budenoside inhaler, ambrisentan, tadalafil, and selexipag   - At baseline home O2 already (currently at 6L)    # Complete Heart Block (New 7/30/21)  Patient was admitted for complete heart block. He was found to be hypervolemic on exam and had NT proBNP 28K. His PTA digoxin was discontinue at admission. TSH normal.   He has h/O pulmonary sarcoidosis with last PET scan in 2017 and cardiac MRI in 2012, that might be impacting also heart and conduction  - Successful pacemaker placed on 8/2/21.     # PAH 2/2 Pulmonary Sarcoidosis,   # Acute on Chronic Hypoxic Respiratory Failure (at home O2 of 4.5 L at rest and  8-10 L with exertion)  # LVEF 55-60%   # RV function mild/moderately reduced.   Patient has history of pulmonary HTN. His last RHC 7/30/21 showed RA - 12, RV - 77/16, PA - 77/20/38, PCWP - 17,   Lesia CO/CI - 4.62/2.36, this translates to mild elevation of right and left filling pressures, moderate elevation of pulmonary artery hypertension but normal LV cardiac  output level. This was compatible with initial vol status assessment and reassure patient required diuresis.   - vol status: euvolemic. New dry weight 162 lbs.   - PTA ambrisentan 10mg daily, selexipag 1600 mcg q12 hrs.   - PTA tadalafil  - PTA budesonide inh   - Inpatient PET Scan to evaluate sarcoidosis on 8/12/21 - placed on ketogenic diet with the following schedule:   NPO from 8/10/21 2000 - 8/11/21 0800   Ketogenic diet from 8/11/21 0800 - 8/11/21 2000   NPO from 8/11/21 2000 until PET scan    # Delirium (New 8/3/21) likely secondary to lack of resting and hospital stay vs. Uremia vs. Persistent complicated UTI vs. S/E of IV cefepime (Resolved - 8/8/21)  Patient become delirious over the night of 8/3/21. He become aggressive, so haldol, seroquel, and ativan was given. Likely this is related to no well resting. He has been confused but not aggressive over last night.   - Non-pharmacologic therapy will be preferred: keeping lights on during the day and off overnight. Will avoid disruption overnight. 1:1 sitter while confused today.   - Melatonin 5 mg at bedtime  - ID and Renal evaluating source control and treatment of complicated UTI  - CT Head w/o Contrast (8/7/21): Negative  - 8/8/21: Completed orientated to person, place, time. Will continue to monitor for change in mentation    # Complicated Urinary Tract Infection with Pan-Senstive Pseudomonas  Urine Culture from 7/27/21 grew pan sensitive pseudomona. Likely a contaminant but we have been treating it since 7/30.   # Bilateral staghorn calculi and L ureter stones. C/B Left hydronephrosis s/p percutaneous drainage (CT 8/1/21).    - Treated with IV cefepime for a total of 6 days (7/30 - 8/4). Specially when U/A from 8/4/21, no concerning for infection.    - U/A (8/6/21): Positive for infection, >182 RBC/WBC's, positive for leukocyte esterase and few bacteria. Concerning for persistent complicated UTI and lack of source control   - CXR (8/7/21): No new  infection, similar to CXR done on 8/3/21   - Narrowed meropenem IV to ciprofloxacin IV on 8/9/21 while inpatient due to:    - Urine CX 8/7/21 = < 1,000 urogenital efrem   - Blood CX x2 8/7/21 = NG for 3 days    - EKG 8/10/21 showing QTc of 498, will continue to monitor while on ciprofloxacin    # SISI on CKD Stage IV   Likely multifactorial secondary to cardiorenal and some degree of obstructive uropathy.   Patient has been evaluated by urology during this admission. CT abdomen showed slightly improvement of hydronephrosis and urology did not recommend further management of obstructive uropathy at this point.   Initially difficult to diuresed, but with increased diuretics (furosemide IV 80 mg BID) he was able to achieved euvolemic state. This was discontinued after creatinine continued to rise above 4.  New baseline Cr ~ 3 (only two lab values for the last year prior to admission).   - Today, creatinine has decreased from 4.86 to 4.47. Will continue gentle diuresis of IV 80 lasix each afternoon. Renal consult following for possible need of dialysis.   - Urology will follow patient as an outpatient.      # BPH, continue PTA Tamsulosin 0.4 PTA     DVT Prophylaxis: Enoxaparin (Lovenox) subcutaneous   Chong Catheter: removed.   Code Status: Full Code  Lines: PIV x2, Left nephrostomy tube     Patient discussed with staff attending, Dr. Wheat.     Sathya Short MD  Internal Medicine Resident PGY-1  Pager: [phone]      Interval History   NAEO. Patient fully oriented this morning to self, place, and time. No new complaints this morning and will continue with the ketogenic diet. PET Scan tomorrow, NPO at 2000 tonight.    Otherwise he denies diarrhea, fevers, chills, nausea, vomiting, headaches, dizziness, or skin changes.    Physical Exam   Temp: 97.7  F (36.5  C) Temp src: Oral BP: (!) 88/48 Pulse: 91   Resp: 18 SpO2: 96 % O2 Device: Nasal cannula Oxygen Delivery: 6 LPM  Vitals:    08/09/21 0226 08/10/21 0400 08/11/21  0400   Weight: 72.1 kg (159 lb) 70.8 kg (156 lb) 70.1 kg (154 lb 8 oz)     Vital Signs with Ranges  Temp:  [97.6  F (36.4  C)-98  F (36.7  C)] 97.7  F (36.5  C)  Pulse:  [73-91] 91  Resp:  [18-20] 18  BP: ()/(48-58) 88/48  SpO2:  [94 %-98 %] 96 %  I/O last 3 completed shifts:  In: 960 [P.O.:960]  Out: 1275 [Urine:1275]     , Blood pressure (!) 88/48, pulse 91, temperature 97.7  F (36.5  C), temperature source Oral, resp. rate 18, height 1.829 m (6'), weight 70.1 kg (154 lb 8 oz), SpO2 96 %.  154 lbs 8 oz  GEN:  Calm, in no acute distress, AAO x3  CV:  Regular rate and rhythm, no murmur.  LUNGS:   Able to move oxygen well with deep breaths. Clear to auscultation bilaterally  ABD:  Active bowel sounds, soft, non-tender/non-distended.  No rebound/guarding/rigidity.  EXT:  +1 swelling in bilaterally lower extremities, palpable pulses    Medications     - MEDICATION INSTRUCTIONS -       - MEDICATION INSTRUCTIONS -       - MEDICATION INSTRUCTIONS -         ambrisentan  10 mg Oral Daily     budesonide  0.25 mg Nebulization BID     ciprofloxacin  400 mg Intravenous Q24H     clotrimazole  1 lozenge Buccal TID     [Held by provider] furosemide  20 mg Oral Daily     heparin ANTICOAGULANT  5,000 Units Subcutaneous Q12H     lidocaine  2 patch Transdermal Q24h    And     lidocaine   Transdermal Q8H     melatonin  5 mg Oral At Bedtime     selexipag  1,600 mcg Oral Q12H     sodium chloride (PF)  3 mL Intracatheter Q8H     tadalafil  40 mg Oral Daily     tamsulosin  0.4 mg Oral Daily       Data   Recent Labs   Lab 08/11/21  0614 08/10/21  1336 08/10/21  0551 08/09/21  0543   WBC 4.6  --  5.2 5.5   HGB 8.6*  --  8.6* 8.6*   MCV 93  --  93 91     --  138* 144*     --  138 139   POTASSIUM 4.3 4.8 4.0 4.4   CHLORIDE 106  --  104 107   CO2 20  --  23 25   *  --  108* 107*   CR 4.50*  --  4.47* 4.86*   ANIONGAP 13  --  11 7   TERI 8.6  --  9.3 9.1   GLC 74  --  85 102*   ALBUMIN 2.5*  --  2.6* 2.6*   PROTTOTAL  6.3*  --  6.5* 6.6*   BILITOTAL 0.4  --  0.3 0.3   ALKPHOS 68  --  70 67   ALT 19  --  18 19   AST 20  --  17 20       No results found for this or any previous visit (from the past 24 hour(s)).

## 2021-08-12 ENCOUNTER — APPOINTMENT (OUTPATIENT)
Dept: OCCUPATIONAL THERAPY | Facility: CLINIC | Age: 61
DRG: 242 | End: 2021-08-12
Attending: INTERNAL MEDICINE
Payer: COMMERCIAL

## 2021-08-12 ENCOUNTER — APPOINTMENT (OUTPATIENT)
Dept: PET IMAGING | Facility: CLINIC | Age: 61
DRG: 242 | End: 2021-08-12
Attending: STUDENT IN AN ORGANIZED HEALTH CARE EDUCATION/TRAINING PROGRAM
Payer: COMMERCIAL

## 2021-08-12 VITALS
RESPIRATION RATE: 16 BRPM | SYSTOLIC BLOOD PRESSURE: 98 MMHG | HEART RATE: 89 BPM | HEIGHT: 72 IN | OXYGEN SATURATION: 97 % | DIASTOLIC BLOOD PRESSURE: 50 MMHG | TEMPERATURE: 97.7 F | BODY MASS INDEX: 20.65 KG/M2 | WEIGHT: 152.5 LBS

## 2021-08-12 LAB
ANION GAP SERPL CALCULATED.3IONS-SCNC: 14 MMOL/L (ref 3–14)
BUN SERPL-MCNC: 124 MG/DL (ref 7–30)
CALCIUM SERPL-MCNC: 9 MG/DL (ref 8.5–10.1)
CHLORIDE BLD-SCNC: 102 MMOL/L (ref 94–109)
CO2 SERPL-SCNC: 22 MMOL/L (ref 20–32)
CREAT SERPL-MCNC: 4.12 MG/DL (ref 0.66–1.25)
ERYTHROCYTE [DISTWIDTH] IN BLOOD BY AUTOMATED COUNT: 13.6 % (ref 10–15)
GFR SERPL CREATININE-BSD FRML MDRD: 15 ML/MIN/1.73M2
GLUCOSE BLD-MCNC: 70 MG/DL (ref 70–99)
HCT VFR BLD AUTO: 28.8 % (ref 40–53)
HGB BLD-MCNC: 9.1 G/DL (ref 13.3–17.7)
MAGNESIUM SERPL-MCNC: 2.3 MG/DL (ref 1.6–2.3)
MCH RBC QN AUTO: 28.5 PG (ref 26.5–33)
MCHC RBC AUTO-ENTMCNC: 31.6 G/DL (ref 31.5–36.5)
MCV RBC AUTO: 90 FL (ref 78–100)
PLATELET # BLD AUTO: 182 10E3/UL (ref 150–450)
POTASSIUM BLD-SCNC: 4.2 MMOL/L (ref 3.4–5.3)
RBC # BLD AUTO: 3.19 10E6/UL (ref 4.4–5.9)
SODIUM SERPL-SCNC: 138 MMOL/L (ref 133–144)
WBC # BLD AUTO: 4.9 10E3/UL (ref 4–11)

## 2021-08-12 PROCEDURE — 343N000001 HC RX 343: Performed by: INTERNAL MEDICINE

## 2021-08-12 PROCEDURE — A9552 F18 FDG: HCPCS | Performed by: INTERNAL MEDICINE

## 2021-08-12 PROCEDURE — 78429 MYOCRD IMG PET 1 STD W/CT: CPT

## 2021-08-12 PROCEDURE — 78429 MYOCRD IMG PET 1 STD W/CT: CPT | Mod: 26 | Performed by: STUDENT IN AN ORGANIZED HEALTH CARE EDUCATION/TRAINING PROGRAM

## 2021-08-12 PROCEDURE — 99239 HOSP IP/OBS DSCHRG MGMT >30: CPT | Mod: 25 | Performed by: INTERNAL MEDICINE

## 2021-08-12 PROCEDURE — 80048 BASIC METABOLIC PNL TOTAL CA: CPT

## 2021-08-12 PROCEDURE — 94640 AIRWAY INHALATION TREATMENT: CPT

## 2021-08-12 PROCEDURE — 250N000009 HC RX 250: Performed by: STUDENT IN AN ORGANIZED HEALTH CARE EDUCATION/TRAINING PROGRAM

## 2021-08-12 PROCEDURE — 999N000128 HC STATISTIC PERIPHERAL IV START W/O US GUIDANCE

## 2021-08-12 PROCEDURE — 999N000147 HC STATISTIC PT IP EVAL DEFER: Performed by: PHYSICAL THERAPIST

## 2021-08-12 PROCEDURE — 83735 ASSAY OF MAGNESIUM: CPT | Performed by: INTERNAL MEDICINE

## 2021-08-12 PROCEDURE — 250N000011 HC RX IP 250 OP 636: Performed by: STUDENT IN AN ORGANIZED HEALTH CARE EDUCATION/TRAINING PROGRAM

## 2021-08-12 PROCEDURE — 36415 COLL VENOUS BLD VENIPUNCTURE: CPT

## 2021-08-12 PROCEDURE — 85014 HEMATOCRIT: CPT

## 2021-08-12 PROCEDURE — 250N000013 HC RX MED GY IP 250 OP 250 PS 637: Performed by: STUDENT IN AN ORGANIZED HEALTH CARE EDUCATION/TRAINING PROGRAM

## 2021-08-12 PROCEDURE — 97530 THERAPEUTIC ACTIVITIES: CPT | Mod: GO

## 2021-08-12 PROCEDURE — 97535 SELF CARE MNGMENT TRAINING: CPT | Mod: GO

## 2021-08-12 PROCEDURE — 99232 SBSQ HOSP IP/OBS MODERATE 35: CPT | Mod: GC | Performed by: INTERNAL MEDICINE

## 2021-08-12 PROCEDURE — 999N000157 HC STATISTIC RCP TIME EA 10 MIN

## 2021-08-12 RX ORDER — CIPROFLOXACIN 500 MG/1
500 TABLET, FILM COATED ORAL DAILY
Qty: 30 TABLET | Refills: 2 | Status: SHIPPED | OUTPATIENT
Start: 2021-08-12 | End: 2021-09-14

## 2021-08-12 RX ORDER — FUROSEMIDE 80 MG
80 TABLET ORAL 2 TIMES DAILY
Qty: 180 TABLET | Refills: 0 | Status: SHIPPED | OUTPATIENT
Start: 2021-08-12 | End: 2021-09-16

## 2021-08-12 RX ADMIN — FLUDEOXYGLUCOSE F-18 12.7 MCI.: 500 INJECTION, SOLUTION INTRAVENOUS at 12:29

## 2021-08-12 RX ADMIN — HEPARIN SODIUM 5000 UNITS: 10000 INJECTION, SOLUTION INTRAVENOUS; SUBCUTANEOUS at 08:13

## 2021-08-12 RX ADMIN — SELEXIPAG 1600 MCG: 200 TABLET, COATED ORAL at 08:14

## 2021-08-12 RX ADMIN — AMBRISENTAN 10 MG: 10 TABLET, FILM COATED ORAL at 08:15

## 2021-08-12 RX ADMIN — TAMSULOSIN HYDROCHLORIDE 0.4 MG: 0.4 CAPSULE ORAL at 08:13

## 2021-08-12 RX ADMIN — TADALAFIL 40 MG: 20 TABLET, FILM COATED ORAL at 08:13

## 2021-08-12 RX ADMIN — BUDESONIDE 0.25 MG: 0.25 INHALANT RESPIRATORY (INHALATION) at 08:25

## 2021-08-12 ASSESSMENT — MIFFLIN-ST. JEOR: SCORE: 1534.74

## 2021-08-12 ASSESSMENT — ACTIVITIES OF DAILY LIVING (ADL)
ADLS_ACUITY_SCORE: 12
ADLS_ACUITY_SCORE: 14
ADLS_ACUITY_SCORE: 12
ADLS_ACUITY_SCORE: 14
ADLS_ACUITY_SCORE: 14

## 2021-08-12 NOTE — PLAN OF CARE
Neuro: A&Ox4.   Cardiac: 100% V-paced. BP's stable. Afebrile.   Respiratory: Satting > 92% on 4-6L NC through mouth. 6-8L with activity.   GI/: Adequate urine output. BM X1 today.   Diet/appetite: Keto diet, 2L FR. Now NPO overnight.    Activity:  Assist of SB, up to chair and in halls.  Pain: At acceptable level on current regimen.   Skin: No new deficits noted.  LDA's: PIV - SL  Plan: Plan for PET scan tomorrow. Will continue with plan of care.

## 2021-08-12 NOTE — PROGRESS NOTES
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 Patient's course complicated by delerium and SISI.     SISI on CKD 4  Patient's renal function has been steadily declining over past 3 years. Cr in 2018 was 1.4 -> Dec 2019 1.9 -> July 2020 ~3 (at [place]) and was in the 3s June 2021. Cr was 3 upon admission and steadily increased to peak of 4.86 on 8/9 and has now started to trend down with increasing UOP on lasix. Differential for SISI is broad at this point including sarcoidosis in the kidneys, staghorn calculi (though s/p PNT on left), AIN due to antibiotic use and ATN. Cr slowly trending down, though BUN remains elevated. Pt denies any uremic symptoms.    -  Ok for discharge from a Nephrology standpoint and we will arrange for close follow up - will have pt scheduled with Radha Rod PA-C, on 8/17  - Agree with lasix 80mg PO BID on discharge - recommended to the patient that he obtain daily weights at home to report back on in follow up  - Strict Is/Os     Recommendations were communicated to primary team via phone.    Seen and discussed with Dr. Darrick Parmar MD   [phone]    Interval History :   Nursing and provider notes from last 24 hours reviewed.    No acute events overnight. He continues to maintain good UOP and feels well. He denies fatigue. Appetite is good. No itchiness. No lightheadedness or dizziness.    Review of Systems:   4pt ROS negative except as above in HPI.    Physical Exam:   I/O last 3 completed shifts:  In: 780 [P.O.:780]  Out: 2055 [Urine:2055]   BP 98/50 (BP Location: Left arm)   Pulse 89   Temp 97.7  F (36.5  C) (Oral)   Resp 16   Ht 1.829 m (6')   Wt 69.2 kg (152 lb 8 oz)   SpO2 97%   BMI 20.68  kg/m       GENERAL APPEARANCE: alert  PULM: on 6L, clear bilaterally  CV: regular rhythm, normal rate, no rub     -trace edema to mid shin bilaterally  GI: soft, non-tender, not distended  INTEGUMENT: no cyanosis, or rash    Labs:   All labs reviewed by me  Electrolytes/Renal -   Recent Labs   Lab Test 08/12/21  0827 08/11/21  0614 08/10/21  1336 08/10/21  0551 08/08/21  1633 07/27/21  0742 07/01/21  1336 01/09/19  1152 12/26/18  1318 05/30/18  1025    139  --  138 136  --  142   < > 139 138   POTASSIUM 4.2 4.3 4.8 4.0 4.5  --  4.0   < > 3.8 4.2   CHLORIDE 102 106  --  104 104  --  110*   < > 102 104   CO2 22 20  --  23 25  --  25   < > 31 28   * 117*  --  108* 103*  --  50*   < > 32* 24   CR 4.12* 4.50*  --  4.47* 4.80*  --  3.27*   < > 1.40* 1.34*   GLC 70 74  --  85 120*   < > 95   < > 122* 100*   TERI 9.0 8.6  --  9.3 9.0  --  8.7   < > 9.1 9.2   MAG 2.3  --   --  2.2 2.5*   < >  --   --   --   --    PHOS  --   --   --   --   --   --  3.8  --  3.3 2.4*    < > = values in this interval not displayed.       CBC -   Recent Labs   Lab Test 08/12/21  0827 08/11/21  0614 08/10/21  0551   WBC 4.9 4.6 5.2   HGB 9.1* 8.6* 8.6*    150 138*       LFTs -   Recent Labs   Lab Test 08/11/21  0614 08/10/21  0551 08/09/21  0543   ALKPHOS 68 70 67   BILITOTAL 0.4 0.3 0.3   ALT 19 18 19   AST 20 17 20   PROTTOTAL 6.3* 6.5* 6.6*   ALBUMIN 2.5* 2.6* 2.6*       Iron Panel -   Recent Labs   Lab Test 07/01/21  1336 10/18/17  1121   IRON 28* 82   IRONSAT 12* 29   MIHIR 33 41         Imaging:  No new imaging    Current Medications:    ambrisentan  10 mg Oral Daily     budesonide  0.25 mg Nebulization BID     ciprofloxacin  400 mg Intravenous Q24H     clotrimazole  1 lozenge Buccal TID     [Held by provider] furosemide  20 mg Oral Daily     heparin ANTICOAGULANT  5,000 Units Subcutaneous Q12H     lidocaine  2 patch Transdermal Q24h    And     lidocaine   Transdermal Q8H     melatonin  5 mg Oral At Bedtime     selexipag   1,600 mcg Oral Q12H     sodium chloride (PF)  3 mL Intracatheter Q8H     tadalafil  40 mg Oral Daily     tamsulosin  0.4 mg Oral Daily       - MEDICATION INSTRUCTIONS -       - MEDICATION INSTRUCTIONS -       - MEDICATION INSTRUCTIONS -       Brandy Parmar MD

## 2021-08-12 NOTE — DISCHARGE SUMMARY
[facility]   Cardiology II Service / Advanced Heart Failure  Discharge Summary     Jimmy Isaac MRN# [id number]   YOB: 1960 Age: 60 year old     DATE OF ADMISSION:  7/30/2021  DATE OF DISCHARGE: 8/12/2021  ADMITTING PROVIDER: Ion Lemon MD  DISCHARGE PROVIDER: Ion Lemon  PRIMARY PROVIDER: Cristhian Busch    DISCHARGE DIAGNOSES:   1. Complete Heart Block  2. Pulmonary Sarcoidosis  3. Acute on Chronic Respiratory Failure  4. Pulmonary Hypertension with RV dysfunction  5. Complicated UTI with pan-sensitive pseudomonas  6. CKD Stage IV  7. 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 Left hydronephrosis with left nephrostomy tube    ITEMS FOR OUTPATIENT FOLLOW UP:   Nephrology F/U Appointment: 8/17/21 with Radha Rod PA-C   - Continue Lasix 80 mg PO BID and record daily weights at home until appointment  Urology Procedure for Left nephrostomy tube  Cardiology F/U Appointment with Dr. Lindsey    HPI:   Jimmy Isaac is a 60 year-old male with a PMH of PAH 2/2 pulmonary sarcoidosis, bilateral kidney stones, and CKD Stage IV. He was undergoing a RHC today to determine clearance for a kidney stone extraction surgery per urology and was found to have a complete heart block with heart rate in the 40's.      Previous to this admission, he was evaluated at [facility] for possible research study (July 2020) and was found to have an increased bilateral stone burden with moderate to advance hydronephrosis on the left. He was referred to urology which evaluated his stone burden and decided to place a left percutaneous nephrostomy tube on 7/9/21. The plan was to undergo a preanesthesia care evaluation (with the RHC) and later a left percutaneous nephrolithotomy.      One month before this admission, Jimmy states the he has had worsening SOB.  This occurs about every three steps within his house and sometimes while at rest. He cannot lay flat at night while sleeping and  has waken up wheezing multiple nights. He uses 4.5 L of O2 at baseline and 8-10 L when doing any activities.     He also states that he has a stabbing pain in his chest that occur for only a few seconds and then disappears. Other symptoms over the last month include nausea, chills, dry heaves, headaches, lower extremity edema, bloating, cramping, and sinusitis.     HOSPITAL COURSE BY PROBLEM:     Jimmy Isaac is a 59 yo male with past medical history significant for 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 He was admitted to the ICU for stabilization until pacemaker could be placed. A pacemaker was placed 8/2/21 due to persistent heart block. Patient developed and recovered from delirium after the pacemaker was placed and developed a persistent SISI with worsening creatinine and BUN. Initially this was controlled with IV diuresis, but persistently elevated despite this intervention. Patient stablizied and received a PET-scan to evaluate sarcoidosis before discharge and will follow-up with urology, nephrology, and cardiology to evaluate progress and monitor the persistently elevated Cr and BUN.      # Complete Heart Block (New 7/30/21)  Patient was admitted for complete heart block. His home PTA digoxin was discontinue at admission. TSH normal. The patient remained in complete heart block persistently and thus a pacemaker was successfully place on 8/2/21.     # PAH 2/2 Pulmonary Sarcoidosis  # Acute on Chronic Hypoxic Respiratory Failure (at home O2 of 4.5 L at rest and  8-10 L with exertion)  # LVEF 55-60%   # RV function mild/moderately reduced.   Patient has history of pulmonary HTN. His last RHC 7/30/21 showed RA - 12, RV - 77/16, PA - 77/20/38, PCWP - 17,   Lesia CO/CI - 4.62/2.36, this translates to mild elevation of right and left filling pressures,  moderate elevation of pulmonary artery hypertension but normal LV cardiac output level. This was compatible with initial volume status assessment and thus diuresis was used to get the patient euvolemic at a dry weight of 152 lbs. Patient was able to maintain adequate oxygen levels with 6 L NC at discharge.     # Pulmonary Sarcoidosis  An inpatient PET Scan was completed to evaluate sarcoidosis on 8/12/21 and showed no evidence of active cardiac sarcoidosis. There was evidence of active pulmonary and systemic sarcoidosis with hypermetabolic perihilar-predominant lung disease similar to 7/11/17 scan.      # Delirium (New 8/3/21) likely secondary to lack of resting and hospital stay vs. Uremia vs. Persistent complicated UTI vs. S/E of IV cefepime (Resolved - 8/8/21)  Patient become delirious over the night of 8/3/21. He become aggressive, so haldol, seroquel, and ativan was given. Likely this is related to lack of sleep during the hospital stay. Non-pharmacologic therapy was used along with a 1:1 sitter. A CT Head w/o contrast was negative for any acute problems. On 8/8/21, the patient was fully oriented to place, time, and person. He stayed in this mentation for the rest of the hospital stay.     # Complicated Urinary Tract Infection with Pan-Senstive Pseudomonas per 7/27/21 urine culture  # Bilateral staghorn calculi and L ureter stones  # C/B Left hydronephrosis s/p percutaneous drainage (CT 8/1/21).    The patient was treated with IV cefepime for a total of 6 days (7/30 - 8/4) and was stopped when a U/A from 8/4/21 showed no concerning signs for infection. Another U/A (8/6/21) was gathered and showed positive for infection and concerning for persistent complicated UTI and lack of source control. ID was consulted and started IV meropenem (8/7/21-8/9/21) which was then narrowed to IV ciprofloxacin on 8/9/21 due to negative blood and urine cultures pulled on 8/7/21.     # SISI on CKD Stage IV   Likely multifactorial  secondary to cardiorenal and some degree of obstructive uropathy.   Patient has been evaluated by urology during this admission. CT abdomen showed slightly improvement of hydronephrosis. The urology team did not recommend further management of obstructive uropathy at this point.   Initially he was difficult to diurese, but with increased dosing (furosemide IV 80 mg BID) he was able to achieved euvolemic state. This was discontinued after creatinine continued to rise above 4. The rise in creatinine continued to a peak of 4.86 and BUN with a peak of 124. Nephrology was consulted for possible need for dialysis, but due to lack of uremic symptoms, this was not needed prior to discharge. Nephrology and urology with follow-up outpatient for further interventions to stablize these renal values.    PHYSICAL EXAM:  Blood pressure 98/50, pulse 89, temperature 97.7  F (36.5  C), temperature source Oral, resp. rate 16, height 1.829 m (6'), weight 69.2 kg (152 lb 8 oz), SpO2 97 %.  GENERAL: Appears alert and oriented times three.   HEENT: Eye symmetrical and EOMI. Mucous membranes moist and without lesions.  NECK: Supple and without lymphadenopathy.   CV: S1/S2 heard without murmur, no rubs or gallops  RESPIRATORY: Respirations regular, even, and unlabored. Normal breath sounds.   GI: Soft and non distended with normoactive bowel sounds present in all quadrants. No tenderness, rebound, guarding. No organomegaly.   EXTREMITIES: No peripheral lower extremity edema. 2+ bilateral pedal pulses.   NEUROLOGIC: Alert and orientated x 3. CN II-XII grossly intact. No focal deficits.   MUSCULOSKELETAL: No joint swelling or tenderness.   SKIN: No jaundice. No rashes or lesions.     LABS:   Last CBC:   Recent Labs   Lab Test 08/12/21 0827   WBC 4.9   RBC 3.19*   HGB 9.1*   HCT 28.8*   MCV 90   MCH 28.5   MCHC 31.6   RDW 13.6          Last CMP:  Recent Labs   Lab Test 08/12/21 0827 08/11/21  0614    139   POTASSIUM 4.2 4.3    CHLORIDE 102 106   TERI 9.0 8.6   CO2 22 20   * 117*   CR 4.12* 4.50*   GLC 70 74   AST  --  20   ALT  --  19   BILITOTAL  --  0.4   ALBUMIN  --  2.5*   PROTTOTAL  --  6.3*   ALKPHOS  --  68       IMAGING:  Results for orders placed or performed during the hospital encounter of 07/30/21   US Renal Complete     Value    Radiologist flags Moderate left hydronephrosis (Urgent)    Narrative    Exam: US RENAL COMPLETE, 8/1/2021 11:35 AM    Indication: SISI, nephrolithiasis    COMPARISON: 6/23/2021    TECHNIQUE: The kidneys and bladder were scanned in the standard  fashion with specialized ultrasound transducer(s) using both gray  scale and limited color/spectral Doppler techniques.    FINDINGS:    Right kidney: Measures 10.3 cm in length. Parenchyma is of normal  thickness and echogenicity. Multiple echogenic, shadowing calyceal  stones are visualized within the right kidney, similar to comparison  CT 6/23/2021. No focal mass. No hydronephrosis.    Left kidney: Measures 9.9 cm in length. Parenchyma is of normal  thickness and echogenicity. Moderate left hydronephrosis. Shadowing,  echogenic stone in the left midpole renal calyx. No focal mass.     Bladder: Not visualized      Impression    IMPRESSION:   1.  Moderate left hydronephrosis, similar to CT 6/23/2021. Left  percutaneous nephrostomy tube is not visualized.  2.  Bilateral nephrolithiasis, right greater than left.    [Urgent Result: Moderate left hydronephrosis]    Finding was identified on 8/1/2021 9:16 AM.     Dr. Loyola was contacted by Dr. Curtis at 8/1/2021 11:20 AM and  verbalized understanding of the urgent finding.     I have personally reviewed the examination and initial interpretation  and I agree with the findings.    LEONILA NORRIS,          SYSTEM ID:  [id number]   CT Abdomen Pelvis w/o Contrast    Narrative    EXAMINATION: CT ABDOMEN PELVIS W/O CONTRAST, 8/1/2021 2:56 PM    TECHNIQUE:  Helical CT images from the lung bases through  the  symphysis pubis were obtained  without contrast using renal stone  protocol.     COMPARISON: CT abdomen and pelvis 6/23/2021    HISTORY: Urinary tract stone, symptomatic/complicated    FINDINGS:    Right kidney: Multiple staghorn calculi are unchanged compared to  prior study from 6/23/2021. There is no hydronephrosis.    Left kidney: Unchanged calculi in the left kidney, largest measuring  1.3 cm in the temporal region, with decreased hydronephrosis status  post interval replacement of percutaneous nephrostomy tube. Stable 2.1  cm calculus in the proximal ureter. No new ureteral calculus.    Urinary bladder: Normal.    Remainder of the abdomen and pelvis: The prostate is mildly enlarged.  Normal noncontrast appearance of the liver, spleen and adrenals. The  gallbladder is decompressed and contains sludge and no pericholecystic  fluid. Mild diffuse pancreatic atrophy with calcifications in the  pancreatic head. No dilated bowel. No free air or loculated fluid  collection. Trace perihepatic ascites, diffuse mesenteric and  retroperitoneal edema. Stable mildly analyzed retrocrural,  retroperitoneal and gastrohepatic node measuring up to 1.3 cm (series  5, image 93).     Lung bases:  Severe emphysema with bullous changes in the lung bases.    Bones and soft tissues: No suspicious osseous lesions. Stable  avascular necrosis of the femoral heads. Mild degenerative changes in  the spine.      Impression    IMPRESSION:   1.  Decreased left hydronephrosis status post interval placement of  percutaneous nephrostomy tube. Bilateral renal calculi and large  calculus in the proximal left ureter are unchanged.   2.  Compared to prior CT, there is ascites, diffuse mesenteric,  retroperitoneal and presacral edema, likely due to fluid third  spacing.  3.  Stable enlarged abdominal lymph nodes, probably reactive.  4.  Stable chronic findings including bullous emphysema of the lung  bases, changes of chronic calcific  pancreatitis.     OSUTH TROY MD         SYSTEM ID:  [id number]   X-ray Chest 1 vw port    Narrative    EXAM: XR CHEST PORT 1 VIEW  8/2/2021 1:24 PM     HISTORY:  Status post pacer/ICD       COMPARISON:  CT chest 11/16/2017    FINDINGS:   Portable AP upright view the chest. Right IJ catheter with distal tip  projecting over the high SVC. Cardiac ICD with leads projecting over  the right atrial appendage and right ventricular apex. High lung  volumes with mildly worsening diffuse perihilar and interstitial  opacities. No new focal airspace opacities or pneumothorax. Stable  cardiac size and pulmonary vasculature.      Impression    IMPRESSION:   1. Cardiac ICD with leads projecting over the right atrial appendage  and right ventricular apex. No pneumothorax.  2. Mildly worsening diffuse perihilar and interstitial opacities  consistent with interstitial fibrosis in setting of known sarcoidosis    I have personally reviewed the examination and initial interpretation  and I agree with the findings.    GISELA MACK MD         SYSTEM ID:  [id number]   X-ray Chest 2 vws*    Narrative    EXAM: XR CHEST 2 VW  8/3/2021 8:55 AM     HISTORY:  Status post pacer/ICD       COMPARISON:  Chest radiograph 8/3/2021    FINDINGS:   2 views the chest. Left chest wall pacemaker with intact leads  projecting over right atrial appendage and right ventricular apex.  High lung volumes with grossly unchanged appearance of perihilar  interstitial and patchy airspace opacities. No new focal airspace  opacities, pneumothorax, or pleural effusion. Stable cardiomediastinal  silhouette. Bones are grossly intact.      Impression    IMPRESSION:   1. Left chest wall pacemaker with leads projecting over right image  and right ventricular apex.   2. High lung volumes with similar appearance of interstitial and  patchy airspace opacities likely consistent with scarring setting of  known interstitial lung disease with history of sarcoidosis.    I  have personally reviewed the examination and initial interpretation  and I agree with the findings.    SHYANNE WINN MD         SYSTEM ID:  [id number]   XR Chest Port 1 View    Narrative    EXAM: XR CHEST PORT 1 VIEW  8/3/2021 3:14 AM     HISTORY:  dyspnea       COMPARISON:  Chest x-ray 8/2/2021    FINDINGS: AP radiograph of the chest. Left chest wall pacemaker with  intact leads overlying the right atrium and right ventricle.    Stable cardiomediastinal silhouette. Continued high lung volumes. No  pneumothorax or significant pleural effusion. Continued perihilar  predominant interstitial opacities and scarring. No new focal airspace  opacity. Visualized upper abdomen is unremarkable. Bones are stable.      Impression    IMPRESSION:  1. No new focal airspace opacity.  2. Continued interstitial opacities and scarring consistent with  interstitial lung disease in this patient with history of sarcoidosis.    I have personally reviewed the examination and initial interpretation  and I agree with the findings.    LEONILA NORRIS DO         SYSTEM ID:  [id number]   US Bladder w Pre and Post Void Residual    Narrative    Exam: US BLADDER W PRE AND POST VOID RESIDUAL, 8/6/2021 7:46 AM    Indication: concern for obstructive uropathy    Comparison: CT abdomen/pelvis 8/1/2021    Findings:   Focused grayscale imaging of the urinary bladder demonstrates minimal  distention with prevoid volume of 74 mL. Patient was unable to void.      Impression    Impression: Normal grayscale imaging of the urinary bladder with small  prevoid volume. Patient was unable to void.    I have personally reviewed the examination and initial interpretation  and I agree with the findings.    ANN SOSA MD         SYSTEM ID:  [id number]   XR Chest Port 1 View    Narrative    Portable chest    INDICATION: Rule out possible pneumonia    Comparisons: 8/3/2021    Findings: Heart size appears normal. Dual-lead left subclavian  transvenous approach pacemaker  appears unchanged. Bilateral perihilar  predominant patchy and somewhat streaky opacities appears similar.  Bony structures appear grossly intact. There is aortic arch  atherosclerosis.      Impression    IMPRESSION: Patchy and streaky pulmonary opacities grossly similar  from 8/3/2021 likely indicating underlying sarcoidosis given the  patient's history with no obvious acute infiltrate/consolidation.  Pacemaker. Atherosclerosis.    GISELA MACK MD         SYSTEM ID:  [id number]   CT Head w/o Contrast    Narrative    CT HEAD W/O CONTRAST 8/7/2021 3:00 PM    History: Mental status change, unknown cause.    Pr EPIC: Medical history significant for??PAH secondary to?pulmonary  sarcoidosis, CKD stage 4,??bilateral kidney stones with moderate to  advance left hydronephrosis s/p?left percutaneous nephrostomy  tube?placement?on 7/9/21. Preop work-up for?left percutaneous  nephrolithotomy?revealed complete heart block and admitted on 7/30/21.  Pacemaker was placed on 8/2/21.   ?  Urine culture on 7/27/21 grew >100K CFU/ml Pseudomonas  fluorescence/putida group.    Comparison: None.    Technique: Using multidetector thin collimation helical acquisition  technique, axial, coronal and sagittal CT images from the skull base  to the vertex were obtained without intravenous contrast.   (topogram) image(s) also obtained and reviewed.    Findings:     No intracranial hemorrhage, mass effect, or midline shift. Gray/white  matter differentiation in both cerebral hemispheres is preserved.  Ventricles are proportionate to the cerebral sulci. The basal cisterns  are clear.    The bony calvaria and the bones of the skull base are normal. Mucosal  thickening of the left maxillary sinus with associated maxillary or  wall thickening. Mastoid air cells are clear.       Impression    Impression:  1. No acute intracranial pathology.  2. Findings of left maxillary chronic sinusitis.     I have personally reviewed the examination and  initial interpretation  and I agree with the findings.    ISELA JONES MD         SYSTEM ID:  [id number]   XR Chest Port 1 View    Narrative    Exam: TEMPORARY, 8/9/2021 2:47 AM    Indication: Shortness of breath    Comparison: 8/7/2021    Findings:   Portable upright views of the chest. Left chest wall ICD with  sequential leads in stable position. Trachea is midline. Cardiac  silhouette is stable in size. Grossly unchanged streaky perihilar  opacities. No new airspace consolidation. No pleural effusion or  pneumothorax. Osseous structures are within normal limits.      Impression    Impression:   Unchanged streaky perihilar opacities, likely related to underlying  sarcoidosis. No new airspace opacities.     I have personally reviewed the examination and initial interpretation  and I agree with the findings.    RESHMA PHELPS MD         SYSTEM ID:  [id number]   PET Cardiac Viability    Narrative    Procedure: PET CARDIAC VIABILITY.  Examination Date: 8/12/2021.    Indication: Cardiomyopathy, undefined, further testing. Rule out  active cardiac sarcoidosis.    Protocol:    1. PET of the heart.  2. PET CT fusion images obtained for attenuation correction.    Technique:  1. Rest myocardial ammonia perfusion imaging was deferred per sarcoid  follow-up protocol.  2. The patient was prepped for a Cardiac FDG PET by following low  carbohydrate and high fat diet for three days, as well as three nights  of fasting for at least 12 hours per night diet prior to the PET  examination.   3. The patient received 12.7 mCi of F-18-FDG; the serum glucose was 77  prior to administration and weight was 69.2 kg. Images were evaluated  in the axial, sagittal, and coronal planes as well as the rotational  cardiac MIP. Images were acquired from the vertex down through the  upper abdomen. Images were obtained after 65 minutes of infusion of  the FDG.  4. CT: Volumetric acquisition was acquired at 3 mm sections.     BACKGROUND:  Liver SUV  max = 3.0.  Aorta SUV max = 2.3.    Comparison: CT abdomen 8/1/2021, PET-CT 7/11/2017.    Findings:     The heart was analyzed in traditional three-view analysis including  the short axis view, vertical long axis view, and the horizontal long  axis views. There is no abnormal uptake to the heart. Mild uptake to  to the SA node is likely physiologic, and may be exaggerated by  attenuation correction given vicinity of calcifications. The heart is  mildly enlarged. No pericardial effusion. Normal caliber of the  thoracic aorta. The main pulmonary artery is markedly dilated  measuring up to 4.4 cm in diameter. The right and left pulmonary  arteries are also dilated. Left chest wall cardiac device.    PET-CT portion of the study demonstrates hyper metabolic adenopathy in  the chest including in the mediastinum and bilateral desiree. This is in  similar distribution to previous study performed on 7/11/2017. For  example subcarinal lymph node with SUV max 9.4, previously 13.3  (series 11, image 145). Also again demonstrated are hypermetabolic  pulmonary opacities, with perihilar-predominant consolidation,  peribronchial and perilymphatic nodularity, groundglass opacities, and  interlobular septal thickening. For example in the right hilum with  the 6.8, previously 7.5 (series 11, image 141). Overall extent of the  pulmonary findings appear similar to previous study. No pleural  effusion or pneumothorax.    Partial visualization of the upper abdomen notable for extensive large  nephrolithiasis bilaterally, greater in the right kidney. There is a  percutaneous left nephrostomy tube. Mild left hydronephrosis.  Unchanged obstructing stone within the proximal left ureter measuring  1.3 cm in diameter (series 11, image 232), similar to CT 8/1/2021.  Central mesenteric and retroperitoneal calcifications suggesting  combination of old calcified lymph nodes and vascular calcifications.      Impression    Impression:  1. No evidence of  active cardiac sarcoidosis.  2. Evidence of active pulmonary and systemic sarcoidosis, with  hypermetabolic perihilar-predominant lung disease similar to  7/11/2017, and hypermetabolic adenopathy in the chest.  3. Bilateral large nephrolithiasis. Unchanged obstructing stone in the  proximal left ureter with left percutaneous nephrostomy stent in  place. Mild left hydronephrosis is unchanged from 8/1/2021.    SONJA SHARPE MD         SYSTEM ID:  [id number]   EP Device    Narrative    EP PROCEDURE NOTE    Procedures:  Dual chamber PPM device implantation.    Attending: Dora Fontanez MD  Procedure Date: 8/2/2021    Pre-operative Diagnosis:  Complete heart block  Device Indication: same  Post-operative diagnosis:   Successful implantation of PPM.  Complications:  None.     Fluoroscopy time/dose:See procedure log     Clinical Profile:  61 yo with severe pulmonary hypertension on home O2, admitted with   dyspnea, found to have sinus with prolonged NY interval and 2:1 AV block   in setting of RBBB. Hemodynamically stable. He had previously been on   digoxin and now has acute renal failure due to nephrolithiasis, digoxin   was held without improvement in conduction. In the EP lab, his initial   rhythm was sinus with 2:1 AVB with RBBB, alternating with CHB and a LBBB   escape rhythm at 40 bpm.     PROCEDURE  The risks and benefits of the procedure were explained to the patient in   full.  The risks of the procedure include, but are not limited to: pain,   bleeding, blood transfusion reactions, infection, pneumothorax,   perforation of vessels or heart, pericardial effusion, and death. Informed   Consent was obtained. The patient was brought to the EP lab in a fasting   and hemodynamically stable condition. The patient was prepped and draped   in a sterile fashion.     Sedation: This procedure was performed under moderate sedation under the   supervision of the the Staff Physician. The patient was assessed   immediately prior  to administering sedation. Minimal sedation was given   given pulmonary hypertension. Heart rate, blood pressure, oxygen   saturation, and patient responses were monitored throughout the procedure   with the assistance of the RN.    The left chest wall was vigorously washed, prepped, and sterilely draped.   The chest wall was anesthetized with 2% lidocaine.    A 5 cm incision was made approximately 2 fingerbreadths from the clavicle.   The subcutaneous tissue was carefully dissected down to the pectoral   fascia. The dissection was carried inferiorly to form a subcutaneous   pocket with adequate hemostasis provided by electrocautery. The subclavian   vein was accessed via the pocket and over the first rib under fluoroscopic   guidance, and two guidewires were inserted down into the level of the IVC.  Two peel away sheaths were inserted over the guidewires. The guidewires   and dilators were removed. A right ventricular lead was inserted through   the sheath down to the RV apical septum and was screwed into the   myocardium. There was very good sensing and pacing threshold at this   position. Pacing at maximum output was performed without diaphragmatic   stimulation. QRS morphology had right axis deviation and R wave in V1   consistent with patient's right ventricular hypertrophy. The position of   the RV lead in the right ventricular septum was verified in Greek   projection. Initial R waves were 7-8 mV, measuring the LBBB escape rhythm.   Subsequently R waves were noted to be diminished to 2-3 mV without any   changes in position of the lead by fluoroscopy. These smaller R waves were   the native RBBB conducted beats.    A right atrial lead was inserted through the sheath and positioned the   lead at the RA lateral wall. The lead was screwed into the myocardium.   There was very good sensing and pacing threshold at this position. Ten   volt pacing did not produce diaphragmatic stimulation.    Both sheaths were then  peeled away, and leads sutured down into the   underlying muscle using 0-ethibond.  There was significant bleeding from   the venous puncture site, and a pursestring suture was required to achieve   hemostasis.    The pocket was then vigorously washed with antibiotic solution. The right   atrial and right ventricular leads were inserted into the pulse generator.   The pulse generator and the leads were inserted into a Tyrx pouch, and the   entire system inserted into the subcutaneous pocket.    The pocket was then closed in 3 layers using interrupted 2-0 Vicryl for   the deep layer, continuous 3-0 Vicryl for the middle layer, and continuous   4-0 Vicryl for the subcuticular layer. Steri-Strips and a dressing were   then applied over the incision site, and the patient was transported to a   monitored bed.               FINAL PROGRAMMING  Jose Cruz Settings:  -Pacing mode: DDD  -Lower Rate Limit: 60 ppm.  -Upper Rate Limit: 130 ppm.    PLAN  1. Wound care.  2. Antibiotics.  3. CXR and Device interrogation in a.m.   Cardiac Catheterization    Narrative      Moderately elevated pulmonary artery hypertension.    Right sided filling pressures are mildly elevated.    Left sided filling pressures are mildly elevated.    Normal cardiac output level.    Complete Heart Block      Cardiac Device Check - Inpatient     Value    Date Time Interrogation Session [id number]    Implantable Pulse Generator  Medtronic    Implantable Pulse Generator Model W1DR01 Huong XT  MRI    Implantable Pulse Generator Serial Number [id number]    Type Interrogation Session In Clinic    Clinic Name [facility]    Implantable Pulse Generator Type Pacemaker    Implantable Pulse Generator Implant Date 20210802    Implantable Lead  Medtronic    Implantable Lead Model 5076 CapSureFix Jacek MRI SureScan    Implantable Lead Serial Number [id number]    Implantable Lead Implant Date 20210802    Implantable Lead  Polarity Type Bipolar Lead    Implantable Lead Location Detail 1 UNKNOWN    Implantable Lead Special Function 52 cm    Implantable Lead Location Right Atrium    Implantable Lead  Medtronic    Implantable Lead Model 5076 CapSureFix Novus MRI SureScan    Implantable Lead Serial Number [id number]    Implantable Lead Implant Date 20210802    Implantable Lead Polarity Type Bipolar Lead    Implantable Lead Location Detail 1 UNKNOWN    Implantable Lead Special Function 58 cm    Implantable Lead Location Right Ventricle    Jose Cruz Setting Mode (NBG Code) DDD    Jose Cruz Setting Lower Rate Limit 60    Jose Cruz Setting Maximum Tracking Rate 130    Jose Cruz Setting Maximum Sensor Rate 130    Jose Cruz Setting Hysterisis Rate DISABLED    Jose Cruz Setting ELIAS Delay Low 150    Jose Cruz Setting PAV Delay Low 180    Jose Cruz Setting AT Mode Switch Rate 171    Lead Channel Setting Sensing Polarity Bipolar    Lead Channel Setting Sensing Anode Location Right Atrium    Lead Channel Setting Sensing Anode Terminal Ring    Lead Channel Setting Sensing Cathode Location Right Atrium    Lead Channel Setting Sensing Cathode Terminal Tip    Lead Channel Setting Sensing Sensitivity 0.3    Lead Channel Setting Sensing Polarity Bipolar    Lead Channel Setting Sensing Anode Location Right Ventricle    Lead Channel Setting Sensing Anode Terminal Ring    Lead Channel Setting Sensing Cathode Location Right Ventricle    Lead Channel Setting Sensing Cathode Terminal Tip    Lead Channel Setting Sensing Sensitivity 0.9    Lead Channel Setting Pacing Polarity Bipolar    Lead Channel Setting Pacing Anode Location Right Atrium    Lead Channel Setting Pacing Anode Terminal Ring    Lead Channel Setting Sensing Cathode Location Right Atrium    Lead Channel Setting Sensing Cathode Terminal Tip    Lead Channel Setting Pacing Pulse Width 0.4    Lead Channel Setting Pacing Amplitude 3.5    Lead Channel Setting Pacing Capture Mode Adaptive    Lead Channel Setting Pacing  Polarity Bipolar    Lead Channel Setting Pacing Anode Location Right Ventricle    Lead Channel Setting Pacing Anode Terminal Ring    Lead Channel Setting Sensing Cathode Location Right Ventricle    Lead Channel Setting Sensing Cathode Terminal Tip    Lead Channel Setting Pacing Pulse Width 0.4    Lead Channel Setting Pacing Amplitude 3.5    Lead Channel Setting Pacing Capture Mode Adaptive    Zone Setting Type Category VF    Zone Setting Type Category VT    Zone Setting Type Category VT    Zone Setting Type Category VT    Zone Setting Detection Interval 400    Zone Setting Type Category ATRIAL_FIBRILLATION    Zone Setting Type Category AT/AF    Zone Setting Detection Interval 350    Lead Channel Impedance Value 380    Lead Channel Impedance Value 304    Lead Channel Sensing Intrinsic Amplitude 2.625    Lead Channel Sensing Intrinsic Amplitude 3    Lead Channel Pacing Threshold Amplitude 0.5    Lead Channel Pacing Threshold Pulse Width 0.4    Lead Channel Impedance Value 418    Lead Channel Impedance Value 342    Lead Channel Sensing Intrinsic Amplitude 1.375    Lead Channel Pacing Threshold Amplitude 0.5    Lead Channel Pacing Threshold Pulse Width 0.4    Battery Date Time of Measurements 20210803081502    Battery Status OK    Battery RRT Trigger 2.625    Battery Voltage 3.20    Jose Cruz Statistic Date Time Start [id number]    Jose Cruz Statistic Date Time End 47080623071265    Jose Cruz Statistic RA Percent Paced 8.56    Jose Cruz Statistic RV Percent Paced 98.4    Jose Cruz Statistic AP  Percent 8.66    Jose Cruz Statistic AS  Percent 89.74    Jose Cruz Statistic AP VS Percent 0    Jose Cruz Statistic AS VS Percent 1.6    Atrial Tachy Statistic Date Time Start 58503516356485    Atrial Tachy Statistic Date Time End [id number]    Atrial Tachy Statistic AT/AF [place] Percent 0    Episode Statistic Recent Count 0    Episode Statistic Type Category AT/AF    Episode Statistic Recent Count 0    Episode Statistic Type Category VT    Episode  Statistic Recent Count 0    Episode Statistic Type Category VT    Episode Statistic Recent Date Time Start 20210802120413    Episode Statistic Recent Date Time End 20210803085944    Episode Statistic Recent Date Time Start 20210802120413    Episode Statistic Recent Date Time End 20210803085944    Episode Statistic Recent Date Time Start 20210802120413    Episode Statistic Recent Date Time End 20210803085944    Episode Statistic Total Count 0    Episode Statistic Type Category AT/AF    Episode Statistic Total Count 0    Episode Statistic Type Category VT    Episode Statistic Total Count 0    Episode Statistic Type Category VT    Episode Statistic Total Date Time Start 20210802120413    Episode Statistic Total Date Time End 20210803085944    Episode Statistic Total Date Time Start 20210802120413    Episode Statistic Total Date Time End 20210803085944    Episode Statistic Total Date Time Start 20210802120413    Episode Statistic Total Date Time End 20210803085944    Narrative    Patient seen on 6C for evaluation and iterative programming of their   pacemaker per MD orders. Patient is s/p pacemaker implant on 8/2/21.   Pacemaker discharge teaching provided in written and verbal form. Patient   verbalized understanding of instructions.    Device: [id number] | [employer] W1DR01 Huong XT DR MRI  Normal device function  Mode: DDD  bpm  AP: 8.6%  : 98.4%  Intrinsic Rhythm: NSR with 2nd degree AVB Type II, vent rate ~ 44 bpm  Short V-V intervals: 0  R waves measure 1.4-1.6 mV today.  Battery voltage = 3.20 V.   Atrial Arrhythmia: 0  Ventricular Arrhythmia: 0  Setting Changes: None  EMERALD Osman NP notified of interrogation results and R wave   measurements.    Plan: Patient has requested to see his primary care physician in 7-10 days   for an incision check and then have his device checked at the [facility] in the future.  LUTHER Schultz, RN    Dual lead pacemaker    I have reviewed and interpreted the device  interrogation, settings,   programming and nurse's summary. The device is functioning within normal   device parameters. I agree with the current findings, assessment and plan.   Cardiac Device Check - Inpatient     Value    Date Time Interrogation Session [id number]    Implantable Pulse Generator  Medtronic    Implantable Pulse Generator Model W1DR01 Huong XT DR MRI    Implantable Pulse Generator Serial Number [id number]    Type Interrogation Session In Clinic    Clinic Name [facility]    Implantable Pulse Generator Type Pacemaker    Implantable Pulse Generator Implant Date 20210802    Implantable Lead  Medtronic    Implantable Lead Model 5076 CapSureFix Novus MRI SureScan    Implantable Lead Serial Number [id number]    Implantable Lead Implant Date 20210802    Implantable Lead Polarity Type Bipolar Lead    Implantable Lead Location Detail 1 UNKNOWN    Implantable Lead Special Function 52 cm    Implantable Lead Location Right Atrium    Implantable Lead  Medtronic    Implantable Lead Model 5076 CapSureFix Novus MRI SureScan    Implantable Lead Serial Number [id number]    Implantable Lead Implant Date 20210802    Implantable Lead Polarity Type Bipolar Lead    Implantable Lead Location Detail 1 UNKNOWN    Implantable Lead Special Function 58 cm    Implantable Lead Location Right Ventricle    Jose Cruz Setting Mode (NBG Code) DDD    Jose Cruz Setting Lower Rate Limit 60    Jose Cruz Setting Maximum Tracking Rate 130    Jose Cruz Setting Maximum Sensor Rate 130    Jose Cruz Setting Hysterisis Rate DISABLED    Jose Cruz Setting ELIAS Delay Low 150    Jose Cruz Setting PAV Delay Low 180    Jose Cruz Setting AT Mode Switch Rate 171    Lead Channel Setting Sensing Polarity Bipolar    Lead Channel Setting Sensing Anode Location Right Atrium    Lead Channel Setting Sensing Anode Terminal Ring    Lead Channel Setting Sensing Cathode Location Right Atrium    Lead Channel Setting Sensing Cathode  Terminal Tip    Lead Channel Setting Sensing Sensitivity 0.3    Lead Channel Setting Sensing Polarity Bipolar    Lead Channel Setting Sensing Anode Location Right Ventricle    Lead Channel Setting Sensing Anode Terminal Ring    Lead Channel Setting Sensing Cathode Location Right Ventricle    Lead Channel Setting Sensing Cathode Terminal Tip    Lead Channel Setting Sensing Sensitivity 0.9    Lead Channel Setting Pacing Polarity Bipolar    Lead Channel Setting Pacing Anode Location Right Atrium    Lead Channel Setting Pacing Anode Terminal Ring    Lead Channel Setting Sensing Cathode Location Right Atrium    Lead Channel Setting Sensing Cathode Terminal Tip    Lead Channel Setting Pacing Pulse Width 0.4    Lead Channel Setting Pacing Amplitude 3.5    Lead Channel Setting Pacing Capture Mode Adaptive    Lead Channel Setting Pacing Polarity Bipolar    Lead Channel Setting Pacing Anode Location Right Ventricle    Lead Channel Setting Pacing Anode Terminal Ring    Lead Channel Setting Sensing Cathode Location Right Ventricle    Lead Channel Setting Sensing Cathode Terminal Tip    Lead Channel Setting Pacing Pulse Width 0.4    Lead Channel Setting Pacing Amplitude 3.5    Lead Channel Setting Pacing Capture Mode Adaptive    Zone Setting Type Category VF    Zone Setting Type Category VT    Zone Setting Type Category VT    Zone Setting Type Category VT    Zone Setting Detection Interval 400    Zone Setting Type Category ATRIAL_FIBRILLATION    Zone Setting Type Category AT/AF    Zone Setting Detection Interval 350    Lead Channel Impedance Value 380    Lead Channel Impedance Value 304    Lead Channel Sensing Intrinsic Amplitude 4.5    Lead Channel Pacing Threshold Amplitude 0.75    Lead Channel Pacing Threshold Pulse Width 0.4    Lead Channel Impedance Value 418    Lead Channel Impedance Value 361    Lead Channel Pacing Threshold Amplitude 0.5    Lead Channel Pacing Threshold Pulse Width 0.4    Battery Date Time of  Measurements [id number]    Battery Status OK    Battery RRT Trigger 2.625    Battery Remaining Longevity 125    Battery Voltage 3.21    Jose Cruz Statistic Date Time Start [id number]    Jose Cruz Statistic Date Time End 20210809151935    Jose Cruz Statistic RA Percent Paced 5.53    Jose Cruz Statistic RV Percent Paced 98.12    Jose Cruz Statistic AP  Percent 5.6    Jose Cruz Statistic AS  Percent 92.51    Jose Cruz Statistic AP VS Percent 0.02    Jose Cruz Statistic AS VS Percent 1.87    Atrial Tachy Statistic Date Time Start 20210803085944    Atrial Tachy Statistic Date Time End 20210809151935    Atrial Tachy Statistic AT/AF [place] Percent 0    Episode Statistic Recent Count 0    Episode Statistic Type Category AT/AF    Episode Statistic Recent Count 0    Episode Statistic Type Category VT    Episode Statistic Recent Count 0    Episode Statistic Type Category VT    Episode Statistic Recent Date Time Start 20210803085944    Episode Statistic Recent Date Time End 20210809151935    Episode Statistic Recent Date Time Start [id number]    Episode Statistic Recent Date Time End 20210809151935    Episode Statistic Recent Date Time Start 20210803085944    Episode Statistic Recent Date Time End 20210809151935    Episode Statistic Total Count 0    Episode Statistic Type Category AT/AF    Episode Statistic Total Count 0    Episode Statistic Type Category VT    Episode Statistic Total Count 0    Episode Statistic Type Category VT    Episode Statistic Total Date Time Start 20210802120413    Episode Statistic Total Date Time End 20210809151935    Episode Statistic Total Date Time Start 20210802120413    Episode Statistic Total Date Time End 20210809151935    Episode Statistic Total Date Time Start 20210802120413    Episode Statistic Total Date Time End 20210809151935    Narrative    Patient seen in [address] [place] [address] for evaluation and iterative programming of their pacemaker, s/p implant on 8/2/21, per MD orders. Steristrips removed without difficulty.  Incision is well approximated without redness, drainage or edema.    Device: Medtronic Huong XT DR  Normal device function  Mode: DDD  bpm  AP: 6%  : 98%  Intrinsic Rhythm: CHB: SR @ 78 bpm/  @ 30 bpm  Short V-V intervals: 0  Lead Trends Appear Stable: yes  Estimated battery longevity to RRT = 10 years. Battery voltage = 3.21V.   Atrial Arrhythmia: 0  AF [place] = 0%  Anticoagulant: none  Ventricular Arrhythmia: 0  Setting Changes: none      Plan: Pt will return to clinic in 3 months.  Logan SILVERMAN RN    dual lead pacemaker    I have reviewed and interpreted the device interrogation, settings, programming and nurse's summary. The device is functioning within normal device parameters. I agree with the current findings, assessment and plan.       PROCEDURES: RHC, pacemaker placement, PET-scan for sarcoidosis    CONSULTATIONS: Infectious Disease, Nephrology, and Urology    DISCHARGE MEDICATIONS:  Current Discharge Medication List        START taking these medications    Details   ciprofloxacin (CIPRO) 500 MG tablet Take 1 tablet (500 mg) by mouth daily  Qty: 30 tablet, Refills: 2    Associated Diagnoses: Acute pyelonephritis           CONTINUE these medications which have CHANGED    Details   furosemide (LASIX) 80 MG tablet Take 1 tablet (80 mg) by mouth 2 times daily  Qty: 180 tablet, Refills: 0    Associated Diagnoses: Pulmonary hypertension (H)           CONTINUE these medications which have NOT CHANGED    Details   ambrisentan (LETAIRIS) 10 MG tablet Take 1 tablet (10 mg) by mouth daily  Qty: 30 tablet, Refills: 11    Associated Diagnoses: Pulmonary hypertension (H)      budesonide (PULMICORT) 0.25 MG/2ML neb solution Take 2 mLs (0.25 mg) by nebulization 2 times daily  Qty: 120 mL, Refills: 4    Associated Diagnoses: Sarcoidosis; COPD (chronic obstructive pulmonary disease) (H)      PROAIR  (90 Base) MCG/ACT inhaler Inhale 2 puffs into the lungs every 6 hours as needed for shortness of breath /  dyspnea  Qty: 18 g, Refills: 11    Comments: Pharmacy may dispense brand covered by insurance (Proair, or proventil or ventolin or generic albuterol inhaler)  Associated Diagnoses: Pulmonary hypertension (H); Mixed hyperlipidemia; COPD (chronic obstructive pulmonary disease) (H)      selexipag (UPTRAVI) 1600 MCG tablet Take 1 tablet (1,600 mcg) by mouth every 12 hours  Qty: 60 tablet, Refills: 11    Associated Diagnoses: Pulmonary hypertension (H)      tadalafil, PAH, (ALYQ) 20 MG TABS Take 2 tablets (40 mg) by mouth daily  Qty: 60 tablet, Refills: 5    Associated Diagnoses: Pulmonary hypertension (H)      tamsulosin (FLOMAX) 0.4 MG capsule Take 1 capsule (0.4 mg) by mouth daily  Qty: 30 capsule, Refills: 0    Associated Diagnoses: Benign prostatic hyperplasia with urinary frequency      UNABLE TO FIND MEDICATION NAME: Topical skin tag remover             DISCHARGE DISPOSITION: Jimmy Isaac will discharge to home in stable condition.     DISCHARGE INSTRUCTIONS:  Discharge Procedure Orders   Reason for your hospital stay   Order Comments: You were admitted because you had complete heart block and you had a pacemaker placed.  You were found to have a urinary infection: you will need to continue antibiotic (ciprofloxacin) until you have your urologic procedure (antibiotic to be stopped 3 days AFTER your urologic procedure).  Your kidney is having worsening function: please have a blood test in a week for your kidneys and follow up with your kidney doctors.  Follow up with providers (cardiology, nephrology and urology as below).     Activity   Order Comments: Your activity upon discharge: activity as tolerated     Order Specific Question Answer Comments   Is discharge order? Yes      Adult [facility] Follow-up and recommended labs and tests   Order Comments: Follow up with primary care provider, Cristhian Busch, within 7 days to evaluate medication change.    Follow up with Nephrology within a month. Need repeat labs  (basic metabolic panel).   Follow up with urology within a month.  Follow up with Dr. Lindsey's team within a month.  Follow up with device clinic within 3 months.  Follow up with pulmonology for your sarcoidosis    Appointments on [facility] and/or [facility] (with [facility] or [facility] provider or service). Call [phone] if you haven't heard regarding these appointments within 7 days of discharge.     Diet   Order Comments: Follow this diet upon discharge: pre-hospital diet     Order Specific Question Answer Comments   Is discharge order? Yes        It was our pleasure to care for Jimmy Isaac during this hospitalization. Please do not hesitate to contact me should there be questions regarding the hospital course or discharge plan.      Patient was seen and discussed with Dr. Wheat.     Sathya Short MD   PGY-1, Internal Medicine  P: [phone]  8/12/2021

## 2021-08-12 NOTE — PLAN OF CARE
DISCHARGE                         8/12/2021  5:29 PM  ----------------------------------------------------------------------------  Discharged to: Home  Via: Automobile  Accompanied by: Family  Discharge Instructions: diet, activity, medications, follow up appointments, when to call the MD, aftercare instructions, and what to watchout for (i.e. s/s of infection, increasing SOB, palpitations, chest pain,)  Prescriptions: To be filled by discharge pharmacy per pt's request; medication list reviewed & sent with pt  Follow Up Appointments: arranged; information given  Belongings: All sent with pt  IV: out  Telemetry: off  Pt exhibits understanding of above discharge instructions; all questions answered.    Discharge Paperwork: Signed, copied, and sent home with patient.   .Joycelyn Naidu RN

## 2021-08-12 NOTE — PLAN OF CARE
Pt admitted 7/30 for RHC c/b CHB s/p PPM. Course complicated by SISI and delirium. Pt recently had nephrostomy tube placed for severe left hydronephrosis and kidney stones. Other PMH includes PAH 2/2 pulmonary sarcoidosis, CKD IV, and BPH.    Neuro: A&O x 4. Calls appropriately. Afebrile. Denies headache. Slept well overnight.  Cardiac: V-paced 80's. SBP 90's. Denies dizziness, chest pain, or palpitations.  Respiratory: Sating well on 6L nasal canula. Denies cough. LS clear. HOSKINS.  GI/: Good UOP via bedside urinal and L nephrostomy tube. Last BM 8/11. Denies nausea.  Diet/Appetite: NPO  Skin: PPM site on left chest WNL and KIARA.  LDA: L PIV SL  Activity: SBA  Pain: Denies     Plan: PET scan today at 1245 with possible discharge to follow. Continue to monitor and alert team with any changes or concerns.

## 2021-08-12 NOTE — PROGRESS NOTES
Care Management Follow Up    Length of Stay (days): 13    Expected Discharge Date: 08/12/2021     Concerns to be Addressed:       Patient plan of care discussed at interdisciplinary rounds: Yes    Anticipated Discharge Disposition:  Home      Anticipated Discharge Services:  None  Anticipated Discharge DME:  Oxygen     Referrals Placed by CM/SW:  None  Private pay costs discussed: Not applicable    Additional Information:  This writer received a call from [facility]-  [phone]. This writer gave her an update on discharge planning.       Lilly Butler RN  Float RN Care Coordinator  Pager [phone] (unit RNCC pager)     To get in touch with the Weekend & Holiday on call RN Care Coordinator:  Pager:  [phone] OR Care Coordinator job code/pager [phone]

## 2021-08-13 ENCOUNTER — TELEPHONE (OUTPATIENT)
Dept: UROLOGY | Facility: CLINIC | Age: 61
End: 2021-08-13

## 2021-08-13 ENCOUNTER — PATIENT OUTREACH (OUTPATIENT)
Dept: CARE COORDINATION | Facility: CLINIC | Age: 61
End: 2021-08-13

## 2021-08-13 ENCOUNTER — TELEPHONE (OUTPATIENT)
Dept: CARDIOLOGY | Facility: CLINIC | Age: 61
End: 2021-08-13

## 2021-08-13 DIAGNOSIS — Z71.89 OTHER SPECIFIED COUNSELING: ICD-10-CM

## 2021-08-13 DIAGNOSIS — I27.20 PULMONARY HYPERTENSION (H): Primary | ICD-10-CM

## 2021-08-13 NOTE — PLAN OF CARE
Discharge Summary    Reason for therapy discharge:    Discharged to home.    Progress towards therapy goal(s). See goals on Care Plan in [facility] electronic health record for goal details.  Goals partially met.  Barriers to achieving goals:   discharge from facility.    Therapy recommendation(s):    Continue home exercise program.

## 2021-08-13 NOTE — CONFIDENTIAL NOTE
----- Message -----   From: Uri Casey MD   Sent: 8/12/2021  12:33 PM CDT   To: Norberto Wheat MD, Cardiology Ph Nurse-[facility],     Can you please schedule a follow up with Dr. Lindsey's team within 2-4 weeks? He will be discharged today.   Also can we have BMP checked in a week?   He also needs a follow up with nephrology and urology within a month. Both teams said they will schedule their own follow ups, but if we can follow up that appointments are scheduled after discharge.     Thank you!     Uri   -----------------------------  Called patient and scheduled video appt with Gauri Laurent PA-C for 8/23/21 @ 9am for post hospital f/u.  Placed order for CMP and asked patient to have that drawn next week either wed or thurs. Patient denied any needs at this time. Patient verbalized understanding, agreed with plan and denied any further questions. Obdulia Rome RN on 8/13/2021 at 1:10 PM

## 2021-08-13 NOTE — TELEPHONE ENCOUNTER
----- Message from BETHANY Multani sent at 8/13/2021  2:37 PM CDT -----  Hi,     Not sure if I sent this or not, he is scheduled for 8/24 at 12pm for hospital follow up (cancelled PCNL due to new cardiologic issues), please call patient to confirm this works for the patient.     Thanks!  Mitra   ----- Message -----  From: Patricia Zhao  Sent: 8/4/2021   5:07 PM CDT  To: BETHANY Multani    Hi Mitra,     When can we get patient in? Let me know.     Thanks!  ----- Message -----  From: Zackery Moura MD  Sent: 8/3/2021   8:51 AM CDT  To: Patricia Zhao    Hi - This patient was hospitalized for [facility].  HE will not be ready for PCNL next week.  Can we get him an outpatient follow up with me and put one of the other pending PCNLs in his slot.    THanks

## 2021-08-13 NOTE — PROGRESS NOTES
Background: Care Coordination referral placed from \[facility]\"" discharge report for reason of patient meeting criteria for a TCM outreach call by [facility] team.    Assessment: Upon chart review, [facility] Team member will cancel/close the referral for TCM outreach due to reason below:    Patient has been contacted by a clinic RN or provider within [facility] for reason of discussing hospital follow up plan of care and answering questions patient may have related to discharge instructions.     Plan: Care Coordination referral for TCM outreach canceled to minimize duplicative efforts.    Tamiko Ceja MA  [facility] [place], [facility]

## 2021-08-13 NOTE — PLAN OF CARE
Occupational Therapy Discharge Summary    Reason for therapy discharge:    Discharged to home.    Progress towards therapy goal(s). See goals on Care Plan in [facility] electronic health record for goal details.  Goals partially met.  Barriers to achieving goals:   discharge from facility.    Therapy recommendation(s):    No further therapy is recommended. Recommend assist with ADL/IADLs prn from [facility].

## 2021-08-15 LAB
MDC_IDC_LEAD_IMPLANT_DT: NORMAL
MDC_IDC_LEAD_IMPLANT_DT: NORMAL
MDC_IDC_LEAD_LOCATION: NORMAL
MDC_IDC_LEAD_LOCATION: NORMAL
MDC_IDC_LEAD_LOCATION_DETAIL_1: NORMAL
MDC_IDC_LEAD_LOCATION_DETAIL_1: NORMAL
MDC_IDC_LEAD_MFG: NORMAL
MDC_IDC_LEAD_MFG: NORMAL
MDC_IDC_LEAD_MODEL: NORMAL
MDC_IDC_LEAD_MODEL: NORMAL
MDC_IDC_LEAD_POLARITY_TYPE: NORMAL
MDC_IDC_LEAD_POLARITY_TYPE: NORMAL
MDC_IDC_LEAD_SERIAL: NORMAL
MDC_IDC_LEAD_SERIAL: NORMAL
MDC_IDC_LEAD_SPECIAL_FUNCTION: NORMAL
MDC_IDC_LEAD_SPECIAL_FUNCTION: NORMAL
MDC_IDC_MSMT_BATTERY_DTM: NORMAL
MDC_IDC_MSMT_BATTERY_REMAINING_LONGEVITY: 125 MO
MDC_IDC_MSMT_BATTERY_RRT_TRIGGER: 2.62
MDC_IDC_MSMT_BATTERY_STATUS: NORMAL
MDC_IDC_MSMT_BATTERY_VOLTAGE: 3.21 V
MDC_IDC_MSMT_LEADCHNL_RA_IMPEDANCE_VALUE: 304 OHM
MDC_IDC_MSMT_LEADCHNL_RA_IMPEDANCE_VALUE: 380 OHM
MDC_IDC_MSMT_LEADCHNL_RA_PACING_THRESHOLD_AMPLITUDE: 0.75 V
MDC_IDC_MSMT_LEADCHNL_RA_PACING_THRESHOLD_PULSEWIDTH: 0.4 MS
MDC_IDC_MSMT_LEADCHNL_RA_SENSING_INTR_AMPL: 4.5 MV
MDC_IDC_MSMT_LEADCHNL_RV_IMPEDANCE_VALUE: 361 OHM
MDC_IDC_MSMT_LEADCHNL_RV_IMPEDANCE_VALUE: 418 OHM
MDC_IDC_MSMT_LEADCHNL_RV_PACING_THRESHOLD_AMPLITUDE: 0.5 V
MDC_IDC_MSMT_LEADCHNL_RV_PACING_THRESHOLD_PULSEWIDTH: 0.4 MS
MDC_IDC_PG_IMPLANT_DTM: NORMAL
MDC_IDC_PG_MFG: NORMAL
MDC_IDC_PG_MODEL: NORMAL
MDC_IDC_PG_SERIAL: NORMAL
MDC_IDC_PG_TYPE: NORMAL
MDC_IDC_SESS_CLINIC_NAME: NORMAL
MDC_IDC_SESS_DTM: NORMAL
MDC_IDC_SESS_TYPE: NORMAL
MDC_IDC_SET_BRADY_AT_MODE_SWITCH_RATE: 171 {BEATS}/MIN
MDC_IDC_SET_BRADY_HYSTRATE: NORMAL
MDC_IDC_SET_BRADY_LOWRATE: 60 {BEATS}/MIN
MDC_IDC_SET_BRADY_MAX_SENSOR_RATE: 130 {BEATS}/MIN
MDC_IDC_SET_BRADY_MAX_TRACKING_RATE: 130 {BEATS}/MIN
MDC_IDC_SET_BRADY_MODE: NORMAL
MDC_IDC_SET_BRADY_PAV_DELAY_LOW: 180 MS
MDC_IDC_SET_BRADY_SAV_DELAY_LOW: 150 MS
MDC_IDC_SET_LEADCHNL_RA_PACING_AMPLITUDE: 3.5 V
MDC_IDC_SET_LEADCHNL_RA_PACING_ANODE_ELECTRODE_1: NORMAL
MDC_IDC_SET_LEADCHNL_RA_PACING_ANODE_LOCATION_1: NORMAL
MDC_IDC_SET_LEADCHNL_RA_PACING_CAPTURE_MODE: NORMAL
MDC_IDC_SET_LEADCHNL_RA_PACING_CATHODE_ELECTRODE_1: NORMAL
MDC_IDC_SET_LEADCHNL_RA_PACING_CATHODE_LOCATION_1: NORMAL
MDC_IDC_SET_LEADCHNL_RA_PACING_POLARITY: NORMAL
MDC_IDC_SET_LEADCHNL_RA_PACING_PULSEWIDTH: 0.4 MS
MDC_IDC_SET_LEADCHNL_RA_SENSING_ANODE_ELECTRODE_1: NORMAL
MDC_IDC_SET_LEADCHNL_RA_SENSING_ANODE_LOCATION_1: NORMAL
MDC_IDC_SET_LEADCHNL_RA_SENSING_CATHODE_ELECTRODE_1: NORMAL
MDC_IDC_SET_LEADCHNL_RA_SENSING_CATHODE_LOCATION_1: NORMAL
MDC_IDC_SET_LEADCHNL_RA_SENSING_POLARITY: NORMAL
MDC_IDC_SET_LEADCHNL_RA_SENSING_SENSITIVITY: 0.3 MV
MDC_IDC_SET_LEADCHNL_RV_PACING_AMPLITUDE: 3.5 V
MDC_IDC_SET_LEADCHNL_RV_PACING_ANODE_ELECTRODE_1: NORMAL
MDC_IDC_SET_LEADCHNL_RV_PACING_ANODE_LOCATION_1: NORMAL
MDC_IDC_SET_LEADCHNL_RV_PACING_CAPTURE_MODE: NORMAL
MDC_IDC_SET_LEADCHNL_RV_PACING_CATHODE_ELECTRODE_1: NORMAL
MDC_IDC_SET_LEADCHNL_RV_PACING_CATHODE_LOCATION_1: NORMAL
MDC_IDC_SET_LEADCHNL_RV_PACING_POLARITY: NORMAL
MDC_IDC_SET_LEADCHNL_RV_PACING_PULSEWIDTH: 0.4 MS
MDC_IDC_SET_LEADCHNL_RV_SENSING_ANODE_ELECTRODE_1: NORMAL
MDC_IDC_SET_LEADCHNL_RV_SENSING_ANODE_LOCATION_1: NORMAL
MDC_IDC_SET_LEADCHNL_RV_SENSING_CATHODE_ELECTRODE_1: NORMAL
MDC_IDC_SET_LEADCHNL_RV_SENSING_CATHODE_LOCATION_1: NORMAL
MDC_IDC_SET_LEADCHNL_RV_SENSING_POLARITY: NORMAL
MDC_IDC_SET_LEADCHNL_RV_SENSING_SENSITIVITY: 0.9 MV
MDC_IDC_SET_ZONE_DETECTION_INTERVAL: 350 MS
MDC_IDC_SET_ZONE_DETECTION_INTERVAL: 400 MS
MDC_IDC_SET_ZONE_TYPE: NORMAL
MDC_IDC_STAT_AT_BURDEN_PERCENT: 0 %
MDC_IDC_STAT_AT_DTM_END: NORMAL
MDC_IDC_STAT_AT_DTM_START: NORMAL
MDC_IDC_STAT_BRADY_AP_VP_PERCENT: 5.6 %
MDC_IDC_STAT_BRADY_AP_VS_PERCENT: 0.02 %
MDC_IDC_STAT_BRADY_AS_VP_PERCENT: 92.51 %
MDC_IDC_STAT_BRADY_AS_VS_PERCENT: 1.87 %
MDC_IDC_STAT_BRADY_DTM_END: NORMAL
MDC_IDC_STAT_BRADY_DTM_START: NORMAL
MDC_IDC_STAT_BRADY_RA_PERCENT_PACED: 5.53 %
MDC_IDC_STAT_BRADY_RV_PERCENT_PACED: 98.12 %
MDC_IDC_STAT_EPISODE_RECENT_COUNT: 0
MDC_IDC_STAT_EPISODE_RECENT_COUNT_DTM_END: NORMAL
MDC_IDC_STAT_EPISODE_RECENT_COUNT_DTM_START: NORMAL
MDC_IDC_STAT_EPISODE_TOTAL_COUNT: 0
MDC_IDC_STAT_EPISODE_TOTAL_COUNT_DTM_END: NORMAL
MDC_IDC_STAT_EPISODE_TOTAL_COUNT_DTM_START: NORMAL
MDC_IDC_STAT_EPISODE_TYPE: NORMAL
VIT B1 PYROPHOSHATE BLD-SCNC: 105 NMOL/L

## 2021-08-16 ENCOUNTER — PRE VISIT (OUTPATIENT)
Dept: UROLOGY | Facility: CLINIC | Age: 61
End: 2021-08-16

## 2021-08-16 NOTE — TELEPHONE ENCOUNTER
Surgery planning, was scheduled for PCNL, cancelled due to cardiology issues.     - Mitra SILVERMAN, EMT  Urology Clinic

## 2021-08-16 NOTE — PROGRESS NOTES
Update for Discharge Summary 8/12/21:    The complicated UTI was found before admission. The left nephrostomy tube was placed due to obstructing stones and the in the setting of a male with a left nephrostomy tube, a complicated UTI is most likely 2/2 to the nephrostomy tube.    The confusion/delirium of Mr. Isaac should be classified as encephalopathy, unable to further specify.

## 2021-08-16 NOTE — PROGRESS NOTES
Received message on 8/9/21 from the [place] [facility] Team stating they had spoken with the patient and he was going to continue device care and cardiology follow-up at the [place].  LUCINA Mejia (Late entry due to being out of office)

## 2021-08-17 ENCOUNTER — VIRTUAL VISIT (OUTPATIENT)
Dept: NEPHROLOGY | Facility: CLINIC | Age: 61
End: 2021-08-17
Attending: PHYSICIAN ASSISTANT
Payer: COMMERCIAL

## 2021-08-17 DIAGNOSIS — R60.9 EDEMA, UNSPECIFIED TYPE: ICD-10-CM

## 2021-08-17 DIAGNOSIS — N20.0 NEPHROLITHIASIS: ICD-10-CM

## 2021-08-17 DIAGNOSIS — N18.4 CKD (CHRONIC KIDNEY DISEASE) STAGE 4, GFR 15-29 ML/MIN (H): Primary | ICD-10-CM

## 2021-08-17 DIAGNOSIS — D86.9 SARCOIDOSIS: ICD-10-CM

## 2021-08-17 PROCEDURE — 99214 OFFICE O/P EST MOD 30 MIN: CPT | Mod: TEL | Performed by: PHYSICIAN ASSISTANT

## 2021-08-17 ASSESSMENT — PAIN SCALES - GENERAL: PAINLEVEL: NO PAIN (0)

## 2021-08-17 NOTE — LETTER
8/17/2021       RE: Jimmy Isaac  [address] [id number]     Dear Colleague,    Thank you for referring your patient, Jimmy Isaac, to the [facility] NEPH[facility] [place] at [facility]. Please see a copy of my visit note below.    Jimmy is a 60 year old who is being evaluated via a billable video visit. Switched to telephone visit due to technical issues.     [facility] you [facility] your [facility]        Nephrology Progress Note  08/17/2021           Reason for Visit: Follow up CKD, kidney stones    ASSESSMENT/RECOMMENDATIONS:  Jimmy Isaac is a 61 year old male with a history of Sarcoidosis, CKD and Nephrolithiasis who presents for routine follow up. Sarcoidosis was diagnosed in 1991 complicated by pulmonary hypertension, hypoxia on home oxygen when ambulating and nephrolithiasis (calcium oxalate).    #CKD 4  - baseline creatinine 1.4 in 2018, increasing to ~ 1.8-1.9 in 2019, Then creatinine increased to ~ 3 in July 2020. Recent creatinine up to 3.65  - Discussed kidney function in detail and possibility that kidney does not completely recover.   - 8/12/21 creatinine 4.12, eGFR 15  - hospitalized from 7/30-8/12/21. Cr was 3 upon admission and steadily increased to peak of 4.86 on 8/9 and has now started to trend down with increasing UOP on lasix. Differential for SISI is broad at this point including sarcoidosis in the kidneys, staghorn calculi (though s/p PNT on left), AIN due to antibiotic use and ATN. Cr slowly trended down. He was discharged on lasix 80 mg BID   - check renal panel, enroll in CKD journey when he is ready    #BP/CV  - normotensive, history of hypertension/pulmonary hypertension, does not take any medications at this time  - monitor BP at home  - ECHO 10/29/18  Interpretation Summary  Technically difficult study.  Global and regional left ventricular function is normal with an EF of 55-60%.  Mild right  ventricular dilation is present.  Global right ventricular function is normal.  Right ventricular systolic pressure is 28mmHg above the right atrial pressure.  No significant valvular abnormalities.  The inferior vena cava was normal in size with preserved respiratory  variability.  No pericardial effusion is present.  This study was compared to a TTE from 9/22/2017. There has been no significant  Change.  - had ECHO and followed up with cardiology planned on 8/23/21. He will be having pacemaker implanted      #Volume  - has not been checking weight at home. Notes, swelling in LE.  - taking lasix 80 mg BID, pharmacy feels this could contribute to stones  - start checking weight daily, continue low sodium diet      #Electrolytes/Acid/Base  - 8/12/21 potassium 4.2, sodium 138, bicarb 22  - no acute concerns      #Anemia  - 8/121/21 Hgb 9.1  - recent iron 28, iron sat 12 %, ferritin 33  - check cbc       #Bone/mineral disease:  - 8/12/21 Calcium 9, 7/30/21 PTH 13  - check vitamin D      #Nephrolithiasis   - following with urology  - left Nephrostomy tube in place, PCNL cancelled due to needing a pacemaker first  - has upcoming urology and cardiology appts      #Disposition: check labs this week, call with recommendations. Will likely enroll in CKD journey and follow closely with monthly labs.    Recommendations reviewed with patient. All questions answered to their satisfaction. They will call with any new or worsening symptoms.       HISTORY OF PRESENT ILLNESS  Jimmy Isaac is a 89 year old male with a history of Sarcoidosis, CKD and Nephrolithiasis who presents for routine follow up. Sarcoidosis was diagnosed in 1991 complicated by pulmonary hypertension, hypoxia on home oxygen when ambulating and nephrolithiasis (calcium oxalate).     He was initially diagnosed with kidney stones in 1991 with new diagnosis of sarcoidosis and was told to drink a lot of water. Stones were noted again in 2012 incidentally on imaging  for pulmonary hypertension but he has also passed stones which were analyzed in 2012 and showed calcium oxalate.    CKD was stable from 2012 until ~ 2018. He was last seen by Dr. Ba 12/26/2018. Baseline creatinine at that time was 1.4 and eGFR 55. He did not follow up. Recently he was evaluated in urology and noted to have creatinine of 3.65 with eGFR 17. Chart review showed creatinine in July 2020 of ~ 3. CT stone was obtained and showed a large obstructing left ureteral calculus, left intrarenal calculi, and increased burden of right staghorn calculi. He has left nephrostomy tube in place. He will have PCNL once he's had pacemaker placed.     On speaking with him today, he is slowly but gradually improving since discharge on 8/12. He does note persistent LE edema, and lesser volume of urine. He is drinking 6-8 bottles of water per day and taking lasix 80 mg BID. He has some generalized itching, hasn't had the energy to shower since he got home. States he has a good appetite and more regular bowel movements. He has no abdominal pain, nausea, or vomiting.     We discussed CKD journey and attending a kidney smart class. He wants to proceed but not immediately.     PAST MEDICAL/SURGICAL HISTORY  - Reviewed    ALLERGIES and MEDICATIONS  - Reviewed per EMR    REVIEW OF SYSTEMS:   - Comprehensive system review obtained, negative unless noted in HPI      PHYSICAL EXAM:   Vitals: None      LABS  All labs reviewed by me  Electrolytes/Renal -   Recent Labs   Lab Test 08/12/21  0827 08/11/21  0614 08/10/21  1336 08/10/21  0551 08/08/21  1633 07/27/21  0742 07/01/21  1336 01/09/19  1152 12/26/18  1318 05/30/18  1025    139  --  138 136  --  142   < > 139 138   POTASSIUM 4.2 4.3 4.8 4.0 4.5  --  4.0   < > 3.8 4.2   CHLORIDE 102 106  --  104 104  --  110*   < > 102 104   CO2 22 20  --  23 25  --  25   < > 31 28   * 117*  --  108* 103*  --  50*   < > 32* 24   CR 4.12* 4.50*  --  4.47* 4.80*  --  3.27*   < >  1.40* 1.34*   GLC 70 74  --  85 120*   < > 95   < > 122* 100*   TERI 9.0 8.6  --  9.3 9.0  --  8.7   < > 9.1 9.2   MAG 2.3  --   --  2.2 2.5*   < >  --   --   --   --    PHOS  --   --   --   --   --   --  3.8  --  3.3 2.4*    < > = values in this interval not displayed.       CBC -   Recent Labs   Lab Test 08/12/21  0827 08/11/21  0614 08/10/21  0551   WBC 4.9 4.6 5.2   HGB 9.1* 8.6* 8.6*    150 138*       LFTs -   Recent Labs   Lab Test 08/11/21 0614 08/10/21  0551 08/09/21  0543   ALKPHOS 68 70 67   BILITOTAL 0.4 0.3 0.3   ALT 19 18 19   AST 20 17 20   PROTTOTAL 6.3* 6.5* 6.6*   ALBUMIN 2.5* 2.6* 2.6*       Iron Panel -   Recent Labs   Lab Test 07/01/21  1336 10/18/17  1121   IRON 28* 82   IRONSAT 12* 29   MIHIR 33 41         Imaging: Reviewed with patient   CT abdomen/pelvis 6/23/21  IMPRESSION:   1.  A large, 1.3 x 1.2 x 1.8 cm obstructing calculus in the left  ureterovesicular junction/proximal ureter with severe left  hydronephrosis. Otherwise stable burden nonobstructing left renal  calculi.  2.  Increased burden of multiple staghorn right renal nonobstructing  calculi.  3.  Stable mildly enlarged upper abdominal lymphadenopathy,  nonspecific and likely reactive.  4.  Stable partly visualized bullous emphysema in the lung bases.      ALIA MASON PA-C     Visit length 17 minutes. An additional 20 minutes was spent on date of service in chart review, documentation, and other activities as noted.     The author of this note documented a reason for not sharing it with the patient.                  Again, thank you for allowing me to participate in the care of your patient.      Sincerely,    ALIA MASON PA-C

## 2021-08-17 NOTE — PATIENT INSTRUCTIONS
1. No medication changes today.   2. Check labs tomorrow (8/18/21). Will call with results and recommendations.   3. Start CKD journey/kidney smart class when you're ready.   4. Monitor BP and weights daily at home and keep a log.   5. Follow up in a month. Call sooner with any questions or concerns.

## 2021-08-17 NOTE — PROGRESS NOTES
Jimmy is a 60 year old who is being evaluated via a billable video visit. Switched to telephone visit due to technical issues.     How would you like to obtain your AVS? [facility]        Nephrology Progress Note  08/17/2021           Reason for Visit: Follow up CKD, kidney stones    ASSESSMENT/RECOMMENDATIONS:  Jimmy Isaac is a 61 year old male with a history of Sarcoidosis, CKD and Nephrolithiasis who presents for routine follow up. Sarcoidosis was diagnosed in 1991 complicated by pulmonary hypertension, hypoxia on home oxygen when ambulating and nephrolithiasis (calcium oxalate).    #CKD 4  - baseline creatinine 1.4 in 2018, increasing to ~ 1.8-1.9 in 2019, Then creatinine increased to ~ 3 in July 2020. Recent creatinine up to 3.65  - Discussed kidney function in detail and possibility that kidney does not completely recover.   - 8/12/21 creatinine 4.12, eGFR 15  - hospitalized from 7/30-8/12/21. Cr was 3 upon admission and steadily increased to peak of 4.86 on 8/9 and has now started to trend down with increasing UOP on lasix. Differential for SISI is broad at this point including sarcoidosis in the kidneys, staghorn calculi (though s/p PNT on left), AIN due to antibiotic use and ATN. Cr slowly trended down. He was discharged on lasix 80 mg BID   - check renal panel, enroll in CKD journey when he is ready    #BP/CV  - normotensive, history of hypertension/pulmonary hypertension, does not take any medications at this time  - monitor BP at home  - ECHO 10/29/18  Interpretation Summary  Technically difficult study.  Global and regional left ventricular function is normal with an EF of 55-60%.  Mild right ventricular dilation is present.  Global right ventricular function is normal.  Right ventricular systolic pressure is 28mmHg above the right atrial pressure.  No significant valvular abnormalities.  The inferior vena cava was normal in size with preserved respiratory  variability.  No pericardial effusion is  present.  This study was compared to a TTE from 9/22/2017. There has been no significant  Change.  - had ECHO and followed up with cardiology planned on 8/23/21. He will be having pacemaker implanted      #Volume  - has not been checking weight at home. Notes, swelling in LE.  - taking lasix 80 mg BID, pharmacy feels this could contribute to stones  - start checking weight daily, continue low sodium diet      #Electrolytes/Acid/Base  - 8/12/21 potassium 4.2, sodium 138, bicarb 22  - no acute concerns      #Anemia  - 8/121/21 Hgb 9.1  - recent iron 28, iron sat 12 %, ferritin 33  - check cbc       #Bone/mineral disease:  - 8/12/21 Calcium 9, 7/30/21 PTH 13  - check vitamin D      #Nephrolithiasis   - following with urology  - left Nephrostomy tube in place, PCNL cancelled due to needing a pacemaker first  - has upcoming urology and cardiology appts      #Disposition: check labs this week, call with recommendations. Will likely enroll in CKD journey and follow closely with monthly labs.    Recommendations reviewed with patient. All questions answered to their satisfaction. They will call with any new or worsening symptoms.       HISTORY OF PRESENT ILLNESS  Jimmy Isaac is a 89 year old male with a history of Sarcoidosis, CKD and Nephrolithiasis who presents for routine follow up. Sarcoidosis was diagnosed in 1991 complicated by pulmonary hypertension, hypoxia on home oxygen when ambulating and nephrolithiasis (calcium oxalate).     He was initially diagnosed with kidney stones in 1991 with new diagnosis of sarcoidosis and was told to drink a lot of water. Stones were noted again in 2012 incidentally on imaging for pulmonary hypertension but he has also passed stones which were analyzed in 2012 and showed calcium oxalate.    CKD was stable from 2012 until ~ 2018. He was last seen by Dr. Ba 12/26/2018. Baseline creatinine at that time was 1.4 and eGFR 55. He did not follow up. Recently he was evaluated in urology  and noted to have creatinine of 3.65 with eGFR 17. Chart review showed creatinine in July 2020 of ~ 3. CT stone was obtained and showed a large obstructing left ureteral calculus, left intrarenal calculi, and increased burden of right staghorn calculi. He has left nephrostomy tube in place. He will have PCNL once he's had pacemaker placed.     On speaking with him today, he is slowly but gradually improving since discharge on 8/12. He does note persistent LE edema, and lesser volume of urine. He is drinking 6-8 bottles of water per day and taking lasix 80 mg BID. He has some generalized itching, hasn't had the energy to shower since he got home. States he has a good appetite and more regular bowel movements. He has no abdominal pain, nausea, or vomiting.     We discussed CKD journey and attending a kidney smart class. He wants to proceed but not immediately.     PAST MEDICAL/SURGICAL HISTORY  - Reviewed    ALLERGIES and MEDICATIONS  - Reviewed per EMR    REVIEW OF SYSTEMS:   - Comprehensive system review obtained, negative unless noted in HPI      PHYSICAL EXAM:   Vitals: None      LABS  All labs reviewed by me  Electrolytes/Renal -   Recent Labs   Lab Test 08/12/21  0827 08/11/21  0614 08/10/21  1336 08/10/21  0551 08/08/21  1633 07/27/21  0742 07/01/21  1336 01/09/19  1152 12/26/18  1318 05/30/18  1025    139  --  138 136  --  142   < > 139 138   POTASSIUM 4.2 4.3 4.8 4.0 4.5  --  4.0   < > 3.8 4.2   CHLORIDE 102 106  --  104 104  --  110*   < > 102 104   CO2 22 20  --  23 25  --  25   < > 31 28   * 117*  --  108* 103*  --  50*   < > 32* 24   CR 4.12* 4.50*  --  4.47* 4.80*  --  3.27*   < > 1.40* 1.34*   GLC 70 74  --  85 120*   < > 95   < > 122* 100*   TERI 9.0 8.6  --  9.3 9.0  --  8.7   < > 9.1 9.2   MAG 2.3  --   --  2.2 2.5*   < >  --   --   --   --    PHOS  --   --   --   --   --   --  3.8  --  3.3 2.4*    < > = values in this interval not displayed.       CBC -   Recent Labs   Lab Test  08/12/21  0827 08/11/21  0614 08/10/21  0551   WBC 4.9 4.6 5.2   HGB 9.1* 8.6* 8.6*    150 138*       LFTs -   Recent Labs   Lab Test 08/11/21  0614 08/10/21  0551 08/09/21  0543   ALKPHOS 68 70 67   BILITOTAL 0.4 0.3 0.3   ALT 19 18 19   AST 20 17 20   PROTTOTAL 6.3* 6.5* 6.6*   ALBUMIN 2.5* 2.6* 2.6*       Iron Panel -   Recent Labs   Lab Test 07/01/21  1336 10/18/17  1121   IRON 28* 82   IRONSAT 12* 29   MIHIR 33 41         Imaging: Reviewed with patient   CT abdomen/pelvis 6/23/21  IMPRESSION:   1.  A large, 1.3 x 1.2 x 1.8 cm obstructing calculus in the left  ureterovesicular junction/proximal ureter with severe left  hydronephrosis. Otherwise stable burden nonobstructing left renal  calculi.  2.  Increased burden of multiple staghorn right renal nonobstructing  calculi.  3.  Stable mildly enlarged upper abdominal lymphadenopathy,  nonspecific and likely reactive.  4.  Stable partly visualized bullous emphysema in the lung bases.      ALIA MASON PA-C     Visit length 17 minutes. An additional 20 minutes was spent on date of service in chart review, documentation, and other activities as noted.     The author of this note documented a reason for not sharing it with the patient.

## 2021-08-18 ENCOUNTER — LAB (OUTPATIENT)
Dept: LAB | Facility: CLINIC | Age: 61
End: 2021-08-18
Payer: COMMERCIAL

## 2021-08-18 ENCOUNTER — TELEPHONE (OUTPATIENT)
Dept: NEPHROLOGY | Facility: CLINIC | Age: 61
End: 2021-08-18

## 2021-08-18 DIAGNOSIS — I27.20 PULMONARY HYPERTENSION (H): ICD-10-CM

## 2021-08-18 DIAGNOSIS — N18.4 CKD (CHRONIC KIDNEY DISEASE) STAGE 4, GFR 15-29 ML/MIN (H): ICD-10-CM

## 2021-08-18 LAB
ERYTHROCYTE [DISTWIDTH] IN BLOOD BY AUTOMATED COUNT: 13.2 % (ref 10–15)
HCT VFR BLD AUTO: 32 % (ref 40–53)
HGB BLD-MCNC: 10.2 G/DL (ref 13.3–17.7)
MCH RBC QN AUTO: 28.3 PG (ref 26.5–33)
MCHC RBC AUTO-ENTMCNC: 31.9 G/DL (ref 31.5–36.5)
MCV RBC AUTO: 89 FL (ref 78–100)
PLATELET # BLD AUTO: 166 10E3/UL (ref 150–450)
RBC # BLD AUTO: 3.6 10E6/UL (ref 4.4–5.9)
WBC # BLD AUTO: 5.3 10E3/UL (ref 4–11)

## 2021-08-18 PROCEDURE — 83735 ASSAY OF MAGNESIUM: CPT

## 2021-08-18 PROCEDURE — 84100 ASSAY OF PHOSPHORUS: CPT

## 2021-08-18 PROCEDURE — 80053 COMPREHEN METABOLIC PANEL: CPT

## 2021-08-18 PROCEDURE — 85027 COMPLETE CBC AUTOMATED: CPT

## 2021-08-18 PROCEDURE — 36415 COLL VENOUS BLD VENIPUNCTURE: CPT | Performed by: INTERNAL MEDICINE

## 2021-08-18 PROCEDURE — 82306 VITAMIN D 25 HYDROXY: CPT

## 2021-08-18 NOTE — TELEPHONE ENCOUNTER
Lvm for patient to call us back to schedule a 1 month follow up appointment with Radha Rod with labs Prior.

## 2021-08-19 LAB
ALBUMIN SERPL-MCNC: 3.3 G/DL (ref 3.4–5)
ALP SERPL-CCNC: 69 U/L (ref 40–150)
ALT SERPL W P-5'-P-CCNC: 21 U/L (ref 0–70)
ANION GAP SERPL CALCULATED.3IONS-SCNC: 10 MMOL/L (ref 3–14)
AST SERPL W P-5'-P-CCNC: 17 U/L (ref 0–45)
BILIRUB SERPL-MCNC: 0.4 MG/DL (ref 0.2–1.3)
BUN SERPL-MCNC: 92 MG/DL (ref 7–30)
CALCIUM SERPL-MCNC: 9.7 MG/DL (ref 8.5–10.1)
CHLORIDE BLD-SCNC: 96 MMOL/L (ref 94–109)
CO2 SERPL-SCNC: 30 MMOL/L (ref 20–32)
CREAT SERPL-MCNC: 3.36 MG/DL (ref 0.66–1.25)
DEPRECATED CALCIDIOL+CALCIFEROL SERPL-MC: 16 UG/L (ref 20–75)
GFR SERPL CREATININE-BSD FRML MDRD: 19 ML/MIN/1.73M2
GLUCOSE BLD-MCNC: 130 MG/DL (ref 70–99)
MAGNESIUM SERPL-MCNC: 2.2 MG/DL (ref 1.6–2.3)
PHOSPHATE SERPL-MCNC: 5.5 MG/DL (ref 2.5–4.5)
POTASSIUM BLD-SCNC: 3.7 MMOL/L (ref 3.4–5.3)
PROT SERPL-MCNC: 7.1 G/DL (ref 6.8–8.8)
SODIUM SERPL-SCNC: 136 MMOL/L (ref 133–144)

## 2021-08-22 NOTE — PROGRESS NOTES
Jimmy is a 60 year old who is being evaluated via a billable telephone visit.      What phone number would you like to be contacted at? [phone]  How would you like to obtain your AVS? Carlos Alberto      Patient does not have a home bp machine and does not check his weight.    Review Of Systems  Skin: Itchy  Eyes:Ears/Nose/Throat: Sinus trouble  Respiratory: P.H. Sob after 2-3 hours of sleep. O2 4.5-5L  Cardiovascular:Edema on and off. Dizziness  Gastrointestinal: NEGATIVE  Genitourinary:CKD   Musculoskeletal: NEGATIVE  Neurologic: NEGATIVE  Psychiatric: NEGATIVE  Hematologic/Lymphatic/Immunologic: NEGATIVE  Endocrine:  NEGATIVE              CARDIOLOGY PH CLINIC PHONE VISIT    Date of virtual visit: 08/23/21      Jimmy Isaac is a 60 year old male who is being evaluated via a billable video visit.        I have reviewed and updated the patient's Past Medical History, Social History, Family History and Medication List.    MEDICATIONS:  Current Outpatient Medications   Medication Sig Dispense Refill     ambrisentan (LETAIRIS) 10 MG tablet Take 1 tablet (10 mg) by mouth daily 30 tablet 11     budesonide (PULMICORT) 0.25 MG/2ML neb solution Take 2 mLs (0.25 mg) by nebulization 2 times daily 120 mL 4     ciprofloxacin (CIPRO) 500 MG tablet Take 1 tablet (500 mg) by mouth daily 30 tablet 2     furosemide (LASIX) 80 MG tablet Take 1 tablet (80 mg) by mouth 2 times daily 180 tablet 0     PROAIR  (90 Base) MCG/ACT inhaler Inhale 2 puffs into the lungs every 6 hours as needed for shortness of breath / dyspnea 18 g 11     selexipag (UPTRAVI) 1600 MCG tablet Take 1 tablet (1,600 mcg) by mouth every 12 hours 60 tablet 11     tadalafil, PAH, (ALYQ) 20 MG TABS Take 2 tablets (40 mg) by mouth daily 60 tablet 5     tamsulosin (FLOMAX) 0.4 MG capsule Take 1 capsule (0.4 mg) by mouth daily 30 capsule 0     UNABLE TO FIND MEDICATION NAME: Topical skin tag remover (Patient not taking: Reported on 8/17/2021)         ALLERGIES  Nkda  [no known drug allergies]      Self reported vitals:  Weight: Not reported  BP Not reported      Primary PH cardiologist: Dr. Lindsey      HPI:  Mr. Jimmy Isaac is a pleasant 60 year old male with a past medical history of hypertension, CKD, and pulmonary sarcoidosis (without cardiac involvement). He is followed here for associated pulmonary hypertension and is maintained on combination therapy with Letairis, tadalafil, and selexipag. He is also oxygen dependent.     He was seen last by Dr. Lindsey via virtual visit in December of 2020. At that time, no medication changes were made, though he was referred for further evaluation of hydronephrosis. He did not see urology until June, but there was concern that he was having more obstruction from nephrolithiasis causing more more deterioration to his kidney function. He had CT imaging which showed an obstructing stone in the left ureter. On 7/9 he underwent a left nephrostomy tube placement. Urology then recommended left percutaneous nephrolithotomy, but would need further evaluation regarding anesthesia. He was to return to see Dr. Lindsey a few weeks ago with a repeat echocardiogram, but missed this appointment. I then met him shortly after  for an urgent evaluation for anesthesia clearance. Unfortunately, his breathing had worsened over the few months prior. His EKG in clinic showed marked sinus bradycardia with 1st degree AV block for which I asked him to hold his digoxin. His echo showed normal EF 55-60%, but he had a flattened septum c/w RV pressure and volume overload. His RV function was mild to moderately reduced, which had worsened from prior. I then sent him for a repeat RHC prior to consideration of surgery.    Jimmy was then admitted from 7/30 to 8/12 as he was found to be in complete heart block with narrow ventricular escape rhythm after undergoing his RHC. He required ICU for stabilization and ultimately a pacemaker was placed 8/2/21 due to  "persistent heart block. His stay was complicated by some transient delirium, and worsening renal function. An inpatient PET Scan was completed on 8/12/21 and showed no evidence of active cardiac sarcoidosis. He was diuresed to a discharge weight of 152# and RHC continued to show moderate PAH with normal cardiac output. He was treated with IV abx for UTI. Urology followed the patient and CT abdomen showed some mild improvement in his hydronephrosis. Lasix was increased from 20mg BID to 80mg BID. His Scr did rise and peak to 4.86, and nephrology was consulted for possible need for dialysis, but due to lack of uremic symptoms, this was not performed.     Today, we are doing a virtual visit to follow up after his hospital stay. He met with nephrology last week and had repeat labs showing some improvement in his renal function as well as anemia. He says that overall he is starting to feel better. After being in the hospital so long he is \"weak\" but this is improving. He says his breathing seems to be getting better and the lower extremity edema is also slowing getting less and less. Unfortunately he isn't weighing himself at home, but says he has been very careful with reducing sodium in his diet and notes this has had a positive impact for him. While he does report some occasional dizziness he says overall balance is much better. His oxygen requirements are at his baseline.      Labs were performed after his nephrology follow up last week, as below.       CURRENT PULMONARY HYPERTENSION REGIMEN:     PAH Rx: Selexipag 1600mcg BID, Letairs 10mg daily, and tadalafil 40mg daily   (previously on Tyvaso)     Diuretics: Lasix 80mg BID      Oxygen: 4-5LPM at rest, will go up to 8-10L with exertion     Anticoagulation: none        ASSESSMENT/PLAN:      1. Pulmonary arterial hypertension.              --In the setting of pulmonary sarcoid and chronic hypoxemia. He remains on triple combination therapy with tadalafil, ambrisentan, " and selexipag. As above, due to worsening HOSKINS and possible planned surgery, he underwent a repeat RHC a few weeks ago. This continued to show moderate PAH with normal cardiac output. Echo also from a few weeks ago showed mild to moderately reduced RV function. He is no longer on digoxin as below, for RV support. Clinically, it sounds as if he is doing relatively well, and is likely optimized as best we can from a cardiology standpoint in regard to anesthesia risk.    --In regard to volume status, I am unable to examine him today because this is a virtual visit. He is also not weighing at home; however, he reports improvement in his swelling and is carefully watching dietary sodium. He remains on lasix 80mg BID. SCr last week appears to be trending favorably, and he is following with nephrology. He has a follow up with urology this week as well.     2. Complete heart block.   --Was bradycardic with recent office visit and digoxin was discontinued. When he returned shortly after for his RHC, he was found to be in complete heart block as outlined above. He is now s/p pacemaker placement on 8/2/2021. He had his one week site check while in the hospital.    --He has an appt in place in November to follow up with EP with a device check.       Follow up plan: Return in 3 months to see Dr. Lindsey with labs prior.       Testing/labs:    Most recent labs:   Results for JESSICA MATTY S (MRN [id number]) as of 8/22/2021 16:53   Ref. Range 8/18/2021 14:07   Sodium Latest Ref Range: 133 - 144 mmol/L 136   Potassium Latest Ref Range: 3.4 - 5.3 mmol/L 3.7   Chloride Latest Ref Range: 94 - 109 mmol/L 96   Carbon Dioxide Latest Ref Range: 20 - 32 mmol/L 30   Urea Nitrogen Latest Ref Range: 7 - 30 mg/dL 92 (H)   Creatinine Latest Ref Range: 0.66 - 1.25 mg/dL 3.36 (H)   GFR Estimate Latest Ref Range: >60 mL/min/1.73m2 19 (L)   Calcium Latest Ref Range: 8.5 - 10.1 mg/dL 9.7   Anion Gap Latest Ref Range: 3 - 14 mmol/L 10   Magnesium  Latest Ref Range: 1.6 - 2.3 mg/dL 2.2   Phosphorus Latest Ref Range: 2.5 - 4.5 mg/dL 5.5 (H)   Albumin Latest Ref Range: 3.4 - 5.0 g/dL 3.3 (L)   Protein Total Latest Ref Range: 6.8 - 8.8 g/dL 7.1   Bilirubin Total Latest Ref Range: 0.2 - 1.3 mg/dL 0.4   Alkaline Phosphatase Latest Ref Range: 40 - 150 U/L 69   ALT Latest Ref Range: 0 - 70 U/L 21   AST Latest Ref Range: 0 - 45 U/L 17   Vitamin D Deficiency screening Latest Ref Range: 20 - 75 ug/L 16 (L)   Glucose Latest Ref Range: 70 - 99 mg/dL 130 (H)   WBC Latest Ref Range: 4.0 - 11.0 10e3/uL 5.3   Hemoglobin Latest Ref Range: 13.3 - 17.7 g/dL 10.2 (L)   Hematocrit Latest Ref Range: 40.0 - 53.0 % 32.0 (L)   Platelet Count Latest Ref Range: 150 - 450 10e3/uL 166         Other recent pertinent testing:  RHC 7/30/2021  Hemodynamics RA - 12  RV - 77/16  PA - 77/20/38  PCWP - 17  Hgb - 10.7  PA - 55.6%  Lesia CO/CI - 4.62/2.36 Right sided filling pressures are mildly elevated. Left sided filling pressures are mildly elevated. Moderately elevated pulmonary artery hypertension. Normal cardiac output level.     Echo 7/27/2021  Interpretation Summary  Global and regional left ventricular function is normal with an EF of 55-60%.  Flattened septum is consistent with right ventricular pressure and volume  overload.  Global right ventricular function is mildly to moderately reduced. The  longitudinal excursion is preserved (TAPSE 2.1 cm and S 11.0 cm/s) but the  free wall contraction is reduced. The RV FAC was 25% but the basal segment of  the RV was not clearly visualized.  No significant valvular abnormality.  Pulmonary artery systolic pressure cannot be assessed due to inadequate TR  jet.  IVC diameter >2.1 cm collapsing <50% with sniff suggests a high RA pressure  estimated at 15 mmHg or greater.  No pericardial effusion is present.  This study was compared with the study from 10/29/2018. The RV function  appears to have declined based on direct visual and quantitative  comparison.  This is primarily due to reduced RV free wall contraction.      NYHA Functional Class:  Functional class 3     1--No limitation of activity  2--Slight limitation, ordinary activities may cause symptoms  3--Marked limitation, less than ordinary activities cause symptoms  4--Severe limitation, minimal activity causes symptoms, or symptoms at rest      Phone-Visit Details    Start time: 0903  End time: 0915    Gauri Laurent PA-C  [facility]  Pager [phone]

## 2021-08-23 ENCOUNTER — VIRTUAL VISIT (OUTPATIENT)
Dept: CARDIOLOGY | Facility: CLINIC | Age: 61
End: 2021-08-23
Payer: COMMERCIAL

## 2021-08-23 DIAGNOSIS — I27.0 PRIMARY PULMONARY HYPERTENSION (H): Primary | ICD-10-CM

## 2021-08-23 DIAGNOSIS — R06.02 SOB (SHORTNESS OF BREATH): ICD-10-CM

## 2021-08-23 PROCEDURE — 99213 OFFICE O/P EST LOW 20 MIN: CPT | Performed by: PHYSICIAN ASSISTANT

## 2021-08-23 NOTE — NURSING NOTE
"Orders placed for 3 month F/U and patient marked \"ready for checkout.\" Kassandra Westfall RN on 8/23/2021 at 9:22 AM    "

## 2021-08-23 NOTE — PATIENT INSTRUCTIONS
Thank you for visiting the Pulmonary Hypertension Clinic today.      Today we discussed:   No medication changes today.  Continue to follow up with nephrology and urology as directed.       Follow up Appointment Information:    Return in 3 months to see Dr. Lindsey.     Additional Instructions:    1. Continue staying active and eat a heart healthy, low sodium diet.     2. Please keep current list of medications with you at all times.     3. Remember to weigh yourself daily after voiding and write it down on a log. If you have gained/lost 2 pounds overnight or 5 pounds in a week contact us for medication adjustments or further instructions.    4. Please call us immediately if you have syncope (fainting or passing out), chest pain, worsening edema (swelling or weight gain), or general worsening in how you are feeling.         ---------------------------------------------------------------------------------------------------------------    If you have questions or concerns please contact us at:        Obdulia Rome RN, BSN, PHN  Samantha Chandra  (Schedule,Prior Auth)  Nurse Coordinator     Clinic   Pulmonary Hypertension   Pulmonary Hypertension  [facility]             [facility]  (P)[phone]    (P) [phone]        (F) [phone]      ** Please note that you will NOT receive a reminder call regarding your scheduled testing, reminder calls are for provider appointments only.  If you are scheduled for testing within the [facility] system you may receive a call regarding pre-registration for billing purposes only.**     ------------------------------------------------------------------------------------------------------------    Interested in joining a support group?    Pulmonary Hypertension Association  [email or web address].phassociation.org/  **Look at the Events Tab** They even have Support Groups that you can call into    [facility] PH  Support Group  Second Saturday of the Month from 1-3 PM   Location: [address] [place] [address] [facility] [address]  Leader: Sandra Jacques and Cinda Rollins  Phone: [phone] or [phone]  Email: [email or web address]

## 2021-08-23 NOTE — LETTER
8/23/2021    Cristhian Busch MD  [address]  [place] [address]    RE: Jimmy Isaac       Dear Colleague,    I had the pleasure of seeing Jimmy Isaac in the [facility].      Jimmy is a 60 year old who is being evaluated via a billable telephone visit.      What phone number would you like to be contacted at? [phone]  How would you like to obtain your AVS? MyChart      Patient does not have a home bp machine and does not check his weight.    Review Of Systems  Skin: Itchy  Eyes:Ears/Nose/Throat: Sinus trouble  Respiratory: P.H. Sob after 2-3 hours of sleep. O2 4.5-5L  Cardiovascular:Edema on and off. Dizziness  Gastrointestinal: NEGATIVE  Genitourinary:CKD   Musculoskeletal: NEGATIVE  Neurologic: NEGATIVE  Psychiatric: NEGATIVE  Hematologic/Lymphatic/Immunologic: NEGATIVE  Endocrine:  NEGATIVE              CARDIOLOGY PH CLINIC PHONE VISIT    Date of virtual visit: 08/23/21      Jimmy Isaac is a 60 year old male who is being evaluated via a billable video visit.        I have reviewed and updated the patient's Past Medical History, Social History, Family History and Medication List.    MEDICATIONS:  Current Outpatient Medications   Medication Sig Dispense Refill     ambrisentan (LETAIRIS) 10 MG tablet Take 1 tablet (10 mg) by mouth daily 30 tablet 11     budesonide (PULMICORT) 0.25 MG/2ML neb solution Take 2 mLs (0.25 mg) by nebulization 2 times daily 120 mL 4     ciprofloxacin (CIPRO) 500 MG tablet Take 1 tablet (500 mg) by mouth daily 30 tablet 2     furosemide (LASIX) 80 MG tablet Take 1 tablet (80 mg) by mouth 2 times daily 180 tablet 0     PROAIR  (90 Base) MCG/ACT inhaler Inhale 2 puffs into the lungs every 6 hours as needed for shortness of breath / dyspnea 18 g 11     selexipag (UPTRAVI) 1600 MCG tablet Take 1 tablet (1,600 mcg) by mouth every 12 hours 60 tablet 11     tadalafil, PAH, (ALYQ) 20 MG TABS Take 2 tablets (40 mg) by  mouth daily 60 tablet 5     tamsulosin (FLOMAX) 0.4 MG capsule Take 1 capsule (0.4 mg) by mouth daily 30 capsule 0     UNABLE TO FIND MEDICATION NAME: Topical skin tag remover (Patient not taking: Reported on 8/17/2021)         ALLERGIES  Nkda [no known drug allergies]      Self reported vitals:  Weight: Not reported  BP Not reported      Primary PH cardiologist: Dr. Lindsey      HPI:  Mr. Jimmy Isaac is a pleasant 60 year old male with a past medical history of hypertension, CKD, and pulmonary sarcoidosis (without cardiac involvement). He is followed here for associated pulmonary hypertension and is maintained on combination therapy with Letairis, tadalafil, and selexipag. He is also oxygen dependent.     He was seen last by Dr. Lindsey via virtual visit in December of 2020. At that time, no medication changes were made, though he was referred for further evaluation of hydronephrosis. He did not see urology until June, but there was concern that he was having more obstruction from nephrolithiasis causing more more deterioration to his kidney function. He had CT imaging which showed an obstructing stone in the left ureter. On 7/9 he underwent a left nephrostomy tube placement. Urology then recommended left percutaneous nephrolithotomy, but would need further evaluation regarding anesthesia. He was to return to see Dr. Lindsey a few weeks ago with a repeat echocardiogram, but missed this appointment. I then met him shortly after  for an urgent evaluation for anesthesia clearance. Unfortunately, his breathing had worsened over the few months prior. His EKG in clinic showed marked sinus bradycardia with 1st degree AV block for which I asked him to hold his digoxin. His echo showed normal EF 55-60%, but he had a flattened septum c/w RV pressure and volume overload. His RV function was mild to moderately reduced, which had worsened from prior. I then sent him for a repeat RHC prior to consideration of  "surgery.    Jimmy was then admitted from 7/30 to 8/12 as he was found to be in complete heart block with narrow ventricular escape rhythm after undergoing his RHC. He required ICU for stabilization and ultimately a pacemaker was placed 8/2/21 due to persistent heart block. His stay was complicated by some transient delirium, and worsening renal function. An inpatient PET Scan was completed on 8/12/21 and showed no evidence of active cardiac sarcoidosis. He was diuresed to a discharge weight of 152# and RHC continued to show moderate PAH with normal cardiac output. He was treated with IV abx for UTI. Urology followed the patient and CT abdomen showed some mild improvement in his hydronephrosis. Lasix was increased from 20mg BID to 80mg BID. His Scr did rise and peak to 4.86, and nephrology was consulted for possible need for dialysis, but due to lack of uremic symptoms, this was not performed.     Today, we are doing a virtual visit to follow up after his hospital stay. He met with nephrology last week and had repeat labs showing some improvement in his renal function as well as anemia. He says that overall he is starting to feel better. After being in the hospital so long he is \"weak\" but this is improving. He says his breathing seems to be getting better and the lower extremity edema is also slowing getting less and less. Unfortunately he isn't weighing himself at home, but says he has been very careful with reducing sodium in his diet and notes this has had a positive impact for him. While he does report some occasional dizziness he says overall balance is much better. His oxygen requirements are at his baseline.      Labs were performed after his nephrology follow up last week, as below.       CURRENT PULMONARY HYPERTENSION REGIMEN:     PAH Rx: Selexipag 1600mcg BID, Letairs 10mg daily, and tadalafil 40mg daily   (previously on Tyvaso)     Diuretics: Lasix 80mg BID      Oxygen: 4-5LPM at rest, will go up to 8-10L " with exertion     Anticoagulation: none        ASSESSMENT/PLAN:      1. Pulmonary arterial hypertension.              --In the setting of pulmonary sarcoid and chronic hypoxemia. He remains on triple combination therapy with tadalafil, ambrisentan, and selexipag. As above, due to worsening HOSKINS and possible planned surgery, he underwent a repeat RHC a few weeks ago. This continued to show moderate PAH with normal cardiac output. Echo also from a few weeks ago showed mild to moderately reduced RV function. He is no longer on digoxin as below, for RV support. Clinically, it sounds as if he is doing relatively well, and is likely optimized as best we can from a cardiology standpoint in regard to anesthesia risk.    --In regard to volume status, I am unable to examine him today because this is a virtual visit. He is also not weighing at home; however, he reports improvement in his swelling and is carefully watching dietary sodium. He remains on lasix 80mg BID. SCr last week appears to be trending favorably, and he is following with nephrology. He has a follow up with urology this week as well.     2. Complete heart block.   --Was bradycardic with recent office visit and digoxin was discontinued. When he returned shortly after for his RHC, he was found to be in complete heart block as outlined above. He is now s/p pacemaker placement on 8/2/2021. He had his one week site check while in the hospital.    --He has an appt in place in November to follow up with EP with a device check.       Follow up plan: Return in 3 months to see Dr. Lindsey with labs prior.       Testing/labs:    Most recent labs:   Results for MATTY LOPEZ (MRN [id number]) as of 8/22/2021 16:53   Ref. Range 8/18/2021 14:07   Sodium Latest Ref Range: 133 - 144 mmol/L 136   Potassium Latest Ref Range: 3.4 - 5.3 mmol/L 3.7   Chloride Latest Ref Range: 94 - 109 mmol/L 96   Carbon Dioxide Latest Ref Range: 20 - 32 mmol/L 30   Urea Nitrogen Latest Ref Range:  7 - 30 mg/dL 92 (H)   Creatinine Latest Ref Range: 0.66 - 1.25 mg/dL 3.36 (H)   GFR Estimate Latest Ref Range: >60 mL/min/1.73m2 19 (L)   Calcium Latest Ref Range: 8.5 - 10.1 mg/dL 9.7   Anion Gap Latest Ref Range: 3 - 14 mmol/L 10   Magnesium Latest Ref Range: 1.6 - 2.3 mg/dL 2.2   Phosphorus Latest Ref Range: 2.5 - 4.5 mg/dL 5.5 (H)   Albumin Latest Ref Range: 3.4 - 5.0 g/dL 3.3 (L)   Protein Total Latest Ref Range: 6.8 - 8.8 g/dL 7.1   Bilirubin Total Latest Ref Range: 0.2 - 1.3 mg/dL 0.4   Alkaline Phosphatase Latest Ref Range: 40 - 150 U/L 69   ALT Latest Ref Range: 0 - 70 U/L 21   AST Latest Ref Range: 0 - 45 U/L 17   Vitamin D Deficiency screening Latest Ref Range: 20 - 75 ug/L 16 (L)   Glucose Latest Ref Range: 70 - 99 mg/dL 130 (H)   WBC Latest Ref Range: 4.0 - 11.0 10e3/uL 5.3   Hemoglobin Latest Ref Range: 13.3 - 17.7 g/dL 10.2 (L)   Hematocrit Latest Ref Range: 40.0 - 53.0 % 32.0 (L)   Platelet Count Latest Ref Range: 150 - 450 10e3/uL 166         Other recent pertinent testing:  RHC 7/30/2021  Hemodynamics RA - 12  RV - 77/16  PA - 77/20/38  PCWP - 17  Hgb - 10.7  PA - 55.6%  Lesia CO/CI - 4.62/2.36 Right sided filling pressures are mildly elevated. Left sided filling pressures are mildly elevated. Moderately elevated pulmonary artery hypertension. Normal cardiac output level.     Echo 7/27/2021  Interpretation Summary  Global and regional left ventricular function is normal with an EF of 55-60%.  Flattened septum is consistent with right ventricular pressure and volume  overload.  Global right ventricular function is mildly to moderately reduced. The  longitudinal excursion is preserved (TAPSE 2.1 cm and S 11.0 cm/s) but the  free wall contraction is reduced. The RV FAC was 25% but the basal segment of  the RV was not clearly visualized.  No significant valvular abnormality.  Pulmonary artery systolic pressure cannot be assessed due to inadequate TR  jet.  IVC diameter >2.1 cm collapsing <50% with sniff  suggests a high RA pressure  estimated at 15 mmHg or greater.  No pericardial effusion is present.  This study was compared with the study from 10/29/2018. The RV function  appears to have declined based on direct visual and quantitative comparison.  This is primarily due to reduced RV free wall contraction.      NYHA Functional Class:  Functional class 3     1--No limitation of activity  2--Slight limitation, ordinary activities may cause symptoms  3--Marked limitation, less than ordinary activities cause symptoms  4--Severe limitation, minimal activity causes symptoms, or symptoms at rest      Phone-Visit Details    Start time: 0903  End time: 0915    Gauri Laurent PA-C  [facility]  Pager [phone]        Thank you for allowing me to participate in the care of your patient.      Sincerely,     HELIO Pineda     [facility]  cc:   No referring provider defined for this encounter.

## 2021-08-24 ENCOUNTER — VIRTUAL VISIT (OUTPATIENT)
Dept: UROLOGY | Facility: CLINIC | Age: 61
End: 2021-08-24
Payer: COMMERCIAL

## 2021-08-24 ENCOUNTER — MYC MEDICAL ADVICE (OUTPATIENT)
Dept: UROLOGY | Facility: CLINIC | Age: 61
End: 2021-08-24

## 2021-08-24 VITALS — HEIGHT: 72 IN | BODY MASS INDEX: 21.67 KG/M2 | WEIGHT: 160 LBS

## 2021-08-24 DIAGNOSIS — N20.0 KIDNEY STONE: Primary | ICD-10-CM

## 2021-08-24 PROCEDURE — 99215 OFFICE O/P EST HI 40 MIN: CPT | Mod: 95 | Performed by: UROLOGY

## 2021-08-24 ASSESSMENT — PAIN SCALES - GENERAL: PAINLEVEL: NO PAIN (0)

## 2021-08-24 ASSESSMENT — MIFFLIN-ST. JEOR: SCORE: 1568.76

## 2021-08-24 NOTE — PROGRESS NOTES
Jimmy is a 60 year old who is being evaluated via a billable telephone visit.      I spoke with: Jimmy Isaac    The reason for the telephone visit was: follow-up left renal stones    Pertinent history and review of systems: 61 yo M with highly complex medical history notable for advanced pulmonary sarcoid, renal failure, stones, pulmonary hypertension with indwelling left nephrostomy tube placed 2 months ago.  Was planning on undergoing definitive stone removal via percutaneous approach last month.  However upon preoperative evaluation he was noted to be in heart block.  He was brought into the hospital for pacemaker placement which was performed successfully several weeks ago.  During hospitalization a urine culture from the left kidney was notable for Pseudomonas growth.  He was treated with antibiotics.  There are some question as to whether or not this was a true episode of pyelonephritis as he did not have any systemic symptoms of infection.  Subsequent urine cultures were no growth.    At this time he is not having any major issues with the nephrostomy tube aside from some mild discomfort.  It has been draining well and he denies any instances of flank pain or signs of infection.    A recent PET scan performed on August 12 of 2021 is reviewed and shows no evidence of active cardiac sarcoidosis.  Per cardiology notes he has a normal cardiac output with stable moderate pulmonary arterial hypertension.    His cardiology note from August 23 is reviewed and says that he is optimized as best he can be from a cardiology standpoint in regards to anesthesia risk.    Assessment: 60-year-old gentleman with bilateral large volume renal stones and obstructing greater than 2 cm left ureteral stone with indwelling nephrostomy tube.  -We had a lengthy and detailed conversation regarding treatment options.  Ultimately given the very large stone burden and the presence of an indwelling nephrostomy tube a percutaneous approach  "would be more likely to achieve stone free status and limits the need for multiple anesthetics.  We also discussed that this can be a more morbid approach and given his multiple medical comorbidities could pose substantial risk both from the procedure itself as well as the anesthetic.  We discussed alternative treatment options which pose threats of incomplete stone removal and need for multiple procedures given the large volume of stones.    We discussed the risks of bleeding, infection, incomplete stone removal, damage to adjacent organs, and need for staged procedures.      Complex Medical Decision Making: Yes.  Heart Arrhythmia  Discussed with patient the higher potential risk of perioperative and post-procedural complications as a result of comorbid illnesses     We will also plan to have his nephrostomy tube changed to prevent encrustation given his presumed hypercalciuria in the setting of sarcoidosis.  We will make every effort to bring him to the operating room prior but given logistic concerns and medical comorbidities this procedure likely will have to be performed at the [facility] where there is currently a limitation as to operating room capacity and availability.    Phone call contact time 22 minutes, > 40 minutes overall spend in chart review, discussion with cardiology team, documentation and surgical coordination     The patient has been notified of following:     \"This telephone visit will be conducted via a call between you and your physician/provider. We have found that certain health care needs can be provided without the need for a physical exam. This service lets us provide the care you need with a short phone conversation. If a prescription is necessary we can send it directly to your pharmacy. If lab work is needed we can place an order for that and you can then stop by our lab to have the test done at a later time.   If during the course of the call the physician/provider feels a " "telephone visit is not appropriate, you will not be charged for this service.\"          What phone number would you like to be contacted at? [phone]  How would you like to obtain your AVS? MyChart        "

## 2021-08-24 NOTE — NURSING NOTE
Chief Complaint   Patient presents with     Follow Up     discuss surgery       Height 1.829 m (6'), weight 72.6 kg (160 lb). Body mass index is 21.7 kg/m .    Patient Active Problem List   Diagnosis     Pulmonary hypertension (H)     Sarcoidosis     Renal insufficiency due to nephrolithiasis     Dyspnea on exertion     CKD (chronic kidney disease) stage 4, GFR 15-29 ml/min (H)     High degree atrioventricular block       Allergies   Allergen Reactions     Nkda [No Known Drug Allergies]        Current Outpatient Medications   Medication Sig Dispense Refill     ambrisentan (LETAIRIS) 10 MG tablet Take 1 tablet (10 mg) by mouth daily 30 tablet 11     budesonide (PULMICORT) 0.25 MG/2ML neb solution Take 2 mLs (0.25 mg) by nebulization 2 times daily 120 mL 4     ciprofloxacin (CIPRO) 500 MG tablet Take 1 tablet (500 mg) by mouth daily 30 tablet 2     furosemide (LASIX) 80 MG tablet Take 1 tablet (80 mg) by mouth 2 times daily 180 tablet 0     PROAIR  (90 Base) MCG/ACT inhaler Inhale 2 puffs into the lungs every 6 hours as needed for shortness of breath / dyspnea 18 g 11     selexipag (UPTRAVI) 1600 MCG tablet Take 1 tablet (1,600 mcg) by mouth every 12 hours 60 tablet 11     tadalafil, PAH, (ALYQ) 20 MG TABS Take 2 tablets (40 mg) by mouth daily 60 tablet 5     tamsulosin (FLOMAX) 0.4 MG capsule Take 1 capsule (0.4 mg) by mouth daily 30 capsule 0     UNABLE TO FIND MEDICATION NAME: Topical skin tag remover         Social History     Tobacco Use     Smoking status: Former Smoker     Packs/day: 1.00     Years: 30.00     Pack years: 30.00     Types: Cigarettes     Start date: 10/10/1975     Quit date: 3/1/2010     Years since quittin.4     Smokeless tobacco: Never Used   Substance Use Topics     Alcohol use: Not Currently     Alcohol/week: 0.0 standard drinks     Comment: stated 2 bottles of beer occ     Drug use: Not Currently     Comment: past use of marijuana       Sorin Orozco EMT  2021  11:31 AM

## 2021-08-24 NOTE — LETTER
8/24/2021       RE: Jimmy Isaac  [address] [address] [address]     Dear Colleague,    Thank you for referring your patient, Jimmy Isaac, to the [facility] [place] [facility]. Please see a copy of my visit note below.    Jimmy is a 60 year old who is being evaluated via a billable telephone visit.      I spoke with: Jimmy Isaac    The reason for the telephone visit was: follow-up left renal stones    Pertinent history and review of systems: 61 yo M with highly complex medical history notable for advanced pulmonary sarcoid, renal failure, stones, pulmonary hypertension with indwelling left nephrostomy tube placed 2 months ago.  Was planning on undergoing definitive stone removal via percutaneous approach last month.  However upon preoperative evaluation he was noted to be in heart block.  He was brought into the hospital for pacemaker placement which was performed successfully several weeks ago.  During hospitalization a urine culture from the left kidney was notable for Pseudomonas growth.  He was treated with antibiotics.  There are some question as to whether or not this was a true episode of pyelonephritis as he did not have any systemic symptoms of infection.  Subsequent urine cultures were no growth.    At this time he is not having any major issues with the nephrostomy tube aside from some mild discomfort.  It has been draining well and he denies any instances of flank pain or signs of infection.    A recent PET scan performed on August 12 of 2021 is reviewed and shows no evidence of active cardiac sarcoidosis.  Per cardiology notes he has a normal cardiac output with stable moderate pulmonary arterial hypertension.    His cardiology note from August 23 is reviewed and says that he is optimized as best he can be from a cardiology standpoint in regards to anesthesia risk.    Assessment: 60-year-old gentleman with  "bilateral large volume renal stones and obstructing greater than 2 cm left ureteral stone with indwelling nephrostomy tube.  -We had a lengthy and detailed conversation regarding treatment options.  Ultimately given the very large stone burden and the presence of an indwelling nephrostomy tube a percutaneous approach would be more likely to achieve stone free status and limits the need for multiple anesthetics.  We also discussed that this can be a more morbid approach and given his multiple medical comorbidities could pose substantial risk both from the procedure itself as well as the anesthetic.  We discussed alternative treatment options which pose threats of incomplete stone removal and need for multiple procedures given the large volume of stones.    We discussed the risks of bleeding, infection, incomplete stone removal, damage to adjacent organs, and need for staged procedures.      Complex Medical Decision Making: Yes.  Heart Arrhythmia  Discussed with patient the higher potential risk of perioperative and post-procedural complications as a result of comorbid illnesses     We will also plan to have his nephrostomy tube changed to prevent encrustation given his presumed hypercalciuria in the setting of sarcoidosis.  We will make every effort to bring him to the operating room prior but given logistic concerns and medical comorbidities this procedure likely will have to be performed at the [facility] where there is currently a limitation as to operating room capacity and availability.    Phone call contact time 22 minutes, > 40 minutes overall spend in chart review, discussion with cardiology team, documentation and surgical coordination     The patient has been notified of following:     \"This telephone visit will be conducted via a call between you and your physician/provider. We have found that certain health care needs can be provided without the need for a physical exam. This service lets us " "provide the care you need with a short phone conversation. If a prescription is necessary we can send it directly to your pharmacy. If lab work is needed we can place an order for that and you can then stop by our lab to have the test done at a later time.   If during the course of the call the physician/provider feels a telephone visit is not appropriate, you will not be charged for this service.\"    What phone number would you like to be contacted at? [phone]  How would you like to obtain your AVS? [facility]    Again, thank you for allowing me to participate in the care of your patient.      Sincerely,    Zackery Moura MD      "

## 2021-09-03 ENCOUNTER — TELEPHONE (OUTPATIENT)
Dept: CARDIOLOGY | Facility: CLINIC | Age: 61
End: 2021-09-03

## 2021-09-03 DIAGNOSIS — I27.20 PULMONARY HYPERTENSION (H): Primary | ICD-10-CM

## 2021-09-03 NOTE — TELEPHONE ENCOUNTER
----- Message from Kassandra Westfall RN sent at 8/23/2021  9:22 AM CDT -----  Regarding: FW: vv update    ----- Message -----  From: Gauri Laurent PA  Sent: 8/23/2021   9:14 AM CDT  To: Cardiology Ph Nurse-  Subject: vv update                                        Doing well post hospital stay.  I did not make any changes today.    Please plug him in for a 3 month follow up with MP next with labs.    Natalio Astorga  -----------------------  Follow up orders placed. Obdulia Rome RN on 9/3/2021 at 11:52 AM

## 2021-09-08 ENCOUNTER — MYC MEDICAL ADVICE (OUTPATIENT)
Dept: CARDIOLOGY | Facility: CLINIC | Age: 61
End: 2021-09-08

## 2021-09-08 ENCOUNTER — TELEPHONE (OUTPATIENT)
Dept: UROLOGY | Facility: CLINIC | Age: 61
End: 2021-09-08

## 2021-09-08 NOTE — TELEPHONE ENCOUNTER
Spoke to patient to update him again that nothing has changed restriction wise that has changed where we can schedule non emergent cases at [facility]. I did let patient know that I forwarded his concern on to Dr. Moura and that as soon as something changes I will call him to get scheduled. Patient is aware that we are probably not going to be able to schedule him before his insurance runs out 09/20/21.

## 2021-09-10 ENCOUNTER — PATIENT OUTREACH (OUTPATIENT)
Dept: UROLOGY | Facility: CLINIC | Age: 61
End: 2021-09-10

## 2021-09-10 ENCOUNTER — TELEPHONE (OUTPATIENT)
Dept: UROLOGY | Facility: CLINIC | Age: 61
End: 2021-09-10

## 2021-09-10 DIAGNOSIS — Z11.59 ENCOUNTER FOR SCREENING FOR OTHER VIRAL DISEASES: ICD-10-CM

## 2021-09-10 DIAGNOSIS — N20.0 KIDNEY STONE: Primary | ICD-10-CM

## 2021-09-14 ENCOUNTER — PRE VISIT (OUTPATIENT)
Dept: SURGERY | Facility: CLINIC | Age: 61
End: 2021-09-14

## 2021-09-14 ENCOUNTER — ANESTHESIA EVENT (OUTPATIENT)
Dept: SURGERY | Facility: CLINIC | Age: 61
End: 2021-09-14

## 2021-09-14 ENCOUNTER — OFFICE VISIT (OUTPATIENT)
Dept: SURGERY | Facility: CLINIC | Age: 61
End: 2021-09-14
Payer: COMMERCIAL

## 2021-09-14 ENCOUNTER — LAB (OUTPATIENT)
Dept: LAB | Facility: CLINIC | Age: 61
End: 2021-09-14
Attending: UROLOGY
Payer: COMMERCIAL

## 2021-09-14 VITALS
DIASTOLIC BLOOD PRESSURE: 76 MMHG | SYSTOLIC BLOOD PRESSURE: 125 MMHG | HEIGHT: 72 IN | RESPIRATION RATE: 20 BRPM | HEART RATE: 89 BPM | WEIGHT: 155.5 LBS | TEMPERATURE: 97.7 F | OXYGEN SATURATION: 97 % | BODY MASS INDEX: 21.06 KG/M2

## 2021-09-14 DIAGNOSIS — Z01.818 PREOP EXAMINATION: Primary | ICD-10-CM

## 2021-09-14 DIAGNOSIS — Z01.818 PREOP EXAMINATION: ICD-10-CM

## 2021-09-14 DIAGNOSIS — N20.0 KIDNEY STONE: ICD-10-CM

## 2021-09-14 DIAGNOSIS — N10 ACUTE PYELONEPHRITIS: ICD-10-CM

## 2021-09-14 LAB
ALBUMIN UR-MCNC: 30 MG/DL
ANION GAP SERPL CALCULATED.3IONS-SCNC: 9 MMOL/L (ref 3–14)
APPEARANCE UR: ABNORMAL
BILIRUB UR QL STRIP: NEGATIVE
BUN SERPL-MCNC: 65 MG/DL (ref 7–30)
CALCIUM SERPL-MCNC: 9.6 MG/DL (ref 8.5–10.1)
CHLORIDE BLD-SCNC: 97 MMOL/L (ref 94–109)
CO2 SERPL-SCNC: 31 MMOL/L (ref 20–32)
COLOR UR AUTO: YELLOW
CREAT SERPL-MCNC: 3.18 MG/DL (ref 0.66–1.25)
ERYTHROCYTE [DISTWIDTH] IN BLOOD BY AUTOMATED COUNT: 12.8 % (ref 10–15)
GFR SERPL CREATININE-BSD FRML MDRD: 20 ML/MIN/1.73M2
GLUCOSE BLD-MCNC: 98 MG/DL (ref 70–99)
GLUCOSE UR STRIP-MCNC: NEGATIVE MG/DL
HCT VFR BLD AUTO: 32.5 % (ref 40–53)
HGB BLD-MCNC: 10.1 G/DL (ref 13.3–17.7)
HGB UR QL STRIP: ABNORMAL
HYALINE CASTS: 1 /LPF
KETONES UR STRIP-MCNC: NEGATIVE MG/DL
LEUKOCYTE ESTERASE UR QL STRIP: ABNORMAL
MCH RBC QN AUTO: 27.7 PG (ref 26.5–33)
MCHC RBC AUTO-ENTMCNC: 31.1 G/DL (ref 31.5–36.5)
MCV RBC AUTO: 89 FL (ref 78–100)
NITRATE UR QL: NEGATIVE
PH UR STRIP: 6 [PH] (ref 5–7)
PLATELET # BLD AUTO: 154 10E3/UL (ref 150–450)
POTASSIUM BLD-SCNC: 3.4 MMOL/L (ref 3.4–5.3)
RBC # BLD AUTO: 3.64 10E6/UL (ref 4.4–5.9)
RBC URINE: 4 /HPF
SODIUM SERPL-SCNC: 137 MMOL/L (ref 133–144)
SP GR UR STRIP: 1.01 (ref 1–1.03)
UROBILINOGEN UR STRIP-MCNC: NORMAL MG/DL
WBC # BLD AUTO: 6.3 10E3/UL (ref 4–11)
WBC URINE: 51 /HPF

## 2021-09-14 PROCEDURE — 81001 URINALYSIS AUTO W/SCOPE: CPT | Performed by: PATHOLOGY

## 2021-09-14 PROCEDURE — 85027 COMPLETE CBC AUTOMATED: CPT | Performed by: PATHOLOGY

## 2021-09-14 PROCEDURE — 99204 OFFICE O/P NEW MOD 45 MIN: CPT | Performed by: PHYSICIAN ASSISTANT

## 2021-09-14 PROCEDURE — 36415 COLL VENOUS BLD VENIPUNCTURE: CPT | Performed by: PATHOLOGY

## 2021-09-14 PROCEDURE — 80048 BASIC METABOLIC PNL TOTAL CA: CPT | Performed by: PATHOLOGY

## 2021-09-14 PROCEDURE — 87086 URINE CULTURE/COLONY COUNT: CPT | Mod: 90 | Performed by: PATHOLOGY

## 2021-09-14 RX ORDER — CIPROFLOXACIN 500 MG/1
500 TABLET, FILM COATED ORAL DAILY
Qty: 30 TABLET | Refills: 2 | Status: ON HOLD | OUTPATIENT
Start: 2021-09-14 | End: 2021-09-23

## 2021-09-14 ASSESSMENT — MIFFLIN-ST. JEOR: SCORE: 1548.34

## 2021-09-14 ASSESSMENT — PAIN SCALES - GENERAL: PAINLEVEL: NO PAIN (0)

## 2021-09-14 ASSESSMENT — LIFESTYLE VARIABLES: TOBACCO_USE: 1

## 2021-09-14 NOTE — PATIENT INSTRUCTIONS
Preparing for Your Surgery      Name:  Jimmy Isaac   MRN:  [id number]   :  1960   Today's Date:  2021       Arriving for surgery:  Surgery date:  2021  Arrival time:  11:45 am    Restrictions due to COVID 19:       One visitor is allowed in the Pre Op area. When you go into surgery, one visitor is allowed to wait in the Surgery Waiting Room       (provided there is enough space to social distance).   After surgery- Two visitors are allowed at a time if you have a private room and one visitor is allowed for those in a semi-private room.   Every 4 days the visitor(s) can rotate. During the 4 day period, the visitor(s) must be consistent. No visitors under the age of 18 years old.   Visiting Hours: 8 am - 8:30 pm   No ill visitors.   All visitors must wear face mask.    [employer] parking is available for anyone with mobility limitations or disabilities.  ([place]  24 hours/ 7 days a week; [facility]  7 am- 3:30 pm, Mon- Fri)    Please come to:     [facility] [address]  [address] [place] [address]       -    Please proceed to [address] on the 3rd floor. [phone]?     - ?If you are in need of directions, wheelchair or escort please stop at the Information Desk in the lobby.      What can I eat or drink?  -  You may eat and drink normally for up to 8 hours before your surgery. (Until 5 am)  -  You may have clear liquids until 2 hours before surgery. (Until 11:45 am arrival time)    Examples of clear liquids:  Water  Clear broth  Juices (apple, white grape, white cranberry  and cider) without pulp  Noncarbonated, powder based beverages  (lemonade and Nicanor-Aid)  Sodas (Sprite, 7-Up, ginger ale and seltzer)  Coffee or tea (without milk or cream)  Gatorade    -  No Alcohol for at least 24 hours before surgery     Which medicines can I take?    Hold Aspirin for 7 days before surgery.   Hold Multivitamins for 7 days before  surgery.  Hold Supplements for 7 days before surgery.  Hold Ibuprofen (Advil, Motrin) for 1 day before surgery--unless otherwise directed by surgeon.  Hold Naproxen (Aleve) for 4 days before surgery.      -  PLEASE TAKE these medications the day of surgery:  (Ambrisentan) Letairis  Inhaler as usual and bring to hospital  Selexipag (uptravi)   Tadalafil   Furosemide       How do I prepare myself?  - Please take 2 showers before surgery using Scrubcare or Hibiclens soap.    Use this soap only from the neck to your toes.     Leave the soap on your skin for one minute--then rinse thoroughly.      You may use your own shampoo and conditioner; no other hair products.   - Please remove all jewelry and body piercings.  - No lotions, deodorants or fragrance.  - No makeup or fingernail polish.   - Bring your ID and insurance card.    -If you have a Deep Brain Stimulator, Spinal Cord Stimulator or any neuro stimulator device---you must bring the remote control to the hospital     - All patients are required to have a Covid-19 test within 4 days of surgery/procedure.      -Patients will be contacted by the [facility] scheduling team within 1 week of surgery to make an appointment.      - Patients may call the Scheduling team at [phone] if they have not been scheduled within 4 days of  surgery.      ALL PATIENTS GOING HOME THE SAME DAY OF SURGERY ARE REQUIRED TO HAVE A RESPONSIBLE ADULT TO DRIVE AND BE IN ATTENDANCE WITH THEM FOR 24 HOURS FOLLOWING SURGERY.      Questions or Concerns:    - For any questions regarding the day of surgery or your hospital stay, please contact the Pre Admission Nursing Office at [phone].       - If you have health changes between today and your surgery please call your surgeon.       For questions after surgery please call your surgeons office.

## 2021-09-14 NOTE — ANESTHESIA PREPROCEDURE EVALUATION
Anesthesia Pre-Procedure Evaluation    Patient: Jimmy Isaac   MRN: [id number] : 1960        Preoperative Diagnosis: * No surgery found *   Procedure :      Past Medical History:   Diagnosis Date     Chronic sinusitis      CKD (chronic kidney disease)      Heart disease      Hypertension      Keratosis     frontal scalp, treated with liquid nitrogen at [facility]     Malignant neoplasm (H)      Pneumonia a few times     Pulmonary sarcoidosis (H)       Past Surgical History:   Procedure Laterality Date     COLONOSCOPY       CV RIGHT HEART CATH MEASUREMENTS RECORDED N/A 2019    Procedure: CV RIGHT HEART CATH;  Surgeon: Kale Delgado MD;  Location:  HEART CARDIAC CATH LAB     CV RIGHT HEART CATH MEASUREMENTS RECORDED N/A 2019    Procedure: CV RIGHT HEART CATH;  Surgeon: Ion Lemon MD;  Location:  HEART CARDIAC CATH LAB     CV RIGHT HEART CATH MEASUREMENTS RECORDED N/A 2021    Procedure: CV RIGHT HEART CATH;  Surgeon: Jhony Barone MD;  Location:  HEART CARDIAC CATH LAB     ENT SURGERY      wisdom teeth extraction     EP PACEMAKER Left 2021    Procedure: EP Pacemaker;  Surgeon: Dora Fontanez MD;  Location:  HEART CARDIAC CATH LAB     IR NEPHROSTOMY TUBE PLACEMENT LEFT  2021     TONSILLECTOMY  1964      Allergies   Allergen Reactions     Nkda [No Known Drug Allergies]       Social History     Tobacco Use     Smoking status: Former Smoker     Packs/day: 1.00     Years: 30.00     Pack years: 30.00     Types: Cigarettes     Start date: 10/10/1975     Quit date: 3/1/2010     Years since quittin.5     Smokeless tobacco: Never Used   Substance Use Topics     Alcohol use: Not Currently     Alcohol/week: 0.0 standard drinks     Comment: stated 2 bottles of beer occ      Wt Readings from Last 1 Encounters:   21 70.5 kg (155 lb 8 oz)        Anesthesia Evaluation   Pt has had prior anesthetic.     History of anesthetic complications   per patient  he became hypotensive after receiving medication in IV prior to colonoscopy in 2012.    ROS/MED HX  ENT/Pulmonary: Comment: Pulmonary sarcoid  O2 dependent    (+) BILLY risk factors, hypertension, tobacco use, Past use,  (-) sleep apnea and recent URI   Neurologic:       Cardiovascular:     (+) hypertension-----HOSKINS. pacemaker, Reason placed: complete heart block. type: medtronic dual lead, pulmonary hypertension, Previous cardiac testing   Echo: Date: 7/27/21 Results:  Interpretation Summary  Global and regional left ventricular function is normal with an EF of 55-60%.  Flattened septum is consistent with right ventricular pressure and volume  overload.  Global right ventricular function is mildly to moderately reduced. The  longitudinal excursion is preserved (TAPSE 2.1 cm and S 11.0 cm/s) but the  free wall contraction is reduced. The RV FAC was 25% but the basal segment of  the RV was not clearly visualized.  No significant valvular abnormality.  Pulmonary artery systolic pressure cannot be assessed due to inadequate TR  jet.  IVC diameter >2.1 cm collapsing <50% with sniff suggests a high RA pressure  estimated at 15 mmHg or greater.  No pericardial effusion is present.  This study was compared with the study from 10/29/2018. The RV function  appears to have declined based on direct visual and quantitative comparison.  This is primarily due to reduced RV free wall contraction.  Stress Test: Date: Results:    ECG Reviewed: Date: Results:    Cath: Date: 7/30/21 Results:    Moderately elevated pulmonary artery hypertension.    Right sided filling pressures are mildly elevated.    Left sided filling pressures are mildly elevated.    Normal cardiac output level.    Complete Heart Block    METS/Exercise Tolerance:     Hematologic:  - neg hematologic  ROS  (-) history of blood clots and history of blood transfusion   Musculoskeletal:  - neg musculoskeletal ROS     GI/Hepatic:  - neg GI/hepatic ROS  (-) liver disease    Renal/Genitourinary:     (+) renal disease, type: CRI and ARF, Pt does not require dialysis, Nephrolithiasis ,     Endo:  - neg endo ROS     Psychiatric/Substance Use:  - neg psychiatric ROS     Infectious Disease:  - neg infectious disease ROS  (-) Recent Fever   Malignancy:  - neg malignancy ROS     Other:  - neg other ROS          Physical Exam    Airway        Mallampati: II   TM distance: > 3 FB   Neck ROM: full   Mouth opening: > 3 cm    Respiratory Devices and Support         Dental  no notable dental history         Cardiovascular          Rhythm and rate: regular and normal     Pulmonary           breath sounds clear to auscultation           OUTSIDE LABS:  CBC:   Lab Results   Component Value Date    WBC 5.3 08/18/2021    WBC 4.9 08/12/2021    HGB 10.2 (L) 08/18/2021    HGB 9.1 (L) 08/12/2021    HCT 32.0 (L) 08/18/2021    HCT 28.8 (L) 08/12/2021     08/18/2021     08/12/2021     BMP:   Lab Results   Component Value Date     08/18/2021     08/12/2021    POTASSIUM 3.7 08/18/2021    POTASSIUM 4.2 08/12/2021    CHLORIDE 96 08/18/2021    CHLORIDE 102 08/12/2021    CO2 30 08/18/2021    CO2 22 08/12/2021    BUN 92 (H) 08/18/2021     (H) 08/12/2021    CR 3.36 (H) 08/18/2021    CR 4.12 (H) 08/12/2021     (H) 08/18/2021    GLC 70 08/12/2021     COAGS:   Lab Results   Component Value Date    INR 1.18 (H) 03/09/2012     POC: No results found for: BGM, HCG, HCGS  HEPATIC:   Lab Results   Component Value Date    ALBUMIN 3.3 (L) 08/18/2021    PROTTOTAL 7.1 08/18/2021    ALT 21 08/18/2021    AST 17 08/18/2021    ALKPHOS 69 08/18/2021    BILITOTAL 0.4 08/18/2021     OTHER:   Lab Results   Component Value Date    PH 7.32 (L) 08/04/2021    A1C 5.8 (H) 10/29/2018    TERI 9.7 08/18/2021    PHOS 5.5 (H) 08/18/2021    MAG 2.2 08/18/2021    TSH 2.40 07/30/2021    CRP 9.2 (H) 07/01/2021    SED 8 09/22/2017             PAC Discussion and Assessment    ASA Classification: 3  Case is suitable  "for: Edgar  Anesthetic techniques and relevant risks discussed: GA  Invasive monitoring and risk discussed: No    Possibility and Risk of blood transfusion discussed: No            PAC Resident/NP Anesthesia Assessment: Jimmy Isaac is a 60 year old male scheduled for NEPHROLITHOTOMY, PERCUTANEOUS, USING HOLMIUM LASER on 9/20/21 by Dr. Moura in treatment of kidney stone.  PAC referral for risk assessment and optimization for anesthesia:    Pre-operative considerations:  1.  Cardiac:  Functional status- METS <4.  Intermediate risk surgery with 6.6% (RCRI #) risk of major adverse cardiac event.   --denies chest pain, chest pressure.  +HOSKINS  --h/o pHTN due to pulmonary sarcoidosis followed by Dr. Lindsey with recent RHC showing RVSP of 77mmHg.  Pt will continue his Letairis, Uptravi and tadalafil as directed. He is on O2 by NC routinely at 4-5L with increase to 8-10 if he more active.  He endorses HOSKINS.  --recent dual lead Medtronic PM placement due to complete heart block.  PAC RN to alert device nurse. Recent interrogation 8/9/21.  Has EP f/u 11/2021.  --Pt taking Lasix 80mg bid but ran out and is taking leftover 20mg tablets (two tablets bid). Euvolemic on exam today.  Follows low sodium diet.  I did message cardiology XOCHILT Gauri Anastasiia to facilitate Lasix refill for patient.   --echo 7/27/21 EF of 55-60%, flattened septum is consistent with right ventricular pressure and volume overload. Global right ventricular function is mildly to moderately reduced.  --RHC 7/30/21 \"Moderately elevated pulmonary artery hypertension\", RVSP of 77mm Hg    2.  Pulm:  Airway feasible.  BILLY risk: Intermediate, 3/8 risk factors.  --non smoker  --h/o pulmonary sarcoid without cardiac involvement per recent PET cardiac viability study. pHTN as above.  Has not seen pulmonary since 1/2019 but is scheduled for f/u 10/2021.  He continues pulmicort nebs and proair inhaler.    --supplemental O2 as above in #1    3.  GI:  Risk of PONV score " =  1.  If > 2, anti-emetic intervention recommended.    4.  Renal/:  --h/o CKD IV.  Recent creatinine 3.36  --bilateral renal stones, s/p Left nephrostomy tube 7/9/21.  Left hydronephrosis. Now with procedure above  --Left tube is draining. Pt denies problems with tube.   --pt told he was to be on Cipro throughout summer operative period but he ran out.  I talked to Kate Lovell, Urology RN who had Dr. Moura [place] [facility] [place].    VTE risk: 1.8%    Patient is optimized and is acceptable candidate for the proposed procedure.  No further diagnostic evaluation is needed.     Patient discussed with Dr. Jeffries.    **For further details of assessment, testing, and physical exam please see H and P completed on same date.          Lesa Espinoza PA-C, [facility]    Reviewed and Signed by PAC Mid-Level Provider/Resident  Mid-Level Provider/Resident: Lesa Espinoza  Date: 9/14/21                                 Lesa Espinoza PA-C

## 2021-09-15 LAB — BACTERIA UR CULT: NO GROWTH

## 2021-09-15 NOTE — H&P
Pre-Operative H & P     CC:  Preoperative exam to assess for increased cardiopulmonary risk while undergoing surgery and anesthesia.    Date of Encounter: 9/14/2021  Primary Care Physician:  Cristhian Busch  Associated Diagnosis: kidney stone    HPI  Jimmy Isaac is a 60 year old male who presents for pre-operative H & P in preparation for percutaneous nephrolithotomy, holmium laser with Dr. Moura on 9/20/21 at [facility].     Mr. Isaac is a 60-year-old male with past medical history significant for hypertension, pacemaker in place, pulmonary hypertension due to pulmonary sarcoidosis, supplemental oxygen use, chronic renal insufficiency, kidney stones, left nephrostomy tube in place.  Patient had left nephrostomy tube placed in July of this year.  He was scheduled to undergo the above procedure however needed a right heart cath to evaluate his pulmonary hypertension.  He was found at the time of the right heart cath to be in complete heart block so he underwent Medtronic dual-lead pacemaker implantation on 8/2/2021.  He denies any nausea, vomiting, fevers, or problems with his left nephrostomy tube.    History is obtained from the patient and the medical record.     Past Medical History  Past Medical History:   Diagnosis Date     Chronic sinusitis      CKD (chronic kidney disease)      Heart disease      Hypertension      Keratosis     frontal scalp, treated with liquid nitrogen at [facility]     Malignant neoplasm (H)      Pneumonia a few times     Pulmonary sarcoidosis (H)        Past Surgical History  Past Surgical History:   Procedure Laterality Date     COLONOSCOPY       CV RIGHT HEART CATH MEASUREMENTS RECORDED N/A 6/19/2019    Procedure: CV RIGHT HEART CATH;  Surgeon: Kale Delgado MD;  Location:  HEART CARDIAC CATH LAB     CV RIGHT HEART CATH MEASUREMENTS RECORDED N/A 12/11/2019    Procedure: CV RIGHT HEART CATH;  Surgeon: Ion  MD Ion;  Location:  HEART CARDIAC CATH LAB     CV RIGHT HEART CATH MEASUREMENTS RECORDED N/A 7/30/2021    Procedure: CV RIGHT HEART CATH;  Surgeon: Jhony Barone MD;  Location:  HEART CARDIAC CATH LAB     ENT SURGERY      wisdom teeth extraction     EP PACEMAKER Left 8/2/2021    Procedure: EP Pacemaker;  Surgeon: Dora Fontanez MD;  Location:  HEART CARDIAC CATH LAB     IR NEPHROSTOMY TUBE PLACEMENT LEFT  7/9/2021     TONSILLECTOMY  1964       Hx of Blood transfusions/reactions: denies    Hx of abnormal bleeding or anti-platelet use: denies    Menstrual history: No LMP for male patient.:     Steroid use in the last year: denies    Personal or FH with difficulty with Anesthesia:  Reports he became hypotensive after induction for colonoscopy 2012.  I don't have these records.    Prior to Admission Medications  Current Outpatient Medications   Medication Sig Dispense Refill     ambrisentan (LETAIRIS) 10 MG tablet Take 10 mg by mouth every morning  30 tablet 11     budesonide (PULMICORT) 0.25 MG/2ML neb solution Take 2 mLs (0.25 mg) by nebulization 2 times daily 120 mL 4     furosemide (LASIX) 80 MG tablet Take 1 tablet (80 mg) by mouth 2 times daily 180 tablet 0     PROAIR  (90 Base) MCG/ACT inhaler Inhale 2 puffs into the lungs every 6 hours as needed for shortness of breath / dyspnea 18 g 11     selexipag (UPTRAVI) 1600 MCG tablet Take 1 tablet (1,600 mcg) by mouth every 12 hours 60 tablet 11     tadalafil, PAH, (ALYQ) 20 MG TABS Take 2 tablets (40 mg) by mouth daily (Patient taking differently: Take 40 mg by mouth every morning ) 60 tablet 5     ciprofloxacin (CIPRO) 500 MG tablet Take 1 tablet (500 mg) by mouth daily for 6 days 30 tablet 2     tamsulosin (FLOMAX) 0.4 MG capsule Take 1 capsule (0.4 mg) by mouth daily 30 capsule 0     UNABLE TO FIND MEDICATION NAME: Topical skin tag remover         Allergies  Allergies   Allergen Reactions     Nkda [No Known Drug Allergies]        Social  History  Social History     Socioeconomic History     Marital status: Single     Spouse name: Not on file     Number of children: Not on file     Years of education: Not on file     Highest education level: Not on file   Occupational History     Not on file   Tobacco Use     Smoking status: Former Smoker     Packs/day: 1.00     Years: 30.00     Pack years: 30.00     Types: Cigarettes     Start date: 10/10/1975     Quit date: 3/1/2010     Years since quittin.5     Smokeless tobacco: Never Used   Substance and Sexual Activity     Alcohol use: Not Currently     Alcohol/week: 0.0 standard drinks     Comment: stated 2 bottles of beer occ     Drug use: Not Currently     Comment: past use of marijuana     Sexual activity: Not Currently     Partners: Female     Birth control/protection: Abstinence, Female Surgical   Other Topics Concern     Parent/sibling w/ CABG, MI or angioplasty before 65F 55M? Not Asked      Service Not Asked     Blood Transfusions Not Asked     Caffeine Concern Yes     Occupational Exposure Not Asked     Hobby Hazards Not Asked     Sleep Concern Not Asked     Stress Concern Not Asked     Weight Concern Not Asked     Special Diet Not Asked     Back Care Not Asked     Exercise Yes     Bike Helmet Not Asked     Seat Belt Not Asked     Self-Exams Not Asked   Social History Narrative     Not on file     Social Determinants of Health     Financial Resource Strain:      Difficulty of Paying Living Expenses:    Food Insecurity:      Worried About Running Out of Food in the Last Year:      Ran Out of Food in the Last Year:    Transportation Needs:      Lack of Transportation (Medical):      Lack of Transportation (Non-Medical):    Physical Activity:      Days of Exercise per Week:      Minutes of Exercise per Session:    Stress:      Feeling of Stress :    Social Connections:      Frequency of Communication with Friends and Family:      Frequency of Social Gatherings with Friends and Family:       Attends Christian Services:      Active Member of Clubs or Organizations:      Attends Club or Organization Meetings:      Marital Status:    Intimate Partner Violence:      Fear of Current or Ex-Partner:      Emotionally Abused:      Physically Abused:      Sexually Abused:        Family History  Family History   Problem Relation Age of Onset     Coronary Artery Disease Father      Heart Disease Father         heart attack age 35-40     Hypertension Mother      Hyperlipidemia Mother      Cerebrovascular Disease Mother      Dementia Mother      Lupus Sister      Glaucoma No family hx of      Macular Degeneration No family hx of        Anesthesia Evaluation   Pt has had prior anesthetic.     History of anesthetic complications   per patient he became hypotensive after receiving medication in IV prior to colonoscopy in 2012.    ROS/MED HX  ENT/Pulmonary: Comment: Pulmonary sarcoid  O2 dependent    (+) BILLY risk factrs, hypertension, tobacco use, Past use,  (-) sleep apnea and recent URI   Neurologic:       Cardiovascular:     (+) hypertension-----HOSKINS. pacemaker, Reason placed: complete heart block. type: medtronic dual lead, pulmonary hypertension, Previous cardiac testing   Echo: Date: 7/27/21 Results:  Interpretation Summary  Global and regional left ventricular function is normal with an EF of 55-60%.  Flattened septum is consistent with right ventricular pressure and volume  overload.  Global right ventricular function is mildly to moderately reduced. The  longitudinal excursion is preserved (TAPSE 2.1 cm and S 11.0 cm/s) but the  free wall contraction is reduced. The RV FAC was 25% but the basal segment of  the RV was not clearly visualized.  No significant valvular abnormality.  Pulmonary artery systolic pressure cannot be assessed due to inadequate TR  jet.  IVC diameter >2.1 cm collapsing <50% with sniff suggests a high RA pressure  estimated at 15 mmHg or greater.  No pericardial effusion is present.  This  "study was compared with the study from 10/29/2018. The RV function  appears to have declined based on direct visual and quantitative comparison.  This is primarily due to reduced RV free wall contraction.  Stress Test: Date: Results:    ECG Reviewed: Date: Results:    Cath: Date: 7/30/21 Results:    Moderately elevated pulmonary artery hypertension.    Right sided filling pressures are mildly elevated.    Left sided filling pressures are mildly elevated.    Normal cardiac output level.    Complete Heart Block    METS/Exercise Tolerance:     Hematologic:  - neg hematologic  ROS  (-) history of blood clots and history of blood transfusion   Musculoskeletal:  - neg musculoskeletal ROS     GI/Hepatic:  - neg GI/hepatic ROS  (-) liver disease   Renal/Genitourinary:     (+) renal disease, type: CRI and ARF, Pt does not require dialysis, Nephrolithiasis ,     Endo:  - neg endo ROS     Psychiatric/Substance Use:  - neg psychiatric ROS     Infectious Disease:  - neg infectious disease ROS  (-) Recent Fever   Malignancy:  - neg malignancy ROS     Other:  - neg other ROS          The complete review of systems is negative other than noted in the HPI or here.   Temp: 97.7  F (36.5  C) Temp src: Oral BP: 125/76 Pulse: 89   Resp: 20 SpO2: 97 % (on O2)         155 lbs 8 oz  6' 0\"[pt reported[   Body mass index is 21.09 kg/m .       Physical Exam  Constitutional: Awake, alert, cooperative, no apparent distress, and appears stated age.  Eyes: Pupils equal, round and reactive to light, extra ocular muscles intact, sclera clear, conjunctiva normal.  HENT: Normocephalic, oral pharynx with moist mucus membranes, good dentition. No goiter appreciated.  Wearing nasal cannula  Respiratory: Clear to auscultation bilaterally, no crackles or wheezing.  Cardiovascular: Regular rate and rhythm, normal S1 and S2, and no murmur noted.  Carotids +2, no bruits. No edema. Palpable pulses to radial  DP and PT arteries.   GI: Normal bowel sounds, Left " nephrostomy tube in place.  Site clean/dry/intact.  Mild erythema, no edema or drainage.   Lymph/Hematologic: No cervical lymphadenopathy and no supraclavicular lymphadenopathy.  Genitourinary:  deferred  Skin: Warm and dry.  No rashes at anticipated surgical site.   Musculoskeletal: Full ROM of neck. There is no redness, warmth, or swelling of the joints. Gross motor strength is normal.    Neurologic: Awake, alert, oriented to name, place and time. Cranial nerves II-XII are grossly intact.   Neuropsychiatric: Calm, cooperative. Normal affect.     PRIOR LABS/DIAGNOSTIC STUDIES:  All labs and imaging personally reviewed    Component      Latest Ref Rng & Units 9/14/2021   WBC      4.0 - 11.0 10e3/uL 6.3   RBC Count      4.40 - 5.90 10e6/uL 3.64 (L)   Hemoglobin      13.3 - 17.7 g/dL 10.1 (L)   Hematocrit      40.0 - 53.0 % 32.5 (L)   MCV      78 - 100 fL 89   MCH      26.5 - 33.0 pg 27.7   MCHC      31.5 - 36.5 g/dL 31.1 (L)   RDW      10.0 - 15.0 % 12.8   Platelet Count      150 - 450 10e3/uL 154     Component      Latest Ref Rng & Units 9/14/2021   Sodium      133 - 144 mmol/L 137   Potassium      3.4 - 5.3 mmol/L 3.4   Chloride      94 - 109 mmol/L 97   Carbon Dioxide      20 - 32 mmol/L 31   Anion Gap      3 - 14 mmol/L 9   Urea Nitrogen      7 - 30 mg/dL 65 (H)   Creatinine      0.66 - 1.25 mg/dL 3.18 (H)   Calcium      8.5 - 10.1 mg/dL 9.6   Glucose      70 - 99 mg/dL 98   GFR Estimate      >60 mL/min/1.73m2 20 (L)       Exam: US RENAL COMPLETE, 8/1/2021   IMPRESSION:   1.  Moderate left hydronephrosis, similar to CT 6/23/2021. Left  percutaneous nephrostomy tube is not visualized.  2.  Bilateral nephrolithiasis, right greater than left.    Cardiac echo: 7/27/21  Interpretation Summary  Global and regional left ventricular function is normal with an EF of 55-60%.  Flattened septum is consistent with right ventricular pressure and volume  overload.  Global right ventricular function is mildly to moderately  reduced. The  longitudinal excursion is preserved (TAPSE 2.1 cm and S 11.0 cm/s) but the  free wall contraction is reduced. The RV FAC was 25% but the basal segment of  the RV was not clearly visualized.  No significant valvular abnormality.  Pulmonary artery systolic pressure cannot be assessed due to inadequate TR  jet.  IVC diameter >2.1 cm collapsing <50% with sniff suggests a high RA pressure  estimated at 15 mmHg or greater.  No pericardial effusion is present.  This study was compared with the study from 10/29/2018. The RV function  appears to have declined based on direct visual and quantitative comparison.  This is primarily due to reduced RV free wall contraction.      Right Heart Cath: 7/30/21    Moderately elevated pulmonary artery hypertension.    Right sided filling pressures are mildly elevated.    Left sided filling pressures are mildly elevated.    Normal cardiac output level.    Complete Heart Block      Outside records reviewed from: care everywhere      ASSESSMENT and PLAN  Jimmy Isaac is a 60 year old male scheduled for NEPHROLITHOTOMY, PERCUTANEOUS, USING HOLMIUM LASER on 9/20/21 by Dr. Moura in treatment of kidney stone.  PAC referral for risk assessment and optimization for anesthesia:    Pre-operative considerations:  1.  Cardiac:  Functional status- METS <4.  Intermediate risk surgery with 6.6% (RCRI #) risk of major adverse cardiac event.   --denies chest pain, chest pressure.  +HOSKINS  --h/o pHTN due to pulmonary sarcoidosis followed by Dr. Lindsey with recent RHC showing RVSP of 77mmHg.  Pt will continue his Letairis, Uptravi and tadalafil as directed. He is on O2 by NC routinely at 4-5L with increase to 8-10 if he more active.  He endorses HOSKINS.  --recent dual lead Medtronic PM placement due to complete heart block.  PAC RN to alert device nurse. Recent interrogation 8/9/21.  Has EP f/u 11/2021.  --Pt taking Lasix 80mg bid but ran out and is taking leftover 20mg tablets (two tablets  "bid). Euvolemic on exam today.  Follows low sodium diet.  I did message cardiology XOCHILT Gauri Laurent to facilitate Lasix refill for patient.   --echo 7/27/21 EF of 55-60%, flattened septum is consistent with right ventricular pressure and volume overload. Global right ventricular function is mildly to moderately reduced.  --RHC 7/30/21 \"Moderately elevated pulmonary artery hypertension\", RVSP of 77mm Hg    2.  Pulm:  Airway feasible.  BILLY risk: Intermediate, 3/8 risk factors.  --non smoker  --h/o pulmonary sarcoid without cardiac involvement per recent PET cardiac viability study. pHTN as above.  Has not seen pulmonary since 1/2019 but is scheduled for f/u 10/2021.  He continues pulmicort nebs and proair inhaler.    --supplemental O2 as above in #1    3.  GI:  Risk of PONV score =  1.  If > 2, anti-emetic intervention recommended.    4.  Renal/:  --h/o CKD IV.  Recent creatinine 3.36  --bilateral renal stones, s/p Left nephrostomy tube 7/9/21.  Left hydronephrosis. Now with procedure above  --Left tube is draining. Pt denies problems with tube.   --pt told he was to be on Cipro throughout summer operative period but he ran out.  I talked to Kate Lovell, Urology RN who had Dr. Moura send Rx to [facility] pharmacy today.    VTE risk: 1.8%      Patient discussed with Dr. Jeffries.    Lesa Espinoza PA-C  Preoperative Assessment Center  [facility]  Phone: [phone]  Fax: [phone]  "

## 2021-09-15 NOTE — H&P (VIEW-ONLY)
Pre-Operative H & P     CC:  Preoperative exam to assess for increased cardiopulmonary risk while undergoing surgery and anesthesia.    Date of Encounter: 9/14/2021  Primary Care Physician:  Cristhian Busch  Associated Diagnosis: kidney stone    HPI  Jimmy Isaac is a 60 year old male who presents for pre-operative H & P in preparation for percutaneous nephrolithotomy, holmium laser with Dr. Moura on 9/20/21 at [facility].     Mr. Isaac is a 60-year-old male with past medical history significant for hypertension, pacemaker in place, pulmonary hypertension due to pulmonary sarcoidosis, supplemental oxygen use, chronic renal insufficiency, kidney stones, left nephrostomy tube in place.  Patient had left nephrostomy tube placed in July of this year.  He was scheduled to undergo the above procedure however needed a right heart cath to evaluate his pulmonary hypertension.  He was found at the time of the right heart cath to be in complete heart block so he underwent Medtronic dual-lead pacemaker implantation on 8/2/2021.  He denies any nausea, vomiting, fevers, or problems with his left nephrostomy tube.    History is obtained from the patient and the medical record.     Past Medical History  Past Medical History:   Diagnosis Date     Chronic sinusitis      CKD (chronic kidney disease)      Heart disease      Hypertension      Keratosis     frontal scalp, treated with liquid nitrogen at [facility]     Malignant neoplasm (H)      Pneumonia a few times     Pulmonary sarcoidosis (H)        Past Surgical History  Past Surgical History:   Procedure Laterality Date     COLONOSCOPY       CV RIGHT HEART CATH MEASUREMENTS RECORDED N/A 6/19/2019    Procedure: CV RIGHT HEART CATH;  Surgeon: Kale Delgado MD;  Location:  HEART CARDIAC CATH LAB     CV RIGHT HEART CATH MEASUREMENTS RECORDED N/A 12/11/2019    Procedure: CV RIGHT HEART CATH;  Surgeon: Ion  MD Ion;  Location:  HEART CARDIAC CATH LAB     CV RIGHT HEART CATH MEASUREMENTS RECORDED N/A 7/30/2021    Procedure: CV RIGHT HEART CATH;  Surgeon: Jhony Barone MD;  Location:  HEART CARDIAC CATH LAB     ENT SURGERY      wisdom teeth extraction     EP PACEMAKER Left 8/2/2021    Procedure: EP Pacemaker;  Surgeon: Dora Fontanez MD;  Location:  HEART CARDIAC CATH LAB     IR NEPHROSTOMY TUBE PLACEMENT LEFT  7/9/2021     TONSILLECTOMY  1964       Hx of Blood transfusions/reactions: denies    Hx of abnormal bleeding or anti-platelet use: denies    Menstrual history: No LMP for male patient.:     Steroid use in the last year: denies    Personal or FH with difficulty with Anesthesia:  Reports he became hypotensive after induction for colonoscopy 2012.  I don't have these records.    Prior to Admission Medications  Current Outpatient Medications   Medication Sig Dispense Refill     ambrisentan (LETAIRIS) 10 MG tablet Take 10 mg by mouth every morning  30 tablet 11     budesonide (PULMICORT) 0.25 MG/2ML neb solution Take 2 mLs (0.25 mg) by nebulization 2 times daily 120 mL 4     furosemide (LASIX) 80 MG tablet Take 1 tablet (80 mg) by mouth 2 times daily 180 tablet 0     PROAIR  (90 Base) MCG/ACT inhaler Inhale 2 puffs into the lungs every 6 hours as needed for shortness of breath / dyspnea 18 g 11     selexipag (UPTRAVI) 1600 MCG tablet Take 1 tablet (1,600 mcg) by mouth every 12 hours 60 tablet 11     tadalafil, PAH, (ALYQ) 20 MG TABS Take 2 tablets (40 mg) by mouth daily (Patient taking differently: Take 40 mg by mouth every morning ) 60 tablet 5     ciprofloxacin (CIPRO) 500 MG tablet Take 1 tablet (500 mg) by mouth daily for 6 days 30 tablet 2     tamsulosin (FLOMAX) 0.4 MG capsule Take 1 capsule (0.4 mg) by mouth daily 30 capsule 0     UNABLE TO FIND MEDICATION NAME: Topical skin tag remover         Allergies  Allergies   Allergen Reactions     Nkda [No Known Drug Allergies]        Social  History  Social History     Socioeconomic History     Marital status: Single     Spouse name: Not on file     Number of children: Not on file     Years of education: Not on file     Highest education level: Not on file   Occupational History     Not on file   Tobacco Use     Smoking status: Former Smoker     Packs/day: 1.00     Years: 30.00     Pack years: 30.00     Types: Cigarettes     Start date: 10/10/1975     Quit date: 3/1/2010     Years since quittin.5     Smokeless tobacco: Never Used   Substance and Sexual Activity     Alcohol use: Not Currently     Alcohol/week: 0.0 standard drinks     Comment: stated 2 bottles of beer occ     Drug use: Not Currently     Comment: past use of marijuana     Sexual activity: Not Currently     Partners: Female     Birth control/protection: Abstinence, Female Surgical   Other Topics Concern     Parent/sibling w/ CABG, MI or angioplasty before 65F 55M? Not Asked      Service Not Asked     Blood Transfusions Not Asked     Caffeine Concern Yes     Occupational Exposure Not Asked     Hobby Hazards Not Asked     Sleep Concern Not Asked     Stress Concern Not Asked     Weight Concern Not Asked     Special Diet Not Asked     Back Care Not Asked     Exercise Yes     Bike Helmet Not Asked     Seat Belt Not Asked     Self-Exams Not Asked   Social History Narrative     Not on file     Social Determinants of Health     Financial Resource Strain:      Difficulty of Paying Living Expenses:    Food Insecurity:      Worried About Running Out of Food in the Last Year:      Ran Out of Food in the Last Year:    Transportation Needs:      Lack of Transportation (Medical):      Lack of Transportation (Non-Medical):    Physical Activity:      Days of Exercise per Week:      Minutes of Exercise per Session:    Stress:      Feeling of Stress :    Social Connections:      Frequency of Communication with Friends and Family:      Frequency of Social Gatherings with Friends and Family:       Attends [employer] Services:      Active Member of Clubs or Organizations:      Attends Club or Organization Meetings:      Marital Status:    Intimate Partner Violence:      Fear of Current or Ex-Partner:      Emotionally Abused:      Physically Abused:      Sexually Abused:        Family History  Family History   Problem Relation Age of Onset     Coronary Artery Disease Father      Heart Disease Father         heart attack age 35-40     Hypertension Mother      Hyperlipidemia Mother      Cerebrovascular Disease Mother      Dementia Mother      Lupus Sister      Glaucoma No family hx of      Macular Degeneration No family hx of        Anesthesia Evaluation   Pt has had prior anesthetic.     History of anesthetic complications   per patient he became hypotensive after receiving medication in IV prior to colonoscopy in 2012.    ROS/MED HX  ENT/Pulmonary: Comment: Pulmonary sarcoid  O2 dependent    (+) BILLY risk factrs, hypertension, tobacco use, Past use,  (-) sleep apnea and recent URI   Neurologic:       Cardiovascular:     (+) hypertension-----HOSKINS. pacemaker, Reason placed: complete heart block. type: medtronic dual lead, pulmonary hypertension, Previous cardiac testing   Echo: Date: 7/27/21 Results:  Interpretation Summary  Global and regional left ventricular function is normal with an EF of 55-60%.  Flattened septum is consistent with right ventricular pressure and volume  overload.  Global right ventricular function is mildly to moderately reduced. The  longitudinal excursion is preserved (TAPSE 2.1 cm and S 11.0 cm/s) but the  free wall contraction is reduced. The RV FAC was 25% but the basal segment of  the RV was not clearly visualized.  No significant valvular abnormality.  Pulmonary artery systolic pressure cannot be assessed due to inadequate TR  jet.  IVC diameter >2.1 cm collapsing <50% with sniff suggests a high RA pressure  estimated at 15 mmHg or greater.  No pericardial effusion is present.  This  "study was compared with the study from 10/29/2018. The RV function  appears to have declined based on direct visual and quantitative comparison.  This is primarily due to reduced RV free wall contraction.  Stress Test: Date: Results:    ECG Reviewed: Date: Results:    Cath: Date: 7/30/21 Results:    Moderately elevated pulmonary artery hypertension.    Right sided filling pressures are mildly elevated.    Left sided filling pressures are mildly elevated.    Normal cardiac output level.    Complete Heart Block    METS/Exercise Tolerance:     Hematologic:  - neg hematologic  ROS  (-) history of blood clots and history of blood transfusion   Musculoskeletal:  - neg musculoskeletal ROS     GI/Hepatic:  - neg GI/hepatic ROS  (-) liver disease   Renal/Genitourinary:     (+) renal disease, type: CRI and ARF, Pt does not require dialysis, Nephrolithiasis ,     Endo:  - neg endo ROS     Psychiatric/Substance Use:  - neg psychiatric ROS     Infectious Disease:  - neg infectious disease ROS  (-) Recent Fever   Malignancy:  - neg malignancy ROS     Other:  - neg other ROS          The complete review of systems is negative other than noted in the HPI or here.   Temp: 97.7  F (36.5  C) Temp src: Oral BP: 125/76 Pulse: 89   Resp: 20 SpO2: 97 % (on O2)         155 lbs 8 oz  6' 0\"[pt reported[   Body mass index is 21.09 kg/m .       Physical Exam  Constitutional: Awake, alert, cooperative, no apparent distress, and appears stated age.  Eyes: Pupils equal, round and reactive to light, extra ocular muscles intact, sclera clear, conjunctiva normal.  HENT: Normocephalic, oral pharynx with moist mucus membranes, good dentition. No goiter appreciated.  Wearing nasal cannula  Respiratory: Clear to auscultation bilaterally, no crackles or wheezing.  Cardiovascular: Regular rate and rhythm, normal S1 and S2, and no murmur noted.  Carotids +2, no bruits. No edema. Palpable pulses to radial  DP and PT arteries.   GI: Normal bowel sounds, Left " nephrostomy tube in place.  Site clean/dry/intact.  Mild erythema, no edema or drainage.   Lymph/Hematologic: No cervical lymphadenopathy and no supraclavicular lymphadenopathy.  Genitourinary:  deferred  Skin: Warm and dry.  No rashes at anticipated surgical site.   Musculoskeletal: Full ROM of neck. There is no redness, warmth, or swelling of the joints. Gross motor strength is normal.    Neurologic: Awake, alert, oriented to name, place and time. Cranial nerves II-XII are grossly intact.   Neuropsychiatric: Calm, cooperative. Normal affect.     PRIOR LABS/DIAGNOSTIC STUDIES:  All labs and imaging personally reviewed    Component      Latest Ref Rng & Units 9/14/2021   WBC      4.0 - 11.0 10e3/uL 6.3   RBC Count      4.40 - 5.90 10e6/uL 3.64 (L)   Hemoglobin      13.3 - 17.7 g/dL 10.1 (L)   Hematocrit      40.0 - 53.0 % 32.5 (L)   MCV      78 - 100 fL 89   MCH      26.5 - 33.0 pg 27.7   MCHC      31.5 - 36.5 g/dL 31.1 (L)   RDW      10.0 - 15.0 % 12.8   Platelet Count      150 - 450 10e3/uL 154     Component      Latest Ref Rng & Units 9/14/2021   Sodium      133 - 144 mmol/L 137   Potassium      3.4 - 5.3 mmol/L 3.4   Chloride      94 - 109 mmol/L 97   Carbon Dioxide      20 - 32 mmol/L 31   Anion Gap      3 - 14 mmol/L 9   Urea Nitrogen      7 - 30 mg/dL 65 (H)   Creatinine      0.66 - 1.25 mg/dL 3.18 (H)   Calcium      8.5 - 10.1 mg/dL 9.6   Glucose      70 - 99 mg/dL 98   GFR Estimate      >60 mL/min/1.73m2 20 (L)       Exam: US RENAL COMPLETE, 8/1/2021   IMPRESSION:   1.  Moderate left hydronephrosis, similar to CT 6/23/2021. Left  percutaneous nephrostomy tube is not visualized.  2.  Bilateral nephrolithiasis, right greater than left.    Cardiac echo: 7/27/21  Interpretation Summary  Global and regional left ventricular function is normal with an EF of 55-60%.  Flattened septum is consistent with right ventricular pressure and volume  overload.  Global right ventricular function is mildly to moderately  reduced. The  longitudinal excursion is preserved (TAPSE 2.1 cm and S 11.0 cm/s) but the  free wall contraction is reduced. The RV FAC was 25% but the basal segment of  the RV was not clearly visualized.  No significant valvular abnormality.  Pulmonary artery systolic pressure cannot be assessed due to inadequate TR  jet.  IVC diameter >2.1 cm collapsing <50% with sniff suggests a high RA pressure  estimated at 15 mmHg or greater.  No pericardial effusion is present.  This study was compared with the study from 10/29/2018. The RV function  appears to have declined based on direct visual and quantitative comparison.  This is primarily due to reduced RV free wall contraction.      Right Heart Cath: 7/30/21    Moderately elevated pulmonary artery hypertension.    Right sided filling pressures are mildly elevated.    Left sided filling pressures are mildly elevated.    Normal cardiac output level.    Complete Heart Block      Outside records reviewed from: care everywhere      ASSESSMENT and PLAN  Jimmy Isaac is a 60 year old male scheduled for NEPHROLITHOTOMY, PERCUTANEOUS, USING HOLMIUM LASER on 9/20/21 by Dr. Moura in treatment of kidney stone.  PAC referral for risk assessment and optimization for anesthesia:    Pre-operative considerations:  1.  Cardiac:  Functional status- METS <4.  Intermediate risk surgery with 6.6% (RCRI #) risk of major adverse cardiac event.   --denies chest pain, chest pressure.  +HOSKINS  --h/o pHTN due to pulmonary sarcoidosis followed by Dr. Lindsey with recent RHC showing RVSP of 77mmHg.  Pt will continue his Letairis, Uptravi and tadalafil as directed. He is on O2 by NC routinely at 4-5L with increase to 8-10 if he more active.  He endorses HOSKINS.  --recent dual lead Medtronic PM placement due to complete heart block.  PAC RN to alert device nurse. Recent interrogation 8/9/21.  Has EP f/u 11/2021.  --Pt taking Lasix 80mg bid but ran out and is taking leftover 20mg tablets (two tablets  "bid). Euvolemic on exam today.  Follows low sodium diet.  I did message cardiology XOCHILT Gauri Laurent to facilitate Lasix refill for patient.   --echo 7/27/21 EF of 55-60%, flattened septum is consistent with right ventricular pressure and volume overload. Global right ventricular function is mildly to moderately reduced.  --RHC 7/30/21 \"Moderately elevated pulmonary artery hypertension\", RVSP of 77mm Hg    2.  Pulm:  Airway feasible.  BILLY risk: Intermediate, 3/8 risk factors.  --non smoker  --h/o pulmonary sarcoid without cardiac involvement per recent PET cardiac viability study. pHTN as above.  Has not seen pulmonary since 1/2019 but is scheduled for f/u 10/2021.  He continues pulmicort nebs and proair inhaler.    --supplemental O2 as above in #1    3.  GI:  Risk of PONV score =  1.  If > 2, anti-emetic intervention recommended.    4.  Renal/:  --h/o CKD IV.  Recent creatinine 3.36  --bilateral renal stones, s/p Left nephrostomy tube 7/9/21.  Left hydronephrosis. Now with procedure above  --Left tube is draining. Pt denies problems with tube.   --pt told he was to be on Cipro throughout summer operative period but he ran out.  I talked to Kate Lovell, Urology RN who had Dr. Moura send Rx to [facility] pharmacy today.    VTE risk: 1.8%      Patient discussed with Dr. Jeffries.    Lesa Espinoza PA-C  Preoperative Assessment Center  [facility]  Phone: [phone]  Fax: [phone]  "

## 2021-09-16 ENCOUNTER — LAB (OUTPATIENT)
Dept: LAB | Facility: CLINIC | Age: 61
End: 2021-09-16
Payer: COMMERCIAL

## 2021-09-16 ENCOUNTER — MYC MEDICAL ADVICE (OUTPATIENT)
Dept: CARDIOLOGY | Facility: CLINIC | Age: 61
End: 2021-09-16

## 2021-09-16 DIAGNOSIS — I27.20 PULMONARY HYPERTENSION (H): ICD-10-CM

## 2021-09-16 DIAGNOSIS — Z11.59 ENCOUNTER FOR SCREENING FOR OTHER VIRAL DISEASES: ICD-10-CM

## 2021-09-16 PROCEDURE — U0005 INFEC AGEN DETEC AMPLI PROBE: HCPCS

## 2021-09-16 PROCEDURE — U0003 INFECTIOUS AGENT DETECTION BY NUCLEIC ACID (DNA OR RNA); SEVERE ACUTE RESPIRATORY SYNDROME CORONAVIRUS 2 (SARS-COV-2) (CORONAVIRUS DISEASE [COVID-19]), AMPLIFIED PROBE TECHNIQUE, MAKING USE OF HIGH THROUGHPUT TECHNOLOGIES AS DESCRIBED BY CMS-2020-01-R: HCPCS

## 2021-09-16 RX ORDER — FUROSEMIDE 20 MG
40 TABLET ORAL 2 TIMES DAILY
Qty: 365 TABLET | Refills: 3 | Status: SHIPPED | OUTPATIENT
Start: 2021-09-16 | End: 2021-11-01

## 2021-09-16 NOTE — TELEPHONE ENCOUNTER
Called patient and spoke with him about his lasix dosing and need for refills.  patient states he is currently using 20mg tabs and taking 40mg BID.  His baseline weight is 165lbs, but today he weighed in the 159lbs. After review with Gauri Laurent PA-C, I advised him to continue taking the 40mg BID, and he would see the [employer] message I just sent him about taking this same dose after the surgery.  Agreed I would send new Erx to Furosemide 40mg BID to  specialty pharmacy.  He stated he has enough tablets to get him through his surgery on 9/20/21.    Assisted patient in making a follow up appt with Dr. Lindsey on 10/13/21 @ 11:30am w/labs @ 8:45am, as he has other testing & appt's in between those times.  Patient verbalized understanding, agreed with plan and denied any further questions. Obdulia Rome RN on 9/16/2021 at 2:48 PM     Erx sent. Obdulia Rome RN on 9/16/2021 at 2:49 PM

## 2021-09-17 LAB — SARS-COV-2 RNA RESP QL NAA+PROBE: NEGATIVE

## 2021-09-19 ENCOUNTER — ANESTHESIA EVENT (OUTPATIENT)
Dept: SURGERY | Facility: CLINIC | Age: 61
DRG: 660 | End: 2021-09-19
Payer: COMMERCIAL

## 2021-09-19 ASSESSMENT — LIFESTYLE VARIABLES: TOBACCO_USE: 1

## 2021-09-19 NOTE — ANESTHESIA PREPROCEDURE EVALUATION
Anesthesia Pre-Procedure Evaluation    Patient: Jimmy Isaac   MRN: [id number] : 1960        Preoperative Diagnosis: Kidney stone [N20.0]   Procedure : Procedure(s):  NEPHROLITHOTOMY, PERCUTANEOUS, USING HOLMIUM LASER     Past Medical History:   Diagnosis Date     Chronic sinusitis      CKD (chronic kidney disease)      Heart disease      Hypertension      Keratosis     frontal scalp, treated with liquid nitrogen at [facility]     Malignant neoplasm (H)      Pneumonia a few times     Pulmonary sarcoidosis (H)       Past Surgical History:   Procedure Laterality Date     COLONOSCOPY       CV RIGHT HEART CATH MEASUREMENTS RECORDED N/A 2019    Procedure: CV RIGHT HEART CATH;  Surgeon: Kael Delgado MD;  Location:  HEART CARDIAC CATH LAB     CV RIGHT HEART CATH MEASUREMENTS RECORDED N/A 2019    Procedure: CV RIGHT HEART CATH;  Surgeon: Ion Lemon MD;  Location:  HEART CARDIAC CATH LAB     CV RIGHT HEART CATH MEASUREMENTS RECORDED N/A 2021    Procedure: CV RIGHT HEART CATH;  Surgeon: Jhony Barone MD;  Location:  HEART CARDIAC CATH LAB     ENT SURGERY      wisdom teeth extraction     EP PACEMAKER Left 2021    Procedure: EP Pacemaker;  Surgeon: Dora Fontanez MD;  Location:  HEART CARDIAC CATH LAB     IR NEPHROSTOMY TUBE PLACEMENT LEFT  2021     TONSILLECTOMY  1964      Allergies   Allergen Reactions     Nkda [No Known Drug Allergies]       Social History     Tobacco Use     Smoking status: Former Smoker     Packs/day: 1.00     Years: 30.00     Pack years: 30.00     Types: Cigarettes     Start date: 10/10/1975     Quit date: 3/1/2010     Years since quittin.5     Smokeless tobacco: Never Used   Substance Use Topics     Alcohol use: Not Currently     Alcohol/week: 0.0 standard drinks     Comment: stated 2 bottles of beer occ      Wt Readings from Last 1 Encounters:   21 70.5 kg (155 lb 8 oz)        Anesthesia Evaluation   Pt has had prior  anesthetic.     History of anesthetic complications   per patient he became hypotensive after receiving medication in IV prior to colonoscopy in 2012.    ROS/MED HX  ENT/Pulmonary: Comment: Pulmonary sarcoid  O2 dependent    PFT:  The FVC, FEV1 and FEV1/FVC ratio are reduced.  The inspiratory flow rates are reduced.  While the TLC is within normal limits, the FRC, RV and RV/TLC ratio are increased.  The reduced diffusing capacity indicates a Severe loss of functional alveolar   capillary surface.  However, the diffusing capacity was not corrected for the patient's hemoglobin.   IMPRESSION:   Very Severe Airflow Obstruction with air trapping.   Severe Diffusion Defect.   6MWT:  Walk distance is reduced on 8 PM oxygen with significant desaturation but no hypoxia.     Maintained on 5Lpm O2 at baseline    (+) BILLY risk factors, hypertension, tobacco use, Past use,  (-) sleep apnea and recent URI   Neurologic:       Cardiovascular:     (+) hypertension-----HOSKINS. pacemaker, Reason placed: complete heart block. type: medtronic dual lead, pulmonary hypertension, Previous cardiac testing   Echo: Date: 7/27/21 Results:  Interpretation Summary  Global and regional left ventricular function is normal with an EF of 55-60%.  Flattened septum is consistent with right ventricular pressure and volume  overload.  Global right ventricular function is mildly to moderately reduced. The  longitudinal excursion is preserved (TAPSE 2.1 cm and S 11.0 cm/s) but the  free wall contraction is reduced. The RV FAC was 25% but the basal segment of  the RV was not clearly visualized.  No significant valvular abnormality.  Pulmonary artery systolic pressure cannot be assessed due to inadequate TR  jet.  IVC diameter >2.1 cm collapsing <50% with sniff suggests a high RA pressure  estimated at 15 mmHg or greater.  No pericardial effusion is present.  This study was compared with the study from 10/29/2018. The RV function  appears to have declined based  on direct visual and quantitative comparison.  This is primarily due to reduced RV free wall contraction.  Stress Test: Date: Results:    ECG Reviewed: Date: Results:    Cath: Date: 7/30/21 Results:    Moderately elevated pulmonary artery hypertension.    Right sided filling pressures are mildly elevated.    Left sided filling pressures are mildly elevated.    Normal cardiac output level.    Complete Heart Block    METS/Exercise Tolerance:     Hematologic:  - neg hematologic  ROS  (-) history of blood clots and history of blood transfusion   Musculoskeletal:  - neg musculoskeletal ROS     GI/Hepatic:  - neg GI/hepatic ROS  (-) liver disease   Renal/Genitourinary:     (+) renal disease, type: CRI and ARF, Pt does not require dialysis, Nephrolithiasis ,     Endo:  - neg endo ROS     Psychiatric/Substance Use:  - neg psychiatric ROS     Infectious Disease:  - neg infectious disease ROS  (-) Recent Fever   Malignancy:  - neg malignancy ROS     Other:  - neg other ROS          Physical Exam    Airway        Mallampati: II   TM distance: > 3 FB   Neck ROM: full   Mouth opening: > 3 cm    Respiratory Devices and Support     Nasal [place]ula 5  l/min.     Dental  no notable dental history         Cardiovascular   cardiovascular exam normal          Pulmonary           (+) decreased breath sounds           OUTSIDE LABS:  CBC:   Lab Results   Component Value Date    WBC 6.3 09/14/2021    WBC 5.3 08/18/2021    HGB 10.1 (L) 09/14/2021    HGB 10.2 (L) 08/18/2021    HCT 32.5 (L) 09/14/2021    HCT 32.0 (L) 08/18/2021     09/14/2021     08/18/2021     BMP:   Lab Results   Component Value Date     09/14/2021     08/18/2021    POTASSIUM 3.4 09/14/2021    POTASSIUM 3.7 08/18/2021    CHLORIDE 97 09/14/2021    CHLORIDE 96 08/18/2021    CO2 31 09/14/2021    CO2 30 08/18/2021    BUN 65 (H) 09/14/2021    BUN 92 (H) 08/18/2021    CR 3.18 (H) 09/14/2021    CR 3.36 (H) 08/18/2021    GLC 98 09/14/2021     (H)  08/18/2021     COAGS:   Lab Results   Component Value Date    INR 1.18 (H) 03/09/2012     POC: No results found for: BGM, HCG, HCGS  HEPATIC:   Lab Results   Component Value Date    ALBUMIN 3.3 (L) 08/18/2021    PROTTOTAL 7.1 08/18/2021    ALT 21 08/18/2021    AST 17 08/18/2021    ALKPHOS 69 08/18/2021    BILITOTAL 0.4 08/18/2021     OTHER:   Lab Results   Component Value Date    PH 7.32 (L) 08/04/2021    A1C 5.8 (H) 10/29/2018    TERI 9.6 09/14/2021    PHOS 5.5 (H) 08/18/2021    MAG 2.2 08/18/2021    TSH 2.40 07/30/2021    CRP 9.2 (H) 07/01/2021    SED 8 09/22/2017       Anesthesia Plan    ASA Status:  4   NPO Status:  NPO Appropriate    Anesthesia Type: Epidural.   Induction: N/a.   Maintenance: TIVA.   Techniques and Equipment:     - Lines/Monitors: 2nd IV, Arterial Line     Consents    Anesthesia Plan(s) and associated risks, benefits, and realistic alternatives discussed. Questions answered and patient/representative(s) expressed understanding.     - Discussed with:  Patient      - Extended Intubation/Ventilatory Support Discussed: Yes.      - Patient is DNR/DNI Status: No    Use of blood products discussed: No .     Postoperative Care    Pain management: IV analgesics, Multi-modal analgesia.   PONV prophylaxis: Ondansetron (or other 5HT-3), Dexamethasone or Solumedrol     Comments:    I extensively discussed the anesthetic risks of extended intubation, prolonged ICU admission and ventilation, MI, CVA, exacerbation of pulmonary hypertension, and right heart failure with the patient and his wife. We discussed performing the procedure under sedation, but Dr. Moura explained that he is concerned about the level of surgical stimulation. I discussed that general anesthesia is always our backup plan, but we will plan for an awake arterial line and will then place an epidural and slowly increase the dose of local anesthetic to achieve a surgical level. We will use dexmedotomidine to provide sedation and avoid  depressing the patient's respiratory drive or instrumenting his airway.            Bert Billings MD

## 2021-09-20 ENCOUNTER — ANCILLARY PROCEDURE (OUTPATIENT)
Dept: ULTRASOUND IMAGING | Facility: CLINIC | Age: 61
End: 2021-09-20
Payer: COMMERCIAL

## 2021-09-20 ENCOUNTER — APPOINTMENT (OUTPATIENT)
Dept: GENERAL RADIOLOGY | Facility: CLINIC | Age: 61
DRG: 660 | End: 2021-09-20
Attending: UROLOGY
Payer: COMMERCIAL

## 2021-09-20 ENCOUNTER — HOSPITAL ENCOUNTER (INPATIENT)
Facility: CLINIC | Age: 61
LOS: 2 days | Discharge: HOME | DRG: 660 | End: 2021-09-23
Attending: UROLOGY | Admitting: UROLOGY
Payer: COMMERCIAL

## 2021-09-20 ENCOUNTER — ANESTHESIA (OUTPATIENT)
Dept: SURGERY | Facility: CLINIC | Age: 61
DRG: 660 | End: 2021-09-20
Payer: COMMERCIAL

## 2021-09-20 DIAGNOSIS — N20.0 KIDNEY STONE: ICD-10-CM

## 2021-09-20 DIAGNOSIS — N10 ACUTE PYELONEPHRITIS: ICD-10-CM

## 2021-09-20 LAB
ABO/RH(D): NORMAL
ANTIBODY SCREEN: NEGATIVE
GLUCOSE BLDC GLUCOMTR-MCNC: 146 MG/DL (ref 70–99)
GLUCOSE BLDC GLUCOMTR-MCNC: 81 MG/DL (ref 70–99)
SPECIMEN EXPIRATION DATE: NORMAL

## 2021-09-20 PROCEDURE — 36415 COLL VENOUS BLD VENIPUNCTURE: CPT | Performed by: UROLOGY

## 2021-09-20 PROCEDURE — 272N000002 HC OR SUPPLY OTHER OPNP: Performed by: UROLOGY

## 2021-09-20 PROCEDURE — 370N000017 HC ANESTHESIA TECHNICAL FEE, PER MIN: Performed by: UROLOGY

## 2021-09-20 PROCEDURE — 0TC18ZZ EXTIRPATION OF MATTER FROM LEFT KIDNEY, VIA NATURAL OR ARTIFICIAL OPENING ENDOSCOPIC: ICD-10-PCS | Performed by: UROLOGY

## 2021-09-20 PROCEDURE — C1725 CATH, TRANSLUMIN NON-LASER: HCPCS | Performed by: UROLOGY

## 2021-09-20 PROCEDURE — 258N000003 HC RX IP 258 OP 636: Performed by: ANESTHESIOLOGY

## 2021-09-20 PROCEDURE — 710N000010 HC RECOVERY PHASE 1, LEVEL 2, PER MIN: Performed by: UROLOGY

## 2021-09-20 PROCEDURE — 360N000085 HC SURGERY LEVEL 5 W/ FLUORO, PER MIN: Performed by: UROLOGY

## 2021-09-20 PROCEDURE — C1769 GUIDE WIRE: HCPCS | Performed by: UROLOGY

## 2021-09-20 PROCEDURE — 250N000011 HC RX IP 250 OP 636: Performed by: ANESTHESIOLOGY

## 2021-09-20 PROCEDURE — 250N000009 HC RX 250: Performed by: ANESTHESIOLOGY

## 2021-09-20 PROCEDURE — 250N000009 HC RX 250: Performed by: UROLOGY

## 2021-09-20 PROCEDURE — 0T778DZ DILATION OF LEFT URETER WITH INTRALUMINAL DEVICE, VIA NATURAL OR ARTIFICIAL OPENING ENDOSCOPIC: ICD-10-PCS | Performed by: UROLOGY

## 2021-09-20 PROCEDURE — 50431 NJX PX NFROSGRM &/URTRGRM: CPT | Mod: 22 | Performed by: STUDENT IN AN ORGANIZED HEALTH CARE EDUCATION/TRAINING PROGRAM

## 2021-09-20 PROCEDURE — 00HU33Z INSERTION OF INFUSION DEVICE INTO SPINAL CANAL, PERCUTANEOUS APPROACH: ICD-10-PCS | Performed by: ANESTHESIOLOGY

## 2021-09-20 PROCEDURE — 86901 BLOOD TYPING SEROLOGIC RH(D): CPT | Performed by: UROLOGY

## 2021-09-20 PROCEDURE — C1729 CATH, DRAINAGE: HCPCS | Performed by: UROLOGY

## 2021-09-20 PROCEDURE — 272N000001 HC OR GENERAL SUPPLY STERILE: Performed by: UROLOGY

## 2021-09-20 PROCEDURE — C1758 CATHETER, URETERAL: HCPCS | Performed by: UROLOGY

## 2021-09-20 PROCEDURE — 258N000003 HC RX IP 258 OP 636: Performed by: UROLOGY

## 2021-09-20 PROCEDURE — 3E033XZ INTRODUCTION OF VASOPRESSOR INTO PERIPHERAL VEIN, PERCUTANEOUS APPROACH: ICD-10-PCS | Performed by: UROLOGY

## 2021-09-20 PROCEDURE — 999N000181 XR SURGERY CARM FLUORO GREATER THAN 5 MIN W STILLS: Mod: TC

## 2021-09-20 PROCEDURE — 250N000011 HC RX IP 250 OP 636: Performed by: STUDENT IN AN ORGANIZED HEALTH CARE EDUCATION/TRAINING PROGRAM

## 2021-09-20 PROCEDURE — 999N000015 HC STATISTIC ARTERIAL MONITORING DAILY

## 2021-09-20 PROCEDURE — 3E0R3BZ INTRODUCTION OF ANESTHETIC AGENT INTO SPINAL CANAL, PERCUTANEOUS APPROACH: ICD-10-PCS | Performed by: ANESTHESIOLOGY

## 2021-09-20 PROCEDURE — 0TP530Z REMOVAL OF DRAINAGE DEVICE FROM KIDNEY, PERCUTANEOUS APPROACH: ICD-10-PCS | Performed by: UROLOGY

## 2021-09-20 PROCEDURE — 50081 PERQ NL/PL LITHOTRP CPLX>2CM: CPT | Mod: 22 | Performed by: STUDENT IN AN ORGANIZED HEALTH CARE EDUCATION/TRAINING PROGRAM

## 2021-09-20 PROCEDURE — 999N000157 HC STATISTIC RCP TIME EA 10 MIN

## 2021-09-20 PROCEDURE — 999N000155 HC STATISTIC RAPCV CVP MONITORING

## 2021-09-20 PROCEDURE — 258N000003 HC RX IP 258 OP 636: Performed by: STUDENT IN AN ORGANIZED HEALTH CARE EDUCATION/TRAINING PROGRAM

## 2021-09-20 PROCEDURE — 52332 CYSTOSCOPY AND TREATMENT: CPT | Mod: LT | Performed by: STUDENT IN AN ORGANIZED HEALTH CARE EDUCATION/TRAINING PROGRAM

## 2021-09-20 PROCEDURE — C2617 STENT, NON-COR, TEM W/O DEL: HCPCS | Performed by: UROLOGY

## 2021-09-20 PROCEDURE — 999N000141 HC STATISTIC PRE-PROCEDURE NURSING ASSESSMENT: Performed by: UROLOGY

## 2021-09-20 PROCEDURE — 250N000011 HC RX IP 250 OP 636: Performed by: UROLOGY

## 2021-09-20 PROCEDURE — 0TF78ZZ FRAGMENTATION IN LEFT URETER, VIA NATURAL OR ARTIFICIAL OPENING ENDOSCOPIC: ICD-10-PCS | Performed by: UROLOGY

## 2021-09-20 DEVICE — STENT URETERAL PERCUFLEX PLUS 6FRX26CM M0061752630: Type: IMPLANTABLE DEVICE | Site: URETER | Status: FUNCTIONAL

## 2021-09-20 RX ORDER — ONDANSETRON 4 MG/1
4 TABLET, ORALLY DISINTEGRATING ORAL EVERY 6 HOURS PRN
Status: DISCONTINUED | OUTPATIENT
Start: 2021-09-20 | End: 2021-09-21

## 2021-09-20 RX ORDER — SODIUM CHLORIDE, SODIUM LACTATE, POTASSIUM CHLORIDE, CALCIUM CHLORIDE 600; 310; 30; 20 MG/100ML; MG/100ML; MG/100ML; MG/100ML
INJECTION, SOLUTION INTRAVENOUS CONTINUOUS
Status: DISCONTINUED | OUTPATIENT
Start: 2021-09-20 | End: 2021-09-20 | Stop reason: HOSPADM

## 2021-09-20 RX ORDER — MAGNESIUM HYDROXIDE/ALUMINUM HYDROXICE/SIMETHICONE 120; 1200; 1200 MG/30ML; MG/30ML; MG/30ML
30 SUSPENSION ORAL EVERY 4 HOURS PRN
Status: DISCONTINUED | OUTPATIENT
Start: 2021-09-20 | End: 2021-09-23 | Stop reason: HOSPADM

## 2021-09-20 RX ORDER — SODIUM CHLORIDE, SODIUM LACTATE, POTASSIUM CHLORIDE, CALCIUM CHLORIDE 600; 310; 30; 20 MG/100ML; MG/100ML; MG/100ML; MG/100ML
INJECTION, SOLUTION INTRAVENOUS CONTINUOUS PRN
Status: DISCONTINUED | OUTPATIENT
Start: 2021-09-20 | End: 2021-09-20

## 2021-09-20 RX ORDER — SODIUM CHLORIDE 9 MG/ML
INJECTION, SOLUTION INTRAVENOUS CONTINUOUS
Status: DISCONTINUED | OUTPATIENT
Start: 2021-09-20 | End: 2021-09-22

## 2021-09-20 RX ORDER — PIPERACILLIN SODIUM, TAZOBACTAM SODIUM 3; .375 G/15ML; G/15ML
3.38 INJECTION, POWDER, LYOPHILIZED, FOR SOLUTION INTRAVENOUS EVERY 8 HOURS
Status: COMPLETED | OUTPATIENT
Start: 2021-09-21 | End: 2021-09-21

## 2021-09-20 RX ORDER — OXYCODONE HYDROCHLORIDE 5 MG/1
5 TABLET ORAL EVERY 4 HOURS PRN
Status: DISCONTINUED | OUTPATIENT
Start: 2021-09-20 | End: 2021-09-20 | Stop reason: HOSPADM

## 2021-09-20 RX ORDER — FENTANYL CITRATE 50 UG/ML
25 INJECTION, SOLUTION INTRAMUSCULAR; INTRAVENOUS EVERY 5 MIN PRN
Status: DISCONTINUED | OUTPATIENT
Start: 2021-09-20 | End: 2021-09-20 | Stop reason: HOSPADM

## 2021-09-20 RX ORDER — FENTANYL CITRATE 50 UG/ML
25-50 INJECTION, SOLUTION INTRAMUSCULAR; INTRAVENOUS
Status: DISCONTINUED | OUTPATIENT
Start: 2021-09-20 | End: 2021-09-20 | Stop reason: HOSPADM

## 2021-09-20 RX ORDER — MEPERIDINE HYDROCHLORIDE 25 MG/ML
12.5 INJECTION INTRAMUSCULAR; INTRAVENOUS; SUBCUTANEOUS
Status: DISCONTINUED | OUTPATIENT
Start: 2021-09-20 | End: 2021-09-20 | Stop reason: HOSPADM

## 2021-09-20 RX ORDER — LIDOCAINE 40 MG/G
CREAM TOPICAL
Status: DISCONTINUED | OUTPATIENT
Start: 2021-09-20 | End: 2021-09-23 | Stop reason: HOSPADM

## 2021-09-20 RX ORDER — HYDROMORPHONE HCL IN WATER/PF 6 MG/30 ML
0.2 PATIENT CONTROLLED ANALGESIA SYRINGE INTRAVENOUS
Status: DISCONTINUED | OUTPATIENT
Start: 2021-09-20 | End: 2021-09-23 | Stop reason: HOSPADM

## 2021-09-20 RX ORDER — OXYCODONE HYDROCHLORIDE 5 MG/1
5 TABLET ORAL EVERY 4 HOURS PRN
Status: DISCONTINUED | OUTPATIENT
Start: 2021-09-20 | End: 2021-09-21

## 2021-09-20 RX ORDER — PHENYLEPHRINE HCL IN 0.9% NACL 50MG/250ML
.1-1 PLASTIC BAG, INJECTION (ML) INTRAVENOUS CONTINUOUS
Status: DISCONTINUED | OUTPATIENT
Start: 2021-09-20 | End: 2021-09-20 | Stop reason: HOSPADM

## 2021-09-20 RX ORDER — ONDANSETRON 4 MG/1
4 TABLET, ORALLY DISINTEGRATING ORAL EVERY 30 MIN PRN
Status: DISCONTINUED | OUTPATIENT
Start: 2021-09-20 | End: 2021-09-20 | Stop reason: HOSPADM

## 2021-09-20 RX ORDER — BUDESONIDE 0.25 MG/2ML
0.25 INHALANT ORAL 2 TIMES DAILY
Status: DISCONTINUED | OUTPATIENT
Start: 2021-09-21 | End: 2021-09-23 | Stop reason: HOSPADM

## 2021-09-20 RX ORDER — HYDROMORPHONE HCL IN WATER/PF 6 MG/30 ML
.2-.4 PATIENT CONTROLLED ANALGESIA SYRINGE INTRAVENOUS EVERY 5 MIN PRN
Status: DISCONTINUED | OUTPATIENT
Start: 2021-09-20 | End: 2021-09-20 | Stop reason: HOSPADM

## 2021-09-20 RX ORDER — SODIUM CHLORIDE, SODIUM GLUCONATE, SODIUM ACETATE, POTASSIUM CHLORIDE AND MAGNESIUM CHLORIDE 526; 502; 368; 37; 30 MG/100ML; MG/100ML; MG/100ML; MG/100ML; MG/100ML
INJECTION, SOLUTION INTRAVENOUS CONTINUOUS PRN
Status: DISCONTINUED | OUTPATIENT
Start: 2021-09-20 | End: 2021-09-20

## 2021-09-20 RX ORDER — ONDANSETRON 2 MG/ML
4 INJECTION INTRAMUSCULAR; INTRAVENOUS EVERY 30 MIN PRN
Status: DISCONTINUED | OUTPATIENT
Start: 2021-09-20 | End: 2021-09-20 | Stop reason: HOSPADM

## 2021-09-20 RX ORDER — HYDROMORPHONE HCL IN WATER/PF 6 MG/30 ML
0.4 PATIENT CONTROLLED ANALGESIA SYRINGE INTRAVENOUS
Status: DISCONTINUED | OUTPATIENT
Start: 2021-09-20 | End: 2021-09-23 | Stop reason: HOSPADM

## 2021-09-20 RX ORDER — FENTANYL CITRATE 50 UG/ML
INJECTION, SOLUTION INTRAMUSCULAR; INTRAVENOUS PRN
Status: DISCONTINUED | OUTPATIENT
Start: 2021-09-20 | End: 2021-09-20

## 2021-09-20 RX ORDER — LEVOFLOXACIN 5 MG/ML
250 INJECTION, SOLUTION INTRAVENOUS EVERY 24 HOURS
Status: DISCONTINUED | OUTPATIENT
Start: 2021-09-20 | End: 2021-09-20 | Stop reason: HOSPADM

## 2021-09-20 RX ORDER — NALOXONE HYDROCHLORIDE 0.4 MG/ML
0.4 INJECTION, SOLUTION INTRAMUSCULAR; INTRAVENOUS; SUBCUTANEOUS
Status: DISCONTINUED | OUTPATIENT
Start: 2021-09-20 | End: 2021-09-20

## 2021-09-20 RX ORDER — BUPIVACAINE HYDROCHLORIDE AND EPINEPHRINE 2.5; 5 MG/ML; UG/ML
INJECTION, SOLUTION INFILTRATION; PERINEURAL PRN
Status: DISCONTINUED | OUTPATIENT
Start: 2021-09-20 | End: 2021-09-20 | Stop reason: HOSPADM

## 2021-09-20 RX ORDER — POLYETHYLENE GLYCOL 3350 17 G/17G
17 POWDER, FOR SOLUTION ORAL 2 TIMES DAILY
Status: DISCONTINUED | OUTPATIENT
Start: 2021-09-21 | End: 2021-09-23 | Stop reason: HOSPADM

## 2021-09-20 RX ORDER — NALOXONE HYDROCHLORIDE 0.4 MG/ML
0.2 INJECTION, SOLUTION INTRAMUSCULAR; INTRAVENOUS; SUBCUTANEOUS
Status: DISCONTINUED | OUTPATIENT
Start: 2021-09-20 | End: 2021-09-20

## 2021-09-20 RX ORDER — ONDANSETRON 2 MG/ML
4 INJECTION INTRAMUSCULAR; INTRAVENOUS EVERY 6 HOURS PRN
Status: DISCONTINUED | OUTPATIENT
Start: 2021-09-20 | End: 2021-09-21

## 2021-09-20 RX ORDER — LABETALOL HYDROCHLORIDE 5 MG/ML
10 INJECTION, SOLUTION INTRAVENOUS
Status: DISCONTINUED | OUTPATIENT
Start: 2021-09-20 | End: 2021-09-20 | Stop reason: HOSPADM

## 2021-09-20 RX ORDER — FENTANYL CITRATE 50 UG/ML
25 INJECTION, SOLUTION INTRAMUSCULAR; INTRAVENOUS
Status: DISCONTINUED | OUTPATIENT
Start: 2021-09-20 | End: 2021-09-20 | Stop reason: HOSPADM

## 2021-09-20 RX ORDER — HYDROMORPHONE HCL IN WATER/PF 6 MG/30 ML
0.2 PATIENT CONTROLLED ANALGESIA SYRINGE INTRAVENOUS EVERY 5 MIN PRN
Status: DISCONTINUED | OUTPATIENT
Start: 2021-09-20 | End: 2021-09-20 | Stop reason: HOSPADM

## 2021-09-20 RX ORDER — TAMSULOSIN HYDROCHLORIDE 0.4 MG/1
0.4 CAPSULE ORAL DAILY
Status: DISCONTINUED | OUTPATIENT
Start: 2021-09-21 | End: 2021-09-23 | Stop reason: HOSPADM

## 2021-09-20 RX ORDER — POLYETHYLENE GLYCOL 3350 17 G/17G
17 POWDER, FOR SOLUTION ORAL DAILY
Status: DISCONTINUED | OUTPATIENT
Start: 2021-09-21 | End: 2021-09-21

## 2021-09-20 RX ORDER — OXYCODONE HYDROCHLORIDE 10 MG/1
10 TABLET ORAL EVERY 4 HOURS PRN
Status: DISCONTINUED | OUTPATIENT
Start: 2021-09-20 | End: 2021-09-23 | Stop reason: HOSPADM

## 2021-09-20 RX ORDER — OXYCODONE HYDROCHLORIDE 5 MG/1
5 TABLET ORAL EVERY 4 HOURS PRN
Status: DISCONTINUED | OUTPATIENT
Start: 2021-09-20 | End: 2021-09-23 | Stop reason: HOSPADM

## 2021-09-20 RX ORDER — FLUMAZENIL 0.1 MG/ML
0.2 INJECTION, SOLUTION INTRAVENOUS
Status: DISCONTINUED | OUTPATIENT
Start: 2021-09-20 | End: 2021-09-20 | Stop reason: HOSPADM

## 2021-09-20 RX ORDER — LIDOCAINE HYDROCHLORIDE 10 MG/ML
INJECTION, SOLUTION INFILTRATION; PERINEURAL
Status: COMPLETED | OUTPATIENT
Start: 2021-09-20 | End: 2021-09-20

## 2021-09-20 RX ORDER — ACETAMINOPHEN 325 MG/1
975 TABLET ORAL ONCE
Status: DISCONTINUED | OUTPATIENT
Start: 2021-09-20 | End: 2021-09-20 | Stop reason: HOSPADM

## 2021-09-20 RX ORDER — ACETAMINOPHEN 325 MG/1
650 TABLET ORAL EVERY 6 HOURS PRN
Status: DISCONTINUED | OUTPATIENT
Start: 2021-09-20 | End: 2021-09-23 | Stop reason: HOSPADM

## 2021-09-20 RX ORDER — ALBUTEROL SULFATE 90 UG/1
2 AEROSOL, METERED RESPIRATORY (INHALATION) EVERY 6 HOURS PRN
Status: DISCONTINUED | OUTPATIENT
Start: 2021-09-20 | End: 2021-09-23 | Stop reason: HOSPADM

## 2021-09-20 RX ORDER — NALBUPHINE HYDROCHLORIDE 10 MG/ML
2.5-5 INJECTION, SOLUTION INTRAMUSCULAR; INTRAVENOUS; SUBCUTANEOUS EVERY 6 HOURS PRN
Status: DISCONTINUED | OUTPATIENT
Start: 2021-09-20 | End: 2021-09-20

## 2021-09-20 RX ORDER — PIPERACILLIN SODIUM, TAZOBACTAM SODIUM 3; .375 G/15ML; G/15ML
3.38 INJECTION, POWDER, LYOPHILIZED, FOR SOLUTION INTRAVENOUS EVERY 6 HOURS
Status: DISCONTINUED | OUTPATIENT
Start: 2021-09-20 | End: 2021-09-20 | Stop reason: HOSPADM

## 2021-09-20 RX ORDER — FENTANYL CITRATE 50 UG/ML
25-50 INJECTION, SOLUTION INTRAMUSCULAR; INTRAVENOUS EVERY 5 MIN PRN
Status: DISCONTINUED | OUTPATIENT
Start: 2021-09-20 | End: 2021-09-20 | Stop reason: HOSPADM

## 2021-09-20 RX ORDER — HALOPERIDOL 5 MG/ML
1 INJECTION INTRAMUSCULAR
Status: DISCONTINUED | OUTPATIENT
Start: 2021-09-20 | End: 2021-09-20 | Stop reason: HOSPADM

## 2021-09-20 RX ADMIN — NOREPINEPHRINE BITARTRATE 6.4 MCG: 1 INJECTION, SOLUTION, CONCENTRATE INTRAVENOUS at 15:37

## 2021-09-20 RX ADMIN — NOREPINEPHRINE BITARTRATE 0.08 MCG/KG/MIN: 1 INJECTION, SOLUTION, CONCENTRATE INTRAVENOUS at 20:28

## 2021-09-20 RX ADMIN — SODIUM CHLORIDE, POTASSIUM CHLORIDE, SODIUM LACTATE AND CALCIUM CHLORIDE: 600; 310; 30; 20 INJECTION, SOLUTION INTRAVENOUS at 14:31

## 2021-09-20 RX ADMIN — NOREPINEPHRINE BITARTRATE 6.4 MCG: 1 INJECTION, SOLUTION, CONCENTRATE INTRAVENOUS at 16:04

## 2021-09-20 RX ADMIN — BUPIVACAINE HYDROCHLORIDE 4 ML: 2.5 INJECTION, SOLUTION EPIDURAL; INFILTRATION; INTRACAUDAL; PERINEURAL at 15:12

## 2021-09-20 RX ADMIN — SODIUM CHLORIDE: 9 INJECTION, SOLUTION INTRAVENOUS at 20:30

## 2021-09-20 RX ADMIN — LIDOCAINE HYDROCHLORIDE 2 ML: 10 INJECTION, SOLUTION INFILTRATION; PERINEURAL at 15:55

## 2021-09-20 RX ADMIN — NOREPINEPHRINE BITARTRATE 6.4 MCG: 1 INJECTION, SOLUTION, CONCENTRATE INTRAVENOUS at 15:52

## 2021-09-20 RX ADMIN — Medication 0.5 MCG/KG/MIN: at 15:11

## 2021-09-20 RX ADMIN — MIDAZOLAM 1 MG: 1 INJECTION INTRAMUSCULAR; INTRAVENOUS at 16:36

## 2021-09-20 RX ADMIN — NOREPINEPHRINE BITARTRATE 6.4 MCG: 1 INJECTION, SOLUTION, CONCENTRATE INTRAVENOUS at 16:10

## 2021-09-20 RX ADMIN — Medication 1 UNITS: at 16:10

## 2021-09-20 RX ADMIN — BUPIVACAINE HYDROCHLORIDE 4 ML: 2.5 INJECTION, SOLUTION EPIDURAL; INFILTRATION; INTRACAUDAL; PERINEURAL at 15:07

## 2021-09-20 RX ADMIN — NOREPINEPHRINE BITARTRATE 0.03 MCG/KG/MIN: 1 INJECTION, SOLUTION, CONCENTRATE INTRAVENOUS at 15:44

## 2021-09-20 RX ADMIN — NOREPINEPHRINE BITARTRATE 6.4 MCG: 1 INJECTION, SOLUTION, CONCENTRATE INTRAVENOUS at 15:41

## 2021-09-20 RX ADMIN — FENTANYL CITRATE 12.5 MCG: 50 INJECTION, SOLUTION INTRAMUSCULAR; INTRAVENOUS at 15:10

## 2021-09-20 RX ADMIN — NOREPINEPHRINE BITARTRATE 6.4 MCG: 1 INJECTION, SOLUTION, CONCENTRATE INTRAVENOUS at 15:32

## 2021-09-20 RX ADMIN — NOREPINEPHRINE BITARTRATE 0.06 MCG/KG/MIN: 1 INJECTION, SOLUTION, CONCENTRATE INTRAVENOUS at 20:38

## 2021-09-20 RX ADMIN — MIDAZOLAM 1 MG: 1 INJECTION INTRAMUSCULAR; INTRAVENOUS at 15:20

## 2021-09-20 RX ADMIN — Medication 1 UNITS: at 16:48

## 2021-09-20 RX ADMIN — SODIUM CHLORIDE, SODIUM GLUCONATE, SODIUM ACETATE, POTASSIUM CHLORIDE AND MAGNESIUM CHLORIDE: 526; 502; 368; 37; 30 INJECTION, SOLUTION INTRAVENOUS at 15:00

## 2021-09-20 RX ADMIN — PIPERACILLIN AND TAZOBACTAM 3.38 G: 3; .375 INJECTION, POWDER, FOR SOLUTION INTRAVENOUS at 15:25

## 2021-09-20 RX ADMIN — NOREPINEPHRINE BITARTRATE 6.4 MCG: 1 INJECTION, SOLUTION, CONCENTRATE INTRAVENOUS at 15:49

## 2021-09-20 RX ADMIN — BUPIVACAINE HYDROCHLORIDE 8 ML/HR: 7.5 INJECTION, SOLUTION EPIDURAL; RETROBULBAR at 15:09

## 2021-09-20 RX ADMIN — DEXMEDETOMIDINE HYDROCHLORIDE 0.5 MCG/KG/HR: 100 INJECTION, SOLUTION INTRAVENOUS at 15:19

## 2021-09-20 RX ADMIN — NOREPINEPHRINE BITARTRATE 6.4 MCG: 1 INJECTION, SOLUTION, CONCENTRATE INTRAVENOUS at 16:07

## 2021-09-20 RX ADMIN — LEVOFLOXACIN 250 MG: 5 INJECTION, SOLUTION INTRAVENOUS at 15:23

## 2021-09-20 RX ADMIN — Medication 1 UNITS: at 17:25

## 2021-09-20 ASSESSMENT — MIFFLIN-ST. JEOR: SCORE: 1545

## 2021-09-20 NOTE — OR NURSING
Device nurse notified of patient's procedure. Interrogation from August in epic. If cautery is used magnet can be placed and patient will pace asynchronized at 85 bpm. Anesthesia notified.

## 2021-09-20 NOTE — ANESTHESIA PROCEDURE NOTES
Epidural catheter Procedure Note  Pre-Procedure   Staff -        Resident/Fellow: Zackery Araya MD       Performed By: anesthesiologist       Procedure Start/Stop Times: 9/20/2021 2:38 PM and 9/20/2021 2:48 PM       Pre-Anesthestic Checklist: patient identified, IV checked, risks and benefits discussed, informed consent, monitors and equipment checked, pre-op evaluation, at physician/surgeon's request and post-op pain management  Timeout:       Correct Patient: Yes        Correct Procedure: Yes        Correct Site: Yes        Correct Position: Yes   Procedure Documentation  Procedure: epidural catheter       Diagnosis: Surgical block       Patient Position: sitting       Skin prep: Chloraprep       Local skin infiltrated with 2 mL of 1% lidocaine.        Insertion Site: T11-12. (midline approach).       Technique: LORT saline        CASTRO at 4 cm.       Needle Type: Touhy needle       Needle Gauge: 17.        Needle Length (Inches): 3.5        Catheter: 20 G.         Catheter threaded easily.         4 cm epidural space.         Threaded 8 cm at skin.        # of attempts: 1 and  # of redirects:     Assessment/Narrative         Paresthesias: No.      Test dose of 3 mL lidocaine 1.5% w/ 1:200,000 epinephrine at 14:45 CDT.        .       Insertion/Infusion Method: LORT saline       Aspiration negative for Heme or CSF via Epidural Catheter.

## 2021-09-20 NOTE — PROGRESS NOTES
Detailed discussion had with patient, spouse, anasthesia team in preop area regarding risks, benefits, alternatives.  Reviewe ureteroscopic and percutaneous options for stone treatment vs chronic stent vs nephrostomy tube.  He understands that he is very high risk for anesthetic related complications inlcluding but not limited to bleeding, infection, damage to adjacent organs, organ failure, inability to extubate and mortality.  Ultimately shared plan made to proceed with supine approach under local.  If not amenable will plan to change neph tube alone.

## 2021-09-20 NOTE — ANESTHESIA PROCEDURE NOTES
Arterial Line Procedure Note  Pre-Procedure   Staff -        Anesthesiologist:  Christina Wiley MD       Performed By: anesthesiologist       Location: OR  Procedure   Procedure: arterial line       Laterality: right       Insertion Site: radial.  Sterile Prep        Skin prep: Chloraprep  Insertion/Injection        Catheter Type/Size: 20 G, 1.75 in/4.5 cm quick cath (integral wire)  Narrative        Complications: None apparent,        Arterial waveform: Yes        IBP within 10% of NIBP: Yes

## 2021-09-20 NOTE — BRIEF OP NOTE
[facility]    Brief Operative Note    Pre-operative diagnosis: Kidney stone [N20.0]  Post-operative diagnosis Same as pre-operative diagnosis    Procedure: Procedure(s):  NEPHROLITHOTOMY, PERCUTANEOUS, USING HOLMIUM LASER  Surgeon: Surgeon(s) and Role:     * Zackery Moura MD - Primary     * Aicha Enciso MD - Resident - Assisting  Anesthesia: General   Estimated blood loss: 50 mL  Drains: 6 Fr x 26 cm double J LEFT ureteral stent; 16 fr will  Specimens: * No specimens in log *  Findings:   see operative report.  Complications: None.  Implants:   Implant Name Type Inv. Item Serial No.  Lot No. LRB No. Used Action   STENT URETERAL PERCUFLEX PLUS [id number] - [id number] Stent STENT URETERAL PERCUFLEX PLUS [id number]  [employer] [id number] Left 1 Implanted       Plan  Obs urology overnight  Continue periop abx while in house  Cipro through Friday upon discharge  CT in AM  TOV in AM  Follow up pending CT findings

## 2021-09-20 NOTE — OP NOTE
OPERATIVE REPORT 9/20    PREOPERATIVE DIAGNOSIS: impacted left ureteral stone    POSTOPERATIVE DIAGNOSIS: stone    PROCEDURES PERFORMED:   1. Antegrade nephrostogram.   2. Removal of indwelling nephrostomy tube under fluoroscopic guidance.   3. Percutaneous nephrolithotomy, stone burden greater than 2 cm.   4. Laser lithotripsy and basket stone extraction  5. Use of C-arm and interpretation of fluoroscopic imaging.   6. 6-Belarusian x 26 cm LEFT double J stent placement.     STAFF SURGEON: Zackery Moura MD.     : Aicha Enciso MD     ESTIMATED BLOOD LOSS:  50 mL    Case Considerations: Modifier 22.  Patient deemed extremely high risk for surgery and per discussion with anasthesia proceeded with PCNL while awake under epidural and in supine position (usually performed prone and under general anasthesia).  This added two to three times additional time and effort relative to what would typically be anticipated for such a procedure.    SPECIMENS: Stone fragment sent for culture as well as stone fragments sent for analysis.     SIGNIFICANT FINDINGS: Incredibly dense, impacted ureteral stone successfully fragmented and removed with laser and basket. The patient has one remaining left renal stone in a calyceal diverticulum, otherwise is visually stone free on the leflt at the end of the procedure. MAx stone size > 2cm    BRIEF OPERATIVE INDICATIONS: Jimmy Isaac is a 59 yo M with highly complex medical history notable for advanced pulmonary sarcoid, renal failure, bilateral renal stones, pulmonary hypertension with indwelling left nephrostomy tube placed 2 months ago for a large proximal left ureteral stone. His surgery was delayed due to new heart block, he is now s/p pacemaker placement. Patient had extensive preoperative workup and clearance and given his pulmonary status, this procedure was planned under epidural and local anesthesia.    PROCEDURE PERFORMED: After identifying and marking the  patient in the preoperative holding area, the patient was brought to the operating room and placed in supine position. Preoperative timeout was then completed to verify the patient and procedure to be performed. Once adequate epidural anesthesia was obtained, the patient was placed in modified supine position with his left side propped up and all the pressure points were well supported. Subsequently, the patient was then prepped and draped in a standard sterile fashion.     We began our procedure by doing the antegrade nephrostogram to delineate the anatomy of the renal pelvis. This revealed moderate hydronephrosis and no passage of contrast past the stone consistent with prior. Images were saved. We then placed a sensor guidewire into the left renal pelvis under fluoroscopic guidance and cut the stitch and removed the percutaneous nephrostomy tube. After removing the nephrostomy tube, we placed a JB1 catheter over the sensor guidewire and exchanged this for a glidwire which we attempted to navigate down the ureter. This was difficult due to the large impacted stone, but we were ultimately able to pass a glide wire into the bladder. We exchanged the glidewire for a superstiff and used a flexible cystoscope and halo basket to establish through and through access. The bladder appeared normal without lesions, stones, or diverticuli and the urethra was unremarkable.    We then advanced a dual lumen catheter over our superstiff wire and passed a sensor wire into the bladder under fluoro to act as our safety wire.    We then advanced the 24Fr nephromax balloon over the superstiff wire to dilate the access tract under fluoroscopy. There was a prominent waist at the fascia presumably due to PNT tract fibrosis. We administered additional local anesthesia and used a josie clamp to spread the tract. We were then able to pass the access sheath over the balloon under fluoroscopy.    We advanced our flexible cystoscope into the  renal pelvis which appeared unremarkable. We visualized the large impacted stone in the proximal ureter. We then used the 100 W holmium laser on settings of 1 J and 15 Hz to fragment this impressively dense, impacted stone. A halo basket was used to retrieve small fragments, as well as to remove the adhered stone from the urothelium. Fragments were passed off for analysis and culture. The underlying mucosa appeared healthy and the ureter distal to where the stone had been appeared patent.    We then used our flexible cystoscope, laser, and basket to remove two additional left renal stones. On fluoroscopy there was a persistent stone visualized; this was correlated to a calyceal diverticulum in the upper pole which we were unable to access.    We again performed serial pyeloscopy and found the patient to be free of all stones other than the calyceal diverticulum. Due to patient's co-morbidities and fact that this stone will not cause obstruction, we opted to leave this in place. We repeated antegrade nephrostogram which now revealed a patent ureter with moderate hydro and no filling defects.    A 6-Bengali x 26 cm double-J stent was placed in retrograde fashion with fluoroscopic guidance and direct vision. Proximal curl in the upper pole was confirmed with direct vision and distal curl in the bladder was confirmed fluoroscopically. A 16 Fr will catheter was placed. We closed the PCN site with 3-0 Vicryl suture and then secured with a gauze and tegaderm dressing. This concluded our procedure. The patient was awakened from anesthesia, brought to the recovery room without any immediate postoperative complication.       Plan: Patient required pressors intraoperatively to maintain his blood pressure, he will therefore be admitted to SICU where they will wean pressors as able. CT in AM to confirm clearance of stone burden. Continue zosyn in house, home on Ciprofloxacin. Trial of void in AM and stent removal pending CT  results.    I, Zackery Moura, was present and participatory for the entirety of the procedure

## 2021-09-21 ENCOUNTER — APPOINTMENT (OUTPATIENT)
Dept: GENERAL RADIOLOGY | Facility: CLINIC | Age: 61
DRG: 660 | End: 2021-09-21
Attending: STUDENT IN AN ORGANIZED HEALTH CARE EDUCATION/TRAINING PROGRAM
Payer: COMMERCIAL

## 2021-09-21 ENCOUNTER — APPOINTMENT (OUTPATIENT)
Dept: CT IMAGING | Facility: CLINIC | Age: 61
DRG: 660 | End: 2021-09-21
Attending: STUDENT IN AN ORGANIZED HEALTH CARE EDUCATION/TRAINING PROGRAM
Payer: COMMERCIAL

## 2021-09-21 LAB
ALBUMIN SERPL-MCNC: 2.7 G/DL (ref 3.4–5)
ALP SERPL-CCNC: 63 U/L (ref 40–150)
ALT SERPL W P-5'-P-CCNC: 13 U/L (ref 0–70)
ANION GAP SERPL CALCULATED.3IONS-SCNC: 9 MMOL/L (ref 3–14)
AST SERPL W P-5'-P-CCNC: 14 U/L (ref 0–45)
BILIRUB SERPL-MCNC: 0.6 MG/DL (ref 0.2–1.3)
BUN SERPL-MCNC: 55 MG/DL (ref 7–30)
CALCIUM SERPL-MCNC: 9.3 MG/DL (ref 8.5–10.1)
CHLORIDE BLD-SCNC: 101 MMOL/L (ref 94–109)
CO2 SERPL-SCNC: 27 MMOL/L (ref 20–32)
CREAT SERPL-MCNC: 2.83 MG/DL (ref 0.66–1.25)
ERYTHROCYTE [DISTWIDTH] IN BLOOD BY AUTOMATED COUNT: 13.2 % (ref 10–15)
GFR SERPL CREATININE-BSD FRML MDRD: 23 ML/MIN/1.73M2
GLUCOSE BLD-MCNC: 188 MG/DL (ref 70–99)
GLUCOSE BLDC GLUCOMTR-MCNC: 97 MG/DL (ref 70–99)
GLUCOSE BLDC GLUCOMTR-MCNC: 97 MG/DL (ref 70–99)
HCT VFR BLD AUTO: 30 % (ref 40–53)
HGB BLD-MCNC: 9.7 G/DL (ref 13.3–17.7)
INR PPP: 1.16 (ref 0.85–1.15)
LACTATE SERPL-SCNC: 1.7 MMOL/L (ref 0.7–2)
MCH RBC QN AUTO: 28.3 PG (ref 26.5–33)
MCHC RBC AUTO-ENTMCNC: 32.3 G/DL (ref 31.5–36.5)
MCV RBC AUTO: 88 FL (ref 78–100)
PLATELET # BLD AUTO: 184 10E3/UL (ref 150–450)
POTASSIUM BLD-SCNC: 3.9 MMOL/L (ref 3.4–5.3)
PROT SERPL-MCNC: 6.5 G/DL (ref 6.8–8.8)
RBC # BLD AUTO: 3.43 10E6/UL (ref 4.4–5.9)
SODIUM SERPL-SCNC: 137 MMOL/L (ref 133–144)
WBC # BLD AUTO: 7.8 10E3/UL (ref 4–11)

## 2021-09-21 PROCEDURE — 258N000003 HC RX IP 258 OP 636

## 2021-09-21 PROCEDURE — 85610 PROTHROMBIN TIME: CPT | Performed by: STUDENT IN AN ORGANIZED HEALTH CARE EDUCATION/TRAINING PROGRAM

## 2021-09-21 PROCEDURE — 200N000002 HC R&B ICU UMMC

## 2021-09-21 PROCEDURE — 36415 COLL VENOUS BLD VENIPUNCTURE: CPT | Performed by: STUDENT IN AN ORGANIZED HEALTH CARE EDUCATION/TRAINING PROGRAM

## 2021-09-21 PROCEDURE — 258N000003 HC RX IP 258 OP 636: Performed by: STUDENT IN AN ORGANIZED HEALTH CARE EDUCATION/TRAINING PROGRAM

## 2021-09-21 PROCEDURE — 250N000011 HC RX IP 250 OP 636: Performed by: STUDENT IN AN ORGANIZED HEALTH CARE EDUCATION/TRAINING PROGRAM

## 2021-09-21 PROCEDURE — 36415 COLL VENOUS BLD VENIPUNCTURE: CPT | Performed by: UROLOGY

## 2021-09-21 PROCEDURE — 250N000009 HC RX 250: Performed by: STUDENT IN AN ORGANIZED HEALTH CARE EDUCATION/TRAINING PROGRAM

## 2021-09-21 PROCEDURE — 74176 CT ABD & PELVIS W/O CONTRAST: CPT | Mod: 26 | Performed by: RADIOLOGY

## 2021-09-21 PROCEDURE — 87086 URINE CULTURE/COLONY COUNT: CPT | Performed by: STUDENT IN AN ORGANIZED HEALTH CARE EDUCATION/TRAINING PROGRAM

## 2021-09-21 PROCEDURE — 87040 BLOOD CULTURE FOR BACTERIA: CPT | Performed by: UROLOGY

## 2021-09-21 PROCEDURE — 74176 CT ABD & PELVIS W/O CONTRAST: CPT

## 2021-09-21 PROCEDURE — 250N000013 HC RX MED GY IP 250 OP 250 PS 637

## 2021-09-21 PROCEDURE — 999N000128 HC STATISTIC PERIPHERAL IV START W/O US GUIDANCE

## 2021-09-21 PROCEDURE — 71045 X-RAY EXAM CHEST 1 VIEW: CPT | Mod: 26 | Performed by: RADIOLOGY

## 2021-09-21 PROCEDURE — 99291 CRITICAL CARE FIRST HOUR: CPT | Mod: 24 | Performed by: SURGERY

## 2021-09-21 PROCEDURE — 999N000157 HC STATISTIC RCP TIME EA 10 MIN

## 2021-09-21 PROCEDURE — 80053 COMPREHEN METABOLIC PANEL: CPT | Performed by: STUDENT IN AN ORGANIZED HEALTH CARE EDUCATION/TRAINING PROGRAM

## 2021-09-21 PROCEDURE — 250N000013 HC RX MED GY IP 250 OP 250 PS 637: Performed by: STUDENT IN AN ORGANIZED HEALTH CARE EDUCATION/TRAINING PROGRAM

## 2021-09-21 PROCEDURE — 71045 X-RAY EXAM CHEST 1 VIEW: CPT

## 2021-09-21 PROCEDURE — 94640 AIRWAY INHALATION TREATMENT: CPT

## 2021-09-21 PROCEDURE — 250N000013 HC RX MED GY IP 250 OP 250 PS 637: Performed by: UROLOGY

## 2021-09-21 PROCEDURE — 258N000003 HC RX IP 258 OP 636: Performed by: ANESTHESIOLOGY

## 2021-09-21 PROCEDURE — 85027 COMPLETE CBC AUTOMATED: CPT | Performed by: STUDENT IN AN ORGANIZED HEALTH CARE EDUCATION/TRAINING PROGRAM

## 2021-09-21 PROCEDURE — 250N000009 HC RX 250: Performed by: ANESTHESIOLOGY

## 2021-09-21 PROCEDURE — 83605 ASSAY OF LACTIC ACID: CPT | Performed by: STUDENT IN AN ORGANIZED HEALTH CARE EDUCATION/TRAINING PROGRAM

## 2021-09-21 RX ORDER — NALOXONE HYDROCHLORIDE 0.4 MG/ML
0.4 INJECTION, SOLUTION INTRAMUSCULAR; INTRAVENOUS; SUBCUTANEOUS
Status: DISCONTINUED | OUTPATIENT
Start: 2021-09-21 | End: 2021-09-23 | Stop reason: HOSPADM

## 2021-09-21 RX ORDER — DEXTROSE MONOHYDRATE 25 G/50ML
25-50 INJECTION, SOLUTION INTRAVENOUS
Status: DISCONTINUED | OUTPATIENT
Start: 2021-09-21 | End: 2021-09-23 | Stop reason: HOSPADM

## 2021-09-21 RX ORDER — PIPERACILLIN SODIUM, TAZOBACTAM SODIUM 3; .375 G/15ML; G/15ML
3.38 INJECTION, POWDER, LYOPHILIZED, FOR SOLUTION INTRAVENOUS EVERY 6 HOURS
Status: COMPLETED | OUTPATIENT
Start: 2021-09-21 | End: 2021-09-21

## 2021-09-21 RX ORDER — ONDANSETRON 2 MG/ML
4 INJECTION INTRAMUSCULAR; INTRAVENOUS EVERY 6 HOURS PRN
Status: DISCONTINUED | OUTPATIENT
Start: 2021-09-21 | End: 2021-09-23 | Stop reason: HOSPADM

## 2021-09-21 RX ORDER — AMBRISENTAN 10 MG/1
10 TABLET, FILM COATED ORAL DAILY
Status: DISCONTINUED | OUTPATIENT
Start: 2021-09-21 | End: 2021-09-23 | Stop reason: HOSPADM

## 2021-09-21 RX ORDER — HEPARIN SODIUM 5000 [USP'U]/.5ML
5000 INJECTION, SOLUTION INTRAVENOUS; SUBCUTANEOUS EVERY 8 HOURS
Status: DISCONTINUED | OUTPATIENT
Start: 2021-09-21 | End: 2021-09-21

## 2021-09-21 RX ORDER — ONDANSETRON 4 MG/1
4 TABLET, ORALLY DISINTEGRATING ORAL EVERY 6 HOURS PRN
Status: DISCONTINUED | OUTPATIENT
Start: 2021-09-21 | End: 2021-09-23 | Stop reason: HOSPADM

## 2021-09-21 RX ORDER — NICOTINE POLACRILEX 4 MG
15-30 LOZENGE BUCCAL
Status: DISCONTINUED | OUTPATIENT
Start: 2021-09-21 | End: 2021-09-21

## 2021-09-21 RX ORDER — DEXTROSE MONOHYDRATE 25 G/50ML
25-50 INJECTION, SOLUTION INTRAVENOUS
Status: DISCONTINUED | OUTPATIENT
Start: 2021-09-21 | End: 2021-09-21

## 2021-09-21 RX ORDER — NALOXONE HYDROCHLORIDE 0.4 MG/ML
0.2 INJECTION, SOLUTION INTRAMUSCULAR; INTRAVENOUS; SUBCUTANEOUS
Status: DISCONTINUED | OUTPATIENT
Start: 2021-09-21 | End: 2021-09-23 | Stop reason: HOSPADM

## 2021-09-21 RX ORDER — NICOTINE POLACRILEX 4 MG
15-30 LOZENGE BUCCAL
Status: DISCONTINUED | OUTPATIENT
Start: 2021-09-21 | End: 2021-09-23 | Stop reason: HOSPADM

## 2021-09-21 RX ADMIN — SODIUM CHLORIDE, POTASSIUM CHLORIDE, SODIUM LACTATE AND CALCIUM CHLORIDE 1000 ML: 600; 310; 30; 20 INJECTION, SOLUTION INTRAVENOUS at 03:45

## 2021-09-21 RX ADMIN — ACETAMINOPHEN 650 MG: 325 TABLET, FILM COATED ORAL at 02:39

## 2021-09-21 RX ADMIN — ALBUTEROL SULFATE 2 PUFF: 90 AEROSOL, METERED RESPIRATORY (INHALATION) at 03:08

## 2021-09-21 RX ADMIN — ACETAMINOPHEN 650 MG: 325 TABLET, FILM COATED ORAL at 09:42

## 2021-09-21 RX ADMIN — AMBRISENTAN 10 MG: 10 TABLET, FILM COATED ORAL at 16:41

## 2021-09-21 RX ADMIN — SELEXIPAG 1600 MCG: 200 TABLET, COATED ORAL at 18:14

## 2021-09-21 RX ADMIN — PIPERACILLIN SODIUM AND TAZOBACTAM SODIUM 3.38 G: 3; .375 INJECTION, POWDER, LYOPHILIZED, FOR SOLUTION INTRAVENOUS at 18:14

## 2021-09-21 RX ADMIN — PIPERACILLIN SODIUM AND TAZOBACTAM SODIUM 3.38 G: 3; .375 INJECTION, POWDER, LYOPHILIZED, FOR SOLUTION INTRAVENOUS at 23:23

## 2021-09-21 RX ADMIN — ACETAMINOPHEN 650 MG: 325 TABLET, FILM COATED ORAL at 16:48

## 2021-09-21 RX ADMIN — ALBUTEROL SULFATE 2 PUFF: 90 AEROSOL, METERED RESPIRATORY (INHALATION) at 22:38

## 2021-09-21 RX ADMIN — SODIUM CHLORIDE, POTASSIUM CHLORIDE, SODIUM LACTATE AND CALCIUM CHLORIDE 500 ML: 600; 310; 30; 20 INJECTION, SOLUTION INTRAVENOUS at 10:06

## 2021-09-21 RX ADMIN — PIPERACILLIN SODIUM AND TAZOBACTAM SODIUM 3.38 G: 3; .375 INJECTION, POWDER, LYOPHILIZED, FOR SOLUTION INTRAVENOUS at 01:52

## 2021-09-21 RX ADMIN — ACETAMINOPHEN 650 MG: 325 TABLET, FILM COATED ORAL at 23:25

## 2021-09-21 RX ADMIN — PIPERACILLIN SODIUM AND TAZOBACTAM SODIUM 3.38 G: 3; .375 INJECTION, POWDER, LYOPHILIZED, FOR SOLUTION INTRAVENOUS at 08:47

## 2021-09-21 RX ADMIN — SODIUM CHLORIDE: 9 INJECTION, SOLUTION INTRAVENOUS at 00:33

## 2021-09-21 RX ADMIN — ALBUTEROL SULFATE 2 PUFF: 90 AEROSOL, METERED RESPIRATORY (INHALATION) at 16:41

## 2021-09-21 RX ADMIN — BUDESONIDE 0.25 MG: 0.25 INHALANT RESPIRATORY (INHALATION) at 21:24

## 2021-09-21 RX ADMIN — NOREPINEPHRINE BITARTRATE 0.03 MCG/KG/MIN: 1 INJECTION, SOLUTION, CONCENTRATE INTRAVENOUS at 10:06

## 2021-09-21 RX ADMIN — SODIUM CHLORIDE: 9 INJECTION, SOLUTION INTRAVENOUS at 13:49

## 2021-09-21 ASSESSMENT — ACTIVITIES OF DAILY LIVING (ADL)
ADLS_ACUITY_SCORE: 16
ADLS_ACUITY_SCORE: 6
ADLS_ACUITY_SCORE: 14
ADLS_ACUITY_SCORE: 6

## 2021-09-21 NOTE — PROGRESS NOTES
Patient does not wear a CPAP or a BIPAP at home and does not want it here; therefore, it was not set up.

## 2021-09-21 NOTE — H&P
SURGICAL ICU ADMISSION NOTE  9/21/2021    PRIMARY TEAM: Urology  PRIMARY PHYSICIAN: Dr. Moura  Critical Care Services Progress Note:     Jimmy Isaac remains critically ill with hypotension requiring norepinephrine after a lithotripsy procedure.  Clearly at risk for urosepsis.  Admitted on norepinephrine infusion.  Has an epidural catheter for analgesia.   I personally examined and evaluated the patient today.   The patient s prognosis today is improved as he does not show signs of urosepsis, peritonitis or dangerous hypotension.  Vasoactive drugs are being weaned.    I have evaluated all laboratory values and imaging studies from the past 24 hours.  Key findings and decisions made today included encouraging progress from a physical standpoint without evidence of peritonitis or urosepsis.  I personally managed the analgesia, sedation and chronic medications.  Consults ongoing and ordered are Nutrition, Pharmacy and Therapies.  Procedures that will happen today are removal of the epidural catheter and resumption of patient's chronic medications..  All treatments were placed at my direction.  I formulated today s plan with Dr. Davison and the house staff team or resident(s) and agree with the findings and plan in the associated note.       The above plans and care have been discussed with family members as available and all questions and concerns were addressed.     I spent a total of 30 minutes (excluding procedure time) personally providing and directing critical care services at the bedside and on the critical care unit for Jimmy Isaac.        Jimmy Bender MD        REASON FOR CRITICAL CARE ADMISSION: Hypotension requiring vasopresors, hemodynamic monitoring  CONSULTING PHYSICIAN: Dr. Perlman    ASSESSMENT: 61 yo male with h/o HTN, complete heart block s/p pacemaker, pulmonary hypertension 2/2 pulmonary sarcoidosis, on 5-10 L O2 at home, CKD, kidney stones, prior L nephrostomy tube. Underwent lithotripsy  of L kidney stone 9/20, epidural placed, hypotensive post-op requiring low dose levophed and admitted to SICU for hemodynamic monitoring and support.    PLAN:  Neuro/ pain/ sedation:  - Monitor neurological status. Notify the MD for any acute changes in exam.  - tylenol, oxycodone, dilaudid for pain.  - epidural catheter removed    Pulmonary care:   #Pulmonary artery hypertension  #Pulmonary sarcoidosis  On 5 L O2 at baseline, up to 10 L when walking. Currently 7 L here  -Supplemental oxygen to keep saturation above 92 %.  - Resume PTA ambrisentan 10mg daily  - If stable and still in ICU, resume PTA Selexipag for PAH this PM  - Once hemodynamically stable, resume Tadalafil 40mg daily  - PTA budesonide, albuterol as needed    Cardiovascular:    #Comlete heart block s/p pacemaker  #Shock, vasoplegic vs. Septic  Arrivedon 0.1 levophed, down to 0.03-0.05 overnight. Thought to be vasoplegic with epidural in place however epidural currently out. Currently still requiring levophed at 0.3  - 1L LR o/n  - Give LR 1 L again this AM  - Continue to monitor    GI care:   No acute concerns  - ADAT    Fluids/ Electrolytes/ Nutrition:   No acute issues.   -ICU electrolyte replacement protocol  -No indication for parenteral nutrition.    Renal/ Fluid Balance:    #H/o kidney stones  #CKD  S/p L kidney stone lithotripsy. L nephrostomy tube removed during procedure  Chong was removed- patient voided well on own  - Will continue to monitor intake and output.  - Pt refusing flomax- endorsing dizziness at home when he take it    Endocrine:    No acute concerns. POC glucose 188  - low dose sliding scale as needed    ID/ Antibiotics:  Last positive Ucx with pansusceptible PsAr. Completed Zosyn this AM  Cultures were sent in the setting of pressor requirements and prior infection.   Pt remains afebrile.  - Continue to monitor fever and WBC  - Follow up UCx and BCx     Heme:     #Anemia of chronic disease  No acute concerns  -Hemoglobin 9.7,  plts 184, INR 1.16    Prophylaxis:    - Mechanical prophylaxis for DVT.   - Hold chemical DVT prophylaxis for now per Urology     MSK:    - PT and OT consulted. Appreciate recs.     Lines/ tubes/ drains:  -arterial line, PIV     Disposition:  -Surgical ICU.     Patient seen, findings and plan discussed with surgical ICU staff.    Mitch Avelar MD  Surgery resident  P [phone]    Addended this PM by Juan Davison MD, PhD  Neurosurgery PGY-1  - - - - - - - - - - - - - - - - - - - - - - - - - - - - - - - - - - - - - - - - - - - - - - - - - - - - - - - - - - - - -     HISTORY PRESENTING ILLNESS: 59 yo male with h/o HTN, complete heart block s/p pacemaker, pulmonary hypertension 2/2 pulmonary sarcoidosis, on 5-10 L O2 a shaye, CKD, kidney stones, prior L nephrostomy tube. Underwent lithotripsy of L kidney stone 9/20, epidural placed, hypotensive post-op requiring low dose levophed and admitted to SICU for hemodynamic monitoring and support.      REVIEW OF SYSTEMS: 10 point ROS neg other than the symptoms noted above in the HPI.      PAST MEDICAL HISTORY:    has a past medical history of Chronic sinusitis, CKD (chronic kidney disease), Heart disease, Hypertension, Keratosis, Malignant neoplasm (H), Pneumonia (a few times), and Pulmonary sarcoidosis (H). He also has no past medical history of Asymptomatic human immunodeficiency virus (HIV) infection status (H), Blood in semen, Cerebral palsy (H), Complication of anesthesia, Congenital renal agenesis and dysgenesis, Epididymitis, bilateral, Epididymitis, left, Epididymitis, right, Goiter, Gout, Hernia, abdominal, History of spinal cord injury, History of thrombophlebitis, Horseshoe kidney, Hydrocephalus (H), Malignant hyperthermia, Mumps, Orchitis, epididymitis, and epididymo-orchitis, with abscess, Palpitations, Parkinsons disease (H), Penile discharge, Prostate infection, Spider veins, Spina bifida (H), STD (sexually transmitted disease), Swelling of testicle, Tethered cord  (H), or Tuberculosis.    SURGICAL HISTORY:    has a past surgical history that includes colonoscopy; ENT surgery; Tonsillectomy (1964); Right Heart Cath (N/A, 6/19/2019); Right Heart Cath (N/A, 12/11/2019); IR Nephrostomy Tube Placement Left (7/9/2021); Electrophysiology Pacemaker (Left, 8/2/2021); and Right Heart Cath (N/A, 7/30/2021).    SOCIAL HISTORY:    reports that he quit smoking about 11 years ago. His smoking use included cigarettes. He started smoking about 45 years ago. He has a 30.00 pack-year smoking history. He has never used smokeless tobacco. He reports previous alcohol use. He reports previous drug use.    FAMILY HISTORY: No bleeding/clotting disorders nor problems with anesthesia.     ALLERGIES:      Allergies   Allergen Reactions     Nkda [No Known Drug Allergies]        MEDICATIONS:  No current facility-administered medications on file prior to encounter.  ambrisentan (LETAIRIS) 10 MG tablet, Take 10 mg by mouth every morning   budesonide (PULMICORT) 0.25 MG/2ML neb solution, Take 2 mLs (0.25 mg) by nebulization 2 times daily  PROAIR  (90 Base) MCG/ACT inhaler, Inhale 2 puffs into the lungs every 6 hours as needed for shortness of breath / dyspnea  selexipag (UPTRAVI) 1600 MCG tablet, Take 1 tablet (1,600 mcg) by mouth every 12 hours  tadalafil, PAH, (ALYQ) 20 MG TABS, Take 2 tablets (40 mg) by mouth daily (Patient taking differently: Take 40 mg by mouth every morning )  tamsulosin (FLOMAX) 0.4 MG capsule, Take 1 capsule (0.4 mg) by mouth daily  UNABLE TO FIND, MEDICATION NAME: Topical skin tag remover        PHYSICAL EXAMINATION:  Temp:  [97.3  F (36.3  C)-99  F (37.2  C)] 99  F (37.2  C)  Pulse:  [28-98] 87  Resp:  [14-22] 16  BP: ()/(52-72) 123/72  MAP:  [54 mmHg-99 mmHg] 67 mmHg  Arterial Line BP: ()/(39-76) 98/52  SpO2:  [90 %-100 %] 94 %      Gen: no acute distress, resting comfortably in bed  HEENT: Atraumatic, normocephalic  Neuro: Alert and oriented. No gross  neurologic deficits   Pulm: nonlabored breathing on 7 L O2, no cough  CV: RRR by radial pulse, noncyanotic   ABD:soft, nontender, nondistended  MSK:  Normal active range of motion, no edema  Skin: Warm, dry, no rashes or abrasions on exposed skin  Psych: Normal affect, cooperative      LABS: Reviewed.   Arterial Blood Gases   No lab results found in last 7 days.  Complete Blood Count   Recent Labs   Lab 09/21/21 0253 09/14/21  1432   WBC 7.8 6.3   HGB 9.7* 10.1*    154     Basic Metabolic Panel  Recent Labs   Lab 09/21/21 0253 09/20/21  2251 09/20/21  1143 09/14/21  1432     --   --  137   POTASSIUM 3.9  --   --  3.4   CHLORIDE 101  --   --  97   CO2 27  --   --  31   BUN 55*  --   --  65*   CR 2.83*  --   --  3.18*   * 146* 81 98     Liver Function Tests  Recent Labs   Lab 09/21/21 0253   AST 14   ALT 13   ALKPHOS 63   BILITOTAL 0.6   ALBUMIN 2.7*   INR 1.16*     Pancreatic Enzymes  No lab results found in last 7 days.  Coagulation Profile  Recent Labs   Lab 09/21/21 0253   INR 1.16*     Lactate  Invalid input(s): LACTATE    IMAGING:  Recent Results (from the past 24 hour(s))   XR Surgery YUNIOR G/T 5 Min Fluoro w Stills    Narrative    This exam was marked as non-reportable because it will not be read by a   radiologist or a [place] non-radiologist provider.         XR Chest Port 1 View    Impression    RESIDENT PRELIMINARY INTERPRETATION  IMPRESSION: Negative for pulmonary edema.

## 2021-09-21 NOTE — PROGRESS NOTES
Admitted/transferred from: PACU  Reason for admission/transfer: Pressor needs  Patient status upon admission/transfer: A/Ox4, 6L O2NC  Plan: BP support overnight   2 RN skin assessment: completed by Jairo OCLMENARES RN and Danelle RADFORD RN   Result of skin assessment and interventions/actions: Surgical site to Lt flank. No other open areas/areas of concern.   Height, weight, drug calc weight: Done  Patient belongings (see Flowsheet - Adult Profile for details): Phone and wallet secured in bedside safe. Clothing/shoes in closet.

## 2021-09-21 NOTE — ANESTHESIA CARE TRANSFER NOTE
Patient: Jimmy Isaac    Procedure(s):  NEPHROLITHOTOMY, PERCUTANEOUS, USING HOLMIUM LASER    Diagnosis: Kidney stone [N20.0]  Diagnosis Additional Information: No value filed.    Anesthesia Type:   General     Note:    Oropharynx: oropharynx clear of all foreign objects  Level of Consciousness: awake  Oxygen Supplementation: face mask  Level of Supplemental Oxygen (L/min / FiO2): 8  Independent Airway: airway patency satisfactory and stable  Dentition: dentition unchanged  Vital Signs Stable: post-procedure vital signs reviewed and stable  Report to RN Given: handoff report given  Patient transferred to: PACU    Handoff Report: Identifed the Patient, Identified the Reponsible Provider, Reviewed the pertinent medical history, Discussed the surgical course, Reviewed Intra-OP anesthesia mangement and issues during anesthesia, Set expectations for post-procedure period and Allowed opportunity for questions and acknowledgement of understanding      Vitals:  Vitals Value Taken Time   BP     Temp     Pulse     Resp     SpO2         Electronically Signed By: ALLI Weinberg CRNA  September 20, 2021  7:19 PM

## 2021-09-21 NOTE — PROGRESS NOTES
REGIONAL ANESTHESIA PAIN SERVICE FOLLOW UP NOTE  Jimmy Isaac is a 60 year old male POD #1 s/p lithotripsy of L) kidney stone on and with MAC and epidural catheter placed T10-11 level for OR anesthesia.  Catheter remained capped without infusion postoperatively.    Subjective and Interval History: Overnight no events.  Patient denies pain.      Antithrombotic/Thrombolytic Therapy: None ordered    Medications related to Pain Management (From now, onward)    Start     Dose/Rate Route Frequency Ordered Stop    09/21/21 0000  polyethylene glycol (MIRALAX) Packet 17 g      17 g Oral 2 TIMES DAILY 09/20/21 2358 09/20/21 2357  oxyCODONE (ROXICODONE) tablet 5 mg      5 mg Oral EVERY 4 HOURS PRN 09/20/21 2357 09/20/21 2357  oxyCODONE IR (ROXICODONE) tablet 10 mg      10 mg Oral EVERY 4 HOURS PRN 09/20/21 2357 09/20/21 2357  acetaminophen (TYLENOL) tablet 650 mg      650 mg Oral EVERY 6 HOURS PRN 09/20/21 2357 09/20/21 2357  HYDROmorphone (DILAUDID) injection 0.2 mg      0.2 mg Intravenous EVERY 2 HOURS PRN 09/20/21 2357 09/20/21 2357  HYDROmorphone (DILAUDID) injection 0.4 mg      0.4 mg Intravenous EVERY 2 HOURS PRN 09/20/21 2357 09/20/21 2357  lidocaine 1 % 0.1-1 mL      0.1-1 mL Other EVERY 1 HOUR PRN 09/20/21 2357 09/20/21 2357  lidocaine (LMX4) cream       Topical EVERY 1 HOUR PRN 09/20/21 2357              Objective  Lab Results     Recent Labs   Lab Test 09/21/21  0253   WBC 7.8   RBC 3.43*   HGB 9.7*   HCT 30.0*   MCV 88   MCH 28.3   MCHC 32.3   RDW 13.2        Lab Results   Component Value Date    INR 1.16 09/21/2021    INR 1.18 03/09/2012    INR 1.15 03/08/2012    INR 1.15 01/12/2012       /72 (BP Location: Left arm)   Pulse 90   Temp 99  F (37.2  C) (Oral)   Resp 16   Ht 1.829 m (6')   Wt 70.2 kg (154 lb 12.2 oz)   SpO2 99%   BMI 20.99 kg/m       Exam:  Constitutional: alert and no distress  Neuro/MSK:  Strength BLE 5/5  and overall symmetric  Skin: epidural  catheter site c/d/i, no tenderness at insertion site, erythema, heme, edema.      Assessment  Patient currently achieving adequate pain control.  Scheduled for CT this morning. No LE weakness or paresthesias.     Plan    1010. Epidural catheter removed without complication, dark tip intact.  Patient describes stinging sensation during catheter removal, that resolved after catheter out from skin.  Insertion site c/d/i. Small bandage applied    RAPS will sign off    Thank you for allowing us the opportunity to participate in the care of Jimmy Isaac  - discussed plan with attending anesthesiologist    ALLI Kaplan [facility]   Regional Anesthesia Pain Service    RAPS Contact Info (for in-house use only):  Job code ID: [id number] [id number]   [place] 0599  Peds [phone]  Jesus phone: dial * * * [phone], enter jobcode ID, then enter call-back number.    Text: Use [employer] on the Intranet <Paging/Directory> tab and enter Jobcode ID.   If no call back at any time, contact the hospital  and ask for RAPS attending or backup

## 2021-09-21 NOTE — ANESTHESIA POSTPROCEDURE EVALUATION
Patient: Jimmy Isaac    Procedure(s):  NEPHROLITHOTOMY, PERCUTANEOUS, USING HOLMIUM LASER    Diagnosis:Kidney stone [N20.0]  Diagnosis Additional Information: No value filed.    Anesthesia Type:  Epidural, MAC    Note:  Disposition: Inpatient   Postop Pain Control: Uneventful            Sign Out: Well controlled pain   PONV:    Neuro/Psych: Uneventful            Sign Out: Acceptable/Baseline neuro status   Airway/Respiratory: Uneventful            Sign Out: Acceptable/Baseline resp. status   CV/Hemodynamics:             Events: Refractory hypOtension   Other NRE:    DID A NON-ROUTINE EVENT OCCUR? YES    Event details/Postop Comments:   59 yo male with h/o HTN, complete heart block s/p pacemaker, pulmonary hypertension 2/2 pulmonary sarcoidosis, on 5-10 L O2 a shaye, CKD, kidney stones, prior L nephrostomy tube. Underwent lithotripsy of L kidney stone 9/20 under epidural and MAC anesthesia. The patient hypotensive in OR requiring high dose levophed (0.1)  administered through PIV. He had prolonged PACU stay and we were able to gradually taper levophed dose. He was transferred to ICU with  low dose levophed (0.03).           Last vitals:  Vitals Value Taken Time   BP 90/53 09/20/21 2210   Temp 36.3  C (97.3  F) 09/20/21 2100   Pulse 86 09/20/21 2214   Resp 20 09/20/21 2214   SpO2 96 % 09/20/21 2217   Vitals shown include unvalidated device data.    Electronically Signed By: Domonique Nichole MD  September 21, 2021  7:15 AM

## 2021-09-21 NOTE — PROGRESS NOTES
RN RECOVERY NOTE   Lilly VERDUGO assigned as primary pt nurse while pt in PACU  Report from Angel Luis SLOAN RN @ pt bedside in PACU @ 2100  -informed that pt currently on hold for 4C  -Angel Luis SLOAN RN has already provided report to 4C  -Currently on hold for 4C waiting for room to be cleaned   Pt sitting up in bed alert and oriented, answering questions appropriately, following commands, moving all extremities equally and symmetrically.    Pt tolerating PO intake without difficulty including clear liquids while in PACU.   Oral fluid promotion preformed and provided   Snacking encouraged and offered   Pt tolerating gram crackers and cookies. Hunger ques present.   Coughing and deep breathing encouraged   Belongings placed on back of bed   Anesthesia @ pt bedside in PACU @ 2200 and provided verbal order to stop administration of IV levo gtt @ this time. Lilly VERDUGO place levo gtt on standby @ this time.

## 2021-09-21 NOTE — PROGRESS NOTES
Urology  Progress Note    Admitted to ICU for hypotension requiring vasopressors, currently on 0.01 NE  Patient reports a rough night, feeling hot and cold and anxious      Exam  /72 (BP Location: Left arm)   Pulse 87   Temp 99  F (37.2  C) (Oral)   Resp 16   Ht 1.829 m (6')   Wt 70.2 kg (154 lb 12.2 oz)   SpO2 94%   BMI 20.99 kg/m    No acute distress  Unlabored breathing  Abdomen soft, nontender, nondistended.  L flank incision covered with tegaderm with no strikethrough  Chong with clear urine in tubing    UOP 1375/600    Labs  WBC 7.8  Hgb 9.7  Cr 2.83 (3.18)    AM labs pending    Assessment/Plan  60 year old y/o male POD#1 s/p awake L PCNL. Admitted to SICU postop for inability to wean vasopressors. Patient does have history of postop hypotension and received epidural intraop. Continue to wean pressors as tolerated. CT this AM to eval residual stone burden.    - appreciate ICU cares  - continue perioperative antibiotics  - follow AM labs  - cipro through Friday on discharge  - follow up CT this morning  - TOV this morning      Seen and examined with the chief resident. Will discuss with Dr. Matias.    Aicha Enciso MD  Urology Resident     Contacting the Urology Team     Please use the following job codes to reach the Urology Team. Note that you must use an in house phone and that job codes cannot receive text pages.     On weekdays, dial [phone] (or star-star-star 777 on the new [employer] telephones) then [phone] to reach the Adult Urology resident or PA on call    On weekdays, dial [phone] (or star-star-star 777 on the new [employer] telephones) then [phone] to reach the Pediatric Urology resident    On weeknights and weekends, dial [phone] (or star-star-star 777 on the new [employer] telephones) then [phone] to reach the Urology resident on call (for both Adult and Pediatrics)

## 2021-09-21 NOTE — PLAN OF CARE
ICU End of Shift Summary. See flowsheets for vital signs and detailed assessment.    Changes this shift: Afebrile. A&O x4, moves all extremities to command, PERRL. Mild incisional pain, patient reports well managed with Tylenol.   Lungs clear throughout, currently on 6L oxymizer, home O2 5-10L. 100% V paced. Requiring 0.02-0.03mcgs Levo peripherally to keep MAP > 65. Bowel sounds active, passing flatus. Incision with dressing in place, CDI. Right radial art line intact, correlating well with cuff.     Plan: Increase activity as patient tolerates, up to chair x1 with standby assist. Epidural removed. 1L total LR given, diet liberalized, fluids encouraged. MAP improved, Levo off @ 1330. Home cardiac meds restarted. Chong removed, patient voiding without difficulty.   Possible discharge home tomorrow if BP WNL off pressors.     Patient updated on plan of care, will continue to monitor and assess.      Problem: Adult Inpatient Plan of Care  Goal: Plan of Care Review  Outcome: No Change  Goal: Patient-Specific Goal (Individualized)  Outcome: No Change  Flowsheets (Taken 9/20/2021 1200 by Katrin Joe, RN)  Individualized Care Needs: O2 dependent, pacemaker  Goal: Absence of Hospital-Acquired Illness or Injury  Outcome: No Change  Intervention: Prevent Skin Injury  Recent Flowsheet Documentation  Taken 9/21/2021 1600 by Alyssa Saenz RN  Body Position: position changed independently  Taken 9/21/2021 1400 by Alyssa Saenz RN  Body Position: position changed independently  Taken 9/21/2021 1200 by Alyssa Saenz RN  Body Position: position changed independently  Taken 9/21/2021 0800 by Alyssa Saenz RN  Body Position: supine, head elevated  Intervention: Prevent and Manage VTE (Venous Thromboembolism) Risk  Recent Flowsheet Documentation  Taken 9/21/2021 1600 by Alyssa Saenz RN  VTE Prevention/Management:   pneumatic compression device   bleeding precautions maintained   bleeding risk assessed   bleeding risk  factor(s) identified, physician notified   AROM (active range of motion) performed  Taken 9/21/2021 1200 by Alyssa Saenz RN  VTE Prevention/Management:   pneumatic compression device   bleeding precautions maintained   bleeding risk assessed   bleeding risk factor(s) identified, physician notified   AROM (active range of motion) performed  Taken 9/21/2021 0800 by Alyssa Saenz RN  VTE Prevention/Management:   pneumatic compression device   bleeding precautions maintained   bleeding risk assessed   bleeding risk factor(s) identified, physician notified   AROM (active range of motion) performed  Goal: Optimal Comfort and Wellbeing  Outcome: No Change  Goal: Readiness for Transition of Care  Outcome: No Change  Intervention: Mutually Develop Transition Plan  Recent Flowsheet Documentation  Taken 9/21/2021 1400 by Alyssa Saenz RN  Equipment Currently Used at Home: none     Problem: Risk for Delirium  Goal: Optimal Coping  Outcome: No Change  Intervention: Optimize Psychosocial Adjustment to Delirium  Recent Flowsheet Documentation  Taken 9/21/2021 1600 by Alyssa Saenz RN  Supportive Measures:   active listening utilized   positive reinforcement provided   problem-solving facilitated   relaxation techniques promoted   self-care encouraged   verbalization of feelings encouraged  Taken 9/21/2021 1200 by Alyssa Saenz RN  Supportive Measures:   active listening utilized   positive reinforcement provided   problem-solving facilitated   relaxation techniques promoted   self-care encouraged   verbalization of feelings encouraged  Taken 9/21/2021 0800 by Alyssa Saenz RN  Supportive Measures:   active listening utilized   positive reinforcement provided   problem-solving facilitated   relaxation techniques promoted   self-care encouraged   verbalization of feelings encouraged  Goal: Improved Behavioral Control  Outcome: No Change  Intervention: Prevent and Manage Agitation  Recent Flowsheet Documentation  Taken  9/21/2021 1600 by Alyssa Saenz RN  Environment Familiarity/Consistency: daily routine followed  Taken 9/21/2021 1200 by Alyssa Saenz RN  Environment Familiarity/Consistency: daily routine followed  Taken 9/21/2021 0800 by Alyssa Saenz RN  Environment Familiarity/Consistency: daily routine followed  Goal: Improved Attention and Thought Clarity  Outcome: No Change  Intervention: Maximize Cognitive Function  Recent Flowsheet Documentation  Taken 9/21/2021 1600 by Alyssa Saenz RN  Sensory Stimulation Regulation: care clustered  Reorientation Measures:   calendar in view   clock in view   reorientation provided  Taken 9/21/2021 1200 by Alyssa Saenz RN  Sensory Stimulation Regulation: care clustered  Reorientation Measures:   calendar in view   clock in view   reorientation provided  Taken 9/21/2021 0800 by Alyssa Saenz RN  Sensory Stimulation Regulation: care clustered  Reorientation Measures:   calendar in view   clock in view   reorientation provided  Goal: Improved Sleep  Outcome: No Change  Intervention: Promote Sleep  Recent Flowsheet Documentation  Taken 9/21/2021 1600 by Alyssa Saenz RN  Sleep/Rest Enhancement:   awakenings minimized   consistent schedule promoted   regular sleep/rest pattern promoted   relaxation techniques promoted  Taken 9/21/2021 1200 by Alyssa Saenz RN  Sleep/Rest Enhancement:   awakenings minimized   consistent schedule promoted   regular sleep/rest pattern promoted   relaxation techniques promoted  Taken 9/21/2021 0800 by Alyssa Saenz RN  Sleep/Rest Enhancement:   awakenings minimized   consistent schedule promoted   regular sleep/rest pattern promoted   relaxation techniques promoted     Problem: COPD Comorbidity  Goal: Maintenance of COPD Symptom Control  Outcome: No Change  Intervention: Maintain COPD-Symptom Control  Recent Flowsheet Documentation  Taken 9/21/2021 1600 by Alyssa Saenz RN  Supportive Measures:   active listening utilized   positive  reinforcement provided   problem-solving facilitated   relaxation techniques promoted   self-care encouraged   verbalization of feelings encouraged  Taken 9/21/2021 1200 by Alyssa Saenz, RN  Supportive Measures:   active listening utilized   positive reinforcement provided   problem-solving facilitated   relaxation techniques promoted   self-care encouraged   verbalization of feelings encouraged  Taken 9/21/2021 0800 by Alyssa Saenz, RN  Supportive Measures:   active listening utilized   positive reinforcement provided   problem-solving facilitated   relaxation techniques promoted   self-care encouraged   verbalization of feelings encouraged

## 2021-09-21 NOTE — PROGRESS NOTES
ICU End of Shift Summary. See flowsheets for vital signs and detailed assessment.    Changes this shift:   -Admit via PACU  -Titrating pressors for MAP>65  -Urine culture sent, peripheral blood cultures yet to be drawn.     Neuro: A/ox4, moves all extremities, neuro intact.     Card: 100% V paced, 90's, MAP goal >65. NE anywhere from 0.01 to 0.05mcg. No edema.     Resp: Baseline home O2 5-10LPM, currently on 7L via oxymask plus 6L NC (prongs in mouth) while sleeping per request. SICU aware of this. Used albuterol inhaler once this shift, did seem to have complaints of labored breathing which improved with inhaler.     GI: No BM's but passing gas. Diet advance as tolerated.     : Chong in place, averaging 100cc/hr. Pink tinged with some sediment. Culture sent.     Skin: Surgical site to Lt flank WNL, as well as epidural site to lower back also WNL. Anesthesia to remove today.     Plan:   -Possible CT abd/pelvis?

## 2021-09-22 ENCOUNTER — APPOINTMENT (OUTPATIENT)
Dept: CARDIOLOGY | Facility: CLINIC | Age: 61
DRG: 660 | End: 2021-09-22
Attending: PHYSICIAN ASSISTANT
Payer: COMMERCIAL

## 2021-09-22 LAB
ANION GAP SERPL CALCULATED.3IONS-SCNC: 5 MMOL/L (ref 3–14)
BACTERIA UR CULT: NO GROWTH
BUN SERPL-MCNC: 44 MG/DL (ref 7–30)
CALCIUM SERPL-MCNC: 8.8 MG/DL (ref 8.5–10.1)
CHLORIDE BLD-SCNC: 107 MMOL/L (ref 94–109)
CO2 SERPL-SCNC: 28 MMOL/L (ref 20–32)
CREAT SERPL-MCNC: 2.6 MG/DL (ref 0.66–1.25)
ERYTHROCYTE [DISTWIDTH] IN BLOOD BY AUTOMATED COUNT: 13.4 % (ref 10–15)
GFR SERPL CREATININE-BSD FRML MDRD: 25 ML/MIN/1.73M2
GLUCOSE BLD-MCNC: 101 MG/DL (ref 70–99)
GLUCOSE BLDC GLUCOMTR-MCNC: 137 MG/DL (ref 70–99)
GLUCOSE BLDC GLUCOMTR-MCNC: 94 MG/DL (ref 70–99)
GLUCOSE BLDC GLUCOMTR-MCNC: 97 MG/DL (ref 70–99)
HCT VFR BLD AUTO: 28.2 % (ref 40–53)
HGB BLD-MCNC: 8.5 G/DL (ref 13.3–17.7)
LVEF ECHO: NORMAL
MCH RBC QN AUTO: 27.7 PG (ref 26.5–33)
MCHC RBC AUTO-ENTMCNC: 30.1 G/DL (ref 31.5–36.5)
MCV RBC AUTO: 92 FL (ref 78–100)
NT-PROBNP SERPL-MCNC: 5187 PG/ML (ref 0–900)
PLATELET # BLD AUTO: 147 10E3/UL (ref 150–450)
POTASSIUM BLD-SCNC: 3.8 MMOL/L (ref 3.4–5.3)
RBC # BLD AUTO: 3.07 10E6/UL (ref 4.4–5.9)
SODIUM SERPL-SCNC: 140 MMOL/L (ref 133–144)
WBC # BLD AUTO: 5.4 10E3/UL (ref 4–11)

## 2021-09-22 PROCEDURE — 93306 TTE W/DOPPLER COMPLETE: CPT | Mod: 26 | Performed by: INTERNAL MEDICINE

## 2021-09-22 PROCEDURE — 83880 ASSAY OF NATRIURETIC PEPTIDE: CPT | Performed by: PHYSICIAN ASSISTANT

## 2021-09-22 PROCEDURE — 250N000013 HC RX MED GY IP 250 OP 250 PS 637: Performed by: PHYSICIAN ASSISTANT

## 2021-09-22 PROCEDURE — 999N000157 HC STATISTIC RCP TIME EA 10 MIN

## 2021-09-22 PROCEDURE — 250N000009 HC RX 250: Performed by: STUDENT IN AN ORGANIZED HEALTH CARE EDUCATION/TRAINING PROGRAM

## 2021-09-22 PROCEDURE — 99232 SBSQ HOSP IP/OBS MODERATE 35: CPT | Performed by: PHYSICIAN ASSISTANT

## 2021-09-22 PROCEDURE — 250N000013 HC RX MED GY IP 250 OP 250 PS 637: Performed by: STUDENT IN AN ORGANIZED HEALTH CARE EDUCATION/TRAINING PROGRAM

## 2021-09-22 PROCEDURE — 120N000002 HC R&B MED SURG/OB UMMC

## 2021-09-22 PROCEDURE — 258N000003 HC RX IP 258 OP 636

## 2021-09-22 PROCEDURE — 999N000015 HC STATISTIC ARTERIAL MONITORING DAILY

## 2021-09-22 PROCEDURE — 250N000013 HC RX MED GY IP 250 OP 250 PS 637: Performed by: UROLOGY

## 2021-09-22 PROCEDURE — 36415 COLL VENOUS BLD VENIPUNCTURE: CPT | Performed by: STUDENT IN AN ORGANIZED HEALTH CARE EDUCATION/TRAINING PROGRAM

## 2021-09-22 PROCEDURE — 99207 PR CONSULT E&M CHANGED TO SUBSEQUENT LEVEL: CPT | Performed by: PHYSICIAN ASSISTANT

## 2021-09-22 PROCEDURE — 85027 COMPLETE CBC AUTOMATED: CPT | Performed by: STUDENT IN AN ORGANIZED HEALTH CARE EDUCATION/TRAINING PROGRAM

## 2021-09-22 PROCEDURE — 93306 TTE W/DOPPLER COMPLETE: CPT

## 2021-09-22 PROCEDURE — 250N000013 HC RX MED GY IP 250 OP 250 PS 637: Performed by: NURSE PRACTITIONER

## 2021-09-22 PROCEDURE — 80048 BASIC METABOLIC PNL TOTAL CA: CPT | Performed by: STUDENT IN AN ORGANIZED HEALTH CARE EDUCATION/TRAINING PROGRAM

## 2021-09-22 PROCEDURE — 99233 SBSQ HOSP IP/OBS HIGH 50: CPT | Mod: 24 | Performed by: SURGERY

## 2021-09-22 PROCEDURE — 250N000013 HC RX MED GY IP 250 OP 250 PS 637

## 2021-09-22 RX ORDER — AMOXICILLIN 250 MG
1 CAPSULE ORAL 2 TIMES DAILY
Status: DISCONTINUED | OUTPATIENT
Start: 2021-09-22 | End: 2021-09-23 | Stop reason: HOSPADM

## 2021-09-22 RX ORDER — TADALAFIL 20 MG/1
40 TABLET ORAL DAILY
Status: DISCONTINUED | OUTPATIENT
Start: 2021-09-22 | End: 2021-09-22

## 2021-09-22 RX ORDER — CIPROFLOXACIN 250 MG/1
250 TABLET, FILM COATED ORAL EVERY 12 HOURS SCHEDULED
Status: DISCONTINUED | OUTPATIENT
Start: 2021-09-22 | End: 2021-09-22

## 2021-09-22 RX ORDER — TADALAFIL 20 MG/1
40 TABLET ORAL DAILY
Status: DISCONTINUED | OUTPATIENT
Start: 2021-09-22 | End: 2021-09-23 | Stop reason: HOSPADM

## 2021-09-22 RX ORDER — CIPROFLOXACIN 250 MG/1
250 TABLET, FILM COATED ORAL
Status: DISCONTINUED | OUTPATIENT
Start: 2021-09-22 | End: 2021-09-23 | Stop reason: HOSPADM

## 2021-09-22 RX ORDER — FUROSEMIDE 40 MG
40 TABLET ORAL
Status: DISCONTINUED | OUTPATIENT
Start: 2021-09-22 | End: 2021-09-23 | Stop reason: HOSPADM

## 2021-09-22 RX ADMIN — BUDESONIDE 0.25 MG: 0.25 INHALANT RESPIRATORY (INHALATION) at 08:35

## 2021-09-22 RX ADMIN — SELEXIPAG 1600 MCG: 200 TABLET, COATED ORAL at 18:33

## 2021-09-22 RX ADMIN — SELEXIPAG 1600 MCG: 200 TABLET, COATED ORAL at 05:46

## 2021-09-22 RX ADMIN — FUROSEMIDE 40 MG: 40 TABLET ORAL at 18:46

## 2021-09-22 RX ADMIN — AMBRISENTAN 10 MG: 10 TABLET, FILM COATED ORAL at 08:54

## 2021-09-22 RX ADMIN — ACETAMINOPHEN 650 MG: 325 TABLET, FILM COATED ORAL at 14:34

## 2021-09-22 RX ADMIN — SODIUM CHLORIDE: 9 INJECTION, SOLUTION INTRAVENOUS at 03:25

## 2021-09-22 RX ADMIN — TADALAFIL 40 MG: 20 TABLET, FILM COATED ORAL at 17:35

## 2021-09-22 RX ADMIN — CIPROFLOXACIN 250 MG: 250 TABLET ORAL at 10:20

## 2021-09-22 RX ADMIN — ACETAMINOPHEN 650 MG: 325 TABLET, FILM COATED ORAL at 05:29

## 2021-09-22 ASSESSMENT — ACTIVITIES OF DAILY LIVING (ADL)
ADLS_ACUITY_SCORE: 6

## 2021-09-22 NOTE — PROGRESS NOTES
Urology  Progress Note    Off pressors required pressors for 1 hour overnight, on 14L of O2  Feels well, pain controlled   Voiding well, will catheter removed yesterday  Feels ready to go home  Minimal sleeping overnight    Exam  /66   Pulse 83   Temp 98  F (36.7  C) (Oral)   Resp 16   Ht 1.829 m (6')   Wt 70.2 kg (154 lb 12.2 oz)   SpO2 100%   BMI 20.99 kg/m    No acute distress  Unlabored breathing  Abdomen soft, nontender, nondistended.  L flank incision covered with tegaderm with no strikethrough  Will with clear urine in tubing    UOP 2100/375    Labs  WBC 5.4  Hgb 8.5  Cr (2.83)    AM labs pending    Assessment/Plan  60 year old y/o male POD#2 s/p awake L PCNL. Admitted to SICU postop for inability to wean vasopressors. Patient does have history of postop hypotension and received epidural intraop. CT yesterday with reduced stone burden but some residual.    - appreciate ICU cares  - will discuss appropriateness for floor vs home today with ICU, likely will need clearance for discharging home with medicine    - continue perioperative antibiotics  - follow AM labs  - cipro through Friday on discharge  - possible home today vs tomorrow     Seen and examined with the chief resident. Will discuss with Dr. Matias.    Dean Shaffer  PGY-2  [phone]       Contacting the Urology Team     Please use the following job codes to reach the Urology Team. Note that you must use an in house phone and that job codes cannot receive text pages.     On weekdays, dial [phone] (or star-star-star 777 on the new [employer] telephones) then [phone] to reach the Adult Urology resident or PA on call    On weekdays, dial [phone] (or star-star-star 777 on the new [employer] telephones) then [phone] to reach the Pediatric Urology resident    On weeknights and weekends, dial [phone] (or star-star-star 777 on the new [employer] telephones) then [phone] to reach the Urology resident on call (for both Adult and Pediatrics)

## 2021-09-22 NOTE — PROGRESS NOTES
No significant events overnight. Neuro intact, on low dose NE for a total of one hour while sleeping (MAP's low 50's, see chart). Remains on 10L via Oxymizer fir comfort. Used PRN inhaler overnight. BM on day shift, good appetite/intake overnight. Voiding spontaneously via urinal (red urine). No skin issues.

## 2021-09-22 NOTE — PLAN OF CARE
ICU End of Shift Summary. See flowsheets for vital signs and detailed assessment.    Changes this shift: Pt remains alert and oriented x4, moves all extremities equally. On 10L oximizer with 12L for activity. Pt spO2 100% but does not want oxygen weaned down. Breathing does sound labored. BP stable without pressors. Art line removed. 100% paced HR. Voiding small amounts of red-tinged urine, 1 BM. PO lasix given. Good appetite. Up in chair x2.    Plan:  Transfer to /S when bed available.

## 2021-09-22 NOTE — PROGRESS NOTES
Spoke with SICU about MAP goals, they are ok with a MAP >60 for tonight. Will continue to monitor.

## 2021-09-22 NOTE — CONSULTS
[facility]  Consult Note - Hospitalist Service, Gold      Date of Admission:  9/20/2021  Consult Requested by: Chantel Hoyos PA-C  Reason for Consult: hypotension    Assessment & Plan   Jimmy Isaac is a 60 year old male with a history of HTN, complete heart block s/p pacemakert, pulmonary hypertension, pulmonary sarcoidosis, chronic hypoxic respiratory failure, CKD, nephrolithiasis with prior L nephrostomy tube. He is now s/p lithotripsy of L kidney stone on 9/20/21, transferred to SICU post-operatively due to pressor requirements. Weaned off pressors overnight and now transferring to the floor.     Left nephrolithiasis s/p lithotripsy (9/20/21)  SISI on CKD  Post-operative course complicated by hypotension requiring pressors. Cr ~4.7 - 4.8 recently, likely obstructive etiology. Cr improved to 2.60 today. Urine culture NGTD.   - Management per Urology. Patient ready for discharge today or tomorrow from surgical perspective.    - On PO ciprofloxacin through time of discharge    Shock, resolved: Hypotension post-operatively requiring norepinephrine through midnight last night. No evidence of infection, blood and urine cultures NGTD and he is afebrile. Patient has a history of hypotension with sedation for colonoscopy. Suspect his hypotension was due related to his summer-operative medications.     Acute on chronic dyspnea  Chronic hypoxic respiratory failure  Pulmonary hypertension  Pulmonary sarcoidosis  Follows with Dr. Coon. On 5L at rest, 10L with activity at home. Currently on 10L at rest. Patient with increased dyspnea from baseline post-operatively. Appears euvolemic on exam. CXR yesterday with no evidence of pulmonary edema or other acute pulmonary pathology. PTA tadalafil and Lasix held since admission.  lbs.    - Discussed with Cardiology 2 fellow, will resume tadalafil today. If patient still with increased dyspnea and O2 needs tomorrow morning will  formally consult Cardiology 2 per fellow's recs.    - Echocardiogram (ordered)    - Consider V/Q scan to rule-out PE if dyspnea fails to improve (prefer to avoid CT contrast d/t renal function)   - If significant abnormalities on echocardiogram would discuss with Cardiology   - Will dose Lasix based on echo results    - Continue PTA regimen of ambrisentan, selexipag, tadalafil   - Continue PTA Pulmicort BID, albuterol PRN   - Daily weights   - I/Os   - Continuous pulse oximetry         Medicine will continue to follow. Please do not hesitate to contact if new questions or concerns arise. Recommendations discussed with Chantel Daley PA-C.     Adore Bragg PA-C  [facility]  Securely message with the Vocera Web Console (learn more here)  Text page via [facility] Paging/Directory      Clinically Significant Risk Factors Present on Admission               ______________________________________________________________________    Chief Complaint   Dyspnea    History is obtained from the patient    History of Present Illness   Jimmy Isaac is a 60 year old male with a history of HTN, complete heart block s/p pacemakert, pulmonary hypertension, pulmonary sarcoidosis, chronic hypoxic respiratory failure, CKD, nephrolithiasis with prior L nephrostomy tube. He is now s/p lithotripsy of L kidney stone on 9/20/21, transferred to SICU post-operatively due to pressor requirements. Weaned off pressors overnight and now transferring to the floor.     His urologic procedure went well but he became hypotensive postoperatively.  He was therefore transferred to the SICU and started on norepinephrine.  Norepinephrine was weaned off overnight and his blood pressure has since remained stable.  He reports he had a prior episode of hypotension after receiving IV sedatives for colonoscopy several years ago.  The colonoscopy had to be aborted.  He was briefly on broad-spectrum antibiotics and  infectious work-up was obtained.  His urine and blood cultures are no growth to date.  He has not had any fevers.    Currently he reports feeling quite short of breath, much more so than he feels at baseline.  At home he typically uses 5 L/min of oxygen at rest and will need to increase this to 8 to 10 L with activity.  He reports he typically has a hard time tolerating such high flow of 10 L.  Today he has been on 10 L essentially all day while at rest and he notes that this is abnormal for him.  He has not noticed any leg swelling.  He has not received his Lasix or his tadalafil in about 3 days.  No chest pain or palpitations.      Review of Systems   The 10 point Review of Systems is negative other than noted in the HPI or here.     Past Medical History    I have reviewed this patient's medical history and updated it with pertinent information if needed.   Past Medical History:   Diagnosis Date     Chronic sinusitis      CKD (chronic kidney disease)      Heart disease      Hypertension      Keratosis     frontal scalp, treated with liquid nitrogen at [facility]     Malignant neoplasm (H)      Pneumonia a few times     Pulmonary sarcoidosis (H)        Past Surgical History   I have reviewed this patient's surgical history and updated it with pertinent information if needed.  Past Surgical History:   Procedure Laterality Date     COLONOSCOPY       CV RIGHT HEART CATH MEASUREMENTS RECORDED N/A 6/19/2019    Procedure: CV RIGHT HEART CATH;  Surgeon: Kale Delgado MD;  Location:  HEART CARDIAC CATH LAB     CV RIGHT HEART CATH MEASUREMENTS RECORDED N/A 12/11/2019    Procedure: CV RIGHT HEART CATH;  Surgeon: Ion Lemon MD;  Location:  HEART CARDIAC CATH LAB     CV RIGHT HEART CATH MEASUREMENTS RECORDED N/A 7/30/2021    Procedure: CV RIGHT HEART CATH;  Surgeon: Jhony Barone MD;  Location:  HEART CARDIAC CATH LAB     ENT SURGERY      wisdom teeth extraction     EP PACEMAKER Left  2021    Procedure: EP Pacemaker;  Surgeon: Dora Fontanez MD;  Location:  HEART CARDIAC CATH LAB     IR NEPHROSTOMY TUBE PLACEMENT LEFT  2021     LASER HOLMIUM NEPHROLITHOTOMY VIA PERCUTANEOUS NEPHROSTOMY Left 2021    Procedure: NEPHROLITHOTOMY, PERCUTANEOUS, USING HOLMIUM LASER;  Surgeon: Zackery Moura MD;  Location:  OR     TONSILLECTOMY  1965       Social History   I have reviewed this patient's social history and updated it with pertinent information if needed.  Social History     Tobacco Use     Smoking status: Former Smoker     Packs/day: 1.00     Years: 30.00     Pack years: 30.00     Types: Cigarettes     Start date: 10/10/1975     Quit date: 3/1/2010     Years since quittin.5     Smokeless tobacco: Never Used   Substance Use Topics     Alcohol use: Not Currently     Alcohol/week: 0.0 standard drinks     Comment: stated 2 bottles of beer occ     Drug use: Not Currently     Comment: past use of marijuana       Family History   I have reviewed this patient's family history and updated it with pertinent information if needed.  Family History   Problem Relation Age of Onset     Coronary Artery Disease Father      Heart Disease Father         heart attack age 35-40     Hypertension Mother      Hyperlipidemia Mother      Cerebrovascular Disease Mother      Dementia Mother      Lupus Sister      Glaucoma No family hx of      Macular Degeneration No family hx of        Medications   Medications Prior to Admission   Medication Sig Dispense Refill Last Dose     ambrisentan (LETAIRIS) 10 MG tablet Take 10 mg by mouth every morning  30 tablet 11 2021 at 0800     budesonide (PULMICORT) 0.25 MG/2ML neb solution Take 2 mLs (0.25 mg) by nebulization 2 times daily 120 mL 4 2021 at 0800     [] ciprofloxacin (CIPRO) 500 MG tablet Take 1 tablet (500 mg) by mouth daily for 6 days 30 tablet 2 2021 at 2100     furosemide (LASIX) 20 MG tablet Take 2 tablets (40 mg) by mouth 2 times  daily 365 tablet 3 9/20/2021 at 0800     PROAIR  (90 Base) MCG/ACT inhaler Inhale 2 puffs into the lungs every 6 hours as needed for shortness of breath / dyspnea 18 g 11 9/20/2021 at 0800     selexipag (UPTRAVI) 1600 MCG tablet Take 1 tablet (1,600 mcg) by mouth every 12 hours 60 tablet 11 9/20/2021 at 0800     tadalafil, PAH, (ALYQ) 20 MG TABS Take 2 tablets (40 mg) by mouth daily (Patient taking differently: Take 40 mg by mouth every morning ) 60 tablet 5 9/20/2021 at 0800     tamsulosin (FLOMAX) 0.4 MG capsule Take 1 capsule (0.4 mg) by mouth daily 30 capsule 0      UNABLE TO FIND MEDICATION NAME: Topical skin tag remover          Allergies   Allergies   Allergen Reactions     Nkda [No Known Drug Allergies]        Physical Exam   Vital Signs: Temp: 97.7  F (36.5  C) Temp src: Axillary BP: 109/68 Pulse: 92   Resp: 22 SpO2: 99 % O2 Device: Oxymizer cannula Oxygen Delivery: 10 LPM  Weight: 154 lbs 12.21 oz    Constitutional: Awake and alert, sitting up in chair.   Eyes: Sclera clear, anicteric   Respiratory: Labored, using accessory muscles to breathe. CTAB.   Cardiovascular:  RRR, normal S1/S2. No rubs or murmurs. Intact bilateral pedal pulses. No peripheral edema.   GI: Soft, non-tender, non-distended.  Normoactive bowel sounds.   Skin:  Good color. No jaundice. No visible rashes, lesions, or bruising of concern.   Neurologic: Alert and fully oriented. No focal deficits. Moves all extremities. No tremor.       Data   ROUTINE IP LABS (Last four results)  Recent Labs   Lab 09/22/21  1249 09/22/21  0846 09/22/21  0542 09/21/21  1644 09/21/21  0935 09/21/21  0253   NA  --   --  140  --   --  137   POTASSIUM  --   --  3.8  --   --  3.9   CHLORIDE  --   --  107  --   --  101   CO2  --   --  28  --   --  27   ANIONGAP  --   --  5  --   --  9   * 97 101* 97   < > 188*   BUN  --   --  44*  --   --  55*   CR  --   --  2.60*  --   --  2.83*   TERI  --   --  8.8  --   --  9.3   PROTTOTAL  --   --   --   --   --   6.5*   ALBUMIN  --   --   --   --   --  2.7*   BILITOTAL  --   --   --   --   --  0.6   ALKPHOS  --   --   --   --   --  63   AST  --   --   --   --   --  14   ALT  --   --   --   --   --  13    < > = values in this interval not displayed.     Recent Labs   Lab 09/22/21  0542 09/21/21 0253   WBC 5.4 7.8   RBC 3.07* 3.43*   HGB 8.5* 9.7*   HCT 28.2* 30.0*   MCV 92 88   MCH 27.7 28.3   MCHC 30.1* 32.3   RDW 13.4 13.2   * 184     Recent Labs   Lab 09/21/21 0253   INR 1.16*        Glucose Values Latest Ref Rng & Units 9/14/2021 9/21/2021 9/22/2021   Bedside Glucose (mg/dl )  - -- -- --   GLUCOSE 70 - 99 mg/dL 98 188(H) 101(H)   Some recent data might be hidden

## 2021-09-23 ENCOUNTER — APPOINTMENT (OUTPATIENT)
Dept: PHYSICAL THERAPY | Facility: CLINIC | Age: 61
DRG: 660 | End: 2021-09-23
Attending: UROLOGY
Payer: COMMERCIAL

## 2021-09-23 ENCOUNTER — APPOINTMENT (OUTPATIENT)
Dept: OCCUPATIONAL THERAPY | Facility: CLINIC | Age: 61
DRG: 660 | End: 2021-09-23
Attending: UROLOGY
Payer: COMMERCIAL

## 2021-09-23 VITALS
WEIGHT: 154.76 LBS | BODY MASS INDEX: 20.96 KG/M2 | HEIGHT: 72 IN | SYSTOLIC BLOOD PRESSURE: 98 MMHG | HEART RATE: 88 BPM | DIASTOLIC BLOOD PRESSURE: 55 MMHG | RESPIRATION RATE: 18 BRPM | OXYGEN SATURATION: 98 % | TEMPERATURE: 98.3 F

## 2021-09-23 LAB
ANION GAP SERPL CALCULATED.3IONS-SCNC: 7 MMOL/L (ref 3–14)
BUN SERPL-MCNC: 38 MG/DL (ref 7–30)
CALCIUM SERPL-MCNC: 9.1 MG/DL (ref 8.5–10.1)
CHLORIDE BLD-SCNC: 106 MMOL/L (ref 94–109)
CO2 SERPL-SCNC: 28 MMOL/L (ref 20–32)
CREAT SERPL-MCNC: 2.47 MG/DL (ref 0.66–1.25)
ERYTHROCYTE [DISTWIDTH] IN BLOOD BY AUTOMATED COUNT: 13.3 % (ref 10–15)
GFR SERPL CREATININE-BSD FRML MDRD: 27 ML/MIN/1.73M2
GLUCOSE BLD-MCNC: 86 MG/DL (ref 70–99)
GLUCOSE BLDC GLUCOMTR-MCNC: 80 MG/DL (ref 70–99)
GLUCOSE BLDC GLUCOMTR-MCNC: 96 MG/DL (ref 70–99)
HCT VFR BLD AUTO: 27.1 % (ref 40–53)
HGB BLD-MCNC: 8.1 G/DL (ref 13.3–17.7)
MCH RBC QN AUTO: 27.9 PG (ref 26.5–33)
MCHC RBC AUTO-ENTMCNC: 29.9 G/DL (ref 31.5–36.5)
MCV RBC AUTO: 93 FL (ref 78–100)
PLATELET # BLD AUTO: 157 10E3/UL (ref 150–450)
POTASSIUM BLD-SCNC: 3.7 MMOL/L (ref 3.4–5.3)
RBC # BLD AUTO: 2.9 10E6/UL (ref 4.4–5.9)
SODIUM SERPL-SCNC: 141 MMOL/L (ref 133–144)
WBC # BLD AUTO: 5.7 10E3/UL (ref 4–11)

## 2021-09-23 PROCEDURE — 97161 PT EVAL LOW COMPLEX 20 MIN: CPT | Mod: GP

## 2021-09-23 PROCEDURE — 250N000009 HC RX 250: Performed by: STUDENT IN AN ORGANIZED HEALTH CARE EDUCATION/TRAINING PROGRAM

## 2021-09-23 PROCEDURE — 36415 COLL VENOUS BLD VENIPUNCTURE: CPT | Performed by: STUDENT IN AN ORGANIZED HEALTH CARE EDUCATION/TRAINING PROGRAM

## 2021-09-23 PROCEDURE — 258N000003 HC RX IP 258 OP 636: Performed by: INTERNAL MEDICINE

## 2021-09-23 PROCEDURE — 250N000013 HC RX MED GY IP 250 OP 250 PS 637: Performed by: UROLOGY

## 2021-09-23 PROCEDURE — 97535 SELF CARE MNGMENT TRAINING: CPT | Mod: GO | Performed by: OCCUPATIONAL THERAPIST

## 2021-09-23 PROCEDURE — 97530 THERAPEUTIC ACTIVITIES: CPT | Mod: GP

## 2021-09-23 PROCEDURE — 99232 SBSQ HOSP IP/OBS MODERATE 35: CPT | Performed by: INTERNAL MEDICINE

## 2021-09-23 PROCEDURE — 999N000157 HC STATISTIC RCP TIME EA 10 MIN

## 2021-09-23 PROCEDURE — 80048 BASIC METABOLIC PNL TOTAL CA: CPT | Performed by: STUDENT IN AN ORGANIZED HEALTH CARE EDUCATION/TRAINING PROGRAM

## 2021-09-23 PROCEDURE — 250N000013 HC RX MED GY IP 250 OP 250 PS 637: Performed by: STUDENT IN AN ORGANIZED HEALTH CARE EDUCATION/TRAINING PROGRAM

## 2021-09-23 PROCEDURE — 250N000013 HC RX MED GY IP 250 OP 250 PS 637

## 2021-09-23 PROCEDURE — 97530 THERAPEUTIC ACTIVITIES: CPT | Mod: GO | Performed by: OCCUPATIONAL THERAPIST

## 2021-09-23 PROCEDURE — 97165 OT EVAL LOW COMPLEX 30 MIN: CPT | Mod: GO | Performed by: OCCUPATIONAL THERAPIST

## 2021-09-23 PROCEDURE — 97116 GAIT TRAINING THERAPY: CPT | Mod: GP

## 2021-09-23 PROCEDURE — 85027 COMPLETE CBC AUTOMATED: CPT | Performed by: STUDENT IN AN ORGANIZED HEALTH CARE EDUCATION/TRAINING PROGRAM

## 2021-09-23 PROCEDURE — 250N000009 HC RX 250: Performed by: INTERNAL MEDICINE

## 2021-09-23 PROCEDURE — 94640 AIRWAY INHALATION TREATMENT: CPT

## 2021-09-23 PROCEDURE — 250N000013 HC RX MED GY IP 250 OP 250 PS 637: Performed by: PHYSICIAN ASSISTANT

## 2021-09-23 RX ORDER — MAGNESIUM HYDROXIDE/ALUMINUM HYDROXICE/SIMETHICONE 120; 1200; 1200 MG/30ML; MG/30ML; MG/30ML
30 SUSPENSION ORAL EVERY 4 HOURS PRN
Status: DISCONTINUED | OUTPATIENT
Start: 2021-09-23 | End: 2021-09-23 | Stop reason: HOSPADM

## 2021-09-23 RX ORDER — CIPROFLOXACIN 500 MG/1
500 TABLET, FILM COATED ORAL 2 TIMES DAILY
Qty: 6 TABLET | Refills: 2 | Status: SHIPPED | OUTPATIENT
Start: 2021-09-23 | End: 2021-09-26

## 2021-09-23 RX ORDER — OXYMETAZOLINE HYDROCHLORIDE 0.05 G/100ML
2 SPRAY NASAL 2 TIMES DAILY
Status: DISCONTINUED | OUTPATIENT
Start: 2021-09-23 | End: 2021-09-23 | Stop reason: HOSPADM

## 2021-09-23 RX ORDER — CALCIUM CARBONATE 500 MG/1
500 TABLET, CHEWABLE ORAL 3 TIMES DAILY PRN
Status: DISCONTINUED | OUTPATIENT
Start: 2021-09-23 | End: 2021-09-23 | Stop reason: HOSPADM

## 2021-09-23 RX ADMIN — SODIUM CHLORIDE 500 ML: 9 INJECTION, SOLUTION INTRAVENOUS at 10:33

## 2021-09-23 RX ADMIN — CALCIUM CARBONATE (ANTACID) CHEW TAB 500 MG 500 MG: 500 CHEW TAB at 01:15

## 2021-09-23 RX ADMIN — BUDESONIDE 0.25 MG: 0.25 INHALANT RESPIRATORY (INHALATION) at 08:21

## 2021-09-23 RX ADMIN — ACETAMINOPHEN 650 MG: 325 TABLET, FILM COATED ORAL at 14:29

## 2021-09-23 RX ADMIN — AMBRISENTAN 10 MG: 10 TABLET, FILM COATED ORAL at 10:11

## 2021-09-23 RX ADMIN — SELEXIPAG 1600 MCG: 200 TABLET, COATED ORAL at 06:04

## 2021-09-23 RX ADMIN — CIPROFLOXACIN 250 MG: 250 TABLET ORAL at 10:11

## 2021-09-23 RX ADMIN — TADALAFIL 40 MG: 20 TABLET, FILM COATED ORAL at 10:10

## 2021-09-23 RX ADMIN — OXYMETAZOLINE HYDROCHLORIDE 2 SPRAY: 0.05 SPRAY NASAL at 10:14

## 2021-09-23 ASSESSMENT — ACTIVITIES OF DAILY LIVING (ADL)
ADLS_ACUITY_SCORE: 6
PREVIOUS_RESPONSIBILITIES: MEAL PREP;HOUSEKEEPING;LAUNDRY;SHOPPING;YARDWORK;MEDICATION MANAGEMENT;FINANCES
ADLS_ACUITY_SCORE: 5
ADLS_ACUITY_SCORE: 6

## 2021-09-23 NOTE — PLAN OF CARE
/63 (BP Location: Left arm)   Pulse 98   Temp (!) 96.4  F (35.8  C) (Oral)   Resp 20   Ht 1.829 m (6')   Wt 70.2 kg (154 lb 12.2 oz)   SpO2 97%   BMI 20.99 kg/m       Neuro: A&O x4. Able to make needs known.  Respiratory: Labored breathing on 10L NC, [place]. Sating in high 90s. Offered to wean down, pt refused. LS clear and diminished. Oxymizer mask during hs.   Cardiac: tachy in the high 90s. Denies cardiac chest pain.  GI/: Voids spontaneously, urine red-tinged in color. LBM 9/22/21. PM Miralax held d/t loose stool.   Diet: Regular diet   Pain/nausea: Denies pain and nausea  Incision/Drains: L flank incision covered, CDI. Erythema around dressing, marked to monitor skin  IV Access: R PIV, CDI, SL   Labs: Reviewed  Activity: Up independently in room    New changes this shift: Pt c/o acid reflux. Team paged and TUMS ordered and administered.     Plan: Continue to monitor per POC.

## 2021-09-23 NOTE — PROGRESS NOTES
Vitals: Temp: 98.3  F (36.8  C) Temp src: Oral BP: 98/55 Pulse: 88   Resp: 18 SpO2: 98 % O2 Device: Oxymizer cannula Oxygen Delivery: 6 LPM     Time: 0515-6356  Reason for admission: Renal insufficiency due to nephrolithiasis.  Activity:  SBA. Independent with bed mobility. Participated in therapy.   Pain:  Reports pain to the incisional site on the (L) side, requested Tylenol.   Neuro: A&O x4. Pleasant and cooperative with cares. Able to make needs known.   Cardiac: WDL. Patient denies cardiac chest pain.   Respiratory:  Labored breathing with exertion and the verbalized need to increase supplemental oxygen when ambulating to bathroom. Lung sounds clear to auscultation and oxygen saturation remains stable with 6l via NC.   GI/:  Voids spontaneously. Denies pain/burning with urination. Uses urinal and toilet.   Diet: Regular diet. Tolerating well.   Lines:  PIV TKO.  Incisions/Drains/Skin:  Skin CDI. Surgical incision CDI.   Lab:  Reviewed.   Electrolyte Replacements: NA  New changes this shift: expected date of discharge is 09/23/2021.

## 2021-09-23 NOTE — PLAN OF CARE
Physical Therapy Discharge Summary    Reason for therapy discharge:    All goals and outcomes met, no further needs identified.    Progress towards therapy goal(s). See goals on Care Plan in [facility] electronic health record for goal details.  Goals met    Therapy recommendation(s):    No further therapy is recommended.

## 2021-09-23 NOTE — PROGRESS NOTES
"7B PT Eval     09/23/21 1300   Quick Adds   Type of Visit Initial PT Evaluation   Living Environment   People in home significant other  (fiance)   Current Living Arrangements house   Home Accessibility stairs to enter home;stairs within home   Number of Stairs, Main Entrance 5   Stair Railings, Main Entrance railing on right side (ascending)   Number of Stairs, Within Home, Primary greater than 10 stairs  (to basement)   Stair Railings, Within Home, Primary none   Transportation Anticipated family or friend will provide   Living Environment [place] and states that he has 4 horses.   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 Patient on 5L O2 resting at 10L O2 for activity   Disability/Function   Hearing Difficulty or Deaf no   Wear Glasses or Blind yes   Vision Management glasses   Concentrating, Remembering or Making Decisions Difficulty no   Difficulty Communicating no   Difficulty Eating/Swallowing no   Walking or Climbing Stairs Difficulty no   Dressing/Bathing Difficulty no   Toileting issues no   Doing Errands Independently Difficulty (such as shopping) no   Fall history within last six months no   Change in Functional Status Since Onset of Current Illness/Injury yes   General Information   Onset of Illness/Injury or Date of Surgery 09/20/21   Referring Physician Juan Davison MD   Patient/Family Therapy Goals Statement (PT) To return home   Pertinent History of Current Problem (include personal factors and/or comorbidities that impact the POC) Per EMR \"Jimmy Isaac is a 60 year old male with a history of HTN, complete heart block s/p pacemakert, pulmonary hypertension, pulmonary sarcoidosis, chronic hypoxic respiratory failure, CKD, nephrolithiasis with prior L nephrostomy tube\"   General " Observations activity: up ad michael   Cognition   Affect/Mental Status (Cognition) WFL   Follows Commands (Cognition) WFL   Integumentary/Edema   Integumentary/Edema Comments surgical site healing well   Posture    Posture Kyphosis   Posture Comments mild kyphosis   Range of Motion (ROM)   ROM Quick Adds ROM WFL   Strength   Manual Muscle Testing Quick Adds Strength WFL   Transfers   Transfers sit-stand transfer   Sit-Stand Transfer   Sit-Stand [place] (Transfers) independent   Gait/Stairs (Locomotion)   [place] Level (Gait) modified independence   Assistive Device (Gait) other (see comments)  (IV Pole initially)   Balance   Balance Comments IND sitting/standing   Clinical Impression   Criteria for Skilled Therapeutic Intervention yes, treatment indicated   PT Diagnosis (PT) impaired functional mobility   Influenced by the following impairments impaired activity tolerance   Functional limitations due to impairments bed mobility, transfers, gait, stairs   Clinical Presentation Stable/Uncomplicated   Clinical Presentation Rationale clinical judgement   Clinical Decision Making (Complexity) low complexity   Therapy Frequency (PT) One time eval and treatment only   Predicted Duration of Therapy Intervention (days/wks) 1 session   Planned Therapy Interventions (PT) balance training;bed mobility training;gait training;ROM (range of motion);stair training;strengthening;stretching;transfer training   Risk & Benefits of therapy have been explained evaluation/treatment results reviewed;care plan/treatment goals reviewed;risks/benefits reviewed;current/potential barriers reviewed;participants voiced agreement with care plan;participants included;patient   PT Discharge Planning    PT Discharge Recommendation (DC Rec) home with assist   PT Rationale for DC Rec Patient demonstrates sufficient functional mobility for safe d/c home with assist from fiance as needed. Patient near functional baseline.   PT Brief overview of  current status  IND for mobility   Total Evaluation Time   Total Evaluation Time (Minutes) 10       Samir Restrepo, PT, DPT  Pager #[phone]

## 2021-09-23 NOTE — PROGRESS NOTES
Admitted/transferred from:   2 RN skin assessment completed by Sejal Vargas RN and Fang JAMES RN   Skin assessment finding: blanchable erythema in sacrum/coccyx area. L heel slightly erythematous. Erythema around L flank incision dressing.   Interventions/actions: Turn and shift weight, elevate leg, use pillows. L flank incision erythema marked to monitor skin.     Will continue to monitor.

## 2021-09-23 NOTE — PROGRESS NOTES
[facility]    Medicine Consult Progress Note - Hospitalist Service, Gold 9       Date of Admission:  9/20/2021    Assessment & Plan           Jimmy Isaac is a 60 year old male with a history of HTN, complete heart block s/p pacemakert, pulmonary hypertension, pulmonary sarcoidosis, chronic hypoxic respiratory failure, CKD, nephrolithiasis with prior L nephrostomy tube. He is now s/p lithotripsy of L kidney stone on 9/20/21, transferred to SICU post-operatively due to pressor requirements. Weaned off pressors overnight and now transferring to the floor.      Left nephrolithiasis s/p lithotripsy (9/20/21)  SISI on CKD  Post-operative course complicated by hypotension requiring pressors. Cr ~4.7 - 4.8 recently, likely obstructive etiology. Cr now improved to baseline today. Urine culture NGTD.   - Management per Urology. Patient ready for discharge today    - On PO ciprofloxacin through time of discharge     Shock, resolved: Hypotension post-operatively requiring norepinephrine through midnight last night. No evidence of infection, blood and urine cultures NGTD and he is afebrile. Patient has a history of hypotension with sedation for colonoscopy. Suspect his hypotension was due related to his summer-operative medications.      Acute on chronic dyspnea  Chronic hypoxic respiratory failure  Pulmonary hypertension  Pulmonary sarcoidosis  Congested sinuses  Follows with Dr. Coon. On 5L at rest, 10L with activity at home. Currently on 10L at rest. Patient with increased dyspnea from baseline post-operatively. Appears euvolemic on exam. CXR yesterday with no evidence of pulmonary edema or other acute pulmonary pathology. PTA tadalafil and Lasix held since admission.  lbs.    - Discussed with Cardiology 2 fellow, resumed tadalafil.    - Echocardiogram- with hi RA pressures, mild RV function, EF 55-60%- urology called cards to discuss   - Lasix held this am, resume per  cards recs, close PCP f/u   - Continue PTA regimen of ambrisentan, selexipag, tadalafil   - Continue PTA Pulmicort BID, albuterol PRN   - Daily weights   - I/Os   - Continuous pulse oximetry   - afrin one dose this am    Yanet Mackay MD  Hospitalist Service, [address]  Securely message with the Vocera Web Console (learn more here)  Text page via [facility] Paging/Directory  Please see sign in/sign out for up to date coverage information  ______________________________________________________________________    Interval History   Feeling well today, with congested sinuses preventing him from wearing his NC-- so his O2 is aimed into his mouth instead. He is very interested in discharging if his O2 is at his home level (which we'll test after a dose of afrin).    Data reviewed today: I reviewed all medications, new labs and imaging results over the last 24 hours.  Physical Exam   Vital Signs: Temp: 98.3  F (36.8  C) Temp src: Oral BP: 98/55 Pulse: 88   Resp: 18 SpO2: 98 % O2 Device: Oxymizer cannula Oxygen Delivery: 6 LPM  Weight: 154 lbs 12.21 oz  Constitutional: Awake and alert, sitting up in bed  Eyes: Sclera clear, anicteric torrey  Respiratory: non-labored, CTAB no w/c, NC aimed in mouth  Cardiovascular:  RRR, normal S1/S2. No rubs or murmurs. Intact bilateral pedal pulses. No peripheral edema.   GI: Soft, non-tender, non-distended.  Normoactive bowel sounds.   Skin:  Good color. No jaundice. No visible rashes, lesions, or bruising of concern.   Neurologic: Alert and fully oriented. No focal deficits. MAEE. No tremor.        Data   Recent Labs   Lab 09/23/21  1240 09/23/21  0815 09/23/21  0705 09/22/21  0846 09/22/21  0542 09/21/21  0935 09/21/21  0253   WBC  --   --  5.7  --  5.4  --  7.8   HGB  --   --  8.1*  --  8.5*  --  9.7*   MCV  --   --  93  --  92  --  88   PLT  --   --  157  --  147*  --  184   INR  --   --   --   --   --   --  1.16*   NA  --   --   141  --  140  --  137   POTASSIUM  --   --  3.7  --  3.8  --  3.9   CHLORIDE  --   --  106  --  107  --  101   CO2  --   --  28  --  28  --  27   BUN  --   --  38*  --  44*  --  55*   CR  --   --  2.47*  --  2.60*  --  2.83*   ANIONGAP  --   --  7  --  5  --  9   TERI  --   --  9.1  --  8.8  --  9.3   GLC 96 80 86   < > 101*   < > 188*   ALBUMIN  --   --   --   --   --   --  2.7*   PROTTOTAL  --   --   --   --   --   --  6.5*   BILITOTAL  --   --   --   --   --   --  0.6   ALKPHOS  --   --   --   --   --   --  63   ALT  --   --   --   --   --   --  13   AST  --   --   --   --   --   --  14    < > = values in this interval not displayed.     No results found for this or any previous visit (from the past 24 hour(s)).  Medications       ambrisentan  10 mg Oral Daily     budesonide  0.25 mg Nebulization BID     ciprofloxacin  250 mg Oral Q24H GOGO     [Held by provider] furosemide  40 mg Oral BID     insulin aspart  1-7 Units Subcutaneous TID AC     oxymetazoline  2 spray Both Nostrils BID     polyethylene glycol  17 g Oral BID     selexipag  1,600 mcg Oral Q12H     senna-docusate  1 tablet Oral BID     sodium chloride (PF)  3 mL Intracatheter Q8H     tadalafil  40 mg Oral Daily     tamsulosin  0.4 mg Oral Daily

## 2021-09-23 NOTE — PROGRESS NOTES
09/23/21 1100   Quick Adds   Type of Visit Initial Occupational Therapy Evaluation   Living Environment   People in home significant other   Current Living Arrangements house   Home Accessibility stairs to enter home;stairs within home   Number of Stairs, Main Entrance 5   Stair Railings, Main Entrance railings on both sides of stairs   Number of Stairs, Within Home, Primary greater than 10 stairs  (to basement. )   Stair Railings, Within Home, Primary none   Transportation Anticipated family or friend will provide   Self-Care   Usual Activity Tolerance moderate   Current Activity Tolerance fair   Regular Exercise No   Equipment Currently Used at Home none   Activity/Exercise/Self-Care Comment pt uses 10 L O2 for ambulation 5 L at rest.    Instrumental Activities of Daily Living (IADL)   Previous Responsibilities meal prep;housekeeping;laundry;shopping;yardwork;medication management;finances   Disability/Function   Hearing Difficulty or Deaf no   Wear Glasses or Blind no   Concentrating, Remembering or Making Decisions Difficulty no   Difficulty Communicating no   Difficulty Eating/Swallowing no   Walking or Climbing Stairs Difficulty no   Dressing/Bathing Difficulty no   Toileting issues no   Doing Errands Independently Difficulty (such as shopping) no   Fall history within last six months no   Change in Functional Status Since Onset of Current Illness/Injury yes   General Information   Referring Physician Juan Davison   Patient/Family Therapy Goal Statement (OT) return to PLOF   Additional Occupational Profile Info/Pertinent History of Current Problem 60 year old y/o male POD#3 s/p awake L PCNL. Admitted to SICU postop for inability to wean vasopressors. Patient does have history of postop hypotension and received epidural intraop. CT with reduced stone burden but some residual. Transferred to floor 09/22. Continues to have persistent elevated O2 requirements.    Existing Precautions/Restrictions abdominal    General Observations and Info pt motivated, requiring 15 L oximizer to maintain sats in 90's forambulation.    Cognitive Status Examination   Orientation Status orientation to person, place and time   Cognitive Status Comments no concerns.    Visual Perception   Visual Impairment/Limitations WFL   Sensory   Sensory Quick Adds No deficits were identified   Range of Motion Comprehensive   General Range of Motion no range of motion deficits identified   Strength Comprehensive (MMT)   General Manual Muscle Testing (MMT) Assessment other (see comments)   Comment, General Manual Muscle Testing (MMT) Assessment overall deconditioning, at riskof further deconditioning.    Coordination   Coordination Comments no concerns.    Bed Mobility   Bed Mobility other (see comments)   Comment (Bed Mobility) education required.    Transfers   Transfers sit-stand transfer;toilet transfer   Sit-Stand Transfer   Sit-Stand [place] (Transfers) modified independence   Toilet Transfer   Toilet Transfer Comments mod I   Balance   Balance Assessment standing balance: dynamic   Standing Balance: Dynamic good balance   Activities of Daily Living   BADL Assessment lower body dressing   Lower Body Dressing Assessment   [place] Level (Lower Body Dressing) maximum assist (25% patient effort)   Clinical Impression   Criteria for Skilled Therapeutic Interventions Met (OT) yes   OT Diagnosis decreased ADL I   Assessment of Occupational Performance 5 or more Performance Deficits   Identified Performance Deficits dressing, bathing, toileting, G/H, home making.   Planned Therapy Interventions (OT) ADL retraining;IADL retraining;bed mobility training;strengthening;transfer training;home program guidelines;progressive activity/exercise;risk factor education   Clinical Decision Making Complexity (OT) low complexity   Therapy Frequency (OT) 5x/week   Predicted Duration of Therapy 1 week   Risk & Benefits of therapy have been explained  evaluation/treatment results reviewed;care plan/treatment goals reviewed;risks/benefits reviewed;current/potential barriers reviewed;participants voiced agreement with care plan;participants included;patient   Comment-Clinical Impression Pt presents to OT with post surgical precautions and general deconditioning leading to decreased ADL I. Pt to benefit form skilled OT intervention to address the above problem list.    OT Discharge Planning    OT Discharge Recommendation (DC Rec) Home with assist   OT Rationale for DC Rec pt progressing well, does require increased O2 (15 L) to maintain aats in 90's during ambulation.    OT Brief overview of current status  Pt mod I toilet transfer and toileting after education, mod I G/H at sink as well as supine <> EOB. pt ambualting in hallway slow shuffling steps, unable to walk greater than 200 feet and unable to attempt stairs today 2/2 fatigue.    Total Evaluation Time (Minutes)   Total Evaluation Time (Minutes) 5

## 2021-09-23 NOTE — PROGRESS NOTES
Urology  Progress Note    Transferred to floor   10L NC  Urine remains red w/o clots    Exam  /53 (BP Location: Left arm)   Pulse 95   Temp (!) 96.7  F (35.9  C) (Oral)   Resp 20   Ht 1.829 m (6')   Wt 70.2 kg (154 lb 12.2 oz)   SpO2 94%   BMI 20.99 kg/m    No acute distress  Unlabored breathing  Abdomen soft, nontender, nondistended.  L flank incision covered with tegaderm with no strikethrough    UOP 1200/865 x1 unmeasured void  x1 BM    Labs  WBC (5.4)  Hgb (8.5)  Cr (2.63)    AM labs pending    Cardiology   Interpretation Summary  Technically difficult study.Extremely poor acoustic windows.  Small LV size.  The visual ejection fraction is 55-60%.  Mild right ventricular dilation is present.  Global right ventricular function is mildly to moderately reduced.  Right ventricular systolic pressure is 37mmHg above the right atrial pressure.  IVC diameter >2.1 cm collapsing <50% with sniff suggests a high RA pressure  estimated at 15 mmHg or greater.  No pericardial effusion is present.  ______________________________________________________________________________    Assessment/Plan  60 year old y/o male POD#3 s/p awake L PCNL. Admitted to SICU postop for inability to wean vasopressors. Patient does have history of postop hypotension and received epidural intraop. CT with reduced stone burden but some residual. Transferred to floor 09/22. Continues to have persistent elevated O2 requirements.      - will likely consult cardiology today for continued oxygen requirements, will discuss plan with medicine as well  - follow AM labs  - cipro through Friday on discharge  - possible home today vs tomorrow once medically optimized    Seen and examined with the chief resident. Will discuss with Dr. Matias.    Dean Shaffer  PGY-2  [phone]       Contacting the Urology Team     Please use the following job codes to reach the Urology Team. Note that you must use an in house phone and that job codes cannot  receive text pages.     On weekdays, dial [phone] (or star-star-star [phone] on the new [place] telephones) then [phone] to reach the Adult Urology resident or PA on call    On weekdays, dial [phone] (or star-star-star [phone] on the new [place] telephones) then [phone] to reach the Pediatric Urology resident    On weeknights and weekends, dial [phone] (or star-star-star [phone] on the new [place] telephones) then [phone] to reach the Urology resident on call (for both Adult and Pediatrics)

## 2021-09-24 NOTE — DISCHARGE SUMMARY
Discharge Summary     Jimmy Isaac MRN# [id number]   YOB: 1960 Age: 60 year old     Date of Admission:  9/20/2021  Date of Discharge::  9/23/2021  Admitting Physician:  Zackery Moura MD  Discharge Physician:  Aicha Enciso MD  Primary Care Physician:         Cristhian Busch          Admission Diagnoses:   Kidney stone [N20.0]            Discharge Diagnosis:   Same as above           Procedures:   Procedure(s):  NEPHROLITHOTOMY, PERCUTANEOUS, USING HOLMIUM LASER        Non-operative procedures:   None performed          Consultations:   VASCULAR ACCESS CARE ADULT IP CONSULT  INTERNAL MEDICINE ADULT IP CONSULT FOR [place]  PHYSICAL THERAPY ADULT IP CONSULT  OCCUPATIONAL THERAPY ADULT IP CONSULT         Imaging Studies:     Results for orders placed or performed during the hospital encounter of 09/20/21   XR Surgery YUNIOR G/T 5 Min Fluoro w Stills    Narrative    This exam was marked as non-reportable because it will not be read by a   radiologist or a [place] non-radiologist provider.         CT Abdomen Pelvis w/o Contrast    Narrative    EXAMINATION: CT ABDOMEN PELVIS W/O CONTRAST 9/21/2021 9:19 AM    TECHNIQUE: Helical CT images from the lung bases through the symphysis  pubis were obtained without IV contrast.     COMPARISON: 8/12/2021, 8/1/2021    HISTORY: Flank pain, kidney stone suspected    FINDINGS:    Abdomen and pelvis:   The previously seen percutaneous nephrostomy tube has been removed,  and a left ureteral stent has been placed, with pigtails positioned in  the left renal pelvis and urinary bladder. The previously seen large  stone in the proximal left ureter is no longer present. One of the  large calyceal stones in the mid left kidney has been removed, and the  other is unchanged. Mild residual left hydronephrosis with diffuse  urothelial thickening/haziness. Increased left perinephric stranding  without discrete perinephric hematoma.  Unchanged staghorn calculi  throughout the right kidney. No right hydronephrosis. A Chong catheter  is positioned within the urinary bladder with associated air.    The liver, gallbladder, spleen, and adrenal glands appear normal.  Unchanged calcification in the pancreatic head. No significant main  pancreatic ductal dilation appreciated on this noncontrast exam.    No intra-abdominal free air or free fluid. No abnormally dilated loops  of bowel. Moderate stool burden. The appendix is normal. Moderate to  severe atherosclerotic calcifications in the normal caliber abdominal  aorta. Slightly decreased size of the prominent gastrohepatic lymph  node measuring 1.2 cm compared to 1.4 cm previously.    Lower chest:   The heart is not enlarged. Trace pericardial fluid. Pacemaker leads  seen in the right ventricle. No change in scattered prominent  subcentimeter periaortic lymph nodes. No change in mosaic attenuation,  groundglass opacities, and scattered nodular fibrosis in the lung  bases.    Bones and soft tissues:   Serpiginous sclerosis in the femoral heads without subchondral  collapse, unchanged. Mild multilevel degenerative changes in the  spine. No worrisome osseous lesion.        Impression    IMPRESSION:   1. Interval postprocedural changes of left PCNL. Increased left  perinephric stranding without discrete perinephric hematoma. Mild  residual left hydronephrosis with appropriately positioned left  ureteral stent.  2. Stable staghorn calculi in the right kidney and decreased stone  burden in the left kidney.  3. Sequelae of chronic pancreatitis. A prominent gastrohepatic lymph  node is slightly decreased in size.  4. Stable findings of pulmonary sarcoidosis in the lung bases.  5. Unchanged avascular necrosis without subchondral collapse in the  femoral heads.    ABDON NAVAS MD         SYSTEM ID:  [id number]   XR Chest Port 1 View    Narrative    EXAM: XR CHEST PORT 1 VIEW 9/21/2021 3:14 AM     HISTORY: [place]  for pulmonary edema       COMPARISON: 2021, 2017    TECHNIQUE: Single frontal radiograph of the chest    FINDINGS:   Dual lead cardiac pacer are intact. Hyperinflated lungs. Grossly  stable patchy midlung opacities bilaterally. No new focal  consolidation. No pneumothorax or significant pleural effusion. Stable  sclerosis in the proximal right humerus.        Impression    IMPRESSION:   Stable findings of pulmonary sarcoidosis. No convincing evidence of  new superimposed pulmonary edema, although evaluation is limited by  extensive pulmonary opacities.    I have personally reviewed the examination and initial interpretation  and I agree with the findings.    ABDON NAVAS MD         SYSTEM ID:  [id number]   Echo Complete     Value    LVEF  55-60%    Narrative    [id number]  [id number]  [id number]  [id number]^JUANPABLO^JULIO^GINETTE     [facility],[place]  Echocardiography Laboratory  [address] [place] [id number]     Name: MATTY LOPEZ  MRN: [id number]  : 1960  Study Date: 2021 03:56 PM  Age: 60 yrs  Gender: Male  Patient Location: [facility] [place]  Reason For Study: Dyspnea  Ordering Physician: JULIO GERONIMO  Performed By: Christina Conte RDCS     BSA: 1.9 m2  Height: 72 in  Weight: 154 lb  HR: 89  BP: 109/68 mmHg  ______________________________________________________________________________  Procedure  Complete Portable Echo Adult. Technically difficult study.Extremely poor  acoustic windows.  ______________________________________________________________________________  Interpretation Summary  Technically difficult study.Extremely poor acoustic windows.  Small LV size.  The visual ejection fraction is 55-60%.  Mild right ventricular dilation is present.  Global right ventricular function is mildly to moderately reduced.  Right ventricular systolic pressure is 37mmHg above the right atrial pressure.  IVC diameter >2.1 cm collapsing <50% with sniff suggests a high RA  pressure  estimated at 15 mmHg or greater.  No pericardial effusion is present.  ______________________________________________________________________________  Left Ventricle  Left ventricular wall thickness cannot evaluate. Small LV size. The visual  ejection fraction is 55-60%. Left ventricular diastolic function is  indeterminate. Abnormal septal motion consistent with pacemaker is present.     Right Ventricle  Mild right ventricular dilation is present. Global right ventricular function  is mildly to moderately reduced. A pacemaker lead is noted in the right  ventricle.     Atria  The atria cannot be assessed.     Mitral Valve  Mild mitral annular calcification is present.     Aortic Valve  Mild aortic valve calcification is present.     Tricuspid Valve  The tricuspid valve is normal. Trace to mild tricuspid insufficiency is  present. Right ventricular systolic pressure is 37mmHg above the right atrial  pressure.     Pulmonic Valve  The pulmonic valve is normal. Trace pulmonic insufficiency is present.     Vessels  The thoracic aorta cannot be assessed. The pulmonary artery cannot be  assessed. IVC diameter >2.1 cm collapsing <50% with sniff suggests a high RA  pressure estimated at 15 mmHg or greater.     Pericardium  No pericardial effusion is present.     ______________________________________________________________________________  MMode/2D Measurements & Calculations  LVOT diam: 2.7 cm  LVOT area: 5.7 cm2     Doppler Measurements & Calculations  MV E max xavi: 86.3 cm/sec  MV A max xavi: 81.5 cm/sec  MV E/A: 1.1  MV dec time: 0.08 sec  TR max xavi: 304.5 cm/sec  TR max P.1 mmHg  E/E' av.8  Lateral E/e': 10.7  Medial E/e': 24.8     ______________________________________________________________________________  Report approved by: David Simpson 2021 04:39 PM                  Medications Prior to Admission:     No medications prior to admission.            Discharge Medications:     Discharge  Medication List as of 9/23/2021  5:27 PM      CONTINUE these medications which have CHANGED    Details   ciprofloxacin (CIPRO) 500 MG tablet Take 1 tablet (500 mg) by mouth 2 times daily for 3 days, Disp-6 tablet, R-2, E-Prescribe         CONTINUE these medications which have NOT CHANGED    Details   ambrisentan (LETAIRIS) 10 MG tablet Take 10 mg by mouth every morning , Disp-30 tablet, R-11, Historical      budesonide (PULMICORT) 0.25 MG/2ML neb solution Take 2 mLs (0.25 mg) by nebulization 2 times daily, Disp-120 mL, R-4, E-Prescribe      furosemide (LASIX) 20 MG tablet Take 2 tablets (40 mg) by mouth 2 times daily, Disp-365 tablet, R-3, E-PrescribeDose reduction      PROAIR  (90 Base) MCG/ACT inhaler Inhale 2 puffs into the lungs every 6 hours as needed for shortness of breath / dyspnea, Disp-18 g, R-11, ABHINAV, E-PrescribePharmacy may dispense brand covered by insurance (Proair, or proventil or ventolin or generic albuterol inhaler)      selexipag (UPTRAVI) 1600 MCG tablet Take 1 tablet (1,600 mcg) by mouth every 12 hours, Disp-60 tablet, R-11, E-Prescribe      tadalafil, PAH, (ALYQ) 20 MG TABS Take 2 tablets (40 mg) by mouth daily, Disp-60 tablet, R-5, E-Prescribe      tamsulosin (FLOMAX) 0.4 MG capsule Take 1 capsule (0.4 mg) by mouth daily, Disp-30 capsule, R-0, E-Prescribe      UNABLE TO FIND MEDICATION NAME: Topical skin tag remover, Historical                    Brief History of Illness:   Reason for admission requiring a surgical or invasive procedure:   Kidney stone [N20.0]   The patient underwent the following procedure(s):   See above   There were no immediate complications during this procedure.    Please refer to the full operative summary for details.           Hospital Course:   The patient's hospital course was remarkable for SICU admission for vasopressors after surgery, attributed to vasoplegia after epidural.  Jimmy Isaac otherwise recovered as anticipated and experienced no post-operative  complications.  He was transferred to floor status on POD#2.    He did have some increased oxygen requirements for which medicine was consulted. An echo was obtained and reviewed by cardiology who had no concerns for discharge.    On POD#3 patient was ambulating without assitance, tolerating the discharge diet, had pain controlled with PO medications to go home with, and requiring no IV medications or fluids. Patient was discharged home with appropriate contact information, follow-up and instructions as seen below in the discharge paperwork.         Final Pathology Result:   Pending at time of discharge         Discharge Instructions and Follow-Up:     Discharge Procedure Orders   When to call - Contact Surgeon Team   Order Comments: You may experience symptoms that require follow-up before your scheduled appointment. Contact your Surgeon Team if you are concerned about pain control, large amount of bleeding, blood clots, constipation, or if you experience signs of infection (fever, growing tenderness at the surgery site, a large amount of drainage, severe pain, foul-smelling drainage, redness or swelling.     When to call - Reach out to Urgent Care   Order Comments: If you are experiencing uncontrolled Nausea and Vomiting, uncontrolled pain, inability to urinate and uncomfortable, and in need of immediate care, and you are NOT able to reach your Surgeon Team, go to an Urgent Care clinic. Do NOT go to the Emergency Room unless you have shortness of breath, chest pain, or other signs of a medical emergency.     When to call - Reasons to Call 911   Order Comments: Call 911 immediately if you experience sudden-onset chest pain, arm weakness/numbness, slurred speech, or shortness of breath     Symptoms - Fever Management   Order Comments: A low grade fever can be expected after surgery. Your Provider many have prescribed an Opioid pain medication that also contains acetaminophen (TYLENOL) that may help with Fever  management.  Do NOT take additional acetaminophen (TYLENOL) in combination with an Opioid/acetaminophen (TYLENOL) product. Read the labels on your Over The Counter (OTC) medications with care.     Symptoms - Reduced Urine Output   Order Comments: If it has been greater than 8 hours since you have urinated despite drinking plenty of water, call your Surgeon Team.     No driving or operating machinery   Order Comments: Do NOT drive any vehicle or operate mechanical equipment for 24 hours following the end of your surgery.  Even though you may feel normal, your reactions may be affected by Anesthesia medication you received.     No Alcohol   Order Comments: Do NOT drink alcoholic beverages for 24 hours following your surgery and while taking pain medications.     Diet Instructions   Order Comments: Follow your surgeon's orders for any diet restrictions.  If you did not receive any diet restrictions, you may drink clear liquids (apple juice, ginger ale, 7-up, broth, etc.), and progress to your regular diet as you feel able. It is important to stay well-hydrated after surgery and drink plenty of water.     Discharge Instructions - Comfort and Pain Management   Order Comments: Pain after surgery is normal and expected. You will have some amount of pain after surgery. Your pain will improve with time. There are several things you can do to help reduce your pain including: rest, ice, and using pain medications as needed. Use pain interventions and don't wait until pain level is out of control. Contact your Surgeon Team if you have pain that persists or worsens after surgery despite rest, ice, and taking your medication(s) as prescribed. You may have a dry mouth, a sore throat, muscles aches or trouble sleeping, and these symptoms should go away after 24 hours.     Discharge Instructions - Rest   Order Comments: Rest and relax for the next 24 hours. Make arrangements to have someone stay with you overnight, and avoid  "hazardous and strenuous activities.  Do NOT make any important decisions for the next 24 hours.     Shower/Bathing - No restrictions, may shower after 24 hours   Order Comments: Shower/Bathing - No restrictions, may shower after 24 hours. No soaking or submerging for 2 weeks     Order Specific Question Answer Comments   Is discharge order? Yes      Return to normal activity as tolerated   Order Comments: Return to normal activity as tolerated     Order Specific Question Answer Comments   Is discharge order? Yes      Dressing / Wound Care - Wound   Order Comments: Activity  - Do not strain with bowel movements.  - Do not drive until you can press the brake pedal quickly and fully without pain.   - Do not operate a motor vehicle while taking narcotic pain medications.     Stents  - You have a ureteral stent in place. Stents can cause some discomfort, including some blood in your urine (which is normal), the feeling or urge to urinate frequently, burning with urination and pressure/discomfort in your back while voiding. Stay well hydrated to keep your urine as clear as possible.    Medications  - Flomax - to decrease stent discomfort   - Take tylenol as needed for discomfort    Follow-Up:  - Follow up with Dr. Moura in around 2 weeks for stent removal.  - Call your primary care provider to touch base regarding your recent procedure.   - Call or return sooner than your regularly scheduled visit if you develop any of the following: fever (greater than 101.5), uncontrolled pain, uncontrolled nausea or vomiting, as well as increased redness, swelling, or drainage from your wound.     Phone numbers:   - Monday through Friday 8am to 4:30pm: Call [phone] with questions or to schedule or confirm appointment.    - Nights or weekends: call the after hours emergency pager - [phone] and tell the  \"I would like to page the Urology Resident on call.\"  - For emergencies, call [phone]     Reason for your hospital stay "   Order Comments: You were admitted to have your kidney stone removed     Activity   Order Comments: Your activity upon discharge: activity as tolerated     Order Specific Question Answer Comments   Is discharge order? Yes      Diet   Order Comments: Follow this diet upon discharge: Orders Placed This Encounter      Advance Diet as Tolerated: Regular Diet Adult; Regular Diet Adult     Order Specific Question Answer Comments   Is discharge order? Yes             Discharge Disposition:     Discharged to Home      Condition at discharge: Good    --    Aicha Enciso MD  Urology Resident    9:27 AM, 9/24/2021

## 2021-09-26 LAB
BACTERIA BLD CULT: NO GROWTH
BACTERIA BLD CULT: NO GROWTH

## 2021-09-29 ENCOUNTER — TELEPHONE (OUTPATIENT)
Dept: UROLOGY | Facility: CLINIC | Age: 61
End: 2021-09-29

## 2021-09-29 NOTE — TELEPHONE ENCOUNTER
Spoke with patient who states that he has been doing well since stone surgery.  He has had no issues urinating and no fever or other symptoms.  He does note that he will be picking up a blood pressure machine to monitor his readings.  If he has any questions or issues he will call back.  He is scheduled for stent removal on 10/26.  Radha Doyle RN

## 2021-10-05 ENCOUNTER — PATIENT OUTREACH (OUTPATIENT)
Dept: CARE COORDINATION | Facility: CLINIC | Age: 61
End: 2021-10-05

## 2021-10-05 NOTE — PROGRESS NOTES
Social Work Intervention  [facility]    Data/Intervention:    Patient Name:  Jimmy Isaac  /Age:  1960 (60 year old)    Visit Type: telephone & email  Referral Source: Obdulia Rome RN  Reason for Referral:  Insurance issues    Collaborated With:    -Jimmy  -consulted with Karoline Santana, financial counselor    Psychosocial Information/Concerns:  Pt sent [employer] message 10-1-21 re insurance issues. His COBRA ended 21 and he understood that he couldn't apply for insurance thru mnsure until the last day of his COBRA. He did that and now has found that Mnsure insurance ([place] care) will not start until 21. He has appts and other medical care scheduled for this month.  He looked into whether he could extend cobra and there is a clause that allows extension if he meets certain criteria. He understood he would need to be certified disabled by the State.     Intervention/Education/Resources Provided:  Reviewed Pt's current understanding of his situation and reviewed the difficulty with being determined disabled by the State (SMRT). I consulted with Karoline Santana a financial counselor re SMRT. Also reviewed the COBRA criteria to extend coverage: https://www.iSpye.gov/sites/[employer]/files/EBSA/about-ebsa/our-activities/resource-center/faqs/cobra-continuation-health-coverage-consumer.pdf  It states that disability determination would be by Social Security disability. He hasn't applied yet, but we discussed that process and he will get started.    In addition, I reviewed the MNsure website and didn't find that he had to wait to apply for mnsure until cobra ended (which caused this issue), instead that he could apply 60 days prior. If there is language on the mnsure website that said he had to wait, it could be a reason to appeal their decision to not approve start of coverage 10-1-2021.   Whatever the path he takes, it's uncertain whether he will ultimately get insurance for this month.  Offered to assist with information about medication programs if needed. He will need to determine whether he wishes to delay other appts until Nov.    Assessment/Plan:  Pt likely misunderstood the info on the mnsure website re when he could apply. It's very confusing in how the info was delivered online. Will remain available to assist him with info about his available options.    Provided patient/family with contact information and availability.    OG Osman, [facility]    [facility] [place]  [phone]

## 2021-10-06 ENCOUNTER — MYC MEDICAL ADVICE (OUTPATIENT)
Dept: CARDIOLOGY | Facility: CLINIC | Age: 61
End: 2021-10-06

## 2021-10-24 ENCOUNTER — HEALTH MAINTENANCE LETTER (OUTPATIENT)
Age: 61
End: 2021-10-24

## 2021-10-28 ENCOUNTER — MYC MEDICAL ADVICE (OUTPATIENT)
Dept: CARDIOLOGY | Facility: CLINIC | Age: 61
End: 2021-10-28

## 2021-10-28 DIAGNOSIS — I27.20 PULMONARY HYPERTENSION (H): ICD-10-CM

## 2021-10-29 ENCOUNTER — TELEPHONE (OUTPATIENT)
Dept: CARDIOLOGY | Facility: CLINIC | Age: 61
End: 2021-10-29
Payer: COMMERCIAL

## 2021-11-01 ENCOUNTER — TELEPHONE (OUTPATIENT)
Dept: CARDIOLOGY | Facility: CLINIC | Age: 61
End: 2021-11-01
Payer: COMMERCIAL

## 2021-11-01 DIAGNOSIS — J44.9 COPD (CHRONIC OBSTRUCTIVE PULMONARY DISEASE) (H): ICD-10-CM

## 2021-11-01 DIAGNOSIS — D86.9 SARCOIDOSIS: ICD-10-CM

## 2021-11-01 RX ORDER — FUROSEMIDE 20 MG
40 TABLET ORAL 2 TIMES DAILY
Qty: 365 TABLET | Refills: 3 | Status: SHIPPED | OUTPATIENT
Start: 2021-11-01 | End: 2022-01-01

## 2021-11-01 RX ORDER — TADALAFIL 20 MG/1
40 TABLET ORAL DAILY
Qty: 60 TABLET | Refills: 11 | Status: SHIPPED | OUTPATIENT
Start: 2021-11-01

## 2021-11-01 NOTE — TELEPHONE ENCOUNTER
PA Initiation    Medication: Ambrisentan 10mg  Insurance Company: [employer] - Phone [phone] Fax [phone]  Pharmacy Filling the Rx: [facility] - [place], [place] - [address]  Start Date: 11/1/2021    *Prior authorization completed and submitted through CoverMyMeds for expedited review.

## 2021-11-01 NOTE — TELEPHONE ENCOUNTER
Called [facility] & spoke with Beverly Woodson whom I gave TORB for Letairis 10mg once daily with 11 refills & Uptravi 1600mcg BID with 11 refills.     The Letairis can be sent right away, but we need to get the referral from [facility] sent to [facility] before they are able to fill Rx.  Verbalized understanding. Obdulia Rome RN on 11/1/2021 at 12:45 PM  -----------------------------  Msg sent to [facility] include 234 beds at the [facility] regarding HUB form transfer. Obdulia Rome RN on 11/1/2021 at 12:46 PM

## 2021-11-01 NOTE — TELEPHONE ENCOUNTER
PA Initiation    Medication: Uptravi 1600mcg   Insurance Company: [employer] - Phone [phone] Fax [phone]  Pharmacy Filling the Rx: [facility] - [place], [place] - [facility] [address] [facility]  Start Date: 11/1/2021    *Prior authorization completed and submitted through CoverMyMeds for expedited review.

## 2021-11-01 NOTE — TELEPHONE ENCOUNTER
PA Initiation    Medication: Tadalafil 20mg  Insurance Company: [employer] - Phone [phone] Fax [phone]  Pharmacy Filling the Rx: [facility] - [place], [place] - [address]  Start Date: 11/1/2021    *Prior authorization completed and submitted through CoverMyMeds for expedited review.

## 2021-11-02 ENCOUNTER — OFFICE VISIT (OUTPATIENT)
Dept: UROLOGY | Facility: CLINIC | Age: 61
End: 2021-11-02
Payer: COMMERCIAL

## 2021-11-02 VITALS
WEIGHT: 155 LBS | BODY MASS INDEX: 20.99 KG/M2 | HEART RATE: 93 BPM | SYSTOLIC BLOOD PRESSURE: 117 MMHG | HEIGHT: 72 IN | DIASTOLIC BLOOD PRESSURE: 67 MMHG

## 2021-11-02 DIAGNOSIS — N20.0 NEPHROLITHIASIS: ICD-10-CM

## 2021-11-02 DIAGNOSIS — N20.0 KIDNEY STONE: Primary | ICD-10-CM

## 2021-11-02 LAB
ALBUMIN UR-MCNC: 100 MG/DL
APPEARANCE UR: ABNORMAL
BACTERIA #/AREA URNS HPF: ABNORMAL /HPF
BILIRUB UR QL STRIP: NEGATIVE
COLOR UR AUTO: YELLOW
GLUCOSE UR STRIP-MCNC: NEGATIVE MG/DL
HGB UR QL STRIP: ABNORMAL
KETONES UR STRIP-MCNC: NEGATIVE MG/DL
LEUKOCYTE ESTERASE UR QL STRIP: ABNORMAL
MUCOUS THREADS #/AREA URNS LPF: PRESENT /LPF
NITRATE UR QL: NEGATIVE
PH UR STRIP: 6 [PH] (ref 5–7)
RBC URINE: >182 /HPF
SP GR UR STRIP: 1.01 (ref 1–1.03)
UROBILINOGEN UR STRIP-MCNC: NORMAL MG/DL
WBC CLUMPS #/AREA URNS HPF: PRESENT /HPF
WBC URINE: >182 /HPF

## 2021-11-02 PROCEDURE — 87186 SC STD MICRODIL/AGAR DIL: CPT | Performed by: UROLOGY

## 2021-11-02 PROCEDURE — 87086 URINE CULTURE/COLONY COUNT: CPT | Performed by: UROLOGY

## 2021-11-02 PROCEDURE — 52310 CYSTOSCOPY AND TREATMENT: CPT | Mod: 58 | Performed by: UROLOGY

## 2021-11-02 PROCEDURE — 81001 URINALYSIS AUTO W/SCOPE: CPT | Performed by: PATHOLOGY

## 2021-11-02 RX ORDER — LIDOCAINE HYDROCHLORIDE 20 MG/ML
JELLY TOPICAL ONCE
Status: COMPLETED | OUTPATIENT
Start: 2021-11-02 | End: 2021-11-02

## 2021-11-02 RX ORDER — BUDESONIDE 0.25 MG/2ML
0.25 INHALANT ORAL 2 TIMES DAILY
Qty: 120 ML | Refills: 4 | Status: SHIPPED | OUTPATIENT
Start: 2021-11-02 | End: 2022-01-01

## 2021-11-02 RX ADMIN — LIDOCAINE HYDROCHLORIDE: 20 JELLY TOPICAL at 16:00

## 2021-11-02 ASSESSMENT — PAIN SCALES - GENERAL: PAINLEVEL: NO PAIN (0)

## 2021-11-02 ASSESSMENT — MIFFLIN-ST. JEOR: SCORE: 1546.08

## 2021-11-02 NOTE — PROGRESS NOTES
CYSTOSCOPY PROCEDURE NOTE    Reason for cystoscopy: Stent Removal    Brief History: 61 yo M with hx of sarcoid, left renal stone, 6 weeks s/p PCNL with stent in place    CYSTOSCOPY  After obtaining informed consent, the patient was prepped and draped in the standard sterile fashion.  The 15 Occitan flexible cystoscope was inserted through the urethral meatus.  The left stent was identified, it was unencrusted, grasped and removed.     The patient tolerated the procedure well without complication.      Assessment/Plan: 61 yo M with sarcoid, renal stones  -F/U with KUB and renal US in 4 weeks      IZackery saw and evaluated this patient and agree with the plan as stated above.  I personally performed all listed procedures.

## 2021-11-02 NOTE — LETTER
11/2/2021       RE: Jimmy Isaac  [address] [address] [address]     Dear Colleague,    Thank you for referring your patient, Jimmy Isaac, to the [facility] [place] at [facility]. Please see a copy of my visit note below.    CYSTOSCOPY PROCEDURE NOTE    Reason for cystoscopy: Stent Removal    Brief History: 61 yo M with hx of sarcoid, left renal stone, 6 weeks s/p PCNL with stent in place    CYSTOSCOPY  After obtaining informed consent, the patient was prepped and draped in the standard sterile fashion.  The 15 Frisian flexible cystoscope was inserted through the urethral meatus.  The left stent was identified, it was unencrusted, grasped and removed.     The patient tolerated the procedure well without complication.      Assessment/Plan: 61 yo M with sarcoid, renal stones  -F/U with KUB and renal US in 4 weeks      IZackery saw and evaluated this patient and agree with the plan as stated above.  I personally performed all listed procedures.

## 2021-11-02 NOTE — NURSING NOTE
Chief Complaint   Patient presents with    Cystoscopy     stent removal       Blood pressure 117/67, pulse 93, height 1.829 m (6'), weight 70.3 kg (155 lb). Body mass index is 21.02 kg/m .    Patient Active Problem List   Diagnosis    Pulmonary hypertension (H)    Sarcoidosis    Renal insufficiency due to nephrolithiasis    Dyspnea on exertion    CKD (chronic kidney disease) stage 4, GFR 15-29 ml/min (H)    High degree atrioventricular block    Kidney stone       Allergies   Allergen Reactions    Nkda [No Known Drug Allergies]        Current Outpatient Medications   Medication Sig Dispense Refill    ambrisentan (LETAIRIS) 10 MG tablet Take 10 mg by mouth every morning  30 tablet 11    budesonide (PULMICORT) 0.25 MG/2ML neb solution Take 2 mLs (0.25 mg) by nebulization 2 times daily 120 mL 4    furosemide (LASIX) 20 MG tablet Take 2 tablets (40 mg) by mouth 2 times daily 365 tablet 3    PROAIR  (90 Base) MCG/ACT inhaler Inhale 2 puffs into the lungs every 6 hours as needed for shortness of breath / dyspnea 18 g 11    selexipag (UPTRAVI) 1600 MCG tablet Take 1 tablet (1,600 mcg) by mouth every 12 hours 60 tablet 11    tadalafil, PAH, (ALYQ) 20 MG TABS Take 2 tablets (40 mg) by mouth daily 60 tablet 11    tamsulosin (FLOMAX) 0.4 MG capsule Take 1 capsule (0.4 mg) by mouth daily 30 capsule 0    UNABLE TO FIND MEDICATION NAME: Topical skin tag remover         Social History     Tobacco Use    Smoking status: Former Smoker     Packs/day: 1.00     Years: 30.00     Pack years: 30.00     Types: Cigarettes     Start date: 10/10/1975     Quit date: 3/1/2010     Years since quittin.6    Smokeless tobacco: Never Used   Substance Use Topics    Alcohol use: Not Currently     Alcohol/week: 0.0 standard drinks     Comment: stated 2 bottles of beer occ    Drug use: Not Currently     Comment: past use of marijuana       Invasive Procedure Safety Checklist:    Procedure: Cystoscopy    Action: Complete sections and  checkboxes as appropriate.    Pre-procedure:  1. Patient ID Verified with 2 identifiers (Alana and  or MRN) : YES    2. Procedure and site verified with patient/designee (when able) : YES    3. Accurate consent documentation in medical record : YES    4. H&P (or appropriate assessment) documented in medical record : N/A  H&P must be up to 30 days prior to procedure an updated within 24 hours of                 Procedure as applicable.     5. Relevant diagnostic and radiology test results appropriately labeled and displayed as applicable : YES    6. Blood products, implants, devices, and/or special equipment available for the procedure as applicable : YES    7. Procedure site(s) marked with provider initials [Exclusions: none] : NO    8. Marking not required. Reason : Yes  Procedure does not require site marking    Time Out:     Time-Out performed immediately prior to starting procedure, including verbal and active participation of all team members addressing: YES    1. Correct patient identity.  2. Confirmed that the correct side and site are marked.  3. An accurate procedure to be done.  4. Agreement on the procedure to be done.  5. Correct patient position.  6. Relevant images and results are properly labeled and appropriately displayed.  7. The need to administer antibiotics or fluids for irrigation purposes during the procedure as applicable.  8. Safety precautions based on patient history or medication use.    During Procedure: Verification of correct person, site, and procedure occurs any time the responsibility for care of the patient is transferred to another member of the care team.    The following medication was given:     MEDICATION:  Lidocaine without epinephrine 2% jelly  ROUTE: urethral   SITE: urethral   DOSE: 10 mL  LOT #: [id number]  : [employer]  EXPIRATION DATE:   NDC#: [address]-5   Was there drug waste? No    Prior to med admin, verified patient  identity using patient's name and date of birth.  Due to med administration, patient instructed to remain in clinic for 15 minutes  afterwards, and to report any adverse reaction to me immediately.    Drug Amount Wasted:  None.  Vial/Syringe: Syringe      Devaughn Morales  11/2/2021  3:37 PM

## 2021-11-02 NOTE — PATIENT INSTRUCTIONS
Please follow up in one month with imaging.    It was a pleasure meeting with you today.  Thank you for allowing me and my team the privilege of caring for you today.  YOU are the reason we are here, and I truly hope we provided you with the excellent service you deserve.  Please let us know if there is anything else we can do for you so that we can be sure you are leaving completely satisfied with your care experience.

## 2021-11-05 LAB — BACTERIA UR CULT: ABNORMAL

## 2021-11-08 NOTE — TELEPHONE ENCOUNTER
Prior Authorization Approval    Authorization Effective Date: 8/1/2021  Authorization Expiration Date: 11/1/2022  Medication: Tadalafil 20mg - Approved  Approved Dose/Quantity: 60 tablets/30 days  Reference #: [id number]   Insurance Company: [employer] - Phone [phone] Fax [phone]    Which Pharmacy is filling the prescription (Not needed for infusion/clinic administered): [facility] - [place], [place] - [address]  ----------------------------  Insurance: JULIANNA FUNEZ ([employer])  BIN: N/A  PCN: N/A  ID#: [id number]  GRP#: N/A

## 2021-11-08 NOTE — TELEPHONE ENCOUNTER
Prior Authorization Approval    Authorization Effective Date: 11/1/2021  Authorization Expiration Date: 11/1/2022  Medication: Ambrisentan 10mg - Approved  Approved Dose/Quantity: 30 tablets/30 days  Reference #: [id number]   Insurance Company: [employer] - Phone [phone] Fax [phone]  Which Pharmacy is filling the prescription (Not needed for infusion/clinic administered): [facility] - [place] [address]  ----------------------------  Insurance: JULIANNA JOSEPH ([employer])  BIN: N/A  PCN: N/A  ID#: [id number]  GRP#: N/A

## 2021-11-08 NOTE — TELEPHONE ENCOUNTER
Prior Authorization Approval    Authorization Effective Date: 11/1/2021  Authorization Expiration Date: 11/1/2022  Medication: Uptravi 1600mcg - Approved  Approved Dose/Quantity: 60 tablets/30 days  Reference #: [id number]   Insurance Company: [employer] - Phone [phone] Fax [phone]  Which [facility] the [facility] [address]  ----------------------------  Insurance: JULIANNA [facility]  BIN: N/A  PCN: N/A  ID#: [id number]  GRP#: N/A

## 2021-11-10 NOTE — PROGRESS NOTES
CARDIOLOGY PH CLINIC TELEPHONE VISIT    Date of telephone visit: 11/11/21    Jimmy Isaac is a 61 year old male who is being evaluated via a billable telephone visit.      I have reviewed and updated the patient's Past Medical History, Social History, Family History and Medication List.      MEDICATIONS:  Current Outpatient Medications   Medication Sig Dispense Refill     ambrisentan (LETAIRIS) 10 MG tablet Take 10 mg by mouth every morning  30 tablet 11     budesonide (PULMICORT) 0.25 MG/2ML neb solution Take 2 mLs (0.25 mg) by nebulization 2 times daily 120 mL 4     furosemide (LASIX) 20 MG tablet Take 2 tablets (40 mg) by mouth 2 times daily 365 tablet 3     PROAIR  (90 Base) MCG/ACT inhaler Inhale 2 puffs into the lungs every 6 hours as needed for shortness of breath / dyspnea 18 g 11     selexipag (UPTRAVI) 1600 MCG tablet Take 1 tablet (1,600 mcg) by mouth every 12 hours 60 tablet 11     tadalafil, PAH, (ALYQ) 20 MG TABS Take 2 tablets (40 mg) by mouth daily 60 tablet 11     tamsulosin (FLOMAX) 0.4 MG capsule Take 1 capsule (0.4 mg) by mouth daily 30 capsule 0     UNABLE TO FIND MEDICATION NAME: Topical skin tag remover         ALLERGIES  Nkda [no known drug allergies]      Self reported vitals:  Weight: 155-160#, hasn't weighed last few days  BP not reported    Primary PH cardiologist: Dr. Lindsey      HPI:  Mr. Jimmy Isaac is a pleasant 61 year old male with a past medical history of hypertension, CKD, and pulmonary sarcoidosis (without cardiac involvement). He is followed here for associated pulmonary hypertension and is maintained on combination therapy with Letairis, tadalafil, and selexipag. He is also oxygen dependent.      He was seen last by Dr. Lindsey via virtual visit in December of 2020. At that time, no medication changes were made, though he was referred for further evaluation of hydronephrosis. He did not see urology until June, but there was concern that he was having more  obstruction from nephrolithiasis causing more more deterioration to his kidney function. He had CT imaging which showed an obstructing stone in the left ureter. On 7/9 he underwent a left nephrostomy tube placement. Urology then recommended left percutaneous nephrolithotomy.  I then met him shortly after  for an urgent evaluation for anesthesia clearance. Unfortunately, his breathing had worsened over the few months prior. His EKG in clinic showed marked sinus bradycardia with 1st degree AV block for which I asked him to hold his digoxin. His echo showed normal EF 55-60%, but he had a flattened septum c/w RV pressure and volume overload. His RV function was mild to moderately reduced, which had worsened from prior. I then sent him for a repeat RHC prior to consideration of surgery.     Jimmy was then admitted from 7/30 to 8/12 as he was found to be in complete heart block with narrow ventricular escape rhythm after undergoing his RHC. He required ICU for stabilization and ultimately a pacemaker was placed 8/2/21 due to persistent heart block. His stay was complicated by some transient delirium, and worsening renal function. An inpatient PET Scan was completed on 8/12/21 and showed no evidence of active cardiac sarcoidosis. He was diuresed to a discharge weight of 152# and RHC continued to show moderate PAH with normal cardiac output. He was treated with IV abx for UTI. Urology followed the patient and CT abdomen showed some mild improvement in his hydronephrosis. Lasix was increased from 20mg BID to 80mg BID. His Scr did rise and peak to 4.86, and nephrology was consulted for possible need for dialysis, but due to lack of uremic symptoms, this was not performed. When I saw him back in mid August, he was starting to feel better. He was watching sodium in his diet which was helpful. At that time, I made no changes.     Jimmy then was hospitalized from 9/20 to 9/23 for his planned percutaneous nephrolithotomy. This was  complicated by a brief ICU admission and pressor support. His PH medications/diuretics were unchanged. He then underwent stent removal on 11/2. Today, we are doing a virtual visit to follow up after his hospital stay. He tells me that overall he is slowly improving. He had some issues with dyspnea while in the hospital but says he is now approaching his baseline. He denies any LE edema or orthopnea, though is not weighing daily at home. His sats are running mid 90s at rest on his usual oxygen 4-5LPM. He denies any dizziness or presyncope.     No new labs done prior to our visit today for review.        CURRENT PULMONARY HYPERTENSION REGIMEN:     PAH Rx: Selexipag 1600mcg BID, Letairs 10mg daily, and tadalafil 40mg daily   (previously on Tyvaso)     Diuretics: Lasix 80mg BID      Oxygen: 4-5LPM at rest, will go up to 8-10L with exertion     Anticoagulation: none         ASSESSMENT/PLAN:      1. Pulmonary arterial hypertension.              --In the setting of pulmonary sarcoid and chronic hypoxemia. He remains on triple combination therapy with tadalafil, ambrisentan, and selexipag. RHC in July showed moderate PAH with normal cardiac output. Echo done during his recent hospital stay showed EF 55-60%. Mild RV dilation and mild to moderately reduced RV function was noted, which does not appear to be significantly changed from prior.   --He was previously on digoxin, though I discontinued it this summer when he presented with bradycardia and AV block. He is now s/p pacemaker as below.            --In regard to volume status, I am unable to examine him today because this is a virtual visit but he reports no edema currently. He remains on lasix 80mg BID. He has not had a repeat BMP since his surgery in September, though renal function remains a concern. Will repeat BMP next week when he returns to see Dr. Lindsey.      2. Complete heart block.              --Was bradycardic with recent office visit as outlined above and  digoxin was discontinued. When he returned shortly after for his RHC, he was found to be in complete heart block. He is now s/p pacemaker placement on 8/2021.               --He has an appt in place next week to follow up with EP with a device check.    Follow up plan: Return next week with labs as already scheduled, to see Dr. Lindsey.       Testing/labs:    Most recent labs:   Results for MATTY LOPEZ (MRN [id number]) as of 11/11/2021 14:32   Ref. Range 9/23/2021 07:05   Sodium Latest Ref Range: 133 - 144 mmol/L 141   Potassium Latest Ref Range: 3.4 - 5.3 mmol/L 3.7   Chloride Latest Ref Range: 94 - 109 mmol/L 106   Carbon Dioxide Latest Ref Range: 20 - 32 mmol/L 28   Urea Nitrogen Latest Ref Range: 7 - 30 mg/dL 38 (H)   Creatinine Latest Ref Range: 0.66 - 1.25 mg/dL 2.47 (H)   GFR Estimate Latest Ref Range: >60 mL/min/1.73m2 27 (L)   Calcium Latest Ref Range: 8.5 - 10.1 mg/dL 9.1   Anion Gap Latest Ref Range: 3 - 14 mmol/L 7   Glucose Latest Ref Range: 70 - 99 mg/dL 86   WBC Latest Ref Range: 4.0 - 11.0 10e3/uL 5.7   Hemoglobin Latest Ref Range: 13.3 - 17.7 g/dL 8.1 (L)   Hematocrit Latest Ref Range: 40.0 - 53.0 % 27.1 (L)   Platelet Count Latest Ref Range: 150 - 450 10e3/uL 157   RBC Count Latest Ref Range: 4.40 - 5.90 10e6/uL 2.90 (L)   MCV Latest Ref Range: 78 - 100 fL 93   MCH Latest Ref Range: 26.5 - 33.0 pg 27.9   MCHC Latest Ref Range: 31.5 - 36.5 g/dL 29.9 (L)   RDW Latest Ref Range: 10.0 - 15.0 % 13.3       Other recent pertinent testing:  RHC 7/30/2021  Hemodynamics RA - 12  RV - 77/16  PA - 77/20/38  PCWP - 17  Hgb - 10.7  PA - 55.6%  Lesia CO/CI - 4.62/2.36 Right sided filling pressures are mildly elevated. Left sided filling pressures are mildly elevated. Moderately elevated pulmonary artery hypertension. Normal cardiac output level.      Echo   9/22/2021  Interpretation Summary  Technically difficult study.Extremely poor acoustic windows.  Small LV size.  The visual ejection fraction is  55-60%.  Mild right ventricular dilation is present.  Global right ventricular function is mildly to moderately reduced.  Right ventricular systolic pressure is 37mmHg above the right atrial pressure.  IVC diameter >2.1 cm collapsing <50% with sniff suggests a high RA pressure  estimated at 15 mmHg or greater.  No pericardial effusion is present.      NYHA Functional Class:  3      I have reviewed the note as documented above.  This accurately captures the substance of my conversation with the patient.      Phone call contact time  Call Started at 1428  Call Ended at 1439    An additional 15 minutes was spent today performing chart and history review, pre and post visit documentation, review of recent testing, and care coordination.        Gauri Laurent PA-C  [facility]  Pager [phone]

## 2021-11-11 NOTE — PATIENT INSTRUCTIONS
Thank you for visiting the Pulmonary Hypertension Clinic virtually today.      Today we discussed:   No medication changes.   Please try to weigh routinely at home so we can monitor for more fluid retention.    Follow up Appointment Information:  Return next week to see Dr Lindsey with labs prior, as already scheduled.     Additional Instructions:    1. Continue staying active and eat a heart healthy, low sodium diet.     2. Please keep current list of medications with you at all times.     3. Remember to weigh yourself daily after voiding and write it down on a log. If you have gained/lost 2 pounds overnight or 5 pounds in a week contact us for medication adjustments or further instructions.    4. Please call us immediately if you have syncope (fainting or passing out), chest pain, worsening edema (swelling or weight gain), or general worsening in how you are feeling.         ---------------------------------------------------------------------------------------------------------------    If you have questions or concerns please contact us at:        Obdulia Rome, RN, BSN, PHN  Samantha Chandra  (Schedule,Prior Auth)  Nurse Coordinator     Clinic   Pulmonary Hypertension   Pulmonary Hypertension  [facility]             [facility]  (P)[phone]    (P) [phone]        (F) [phone]      ** Please note that you will NOT receive a reminder call regarding your scheduled testing, reminder calls are for provider appointments only.  If you are scheduled for testing within the [employer] system you may receive a call regarding pre-registration for billing purposes only.**     ------------------------------------------------------------------------------------------------------------    Interested in joining a support group?    Pulmonary Hypertension Association  Https://www.phassociation.org/  **Look at the Events Tab** They even have Support  Groups that you can call into    [facility]  Second Saturday of the Month from 1-3 PM   Location: [address] [place] [address]  Leader: Sandra Rollins  Phone: [phone] or [phone]  Email: [email or web address]

## 2021-11-11 NOTE — LETTER
11/11/2021      RE: Jimmy Isaac  [id number] [address] [address]       Dear Colleague,    Thank you for the opportunity to participate in the care of your patient, Jimmy Isaac, at the [facility] [place] at [facility]. Please see a copy of my visit note below.        CARDIOLOGY PH CLINIC TELEPHONE VISIT    Date of telephone visit: 11/11/21    Jimmy Isaac is a 61 year old male who is being evaluated via a billable telephone visit.      I have reviewed and updated the patient's Past Medical History, Social History, Family History and Medication List.      MEDICATIONS:  Current Outpatient Medications   Medication Sig Dispense Refill     ambrisentan (LETAIRIS) 10 MG tablet Take 10 mg by mouth every morning  30 tablet 11     budesonide (PULMICORT) 0.25 MG/2ML neb solution Take 2 mLs (0.25 mg) by nebulization 2 times daily 120 mL 4     furosemide (LASIX) 20 MG tablet Take 2 tablets (40 mg) by mouth 2 times daily 365 tablet 3     PROAIR  (90 Base) MCG/ACT inhaler Inhale 2 puffs into the lungs every 6 hours as needed for shortness of breath / dyspnea 18 g 11     selexipag (UPTRAVI) 1600 MCG tablet Take 1 tablet (1,600 mcg) by mouth every 12 hours 60 tablet 11     tadalafil, PAH, (ALYQ) 20 MG TABS Take 2 tablets (40 mg) by mouth daily 60 tablet 11     tamsulosin (FLOMAX) 0.4 MG capsule Take 1 capsule (0.4 mg) by mouth daily 30 capsule 0     UNABLE TO FIND MEDICATION NAME: Topical skin tag remover         ALLERGIES  Nkda [no known drug allergies]      Self reported vitals:  Weight: 155-160#, hasn't weighed last few days  BP not reported    Primary PH cardiologist: Dr. Lindsey      HPI:  Mr. Jimmy Isaac is a pleasant 61 year old male with a past medical history of hypertension, CKD, and pulmonary sarcoidosis (without cardiac involvement). He is followed here for associated pulmonary hypertension and is maintained on combination therapy  with Letairis, tadalafil, and selexipag. He is also oxygen dependent.      He was seen last by Dr. Lindsey via virtual visit in December of 2020. At that time, no medication changes were made, though he was referred for further evaluation of hydronephrosis. He did not see urology until June, but there was concern that he was having more obstruction from nephrolithiasis causing more more deterioration to his kidney function. He had CT imaging which showed an obstructing stone in the left ureter. On 7/9 he underwent a left nephrostomy tube placement. Urology then recommended left percutaneous nephrolithotomy.  I then met him shortly after  for an urgent evaluation for anesthesia clearance. Unfortunately, his breathing had worsened over the few months prior. His EKG in clinic showed marked sinus bradycardia with 1st degree AV block for which I asked him to hold his digoxin. His echo showed normal EF 55-60%, but he had a flattened septum c/w RV pressure and volume overload. His RV function was mild to moderately reduced, which had worsened from prior. I then sent him for a repeat RHC prior to consideration of surgery.     Jimmy was then admitted from 7/30 to 8/12 as he was found to be in complete heart block with narrow ventricular escape rhythm after undergoing his RHC. He required ICU for stabilization and ultimately a pacemaker was placed 8/2/21 due to persistent heart block. His stay was complicated by some transient delirium, and worsening renal function. An inpatient PET Scan was completed on 8/12/21 and showed no evidence of active cardiac sarcoidosis. He was diuresed to a discharge weight of 152# and RHC continued to show moderate PAH with normal cardiac output. He was treated with IV abx for UTI. Urology followed the patient and CT abdomen showed some mild improvement in his hydronephrosis. Lasix was increased from 20mg BID to 80mg BID. His Scr did rise and peak to 4.86, and nephrology was consulted for  possible need for dialysis, but due to lack of uremic symptoms, this was not performed. When I saw him back in mid August, he was starting to feel better. He was watching sodium in his diet which was helpful. At that time, I made no changes.     Jimmy then was hospitalized from 9/20 to 9/23 for his planned percutaneous nephrolithotomy. This was complicated by a brief ICU admission and pressor support. His PH medications/diuretics were unchanged. He then underwent stent removal on 11/2. Today, we are doing a virtual visit to follow up after his hospital stay. He tells me that overall he is slowly improving. He had some issues with dyspnea while in the hospital but says he is now approaching his baseline. He denies any LE edema or orthopnea, though is not weighing daily at home. His sats are running mid 90s at rest on his usual oxygen 4-5LPM. He denies any dizziness or presyncope.     No new labs done prior to our visit today for review.        CURRENT PULMONARY HYPERTENSION REGIMEN:     PAH Rx: Selexipag 1600mcg BID, Letairs 10mg daily, and tadalafil 40mg daily   (previously on Tyvaso)     Diuretics: Lasix 80mg BID      Oxygen: 4-5LPM at rest, will go up to 8-10L with exertion     Anticoagulation: none         ASSESSMENT/PLAN:      1. Pulmonary arterial hypertension.              --In the setting of pulmonary sarcoid and chronic hypoxemia. He remains on triple combination therapy with tadalafil, ambrisentan, and selexipag. RHC in July showed moderate PAH with normal cardiac output. Echo done during his recent hospital stay showed EF 55-60%. Mild RV dilation and mild to moderately reduced RV function was noted, which does not appear to be significantly changed from prior.   --He was previously on digoxin, though I discontinued it this summer when he presented with bradycardia and AV block. He is now s/p pacemaker as below.            --In regard to volume status, I am unable to examine him today because this is a  virtual visit but he reports no edema currently. He remains on lasix 80mg BID. He has not had a repeat BMP since his surgery in September, though renal function remains a concern. Will repeat BMP next week when he returns to see Dr. Lindsey.      2. Complete heart block.              --Was bradycardic with recent office visit as outlined above and digoxin was discontinued. When he returned shortly after for his RHC, he was found to be in complete heart block. He is now s/p pacemaker placement on 8/2021.               --He has an appt in place next week to follow up with EP with a device check.    Follow up plan: Return next week with labs as already scheduled, to see Dr. Lindsey.       Testing/labs:    Most recent labs:   Results for MATTY LOPEZ (MRN [id number]) as of 11/11/2021 14:32   Ref. Range 9/23/2021 07:05   Sodium Latest Ref Range: 133 - 144 mmol/L 141   Potassium Latest Ref Range: 3.4 - 5.3 mmol/L 3.7   Chloride Latest Ref Range: 94 - 109 mmol/L 106   Carbon Dioxide Latest Ref Range: 20 - 32 mmol/L 28   Urea Nitrogen Latest Ref Range: 7 - 30 mg/dL 38 (H)   Creatinine Latest Ref Range: 0.66 - 1.25 mg/dL 2.47 (H)   GFR Estimate Latest Ref Range: >60 mL/min/1.73m2 27 (L)   Calcium Latest Ref Range: 8.5 - 10.1 mg/dL 9.1   Anion Gap Latest Ref Range: 3 - 14 mmol/L 7   Glucose Latest Ref Range: 70 - 99 mg/dL 86   WBC Latest Ref Range: 4.0 - 11.0 10e3/uL 5.7   Hemoglobin Latest Ref Range: 13.3 - 17.7 g/dL 8.1 (L)   Hematocrit Latest Ref Range: 40.0 - 53.0 % 27.1 (L)   Platelet Count Latest Ref Range: 150 - 450 10e3/uL 157   RBC Count Latest Ref Range: 4.40 - 5.90 10e6/uL 2.90 (L)   MCV Latest Ref Range: 78 - 100 fL 93   MCH Latest Ref Range: 26.5 - 33.0 pg 27.9   MCHC Latest Ref Range: 31.5 - 36.5 g/dL 29.9 (L)   RDW Latest Ref Range: 10.0 - 15.0 % 13.3       Other recent pertinent testing:  [facility] 7/30/2021  Hemodynamics RA - 12  RV - 77/16  PA - 77/20/38  PCWP - 17  Hgb - 10.7  PA - 55.6%  Lesia CO/CI -  4.62/2.36 Right sided filling pressures are mildly elevated. Left sided filling pressures are mildly elevated. Moderately elevated pulmonary artery hypertension. Normal cardiac output level.      Echo   9/22/2021  Interpretation Summary  Technically difficult study.Extremely poor acoustic windows.  Small LV size.  The visual ejection fraction is 55-60%.  Mild right ventricular dilation is present.  Global right ventricular function is mildly to moderately reduced.  Right ventricular systolic pressure is 37mmHg above the right atrial pressure.  IVC diameter >2.1 cm collapsing <50% with sniff suggests a high RA pressure  estimated at 15 mmHg or greater.  No pericardial effusion is present.      NYHA Functional Class:  3      I have reviewed the note as documented above.  This accurately captures the substance of my conversation with the patient.      Phone call contact time  Call Started at 1428  Call Ended at 1439    An additional 15 minutes was spent today performing chart and history review, pre and post visit documentation, review of recent testing, and care coordination.        Gauri Laurent PA-C  [facility]  Pager [phone]          Please do not hesitate to contact me if you have any questions/concerns.     Sincerely,     HELIO Pineda

## 2021-11-11 NOTE — PROGRESS NOTES
Jimmy is a 61 year old who is being evaluated via a billable telephone visit.      What phone number would you like to be contacted at? [phone]  How would you like to obtain your AVS? Carlos Alberto Echeverria LPN on 11/11/21 at 12:28 PM

## 2021-11-17 NOTE — LETTER
11/17/2021      RE: Jimmy Isaac  [address] [address] [address]       The patient is seen for 30 minutes to review interim history of hospitalization, increasing shortness of breath from which he notes residual fatigue and shortness of breath.  1. Pulmonary arterial hypertension  2. Chronic, oxygen dependent respiratory failure  3. Sarcoidosis without cardiac involvement  4. Nephrolithiasis  5. Prednisone related fluid retention  6. Nephrolitiasis  7. Aseptic necrosis of femoral heads  8. CKD      Plan  1. Clinically stable from CV perspective  2. RTC in February, sooner if not doing well  3. Sarcoid clinic next week    Laboratories reviewed and discussed  CT chest reviewed and discussed     Constitutional: alert, oriented, normal gait and station, normal mentation.  Oral: moist mucous membrans  Lymph: without pathologic adenopathy  Chest: clear to ausculation and percussion  Cor: No evidence of left or right ventricular activity.  Rhythm is regular.  S1 normal, S2 split physiologically. Murmurs are not present  Abdomen: without tenderness, rebound, guarding, masses, ascites  Extremities: Edema not present  Neuro: no focal defects, normal mentation  Skin: without open lesions  Psych: oriented, verbal, mental status in tact     Wt Readings from Last 24 Encounters:   11/17/21 72.4 kg (159 lb 9.8 oz)   11/17/21 72.4 kg (159 lb 11.2 oz)   11/02/21 70.3 kg (155 lb)   09/20/21 70.2 kg (154 lb 12.2 oz)   09/14/21 70.5 kg (155 lb 8 oz)   08/24/21 72.6 kg (160 lb)   08/12/21 69.2 kg (152 lb 8 oz)   07/27/21 74.8 kg (165 lb)   07/13/21 74.8 kg (165 lb)   07/09/21 76 kg (167 lb 8 oz)   07/01/21 76 kg (167 lb 8 oz)   06/16/21 74.8 kg (165 lb)   12/11/19 76.4 kg (168 lb 6.4 oz)   12/11/19 78.5 kg (173 lb)   09/25/19 80.4 kg (177 lb 4.8 oz)   06/19/19 80.3 kg (177 lb 1.6 oz)   06/19/19 79.4 kg (175 lb)   03/05/19 80.1 kg (176 lb 8 oz)   01/09/19 82.1 kg (181 lb)   12/26/18 82.5 kg (181 lb 12.8 oz)   10/29/18  81.6 kg (180 lb)   18 84.6 kg (186 lb 9.6 oz)   18 84.8 kg (187 lb)   18 84.6 kg (186 lb 8 oz)         Current Outpatient Medications   Medication     ambrisentan (LETAIRIS) 10 MG tablet     budesonide (PULMICORT) 0.25 MG/2ML neb solution     furosemide (LASIX) 20 MG tablet     PROAIR  (90 Base) MCG/ACT inhaler     selexipag (UPTRAVI) 1600 MCG tablet     tadalafil, PAH, (ALYQ) 20 MG TABS     tamsulosin (FLOMAX) 0.4 MG capsule     UNABLE TO FIND     UNABLE TO FIND     No current facility-administered medications for this visit.   Results for JESSICA MATTY KASPER (MRN [id number]) as of 2021 12:39    Name: MATTY LOPEZ  MRN: [id number]  : 1960  Study Date: 2021 03:56 PM  Age: 60 yrs  Gender: Male  Patient Location: [facility]  Reason For Study: Dyspnea  Ordering Physician: JULIO GERONIMO  Performed By: Christina Conte RDCS     BSA: 1.9 m2  Height: 72 in  Weight: 154 lb  HR: 89  BP: 109/68 mmHg  ______________________________________________________________________________  Procedure  Complete Portable Echo Adult. Technically difficult study.Extremely poor  acoustic windows.  ______________________________________________________________________________  Interpretation Summary  Technically difficult study.Extremely poor acoustic windows.  Small LV size.  The visual ejection fraction is 55-60%.  Mild right ventricular dilation is present.  Global right ventricular function is mildly to moderately reduced.  Right ventricular systolic pressure is 37mmHg above the right atrial pressure.  IVC diameter >2.1 cm collapsing <50% with sniff suggests a high RA pressure  estimated at 15 mmHg or greater.  No pericardial effusion is present.  ______________________________________________________________________________  Left Ventricle  Left ventricular wall thickness cannot evaluate. Small LV size. The visual  ejection fraction is 55-60%. Left ventricular diastolic function is  indeterminate.  Abnormal septal motion consistent with pacemaker is present.     Right Ventricle  Mild right ventricular dilation is present. Global right ventricular function  is mildly to moderately reduced. A pacemaker lead is noted in the right  ventricle.     Atria  The atria cannot be assessed.     Mitral Valve  Mild mitral annular calcification is present.     Aortic Valve  Mild aortic valve calcification is present.     Tricuspid Valve  The tricuspid valve is normal. Trace to mild tricuspid insufficiency is  present. Right ventricular systolic pressure is 37mmHg above the right atrial  pressure.     Pulmonic Valve  The pulmonic valve is normal. Trace pulmonic insufficiency is present.     Vessels  The thoracic aorta cannot be assessed. The pulmonary artery cannot be  assessed. IVC diameter >2.1 cm collapsing <50% with sniff suggests a high RA  pressure estimated at 15 mmHg or greater.     Pericardium  No pericardial effusion is present.     ______________________________________________________________________________  MMode/2D Measurements & Calculations  LVOT diam: 2.7 cm  LVOT area: 5.7 cm2     Doppler Measurements & Calculations  MV E max xavi: 86.3 cm/sec  MV A max xavi: 81.5 cm/sec  MV E/A: 1.1  MV dec time: 0.08 sec  TR max xavi: 304.5 cm/sec  TR max P.1 mmHg  E/E' av.8  Lateral E/e': 10.7  Medial E/e': 24.8      Results for MATTY LOPEZ (MRN [id number]) as of 2021 12:39   Ref. Range 2021 13:36 2021 07:42 2021 10:47 2021 08:46   N-Terminal Pro Bnp Latest Ref Range: 0 - 125 pg/mL 8,465 (H) 8,834 (H) 28,841 (H) 5,470 (H)          Ref. Range 2021 16:48 2021 08:46 2021 08:56 2021 12:31   Sodium Latest Ref Range: 133 - 144 mmol/L  139     Potassium Latest Ref Range: 3.4 - 5.3 mmol/L  3.6     Chloride Latest Ref Range: 94 - 109 mmol/L  101     Carbon Dioxide Latest Ref Range: 20 - 32 mmol/L  28     Urea Nitrogen Latest Ref Range: 7 - 30 mg/dL  47 (H)     Creatinine  Latest Ref Range: 0.66 - 1.25 mg/dL  2.92 (H)     GFR Estimate Latest Ref Range: >60 mL/min/1.73m2  22 (L)     Calcium Latest Ref Range: 8.5 - 10.1 mg/dL  9.6     Anion Gap Latest Ref Range: 3 - 14 mmol/L  10     Albumin Latest Ref Range: 3.4 - 5.0 g/dL  3.6     Protein Total Latest Ref Range: 6.8 - 8.8 g/dL  7.4     Bilirubin Total Latest Ref Range: 0.2 - 1.3 mg/dL  0.4     Alkaline Phosphatase Latest Ref Range: 40 - 150 U/L  72     ALT Latest Ref Range: 0 - 70 U/L  18     AST Latest Ref Range: 0 - 45 U/L  14     Angiotensin Converting Enzyme Latest Ref Range: 9 - 67 U/L    44   N-Terminal Pro Bnp Latest Ref Range: 0 - 125 pg/mL  5,470 (H)     Glucose Latest Ref Range: 70 - 99 mg/dL  118 (H)     WBC Latest Ref Range: 4.0 - 11.0 10e3/uL  6.4     Hemoglobin Latest Ref Range: 13.3 - 17.7 g/dL  10.5 (L)     Hematocrit Latest Ref Range: 40.0 - 53.0 %  34.1 (L)     Platelet Count Latest Ref Range: 150 - 450 10e3/uL  165     RBC Count Latest Ref Range: 4.40 - 5.90 10e6/uL  3.85 (L)     MCV Latest Ref Range: 78 - 100 fL  89     MCH Latest Ref Range: 26.5 - 33.0 pg  27.3     MCHC Latest Ref Range: 31.5 - 36.5 g/dL  30.8 (L)     RDW Latest Ref Range: 10.0 - 15.0 %  14.3     Color Urine Latest Ref Range: Colorless, Straw, Light Yellow, Yellow  Yellow      Appearance Urine Latest Ref Range: Clear  Cloudy (A)      Glucose Urine Latest Ref Range: Negative mg/dL Negative      Bilirubin Urine Latest Ref Range: Negative  Negative      Ketones Urine Latest Ref Range: Negative mg/dL Negative      Specific Gravity Urine Latest Ref Range: 1.003 - 1.035  1.010      pH Urine Latest Ref Range: 5.0 - 7.0  6.0      Protein Albumin Urine Latest Ref Range: Negative mg/dL 100 (A)      Urobilinogen mg/dL Latest Ref Range: Normal, 2.0 mg/dL Normal      Nitrite Urine Latest Ref Range: Negative  Negative      Blood Urine Latest Ref Range: Negative  Large (A)      Leukocyte Esterase Urine Latest Ref Range: Negative  Large (A)      WBC Urine Latest  Ref Range: <=5 /HPF >182 (H)      RBC Urine Latest Ref Range: <=2 /HPF >182 (H)      Bacteria Urine Latest Ref Range: None Seen /HPF Few (A)      WBC Clumps Latest Ref Range: None Seen /HPF Present (A)      Mucus Urine Latest Ref Range: None Seen /LPF Present (A)      URINE CULTURE Unknown Rpt (A)  Rpt      EXAMINATION: Chest CT  11/17/2021 12:54 PM     CLINICAL HISTORY: High resolution thin section for sarcoid; Pulmonary  hypertension (H); Sarcoidosis     COMPARISON: PET/CT dated 8/12/2021. CT chest 11/16/2017.     TECHNIQUE: CT imaging obtained through the chest without contrast.  Axial, coronal, and sagittal reconstructions and axial MIP reformatted  images are reviewed. Inspiratory and expiratory acquisitions were  obtained.      CONTRAST: None     FINDINGS:  Lungs: Architectural distortion of the tracheobronchial tree with  lobar and segmental bronchial stenosis secondary to the extensive  perihilar consolidative fibrosis. Perilymphatic nodularity in the mid  to upper lobes. Panlobular emphysematous changes greatest in the lower  lobes. Subtle areas of organizing pneumonia also present in the lower  lobes. No pleural effusion or pneumothorax. Mild air trapping during  expiration.     Mediastinum: The thyroid is unremarkable. Cardiac size is normal.  Normal caliber aorta. The main pulmonary artery measures 43 mm in  diameter. No pericardial effusion. Moderate coronary artery calcium.  Extensive bulky mediastinal and hilar lymphadenopathy. Esophagus is  normal in caliber.     Bones and soft tissues: No suspicious bone findings. Left chest wall  ICD was sequentially dilated right atrial appendage and right  ventricle.     Upper Abdomen: Limited evaluation of the upper abdomen. Large staghorn  calculus in the right kidney. Nonobstructing 14 mm stone in the upper  pole of left kidney.                                                                      IMPRESSION:   1. Extensive sequelae of pulmonary sarcoidosis,  which overall has not  significantly progressed in severity compared to CT chest dated  11/16/2017. Bibasilar emphysematous changes and organizing chronic  type pneumonia.  2. Dilated main pulmonary artery compatible with pulmonary arterial  hypertension.  3. Chronic bilateral nephrolithiasis.  Answers for HPI/ROS submitted by the patient on 11/16/2021  General Symptoms: Yes  Skin Symptoms: Yes  HENT Symptoms: Yes  EYE SYMPTOMS: No  HEART SYMPTOMS: Yes  LUNG SYMPTOMS: Yes  INTESTINAL SYMPTOMS: Yes  URINARY SYMPTOMS: Yes  REPRODUCTIVE SYMPTOMS: No  SKELETAL SYMPTOMS: Yes  BLOOD SYMPTOMS: No  NERVOUS SYSTEM SYMPTOMS: No  MENTAL HEALTH SYMPTOMS: Yes  Ear discharge: No  Hearing loss: No  Tinnitus: No  Nosebleeds: No  Congestion: Yes  Sinus pain: Yes  Trouble swallowing: No   Voice hoarseness: No  Mouth sores: No  Sore throat: No  Tooth pain: No  Gum tenderness: No  Bleeding gums: No  Change in taste: No  Change in sense of smell: No  Dry mouth: Yes  Hearing aid used: No  Neck lump: No  Fever: No  Loss of appetite: No  Weight loss: No  Weight gain: Yes  Fatigue: Yes  Night sweats: No  Chills: No  Increased stress: No  Excessive hunger: No  Excessive thirst: No  Feeling hot or cold when others believe the temperature is normal: No  Loss of height: No  Post-operative complications: No  Hallucinations: No  Change in or Loss of Energy: No  Hyperactivity: No  Confusion: No  Changes in hair: No  Changes in moles/birth marks: Yes  Itching: No  Rashes: No  Changes in nails: No  Acne: No  Change in facial hair: No  Warts: No  Non-healing sores: No  Scarring: No  Flaking of skin: No  Color changes of hands/feet in cold : No  Sun sensitivity: No  Skin thickening: No  Chest pain or pressure: Yes  Fast or irregular heartbeat: Yes  Pain in legs with walking: No  Trouble breathing while lying down: Yes  Fingers or toes appear blue: No  High blood pressure: Yes  Low blood pressure: No  Fainting: No  Murmurs: No  Pacemaker: Yes  Varicose  veins: No  Edema or swelling: No  Wake up at night with shortness of breath: Yes  Light-headedness: No  Exercise intolerance: Yes  Cough: Yes  Sputum or phlegm: Yes  Coughing up blood: No  Difficulty breating or shortness of breath: Yes  Snoring: No  Wheezing: Yes  Difficulty breathing on exertion: Yes  Nighttime Cough: Yes  Difficulty breathing when lying flat: Yes  Heart burn or indigestion: No  Nausea: No  Vomiting: No  Abdominal pain: Yes  Bloating: No  Constipation: No  Diarrhea: Yes  Blood in stool: No  Black stools: No  Rectal or Anal pain: No  Fecal incontinence: No  Yellowing of skin or eyes: No  Vomit with blood: No  Change in stools: No  Trouble holding urine or incontinence: Yes  Pain or burning: No  Trouble starting or stopping: No  Increased frequency of urination: Yes  Blood in urine: No  Decreased frequency of urination: No  Frequent nighttime urination: Yes  Flank pain: Yes  Difficulty emptying bladder: No  Back pain: Yes  Muscle aches: Yes  Neck pain: No  Swollen joints: No  Joint pain: Yes  Bone pain: No  Muscle cramps: No  Muscle weakness: Yes  Joint stiffness: Yes  Bone fracture: No  Nervous or Anxious: No  Depression: Yes  Trouble sleeping: Yes  Trouble thinking or concentrating: No  Mood changes: No  Panic attacks: No          Khang Lindsey MD

## 2021-11-17 NOTE — LETTER
11/17/2021      RE: Jimmy Isaac  [address] [address] [address]       Dear Colleague,    Thank you for the opportunity to participate in the care of your patient, Jimmy Isaac, at the [facility]ART [facility] at [facility]. Please see a copy of my visit note below.    The patient is seen for 30 minutes to review interim history of hospitalization, increasing shortness of breath from which he notes residual fatigue and shortness of breath.  1. Pulmonary arterial hypertension  2. Chronic, oxygen dependent respiratory failure  3. Sarcoidosis without cardiac involvement  4. Nephrolithiasis  5. Prednisone related fluid retention  6. Nephrolitiasis  7. Aseptic necrosis of femoral heads  8. CKD      Plan  1. Clinically stable from CV perspective  2. RTC in February, sooner if not doing well  3. Sarcoid clinic next week    Laboratories reviewed and discussed  CT chest reviewed and discussed     Constitutional: alert, oriented, normal gait and station, normal mentation.  Oral: moist mucous membrans  Lymph: without pathologic adenopathy  Chest: clear to ausculation and percussion  Cor: No evidence of left or right ventricular activity.  Rhythm is regular.  S1 normal, S2 split physiologically. Murmurs are not present  Abdomen: without tenderness, rebound, guarding, masses, ascites  Extremities: Edema not present  Neuro: no focal defects, normal mentation  Skin: without open lesions  Psych: oriented, verbal, mental status in tact     Wt Readings from Last 24 Encounters:   11/17/21 72.4 kg (159 lb 9.8 oz)   11/17/21 72.4 kg (159 lb 11.2 oz)   11/02/21 70.3 kg (155 lb)   09/20/21 70.2 kg (154 lb 12.2 oz)   09/14/21 70.5 kg (155 lb 8 oz)   08/24/21 72.6 kg (160 lb)   08/12/21 69.2 kg (152 lb 8 oz)   07/27/21 74.8 kg (165 lb)   07/13/21 74.8 kg (165 lb)   07/09/21 76 kg (167 lb 8 oz)   07/01/21 76 kg (167 lb 8 oz)   06/16/21 74.8 kg (165 lb)   12/11/19  76.4 kg (168 lb 6.4 oz)   19 78.5 kg (173 lb)   19 80.4 kg (177 lb 4.8 oz)   19 80.3 kg (177 lb 1.6 oz)   19 79.4 kg (175 lb)   19 80.1 kg (176 lb 8 oz)   19 82.1 kg (181 lb)   18 82.5 kg (181 lb 12.8 oz)   10/29/18 81.6 kg (180 lb)   18 84.6 kg (186 lb 9.6 oz)   18 84.8 kg (187 lb)   18 84.6 kg (186 lb 8 oz)         Current Outpatient Medications   Medication     ambrisentan (LETAIRIS) 10 MG tablet     budesonide (PULMICORT) 0.25 MG/2ML neb solution     furosemide (LASIX) 20 MG tablet     PROAIR  (90 Base) MCG/ACT inhaler     selexipag (UPTRAVI) 1600 MCG tablet     tadalafil, PAH, (ALYQ) 20 MG TABS     tamsulosin (FLOMAX) 0.4 MG capsule     UNABLE TO FIND     UNABLE TO FIND     No current facility-administered medications for this visit.   Results for MATTY LOPEZ (MRN [id number]) as of 2021 12:39    Name: MATTY LOPEZ  MRN: [id number]  : 1960  Study Date: 2021 03:56 PM  Age: 60 yrs  Gender: Male  [place] [facility] [place]  Reason For Study: Dyspnea  Ordering Physician: JULIO GERONIMO  Performed By: Christina Conte LISETTE     BSA: 1.9 m2  Height: 72 in  Weight: 154 lb  HR: 89  BP: 109/68 mmHg  ______________________________________________________________________________  Procedure  Complete Portable Echo Adult. Technically difficult study.Extremely poor  acoustic windows.  ______________________________________________________________________________  Interpretation Summary  Technically difficult study.Extremely poor acoustic windows.  Small LV size.  The visual ejection fraction is 55-60%.  Mild right ventricular dilation is present.  Global right ventricular function is mildly to moderately reduced.  Right ventricular systolic pressure is 37mmHg above the right atrial pressure.  IVC diameter >2.1 cm collapsing <50% with sniff suggests a high RA pressure  estimated at 15 mmHg or greater.  No pericardial effusion is  present.  ______________________________________________________________________________  Left Ventricle  Left ventricular wall thickness cannot evaluate. Small LV size. The visual  ejection fraction is 55-60%. Left ventricular diastolic function is  indeterminate. Abnormal septal motion consistent with pacemaker is present.     Right Ventricle  Mild right ventricular dilation is present. Global right ventricular function  is mildly to moderately reduced. A pacemaker lead is noted in the right  ventricle.     Atria  The atria cannot be assessed.     Mitral Valve  Mild mitral annular calcification is present.     Aortic Valve  Mild aortic valve calcification is present.     Tricuspid Valve  The tricuspid valve is normal. Trace to mild tricuspid insufficiency is  present. Right ventricular systolic pressure is 37mmHg above the right atrial  pressure.     Pulmonic Valve  The pulmonic valve is normal. Trace pulmonic insufficiency is present.     Vessels  The thoracic aorta cannot be assessed. The pulmonary artery cannot be  assessed. IVC diameter >2.1 cm collapsing <50% with sniff suggests a high RA  pressure estimated at 15 mmHg or greater.     Pericardium  No pericardial effusion is present.     ______________________________________________________________________________  MMode/2D Measurements & Calculations  LVOT diam: 2.7 cm  LVOT area: 5.7 cm2     Doppler Measurements & Calculations  MV E max xavi: 86.3 cm/sec  MV A max xavi: 81.5 cm/sec  MV E/A: 1.1  MV dec time: 0.08 sec  TR max xavi: 304.5 cm/sec  TR max P.1 mmHg  E/E' av.8  Lateral E/e': 10.7  Medial E/e': 24.8      Results for MATTY LOPEZ (MRN [id number]) as of 2021 12:39   Ref. Range 2021 13:36 2021 07:42 2021 10:47 2021 08:46   N-Terminal Pro Bnp Latest Ref Range: 0 - 125 pg/mL 8,465 (H) 8,834 (H) 28,841 (H) 5,470 (H)          Ref. Range 2021 16:48 2021 08:46 2021 08:56 2021 12:31   Sodium Latest  Ref Range: 133 - 144 mmol/L  139     Potassium Latest Ref Range: 3.4 - 5.3 mmol/L  3.6     Chloride Latest Ref Range: 94 - 109 mmol/L  101     Carbon Dioxide Latest Ref Range: 20 - 32 mmol/L  28     Urea Nitrogen Latest Ref Range: 7 - 30 mg/dL  47 (H)     Creatinine Latest Ref Range: 0.66 - 1.25 mg/dL  2.92 (H)     GFR Estimate Latest Ref Range: >60 mL/min/1.73m2  22 (L)     Calcium Latest Ref Range: 8.5 - 10.1 mg/dL  9.6     Anion Gap Latest Ref Range: 3 - 14 mmol/L  10     Albumin Latest Ref Range: 3.4 - 5.0 g/dL  3.6     Protein Total Latest Ref Range: 6.8 - 8.8 g/dL  7.4     Bilirubin Total Latest Ref Range: 0.2 - 1.3 mg/dL  0.4     Alkaline Phosphatase Latest Ref Range: 40 - 150 U/L  72     ALT Latest Ref Range: 0 - 70 U/L  18     AST Latest Ref Range: 0 - 45 U/L  14     Angiotensin Converting Enzyme Latest Ref Range: 9 - 67 U/L    44   N-Terminal Pro Bnp Latest Ref Range: 0 - 125 pg/mL  5,470 (H)     Glucose Latest Ref Range: 70 - 99 mg/dL  118 (H)     WBC Latest Ref Range: 4.0 - 11.0 10e3/uL  6.4     Hemoglobin Latest Ref Range: 13.3 - 17.7 g/dL  10.5 (L)     Hematocrit Latest Ref Range: 40.0 - 53.0 %  34.1 (L)     Platelet Count Latest Ref Range: 150 - 450 10e3/uL  165     RBC Count Latest Ref Range: 4.40 - 5.90 10e6/uL  3.85 (L)     MCV Latest Ref Range: 78 - 100 fL  89     MCH Latest Ref Range: 26.5 - 33.0 pg  27.3     MCHC Latest Ref Range: 31.5 - 36.5 g/dL  30.8 (L)     RDW Latest Ref Range: 10.0 - 15.0 %  14.3     Color Urine Latest Ref Range: Colorless, Straw, Light Yellow, Yellow  Yellow      Appearance Urine Latest Ref Range: Clear  Cloudy (A)      Glucose Urine Latest Ref Range: Negative mg/dL Negative      Bilirubin Urine Latest Ref Range: Negative  Negative      Ketones Urine Latest Ref Range: Negative mg/dL Negative      Specific Gravity Urine Latest Ref Range: 1.003 - 1.035  1.010      pH Urine Latest Ref Range: 5.0 - 7.0  6.0      Protein Albumin Urine Latest Ref Range: Negative mg/dL 100 (A)       Urobilinogen mg/dL Latest Ref Range: Normal, 2.0 mg/dL Normal      Nitrite Urine Latest Ref Range: Negative  Negative      Blood Urine Latest Ref Range: Negative  Large (A)      Leukocyte Esterase Urine Latest Ref Range: Negative  Large (A)      WBC Urine Latest Ref Range: <=5 /HPF >182 (H)      RBC Urine Latest Ref Range: <=2 /HPF >182 (H)      Bacteria Urine Latest Ref Range: None Seen /HPF Few (A)      WBC Clumps Latest Ref Range: None Seen /HPF Present (A)      Mucus Urine Latest Ref Range: None Seen /LPF Present (A)      URINE CULTURE Unknown Rpt (A)  Rpt      EXAMINATION: Chest CT  11/17/2021 12:54 PM     CLINICAL HISTORY: High resolution thin section for sarcoid; Pulmonary  hypertension (H); Sarcoidosis     COMPARISON: PET/CT dated 8/12/2021. CT chest 11/16/2017.     TECHNIQUE: CT imaging obtained through the chest without contrast.  Axial, coronal, and sagittal reconstructions and axial MIP reformatted  images are reviewed. Inspiratory and expiratory acquisitions were  obtained.      CONTRAST: None     FINDINGS:  Lungs: Architectural distortion of the tracheobronchial tree with  lobar and segmental bronchial stenosis secondary to the extensive  perihilar consolidative fibrosis. Perilymphatic nodularity in the mid  to upper lobes. Panlobular emphysematous changes greatest in the lower  lobes. Subtle areas of organizing pneumonia also present in the lower  lobes. No pleural effusion or pneumothorax. Mild air trapping during  expiration.     Mediastinum: The thyroid is unremarkable. Cardiac size is normal.  Normal caliber aorta. The main pulmonary artery measures 43 mm in  diameter. No pericardial effusion. Moderate coronary artery calcium.  Extensive bulky mediastinal and hilar lymphadenopathy. Esophagus is  normal in caliber.     Bones and soft tissues: No suspicious bone findings. Left chest wall  ICD was sequentially dilated right atrial appendage and right  ventricle.     Upper Abdomen: Limited  evaluation of the upper abdomen. Large staghorn  calculus in the right kidney. Nonobstructing 14 mm stone in the upper  pole of left kidney.                                                                      IMPRESSION:   1. Extensive sequelae of pulmonary sarcoidosis, which overall has not  significantly progressed in severity compared to CT chest dated  11/16/2017. Bibasilar emphysematous changes and organizing chronic  type pneumonia.  2. Dilated main pulmonary artery compatible with pulmonary arterial  hypertension.  3. Chronic bilateral nephrolithiasis.  Answers for HPI/ROS submitted by the patient on 11/16/2021  General Symptoms: Yes  Skin Symptoms: Yes  HENT Symptoms: Yes  EYE SYMPTOMS: No  HEART SYMPTOMS: Yes  LUNG SYMPTOMS: Yes  INTESTINAL SYMPTOMS: Yes  URINARY SYMPTOMS: Yes  REPRODUCTIVE SYMPTOMS: No  SKELETAL SYMPTOMS: Yes  BLOOD SYMPTOMS: No  NERVOUS SYSTEM SYMPTOMS: No  MENTAL HEALTH SYMPTOMS: Yes  Ear discharge: No  Hearing loss: No  Tinnitus: No  Nosebleeds: No  Congestion: Yes  Sinus pain: Yes  Trouble swallowing: No   Voice hoarseness: No  Mouth sores: No  Sore throat: No  Tooth pain: No  Gum tenderness: No  Bleeding gums: No  Change in taste: No  Change in sense of smell: No  Dry mouth: Yes  Hearing aid used: No  Neck lump: No  Fever: No  Loss of appetite: No  Weight loss: No  Weight gain: Yes  Fatigue: Yes  Night sweats: No  Chills: No  Increased stress: No  Excessive hunger: No  Excessive thirst: No  Feeling hot or cold when others believe the temperature is normal: No  Loss of height: No  Post-operative complications: No  Hallucinations: No  Change in or Loss of Energy: No  Hyperactivity: No  Confusion: No  Changes in hair: No  Changes in moles/birth marks: Yes  Itching: No  Rashes: No  Changes in nails: No  Acne: No  Change in facial hair: No  Warts: No  Non-healing sores: No  Scarring: No  Flaking of skin: No  Color changes of hands/feet in cold : No  Sun sensitivity: No  Skin thickening:  No  Chest pain or pressure: Yes  Fast or irregular heartbeat: Yes  Pain in legs with walking: No  Trouble breathing while lying down: Yes  Fingers or toes appear blue: No  High blood pressure: Yes  Low blood pressure: No  Fainting: No  Murmurs: No  Pacemaker: Yes  Varicose veins: No  Edema or swelling: No  Wake up at night with shortness of breath: Yes  Light-headedness: No  Exercise intolerance: Yes  Cough: Yes  Sputum or phlegm: Yes  Coughing up blood: No  Difficulty breating or shortness of breath: Yes  Snoring: No  Wheezing: Yes  Difficulty breathing on exertion: Yes  Nighttime Cough: Yes  Difficulty breathing when lying flat: Yes  Heart burn or indigestion: No  Nausea: No  Vomiting: No  Abdominal pain: Yes  Bloating: No  Constipation: No  Diarrhea: Yes  Blood in stool: No  Black stools: No  Rectal or Anal pain: No  Fecal incontinence: No  Yellowing of skin or eyes: No  Vomit with blood: No  Change in stools: No  Trouble holding urine or incontinence: Yes  Pain or burning: No  Trouble starting or stopping: No  Increased frequency of urination: Yes  Blood in urine: No  Decreased frequency of urination: No  Frequent nighttime urination: Yes  Flank pain: Yes  Difficulty emptying bladder: No  Back pain: Yes  Muscle aches: Yes  Neck pain: No  Swollen joints: No  Joint pain: Yes  Bone pain: No  Muscle cramps: No  Muscle weakness: Yes  Joint stiffness: Yes  Bone fracture: No  Nervous or Anxious: No  Depression: Yes  Trouble sleeping: Yes  Trouble thinking or concentrating: No  Mood changes: No  Panic attacks: No          Please do not hesitate to contact me if you have any questions/concerns.     Sincerely,     Khang Lindsey MD

## 2021-11-17 NOTE — PATIENT INSTRUCTIONS
It was a pleasure to see you in clinic today.  Please do not hesitate to call with any questions or concerns.  We look forward to seeing you in clinic at your next device check in 1 month.    Nikky Schultz, RN, MS, CCRN  Electrophysiology Nurse Clinician  [facility]    During Business Hours Please Call:  [phone]  After Hours Please Call:  [phone] - select option #4 and ask for job code [phone]

## 2021-11-17 NOTE — NURSING NOTE
No chief complaint on file.    Vitals were taken and medications reconciled.    Hussein Best, BETHANY  10:46 AM

## 2021-11-17 NOTE — LETTER
11/17/2021      RE: Jimmy Isaac  [address] [address] [address]       Dear Colleague,    Thank you for the opportunity to participate in the care of your patient, Jimmy Isaac, at the [facility]INIC [place] at [facility]. Please see a copy of my visit note below.        ELECTROPHYSIOLOGY CLINIC VISIT    Assessment/Recommendations   Assessment/Plan:    Mr. Isaac is a 61 year old male who has a past medical history significant for severe PH (on home O2) 2/2 pulmonary sarcoidosis, HTN, CKD, and nephrolithiasis s/p left nephrostomy 7/2021. He had been having about 1 month symptom of fatigue. He was seen in PH clinic 7/27/2021 with dyspnea, had chronic PH on home O2. EKG at that time showed bradycardia. His digoxin was held due to SISI. He was scheduled for RHC and was noted to be CHB during procedure. He remained in high degree AVB and decision was made to pursue PPM which he had on 8/2/21. PET from 8/10/21 showed no evidence of active cardiac sarcoid and evidence of active pulmonary and systemic sarcoid. Recent echo showed LVEF 55-60%, mild RV dilation with moderately decreased function.     Complete Heart Block s/p PPM 8/2/21:  1. Normal device function, stable lead parameters  2. Continue routine device follow-up   3. Consider CMR for further sarcoid evaluations given new onset CHB and history of pulmonary sarcoid.     Follow up with EP in 6 months with echo/CMR.        History of Present Illness/Subjective    Mr. Jimmy Isaac is a 61 year old male who comes in today for EP follow-up of CHB s/p PPM.    Mr. Isaac is a 61 year old male who has a past medical history significant for severe PH (on home O2) 2/2 pulmonary sarcoidosis, HTN, CKD, and nephrolithiasis s/p left nephrostomy 7/2021. He was having fatigue. He was seen in PH clinic 7/27/2021 with dyspnea, had chronic PH on home O2. EKG at that time showed bradycardia. His digoxin was held  due to SISI. He was scheduled for RHC and was noted to be CHB during procedure. He remained in high degree AVB and decision was made to pursue PPM.     He presents today for follow up. He had PPM implanted on 8/2/21. Device site well healed. Device interrogation shows normal device function, stable lead parameters, 2 NSVT 2-4 seconds,1 AT/AF 3 min and 3 seconds, and AP 5.8%,  97.2%. He reports feeling well. He denies chest discomfort, palpitations, abdominal fullness/bloating or peripheral edema, shortness of breath, paroxysmal nocturnal dyspnea, orthopnea, lightheadedness, dizziness, pre-syncope, or syncope. PET from 8/10/21 showed no evidence of active cardiac sarcoid and evidence of active pulmonary and systemic sarcoid. Recent echo showed LVEF 55-60%, mild RV dilation with moderately decreased function. Current cardiac medications include: Lasix and Ambrisentan.       I have reviewed and updated the patient's Past Medical History, Social History, Family History and Medication List.     Cardiographics (Personally Reviewed) :   9/22/21 Echo:   Interpretation Summary  Technically difficult study.Extremely poor acoustic windows.  Small LV size.  The visual ejection fraction is 55-60%.  Mild right ventricular dilation is present.  Global right ventricular function is mildly to moderately reduced.  Right ventricular systolic pressure is 37mmHg above the right atrial pressure.  IVC diameter >2.1 cm collapsing <50% with sniff suggests a high RA pressure  estimated at 15 mmHg or greater.  No pericardial effusion is present.       Physical Examination   /61 (BP Location: Right arm, Patient Position: Chair, Cuff Size: Adult Regular)   Pulse 82   Wt 72.4 kg (159 lb 11.2 oz)   SpO2 100%   BMI 21.66 kg/m    Wt Readings from Last 3 Encounters:   11/02/21 70.3 kg (155 lb)   09/20/21 70.2 kg (154 lb 12.2 oz)   09/14/21 70.5 kg (155 lb 8 oz)     General Appearance:   Alert, well-appearing and in no acute distress.    HEENT: Atraumatic, normocephalic. PERRL.  MMM.   Chest/Lungs:   Respirations unlabored.  Lungs are clear to auscultation.   Cardiovascular:   Regular rate and rhythm.  S1/S2. No murmur.    Abdomen:  Soft, nontender, nondistended.   Extremities: No cyanosis or clubbing. No edema.    Musculoskeletal: Moves all extremities.  Gait normal.   Skin: Warm, dry, intact.    Neurologic: Mood and affect are appropriate.  Alert and oriented to person, place, time, and situation.          Medications  Allergies   Current Outpatient Medications   Medication Sig Dispense Refill     ambrisentan (LETAIRIS) 10 MG tablet Take 10 mg by mouth every morning  30 tablet 11     budesonide (PULMICORT) 0.25 MG/2ML neb solution Take 2 mLs (0.25 mg) by nebulization 2 times daily 120 mL 4     furosemide (LASIX) 20 MG tablet Take 2 tablets (40 mg) by mouth 2 times daily 365 tablet 3     PROAIR  (90 Base) MCG/ACT inhaler Inhale 2 puffs into the lungs every 6 hours as needed for shortness of breath / dyspnea 18 g 11     selexipag (UPTRAVI) 1600 MCG tablet Take 1 tablet (1,600 mcg) by mouth every 12 hours 60 tablet 11     tadalafil, PAH, (ALYQ) 20 MG TABS Take 2 tablets (40 mg) by mouth daily 60 tablet 11     tamsulosin (FLOMAX) 0.4 MG capsule Take 1 capsule (0.4 mg) by mouth daily 30 capsule 0     UNABLE TO FIND MEDICATION NAME: Topical skin tag remover      Allergies   Allergen Reactions     Nkda [No Known Drug Allergies]          Lab Results (Personally Reviewed)    Chemistry/lipid CBC Cardiac Enzymes/BNP/TSH/INR   Lab Results   Component Value Date    BUN 38 (H) 09/23/2021     09/23/2021    CO2 28 09/23/2021     Creatinine   Date Value Ref Range Status   09/23/2021 2.47 (H) 0.66 - 1.25 mg/dL Final   07/01/2021 3.27 (H) 0.66 - 1.25 mg/dL Final       Lab Results   Component Value Date    CHOL 188 10/29/2018    HDL 34 (L) 10/29/2018     (H) 10/29/2018      Lab Results   Component Value Date    WBC 5.7 09/23/2021    HGB 8.1 (L)  09/23/2021    HCT 27.1 (L) 09/23/2021    MCV 93 09/23/2021     09/23/2021    Lab Results   Component Value Date    TSH 2.40 07/30/2021    INR 1.16 (H) 09/21/2021        The patient states understanding and is agreeable with the plan.   ALLI Osman CNP  Electrophysiology Consult Service  Pager: [phone]

## 2021-11-17 NOTE — PATIENT INSTRUCTIONS
Thank you for visiting the Pulmonary Hypertension Clinic today with Dr. Lindsey.        Today we discussed:   1.Chest CT today - Scheduled into the 3pm spot - but likely will be able to complete earlier.  Check in on 1st floor radiology.  2. Please have 1 additional lab draw downstairs before you go.        Follow up Appointment Information:  Cardiac Device check on 12/15/21 when you come back to see Dr. Aquino       Additional Instructions:     1. Continue staying active and eat a heart healthy, low sodium diet.      2. Please keep current list of medications with you at all times.      3. Remember to weigh yourself daily after voiding and write it down on a log. If you have gained/lost 2 pounds overnight or 5 pounds in a week contact us for medication adjustments or further instructions.     4. Please call us immediately if you have syncope (fainting or passing out), chest pain, worsening edema (swelling or weight gain), or general worsening in how you are feeling.            ---------------------------------------------------------------------------------------------------------------     If you have questions or concerns please contact us at:           Obdulia Rome RN, BSN, PHN                 Samantha Chandra  (Schedule,Prior Auth)  Nurse Coordinator                                          Clinic   Pulmonary Hypertension                                Pulmonary Hypertension  [facility]               [facility]  (P)[phone]                                             (P) [phone]                                                                          (F) [phone]        ** Please note that you will NOT receive a reminder call regarding your scheduled testing, reminder calls are for provider appointments only.  If you are scheduled for testing within the "[employer] system you may receive a call regarding pre-registration for  billing purposes only.**      ------------------------------------------------------------------------------------------------------------     Interested in joining a support group?     Pulmonary Hypertension Association  [email or web address].phassociation.org/  **Look at the Events Tab** They even have Support Groups that you can call into     [facility] Support Group  Second Saturday of the Month from 1-3 PM   Location: [address] [place] [id number]  Leader: Sandra Rollins  Phone: [phone] or [phone]  Email: [email or web address]

## 2021-11-17 NOTE — NURSING NOTE
Chief Complaint   Patient presents with     Follow Up     return arrythmia- 3 month follow up     Vitals were taken and medications reconciled.    Hussein Best, BETHANY  10:32 AM

## 2021-11-17 NOTE — PROGRESS NOTES
ELECTROPHYSIOLOGY CLINIC VISIT    Assessment/Recommendations   Assessment/Plan:    Mr. Isaac is a 61 year old male who has a past medical history significant for severe PH (on home O2) 2/2 pulmonary sarcoidosis, HTN, CKD, and nephrolithiasis s/p left nephrostomy 7/2021. He had been having about 1 month symptom of fatigue. He was seen in PH clinic 7/27/2021 with dyspnea, had chronic PH on home O2. EKG at that time showed bradycardia. His digoxin was held due to SISI. He was scheduled for RHC and was noted to be CHB during procedure. He remained in high degree AVB and decision was made to pursue PPM which he had on 8/2/21. PET from 8/10/21 showed no evidence of active cardiac sarcoid and evidence of active pulmonary and systemic sarcoid. Recent echo showed LVEF 55-60%, mild RV dilation with moderately decreased function.     Complete Heart Block s/p PPM 8/2/21:  1. Normal device function, stable lead parameters  2. Continue routine device follow-up   3. Consider CMR for further sarcoid evaluations given new onset CHB and history of pulmonary sarcoid.     Follow up with EP in 6 months with echo/CMR.        History of Present Illness/Subjective    Mr. Jimmy Isaac is a 61 year old male who comes in today for EP follow-up of CHB s/p PPM.    Mr. Isaac is a 61 year old male who has a past medical history significant for severe PH (on home O2) 2/2 pulmonary sarcoidosis, HTN, CKD, and nephrolithiasis s/p left nephrostomy 7/2021. He was having fatigue. He was seen in PH clinic 7/27/2021 with dyspnea, had chronic PH on home O2. EKG at that time showed bradycardia. His digoxin was held due to SISI. He was scheduled for RHC and was noted to be CHB during procedure. He remained in high degree AVB and decision was made to pursue PPM.     He presents today for follow up. He had PPM implanted on 8/2/21. Device site well healed. Device interrogation shows normal device function, stable lead parameters, 2 NSVT 2-4 seconds,1 AT/AF 3  min and 3 seconds, and AP 5.8%,  97.2%. He reports feeling well. He denies chest discomfort, palpitations, abdominal fullness/bloating or peripheral edema, shortness of breath, paroxysmal nocturnal dyspnea, orthopnea, lightheadedness, dizziness, pre-syncope, or syncope. PET from 8/10/21 showed no evidence of active cardiac sarcoid and evidence of active pulmonary and systemic sarcoid. Recent echo showed LVEF 55-60%, mild RV dilation with moderately decreased function. Current cardiac medications include: Lasix and Ambrisentan.       I have reviewed and updated the patient's Past Medical History, Social History, Family History and Medication List.     Cardiographics (Personally Reviewed) :   9/22/21 Echo:   Interpretation Summary  Technically difficult study.Extremely poor acoustic windows.  Small LV size.  The visual ejection fraction is 55-60%.  Mild right ventricular dilation is present.  Global right ventricular function is mildly to moderately reduced.  Right ventricular systolic pressure is 37mmHg above the right atrial pressure.  IVC diameter >2.1 cm collapsing <50% with sniff suggests a high RA pressure  estimated at 15 mmHg or greater.  No pericardial effusion is present.       Physical Examination   /61 (BP Location: Right arm, Patient Position: Chair, Cuff Size: Adult Regular)   Pulse 82   Wt 72.4 kg (159 lb 11.2 oz)   SpO2 100%   BMI 21.66 kg/m    Wt Readings from Last 3 Encounters:   11/02/21 70.3 kg (155 lb)   09/20/21 70.2 kg (154 lb 12.2 oz)   09/14/21 70.5 kg (155 lb 8 oz)     General Appearance:   Alert, well-appearing and in no acute distress.   HEENT: Atraumatic, normocephalic. PERRL.  MMM.   Chest/Lungs:   Respirations unlabored.  Lungs are clear to auscultation.   Cardiovascular:   Regular rate and rhythm.  S1/S2. No murmur.    Abdomen:  Soft, nontender, nondistended.   Extremities: No cyanosis or clubbing. No edema.    Musculoskeletal: Moves all extremities.  Gait normal.   Skin:  Warm, dry, intact.    Neurologic: Mood and affect are appropriate.  Alert and oriented to person, place, time, and situation.          Medications  Allergies   Current Outpatient Medications   Medication Sig Dispense Refill     ambrisentan (LETAIRIS) 10 MG tablet Take 10 mg by mouth every morning  30 tablet 11     budesonide (PULMICORT) 0.25 MG/2ML neb solution Take 2 mLs (0.25 mg) by nebulization 2 times daily 120 mL 4     furosemide (LASIX) 20 MG tablet Take 2 tablets (40 mg) by mouth 2 times daily 365 tablet 3     PROAIR  (90 Base) MCG/ACT inhaler Inhale 2 puffs into the lungs every 6 hours as needed for shortness of breath / dyspnea 18 g 11     selexipag (UPTRAVI) 1600 MCG tablet Take 1 tablet (1,600 mcg) by mouth every 12 hours 60 tablet 11     tadalafil, PAH, (ALYQ) 20 MG TABS Take 2 tablets (40 mg) by mouth daily 60 tablet 11     tamsulosin (FLOMAX) 0.4 MG capsule Take 1 capsule (0.4 mg) by mouth daily 30 capsule 0     UNABLE TO FIND MEDICATION NAME: Topical skin tag remover      Allergies   Allergen Reactions     Nkda [No Known Drug Allergies]          Lab Results (Personally Reviewed)    Chemistry/lipid CBC Cardiac Enzymes/BNP/TSH/INR   Lab Results   Component Value Date    BUN 38 (H) 09/23/2021     09/23/2021    CO2 28 09/23/2021     Creatinine   Date Value Ref Range Status   09/23/2021 2.47 (H) 0.66 - 1.25 mg/dL Final   07/01/2021 3.27 (H) 0.66 - 1.25 mg/dL Final       Lab Results   Component Value Date    CHOL 188 10/29/2018    HDL 34 (L) 10/29/2018     (H) 10/29/2018      Lab Results   Component Value Date    WBC 5.7 09/23/2021    HGB 8.1 (L) 09/23/2021    HCT 27.1 (L) 09/23/2021    MCV 93 09/23/2021     09/23/2021    Lab Results   Component Value Date    TSH 2.40 07/30/2021    INR 1.16 (H) 09/21/2021        The patient states understanding and is agreeable with the plan.   ALLI Osman CNP  Electrophysiology Consult Service  Pager: [phone]

## 2021-11-18 NOTE — NURSING NOTE
Called Dr. Lindsey and reviewed Chest Ct from yesterday.  Recommended follow up with Dr. Lindsey Feb '22 with labs prior.  Obdulia Rome RN on 11/18/2021 at 2:16 PM

## 2021-11-20 NOTE — PROGRESS NOTES
The patient is seen for 30 minutes to review interim history of hospitalization, increasing shortness of breath from which he notes residual fatigue and shortness of breath.  1. Pulmonary arterial hypertension  2. Chronic, oxygen dependent respiratory failure  3. Sarcoidosis without cardiac involvement  4. Nephrolithiasis  5. Prednisone related fluid retention  6. Nephrolitiasis  7. Aseptic necrosis of femoral heads  8. CKD      Plan  1. Clinically stable from CV perspective  2. RTC in February, sooner if not doing well  3. Sarcoid clinic next week    Laboratories reviewed and discussed  CT chest reviewed and discussed     Constitutional: alert, oriented, normal gait and station, normal mentation.  Oral: moist mucous membrans  Lymph: without pathologic adenopathy  Chest: clear to ausculation and percussion  Cor: No evidence of left or right ventricular activity.  Rhythm is regular.  S1 normal, S2 split physiologically. Murmurs are not present  Abdomen: without tenderness, rebound, guarding, masses, ascites  Extremities: Edema not present  Neuro: no focal defects, normal mentation  Skin: without open lesions  Psych: oriented, verbal, mental status in tact     Wt Readings from Last 24 Encounters:   11/17/21 72.4 kg (159 lb 9.8 oz)   11/17/21 72.4 kg (159 lb 11.2 oz)   11/02/21 70.3 kg (155 lb)   09/20/21 70.2 kg (154 lb 12.2 oz)   09/14/21 70.5 kg (155 lb 8 oz)   08/24/21 72.6 kg (160 lb)   08/12/21 69.2 kg (152 lb 8 oz)   07/27/21 74.8 kg (165 lb)   07/13/21 74.8 kg (165 lb)   07/09/21 76 kg (167 lb 8 oz)   07/01/21 76 kg (167 lb 8 oz)   06/16/21 74.8 kg (165 lb)   12/11/19 76.4 kg (168 lb 6.4 oz)   12/11/19 78.5 kg (173 lb)   09/25/19 80.4 kg (177 lb 4.8 oz)   06/19/19 80.3 kg (177 lb 1.6 oz)   06/19/19 79.4 kg (175 lb)   03/05/19 80.1 kg (176 lb 8 oz)   01/09/19 82.1 kg (181 lb)   12/26/18 82.5 kg (181 lb 12.8 oz)   10/29/18 81.6 kg (180 lb)   05/30/18 84.6 kg (186 lb 9.6 oz)   05/30/18 84.8 kg (187 lb)   02/27/18  84.6 kg (186 lb 8 oz)         Current Outpatient Medications   Medication     ambrisentan (LETAIRIS) 10 MG tablet     budesonide (PULMICORT) 0.25 MG/2ML neb solution     furosemide (LASIX) 20 MG tablet     PROAIR  (90 Base) MCG/ACT inhaler     selexipag (UPTRAVI) 1600 MCG tablet     tadalafil, PAH, (ALYQ) 20 MG TABS     tamsulosin (FLOMAX) 0.4 MG capsule     UNABLE TO FIND     UNABLE TO FIND     No current facility-administered medications for this visit.   Results for MATTY LOPEZ (MRN [id number]) as of 2021 12:39    Name: MATTY LOPEZ  MRN: [id number]  : 1960  Study Date: 2021 03:56 PM  Age: 60 yrs  Gender: Male  [place] [facility] [place] For Study: Dyspnea  Ordering Physician: JULIO GERONIMO  Performed By: Christina Conte RDCS     BSA: 1.9 m2  Height: 72 in  Weight: 154 lb  HR: 89  BP: 109/68 mmHg  ______________________________________________________________________________  Procedure  Complete Portable Echo Adult. Technically difficult study.Extremely poor  acoustic windows.  ______________________________________________________________________________  Interpretation Summary  Technically difficult study.Extremely poor acoustic windows.  Small LV size.  The visual ejection fraction is 55-60%.  Mild right ventricular dilation is present.  Global right ventricular function is mildly to moderately reduced.  Right ventricular systolic pressure is 37mmHg above the right atrial pressure.  IVC diameter >2.1 cm collapsing <50% with sniff suggests a high RA pressure  estimated at 15 mmHg or greater.  No pericardial effusion is present.  ______________________________________________________________________________  Left Ventricle  Left ventricular wall thickness cannot evaluate. Small LV size. The visual  ejection fraction is 55-60%. Left ventricular diastolic function is  indeterminate. Abnormal septal motion consistent with pacemaker is present.     Right Ventricle  Mild right  ventricular dilation is present. Global right ventricular function  is mildly to moderately reduced. A pacemaker lead is noted in the right  ventricle.     Atria  The atria cannot be assessed.     Mitral Valve  Mild mitral annular calcification is present.     Aortic Valve  Mild aortic valve calcification is present.     Tricuspid Valve  The tricuspid valve is normal. Trace to mild tricuspid insufficiency is  present. Right ventricular systolic pressure is 37mmHg above the right atrial  pressure.     Pulmonic Valve  The pulmonic valve is normal. Trace pulmonic insufficiency is present.     Vessels  The thoracic aorta cannot be assessed. The pulmonary artery cannot be  assessed. IVC diameter >2.1 cm collapsing <50% with sniff suggests a high RA  pressure estimated at 15 mmHg or greater.     Pericardium  No pericardial effusion is present.     ______________________________________________________________________________  MMode/2D Measurements & Calculations  LVOT diam: 2.7 cm  LVOT area: 5.7 cm2     Doppler Measurements & Calculations  MV E max xavi: 86.3 cm/sec  MV A max xavi: 81.5 cm/sec  MV E/A: 1.1  MV dec time: 0.08 sec  TR max xavi: 304.5 cm/sec  TR max P.1 mmHg  E/E' av.8  Lateral E/e': 10.7  Medial E/e': 24.8      Results for MATTY LOPEZ (MRN [id number]) as of 2021 12:39   Ref. Range 2021 13:36 2021 07:42 2021 10:47 2021 08:46   N-Terminal Pro Bnp Latest Ref Range: 0 - 125 pg/mL 8,465 (H) 8,834 (H) 28,841 (H) 5,470 (H)          Ref. Range 2021 16:48 2021 08:46 2021 08:56 2021 12:31   Sodium Latest Ref Range: 133 - 144 mmol/L  139     Potassium Latest Ref Range: 3.4 - 5.3 mmol/L  3.6     Chloride Latest Ref Range: 94 - 109 mmol/L  101     Carbon Dioxide Latest Ref Range: 20 - 32 mmol/L  28     Urea Nitrogen Latest Ref Range: 7 - 30 mg/dL  47 (H)     Creatinine Latest Ref Range: 0.66 - 1.25 mg/dL  2.92 (H)     GFR Estimate Latest Ref Range: >60  mL/min/1.73m2  22 (L)     Calcium Latest Ref Range: 8.5 - 10.1 mg/dL  9.6     Anion Gap Latest Ref Range: 3 - 14 mmol/L  10     Albumin Latest Ref Range: 3.4 - 5.0 g/dL  3.6     Protein Total Latest Ref Range: 6.8 - 8.8 g/dL  7.4     Bilirubin Total Latest Ref Range: 0.2 - 1.3 mg/dL  0.4     Alkaline Phosphatase Latest Ref Range: 40 - 150 U/L  72     ALT Latest Ref Range: 0 - 70 U/L  18     AST Latest Ref Range: 0 - 45 U/L  14     Angiotensin Converting Enzyme Latest Ref Range: 9 - 67 U/L    44   N-Terminal Pro Bnp Latest Ref Range: 0 - 125 pg/mL  5,470 (H)     Glucose Latest Ref Range: 70 - 99 mg/dL  118 (H)     WBC Latest Ref Range: 4.0 - 11.0 10e3/uL  6.4     Hemoglobin Latest Ref Range: 13.3 - 17.7 g/dL  10.5 (L)     Hematocrit Latest Ref Range: 40.0 - 53.0 %  34.1 (L)     Platelet Count Latest Ref Range: 150 - 450 10e3/uL  165     RBC Count Latest Ref Range: 4.40 - 5.90 10e6/uL  3.85 (L)     MCV Latest Ref Range: 78 - 100 fL  89     MCH Latest Ref Range: 26.5 - 33.0 pg  27.3     MCHC Latest Ref Range: 31.5 - 36.5 g/dL  30.8 (L)     RDW Latest Ref Range: 10.0 - 15.0 %  14.3     Color Urine Latest Ref Range: Colorless, Straw, Light Yellow, Yellow  Yellow      Appearance Urine Latest Ref Range: Clear  Cloudy (A)      Glucose Urine Latest Ref Range: Negative mg/dL Negative      Bilirubin Urine Latest Ref Range: Negative  Negative      Ketones Urine Latest Ref Range: Negative mg/dL Negative      Specific Gravity Urine Latest Ref Range: 1.003 - 1.035  1.010      pH Urine Latest Ref Range: 5.0 - 7.0  6.0      Protein Albumin Urine Latest Ref Range: Negative mg/dL 100 (A)      Urobilinogen mg/dL Latest Ref Range: Normal, 2.0 mg/dL Normal      Nitrite Urine Latest Ref Range: Negative  Negative      Blood Urine Latest Ref Range: Negative  Large (A)      Leukocyte Esterase Urine Latest Ref Range: Negative  Large (A)      WBC Urine Latest Ref Range: <=5 /HPF >182 (H)      RBC Urine Latest Ref Range: <=2 /HPF >182 (H)       Bacteria Urine Latest Ref Range: None Seen /HPF Few (A)      WBC Clumps Latest Ref Range: None Seen /HPF Present (A)      Mucus Urine Latest Ref Range: None Seen /LPF Present (A)      URINE CULTURE Unknown Rpt (A)  Rpt      EXAMINATION: Chest CT  11/17/2021 12:54 PM     CLINICAL HISTORY: High resolution thin section for sarcoid; Pulmonary  hypertension (H); Sarcoidosis     COMPARISON: PET/CT dated 8/12/2021. CT chest 11/16/2017.     TECHNIQUE: CT imaging obtained through the chest without contrast.  Axial, coronal, and sagittal reconstructions and axial MIP reformatted  images are reviewed. Inspiratory and expiratory acquisitions were  obtained.      CONTRAST: None     FINDINGS:  Lungs: Architectural distortion of the tracheobronchial tree with  lobar and segmental bronchial stenosis secondary to the extensive  perihilar consolidative fibrosis. Perilymphatic nodularity in the mid  to upper lobes. Panlobular emphysematous changes greatest in the lower  lobes. Subtle areas of organizing pneumonia also present in the lower  lobes. No pleural effusion or pneumothorax. Mild air trapping during  expiration.     Mediastinum: The thyroid is unremarkable. Cardiac size is normal.  Normal caliber aorta. The main pulmonary artery measures 43 mm in  diameter. No pericardial effusion. Moderate coronary artery calcium.  Extensive bulky mediastinal and hilar lymphadenopathy. Esophagus is  normal in caliber.     Bones and soft tissues: No suspicious bone findings. Left chest wall  ICD was sequentially dilated right atrial appendage and right  ventricle.     Upper Abdomen: Limited evaluation of the upper abdomen. Large staghorn  calculus in the right kidney. Nonobstructing 14 mm stone in the upper  pole of left kidney.                                                                      IMPRESSION:   1. Extensive sequelae of pulmonary sarcoidosis, which overall has not  significantly progressed in severity compared to CT chest  dated  11/16/2017. Bibasilar emphysematous changes and organizing chronic  type pneumonia.  2. Dilated main pulmonary artery compatible with pulmonary arterial  hypertension.  3. Chronic bilateral nephrolithiasis.  Answers for HPI/ROS submitted by the patient on 11/16/2021  General Symptoms: Yes  Skin Symptoms: Yes  HENT Symptoms: Yes  EYE SYMPTOMS: No  HEART SYMPTOMS: Yes  LUNG SYMPTOMS: Yes  INTESTINAL SYMPTOMS: Yes  URINARY SYMPTOMS: Yes  REPRODUCTIVE SYMPTOMS: No  SKELETAL SYMPTOMS: Yes  BLOOD SYMPTOMS: No  NERVOUS SYSTEM SYMPTOMS: No  MENTAL HEALTH SYMPTOMS: Yes  Ear discharge: No  Hearing loss: No  Tinnitus: No  Nosebleeds: No  Congestion: Yes  Sinus pain: Yes  Trouble swallowing: No   Voice hoarseness: No  Mouth sores: No  Sore throat: No  Tooth pain: No  Gum tenderness: No  Bleeding gums: No  Change in taste: No  Change in sense of smell: No  Dry mouth: Yes  Hearing aid used: No  Neck lump: No  Fever: No  Loss of appetite: No  Weight loss: No  Weight gain: Yes  Fatigue: Yes  Night sweats: No  Chills: No  Increased stress: No  Excessive hunger: No  Excessive thirst: No  Feeling hot or cold when others believe the temperature is normal: No  Loss of height: No  Post-operative complications: No  Hallucinations: No  Change in or Loss of Energy: No  Hyperactivity: No  Confusion: No  Changes in hair: No  Changes in moles/birth marks: Yes  Itching: No  Rashes: No  Changes in nails: No  Acne: No  Change in facial hair: No  Warts: No  Non-healing sores: No  Scarring: No  Flaking of skin: No  Color changes of hands/feet in cold : No  Sun sensitivity: No  Skin thickening: No  Chest pain or pressure: Yes  Fast or irregular heartbeat: Yes  Pain in legs with walking: No  Trouble breathing while lying down: Yes  Fingers or toes appear blue: No  High blood pressure: Yes  Low blood pressure: No  Fainting: No  Murmurs: No  Pacemaker: Yes  Varicose veins: No  Edema or swelling: No  Wake up at night with shortness of breath:  Yes  Light-headedness: No  Exercise intolerance: Yes  Cough: Yes  Sputum or phlegm: Yes  Coughing up blood: No  Difficulty breating or shortness of breath: Yes  Snoring: No  Wheezing: Yes  Difficulty breathing on exertion: Yes  Nighttime Cough: Yes  Difficulty breathing when lying flat: Yes  Heart burn or indigestion: No  Nausea: No  Vomiting: No  Abdominal pain: Yes  Bloating: No  Constipation: No  Diarrhea: Yes  Blood in stool: No  Black stools: No  Rectal or Anal pain: No  Fecal incontinence: No  Yellowing of skin or eyes: No  Vomit with blood: No  Change in stools: No  Trouble holding urine or incontinence: Yes  Pain or burning: No  Trouble starting or stopping: No  Increased frequency of urination: Yes  Blood in urine: No  Decreased frequency of urination: No  Frequent nighttime urination: Yes  Flank pain: Yes  Difficulty emptying bladder: No  Back pain: Yes  Muscle aches: Yes  Neck pain: No  Swollen joints: No  Joint pain: Yes  Bone pain: No  Muscle cramps: No  Muscle weakness: Yes  Joint stiffness: Yes  Bone fracture: No  Nervous or Anxious: No  Depression: Yes  Trouble sleeping: Yes  Trouble thinking or concentrating: No  Mood changes: No  Panic attacks: No

## 2021-12-15 NOTE — LETTER
12/15/2021         RE: Jimmy Isaac  [id number] [address] [id number]        Dear Colleague,    Thank you for referring your patient, Jimmy Isaac, to the [facility] [place]. Please see a copy of my visit note below.    Reason for Visit  Jimmy Isaac is a 61 year old year old male who is being seen for sarcoidosis  Pulmonary HPI    The patient was seen and examined by Shane Ruvalcaba MD     Mr. Isaac comes in for followup.  He was last seen in the Pulmonary Clinic for sarcoidosis in 01/2019.  At that time, he was not inclined to pursue anti-inflammatory therapy.  For his SAPH he is on triple therapy.    He comes in today and reports that his summer was quite difficult.  He was admitted to the hospital in the Cardiovascular ICU with shock, for which he was on pressors.  He was also close to needing dialysis during that hospital stay.  He was found to have urosepsis and obstructing renal stones.  He needed an AICD placement.  Since then, he had lithotripsy done under epidural anesthesia, which he tolerated well.    Because of all of the hospitalizations this year, he reports worsening endurance.  He is more short of breath and needs rest to carry out the chores he is interested in.  He also has occasional cough.        Current Outpatient Medications   Medication     ambrisentan (LETAIRIS) 10 MG tablet     budesonide (PULMICORT) 0.25 MG/2ML neb solution     furosemide (LASIX) 20 MG tablet     PROAIR  (90 Base) MCG/ACT inhaler     selexipag (UPTRAVI) 1600 MCG tablet     tadalafil, PAH, (ALYQ) 20 MG TABS     tamsulosin (FLOMAX) 0.4 MG capsule     UNABLE TO FIND     UNABLE TO FIND     No current facility-administered medications for this visit.     Allergies   Allergen Reactions     Nkda [No Known Drug Allergies]      Past Medical History:   Diagnosis Date     Chronic sinusitis      CKD (chronic kidney disease)      Heart disease       Hypertension      Keratosis     frontal scalp, treated with liquid nitrogen at [facility]     Malignant neoplasm (H)      Pneumonia a few times     Pulmonary sarcoidosis (H)        Past Surgical History:   Procedure Laterality Date     COLONOSCOPY       CV RIGHT HEART CATH MEASUREMENTS RECORDED N/A 2019    Procedure: CV RIGHT HEART CATH;  Surgeon: Kale Delgado MD;  Location:  HEART CARDIAC CATH LAB     CV RIGHT HEART CATH MEASUREMENTS RECORDED N/A 2019    Procedure: CV RIGHT HEART CATH;  Surgeon: Ion Lemon MD;  Location:  HEART CARDIAC CATH LAB     CV RIGHT HEART CATH MEASUREMENTS RECORDED N/A 2021    Procedure: CV RIGHT HEART CATH;  Surgeon: Jhony Barone MD;  Location:  HEART CARDIAC CATH LAB     ENT SURGERY      wisdom teeth extraction     EP PACEMAKER Left 2021    Procedure: EP Pacemaker;  Surgeon: Dora Fontanez MD;  Location:  HEART CARDIAC CATH LAB     IR NEPHROSTOMY TUBE PLACEMENT LEFT  2021     LASER HOLMIUM NEPHROLITHOTOMY VIA PERCUTANEOUS NEPHROSTOMY Left 2021    Procedure: NEPHROLITHOTOMY, PERCUTANEOUS, USING HOLMIUM LASER;  Surgeon: Zackery Moura MD;  Location:  OR     TONSILLECTOMY  1964       Social History     Socioeconomic History     Marital status: Single     Spouse name: Not on file     Number of children: Not on file     Years of education: Not on file     Highest education level: Not on file   Occupational History     Not on file   Tobacco Use     Smoking status: Former Smoker     Packs/day: 1.00     Years: 30.00     Pack years: 30.00     Types: Cigarettes     Start date: 10/10/1975     Quit date: 3/1/2010     Years since quittin.8     Smokeless tobacco: Never Used   Substance and Sexual Activity     Alcohol use: Not Currently     Alcohol/week: 0.0 standard drinks     Comment: stated 2 bottles of beer occ     Drug use: Not Currently     Comment: past use of marijuana     Sexual activity: Not Currently      Partners: Female     Birth control/protection: Abstinence, Female Surgical   Other Topics Concern     Parent/sibling w/ CABG, MI or angioplasty before 65F 55M? Not Asked      Service Not Asked     Blood Transfusions Not Asked     Caffeine Concern Yes     Occupational Exposure Not Asked     Hobby Hazards Not Asked     Sleep Concern Not Asked     Stress Concern Not Asked     Weight Concern Not Asked     Special Diet Not Asked     Back Care Not Asked     Exercise Yes     Bike Helmet Not Asked     Seat Belt Not Asked     Self-Exams Not Asked   Social History Narrative     Not on file     Social Determinants of Health     Financial Resource Strain: Not on file   Food Insecurity: Not on file   Transportation Needs: Not on file   Physical Activity: Not on file   Stress: Not on file   Social Connections: Not on file   Intimate Partner Violence: Not on file   Housing Stability: Not on file       ROS Pulmonary  A complete ROS was otherwise negative except as noted in the HPI.  /63   Pulse 78   Ht 1.829 m (6')   Wt 70.3 kg (155 lb)   SpO2 100%   BMI 21.02 kg/m    Exam:   GENERAL APPEARANCE: Alert, and in no apparent distress.  EYES: PERRL, EOMI  LYMPHATICS: No significant axillary, cervical, or supraclavicular nodes.  RESP: normal percussion, good air flow throughout.  No crackles. No rhonchi. No wheezes.  CV: Normal S1, S2, regular rhythm, normal rate. No murmur.  No rub. No gallop. No LE edema.   MS: extremities normal. No clubbing. No cyanosis.  SKIN: no rash on limited exam  NEURO: Mentation intact, speech normal, normal strength and tone, normal gait and stance    Results:  PET Scan: 8/12/21 Impression: 1. No evidence of active cardiac sarcoidosis. 2. Evidence of active pulmonary and systemic sarcoidosis, with hypermetabolic perihilar-predominant lung disease similar to 7/11/2017, and hypermetabolic adenopathy in the chest. 3. Bilateral large nephrolithiasis. Unchanged obstructing stone in the proximal  left ureter with left percutaneous nephrostomy stent in  place. Mild left hydronephrosis is unchanged from 8/1/2021.    CT abdomen:9/21/21: Interval postprocedural changes of left PCNL. Increased left perinephric stranding without discrete perinephric hematoma. Mild residual left hydronephrosis with appropriately positioned left ureteral stent. 2. Stable staghorn calculi in the right kidney and decreased stone burden in the left kidney. 3. Sequelae of chronic pancreatitis. A prominent gastrohepatic lymph node is slightly decreased in size. 4. Stable findings of pulmonary sarcoidosis in the lung bases. 5. Unchanged avascular necrosis without subchondral collapse in the femoral heads.    CT Chest : 11/17/2021- 1. Extensive sequelae of pulmonary sarcoidosis, which overall has not significantly progressed in severity compared to CT chest dated 11/16/2017. Bibasilar emphysematous changes and organizing chronic type pneumonia. 2. Dilated main pulmonary artery compatible with pulmonary arterial hypertension. 3. Chronic bilateral nephrolithiasis    Assessment and plan: Mr. 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  He had no response to prednisone therapy in 2017.  Currently off all other systemic anti-inflammatory medications.   1.  Pulmonary:  CT with dense infiltrates, likely fibrotic changes.  The PET scan does show FDG uptake in the lung parenchyma, but this could be seen with fibrosis.  He also has areas in cervical and mediastinal/hilar nodes with FDG uptake.  The PFTs show severe obstruction with air trapping, but not significantly changed.  At this point, it is difficult to know if there is any reversible disease.  In the past, there was no response to prednisone.  We reviewed a trail steroid-sparing agents including infliximab.  He is not interested in pursuing injectable therapy.   Methotrexate is not a possibility given his elevated creatinine.  Options could include Imuran and CellCept.  Traditionally, CellCept has not been very effective in our patient population.  Leflunomide could also be an option.  At this point, the patient is wanting to go back on prednisone.  We will start at 10 mg, follow PFTs and add CellCept or Imuran if needed.  A TPMT is being done.  2.  Extrapulmonary:  SAPH, for which he is on triple therapy and oxygen supplementation.  On the most recent cardiac PET scan, no FDG uptake either in the myocardium or other areas to suggest sarcoidosis.  We will check labs.  3.  The patient is not interested in pursuing lung transplantation.  4.  We will change his inhalers.  He is currently using rescue albuterol 4-5 times a day.  We will add long-acting beta agonist and see if he would prefer inhaler other than budesonide nebulizers that he is currently on.      I will follow him back in 3 months.      Addendum: Labs reviewed.  Creatinine 3.04, BUN 59.  Calcium level is 9.5, ALP is 68, ALT is 17 and AST is 14.  Vitamin D and 125 dihydroxy vitamin D both are low.  CBC with white count 5.0 hemoglobin 10.9 and platelet count 155.  IgG levels 802 parathyroid hormone level less than 13.  Plan: Worsening renal failure for which she will need to see nephrology.  Unclear if hypoparathyroidism is related to the CKD.   In one month would consider adding cellcept is not improvement in pulmonary status.    This note consists of symbols derived from keyboarding, dictation and/or voice recognition software. As a result, there may be errors in the script that have gone undetected. Please consider this when interpreting information found in this ilia    Again, thank you for allowing me to participate in the care of your patient.        Sincerely,        Shane Ruvalcaba MD

## 2021-12-15 NOTE — PATIENT INSTRUCTIONS
It was a pleasure to see you in clinic today.  Please do not hesitate to call with any questions or concerns.  You are scheduled for a remote transmission on 3/15/22.  We look forward to seeing you in clinic at your next device check in 6 months.    Nikky Schultz, RN, MS, CCRN  Electrophysiology Nurse Clinician  [facility]    During Business Hours Please Call:  [phone]  After Hours Please Call:  [phone] - select option #4 and ask for job code [id number]

## 2021-12-15 NOTE — PROGRESS NOTES
Reason for Visit  Jimmy Isaac is a 61 year old year old male who is being seen for sarcoidosis  Pulmonary HPI    The patient was seen and examined by Shane Ruvalcaba MD     Mr. Isaac comes in for followup.  He was last seen in the Pulmonary Clinic for sarcoidosis in 01/2019.  At that time, he was not inclined to pursue anti-inflammatory therapy.  For his SAPH he is on triple therapy.    He comes in today and reports that his summer was quite difficult.  He was admitted to the hospital in the Cardiovascular ICU with shock, for which he was on pressors.  He was also close to needing dialysis during that hospital stay.  He was found to have urosepsis and obstructing renal stones.  He needed an AICD placement.  Since then, he had lithotripsy done under epidural anesthesia, which he tolerated well.    Because of all of the hospitalizations this year, he reports worsening endurance.  He is more short of breath and needs rest to carry out the chores he is interested in.  He also has occasional cough.        Current Outpatient Medications   Medication     ambrisentan (LETAIRIS) 10 MG tablet     budesonide (PULMICORT) 0.25 MG/2ML neb solution     furosemide (LASIX) 20 MG tablet     PROAIR  (90 Base) MCG/ACT inhaler     selexipag (UPTRAVI) 1600 MCG tablet     tadalafil, PAH, (ALYQ) 20 MG TABS     tamsulosin (FLOMAX) 0.4 MG capsule     UNABLE TO FIND     UNABLE TO FIND     No current facility-administered medications for this visit.     Allergies   Allergen Reactions     Nkda [No Known Drug Allergies]      Past Medical History:   Diagnosis Date     Chronic sinusitis      CKD (chronic kidney disease)      Heart disease      Hypertension      Keratosis     frontal scalp, treated with liquid nitrogen at [facility]     Malignant neoplasm (H)      Pneumonia a few times     Pulmonary sarcoidosis (H)        Past Surgical History:   Procedure Laterality Date     COLONOSCOPY       CV RIGHT HEART CATH MEASUREMENTS  RECORDED N/A 2019    Procedure: CV RIGHT HEART CATH;  Surgeon: Kale Delgado MD;  Location:  HEART CARDIAC CATH LAB     CV RIGHT HEART CATH MEASUREMENTS RECORDED N/A 2019    Procedure: CV RIGHT HEART CATH;  Surgeon: Ion Lemon MD;  Location:  HEART CARDIAC CATH LAB     CV RIGHT HEART CATH MEASUREMENTS RECORDED N/A 2021    Procedure: CV RIGHT HEART CATH;  Surgeon: Jhony Barone MD;  Location:  HEART CARDIAC CATH LAB     ENT SURGERY      wisdom teeth extraction     EP PACEMAKER Left 2021    Procedure: EP Pacemaker;  Surgeon: Dora Fontanez MD;  Location:  HEART CARDIAC CATH LAB     IR NEPHROSTOMY TUBE PLACEMENT LEFT  2021     LASER HOLMIUM NEPHROLITHOTOMY VIA PERCUTANEOUS NEPHROSTOMY Left 2021    Procedure: NEPHROLITHOTOMY, PERCUTANEOUS, USING HOLMIUM LASER;  Surgeon: Zackery Moura MD;  Location:  OR     [facility]OMY  [facility]       Social History     Socioeconomic History     Marital status: Single     Spouse name: Not on file     Number of children: Not on file     Years of education: Not on file     Highest education level: Not on file   Occupational History     Not on file   Tobacco Use     Smoking status: Former Smoker     Packs/day: 1.00     Years: 30.00     Pack years: 30.00     Types: Cigarettes     Start date: 10/10/1975     Quit date: 3/1/2010     Years since quittin.8     Smokeless tobacco: Never Used   Substance and Sexual Activity     Alcohol use: Not Currently     Alcohol/week: 0.0 standard drinks     Comment: stated 2 bottles of beer occ     Drug use: Not Currently     Comment: past use of marijuana     Sexual activity: Not Currently     Partners: Female     Birth control/protection: Abstinence, Female Surgical   Other Topics Concern     Parent/sibling w/ CABG, MI or angioplasty before 65F 55M? Not Asked      Service Not Asked     Blood Transfusions Not Asked     Caffeine Concern Yes     Occupational Exposure Not Asked      Hobby Hazards Not Asked     Sleep Concern Not Asked     Stress Concern Not Asked     Weight Concern Not Asked     Special Diet Not Asked     Back Care Not Asked     Exercise Yes     Bike Helmet Not Asked     Seat Belt Not Asked     Self-Exams Not Asked   Social History Narrative     Not on file     Social Determinants of Health     Financial Resource Strain: Not on file   Food Insecurity: Not on file   Transportation Needs: Not on file   Physical Activity: Not on file   Stress: Not on file   Social Connections: Not on file   Intimate Partner Violence: Not on file   Housing Stability: Not on file       ROS Pulmonary  A complete ROS was otherwise negative except as noted in the HPI.  /63   Pulse 78   Ht 1.829 m (6')   Wt 70.3 kg (155 lb)   SpO2 100%   BMI 21.02 kg/m    Exam:   GENERAL APPEARANCE: Alert, and in no apparent distress.  EYES: PERRL, EOMI  LYMPHATICS: No significant axillary, cervical, or supraclavicular nodes.  RESP: normal percussion, good air flow throughout.  No crackles. No rhonchi. No wheezes.  CV: Normal S1, S2, regular rhythm, normal rate. No murmur.  No rub. No gallop. No LE edema.   MS: extremities normal. No clubbing. No cyanosis.  SKIN: no rash on limited exam  NEURO: Mentation intact, speech normal, normal strength and tone, normal gait and stance    Results:  PET Scan: 8/12/21 Impression: 1. No evidence of active cardiac sarcoidosis. 2. Evidence of active pulmonary and systemic sarcoidosis, with hypermetabolic perihilar-predominant lung disease similar to 7/11/2017, and hypermetabolic adenopathy in the chest. 3. Bilateral large nephrolithiasis. Unchanged obstructing stone in the proximal left ureter with left percutaneous nephrostomy stent in  place. Mild left hydronephrosis is unchanged from 8/1/2021.    CT abdomen:9/21/21: Interval postprocedural changes of left PCNL. Increased left perinephric stranding without discrete perinephric hematoma. Mild residual left hydronephrosis  with appropriately positioned left ureteral stent. 2. Stable staghorn calculi in the right kidney and decreased stone burden in the left kidney. 3. Sequelae of chronic pancreatitis. A prominent gastrohepatic lymph node is slightly decreased in size. 4. Stable findings of pulmonary sarcoidosis in the lung bases. 5. Unchanged avascular necrosis without subchondral collapse in the femoral heads.    CT Chest : 11/17/2021- 1. Extensive sequelae of pulmonary sarcoidosis, which overall has not significantly progressed in severity compared to CT chest dated 11/16/2017. Bibasilar emphysematous changes and organizing chronic type pneumonia. 2. Dilated main pulmonary artery compatible with pulmonary arterial hypertension. 3. Chronic bilateral nephrolithiasis    Assessment and plan: Mr. 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  He had no response to prednisone therapy in 2017.  Currently off all other systemic anti-inflammatory medications.   1.  Pulmonary:  CT with dense infiltrates, likely fibrotic changes.  The PET scan does show FDG uptake in the lung parenchyma, but this could be seen with fibrosis.  He also has areas in cervical and mediastinal/hilar nodes with FDG uptake.  The PFTs show severe obstruction with air trapping, but not significantly changed.  At this point, it is difficult to know if there is any reversible disease.  In the past, there was no response to prednisone.  We reviewed a trail steroid-sparing agents including infliximab.  He is not interested in pursuing injectable therapy.  Methotrexate is not a possibility given his elevated creatinine.  Options could include Imuran and CellCept.  Traditionally, CellCept has not been very effective in our patient population.  Leflunomide could also be an option.  At this point, the patient is wanting to go back on prednisone.  We will start  at 10 mg, follow PFTs and add CellCept or Imuran if needed.  A TPMT is being done.  2.  Extrapulmonary:  SAPH, for which he is on triple therapy and oxygen supplementation.  On the most recent cardiac PET scan, no FDG uptake either in the myocardium or other areas to suggest sarcoidosis.  We will check labs.  3.  The patient is not interested in pursuing lung transplantation.  4.  We will change his inhalers.  He is currently using rescue albuterol 4-5 times a day.  We will add long-acting beta agonist and see if he would prefer inhaler other than budesonide nebulizers that he is currently on.      I will follow him back in 3 months.      Addendum: Labs reviewed.  Creatinine 3.04, BUN 59.  Calcium level is 9.5, ALP is 68, ALT is 17 and AST is 14.  Vitamin D and 125 dihydroxy vitamin D both are low.  CBC with white count 5.0 hemoglobin 10.9 and platelet count 155.  IgG levels 802 parathyroid hormone level less than 13.  Plan: Worsening renal failure for which she will need to see nephrology.  Unclear if hypoparathyroidism is related to the CKD.   In one month would consider adding cellcept is not improvement in pulmonary status.    This note consists of symbols derived from keyboarding, dictation and/or voice recognition software. As a result, there may be errors in the script that have gone undetected. Please consider this when interpreting information found in this chart

## 2021-12-15 NOTE — NURSING NOTE
Chief Complaint   Patient presents with     Interstitial Lung Disease (ILD)     Follow up sarcoids      Vitals were taken and medications were reconciled.     Elin Dowell RMA  9:58 AM

## 2021-12-15 NOTE — PROGRESS NOTES
Jimmy is a 61 year old who is being evaluated via a billable phone visit. Switched to telephone visit due to technical issues.     How would you like to obtain your AVS? [facility]        Nephrology Progress Note  12/15/2021           Reason for Visit: Follow up CKD, kidney stones    ASSESSMENT/RECOMMENDATIONS:  Jimmy Isaac is a 61 year old male with a history of Sarcoidosis, CKD and Nephrolithiasis who presents for routine follow up. Sarcoidosis was diagnosed in 1991 complicated by pulmonary hypertension, hypoxia on home oxygen when ambulating and nephrolithiasis (calcium oxalate).    #CKD 4  - baseline creatinine 1.4 in 2018, increasing to ~ 1.8-1.9 in 2019, Then creatinine increased to ~ 3 in July 2020. Previous creatinine up to 4.12  - Discussed kidney function in detail and possibility that kidney does not completely recover.   - 12/15/21 creatinine 3.04, eGFR 21  - hospitalized from 7/30-8/12/21. Cr was 3 upon admission and steadily increased to peak of 4.86 on 8/9 and has now started to trend down with increasing UOP on lasix. Differential for SISI is broad at this point including sarcoidosis in the kidneys, staghorn calculi (though s/p PNT on left), AIN due to antibiotic use and ATN. Cr slowly trended down. He was discharged on lasix 80 mg BID   - enrolled in CKD journey if kidney function does not improve, could consider referral to transplant team however he has declined referral for lung transplant.     #BP/CV  - normotensive, history of hypertension/pulmonary hypertension, does not take any medications at this time  - s/p pacemaker placement on 8/2/21  - monitor BP at home  - ECHO 10/29/18  Interpretation Summary  Technically difficult study.  Global and regional left ventricular function is normal with an EF of 55-60%.  Mild right ventricular dilation is present.  Global right ventricular function is normal.  Right ventricular systolic pressure is 28mmHg above the right atrial pressure.  No significant  valvular abnormalities.  The inferior vena cava was normal in size with preserved respiratory  variability.  No pericardial effusion is present.  This study was compared to a TTE from 9/22/2017. There has been no significant  Change.  - had ECHO and followed up with cardiology planned on 8/23/21. He will be having pacemaker implanted      #Volume  - has not been checking weight at home. Has no, swelling in LE.  - taking lasix 80 mg BID, pharmacy feels this could contribute to stones  - decrease lasix to 40 mg daily  - start checking weight daily, continue low sodium diet      #Electrolytes/Acid/Base  - 12/15/21 potassium 3.5, sodium 138, bicarb 30  - no acute concerns      #Anemia  - 12/15/21 Hgb 10.9  - recent iron 28, iron sat 9 %, ferritin 15  - Hgb is stable but iron low, refer to anemia clinic      #Bone/mineral disease:  - 12/15/21 Corrected calcium 9.74, PTH 13, Vit D 18  - start cholecalciferol 1000 units every other day and monitor calcium closely      #Nephrolithiasis   - following with urology  - s/p left PCNL on 9/20/21 recovered well from this.      #Disposition: check labs in 1-2 weeks. Enrolled in CKD journey. Follow up in 4-6 weeks with Dr. Parmar.     Recommendations reviewed with patient. All questions answered to their satisfaction. They will call with any new or worsening symptoms.       HISTORY OF PRESENT ILLNESS  Jimmy Isaac is a 61 year old male with a history of Sarcoidosis, CKD and Nephrolithiasis who presents for routine follow up. Sarcoidosis was diagnosed in 1991 complicated by pulmonary hypertension, hypoxia on home oxygen when ambulating and nephrolithiasis (calcium oxalate).     He was initially diagnosed with kidney stones in 1991 with new diagnosis of sarcoidosis and was told to drink a lot of water. Stones were noted again in 2012 incidentally on imaging for pulmonary hypertension but he has also passed stones which were analyzed in 2012 and showed calcium oxalate.    CKD was  stable from 2012 until ~ 2018. He was last seen by Dr. Ba 12/26/2018. Baseline creatinine at that time was 1.4 and eGFR 55. He did not follow up. Recently he was evaluated in urology and noted to have creatinine of 3.65 with eGFR 17. Chart review showed creatinine in July 2020 of ~ 3. CT stone was obtained and showed a large obstructing left ureteral calculus, left intrarenal calculi, and increased burden of right staghorn calculi. He has left nephrostomy tube in place. He will have PCNL once he's had pacemaker placed.     On speaking with him today, he is slowly but gradually improving since discharge on 8/12. He does note persistent LE edema, and lesser volume of urine. He is drinking 6-8 bottles of water per day and taking lasix 80 mg BID. He has some generalized itching, hasn't had the energy to shower since he got home. States he has a good appetite and more regular bowel movements. He has no abdominal pain, nausea, or vomiting.     We discussed CKD journey and attending a kidney smart class. He wants to proceed but not immediately.     PAST MEDICAL/SURGICAL HISTORY  - Reviewed    ALLERGIES and MEDICATIONS  - Reviewed per EMR    REVIEW OF SYSTEMS:   - Comprehensive system review obtained, negative unless noted in HPI      PHYSICAL EXAM:   Vitals: None      LABS  All labs reviewed by me  Electrolytes/Renal -   Recent Labs   Lab Test 12/15/21  1138 11/17/21  0846 09/23/21  1240 09/23/21  0815 09/23/21  0705 09/14/21  1432 08/18/21  1407 08/12/21  0827 08/10/21  1336 08/10/21  0551 07/27/21  0742 07/01/21  1336    139  --   --  141   < > 136 138   < > 138   < > 142   POTASSIUM 3.5 3.6  --   --  3.7   < > 3.7 4.2   < > 4.0   < > 4.0   CHLORIDE 99 101  --   --  106   < > 96 102   < > 104   < > 110*   CO2 30 28  --   --  28   < > 30 22   < > 23   < > 25   BUN 59* 47*  --   --  38*   < > 92* 124*   < > 108*   < > 50*   CR 3.04* 2.92*  --   --  2.47*   < > 3.36* 4.12*   < > 4.47*   < > 3.27*   GLC 90 118*  96   < > 86   < > 130* 70   < > 85   < > 95   TERI 9.5 9.6  --   --  9.1   < > 9.7 9.0   < > 9.3   < > 8.7   MAG  --   --   --   --   --   --  2.2 2.3  --  2.2   < >  --    PHOS 3.7  --   --   --   --   --  5.5*  --   --   --   --  3.8    < > = values in this interval not displayed.       CBC -   Recent Labs   Lab Test 12/15/21  1138 11/17/21  0846 09/23/21  0705   WBC 5.0 6.4 5.7   HGB 10.9* 10.5* 8.1*    165 157       LFTs -   Recent Labs   Lab Test 12/15/21  1138 11/17/21  0846 09/21/21  0253   ALKPHOS 68 72 63   BILITOTAL 0.4 0.4 0.6   ALT 17 18 13   AST 14 14 14   PROTTOTAL 7.4 7.4 6.5*   ALBUMIN 3.7 3.6 2.7*       Iron Panel -   Recent Labs   Lab Test 12/15/21  1138 07/01/21  1336 10/18/17  1121   IRON 28* 28* 82   IRONSAT 9* 12* 29   MIHIR 15* 33 41         Imaging: Reviewed with patient   CT abdomen/pelvis 6/23/21  IMPRESSION:   1.  A large, 1.3 x 1.2 x 1.8 cm obstructing calculus in the left  ureterovesicular junction/proximal ureter with severe left  hydronephrosis. Otherwise stable burden nonobstructing left renal  calculi.  2.  Increased burden of multiple staghorn right renal nonobstructing  calculi.  3.  Stable mildly enlarged upper abdominal lymphadenopathy,  nonspecific and likely reactive.  4.  Stable partly visualized bullous emphysema in the lung bases.      ROSALINO FRASERC     Visit length 13 minutes. An additional 20 minutes was spent on date of service in chart review, documentation, and other activities as noted.

## 2021-12-16 NOTE — LETTER
12/16/2021     RE: Jimmy Isaac  [address] [address]     Dear Colleague,    Thank you for referring your patient, Jimmy Isaac, to the [facility] [place] [facility]. Please see a copy of my visit note below.    Jimmy is a 61 year old who is being evaluated via a billable phone visit. Switched to telephone visit due to technical issues.     How would you like to obtain your AVS? [facility]        Nephrology Progress Note  12/15/2021           Reason for Visit: Follow up CKD, kidney stones    ASSESSMENT/RECOMMENDATIONS:  Jimmy Isaac is a 61 year old male with a history of Sarcoidosis, CKD and Nephrolithiasis who presents for routine follow up. Sarcoidosis was diagnosed in 1991 complicated by pulmonary hypertension, hypoxia on home oxygen when ambulating and nephrolithiasis (calcium oxalate).    #CKD 4  - baseline creatinine 1.4 in 2018, increasing to ~ 1.8-1.9 in 2019, Then creatinine increased to ~ 3 in July 2020. Previous creatinine up to 4.12  - Discussed kidney function in detail and possibility that kidney does not completely recover.   - 12/15/21 creatinine 3.04, eGFR 21  - hospitalized from 7/30-8/12/21. Cr was 3 upon admission and steadily increased to peak of 4.86 on 8/9 and has now started to trend down with increasing UOP on lasix. Differential for SISI is broad at this point including sarcoidosis in the kidneys, staghorn calculi (though s/p PNT on left), AIN due to antibiotic use and ATN. Cr slowly trended down. He was discharged on lasix 80 mg BID   - enrolled in CKD journey if kidney function does not improve, could consider referral to transplant team however he has declined referral for lung transplant.     #BP/CV  - normotensive, history of hypertension/pulmonary hypertension, does not take any medications at this time  - s/p pacemaker placement on 8/2/21  - monitor BP at home  - ECHO  10/29/18  Interpretation Summary  Technically difficult study.  Global and regional left ventricular function is normal with an EF of 55-60%.  Mild right ventricular dilation is present.  Global right ventricular function is normal.  Right ventricular systolic pressure is 28mmHg above the right atrial pressure.  No significant valvular abnormalities.  The inferior vena cava was normal in size with preserved respiratory  variability.  No pericardial effusion is present.  This study was compared to a TTE from 9/22/2017. There has been no significant  Change.  - had ECHO and followed up with cardiology planned on 8/23/21. He will be having pacemaker implanted      #Volume  - has not been checking weight at home. Has no, swelling in LE.  - taking lasix 80 mg BID, pharmacy feels this could contribute to stones  - decrease lasix to 40 mg daily  - start checking weight daily, continue low sodium diet      #Electrolytes/Acid/Base  - 12/15/21 potassium 3.5, sodium 138, bicarb 30  - no acute concerns      #Anemia  - 12/15/21 Hgb 10.9  - recent iron 28, iron sat 9 %, ferritin 15  - Hgb is stable but iron low, refer to anemia clinic      #Bone/mineral disease:  - 12/15/21 Corrected calcium 9.74, PTH 13, Vit D 18  - start cholecalciferol 1000 units every other day and monitor calcium closely      #Nephrolithiasis   - following with urology  - s/p left PCNL on 9/20/21 recovered well from this.      #Disposition: check labs in 1-2 weeks. Enrolled in CKD journey. Follow up in 4-6 weeks with Dr. Parmar.     Recommendations reviewed with patient. All questions answered to their satisfaction. They will call with any new or worsening symptoms.       HISTORY OF PRESENT ILLNESS  Jimmy Isaac is a 61 year old male with a history of Sarcoidosis, CKD and Nephrolithiasis who presents for routine follow up. Sarcoidosis was diagnosed in 1991 complicated by pulmonary hypertension, hypoxia on home oxygen when ambulating and nephrolithiasis  (calcium oxalate).     He was initially diagnosed with kidney stones in 1991 with new diagnosis of sarcoidosis and was told to drink a lot of water. Stones were noted again in 2012 incidentally on imaging for pulmonary hypertension but he has also passed stones which were analyzed in 2012 and showed calcium oxalate.    CKD was stable from 2012 until ~ 2018. He was last seen by Dr. Ba 12/26/2018. Baseline creatinine at that time was 1.4 and eGFR 55. He did not follow up. Recently he was evaluated in urology and noted to have creatinine of 3.65 with eGFR 17. Chart review showed creatinine in July 2020 of ~ 3. CT stone was obtained and showed a large obstructing left ureteral calculus, left intrarenal calculi, and increased burden of right staghorn calculi. He has left nephrostomy tube in place. He will have PCNL once he's had pacemaker placed.     On speaking with him today, he is slowly but gradually improving since discharge on 8/12. He does note persistent LE edema, and lesser volume of urine. He is drinking 6-8 bottles of water per day and taking lasix 80 mg BID. He has some generalized itching, hasn't had the energy to shower since he got home. States he has a good appetite and more regular bowel movements. He has no abdominal pain, nausea, or vomiting.     We discussed CKD journey and attending a kidney smart class. He wants to proceed but not immediately.     PAST MEDICAL/SURGICAL HISTORY  - Reviewed    ALLERGIES and MEDICATIONS  - Reviewed per EMR    REVIEW OF SYSTEMS:   - Comprehensive system review obtained, negative unless noted in HPI      PHYSICAL EXAM:   Vitals: None      LABS  All labs reviewed by me  Electrolytes/Renal -   Recent Labs   Lab Test 12/15/21  1138 11/17/21  0846 09/23/21  1240 09/23/21  0815 09/23/21  0705 09/14/21  1432 08/18/21  1407 08/12/21  0827 08/10/21  1336 08/10/21  0551 07/27/21  0742 07/01/21  1336    139  --   --  141   < > 136 138   < > 138   < > 142   POTASSIUM  3.5 3.6  --   --  3.7   < > 3.7 4.2   < > 4.0   < > 4.0   CHLORIDE 99 101  --   --  106   < > 96 102   < > 104   < > 110*   CO2 30 28  --   --  28   < > 30 22   < > 23   < > 25   BUN 59* 47*  --   --  38*   < > 92* 124*   < > 108*   < > 50*   CR 3.04* 2.92*  --   --  2.47*   < > 3.36* 4.12*   < > 4.47*   < > 3.27*   GLC 90 118* 96   < > 86   < > 130* 70   < > 85   < > 95   TERI 9.5 9.6  --   --  9.1   < > 9.7 9.0   < > 9.3   < > 8.7   MAG  --   --   --   --   --   --  2.2 2.3  --  2.2   < >  --    PHOS 3.7  --   --   --   --   --  5.5*  --   --   --   --  3.8    < > = values in this interval not displayed.       CBC -   Recent Labs   Lab Test 12/15/21  1138 11/17/21  0846 09/23/21  0705   WBC 5.0 6.4 5.7   HGB 10.9* 10.5* 8.1*    165 157       LFTs -   Recent Labs   Lab Test 12/15/21  1138 11/17/21  0846 09/21/21  0253   ALKPHOS 68 72 63   BILITOTAL 0.4 0.4 0.6   ALT 17 18 13   AST 14 14 14   PROTTOTAL 7.4 7.4 6.5*   ALBUMIN 3.7 3.6 2.7*       Iron Panel -   Recent Labs   Lab Test 12/15/21  1138 07/01/21  1336 10/18/17  1121   IRON 28* 28* 82   IRONSAT 9* 12* 29   MIHIR 15* 33 41         Imaging: Reviewed with patient   CT abdomen/pelvis 6/23/21  IMPRESSION:   1.  A large, 1.3 x 1.2 x 1.8 cm obstructing calculus in the left  ureterovesicular junction/proximal ureter with severe left  hydronephrosis. Otherwise stable burden nonobstructing left renal  calculi.  2.  Increased burden of multiple staghorn right renal nonobstructing  calculi.  3.  Stable mildly enlarged upper abdominal lymphadenopathy,  nonspecific and likely reactive.  4.  Stable partly visualized bullous emphysema in the lung bases.      ALIA MASON PA-C     Visit length 13 minutes. An additional 20 minutes was spent on date of service in chart review, documentation, and other activities as noted.     Jimmy is a 61 year old who is being evaluated via a billable telephone visit.      What phone number would you like to be contacted at?  [phone]  How would you like to obtain your AVS? Mail a copy  Phone call duration: 13 minutes    Aliyah TRIPP CMA

## 2021-12-16 NOTE — PROGRESS NOTES
Jimmy is a 61 year old who is being evaluated via a billable telephone visit.      What phone number would you like to be contacted at? [phone]  How would you like to obtain your AVS? Mail a copy  Phone call duration: 13 minutes      Aliyah TRIPP CMA

## 2021-12-17 NOTE — PATIENT INSTRUCTIONS
1. Decrease lasix to 40 mg daily (just 2 tablets).   2. Continue good hydration and low sodium diet.   3. Monitor blood pressure and weight daily.   4. Labs in 1-2 weeks.   5. Follow up in  4-6 weeks with Dr. Parmar.   6. Call sooner with any questions or concerns.

## 2022-01-01 ENCOUNTER — LAB (OUTPATIENT)
Dept: LAB | Facility: CLINIC | Age: 62
End: 2022-01-01
Payer: COMMERCIAL

## 2022-01-01 ENCOUNTER — ANCILLARY PROCEDURE (OUTPATIENT)
Dept: CARDIOLOGY | Facility: CLINIC | Age: 62
End: 2022-01-01
Attending: INTERNAL MEDICINE
Payer: COMMERCIAL

## 2022-01-01 ENCOUNTER — TELEPHONE (OUTPATIENT)
Dept: CARDIOLOGY | Facility: CLINIC | Age: 62
End: 2022-01-01

## 2022-01-01 ENCOUNTER — VIRTUAL VISIT (OUTPATIENT)
Dept: NEPHROLOGY | Facility: CLINIC | Age: 62
End: 2022-01-01
Attending: STUDENT IN AN ORGANIZED HEALTH CARE EDUCATION/TRAINING PROGRAM
Payer: COMMERCIAL

## 2022-01-01 ENCOUNTER — HOSPITAL ENCOUNTER (OUTPATIENT)
Dept: MRI IMAGING | Facility: CLINIC | Age: 62
Discharge: HOME | End: 2022-04-01
Attending: NURSE PRACTITIONER
Payer: COMMERCIAL

## 2022-01-01 ENCOUNTER — APPOINTMENT (OUTPATIENT)
Dept: OCCUPATIONAL THERAPY | Facility: CLINIC | Age: 62
End: 2022-01-01
Attending: INTERNAL MEDICINE
Payer: COMMERCIAL

## 2022-01-01 ENCOUNTER — MYC MEDICAL ADVICE (OUTPATIENT)
Dept: CARDIOLOGY | Facility: CLINIC | Age: 62
End: 2022-01-01

## 2022-01-01 ENCOUNTER — COMMITTEE REVIEW (OUTPATIENT)
Dept: TRANSPLANT | Facility: CLINIC | Age: 62
End: 2022-01-01
Payer: COMMERCIAL

## 2022-01-01 ENCOUNTER — APPOINTMENT (OUTPATIENT)
Dept: GENERAL RADIOLOGY | Facility: CLINIC | Age: 62
End: 2022-01-01
Attending: INTERNAL MEDICINE
Payer: COMMERCIAL

## 2022-01-01 ENCOUNTER — LAB (OUTPATIENT)
Dept: URGENT CARE | Facility: URGENT CARE | Age: 62
End: 2022-01-01
Payer: COMMERCIAL

## 2022-01-01 ENCOUNTER — HOSPITAL ENCOUNTER (INPATIENT)
Facility: CLINIC | Age: 62
LOS: 6 days | Discharge: HOME | End: 2022-06-28
Attending: INTERNAL MEDICINE | Admitting: INTERNAL MEDICINE
Payer: COMMERCIAL

## 2022-01-01 ENCOUNTER — DOCUMENTATION ONLY (OUTPATIENT)
Dept: LAB | Facility: CLINIC | Age: 62
End: 2022-01-01

## 2022-01-01 ENCOUNTER — TELEPHONE (OUTPATIENT)
Dept: NEPHROLOGY | Facility: CLINIC | Age: 62
End: 2022-01-01
Payer: COMMERCIAL

## 2022-01-01 ENCOUNTER — DOCUMENTATION ONLY (OUTPATIENT)
Dept: TRANSPLANT | Facility: CLINIC | Age: 62
End: 2022-01-01
Payer: COMMERCIAL

## 2022-01-01 ENCOUNTER — HEALTH MAINTENANCE LETTER (OUTPATIENT)
Age: 62
End: 2022-01-01

## 2022-01-01 ENCOUNTER — PATIENT OUTREACH (OUTPATIENT)
Dept: NEPHROLOGY | Facility: CLINIC | Age: 62
End: 2022-01-01

## 2022-01-01 ENCOUNTER — APPOINTMENT (OUTPATIENT)
Dept: GENERAL RADIOLOGY | Facility: CLINIC | Age: 62
End: 2022-01-01
Attending: EMERGENCY MEDICINE
Payer: COMMERCIAL

## 2022-01-01 ENCOUNTER — VIRTUAL VISIT (OUTPATIENT)
Dept: CARDIOLOGY | Facility: CLINIC | Age: 62
End: 2022-01-01
Attending: PHYSICIAN ASSISTANT
Payer: COMMERCIAL

## 2022-01-01 ENCOUNTER — TELEPHONE (OUTPATIENT)
Dept: CARDIOLOGY | Facility: CLINIC | Age: 62
End: 2022-01-01
Payer: COMMERCIAL

## 2022-01-01 ENCOUNTER — HOSPITAL ENCOUNTER (OUTPATIENT)
Facility: CLINIC | Age: 62
Setting detail: OBSERVATION
Discharge: HOME | End: 2022-05-10
Attending: EMERGENCY MEDICINE | Admitting: INTERNAL MEDICINE
Payer: COMMERCIAL

## 2022-01-01 ENCOUNTER — APPOINTMENT (OUTPATIENT)
Dept: ULTRASOUND IMAGING | Facility: CLINIC | Age: 62
End: 2022-01-01
Attending: INTERNAL MEDICINE
Payer: COMMERCIAL

## 2022-01-01 ENCOUNTER — VIRTUAL VISIT (OUTPATIENT)
Dept: NEPHROLOGY | Facility: CLINIC | Age: 62
End: 2022-01-01
Attending: INTERNAL MEDICINE
Payer: COMMERCIAL

## 2022-01-01 ENCOUNTER — HOSPITAL ENCOUNTER (INPATIENT)
Facility: CLINIC | Age: 62
LOS: 3 days | End: 2022-11-10
Attending: INTERNAL MEDICINE | Admitting: INTERNAL MEDICINE
Payer: COMMERCIAL

## 2022-01-01 ENCOUNTER — APPOINTMENT (OUTPATIENT)
Dept: MEDSURG UNIT | Facility: CLINIC | Age: 62
End: 2022-01-01
Attending: INTERNAL MEDICINE
Payer: COMMERCIAL

## 2022-01-01 ENCOUNTER — DOCUMENTATION ONLY (OUTPATIENT)
Dept: LAB | Facility: CLINIC | Age: 62
End: 2022-01-01
Payer: COMMERCIAL

## 2022-01-01 ENCOUNTER — APPOINTMENT (OUTPATIENT)
Dept: LAB | Facility: CLINIC | Age: 62
End: 2022-01-01
Attending: INTERNAL MEDICINE
Payer: COMMERCIAL

## 2022-01-01 ENCOUNTER — TELEPHONE (OUTPATIENT)
Dept: TRANSPLANT | Facility: CLINIC | Age: 62
End: 2022-01-01
Payer: COMMERCIAL

## 2022-01-01 ENCOUNTER — PATIENT OUTREACH (OUTPATIENT)
Dept: NEPHROLOGY | Facility: CLINIC | Age: 62
End: 2022-01-01
Payer: COMMERCIAL

## 2022-01-01 ENCOUNTER — APPOINTMENT (OUTPATIENT)
Dept: GENERAL RADIOLOGY | Facility: CLINIC | Age: 62
End: 2022-01-01
Attending: STUDENT IN AN ORGANIZED HEALTH CARE EDUCATION/TRAINING PROGRAM
Payer: COMMERCIAL

## 2022-01-01 ENCOUNTER — OFFICE VISIT (OUTPATIENT)
Dept: PULMONOLOGY | Facility: CLINIC | Age: 62
End: 2022-01-01
Attending: INTERNAL MEDICINE
Payer: COMMERCIAL

## 2022-01-01 ENCOUNTER — REFERRAL (OUTPATIENT)
Dept: TRANSPLANT | Facility: CLINIC | Age: 62
End: 2022-01-01
Attending: INTERNAL MEDICINE
Payer: COMMERCIAL

## 2022-01-01 VITALS
HEART RATE: 75 BPM | WEIGHT: 171 LBS | DIASTOLIC BLOOD PRESSURE: 71 MMHG | BODY MASS INDEX: 23.19 KG/M2 | SYSTOLIC BLOOD PRESSURE: 115 MMHG | OXYGEN SATURATION: 98 %

## 2022-01-01 VITALS
WEIGHT: 176.4 LBS | DIASTOLIC BLOOD PRESSURE: 70 MMHG | HEIGHT: 72 IN | SYSTOLIC BLOOD PRESSURE: 129 MMHG | BODY MASS INDEX: 23.89 KG/M2 | TEMPERATURE: 97.5 F | RESPIRATION RATE: 20 BRPM | OXYGEN SATURATION: 98 % | HEART RATE: 78 BPM

## 2022-01-01 VITALS — BODY MASS INDEX: 22.26 KG/M2 | WEIGHT: 168 LBS | HEIGHT: 73 IN

## 2022-01-01 VITALS
HEIGHT: 72 IN | DIASTOLIC BLOOD PRESSURE: 60 MMHG | OXYGEN SATURATION: 98 % | HEART RATE: 63 BPM | BODY MASS INDEX: 22.48 KG/M2 | WEIGHT: 166 LBS | SYSTOLIC BLOOD PRESSURE: 120 MMHG

## 2022-01-01 VITALS — BODY MASS INDEX: 22.35 KG/M2 | HEIGHT: 72 IN | WEIGHT: 165 LBS

## 2022-01-01 VITALS
TEMPERATURE: 98 F | OXYGEN SATURATION: 60 % | BODY MASS INDEX: 24.04 KG/M2 | SYSTOLIC BLOOD PRESSURE: 100 MMHG | RESPIRATION RATE: 23 BRPM | HEIGHT: 72 IN | HEART RATE: 60 BPM | DIASTOLIC BLOOD PRESSURE: 44 MMHG | WEIGHT: 177.47 LBS

## 2022-01-01 VITALS — BODY MASS INDEX: 21.02 KG/M2 | WEIGHT: 155 LBS

## 2022-01-01 VITALS
WEIGHT: 177 LBS | HEIGHT: 72 IN | DIASTOLIC BLOOD PRESSURE: 80 MMHG | BODY MASS INDEX: 23.98 KG/M2 | SYSTOLIC BLOOD PRESSURE: 145 MMHG

## 2022-01-01 VITALS
HEIGHT: 72 IN | TEMPERATURE: 97.5 F | HEART RATE: 61 BPM | SYSTOLIC BLOOD PRESSURE: 126 MMHG | BODY MASS INDEX: 21.26 KG/M2 | DIASTOLIC BLOOD PRESSURE: 78 MMHG | WEIGHT: 157 LBS | OXYGEN SATURATION: 97 % | RESPIRATION RATE: 18 BRPM

## 2022-01-01 VITALS
RESPIRATION RATE: 24 BRPM | WEIGHT: 176.5 LBS | HEART RATE: 78 BPM | TEMPERATURE: 97.5 F | OXYGEN SATURATION: 100 % | DIASTOLIC BLOOD PRESSURE: 75 MMHG | SYSTOLIC BLOOD PRESSURE: 129 MMHG | BODY MASS INDEX: 23.94 KG/M2

## 2022-01-01 VITALS
BODY MASS INDEX: 22.48 KG/M2 | DIASTOLIC BLOOD PRESSURE: 60 MMHG | WEIGHT: 166 LBS | OXYGEN SATURATION: 98 % | HEART RATE: 63 BPM | SYSTOLIC BLOOD PRESSURE: 120 MMHG | HEIGHT: 72 IN

## 2022-01-01 VITALS
WEIGHT: 165 LBS | OXYGEN SATURATION: 92 % | DIASTOLIC BLOOD PRESSURE: 71 MMHG | SYSTOLIC BLOOD PRESSURE: 115 MMHG | HEART RATE: 75 BPM | HEIGHT: 72 IN | BODY MASS INDEX: 22.35 KG/M2

## 2022-01-01 DIAGNOSIS — N18.4 CKD (CHRONIC KIDNEY DISEASE) STAGE 4, GFR 15-29 ML/MIN (H): Primary | ICD-10-CM

## 2022-01-01 DIAGNOSIS — I50.9 ACUTE ON CHRONIC CONGESTIVE HEART FAILURE, UNSPECIFIED HEART FAILURE TYPE (H): ICD-10-CM

## 2022-01-01 DIAGNOSIS — D86.9 SARCOIDOSIS: ICD-10-CM

## 2022-01-01 DIAGNOSIS — E87.6 HYPOKALEMIA: Primary | ICD-10-CM

## 2022-01-01 DIAGNOSIS — E87.6 HYPOKALEMIA: ICD-10-CM

## 2022-01-01 DIAGNOSIS — I44.39 HIGH DEGREE ATRIOVENTRICULAR BLOCK: ICD-10-CM

## 2022-01-01 DIAGNOSIS — N18.32 ANEMIA IN STAGE 3B CHRONIC KIDNEY DISEASE (H): ICD-10-CM

## 2022-01-01 DIAGNOSIS — Z95.0 CARDIAC PACEMAKER IN SITU: ICD-10-CM

## 2022-01-01 DIAGNOSIS — N18.4 CKD (CHRONIC KIDNEY DISEASE) STAGE 4, GFR 15-29 ML/MIN (H): ICD-10-CM

## 2022-01-01 DIAGNOSIS — Z86.2 HX OF SARCOIDOSIS: ICD-10-CM

## 2022-01-01 DIAGNOSIS — Z20.822 ENCOUNTER FOR LABORATORY TESTING FOR COVID-19 VIRUS: ICD-10-CM

## 2022-01-01 DIAGNOSIS — I27.20 PULMONARY HYPERTENSION (H): ICD-10-CM

## 2022-01-01 DIAGNOSIS — Z99.81 CHRONIC RESPIRATORY FAILURE WITH HYPOXIA, ON HOME O2 THERAPY (H): ICD-10-CM

## 2022-01-01 DIAGNOSIS — J96.11 CHRONIC RESPIRATORY FAILURE WITH HYPOXIA, ON HOME O2 THERAPY (H): ICD-10-CM

## 2022-01-01 DIAGNOSIS — Z95.0 S/P PLACEMENT OF CARDIAC PACEMAKER: ICD-10-CM

## 2022-01-01 DIAGNOSIS — I44.39 HIGH DEGREE ATRIOVENTRICULAR BLOCK: Primary | ICD-10-CM

## 2022-01-01 DIAGNOSIS — J84.9 ILD (INTERSTITIAL LUNG DISEASE) (H): Primary | ICD-10-CM

## 2022-01-01 DIAGNOSIS — R21 RASH: ICD-10-CM

## 2022-01-01 DIAGNOSIS — N20.0 NEPHROLITHIASIS: ICD-10-CM

## 2022-01-01 DIAGNOSIS — I27.20 PULMONARY HYPERTENSION (H): Primary | ICD-10-CM

## 2022-01-01 DIAGNOSIS — R06.02 SOB (SHORTNESS OF BREATH): ICD-10-CM

## 2022-01-01 DIAGNOSIS — I50.810 RIGHT HEART FAILURE DUE TO PULMONARY HYPERTENSION (H): ICD-10-CM

## 2022-01-01 DIAGNOSIS — N18.32 ANEMIA IN STAGE 3B CHRONIC KIDNEY DISEASE (H): Primary | ICD-10-CM

## 2022-01-01 DIAGNOSIS — N17.9 ACUTE RENAL FAILURE SUPERIMPOSED ON STAGE 4 CHRONIC KIDNEY DISEASE, UNSPECIFIED ACUTE RENAL FAILURE TYPE (H): ICD-10-CM

## 2022-01-01 DIAGNOSIS — I44.2 COMPLETE HEART BLOCK (H): ICD-10-CM

## 2022-01-01 DIAGNOSIS — N47.2 PARAPHIMOSIS: ICD-10-CM

## 2022-01-01 DIAGNOSIS — N18.4 ACUTE RENAL FAILURE SUPERIMPOSED ON STAGE 4 CHRONIC KIDNEY DISEASE, UNSPECIFIED ACUTE RENAL FAILURE TYPE (H): ICD-10-CM

## 2022-01-01 DIAGNOSIS — D86.9 SARCOIDOSIS: Primary | ICD-10-CM

## 2022-01-01 DIAGNOSIS — D63.1 ANEMIA IN STAGE 3B CHRONIC KIDNEY DISEASE (H): Primary | ICD-10-CM

## 2022-01-01 DIAGNOSIS — I50.9 ACUTE ON CHRONIC CONGESTIVE HEART FAILURE, UNSPECIFIED HEART FAILURE TYPE (H): Primary | ICD-10-CM

## 2022-01-01 DIAGNOSIS — J84.9 ILD (INTERSTITIAL LUNG DISEASE) (H): ICD-10-CM

## 2022-01-01 DIAGNOSIS — B02.8 HERPES ZOSTER WITH COMPLICATION: ICD-10-CM

## 2022-01-01 DIAGNOSIS — E55.9 VITAMIN D DEFICIENCY: ICD-10-CM

## 2022-01-01 DIAGNOSIS — R06.02 SHORTNESS OF BREATH: ICD-10-CM

## 2022-01-01 DIAGNOSIS — D50.9 IRON DEFICIENCY ANEMIA, UNSPECIFIED IRON DEFICIENCY ANEMIA TYPE: Primary | ICD-10-CM

## 2022-01-01 DIAGNOSIS — M79.89 SWELLING OF BOTH LOWER EXTREMITIES: ICD-10-CM

## 2022-01-01 DIAGNOSIS — I27.29 RIGHT HEART FAILURE DUE TO PULMONARY HYPERTENSION (H): ICD-10-CM

## 2022-01-01 DIAGNOSIS — E78.2 MIXED HYPERLIPIDEMIA: ICD-10-CM

## 2022-01-01 DIAGNOSIS — N18.32 STAGE 3B CHRONIC KIDNEY DISEASE (H): ICD-10-CM

## 2022-01-01 DIAGNOSIS — R06.09 DYSPNEA ON EXERTION: ICD-10-CM

## 2022-01-01 DIAGNOSIS — R60.1 ANASARCA: Primary | ICD-10-CM

## 2022-01-01 DIAGNOSIS — D63.1 ANEMIA IN STAGE 3B CHRONIC KIDNEY DISEASE (H): ICD-10-CM

## 2022-01-01 DIAGNOSIS — J18.9 COMMUNITY ACQUIRED PNEUMONIA, UNSPECIFIED LATERALITY: ICD-10-CM

## 2022-01-01 LAB
1,25(OH)2D SERPL-MCNC: 28.8 PG/ML (ref 19.9–79.3)
6 MIN WALK (FT): 750 FT
6 MIN WALK (M): 229 M
ALBUMIN MFR UR ELPH: 20.8 MG/DL
ALBUMIN MFR UR ELPH: 27.6 MG/DL
ALBUMIN SERPL BCG-MCNC: 3.7 G/DL (ref 3.5–5.2)
ALBUMIN SERPL BCG-MCNC: 3.8 G/DL (ref 3.5–5.2)
ALBUMIN SERPL BCG-MCNC: 3.9 G/DL (ref 3.5–5.2)
ALBUMIN SERPL BCG-MCNC: 4 G/DL (ref 3.5–5.2)
ALBUMIN SERPL BCG-MCNC: 4 G/DL (ref 3.5–5.2)
ALBUMIN SERPL BCG-MCNC: 4.1 G/DL (ref 3.5–5.2)
ALBUMIN SERPL BCG-MCNC: 4.2 G/DL (ref 3.5–5.2)
ALBUMIN SERPL-MCNC: 2.7 G/DL (ref 3.4–5)
ALBUMIN SERPL-MCNC: 3.1 G/DL (ref 3.4–5)
ALBUMIN SERPL-MCNC: 3.1 G/DL (ref 3.4–5)
ALBUMIN SERPL-MCNC: 3.3 G/DL (ref 3.4–5)
ALBUMIN SERPL-MCNC: 3.5 G/DL (ref 3.4–5)
ALBUMIN SERPL-MCNC: 3.6 G/DL (ref 3.4–5)
ALBUMIN UR-MCNC: 10 MG/DL
ALBUMIN UR-MCNC: 30 MG/DL
ALBUMIN UR-MCNC: ABNORMAL MG/DL
ALBUMIN UR-MCNC: NEGATIVE MG/DL
ALBUMIN UR-MCNC: NEGATIVE MG/DL
ALP SERPL-CCNC: 101 U/L (ref 40–129)
ALP SERPL-CCNC: 105 U/L (ref 40–129)
ALP SERPL-CCNC: 55 U/L (ref 40–150)
ALP SERPL-CCNC: 58 U/L (ref 40–129)
ALP SERPL-CCNC: 59 U/L (ref 40–150)
ALP SERPL-CCNC: 60 U/L (ref 40–129)
ALP SERPL-CCNC: 61 U/L (ref 40–129)
ALP SERPL-CCNC: 64 U/L (ref 40–129)
ALP SERPL-CCNC: 66 U/L (ref 40–129)
ALP SERPL-CCNC: 69 U/L (ref 40–129)
ALP SERPL-CCNC: 75 U/L (ref 40–129)
ALP SERPL-CCNC: 80 U/L (ref 40–129)
ALP SERPL-CCNC: 83 U/L (ref 40–129)
ALP SERPL-CCNC: 94 U/L (ref 40–129)
ALP SERPL-CCNC: 97 U/L (ref 40–129)
ALT SERPL W P-5'-P-CCNC: 12 U/L (ref 10–50)
ALT SERPL W P-5'-P-CCNC: 12 U/L (ref 10–50)
ALT SERPL W P-5'-P-CCNC: 14 U/L (ref 10–50)
ALT SERPL W P-5'-P-CCNC: 15 U/L (ref 10–50)
ALT SERPL W P-5'-P-CCNC: 16 U/L (ref 10–50)
ALT SERPL W P-5'-P-CCNC: 17 U/L (ref 10–50)
ALT SERPL W P-5'-P-CCNC: 18 U/L (ref 10–50)
ALT SERPL W P-5'-P-CCNC: 19 U/L (ref 10–50)
ALT SERPL W P-5'-P-CCNC: 20 U/L (ref 0–70)
ALT SERPL W P-5'-P-CCNC: 20 U/L (ref 10–50)
ALT SERPL W P-5'-P-CCNC: 20 U/L (ref 10–50)
ALT SERPL W P-5'-P-CCNC: 39 U/L (ref 0–70)
ALT SERPL W P-5'-P-CCNC: 9 U/L (ref 10–50)
ANION GAP SERPL CALCULATED.3IONS-SCNC: 10 MMOL/L (ref 3–14)
ANION GAP SERPL CALCULATED.3IONS-SCNC: 10 MMOL/L (ref 3–14)
ANION GAP SERPL CALCULATED.3IONS-SCNC: 11 MMOL/L (ref 3–14)
ANION GAP SERPL CALCULATED.3IONS-SCNC: 12 MMOL/L (ref 7–15)
ANION GAP SERPL CALCULATED.3IONS-SCNC: 13 MMOL/L (ref 7–15)
ANION GAP SERPL CALCULATED.3IONS-SCNC: 13 MMOL/L (ref 7–15)
ANION GAP SERPL CALCULATED.3IONS-SCNC: 14 MMOL/L (ref 7–15)
ANION GAP SERPL CALCULATED.3IONS-SCNC: 15 MMOL/L (ref 7–15)
ANION GAP SERPL CALCULATED.3IONS-SCNC: 15 MMOL/L (ref 7–15)
ANION GAP SERPL CALCULATED.3IONS-SCNC: 16 MMOL/L (ref 7–15)
ANION GAP SERPL CALCULATED.3IONS-SCNC: 17 MMOL/L (ref 7–15)
ANION GAP SERPL CALCULATED.3IONS-SCNC: 19 MMOL/L (ref 7–15)
ANION GAP SERPL CALCULATED.3IONS-SCNC: 20 MMOL/L (ref 7–15)
ANION GAP SERPL CALCULATED.3IONS-SCNC: 27 MMOL/L (ref 7–15)
ANION GAP SERPL CALCULATED.3IONS-SCNC: 6 MMOL/L (ref 3–14)
ANION GAP SERPL CALCULATED.3IONS-SCNC: 6 MMOL/L (ref 3–14)
ANION GAP SERPL CALCULATED.3IONS-SCNC: 7 MMOL/L (ref 3–14)
ANION GAP SERPL CALCULATED.3IONS-SCNC: 8 MMOL/L (ref 3–14)
ANION GAP SERPL CALCULATED.3IONS-SCNC: 9 MMOL/L (ref 3–14)
APPEARANCE UR: ABNORMAL
APPEARANCE UR: CLEAR
APTT PPP: 33 SECONDS (ref 22–38)
AST SERPL W P-5'-P-CCNC: 15 U/L (ref 0–45)
AST SERPL W P-5'-P-CCNC: 18 U/L (ref 10–50)
AST SERPL W P-5'-P-CCNC: 21 U/L (ref 10–50)
AST SERPL W P-5'-P-CCNC: 23 U/L (ref 0–45)
AST SERPL W P-5'-P-CCNC: 23 U/L (ref 10–50)
AST SERPL W P-5'-P-CCNC: 24 U/L (ref 10–50)
AST SERPL W P-5'-P-CCNC: 26 U/L (ref 10–50)
AST SERPL W P-5'-P-CCNC: 27 U/L (ref 10–50)
AST SERPL W P-5'-P-CCNC: 33 U/L (ref 10–50)
ATRIAL RATE - MUSE: 241 BPM
ATRIAL RATE - MUSE: 72 BPM
BACTERIA #/AREA URNS HPF: ABNORMAL /HPF
BACTERIA BLD CULT: NO GROWTH
BACTERIA BLD CULT: NO GROWTH
BACTERIA UR CULT: ABNORMAL
BACTERIA UR CULT: NORMAL
BASE EXCESS BLDA CALC-SCNC: -5.1 MMOL/L (ref -9–1.8)
BASE EXCESS BLDA CALC-SCNC: -6.9 MMOL/L (ref -9–1.8)
BASE EXCESS BLDA CALC-SCNC: -7.1 MMOL/L (ref -9–1.8)
BASE EXCESS BLDV CALC-SCNC: -4.4 MMOL/L (ref -7.7–1.9)
BASE EXCESS BLDV CALC-SCNC: -4.7 MMOL/L (ref -7.7–1.9)
BASE EXCESS BLDV CALC-SCNC: -6.1 MMOL/L (ref -7.7–1.9)
BASE EXCESS BLDV CALC-SCNC: -6.7 MMOL/L (ref -7.7–1.9)
BASE EXCESS BLDV CALC-SCNC: -6.8 MMOL/L (ref -7.7–1.9)
BASE EXCESS BLDV CALC-SCNC: 12.5 MMOL/L (ref -7.7–1.9)
BASOPHILS # BLD AUTO: 0 10E3/UL (ref 0–0.2)
BASOPHILS NFR BLD AUTO: 0 %
BASOPHILS NFR BLD AUTO: 1 %
BILIRUB DIRECT SERPL-MCNC: 0.34 MG/DL (ref 0–0.3)
BILIRUB SERPL-MCNC: 0.4 MG/DL
BILIRUB SERPL-MCNC: 0.5 MG/DL
BILIRUB SERPL-MCNC: 0.5 MG/DL (ref 0.2–1.3)
BILIRUB SERPL-MCNC: 0.6 MG/DL
BILIRUB SERPL-MCNC: 0.6 MG/DL (ref 0.2–1.3)
BILIRUB SERPL-MCNC: 0.7 MG/DL
BILIRUB SERPL-MCNC: 0.8 MG/DL
BILIRUB UR QL STRIP: NEGATIVE
BUN SERPL-MCNC: 102 MG/DL (ref 8–23)
BUN SERPL-MCNC: 104 MG/DL (ref 8–23)
BUN SERPL-MCNC: 104 MG/DL (ref 8–23)
BUN SERPL-MCNC: 105 MG/DL (ref 8–23)
BUN SERPL-MCNC: 106 MG/DL (ref 8–23)
BUN SERPL-MCNC: 106 MG/DL (ref 8–23)
BUN SERPL-MCNC: 107 MG/DL (ref 8–23)
BUN SERPL-MCNC: 107 MG/DL (ref 8–23)
BUN SERPL-MCNC: 109 MG/DL (ref 8–23)
BUN SERPL-MCNC: 110 MG/DL (ref 8–23)
BUN SERPL-MCNC: 135 MG/DL (ref 8–23)
BUN SERPL-MCNC: 135 MG/DL (ref 8–23)
BUN SERPL-MCNC: 139 MG/DL (ref 8–23)
BUN SERPL-MCNC: 140 MG/DL (ref 8–23)
BUN SERPL-MCNC: 140 MG/DL (ref 8–23)
BUN SERPL-MCNC: 142 MG/DL (ref 8–23)
BUN SERPL-MCNC: 50 MG/DL (ref 7–30)
BUN SERPL-MCNC: 58.5 MG/DL (ref 8–23)
BUN SERPL-MCNC: 59 MG/DL (ref 7–30)
BUN SERPL-MCNC: 67 MG/DL (ref 7–30)
BUN SERPL-MCNC: 71 MG/DL (ref 7–30)
BUN SERPL-MCNC: 74 MG/DL (ref 7–30)
BUN SERPL-MCNC: 76 MG/DL (ref 7–30)
BUN SERPL-MCNC: 89.9 MG/DL (ref 8–23)
BUN SERPL-MCNC: 89.9 MG/DL (ref 8–23)
BUN SERPL-MCNC: 91.5 MG/DL (ref 8–23)
BUN SERPL-MCNC: 93.1 MG/DL (ref 8–23)
BUN SERPL-MCNC: 93.4 MG/DL (ref 8–23)
BUN SERPL-MCNC: 96.7 MG/DL (ref 8–23)
BUN SERPL-MCNC: 98.3 MG/DL (ref 8–23)
BUN SERPL-MCNC: 98.7 MG/DL (ref 8–23)
CALCIUM SERPL-MCNC: 10 MG/DL (ref 8.8–10.2)
CALCIUM SERPL-MCNC: 10.1 MG/DL (ref 8.8–10.2)
CALCIUM SERPL-MCNC: 10.2 MG/DL (ref 8.8–10.2)
CALCIUM SERPL-MCNC: 8.6 MG/DL (ref 8.5–10.1)
CALCIUM SERPL-MCNC: 8.7 MG/DL (ref 8.5–10.1)
CALCIUM SERPL-MCNC: 8.7 MG/DL (ref 8.5–10.1)
CALCIUM SERPL-MCNC: 8.7 MG/DL (ref 8.8–10.2)
CALCIUM SERPL-MCNC: 8.7 MG/DL (ref 8.8–10.2)
CALCIUM SERPL-MCNC: 9 MG/DL (ref 8.5–10.1)
CALCIUM SERPL-MCNC: 9 MG/DL (ref 8.8–10.2)
CALCIUM SERPL-MCNC: 9.1 MG/DL (ref 8.5–10.1)
CALCIUM SERPL-MCNC: 9.1 MG/DL (ref 8.8–10.2)
CALCIUM SERPL-MCNC: 9.1 MG/DL (ref 8.8–10.2)
CALCIUM SERPL-MCNC: 9.2 MG/DL (ref 8.8–10.2)
CALCIUM SERPL-MCNC: 9.3 MG/DL (ref 8.5–10.1)
CALCIUM SERPL-MCNC: 9.3 MG/DL (ref 8.8–10.2)
CALCIUM SERPL-MCNC: 9.3 MG/DL (ref 8.8–10.2)
CALCIUM SERPL-MCNC: 9.4 MG/DL (ref 8.5–10.1)
CALCIUM SERPL-MCNC: 9.4 MG/DL (ref 8.8–10.2)
CALCIUM SERPL-MCNC: 9.5 MG/DL (ref 8.5–10.1)
CALCIUM SERPL-MCNC: 9.5 MG/DL (ref 8.8–10.2)
CALCIUM SERPL-MCNC: 9.7 MG/DL (ref 8.8–10.2)
CALCIUM SERPL-MCNC: 9.8 MG/DL (ref 8.8–10.2)
CALCIUM SERPL-MCNC: 9.9 MG/DL (ref 8.8–10.2)
CHLORIDE BLD-SCNC: 100 MMOL/L (ref 94–109)
CHLORIDE BLD-SCNC: 101 MMOL/L (ref 94–109)
CHLORIDE BLD-SCNC: 101 MMOL/L (ref 94–109)
CHLORIDE BLD-SCNC: 103 MMOL/L (ref 94–109)
CHLORIDE BLD-SCNC: 99 MMOL/L (ref 94–109)
CHLORIDE BLD-SCNC: 99 MMOL/L (ref 94–109)
CHLORIDE SERPL-SCNC: 81 MMOL/L (ref 98–107)
CHLORIDE SERPL-SCNC: 83 MMOL/L (ref 98–107)
CHLORIDE SERPL-SCNC: 84 MMOL/L (ref 98–107)
CHLORIDE SERPL-SCNC: 85 MMOL/L (ref 98–107)
CHLORIDE SERPL-SCNC: 86 MMOL/L (ref 98–107)
CHLORIDE SERPL-SCNC: 86 MMOL/L (ref 98–107)
CHLORIDE SERPL-SCNC: 87 MMOL/L (ref 98–107)
CHLORIDE SERPL-SCNC: 87 MMOL/L (ref 98–107)
CHLORIDE SERPL-SCNC: 88 MMOL/L (ref 98–107)
CHLORIDE SERPL-SCNC: 90 MMOL/L (ref 98–107)
CHLORIDE SERPL-SCNC: 91 MMOL/L (ref 98–107)
CHLORIDE SERPL-SCNC: 95 MMOL/L (ref 98–107)
CO2 SERPL-SCNC: 23 MMOL/L (ref 20–32)
CO2 SERPL-SCNC: 26 MMOL/L (ref 20–32)
CO2 SERPL-SCNC: 27 MMOL/L (ref 20–32)
CO2 SERPL-SCNC: 27 MMOL/L (ref 20–32)
CO2 SERPL-SCNC: 29 MMOL/L (ref 20–32)
CO2 SERPL-SCNC: 30 MMOL/L (ref 20–32)
CO2 SERPL-SCNC: 32 MMOL/L (ref 20–32)
CO2 SERPL-SCNC: 33 MMOL/L (ref 20–32)
COLOR UR AUTO: ABNORMAL
COLOR UR AUTO: YELLOW
CREAT SERPL-MCNC: 2.47 MG/DL (ref 0.66–1.25)
CREAT SERPL-MCNC: 2.49 MG/DL (ref 0.66–1.25)
CREAT SERPL-MCNC: 2.53 MG/DL (ref 0.67–1.17)
CREAT SERPL-MCNC: 2.55 MG/DL (ref 0.67–1.17)
CREAT SERPL-MCNC: 2.56 MG/DL (ref 0.66–1.25)
CREAT SERPL-MCNC: 2.59 MG/DL (ref 0.66–1.25)
CREAT SERPL-MCNC: 2.6 MG/DL (ref 0.66–1.25)
CREAT SERPL-MCNC: 2.71 MG/DL (ref 0.67–1.17)
CREAT SERPL-MCNC: 2.78 MG/DL (ref 0.67–1.17)
CREAT SERPL-MCNC: 2.81 MG/DL (ref 0.67–1.17)
CREAT SERPL-MCNC: 2.88 MG/DL (ref 0.66–1.25)
CREAT SERPL-MCNC: 2.88 MG/DL (ref 0.67–1.17)
CREAT SERPL-MCNC: 2.9 MG/DL (ref 0.67–1.17)
CREAT SERPL-MCNC: 2.98 MG/DL (ref 0.67–1.17)
CREAT SERPL-MCNC: 3.02 MG/DL (ref 0.67–1.17)
CREAT SERPL-MCNC: 3.02 MG/DL (ref 0.67–1.17)
CREAT SERPL-MCNC: 3.05 MG/DL (ref 0.67–1.17)
CREAT SERPL-MCNC: 3.05 MG/DL (ref 0.67–1.17)
CREAT SERPL-MCNC: 3.06 MG/DL (ref 0.67–1.17)
CREAT SERPL-MCNC: 3.06 MG/DL (ref 0.67–1.17)
CREAT SERPL-MCNC: 3.24 MG/DL (ref 0.66–1.25)
CREAT SERPL-MCNC: 3.25 MG/DL (ref 0.66–1.25)
CREAT SERPL-MCNC: 3.28 MG/DL (ref 0.66–1.25)
CREAT SERPL-MCNC: 4.43 MG/DL (ref 0.67–1.17)
CREAT SERPL-MCNC: 4.73 MG/DL (ref 0.67–1.17)
CREAT SERPL-MCNC: 5.04 MG/DL (ref 0.67–1.17)
CREAT SERPL-MCNC: 5.35 MG/DL (ref 0.67–1.17)
CREAT SERPL-MCNC: 5.37 MG/DL (ref 0.67–1.17)
CREAT SERPL-MCNC: 5.45 MG/DL (ref 0.67–1.17)
CREAT SERPL-MCNC: 5.48 MG/DL (ref 0.67–1.17)
CREAT SERPL-MCNC: 5.48 MG/DL (ref 0.67–1.17)
CREAT SERPL-MCNC: 5.59 MG/DL (ref 0.67–1.17)
CREAT UR-MCNC: 16 MG/DL
CREAT UR-MCNC: 24 MG/DL
CREAT UR-MCNC: 47 MG/DL
CREAT UR-MCNC: 47 MG/DL
CREAT UR-MCNC: 63.4 MG/DL
DEPRECATED CALCIDIOL+CALCIFEROL SERPL-MC: 38 UG/L (ref 20–75)
DEPRECATED CALCIDIOL+CALCIFEROL SERPL-MC: 39 UG/L (ref 20–75)
DEPRECATED HCO3 PLAS-SCNC: 13 MMOL/L (ref 22–29)
DEPRECATED HCO3 PLAS-SCNC: 16 MMOL/L (ref 22–29)
DEPRECATED HCO3 PLAS-SCNC: 19 MMOL/L (ref 22–29)
DEPRECATED HCO3 PLAS-SCNC: 20 MMOL/L (ref 22–29)
DEPRECATED HCO3 PLAS-SCNC: 21 MMOL/L (ref 22–29)
DEPRECATED HCO3 PLAS-SCNC: 22 MMOL/L (ref 22–29)
DEPRECATED HCO3 PLAS-SCNC: 28 MMOL/L (ref 22–29)
DEPRECATED HCO3 PLAS-SCNC: 32 MMOL/L (ref 22–29)
DEPRECATED HCO3 PLAS-SCNC: 33 MMOL/L (ref 22–29)
DEPRECATED HCO3 PLAS-SCNC: 34 MMOL/L (ref 22–29)
DEPRECATED HCO3 PLAS-SCNC: 35 MMOL/L (ref 22–29)
DEPRECATED HCO3 PLAS-SCNC: 35 MMOL/L (ref 22–29)
DEPRECATED HCO3 PLAS-SCNC: 36 MMOL/L (ref 22–29)
DEPRECATED HCO3 PLAS-SCNC: 37 MMOL/L (ref 22–29)
DEPRECATED HCO3 PLAS-SCNC: 38 MMOL/L (ref 22–29)
DEPRECATED HCO3 PLAS-SCNC: 40 MMOL/L (ref 22–29)
DEPRECATED HCO3 PLAS-SCNC: 40 MMOL/L (ref 22–29)
DIASTOLIC BLOOD PRESSURE - MUSE: NORMAL MMHG
DIASTOLIC BLOOD PRESSURE - MUSE: NORMAL MMHG
DLCOCOR-%PRED-PRE: 22 %
DLCOCOR-PRE: 6.61 ML/MIN/MMHG
DLCOUNC-%PRED-PRE: 21 %
DLCOUNC-%PRED-PRE: 25 %
DLCOUNC-PRE: 6.29 ML/MIN/MMHG
DLCOUNC-PRE: 7.31 ML/MIN/MMHG
DLCOUNC-PRED: 28.99 ML/MIN/MMHG
DLCOUNC-PRED: 28.99 ML/MIN/MMHG
EOSINOPHIL # BLD AUTO: 0 10E3/UL (ref 0–0.7)
EOSINOPHIL # BLD AUTO: 0.1 10E3/UL (ref 0–0.7)
EOSINOPHIL # BLD AUTO: 0.1 10E3/UL (ref 0–0.7)
EOSINOPHIL # BLD AUTO: 0.7 10E3/UL (ref 0–0.7)
EOSINOPHIL NFR BLD AUTO: 0 %
EOSINOPHIL NFR BLD AUTO: 1 %
EOSINOPHIL NFR BLD AUTO: 1 %
EOSINOPHIL NFR BLD AUTO: 8 %
ERV-%PRED-PRE: 49 %
ERV-%PRED-PRE: 50 %
ERV-PRE: 0.8 L
ERV-PRE: 0.82 L
ERV-PRED: 1.63 L
ERV-PRED: 1.64 L
ERYTHROCYTE [DISTWIDTH] IN BLOOD BY AUTOMATED COUNT: 13.9 % (ref 10–15)
ERYTHROCYTE [DISTWIDTH] IN BLOOD BY AUTOMATED COUNT: 14.2 % (ref 10–15)
ERYTHROCYTE [DISTWIDTH] IN BLOOD BY AUTOMATED COUNT: 14.3 % (ref 10–15)
ERYTHROCYTE [DISTWIDTH] IN BLOOD BY AUTOMATED COUNT: 14.4 % (ref 10–15)
ERYTHROCYTE [DISTWIDTH] IN BLOOD BY AUTOMATED COUNT: 14.5 % (ref 10–15)
ERYTHROCYTE [DISTWIDTH] IN BLOOD BY AUTOMATED COUNT: 14.9 % (ref 10–15)
ERYTHROCYTE [DISTWIDTH] IN BLOOD BY AUTOMATED COUNT: 15 % (ref 10–15)
ERYTHROCYTE [DISTWIDTH] IN BLOOD BY AUTOMATED COUNT: 15 % (ref 10–15)
ERYTHROCYTE [DISTWIDTH] IN BLOOD BY AUTOMATED COUNT: 15.1 % (ref 10–15)
ERYTHROCYTE [DISTWIDTH] IN BLOOD BY AUTOMATED COUNT: 15.1 % (ref 10–15)
ERYTHROCYTE [DISTWIDTH] IN BLOOD BY AUTOMATED COUNT: 15.4 % (ref 10–15)
ERYTHROCYTE [DISTWIDTH] IN BLOOD BY AUTOMATED COUNT: 15.5 % (ref 10–15)
ERYTHROCYTE [DISTWIDTH] IN BLOOD BY AUTOMATED COUNT: 15.7 % (ref 10–15)
ERYTHROCYTE [DISTWIDTH] IN BLOOD BY AUTOMATED COUNT: 15.9 % (ref 10–15)
EXPTIME-PRE: 13.34 SEC
EXPTIME-PRE: 9.84 SEC
FEF2575-%PRED-PRE: 11 %
FEF2575-%PRED-PRE: 8 %
FEF2575-PRE: 0.25 L/SEC
FEF2575-PRE: 0.33 L/SEC
FEF2575-PRED: 3.01 L/SEC
FEF2575-PRED: 3.01 L/SEC
FEFMAX-%PRED-PRE: 27 %
FEFMAX-%PRED-PRE: 33 %
FEFMAX-PRE: 2.67 L/SEC
FEFMAX-PRE: 3.18 L/SEC
FEFMAX-PRED: 9.53 L/SEC
FEFMAX-PRED: 9.53 L/SEC
FERRITIN SERPL-MCNC: 51 NG/ML (ref 31–409)
FEV1-%PRED-PRE: 24 %
FEV1-%PRED-PRE: 26 %
FEV1-PRE: 0.92 L
FEV1-PRE: 1 L
FEV1FEV6-PRE: 48 %
FEV1FEV6-PRE: 50 %
FEV1FEV6-PRED: 79 %
FEV1FEV6-PRED: 79 %
FEV1FVC-PRE: 40 %
FEV1FVC-PRE: 41 %
FEV1FVC-PRED: 77 %
FEV1FVC-PRED: 77 %
FEV1SVC-PRE: 38 %
FEV1SVC-PRE: 38 %
FEV1SVC-PRED: 71 %
FEV1SVC-PRED: 71 %
FIFMAX-PRE: 2.87 L/SEC
FIFMAX-PRE: 3.23 L/SEC
FLUAV RNA SPEC QL NAA+PROBE: NEGATIVE
FLUBV RNA RESP QL NAA+PROBE: NEGATIVE
FRCPLETH-%PRED-PRE: 133 %
FRCPLETH-PRE: 5.01 L
FRCPLETH-PRED: 3.75 L
FVC-%PRED-PRE: 47 %
FVC-%PRED-PRE: 49 %
FVC-PRE: 2.31 L
FVC-PRE: 2.42 L
FVC-PRED: 4.89 L
FVC-PRED: 4.89 L
GFR SERPL CREATININE-BSD FRML MDRD: 11 ML/MIN/1.73M2
GFR SERPL CREATININE-BSD FRML MDRD: 12 ML/MIN/1.73M2
GFR SERPL CREATININE-BSD FRML MDRD: 13 ML/MIN/1.73M2
GFR SERPL CREATININE-BSD FRML MDRD: 14 ML/MIN/1.73M2
GFR SERPL CREATININE-BSD FRML MDRD: 20 ML/MIN/1.73M2
GFR SERPL CREATININE-BSD FRML MDRD: 21 ML/MIN/1.73M2
GFR SERPL CREATININE-BSD FRML MDRD: 21 ML/MIN/1.73M2
GFR SERPL CREATININE-BSD FRML MDRD: 22 ML/MIN/1.73M2
GFR SERPL CREATININE-BSD FRML MDRD: 23 ML/MIN/1.73M2
GFR SERPL CREATININE-BSD FRML MDRD: 24 ML/MIN/1.73M2
GFR SERPL CREATININE-BSD FRML MDRD: 25 ML/MIN/1.73M2
GFR SERPL CREATININE-BSD FRML MDRD: 25 ML/MIN/1.73M2
GFR SERPL CREATININE-BSD FRML MDRD: 26 ML/MIN/1.73M2
GFR SERPL CREATININE-BSD FRML MDRD: 27 ML/MIN/1.73M2
GFR SERPL CREATININE-BSD FRML MDRD: 27 ML/MIN/1.73M2
GFR SERPL CREATININE-BSD FRML MDRD: 28 ML/MIN/1.73M2
GFR SERPL CREATININE-BSD FRML MDRD: 29 ML/MIN/1.73M2
GLUCOSE BLD-MCNC: 102 MG/DL (ref 70–99)
GLUCOSE BLD-MCNC: 104 MG/DL (ref 70–99)
GLUCOSE BLD-MCNC: 105 MG/DL (ref 70–99)
GLUCOSE BLD-MCNC: 116 MG/DL (ref 70–99)
GLUCOSE BLD-MCNC: 118 MG/DL (ref 70–99)
GLUCOSE BLD-MCNC: 128 MG/DL (ref 70–99)
GLUCOSE BLD-MCNC: 146 MG/DL (ref 70–99)
GLUCOSE BLD-MCNC: 176 MG/DL (ref 70–99)
GLUCOSE BLDC GLUCOMTR-MCNC: 105 MG/DL (ref 70–99)
GLUCOSE BLDC GLUCOMTR-MCNC: 110 MG/DL (ref 70–99)
GLUCOSE BLDC GLUCOMTR-MCNC: 112 MG/DL (ref 70–99)
GLUCOSE BLDC GLUCOMTR-MCNC: 126 MG/DL (ref 70–99)
GLUCOSE BLDC GLUCOMTR-MCNC: 129 MG/DL (ref 70–99)
GLUCOSE BLDC GLUCOMTR-MCNC: 130 MG/DL (ref 70–99)
GLUCOSE BLDC GLUCOMTR-MCNC: 148 MG/DL (ref 70–99)
GLUCOSE BLDC GLUCOMTR-MCNC: 48 MG/DL (ref 70–99)
GLUCOSE BLDC GLUCOMTR-MCNC: 65 MG/DL (ref 70–99)
GLUCOSE BLDC GLUCOMTR-MCNC: 69 MG/DL (ref 70–99)
GLUCOSE BLDC GLUCOMTR-MCNC: 73 MG/DL (ref 70–99)
GLUCOSE BLDC GLUCOMTR-MCNC: 76 MG/DL (ref 70–99)
GLUCOSE BLDC GLUCOMTR-MCNC: 89 MG/DL (ref 70–99)
GLUCOSE BLDC GLUCOMTR-MCNC: 91 MG/DL (ref 70–99)
GLUCOSE BLDC GLUCOMTR-MCNC: 98 MG/DL (ref 70–99)
GLUCOSE SERPL-MCNC: 101 MG/DL (ref 70–99)
GLUCOSE SERPL-MCNC: 103 MG/DL (ref 70–99)
GLUCOSE SERPL-MCNC: 105 MG/DL (ref 70–99)
GLUCOSE SERPL-MCNC: 105 MG/DL (ref 70–99)
GLUCOSE SERPL-MCNC: 106 MG/DL (ref 70–99)
GLUCOSE SERPL-MCNC: 107 MG/DL (ref 70–99)
GLUCOSE SERPL-MCNC: 113 MG/DL (ref 70–99)
GLUCOSE SERPL-MCNC: 114 MG/DL (ref 70–99)
GLUCOSE SERPL-MCNC: 116 MG/DL (ref 70–99)
GLUCOSE SERPL-MCNC: 120 MG/DL (ref 70–99)
GLUCOSE SERPL-MCNC: 122 MG/DL (ref 70–99)
GLUCOSE SERPL-MCNC: 123 MG/DL (ref 70–99)
GLUCOSE SERPL-MCNC: 124 MG/DL (ref 70–99)
GLUCOSE SERPL-MCNC: 128 MG/DL (ref 70–99)
GLUCOSE SERPL-MCNC: 137 MG/DL (ref 70–99)
GLUCOSE SERPL-MCNC: 157 MG/DL (ref 70–99)
GLUCOSE SERPL-MCNC: 160 MG/DL (ref 70–99)
GLUCOSE SERPL-MCNC: 165 MG/DL (ref 70–99)
GLUCOSE SERPL-MCNC: 169 MG/DL (ref 70–99)
GLUCOSE SERPL-MCNC: 189 MG/DL (ref 70–99)
GLUCOSE SERPL-MCNC: 57 MG/DL (ref 70–99)
GLUCOSE SERPL-MCNC: 57 MG/DL (ref 70–99)
GLUCOSE SERPL-MCNC: 82 MG/DL (ref 70–99)
GLUCOSE UR STRIP-MCNC: NEGATIVE MG/DL
HBA1C MFR BLD: 6.6 %
HBV SURFACE AB SERPL IA-ACNC: 1.04 M[IU]/ML
HBV SURFACE AB SERPL IA-ACNC: NONREACTIVE M[IU]/ML
HBV SURFACE AG SERPL QL IA: NONREACTIVE
HCO3 BLD-SCNC: 21 MMOL/L (ref 21–28)
HCO3 BLD-SCNC: 22 MMOL/L (ref 21–28)
HCO3 BLD-SCNC: 24 MMOL/L (ref 21–28)
HCO3 BLDV-SCNC: 23 MMOL/L (ref 21–28)
HCO3 BLDV-SCNC: 25 MMOL/L (ref 21–28)
HCO3 BLDV-SCNC: 26 MMOL/L (ref 21–28)
HCO3 BLDV-SCNC: 28 MMOL/L (ref 21–28)
HCO3 BLDV-SCNC: 40 MMOL/L (ref 21–28)
HCT VFR BLD AUTO: 33 % (ref 40–53)
HCT VFR BLD AUTO: 33.5 % (ref 40–53)
HCT VFR BLD AUTO: 34.8 % (ref 40–53)
HCT VFR BLD AUTO: 36.2 % (ref 40–53)
HCT VFR BLD AUTO: 36.9 % (ref 40–53)
HCT VFR BLD AUTO: 37 % (ref 40–53)
HCT VFR BLD AUTO: 37.8 % (ref 40–53)
HCT VFR BLD AUTO: 39.2 % (ref 40–53)
HCT VFR BLD AUTO: 39.5 % (ref 40–53)
HCT VFR BLD AUTO: 39.5 % (ref 40–53)
HCT VFR BLD AUTO: 40.6 % (ref 40–53)
HCT VFR BLD AUTO: 40.6 % (ref 40–53)
HCT VFR BLD AUTO: 40.9 % (ref 40–53)
HCT VFR BLD AUTO: 42.6 % (ref 40–53)
HGB BLD-MCNC: 10 G/DL (ref 13.3–17.7)
HGB BLD-MCNC: 10.3 G/DL (ref 13.3–17.7)
HGB BLD-MCNC: 10.5 G/DL (ref 13.3–17.7)
HGB BLD-MCNC: 11.4 G/DL (ref 13.3–17.7)
HGB BLD-MCNC: 11.4 G/DL (ref 13.3–17.7)
HGB BLD-MCNC: 11.9 G/DL (ref 13.3–17.7)
HGB BLD-MCNC: 11.9 G/DL (ref 13.3–17.7)
HGB BLD-MCNC: 12.2 G/DL (ref 13.3–17.7)
HGB BLD-MCNC: 12.4 G/DL (ref 13.3–17.7)
HGB BLD-MCNC: 12.4 G/DL (ref 13.3–17.7)
HGB BLD-MCNC: 12.5 G/DL (ref 13.3–17.7)
HGB BLD-MCNC: 12.5 G/DL (ref 13.3–17.7)
HGB BLD-MCNC: 12.6 G/DL (ref 13.3–17.7)
HGB BLD-MCNC: 13 G/DL (ref 13.3–17.7)
HGB BLD-MCNC: 13 G/DL (ref 13.3–17.7)
HGB BLD-MCNC: 13.4 G/DL (ref 13.3–17.7)
HGB UR QL STRIP: ABNORMAL
HOLD SPECIMEN: NORMAL
IC-%PRED-PRE: 44 %
IC-%PRED-PRE: 49 %
IC-PRE: 1.64 L
IC-PRE: 1.81 L
IC-PRED: 3.66 L
IC-PRED: 3.67 L
IMM GRANULOCYTES # BLD: 0 10E3/UL
IMM GRANULOCYTES # BLD: 0 10E3/UL
IMM GRANULOCYTES # BLD: 0.1 10E3/UL
IMM GRANULOCYTES NFR BLD: 0 %
IMM GRANULOCYTES NFR BLD: 1 %
INR PPP: 1 (ref 0.85–1.15)
INR PPP: 1.08 (ref 0.85–1.15)
INR PPP: 1.19 (ref 0.85–1.15)
INTERPRETATION ECG - MUSE: NORMAL
INTERPRETATION ECG - MUSE: NORMAL
IRON BINDING CAPACITY (ROCHE): 258 UG/DL (ref 240–430)
IRON SATN MFR SERPL: 10 % (ref 15–46)
IRON SERPL-MCNC: 26 UG/DL (ref 61–157)
KETONES UR STRIP-MCNC: NEGATIVE MG/DL
LACTATE BLD-SCNC: 0.6 MMOL/L
LACTATE SERPL-SCNC: 0.9 MMOL/L (ref 0.7–2)
LACTATE SERPL-SCNC: 1.4 MMOL/L (ref 0.7–2)
LEUKOCYTE ESTERASE UR QL STRIP: ABNORMAL
LYMPHOCYTES # BLD AUTO: 0.1 10E3/UL (ref 0.8–5.3)
LYMPHOCYTES # BLD AUTO: 0.3 10E3/UL (ref 0.8–5.3)
LYMPHOCYTES # BLD AUTO: 0.4 10E3/UL (ref 0.8–5.3)
LYMPHOCYTES # BLD AUTO: 0.5 10E3/UL (ref 0.8–5.3)
LYMPHOCYTES NFR BLD AUTO: 1 %
LYMPHOCYTES NFR BLD AUTO: 3 %
LYMPHOCYTES NFR BLD AUTO: 4 %
LYMPHOCYTES NFR BLD AUTO: 5 %
MAGNESIUM SERPL-MCNC: 2.1 MG/DL (ref 1.6–2.3)
MAGNESIUM SERPL-MCNC: 2.1 MG/DL (ref 1.6–2.3)
MAGNESIUM SERPL-MCNC: 2.2 MG/DL (ref 1.7–2.3)
MAGNESIUM SERPL-MCNC: 2.3 MG/DL (ref 1.7–2.3)
MAGNESIUM SERPL-MCNC: 2.4 MG/DL (ref 1.7–2.3)
MAGNESIUM SERPL-MCNC: 2.5 MG/DL (ref 1.7–2.3)
MAGNESIUM SERPL-MCNC: 2.6 MG/DL (ref 1.7–2.3)
MAGNESIUM SERPL-MCNC: 2.6 MG/DL (ref 1.7–2.3)
MAGNESIUM SERPL-MCNC: 2.9 MG/DL (ref 1.7–2.3)
MAGNESIUM SERPL-MCNC: 3 MG/DL (ref 1.7–2.3)
MCH RBC QN AUTO: 27.2 PG (ref 26.5–33)
MCH RBC QN AUTO: 27.6 PG (ref 26.5–33)
MCH RBC QN AUTO: 27.8 PG (ref 26.5–33)
MCH RBC QN AUTO: 27.8 PG (ref 26.5–33)
MCH RBC QN AUTO: 27.9 PG (ref 26.5–33)
MCH RBC QN AUTO: 29.2 PG (ref 26.5–33)
MCH RBC QN AUTO: 29.2 PG (ref 26.5–33)
MCH RBC QN AUTO: 29.5 PG (ref 26.5–33)
MCH RBC QN AUTO: 29.7 PG (ref 26.5–33)
MCH RBC QN AUTO: 29.9 PG (ref 26.5–33)
MCH RBC QN AUTO: 30.3 PG (ref 26.5–33)
MCH RBC QN AUTO: 30.3 PG (ref 26.5–33)
MCH RBC QN AUTO: 30.4 PG (ref 26.5–33)
MCH RBC QN AUTO: 30.8 PG (ref 26.5–33)
MCHC RBC AUTO-ENTMCNC: 30.2 G/DL (ref 31.5–36.5)
MCHC RBC AUTO-ENTMCNC: 30.3 G/DL (ref 31.5–36.5)
MCHC RBC AUTO-ENTMCNC: 30.4 G/DL (ref 31.5–36.5)
MCHC RBC AUTO-ENTMCNC: 30.7 G/DL (ref 31.5–36.5)
MCHC RBC AUTO-ENTMCNC: 30.8 G/DL (ref 31.5–36.5)
MCHC RBC AUTO-ENTMCNC: 31 G/DL (ref 31.5–36.5)
MCHC RBC AUTO-ENTMCNC: 31.5 G/DL (ref 31.5–36.5)
MCHC RBC AUTO-ENTMCNC: 31.6 G/DL (ref 31.5–36.5)
MCHC RBC AUTO-ENTMCNC: 31.6 G/DL (ref 31.5–36.5)
MCHC RBC AUTO-ENTMCNC: 31.8 G/DL (ref 31.5–36.5)
MCHC RBC AUTO-ENTMCNC: 32 G/DL (ref 31.5–36.5)
MCHC RBC AUTO-ENTMCNC: 33.1 G/DL (ref 31.5–36.5)
MCV RBC AUTO: 90 FL (ref 78–100)
MCV RBC AUTO: 91 FL (ref 78–100)
MCV RBC AUTO: 91 FL (ref 78–100)
MCV RBC AUTO: 92 FL (ref 78–100)
MCV RBC AUTO: 93 FL (ref 78–100)
MCV RBC AUTO: 94 FL (ref 78–100)
MCV RBC AUTO: 95 FL (ref 78–100)
MCV RBC AUTO: 95 FL (ref 78–100)
MCV RBC AUTO: 96 FL (ref 78–100)
MCV RBC AUTO: 97 FL (ref 78–100)
MDC_IDC_EPISODE_DTM: NORMAL
MDC_IDC_EPISODE_DURATION: 0 S
MDC_IDC_EPISODE_DURATION: 1 S
MDC_IDC_EPISODE_DURATION: 1111 S
MDC_IDC_EPISODE_DURATION: 124 S
MDC_IDC_EPISODE_DURATION: 1361 S
MDC_IDC_EPISODE_DURATION: 154 S
MDC_IDC_EPISODE_DURATION: 1544 S
MDC_IDC_EPISODE_DURATION: 181 S
MDC_IDC_EPISODE_DURATION: 182 S
MDC_IDC_EPISODE_DURATION: 183 S
MDC_IDC_EPISODE_DURATION: 196 S
MDC_IDC_EPISODE_DURATION: 2 S
MDC_IDC_EPISODE_DURATION: 207 S
MDC_IDC_EPISODE_DURATION: 208 S
MDC_IDC_EPISODE_DURATION: 225 S
MDC_IDC_EPISODE_DURATION: 229 S
MDC_IDC_EPISODE_DURATION: 235 S
MDC_IDC_EPISODE_DURATION: 278 S
MDC_IDC_EPISODE_DURATION: 3 S
MDC_IDC_EPISODE_DURATION: 3075 S
MDC_IDC_EPISODE_DURATION: 308 S
MDC_IDC_EPISODE_DURATION: 309 S
MDC_IDC_EPISODE_DURATION: 34 S
MDC_IDC_EPISODE_DURATION: 35 S
MDC_IDC_EPISODE_DURATION: 354 S
MDC_IDC_EPISODE_DURATION: 397 S
MDC_IDC_EPISODE_DURATION: 408 S
MDC_IDC_EPISODE_DURATION: 43 S
MDC_IDC_EPISODE_DURATION: 46 S
MDC_IDC_EPISODE_DURATION: 478 S
MDC_IDC_EPISODE_DURATION: 67 S
MDC_IDC_EPISODE_DURATION: 699 S
MDC_IDC_EPISODE_DURATION: 86 S
MDC_IDC_EPISODE_DURATION: 87 S
MDC_IDC_EPISODE_DURATION: NORMAL S
MDC_IDC_EPISODE_ID: 10
MDC_IDC_EPISODE_ID: 11
MDC_IDC_EPISODE_ID: 12
MDC_IDC_EPISODE_ID: 13
MDC_IDC_EPISODE_ID: 14
MDC_IDC_EPISODE_ID: 15
MDC_IDC_EPISODE_ID: 16
MDC_IDC_EPISODE_ID: 17
MDC_IDC_EPISODE_ID: 18
MDC_IDC_EPISODE_ID: 19
MDC_IDC_EPISODE_ID: 20
MDC_IDC_EPISODE_ID: 21
MDC_IDC_EPISODE_ID: 22
MDC_IDC_EPISODE_ID: 23
MDC_IDC_EPISODE_ID: 24
MDC_IDC_EPISODE_ID: 25
MDC_IDC_EPISODE_ID: 26
MDC_IDC_EPISODE_ID: 27
MDC_IDC_EPISODE_ID: 28
MDC_IDC_EPISODE_ID: 29
MDC_IDC_EPISODE_ID: 30
MDC_IDC_EPISODE_ID: 31
MDC_IDC_EPISODE_ID: 32
MDC_IDC_EPISODE_ID: 33
MDC_IDC_EPISODE_ID: 34
MDC_IDC_EPISODE_ID: 35
MDC_IDC_EPISODE_ID: 36
MDC_IDC_EPISODE_ID: 37
MDC_IDC_EPISODE_ID: 38
MDC_IDC_EPISODE_ID: 39
MDC_IDC_EPISODE_ID: 40
MDC_IDC_EPISODE_ID: 45
MDC_IDC_EPISODE_ID: 46
MDC_IDC_EPISODE_ID: 47
MDC_IDC_EPISODE_ID: 48
MDC_IDC_EPISODE_ID: 49
MDC_IDC_EPISODE_ID: 5
MDC_IDC_EPISODE_ID: 50
MDC_IDC_EPISODE_ID: 51
MDC_IDC_EPISODE_ID: 52
MDC_IDC_EPISODE_ID: 54
MDC_IDC_EPISODE_ID: 55
MDC_IDC_EPISODE_ID: 6
MDC_IDC_EPISODE_ID: 7
MDC_IDC_EPISODE_ID: 8
MDC_IDC_EPISODE_ID: 9
MDC_IDC_EPISODE_TYPE: NORMAL
MDC_IDC_LEAD_IMPLANT_DT: NORMAL
MDC_IDC_LEAD_LOCATION: NORMAL
MDC_IDC_LEAD_LOCATION_DETAIL_1: NORMAL
MDC_IDC_LEAD_MFG: NORMAL
MDC_IDC_LEAD_MODEL: NORMAL
MDC_IDC_LEAD_POLARITY_TYPE: NORMAL
MDC_IDC_LEAD_SERIAL: NORMAL
MDC_IDC_LEAD_SPECIAL_FUNCTION: NORMAL
MDC_IDC_MSMT_BATTERY_DTM: NORMAL
MDC_IDC_MSMT_BATTERY_REMAINING_LONGEVITY: 140 MO
MDC_IDC_MSMT_BATTERY_REMAINING_LONGEVITY: 140 MO
MDC_IDC_MSMT_BATTERY_REMAINING_LONGEVITY: 141 MO
MDC_IDC_MSMT_BATTERY_REMAINING_LONGEVITY: 143 MO
MDC_IDC_MSMT_BATTERY_RRT_TRIGGER: 2.62
MDC_IDC_MSMT_BATTERY_STATUS: NORMAL
MDC_IDC_MSMT_BATTERY_VOLTAGE: 3.04 V
MDC_IDC_MSMT_BATTERY_VOLTAGE: 3.06 V
MDC_IDC_MSMT_BATTERY_VOLTAGE: 3.11 V
MDC_IDC_MSMT_BATTERY_VOLTAGE: 3.12 V
MDC_IDC_MSMT_LEADCHNL_RA_IMPEDANCE_VALUE: 266 OHM
MDC_IDC_MSMT_LEADCHNL_RA_IMPEDANCE_VALUE: 285 OHM
MDC_IDC_MSMT_LEADCHNL_RA_IMPEDANCE_VALUE: 323 OHM
MDC_IDC_MSMT_LEADCHNL_RA_IMPEDANCE_VALUE: 437 OHM
MDC_IDC_MSMT_LEADCHNL_RA_IMPEDANCE_VALUE: 494 OHM
MDC_IDC_MSMT_LEADCHNL_RA_IMPEDANCE_VALUE: 532 OHM
MDC_IDC_MSMT_LEADCHNL_RA_IMPEDANCE_VALUE: 551 OHM
MDC_IDC_MSMT_LEADCHNL_RA_IMPEDANCE_VALUE: 570 OHM
MDC_IDC_MSMT_LEADCHNL_RA_IMPEDANCE_VALUE: 608 OHM
MDC_IDC_MSMT_LEADCHNL_RA_PACING_THRESHOLD_AMPLITUDE: 0.75 V
MDC_IDC_MSMT_LEADCHNL_RA_PACING_THRESHOLD_AMPLITUDE: 1 V
MDC_IDC_MSMT_LEADCHNL_RA_PACING_THRESHOLD_AMPLITUDE: 1 V
MDC_IDC_MSMT_LEADCHNL_RA_PACING_THRESHOLD_PULSEWIDTH: 0.4 MS
MDC_IDC_MSMT_LEADCHNL_RA_SENSING_INTR_AMPL: 1.38 MV
MDC_IDC_MSMT_LEADCHNL_RA_SENSING_INTR_AMPL: 1.38 MV
MDC_IDC_MSMT_LEADCHNL_RA_SENSING_INTR_AMPL: 2.25 MV
MDC_IDC_MSMT_LEADCHNL_RA_SENSING_INTR_AMPL: 2.25 MV
MDC_IDC_MSMT_LEADCHNL_RA_SENSING_INTR_AMPL: 2.8 MV
MDC_IDC_MSMT_LEADCHNL_RA_SENSING_INTR_AMPL: 3.4 MV
MDC_IDC_MSMT_LEADCHNL_RA_SENSING_INTR_AMPL: 3.4 MV
MDC_IDC_MSMT_LEADCHNL_RA_SENSING_INTR_AMPL: 3.5 MV
MDC_IDC_MSMT_LEADCHNL_RV_IMPEDANCE_VALUE: 361 OHM
MDC_IDC_MSMT_LEADCHNL_RV_IMPEDANCE_VALUE: 399 OHM
MDC_IDC_MSMT_LEADCHNL_RV_IMPEDANCE_VALUE: 437 OHM
MDC_IDC_MSMT_LEADCHNL_RV_IMPEDANCE_VALUE: 456 OHM
MDC_IDC_MSMT_LEADCHNL_RV_IMPEDANCE_VALUE: 494 OHM
MDC_IDC_MSMT_LEADCHNL_RV_IMPEDANCE_VALUE: 513 OHM
MDC_IDC_MSMT_LEADCHNL_RV_IMPEDANCE_VALUE: 532 OHM
MDC_IDC_MSMT_LEADCHNL_RV_PACING_THRESHOLD_AMPLITUDE: 0.38 V
MDC_IDC_MSMT_LEADCHNL_RV_PACING_THRESHOLD_AMPLITUDE: 0.5 V
MDC_IDC_MSMT_LEADCHNL_RV_PACING_THRESHOLD_PULSEWIDTH: 0.4 MS
MDC_IDC_MSMT_LEADCHNL_RV_SENSING_INTR_AMPL: 12.9 MV
MDC_IDC_MSMT_LEADCHNL_RV_SENSING_INTR_AMPL: 13.88 MV
MDC_IDC_MSMT_LEADCHNL_RV_SENSING_INTR_AMPL: 13.88 MV
MDC_IDC_MSMT_LEADCHNL_RV_SENSING_INTR_AMPL: 5.5 MV
MDC_IDC_MSMT_LEADCHNL_RV_SENSING_INTR_AMPL: 5.5 MV
MDC_IDC_MSMT_LEADCHNL_RV_SENSING_INTR_AMPL: 7.1 MV
MDC_IDC_MSMT_LEADCHNL_RV_SENSING_INTR_AMPL: 9.3 MV
MDC_IDC_PG_IMPLANT_DTM: NORMAL
MDC_IDC_PG_MFG: NORMAL
MDC_IDC_PG_MODEL: NORMAL
MDC_IDC_PG_SERIAL: NORMAL
MDC_IDC_PG_TYPE: NORMAL
MDC_IDC_SESS_CLINIC_NAME: NORMAL
MDC_IDC_SESS_DTM: NORMAL
MDC_IDC_SESS_TYPE: NORMAL
MDC_IDC_SET_BRADY_AT_MODE_SWITCH_RATE: 171 {BEATS}/MIN
MDC_IDC_SET_BRADY_HYSTRATE: NORMAL
MDC_IDC_SET_BRADY_LOWRATE: 60 {BEATS}/MIN
MDC_IDC_SET_BRADY_LOWRATE: 80 {BEATS}/MIN
MDC_IDC_SET_BRADY_MAX_SENSOR_RATE: 130 {BEATS}/MIN
MDC_IDC_SET_BRADY_MAX_TRACKING_RATE: 130 {BEATS}/MIN
MDC_IDC_SET_BRADY_MODE: NORMAL
MDC_IDC_SET_BRADY_PAV_DELAY_LOW: 180 MS
MDC_IDC_SET_BRADY_SAV_DELAY_LOW: 150 MS
MDC_IDC_SET_LEADCHNL_RA_PACING_AMPLITUDE: 1.5 V
MDC_IDC_SET_LEADCHNL_RA_PACING_AMPLITUDE: 1.75 V
MDC_IDC_SET_LEADCHNL_RA_PACING_AMPLITUDE: 1.75 V
MDC_IDC_SET_LEADCHNL_RA_PACING_AMPLITUDE: 2 V
MDC_IDC_SET_LEADCHNL_RA_PACING_ANODE_ELECTRODE_1: NORMAL
MDC_IDC_SET_LEADCHNL_RA_PACING_ANODE_LOCATION_1: NORMAL
MDC_IDC_SET_LEADCHNL_RA_PACING_CAPTURE_MODE: NORMAL
MDC_IDC_SET_LEADCHNL_RA_PACING_CATHODE_ELECTRODE_1: NORMAL
MDC_IDC_SET_LEADCHNL_RA_PACING_CATHODE_LOCATION_1: NORMAL
MDC_IDC_SET_LEADCHNL_RA_PACING_POLARITY: NORMAL
MDC_IDC_SET_LEADCHNL_RA_PACING_PULSEWIDTH: 0.4 MS
MDC_IDC_SET_LEADCHNL_RA_SENSING_ANODE_ELECTRODE_1: NORMAL
MDC_IDC_SET_LEADCHNL_RA_SENSING_ANODE_LOCATION_1: NORMAL
MDC_IDC_SET_LEADCHNL_RA_SENSING_CATHODE_ELECTRODE_1: NORMAL
MDC_IDC_SET_LEADCHNL_RA_SENSING_CATHODE_LOCATION_1: NORMAL
MDC_IDC_SET_LEADCHNL_RA_SENSING_POLARITY: NORMAL
MDC_IDC_SET_LEADCHNL_RA_SENSING_SENSITIVITY: 0.3 MV
MDC_IDC_SET_LEADCHNL_RV_PACING_AMPLITUDE: 2 V
MDC_IDC_SET_LEADCHNL_RV_PACING_ANODE_ELECTRODE_1: NORMAL
MDC_IDC_SET_LEADCHNL_RV_PACING_ANODE_LOCATION_1: NORMAL
MDC_IDC_SET_LEADCHNL_RV_PACING_CAPTURE_MODE: NORMAL
MDC_IDC_SET_LEADCHNL_RV_PACING_CATHODE_ELECTRODE_1: NORMAL
MDC_IDC_SET_LEADCHNL_RV_PACING_CATHODE_LOCATION_1: NORMAL
MDC_IDC_SET_LEADCHNL_RV_PACING_POLARITY: NORMAL
MDC_IDC_SET_LEADCHNL_RV_PACING_PULSEWIDTH: 0.4 MS
MDC_IDC_SET_LEADCHNL_RV_SENSING_ANODE_ELECTRODE_1: NORMAL
MDC_IDC_SET_LEADCHNL_RV_SENSING_ANODE_LOCATION_1: NORMAL
MDC_IDC_SET_LEADCHNL_RV_SENSING_CATHODE_ELECTRODE_1: NORMAL
MDC_IDC_SET_LEADCHNL_RV_SENSING_CATHODE_LOCATION_1: NORMAL
MDC_IDC_SET_LEADCHNL_RV_SENSING_POLARITY: NORMAL
MDC_IDC_SET_LEADCHNL_RV_SENSING_SENSITIVITY: 0.9 MV
MDC_IDC_SET_ZONE_DETECTION_INTERVAL: 350 MS
MDC_IDC_SET_ZONE_DETECTION_INTERVAL: 400 MS
MDC_IDC_SET_ZONE_TYPE: NORMAL
MDC_IDC_STAT_AT_BURDEN_PERCENT: 0.1 %
MDC_IDC_STAT_AT_BURDEN_PERCENT: 100 %
MDC_IDC_STAT_AT_BURDEN_PERCENT: 100 %
MDC_IDC_STAT_AT_BURDEN_PERCENT: 92.1 %
MDC_IDC_STAT_AT_DTM_END: NORMAL
MDC_IDC_STAT_AT_DTM_START: NORMAL
MDC_IDC_STAT_BRADY_AP_VP_PERCENT: 13.23 %
MDC_IDC_STAT_BRADY_AP_VP_PERCENT: 13.33 %
MDC_IDC_STAT_BRADY_AP_VS_PERCENT: 0.01 %
MDC_IDC_STAT_BRADY_AP_VS_PERCENT: 0.02 %
MDC_IDC_STAT_BRADY_AS_VP_PERCENT: 84.67 %
MDC_IDC_STAT_BRADY_AS_VP_PERCENT: 85.44 %
MDC_IDC_STAT_BRADY_AS_VP_PERCENT: 85.45 %
MDC_IDC_STAT_BRADY_AS_VP_PERCENT: 85.45 %
MDC_IDC_STAT_BRADY_AS_VS_PERCENT: 0.94 %
MDC_IDC_STAT_BRADY_AS_VS_PERCENT: 1.3 %
MDC_IDC_STAT_BRADY_AS_VS_PERCENT: 1.3 %
MDC_IDC_STAT_BRADY_AS_VS_PERCENT: 1.31 %
MDC_IDC_STAT_BRADY_DTM_END: NORMAL
MDC_IDC_STAT_BRADY_DTM_START: NORMAL
MDC_IDC_STAT_BRADY_RA_PERCENT_PACED: 0 %
MDC_IDC_STAT_BRADY_RA_PERCENT_PACED: 0.04 %
MDC_IDC_STAT_BRADY_RA_PERCENT_PACED: 1.09 %
MDC_IDC_STAT_BRADY_RA_PERCENT_PACED: 13.58 %
MDC_IDC_STAT_BRADY_RA_PERCENT_PACED: 13.58 %
MDC_IDC_STAT_BRADY_RA_PERCENT_PACED: 13.59 %
MDC_IDC_STAT_BRADY_RV_PERCENT_PACED: 93.43 %
MDC_IDC_STAT_BRADY_RV_PERCENT_PACED: 98.67 %
MDC_IDC_STAT_BRADY_RV_PERCENT_PACED: 98.68 %
MDC_IDC_STAT_BRADY_RV_PERCENT_PACED: 98.68 %
MDC_IDC_STAT_BRADY_RV_PERCENT_PACED: 98.97 %
MDC_IDC_STAT_BRADY_RV_PERCENT_PACED: 99.29 %
MDC_IDC_STAT_EPISODE_RECENT_COUNT: 0
MDC_IDC_STAT_EPISODE_RECENT_COUNT: 1
MDC_IDC_STAT_EPISODE_RECENT_COUNT: 1
MDC_IDC_STAT_EPISODE_RECENT_COUNT: 10
MDC_IDC_STAT_EPISODE_RECENT_COUNT: 12
MDC_IDC_STAT_EPISODE_RECENT_COUNT: 2
MDC_IDC_STAT_EPISODE_RECENT_COUNT: 25
MDC_IDC_STAT_EPISODE_RECENT_COUNT_DTM_END: NORMAL
MDC_IDC_STAT_EPISODE_RECENT_COUNT_DTM_START: NORMAL
MDC_IDC_STAT_EPISODE_TOTAL_COUNT: 0
MDC_IDC_STAT_EPISODE_TOTAL_COUNT: 12
MDC_IDC_STAT_EPISODE_TOTAL_COUNT: 13
MDC_IDC_STAT_EPISODE_TOTAL_COUNT: 27
MDC_IDC_STAT_EPISODE_TOTAL_COUNT: 31
MDC_IDC_STAT_EPISODE_TOTAL_COUNT: 39
MDC_IDC_STAT_EPISODE_TOTAL_COUNT: 42
MDC_IDC_STAT_EPISODE_TOTAL_COUNT_DTM_END: NORMAL
MDC_IDC_STAT_EPISODE_TOTAL_COUNT_DTM_START: NORMAL
MDC_IDC_STAT_EPISODE_TYPE: NORMAL
MICROALBUMIN UR-MCNC: 68 MG/L
MICROALBUMIN/CREAT UR: 283.33 MG/G CR (ref 0–17)
MONOCYTES # BLD AUTO: 0.4 10E3/UL (ref 0–1.3)
MONOCYTES # BLD AUTO: 0.5 10E3/UL (ref 0–1.3)
MONOCYTES # BLD AUTO: 0.6 10E3/UL (ref 0–1.3)
MONOCYTES # BLD AUTO: 2.4 10E3/UL (ref 0–1.3)
MONOCYTES NFR BLD AUTO: 13 %
MONOCYTES NFR BLD AUTO: 5 %
MONOCYTES NFR BLD AUTO: 6 %
MONOCYTES NFR BLD AUTO: 7 %
MRSA DNA SPEC QL NAA+PROBE: NEGATIVE
MUCOUS THREADS #/AREA URNS LPF: PRESENT /LPF
MUCOUS THREADS #/AREA URNS LPF: PRESENT /LPF
NEUTROPHILS # BLD AUTO: 15.2 10E3/UL (ref 1.6–8.3)
NEUTROPHILS # BLD AUTO: 7 10E3/UL (ref 1.6–8.3)
NEUTROPHILS # BLD AUTO: 7.5 10E3/UL (ref 1.6–8.3)
NEUTROPHILS # BLD AUTO: 7.7 10E3/UL (ref 1.6–8.3)
NEUTROPHILS # BLD AUTO: 8.1 10E3/UL (ref 1.6–8.3)
NEUTROPHILS # BLD AUTO: 9.1 10E3/UL (ref 1.6–8.3)
NEUTROPHILS NFR BLD AUTO: 81 %
NEUTROPHILS NFR BLD AUTO: 85 %
NEUTROPHILS NFR BLD AUTO: 89 %
NEUTROPHILS NFR BLD AUTO: 90 %
NEUTROPHILS NFR BLD AUTO: 90 %
NEUTROPHILS NFR BLD AUTO: 91 %
NITRATE UR QL: NEGATIVE
NRBC # BLD AUTO: 0 10E3/UL
NRBC BLD AUTO-RTO: 0 /100
NT-PROBNP SERPL-MCNC: 6378 PG/ML (ref 0–900)
NT-PROBNP SERPL-MCNC: 6893 PG/ML (ref 0–900)
NT-PROBNP SERPL-MCNC: 7187 PG/ML (ref 0–125)
NT-PROBNP SERPL-MCNC: 8782 PG/ML (ref 0–125)
NT-PROBNP SERPL-MCNC: ABNORMAL PG/ML (ref 0–900)
O2/TOTAL GAS SETTING VFR VENT: 40 %
O2/TOTAL GAS SETTING VFR VENT: 50 %
O2/TOTAL GAS SETTING VFR VENT: 60 %
O2/TOTAL GAS SETTING VFR VENT: 7 %
O2/TOTAL GAS SETTING VFR VENT: 94 %
O2/TOTAL GAS SETTING VFR VENT: 96 %
OXYHGB MFR BLD: 92 % (ref 92–100)
OXYHGB MFR BLD: 93 % (ref 92–100)
OXYHGB MFR BLD: 95 % (ref 92–100)
OXYHGB MFR BLDA: 62 % (ref 92–100)
OXYHGB MFR BLDV: 52 % (ref 70–75)
OXYHGB MFR BLDV: 71 % (ref 70–75)
OXYHGB MFR BLDV: 78 % (ref 70–75)
OXYHGB MFR BLDV: 87 % (ref 70–75)
OXYHGB MFR BLDV: 96 % (ref 92–100)
P AXIS - MUSE: 264 DEGREES
P AXIS - MUSE: NORMAL DEGREES
PCO2 BLD: 52 MM HG (ref 35–45)
PCO2 BLD: 59 MM HG (ref 35–45)
PCO2 BLD: 62 MM HG (ref 35–45)
PCO2 BLDV: 56 MM HG (ref 40–50)
PCO2 BLDV: 59 MM HG (ref 40–50)
PCO2 BLDV: 63 MM HG (ref 40–50)
PCO2 BLDV: 63 MM HG (ref 40–50)
PCO2 BLDV: 65 MM HG (ref 40–50)
PCO2 BLDV: 70 MM HG (ref 40–50)
PCO2 BLDV: 72 MM HG (ref 40–50)
PH BLD: 7.18 [PH] (ref 7.35–7.45)
PH BLD: 7.19 [PH] (ref 7.35–7.45)
PH BLD: 7.21 [PH] (ref 7.35–7.45)
PH BLDV: 7.15 [PH] (ref 7.32–7.43)
PH BLDV: 7.16 [PH] (ref 7.32–7.43)
PH BLDV: 7.17 [PH] (ref 7.32–7.43)
PH BLDV: 7.31 [PH] (ref 7.32–7.43)
PH BLDV: 7.43 [PH] (ref 7.32–7.43)
PH UR STRIP: 5 [PH] (ref 5–7)
PH UR STRIP: 5 [PH] (ref 5–7)
PH UR STRIP: 5.5 [PH] (ref 5–7)
PH UR STRIP: 6 [PH] (ref 5–7)
PH UR STRIP: 6.5 [PH] (ref 5–7)
PHOSPHATE SERPL-MCNC: 3.2 MG/DL (ref 2.5–4.5)
PHOSPHATE SERPL-MCNC: 3.4 MG/DL (ref 2.5–4.5)
PHOSPHATE SERPL-MCNC: 3.6 MG/DL (ref 2.5–4.5)
PHOSPHATE SERPL-MCNC: 3.6 MG/DL (ref 2.5–4.5)
PHOSPHATE SERPL-MCNC: 4.1 MG/DL (ref 2.5–4.5)
PHOSPHATE SERPL-MCNC: 4.4 MG/DL (ref 2.5–4.5)
PHOSPHATE SERPL-MCNC: 6.2 MG/DL (ref 2.5–4.5)
PLATELET # BLD AUTO: 130 10E3/UL (ref 150–450)
PLATELET # BLD AUTO: 131 10E3/UL (ref 150–450)
PLATELET # BLD AUTO: 150 10E3/UL (ref 150–450)
PLATELET # BLD AUTO: 158 10E3/UL (ref 150–450)
PLATELET # BLD AUTO: 158 10E3/UL (ref 150–450)
PLATELET # BLD AUTO: 161 10E3/UL (ref 150–450)
PLATELET # BLD AUTO: 169 10E3/UL (ref 150–450)
PLATELET # BLD AUTO: 171 10E3/UL (ref 150–450)
PLATELET # BLD AUTO: 171 10E3/UL (ref 150–450)
PLATELET # BLD AUTO: 179 10E3/UL (ref 150–450)
PLATELET # BLD AUTO: 190 10E3/UL (ref 150–450)
PLATELET # BLD AUTO: 193 10E3/UL (ref 150–450)
PLATELET # BLD AUTO: 197 10E3/UL (ref 150–450)
PLATELET # BLD AUTO: 222 10E3/UL (ref 150–450)
PO2 BLD: 75 MM HG (ref 80–105)
PO2 BLD: 82 MM HG (ref 80–105)
PO2 BLD: 85 MM HG (ref 80–105)
PO2 BLDV: 27 MM HG (ref 25–47)
PO2 BLDV: 29 MM HG (ref 25–47)
PO2 BLDV: 32 MM HG (ref 25–47)
PO2 BLDV: 39 MM HG (ref 25–47)
PO2 BLDV: 45 MM HG (ref 25–47)
PO2 BLDV: 46 MM HG (ref 25–47)
PO2 BLDV: 63 MM HG (ref 25–47)
POTASSIUM BLD-SCNC: 3.1 MMOL/L (ref 3.4–5.3)
POTASSIUM BLD-SCNC: 3.2 MMOL/L (ref 3.4–5.3)
POTASSIUM BLD-SCNC: 3.6 MMOL/L (ref 3.4–5.3)
POTASSIUM BLD-SCNC: 4 MMOL/L (ref 3.4–5.3)
POTASSIUM BLD-SCNC: 4 MMOL/L (ref 3.4–5.3)
POTASSIUM BLD-SCNC: 4.1 MMOL/L (ref 3.4–5.3)
POTASSIUM BLD-SCNC: 4.1 MMOL/L (ref 3.4–5.3)
POTASSIUM BLD-SCNC: 4.2 MMOL/L (ref 3.4–5.3)
POTASSIUM BLD-SCNC: 4.8 MMOL/L (ref 3.4–5.3)
POTASSIUM SERPL-SCNC: 3 MMOL/L (ref 3.4–4.5)
POTASSIUM SERPL-SCNC: 3 MMOL/L (ref 3.4–4.5)
POTASSIUM SERPL-SCNC: 3.1 MMOL/L (ref 3.4–4.5)
POTASSIUM SERPL-SCNC: 3.1 MMOL/L (ref 3.4–4.5)
POTASSIUM SERPL-SCNC: 3.1 MMOL/L (ref 3.4–5.3)
POTASSIUM SERPL-SCNC: 3.2 MMOL/L (ref 3.4–4.5)
POTASSIUM SERPL-SCNC: 3.6 MMOL/L (ref 3.4–5.3)
POTASSIUM SERPL-SCNC: 3.6 MMOL/L (ref 3.4–5.3)
POTASSIUM SERPL-SCNC: 3.7 MMOL/L (ref 3.4–5.3)
POTASSIUM SERPL-SCNC: 3.7 MMOL/L (ref 3.4–5.3)
POTASSIUM SERPL-SCNC: 3.8 MMOL/L (ref 3.4–5.3)
POTASSIUM SERPL-SCNC: 3.9 MMOL/L (ref 3.4–4.5)
POTASSIUM SERPL-SCNC: 4 MMOL/L (ref 3.4–5.3)
POTASSIUM SERPL-SCNC: 4.1 MMOL/L (ref 3.4–5.3)
POTASSIUM SERPL-SCNC: 4.2 MMOL/L (ref 3.4–5.3)
POTASSIUM SERPL-SCNC: 4.2 MMOL/L (ref 3.4–5.3)
POTASSIUM SERPL-SCNC: 4.3 MMOL/L (ref 3.4–5.3)
POTASSIUM SERPL-SCNC: 4.3 MMOL/L (ref 3.4–5.3)
POTASSIUM SERPL-SCNC: 4.5 MMOL/L (ref 3.4–5.3)
POTASSIUM SERPL-SCNC: 4.7 MMOL/L (ref 3.4–5.3)
POTASSIUM SERPL-SCNC: 4.9 MMOL/L (ref 3.4–5.3)
POTASSIUM SERPL-SCNC: 5.3 MMOL/L (ref 3.4–5.3)
POTASSIUM SERPL-SCNC: 5.3 MMOL/L (ref 3.4–5.3)
POTASSIUM SERPL-SCNC: 5.8 MMOL/L (ref 3.4–5.3)
POTASSIUM SERPL-SCNC: 6.1 MMOL/L (ref 3.4–5.3)
POTASSIUM SERPL-SCNC: 6.1 MMOL/L (ref 3.4–5.3)
POTASSIUM SERPL-SCNC: 6.2 MMOL/L (ref 3.4–5.3)
POTASSIUM SERPL-SCNC: 6.3 MMOL/L (ref 3.4–5.3)
POTASSIUM SERPL-SCNC: 6.4 MMOL/L (ref 3.4–5.3)
POTASSIUM SERPL-SCNC: 6.7 MMOL/L (ref 3.4–5.3)
POTASSIUM SERPL-SCNC: 6.9 MMOL/L (ref 3.4–5.3)
PR INTERVAL - MUSE: NORMAL MS
PR INTERVAL - MUSE: NORMAL MS
PROCALCITONIN SERPL IA-MCNC: 0.18 NG/ML
PROCALCITONIN SERPL-MCNC: 0.22 NG/ML
PROT SERPL-MCNC: 5.7 G/DL (ref 6.8–8.8)
PROT SERPL-MCNC: 5.7 G/DL (ref 6.8–8.8)
PROT SERPL-MCNC: 6.1 G/DL (ref 6.4–8.3)
PROT SERPL-MCNC: 6.3 G/DL (ref 6.4–8.3)
PROT SERPL-MCNC: 6.4 G/DL (ref 6.4–8.3)
PROT SERPL-MCNC: 6.5 G/DL (ref 6.4–8.3)
PROT SERPL-MCNC: 6.5 G/DL (ref 6.4–8.3)
PROT SERPL-MCNC: 6.6 G/DL (ref 6.4–8.3)
PROT SERPL-MCNC: 6.7 G/DL (ref 6.4–8.3)
PROT SERPL-MCNC: 6.8 G/DL (ref 6.4–8.3)
PROT SERPL-MCNC: 6.9 G/DL (ref 6.4–8.3)
PROT SERPL-MCNC: 7.1 G/DL (ref 6.4–8.3)
PROT SERPL-MCNC: 7.2 G/DL (ref 6.4–8.3)
PROT UR-MCNC: 0.2 G/L
PROT UR-MCNC: 0.31 G/L
PROT/CREAT 24H UR: 0.43 G/G CR (ref 0–0.2)
PROT/CREAT 24H UR: 0.44 MG/MG CR (ref 0–0.2)
PROT/CREAT 24H UR: 0.66 G/G CR (ref 0–0.2)
PROT/CREAT 24H UR: 1.3 MG/MG CR (ref 0–0.2)
PTH-INTACT SERPL-MCNC: 65 PG/ML (ref 18–80)
PTH-INTACT SERPL-MCNC: 66 PG/ML (ref 15–65)
QRS DURATION - MUSE: 152 MS
QRS DURATION - MUSE: 152 MS
QT - MUSE: 420 MS
QT - MUSE: 492 MS
QTC - MUSE: 433 MS
QTC - MUSE: 503 MS
R AXIS - MUSE: 239 DEGREES
R AXIS - MUSE: 256 DEGREES
RBC # BLD AUTO: 3.6 10E6/UL (ref 4.4–5.9)
RBC # BLD AUTO: 3.7 10E6/UL (ref 4.4–5.9)
RBC # BLD AUTO: 3.8 10E6/UL (ref 4.4–5.9)
RBC # BLD AUTO: 3.91 10E6/UL (ref 4.4–5.9)
RBC # BLD AUTO: 4.01 10E6/UL (ref 4.4–5.9)
RBC # BLD AUTO: 4.03 10E6/UL (ref 4.4–5.9)
RBC # BLD AUTO: 4.09 10E6/UL (ref 4.4–5.9)
RBC # BLD AUTO: 4.11 10E6/UL (ref 4.4–5.9)
RBC # BLD AUTO: 4.18 10E6/UL (ref 4.4–5.9)
RBC # BLD AUTO: 4.22 10E6/UL (ref 4.4–5.9)
RBC # BLD AUTO: 4.29 10E6/UL (ref 4.4–5.9)
RBC # BLD AUTO: 4.31 10E6/UL (ref 4.4–5.9)
RBC # BLD AUTO: 4.38 10E6/UL (ref 4.4–5.9)
RBC # BLD AUTO: 4.55 10E6/UL (ref 4.4–5.9)
RBC #/AREA URNS AUTO: ABNORMAL /HPF
RBC URINE: 15 /HPF
RBC URINE: 27 /HPF
RBC URINE: 30 /HPF
RSV RNA SPEC NAA+PROBE: NEGATIVE
RVPLETH-%PRED-PRE: 165 %
RVPLETH-PRE: 4.2 L
RVPLETH-PRED: 2.53 L
SA TARGET DNA: NEGATIVE
SAO2 % BLDV: 43 % (ref 94–100)
SARS-COV-2 RNA RESP QL NAA+PROBE: NEGATIVE
SODIUM SERPL-SCNC: 123 MMOL/L (ref 136–145)
SODIUM SERPL-SCNC: 124 MMOL/L (ref 136–145)
SODIUM SERPL-SCNC: 126 MMOL/L (ref 136–145)
SODIUM SERPL-SCNC: 127 MMOL/L (ref 136–145)
SODIUM SERPL-SCNC: 128 MMOL/L (ref 136–145)
SODIUM SERPL-SCNC: 128 MMOL/L (ref 136–145)
SODIUM SERPL-SCNC: 130 MMOL/L (ref 136–145)
SODIUM SERPL-SCNC: 132 MMOL/L (ref 133–144)
SODIUM SERPL-SCNC: 133 MMOL/L (ref 133–144)
SODIUM SERPL-SCNC: 133 MMOL/L (ref 136–145)
SODIUM SERPL-SCNC: 134 MMOL/L (ref 133–144)
SODIUM SERPL-SCNC: 135 MMOL/L (ref 136–145)
SODIUM SERPL-SCNC: 136 MMOL/L (ref 136–145)
SODIUM SERPL-SCNC: 137 MMOL/L (ref 133–144)
SODIUM SERPL-SCNC: 137 MMOL/L (ref 133–144)
SODIUM SERPL-SCNC: 137 MMOL/L (ref 136–145)
SODIUM SERPL-SCNC: 137 MMOL/L (ref 136–145)
SODIUM SERPL-SCNC: 138 MMOL/L (ref 136–145)
SODIUM SERPL-SCNC: 140 MMOL/L (ref 133–144)
SODIUM SERPL-SCNC: 142 MMOL/L (ref 133–144)
SODIUM SERPL-SCNC: 142 MMOL/L (ref 133–144)
SP GR UR STRIP: 1.01 (ref 1–1.03)
SQUAMOUS #/AREA URNS AUTO: ABNORMAL /LPF
SQUAMOUS #/AREA URNS AUTO: ABNORMAL /LPF
SYSTOLIC BLOOD PRESSURE - MUSE: NORMAL MMHG
SYSTOLIC BLOOD PRESSURE - MUSE: NORMAL MMHG
T AXIS - MUSE: 60 DEGREES
T AXIS - MUSE: 64 DEGREES
TLCPLETH-%PRED-PRE: 90 %
TLCPLETH-PRE: 6.82 L
TLCPLETH-PRED: 7.53 L
TRANSITIONAL EPI: <1 /HPF
TROPONIN I SERPL HS-MCNC: 50 NG/L
TSH SERPL DL<=0.005 MIU/L-ACNC: 1.14 MU/L (ref 0.4–4)
UROBILINOGEN UR STRIP-ACNC: 0.2 E.U./DL
UROBILINOGEN UR STRIP-MCNC: NORMAL MG/DL
VA-%PRED-PRE: 56 %
VA-%PRED-PRE: 62 %
VA-PRE: 3.97 L
VA-PRE: 4.34 L
VC-%PRED-PRE: 45 %
VC-%PRED-PRE: 49 %
VC-PRE: 2.44 L
VC-PRE: 2.62 L
VC-PRED: 5.3 L
VC-PRED: 5.3 L
VENTRICULAR RATE- MUSE: 63 BPM
VENTRICULAR RATE- MUSE: 64 BPM
WBC # BLD AUTO: 10.2 10E3/UL (ref 4–11)
WBC # BLD AUTO: 10.2 10E3/UL (ref 4–11)
WBC # BLD AUTO: 10.4 10E3/UL (ref 4–11)
WBC # BLD AUTO: 10.7 10E3/UL (ref 4–11)
WBC # BLD AUTO: 13 10E3/UL (ref 4–11)
WBC # BLD AUTO: 14.1 10E3/UL (ref 4–11)
WBC # BLD AUTO: 17.8 10E3/UL (ref 4–11)
WBC # BLD AUTO: 8 10E3/UL (ref 4–11)
WBC # BLD AUTO: 8.5 10E3/UL (ref 4–11)
WBC # BLD AUTO: 8.7 10E3/UL (ref 4–11)
WBC # BLD AUTO: 9 10E3/UL (ref 4–11)
WBC # BLD AUTO: 9.1 10E3/UL (ref 4–11)
WBC # BLD AUTO: 9.3 10E3/UL (ref 4–11)
WBC # BLD AUTO: 9.7 10E3/UL (ref 4–11)
WBC #/AREA URNS AUTO: ABNORMAL /HPF
WBC CLUMPS #/AREA URNS HPF: PRESENT /HPF
WBC URINE: 13 /HPF
WBC URINE: 5 /HPF
WBC URINE: 65 /HPF

## 2022-01-01 PROCEDURE — 272N000001 HC OR GENERAL SUPPLY STERILE: Performed by: INTERNAL MEDICINE

## 2022-01-01 PROCEDURE — 81001 URINALYSIS AUTO W/SCOPE: CPT

## 2022-01-01 PROCEDURE — 83735 ASSAY OF MAGNESIUM: CPT | Performed by: INTERNAL MEDICINE

## 2022-01-01 PROCEDURE — 258N000003 HC RX IP 258 OP 636: Performed by: CLINICAL NURSE SPECIALIST

## 2022-01-01 PROCEDURE — 36415 COLL VENOUS BLD VENIPUNCTURE: CPT

## 2022-01-01 PROCEDURE — 93286 PERI-PX EVAL PM/LDLS PM IP: CPT

## 2022-01-01 PROCEDURE — 96375 TX/PRO/DX INJ NEW DRUG ADDON: CPT

## 2022-01-01 PROCEDURE — 250N000011 HC RX IP 250 OP 636

## 2022-01-01 PROCEDURE — 90937 HEMODIALYSIS REPEATED EVAL: CPT

## 2022-01-01 PROCEDURE — 85027 COMPLETE CBC AUTOMATED: CPT | Performed by: INTERNAL MEDICINE

## 2022-01-01 PROCEDURE — 999N000157 HC STATISTIC RCP TIME EA 10 MIN

## 2022-01-01 PROCEDURE — 250N000013 HC RX MED GY IP 250 OP 250 PS 637

## 2022-01-01 PROCEDURE — 83605 ASSAY OF LACTIC ACID: CPT

## 2022-01-01 PROCEDURE — G0463 HOSPITAL OUTPT CLINIC VISIT: HCPCS

## 2022-01-01 PROCEDURE — 250N000011 HC RX IP 250 OP 636: Performed by: INTERNAL MEDICINE

## 2022-01-01 PROCEDURE — 83735 ASSAY OF MAGNESIUM: CPT | Performed by: STUDENT IN AN ORGANIZED HEALTH CARE EDUCATION/TRAINING PROGRAM

## 2022-01-01 PROCEDURE — 82310 ASSAY OF CALCIUM: CPT

## 2022-01-01 PROCEDURE — 82805 BLOOD GASES W/O2 SATURATION: CPT | Performed by: STUDENT IN AN ORGANIZED HEALTH CARE EDUCATION/TRAINING PROGRAM

## 2022-01-01 PROCEDURE — 250N000009 HC RX 250: Performed by: STUDENT IN AN ORGANIZED HEALTH CARE EDUCATION/TRAINING PROGRAM

## 2022-01-01 PROCEDURE — 255N000002 HC RX 255 OP 636: Performed by: NURSE PRACTITIONER

## 2022-01-01 PROCEDURE — 71045 X-RAY EXAM CHEST 1 VIEW: CPT

## 2022-01-01 PROCEDURE — 80053 COMPREHEN METABOLIC PANEL: CPT | Performed by: STUDENT IN AN ORGANIZED HEALTH CARE EDUCATION/TRAINING PROGRAM

## 2022-01-01 PROCEDURE — 250N000013 HC RX MED GY IP 250 OP 250 PS 637: Performed by: INTERNAL MEDICINE

## 2022-01-01 PROCEDURE — U0003 INFECTIOUS AGENT DETECTION BY NUCLEIC ACID (DNA OR RNA); SEVERE ACUTE RESPIRATORY SYNDROME CORONAVIRUS 2 (SARS-COV-2) (CORONAVIRUS DISEASE [COVID-19]), AMPLIFIED PROBE TECHNIQUE, MAKING USE OF HIGH THROUGHPUT TECHNOLOGIES AS DESCRIBED BY CMS-2020-01-R: HCPCS

## 2022-01-01 PROCEDURE — 5A09357 ASSISTANCE WITH RESPIRATORY VENTILATION, LESS THAN 24 CONSECUTIVE HOURS, CONTINUOUS POSITIVE AIRWAY PRESSURE: ICD-10-PCS | Performed by: INTERNAL MEDICINE

## 2022-01-01 PROCEDURE — 93005 ELECTROCARDIOGRAM TRACING: CPT

## 2022-01-01 PROCEDURE — 250N000013 HC RX MED GY IP 250 OP 250 PS 637: Performed by: STUDENT IN AN ORGANIZED HEALTH CARE EDUCATION/TRAINING PROGRAM

## 2022-01-01 PROCEDURE — 80053 COMPREHEN METABOLIC PANEL: CPT | Performed by: PATHOLOGY

## 2022-01-01 PROCEDURE — 96365 THER/PROPH/DIAG IV INF INIT: CPT

## 2022-01-01 PROCEDURE — 81001 URINALYSIS AUTO W/SCOPE: CPT | Performed by: STUDENT IN AN ORGANIZED HEALTH CARE EDUCATION/TRAINING PROGRAM

## 2022-01-01 PROCEDURE — 83970 ASSAY OF PARATHORMONE: CPT | Performed by: PATHOLOGY

## 2022-01-01 PROCEDURE — G0463 HOSPITAL OUTPT CLINIC VISIT: HCPCS | Mod: PN,RTG | Performed by: INTERNAL MEDICINE

## 2022-01-01 PROCEDURE — 99223 1ST HOSP IP/OBS HIGH 75: CPT | Performed by: CLINICAL NURSE SPECIALIST

## 2022-01-01 PROCEDURE — 94729 DIFFUSING CAPACITY: CPT | Performed by: INTERNAL MEDICINE

## 2022-01-01 PROCEDURE — 87340 HEPATITIS B SURFACE AG IA: CPT | Performed by: CLINICAL NURSE SPECIALIST

## 2022-01-01 PROCEDURE — 84132 ASSAY OF SERUM POTASSIUM: CPT

## 2022-01-01 PROCEDURE — 250N000011 HC RX IP 250 OP 636: Performed by: STUDENT IN AN ORGANIZED HEALTH CARE EDUCATION/TRAINING PROGRAM

## 2022-01-01 PROCEDURE — 87040 BLOOD CULTURE FOR BACTERIA: CPT | Performed by: INTERNAL MEDICINE

## 2022-01-01 PROCEDURE — 84100 ASSAY OF PHOSPHORUS: CPT | Performed by: PATHOLOGY

## 2022-01-01 PROCEDURE — 250N000012 HC RX MED GY IP 250 OP 636 PS 637

## 2022-01-01 PROCEDURE — 36415 COLL VENOUS BLD VENIPUNCTURE: CPT | Performed by: INTERNAL MEDICINE

## 2022-01-01 PROCEDURE — 93451 RIGHT HEART CATH: CPT | Performed by: INTERNAL MEDICINE

## 2022-01-01 PROCEDURE — 82040 ASSAY OF SERUM ALBUMIN: CPT | Performed by: STUDENT IN AN ORGANIZED HEALTH CARE EDUCATION/TRAINING PROGRAM

## 2022-01-01 PROCEDURE — U0003 INFECTIOUS AGENT DETECTION BY NUCLEIC ACID (DNA OR RNA); SEVERE ACUTE RESPIRATORY SYNDROME CORONAVIRUS 2 (SARS-COV-2) (CORONAVIRUS DISEASE [COVID-19]), AMPLIFIED PROBE TECHNIQUE, MAKING USE OF HIGH THROUGHPUT TECHNOLOGIES AS DESCRIBED BY CMS-2020-01-R: HCPCS | Performed by: INTERNAL MEDICINE

## 2022-01-01 PROCEDURE — 84132 ASSAY OF SERUM POTASSIUM: CPT | Performed by: STUDENT IN AN ORGANIZED HEALTH CARE EDUCATION/TRAINING PROGRAM

## 2022-01-01 PROCEDURE — 87186 SC STD MICRODIL/AGAR DIL: CPT

## 2022-01-01 PROCEDURE — 214N000001 HC R&B CCU UMMC

## 2022-01-01 PROCEDURE — 999N000065 XR CHEST PORT 1 VIEW

## 2022-01-01 PROCEDURE — 85027 COMPLETE CBC AUTOMATED: CPT

## 2022-01-01 PROCEDURE — 80053 COMPREHEN METABOLIC PANEL: CPT | Performed by: INTERNAL MEDICINE

## 2022-01-01 PROCEDURE — 75561 CARDIAC MRI FOR MORPH W/DYE: CPT | Mod: 26 | Performed by: STUDENT IN AN ORGANIZED HEALTH CARE EDUCATION/TRAINING PROGRAM

## 2022-01-01 PROCEDURE — 258N000003 HC RX IP 258 OP 636

## 2022-01-01 PROCEDURE — 97165 OT EVAL LOW COMPLEX 30 MIN: CPT | Mod: GO | Performed by: OCCUPATIONAL THERAPIST

## 2022-01-01 PROCEDURE — 82805 BLOOD GASES W/O2 SATURATION: CPT

## 2022-01-01 PROCEDURE — 120N000003 HC R&B IMCU UMMC

## 2022-01-01 PROCEDURE — G0378 HOSPITAL OBSERVATION PER HR: HCPCS

## 2022-01-01 PROCEDURE — 83880 ASSAY OF NATRIURETIC PEPTIDE: CPT

## 2022-01-01 PROCEDURE — 02HV33Z INSERTION OF INFUSION DEVICE INTO SUPERIOR VENA CAVA, PERCUTANEOUS APPROACH: ICD-10-PCS | Performed by: RADIOLOGY

## 2022-01-01 PROCEDURE — 83880 ASSAY OF NATRIURETIC PEPTIDE: CPT | Performed by: PATHOLOGY

## 2022-01-01 PROCEDURE — 272N000004 HC RX 272: Performed by: INTERNAL MEDICINE

## 2022-01-01 PROCEDURE — 93296 REM INTERROG EVL PM/IDS: CPT

## 2022-01-01 PROCEDURE — 272N000004 HC RX 272: Performed by: STUDENT IN AN ORGANIZED HEALTH CARE EDUCATION/TRAINING PROGRAM

## 2022-01-01 PROCEDURE — 83735 ASSAY OF MAGNESIUM: CPT

## 2022-01-01 PROCEDURE — 99207 PR NO BILLABLE SERVICE THIS VISIT: CPT | Performed by: INTERNAL MEDICINE

## 2022-01-01 PROCEDURE — 99214 OFFICE O/P EST MOD 30 MIN: CPT | Mod: 95 | Performed by: STUDENT IN AN ORGANIZED HEALTH CARE EDUCATION/TRAINING PROGRAM

## 2022-01-01 PROCEDURE — 93280 PM DEVICE PROGR EVAL DUAL: CPT

## 2022-01-01 PROCEDURE — 36415 COLL VENOUS BLD VENIPUNCTURE: CPT | Performed by: PATHOLOGY

## 2022-01-01 PROCEDURE — 82810 BLOOD GASES O2 SAT ONLY: CPT

## 2022-01-01 PROCEDURE — 83540 ASSAY OF IRON: CPT

## 2022-01-01 PROCEDURE — 36415 COLL VENOUS BLD VENIPUNCTURE: CPT | Performed by: EMERGENCY MEDICINE

## 2022-01-01 PROCEDURE — 85018 HEMOGLOBIN: CPT

## 2022-01-01 PROCEDURE — 99223 1ST HOSP IP/OBS HIGH 75: CPT | Mod: 25 | Performed by: INTERNAL MEDICINE

## 2022-01-01 PROCEDURE — 85610 PROTHROMBIN TIME: CPT | Performed by: INTERNAL MEDICINE

## 2022-01-01 PROCEDURE — 71045 X-RAY EXAM CHEST 1 VIEW: CPT | Mod: 26 | Performed by: RADIOLOGY

## 2022-01-01 PROCEDURE — 250N000012 HC RX MED GY IP 250 OP 636 PS 637: Performed by: STUDENT IN AN ORGANIZED HEALTH CARE EDUCATION/TRAINING PROGRAM

## 2022-01-01 PROCEDURE — 36415 COLL VENOUS BLD VENIPUNCTURE: CPT | Performed by: STUDENT IN AN ORGANIZED HEALTH CARE EDUCATION/TRAINING PROGRAM

## 2022-01-01 PROCEDURE — 250N000009 HC RX 250: Performed by: INTERNAL MEDICINE

## 2022-01-01 PROCEDURE — 84145 PROCALCITONIN (PCT): CPT | Performed by: EMERGENCY MEDICINE

## 2022-01-01 PROCEDURE — 94640 AIRWAY INHALATION TREATMENT: CPT | Mod: 76

## 2022-01-01 PROCEDURE — 999N000132 HC STATISTIC PP CARE STAGE 1

## 2022-01-01 PROCEDURE — 94375 RESPIRATORY FLOW VOLUME LOOP: CPT | Performed by: INTERNAL MEDICINE

## 2022-01-01 PROCEDURE — 93294 REM INTERROG EVL PM/LDLS PM: CPT | Performed by: INTERNAL MEDICINE

## 2022-01-01 PROCEDURE — 96366 THER/PROPH/DIAG IV INF ADDON: CPT

## 2022-01-01 PROCEDURE — 94640 AIRWAY INHALATION TREATMENT: CPT

## 2022-01-01 PROCEDURE — C9803 HOPD COVID-19 SPEC COLLECT: HCPCS

## 2022-01-01 PROCEDURE — 84156 ASSAY OF PROTEIN URINE: CPT

## 2022-01-01 PROCEDURE — 99232 SBSQ HOSP IP/OBS MODERATE 35: CPT | Mod: 25 | Performed by: INTERNAL MEDICINE

## 2022-01-01 PROCEDURE — 99207 PR SC NO CHARGE VISIT: CPT | Performed by: CLINICAL NURSE SPECIALIST

## 2022-01-01 PROCEDURE — 99215 OFFICE O/P EST HI 40 MIN: CPT | Mod: 95 | Performed by: INTERNAL MEDICINE

## 2022-01-01 PROCEDURE — 74018 RADEX ABDOMEN 1 VIEW: CPT

## 2022-01-01 PROCEDURE — 94726 PLETHYSMOGRAPHY LUNG VOLUMES: CPT | Performed by: INTERNAL MEDICINE

## 2022-01-01 PROCEDURE — 84450 TRANSFERASE (AST) (SGOT): CPT | Performed by: STUDENT IN AN ORGANIZED HEALTH CARE EDUCATION/TRAINING PROGRAM

## 2022-01-01 PROCEDURE — 93280 PM DEVICE PROGR EVAL DUAL: CPT | Mod: 26 | Performed by: INTERNAL MEDICINE

## 2022-01-01 PROCEDURE — 36600 WITHDRAWAL OF ARTERIAL BLOOD: CPT

## 2022-01-01 PROCEDURE — 272N000450 HC KIT 4FR POWER PICC SINGLE LUMEN

## 2022-01-01 PROCEDURE — 83605 ASSAY OF LACTIC ACID: CPT | Performed by: STUDENT IN AN ORGANIZED HEALTH CARE EDUCATION/TRAINING PROGRAM

## 2022-01-01 PROCEDURE — 83970 ASSAY OF PARATHORMONE: CPT

## 2022-01-01 PROCEDURE — 82310 ASSAY OF CALCIUM: CPT | Performed by: STUDENT IN AN ORGANIZED HEALTH CARE EDUCATION/TRAINING PROGRAM

## 2022-01-01 PROCEDURE — 97535 SELF CARE MNGMENT TRAINING: CPT | Mod: GO

## 2022-01-01 PROCEDURE — 83880 ASSAY OF NATRIURETIC PEPTIDE: CPT | Performed by: EMERGENCY MEDICINE

## 2022-01-01 PROCEDURE — 84155 ASSAY OF PROTEIN SERUM: CPT | Performed by: STUDENT IN AN ORGANIZED HEALTH CARE EDUCATION/TRAINING PROGRAM

## 2022-01-01 PROCEDURE — 82040 ASSAY OF SERUM ALBUMIN: CPT | Performed by: INTERNAL MEDICINE

## 2022-01-01 PROCEDURE — 87088 URINE BACTERIA CULTURE: CPT

## 2022-01-01 PROCEDURE — 99223 1ST HOSP IP/OBS HIGH 75: CPT | Performed by: STUDENT IN AN ORGANIZED HEALTH CARE EDUCATION/TRAINING PROGRAM

## 2022-01-01 PROCEDURE — 80048 BASIC METABOLIC PNL TOTAL CA: CPT | Performed by: INTERNAL MEDICINE

## 2022-01-01 PROCEDURE — 99442 PR PHYSICIAN TELEPHONE EVALUATION 11-20 MIN: CPT | Mod: 95 | Performed by: PHYSICIAN ASSISTANT

## 2022-01-01 PROCEDURE — 999N000248 HC STATISTIC IV INSERT WITH US BY RN

## 2022-01-01 PROCEDURE — 250N000011 HC RX IP 250 OP 636: Performed by: EMERGENCY MEDICINE

## 2022-01-01 PROCEDURE — 76770 US EXAM ABDO BACK WALL COMP: CPT

## 2022-01-01 PROCEDURE — G0463 HOSPITAL OUTPT CLINIC VISIT: HCPCS | Mod: 25

## 2022-01-01 PROCEDURE — 84100 ASSAY OF PHOSPHORUS: CPT | Performed by: STUDENT IN AN ORGANIZED HEALTH CARE EDUCATION/TRAINING PROGRAM

## 2022-01-01 PROCEDURE — 99214 OFFICE O/P EST MOD 30 MIN: CPT | Mod: 25 | Performed by: INTERNAL MEDICINE

## 2022-01-01 PROCEDURE — 82565 ASSAY OF CREATININE: CPT

## 2022-01-01 PROCEDURE — 85025 COMPLETE CBC W/AUTO DIFF WBC: CPT | Performed by: INTERNAL MEDICINE

## 2022-01-01 PROCEDURE — 99214 OFFICE O/P EST MOD 30 MIN: CPT | Performed by: INTERNAL MEDICINE

## 2022-01-01 PROCEDURE — 97535 SELF CARE MNGMENT TRAINING: CPT | Mod: GO | Performed by: OCCUPATIONAL THERAPIST

## 2022-01-01 PROCEDURE — 80048 BASIC METABOLIC PNL TOTAL CA: CPT

## 2022-01-01 PROCEDURE — 99291 CRITICAL CARE FIRST HOUR: CPT | Mod: 25

## 2022-01-01 PROCEDURE — 82728 ASSAY OF FERRITIN: CPT

## 2022-01-01 PROCEDURE — 99291 CRITICAL CARE FIRST HOUR: CPT | Mod: GC | Performed by: INTERNAL MEDICINE

## 2022-01-01 PROCEDURE — 5A1D70Z PERFORMANCE OF URINARY FILTRATION, INTERMITTENT, LESS THAN 6 HOURS PER DAY: ICD-10-PCS | Performed by: INTERNAL MEDICINE

## 2022-01-01 PROCEDURE — 83880 ASSAY OF NATRIURETIC PEPTIDE: CPT | Performed by: INTERNAL MEDICINE

## 2022-01-01 PROCEDURE — 96372 THER/PROPH/DIAG INJ SC/IM: CPT | Performed by: INTERNAL MEDICINE

## 2022-01-01 PROCEDURE — 99232 SBSQ HOSP IP/OBS MODERATE 35: CPT | Performed by: INTERNAL MEDICINE

## 2022-01-01 PROCEDURE — 87637 SARSCOV2&INF A&B&RSV AMP PRB: CPT | Performed by: EMERGENCY MEDICINE

## 2022-01-01 PROCEDURE — 120N000001 HC R&B MED SURG/OB

## 2022-01-01 PROCEDURE — 94660 CPAP INITIATION&MGMT: CPT

## 2022-01-01 PROCEDURE — 80069 RENAL FUNCTION PANEL: CPT

## 2022-01-01 PROCEDURE — 85027 COMPLETE CBC AUTOMATED: CPT | Performed by: STUDENT IN AN ORGANIZED HEALTH CARE EDUCATION/TRAINING PROGRAM

## 2022-01-01 PROCEDURE — 258N000003 HC RX IP 258 OP 636: Performed by: EMERGENCY MEDICINE

## 2022-01-01 PROCEDURE — 99215 OFFICE O/P EST HI 40 MIN: CPT | Mod: 95 | Performed by: STUDENT IN AN ORGANIZED HEALTH CARE EDUCATION/TRAINING PROGRAM

## 2022-01-01 PROCEDURE — 93010 ELECTROCARDIOGRAM REPORT: CPT | Performed by: INTERNAL MEDICINE

## 2022-01-01 PROCEDURE — 82652 VIT D 1 25-DIHYDROXY: CPT

## 2022-01-01 PROCEDURE — 84132 ASSAY OF SERUM POTASSIUM: CPT | Performed by: INTERNAL MEDICINE

## 2022-01-01 PROCEDURE — 86706 HEP B SURFACE ANTIBODY: CPT | Performed by: CLINICAL NURSE SPECIALIST

## 2022-01-01 PROCEDURE — 85610 PROTHROMBIN TIME: CPT | Performed by: EMERGENCY MEDICINE

## 2022-01-01 PROCEDURE — 93451 RIGHT HEART CATH: CPT | Mod: 26 | Performed by: INTERNAL MEDICINE

## 2022-01-01 PROCEDURE — 99239 HOSP IP/OBS DSCHRG MGMT >30: CPT | Performed by: INTERNAL MEDICINE

## 2022-01-01 PROCEDURE — 250N000012 HC RX MED GY IP 250 OP 636 PS 637: Performed by: INTERNAL MEDICINE

## 2022-01-01 PROCEDURE — 97140 MANUAL THERAPY 1/> REGIONS: CPT | Mod: GO

## 2022-01-01 PROCEDURE — 75561 CARDIAC MRI FOR MORPH W/DYE: CPT

## 2022-01-01 PROCEDURE — 74018 RADEX ABDOMEN 1 VIEW: CPT | Mod: 26 | Performed by: RADIOLOGY

## 2022-01-01 PROCEDURE — 80050 GENERAL HEALTH PANEL: CPT | Performed by: PATHOLOGY

## 2022-01-01 PROCEDURE — 82306 VITAMIN D 25 HYDROXY: CPT

## 2022-01-01 PROCEDURE — 99223 1ST HOSP IP/OBS HIGH 75: CPT | Mod: AI | Performed by: INTERNAL MEDICINE

## 2022-01-01 PROCEDURE — 258N000001 HC RX 258: Performed by: STUDENT IN AN ORGANIZED HEALTH CARE EDUCATION/TRAINING PROGRAM

## 2022-01-01 PROCEDURE — 82803 BLOOD GASES ANY COMBINATION: CPT

## 2022-01-01 PROCEDURE — 85027 COMPLETE CBC AUTOMATED: CPT | Performed by: PATHOLOGY

## 2022-01-01 PROCEDURE — 4A023N6 MEASUREMENT OF CARDIAC SAMPLING AND PRESSURE, RIGHT HEART, PERCUTANEOUS APPROACH: ICD-10-PCS | Performed by: INTERNAL MEDICINE

## 2022-01-01 PROCEDURE — 93286 PERI-PX EVAL PM/LDLS PM IP: CPT | Mod: 26 | Performed by: INTERNAL MEDICINE

## 2022-01-01 PROCEDURE — 99207 CARDIAC DEVICE CHECK - REMOTE: CPT | Performed by: INTERNAL MEDICINE

## 2022-01-01 PROCEDURE — 80053 COMPREHEN METABOLIC PANEL: CPT | Performed by: EMERGENCY MEDICINE

## 2022-01-01 PROCEDURE — 80051 ELECTROLYTE PANEL: CPT | Performed by: INTERNAL MEDICINE

## 2022-01-01 PROCEDURE — 99232 SBSQ HOSP IP/OBS MODERATE 35: CPT | Mod: GC | Performed by: INTERNAL MEDICINE

## 2022-01-01 PROCEDURE — 94618 PULMONARY STRESS TESTING: CPT | Performed by: INTERNAL MEDICINE

## 2022-01-01 PROCEDURE — 99292 CRITICAL CARE ADDL 30 MIN: CPT

## 2022-01-01 PROCEDURE — 85025 COMPLETE CBC W/AUTO DIFF WBC: CPT

## 2022-01-01 PROCEDURE — 200N000002 HC R&B ICU UMMC

## 2022-01-01 PROCEDURE — 81001 URINALYSIS AUTO W/SCOPE: CPT | Performed by: EMERGENCY MEDICINE

## 2022-01-01 PROCEDURE — 999N000128 HC STATISTIC PERIPHERAL IV START W/O US GUIDANCE

## 2022-01-01 PROCEDURE — 258N000001 HC RX 258

## 2022-01-01 PROCEDURE — 84145 PROCALCITONIN (PCT): CPT

## 2022-01-01 PROCEDURE — 83735 ASSAY OF MAGNESIUM: CPT | Performed by: PATHOLOGY

## 2022-01-01 PROCEDURE — 82043 UR ALBUMIN QUANTITATIVE: CPT | Performed by: PATHOLOGY

## 2022-01-01 PROCEDURE — 83036 HEMOGLOBIN GLYCOSYLATED A1C: CPT | Performed by: STUDENT IN AN ORGANIZED HEALTH CARE EDUCATION/TRAINING PROGRAM

## 2022-01-01 PROCEDURE — 83550 IRON BINDING TEST: CPT

## 2022-01-01 PROCEDURE — 99233 SBSQ HOSP IP/OBS HIGH 50: CPT | Mod: GC | Performed by: INTERNAL MEDICINE

## 2022-01-01 PROCEDURE — 99239 HOSP IP/OBS DSCHRG MGMT >30: CPT | Mod: GC | Performed by: INTERNAL MEDICINE

## 2022-01-01 PROCEDURE — 999N000147 HC STATISTIC PT IP EVAL DEFER

## 2022-01-01 PROCEDURE — U0005 INFEC AGEN DETEC AMPLI PROBE: HCPCS

## 2022-01-01 PROCEDURE — 87086 URINE CULTURE/COLONY COUNT: CPT

## 2022-01-01 PROCEDURE — 5A1D90Z PERFORMANCE OF URINARY FILTRATION, CONTINUOUS, GREATER THAN 18 HOURS PER DAY: ICD-10-PCS | Performed by: CLINICAL NURSE SPECIALIST

## 2022-01-01 PROCEDURE — 80053 COMPREHEN METABOLIC PANEL: CPT

## 2022-01-01 PROCEDURE — 85025 COMPLETE CBC W/AUTO DIFF WBC: CPT | Performed by: EMERGENCY MEDICINE

## 2022-01-01 PROCEDURE — 99215 OFFICE O/P EST HI 40 MIN: CPT | Performed by: INTERNAL MEDICINE

## 2022-01-01 PROCEDURE — 82310 ASSAY OF CALCIUM: CPT | Performed by: INTERNAL MEDICINE

## 2022-01-01 PROCEDURE — 81001 URINALYSIS AUTO W/SCOPE: CPT | Performed by: PATHOLOGY

## 2022-01-01 PROCEDURE — 99233 SBSQ HOSP IP/OBS HIGH 50: CPT | Performed by: CLINICAL NURSE SPECIALIST

## 2022-01-01 PROCEDURE — 82803 BLOOD GASES ANY COMBINATION: CPT | Performed by: STUDENT IN AN ORGANIZED HEALTH CARE EDUCATION/TRAINING PROGRAM

## 2022-01-01 PROCEDURE — 999N000142 HC STATISTIC PROCEDURE PREP ONLY

## 2022-01-01 PROCEDURE — 84484 ASSAY OF TROPONIN QUANT: CPT | Performed by: EMERGENCY MEDICINE

## 2022-01-01 PROCEDURE — 85730 THROMBOPLASTIN TIME PARTIAL: CPT | Performed by: EMERGENCY MEDICINE

## 2022-01-01 PROCEDURE — 272N000473 HC KIT, VPS RHYTHM STYLET

## 2022-01-01 PROCEDURE — 87641 MR-STAPH DNA AMP PROBE: CPT | Performed by: INTERNAL MEDICINE

## 2022-01-01 PROCEDURE — A9585 GADOBUTROL INJECTION: HCPCS | Performed by: NURSE PRACTITIONER

## 2022-01-01 PROCEDURE — 97140 MANUAL THERAPY 1/> REGIONS: CPT | Mod: GO | Performed by: OCCUPATIONAL THERAPIST

## 2022-01-01 PROCEDURE — 36569 INSJ PICC 5 YR+ W/O IMAGING: CPT

## 2022-01-01 PROCEDURE — 97530 THERAPEUTIC ACTIVITIES: CPT | Mod: GO

## 2022-01-01 PROCEDURE — 76770 US EXAM ABDO BACK WALL COMP: CPT | Mod: 26 | Performed by: RADIOLOGY

## 2022-01-01 PROCEDURE — 87040 BLOOD CULTURE FOR BACTERIA: CPT | Performed by: EMERGENCY MEDICINE

## 2022-01-01 RX ORDER — MORPHINE SULFATE 2 MG/ML
1-2 INJECTION, SOLUTION INTRAMUSCULAR; INTRAVENOUS
Status: DISCONTINUED | OUTPATIENT
Start: 2022-01-01 | End: 2022-01-01

## 2022-01-01 RX ORDER — GLYCOPYRROLATE 0.2 MG/ML
0.2 INJECTION, SOLUTION INTRAMUSCULAR; INTRAVENOUS EVERY 4 HOURS PRN
Status: DISCONTINUED | OUTPATIENT
Start: 2022-01-01 | End: 2022-01-01 | Stop reason: HOSPADM

## 2022-01-01 RX ORDER — FERROUS GLUCONATE 324(38)MG
324 TABLET ORAL
Qty: 30 TABLET | Refills: 3 | Status: SHIPPED | OUTPATIENT
Start: 2022-01-01 | End: 2023-01-05

## 2022-01-01 RX ORDER — ACETAMINOPHEN 325 MG/1
650 TABLET ORAL EVERY 4 HOURS PRN
Status: DISCONTINUED | OUTPATIENT
Start: 2022-01-01 | End: 2022-01-01 | Stop reason: HOSPADM

## 2022-01-01 RX ORDER — BUDESONIDE 0.25 MG/2ML
0.25 INHALANT ORAL 2 TIMES DAILY
Status: DISCONTINUED | OUTPATIENT
Start: 2022-01-01 | End: 2022-01-01 | Stop reason: HOSPADM

## 2022-01-01 RX ORDER — DOXYCYCLINE HYCLATE 100 MG
100 TABLET ORAL 2 TIMES DAILY
Qty: 10 TABLET | Refills: 0 | Status: SHIPPED | OUTPATIENT
Start: 2022-01-01 | End: 2022-01-01

## 2022-01-01 RX ORDER — ONDANSETRON 4 MG/1
4 TABLET, ORALLY DISINTEGRATING ORAL EVERY 6 HOURS PRN
Status: DISCONTINUED | OUTPATIENT
Start: 2022-01-01 | End: 2022-01-01 | Stop reason: HOSPADM

## 2022-01-01 RX ORDER — HYDROMORPHONE HCL IN WATER/PF 6 MG/30 ML
0.2 PATIENT CONTROLLED ANALGESIA SYRINGE INTRAVENOUS ONCE
Status: COMPLETED | OUTPATIENT
Start: 2022-01-01 | End: 2022-01-01

## 2022-01-01 RX ORDER — CHLORTHALIDONE 25 MG/1
25 TABLET ORAL DAILY
Qty: 30 TABLET | Refills: 4 | Status: SHIPPED | OUTPATIENT
Start: 2022-01-01

## 2022-01-01 RX ORDER — LIDOCAINE 40 MG/G
CREAM TOPICAL
Status: CANCELLED | OUTPATIENT
Start: 2022-01-01

## 2022-01-01 RX ORDER — VITAMIN B COMPLEX
25 TABLET ORAL EVERY OTHER DAY
Qty: 45 TABLET | Refills: 3 | Status: SHIPPED | OUTPATIENT
Start: 2022-01-01

## 2022-01-01 RX ORDER — SALIVA STIMULANT COMB. NO.3
1 SPRAY, NON-AEROSOL (ML) MUCOUS MEMBRANE
Status: DISCONTINUED | OUTPATIENT
Start: 2022-01-01 | End: 2022-01-01 | Stop reason: HOSPADM

## 2022-01-01 RX ORDER — FUROSEMIDE 10 MG/ML
80 INJECTION INTRAMUSCULAR; INTRAVENOUS ONCE
Status: COMPLETED | OUTPATIENT
Start: 2022-01-01 | End: 2022-01-01

## 2022-01-01 RX ORDER — POTASSIUM CHLORIDE 750 MG/1
10 TABLET, EXTENDED RELEASE ORAL ONCE
Status: COMPLETED | OUTPATIENT
Start: 2022-01-01 | End: 2022-01-01

## 2022-01-01 RX ORDER — ONDANSETRON 2 MG/ML
4 INJECTION INTRAMUSCULAR; INTRAVENOUS EVERY 6 HOURS PRN
Status: DISCONTINUED | OUTPATIENT
Start: 2022-01-01 | End: 2022-01-01

## 2022-01-01 RX ORDER — CHLOROTHIAZIDE SODIUM 500 MG/1
1000 INJECTION INTRAVENOUS ONCE
Status: COMPLETED | OUTPATIENT
Start: 2022-01-01 | End: 2022-01-01

## 2022-01-01 RX ORDER — FENTANYL CITRATE 50 UG/ML
25-50 INJECTION, SOLUTION INTRAMUSCULAR; INTRAVENOUS
Status: DISCONTINUED | OUTPATIENT
Start: 2022-01-01 | End: 2022-01-01 | Stop reason: HOSPADM

## 2022-01-01 RX ORDER — HEPARIN SODIUM 5000 [USP'U]/.5ML
5000 INJECTION, SOLUTION INTRAVENOUS; SUBCUTANEOUS EVERY 12 HOURS
Status: DISCONTINUED | OUTPATIENT
Start: 2022-01-01 | End: 2022-01-01

## 2022-01-01 RX ORDER — MAGNESIUM HYDROXIDE/ALUMINUM HYDROXICE/SIMETHICONE 120; 1200; 1200 MG/30ML; MG/30ML; MG/30ML
30 SUSPENSION ORAL EVERY 4 HOURS PRN
Status: DISCONTINUED | OUTPATIENT
Start: 2022-01-01 | End: 2022-01-01 | Stop reason: HOSPADM

## 2022-01-01 RX ORDER — DIPHENHYDRAMINE HCL 25 MG
25 CAPSULE ORAL EVERY 6 HOURS PRN
Status: DISCONTINUED | OUTPATIENT
Start: 2022-01-01 | End: 2022-01-01 | Stop reason: HOSPADM

## 2022-01-01 RX ORDER — ONDANSETRON 2 MG/ML
4 INJECTION INTRAMUSCULAR; INTRAVENOUS EVERY 6 HOURS PRN
Status: DISCONTINUED | OUTPATIENT
Start: 2022-01-01 | End: 2022-01-01 | Stop reason: HOSPADM

## 2022-01-01 RX ORDER — DIPHENHYDRAMINE HCL 25 MG
CAPSULE ORAL
Qty: 60 CAPSULE | Refills: 0 | Status: SHIPPED | OUTPATIENT
Start: 2022-01-01 | End: 2022-01-01

## 2022-01-01 RX ORDER — FUROSEMIDE 20 MG
120 TABLET ORAL 2 TIMES DAILY
Qty: 365 TABLET | Refills: 3 | Status: ON HOLD | OUTPATIENT
Start: 2022-01-01 | End: 2022-01-01

## 2022-01-01 RX ORDER — NALOXONE HYDROCHLORIDE 0.4 MG/ML
0.4 INJECTION, SOLUTION INTRAMUSCULAR; INTRAVENOUS; SUBCUTANEOUS
Status: DISCONTINUED | OUTPATIENT
Start: 2022-01-01 | End: 2022-01-01

## 2022-01-01 RX ORDER — AMOXICILLIN 250 MG
1 CAPSULE ORAL 2 TIMES DAILY PRN
Status: DISCONTINUED | OUTPATIENT
Start: 2022-01-01 | End: 2022-01-01 | Stop reason: HOSPADM

## 2022-01-01 RX ORDER — DEXTROSE MONOHYDRATE 25 G/50ML
25-50 INJECTION, SOLUTION INTRAVENOUS
Status: DISCONTINUED | OUTPATIENT
Start: 2022-01-01 | End: 2022-01-01

## 2022-01-01 RX ORDER — PREDNISONE 20 MG/1
40 TABLET ORAL 2 TIMES DAILY WITH MEALS
Status: DISCONTINUED | OUTPATIENT
Start: 2022-01-01 | End: 2022-01-01 | Stop reason: HOSPADM

## 2022-01-01 RX ORDER — POTASSIUM CHLORIDE 750 MG/1
20 TABLET, EXTENDED RELEASE ORAL ONCE
Status: COMPLETED | OUTPATIENT
Start: 2022-01-01 | End: 2022-01-01

## 2022-01-01 RX ORDER — MAGNESIUM SULFATE HEPTAHYDRATE 40 MG/ML
2 INJECTION, SOLUTION INTRAVENOUS EVERY 8 HOURS PRN
Status: DISCONTINUED | OUTPATIENT
Start: 2022-01-01 | End: 2022-01-01

## 2022-01-01 RX ORDER — VITAMIN B COMPLEX
25 TABLET ORAL EVERY OTHER DAY
Status: DISCONTINUED | OUTPATIENT
Start: 2022-01-01 | End: 2022-01-01

## 2022-01-01 RX ORDER — VITAMIN B COMPLEX
25 TABLET ORAL EVERY OTHER DAY
Status: DISCONTINUED | OUTPATIENT
Start: 2022-01-01 | End: 2022-01-01 | Stop reason: HOSPADM

## 2022-01-01 RX ORDER — PREDNISONE 2.5 MG/1
2.5 TABLET ORAL DAILY
Status: DISCONTINUED | OUTPATIENT
Start: 2022-01-01 | End: 2022-01-01

## 2022-01-01 RX ORDER — ALBUTEROL SULFATE 90 UG/1
2 AEROSOL, METERED RESPIRATORY (INHALATION) EVERY 6 HOURS PRN
Status: DISCONTINUED | OUTPATIENT
Start: 2022-01-01 | End: 2022-01-01 | Stop reason: HOSPADM

## 2022-01-01 RX ORDER — ACETAMINOPHEN 650 MG/1
650 SUPPOSITORY RECTAL EVERY 4 HOURS PRN
Status: DISCONTINUED | OUTPATIENT
Start: 2022-01-01 | End: 2022-01-01

## 2022-01-01 RX ORDER — HYDROCHLOROTHIAZIDE 12.5 MG/1
25 TABLET ORAL DAILY
Qty: 2 TABLET | Refills: 2 | Status: ON HOLD | OUTPATIENT
Start: 2022-01-01 | End: 2022-01-01

## 2022-01-01 RX ORDER — AMOXICILLIN 250 MG
2 CAPSULE ORAL 2 TIMES DAILY PRN
Status: DISCONTINUED | OUTPATIENT
Start: 2022-01-01 | End: 2022-01-01 | Stop reason: HOSPADM

## 2022-01-01 RX ORDER — PREDNISONE 20 MG/1
TABLET ORAL
Qty: 10 TABLET | Refills: 0 | Status: SHIPPED | OUTPATIENT
Start: 2022-01-01 | End: 2022-01-01

## 2022-01-01 RX ORDER — POTASSIUM CHLORIDE 750 MG/1
40 TABLET, EXTENDED RELEASE ORAL 2 TIMES DAILY
Status: DISCONTINUED | OUTPATIENT
Start: 2022-01-01 | End: 2022-01-01 | Stop reason: HOSPADM

## 2022-01-01 RX ORDER — HEPARIN SODIUM,PORCINE 10 UNIT/ML
5-20 VIAL (ML) INTRAVENOUS
Status: DISCONTINUED | OUTPATIENT
Start: 2022-01-01 | End: 2022-01-01

## 2022-01-01 RX ORDER — NALOXONE HYDROCHLORIDE 0.4 MG/ML
0.1 INJECTION, SOLUTION INTRAMUSCULAR; INTRAVENOUS; SUBCUTANEOUS
Status: DISCONTINUED | OUTPATIENT
Start: 2022-01-01 | End: 2022-01-01 | Stop reason: HOSPADM

## 2022-01-01 RX ORDER — PREDNISONE 10 MG/1
10 TABLET ORAL DAILY
Status: DISCONTINUED | OUTPATIENT
Start: 2022-01-01 | End: 2022-01-01 | Stop reason: HOSPADM

## 2022-01-01 RX ORDER — AMBRISENTAN 10 MG/1
10 TABLET, FILM COATED ORAL EVERY MORNING
Status: DISCONTINUED | OUTPATIENT
Start: 2022-01-01 | End: 2022-01-01

## 2022-01-01 RX ORDER — BISACODYL 10 MG
10 SUPPOSITORY, RECTAL RECTAL DAILY PRN
Status: DISCONTINUED | OUTPATIENT
Start: 2022-01-01 | End: 2022-01-01 | Stop reason: HOSPADM

## 2022-01-01 RX ORDER — MORPHINE SULFATE 2 MG/ML
1-2 INJECTION, SOLUTION INTRAMUSCULAR; INTRAVENOUS
Status: DISCONTINUED | OUTPATIENT
Start: 2022-01-01 | End: 2022-01-01 | Stop reason: HOSPADM

## 2022-01-01 RX ORDER — FERROUS GLUCONATE 324(38)MG
324 TABLET ORAL
Status: DISCONTINUED | OUTPATIENT
Start: 2022-01-01 | End: 2022-01-01

## 2022-01-01 RX ORDER — POTASSIUM CHLORIDE 1.5 G/1.58G
20 POWDER, FOR SOLUTION ORAL 2 TIMES DAILY
Status: DISCONTINUED | OUTPATIENT
Start: 2022-01-01 | End: 2022-01-01

## 2022-01-01 RX ORDER — HEPARIN SODIUM,PORCINE 10 UNIT/ML
5-20 VIAL (ML) INTRAVENOUS EVERY 24 HOURS
Status: DISCONTINUED | OUTPATIENT
Start: 2022-01-01 | End: 2022-01-01 | Stop reason: HOSPADM

## 2022-01-01 RX ORDER — CALCIUM CHLORIDE, MAGNESIUM CHLORIDE, DEXTROSE MONOHYDRATE, LACTIC ACID, SODIUM CHLORIDE, SODIUM BICARBONATE AND POTASSIUM CHLORIDE 5.15; 2.03; 22; 5.4; 6.46; 3.09; .157 G/L; G/L; G/L; G/L; G/L; G/L; G/L
INJECTION INTRAVENOUS CONTINUOUS
Status: DISCONTINUED | OUTPATIENT
Start: 2022-01-01 | End: 2022-01-01

## 2022-01-01 RX ORDER — TORSEMIDE 100 MG/1
100 TABLET ORAL
Qty: 60 TABLET | Refills: 3 | Status: SHIPPED | OUTPATIENT
Start: 2022-01-01 | End: 2022-01-01

## 2022-01-01 RX ORDER — NALOXONE HYDROCHLORIDE 0.4 MG/ML
0.2 INJECTION, SOLUTION INTRAMUSCULAR; INTRAVENOUS; SUBCUTANEOUS
Status: DISCONTINUED | OUTPATIENT
Start: 2022-01-01 | End: 2022-01-01

## 2022-01-01 RX ORDER — TORSEMIDE 100 MG/1
100 TABLET ORAL
Qty: 180 TABLET | Refills: 3 | Status: SHIPPED | OUTPATIENT
Start: 2022-01-01

## 2022-01-01 RX ORDER — CALCIUM GLUCONATE 20 MG/ML
1 INJECTION, SOLUTION INTRAVENOUS ONCE
Status: COMPLETED | OUTPATIENT
Start: 2022-01-01 | End: 2022-01-01

## 2022-01-01 RX ORDER — TADALAFIL 20 MG/1
40 TABLET ORAL DAILY
Status: DISCONTINUED | OUTPATIENT
Start: 2022-01-01 | End: 2022-01-01

## 2022-01-01 RX ORDER — AMOXICILLIN 250 MG
1 CAPSULE ORAL AT BEDTIME
Status: DISCONTINUED | OUTPATIENT
Start: 2022-01-01 | End: 2022-01-01

## 2022-01-01 RX ORDER — DIPHENHYDRAMINE HYDROCHLORIDE 50 MG/ML
25 INJECTION INTRAMUSCULAR; INTRAVENOUS ONCE
Status: COMPLETED | OUTPATIENT
Start: 2022-01-01 | End: 2022-01-01

## 2022-01-01 RX ORDER — LORAZEPAM 2 MG/ML
INJECTION INTRAMUSCULAR
Status: COMPLETED
Start: 2022-01-01 | End: 2022-01-01

## 2022-01-01 RX ORDER — ATROPINE SULFATE 10 MG/ML
2 SOLUTION/ DROPS OPHTHALMIC EVERY 4 HOURS PRN
Status: DISCONTINUED | OUTPATIENT
Start: 2022-01-01 | End: 2022-01-01

## 2022-01-01 RX ORDER — HYDROXYZINE HYDROCHLORIDE 25 MG/1
25 TABLET, FILM COATED ORAL 3 TIMES DAILY PRN
Status: DISCONTINUED | OUTPATIENT
Start: 2022-01-01 | End: 2022-01-01 | Stop reason: HOSPADM

## 2022-01-01 RX ORDER — FUROSEMIDE 10 MG/ML
80 INJECTION INTRAMUSCULAR; INTRAVENOUS ONCE
Status: DISCONTINUED | OUTPATIENT
Start: 2022-01-01 | End: 2022-01-01

## 2022-01-01 RX ORDER — ATROPINE SULFATE 10 MG/ML
2 SOLUTION/ DROPS OPHTHALMIC EVERY 4 HOURS PRN
Status: DISCONTINUED | OUTPATIENT
Start: 2022-01-01 | End: 2022-01-01 | Stop reason: HOSPADM

## 2022-01-01 RX ORDER — POTASSIUM CHLORIDE 750 MG/1
40 TABLET, EXTENDED RELEASE ORAL 2 TIMES DAILY
Status: DISCONTINUED | OUTPATIENT
Start: 2022-01-01 | End: 2022-01-01

## 2022-01-01 RX ORDER — NALOXONE HYDROCHLORIDE 0.4 MG/ML
0.2 INJECTION, SOLUTION INTRAMUSCULAR; INTRAVENOUS; SUBCUTANEOUS
Status: DISCONTINUED | OUTPATIENT
Start: 2022-01-01 | End: 2022-01-01 | Stop reason: HOSPADM

## 2022-01-01 RX ORDER — LIDOCAINE 40 MG/G
CREAM TOPICAL
Status: DISCONTINUED | OUTPATIENT
Start: 2022-01-01 | End: 2022-01-01

## 2022-01-01 RX ORDER — CALCIUM GLUCONATE 20 MG/ML
2 INJECTION, SOLUTION INTRAVENOUS EVERY 8 HOURS PRN
Status: DISCONTINUED | OUTPATIENT
Start: 2022-01-01 | End: 2022-01-01

## 2022-01-01 RX ORDER — NICOTINE POLACRILEX 4 MG
15-30 LOZENGE BUCCAL
Status: DISCONTINUED | OUTPATIENT
Start: 2022-01-01 | End: 2022-01-01

## 2022-01-01 RX ORDER — GLYCOPYRROLATE 0.2 MG/ML
0.2 INJECTION, SOLUTION INTRAMUSCULAR; INTRAVENOUS EVERY 4 HOURS PRN
Status: DISCONTINUED | OUTPATIENT
Start: 2022-01-01 | End: 2022-01-01

## 2022-01-01 RX ORDER — POTASSIUM CHLORIDE 29.8 MG/ML
20 INJECTION INTRAVENOUS EVERY 8 HOURS PRN
Status: DISCONTINUED | OUTPATIENT
Start: 2022-01-01 | End: 2022-01-01

## 2022-01-01 RX ORDER — AMBRISENTAN 10 MG/1
10 TABLET, FILM COATED ORAL EVERY MORNING
Status: DISCONTINUED | OUTPATIENT
Start: 2022-01-01 | End: 2022-01-01 | Stop reason: HOSPADM

## 2022-01-01 RX ORDER — LIDOCAINE 40 MG/G
CREAM TOPICAL
Status: COMPLETED | OUTPATIENT
Start: 2022-01-01 | End: 2022-01-01

## 2022-01-01 RX ORDER — PREDNISONE 5 MG/1
5 TABLET ORAL DAILY
Status: ON HOLD | COMMUNITY
End: 2022-01-01

## 2022-01-01 RX ORDER — CALCIUM CHLORIDE, MAGNESIUM CHLORIDE, DEXTROSE MONOHYDRATE, LACTIC ACID, SODIUM CHLORIDE, SODIUM BICARBONATE AND POTASSIUM CHLORIDE 5.15; 2.03; 22; 5.4; 6.46; 3.09; .157 G/L; G/L; G/L; G/L; G/L; G/L; G/L
6 INJECTION INTRAVENOUS CONTINUOUS
Status: DISCONTINUED | OUTPATIENT
Start: 2022-01-01 | End: 2022-01-01

## 2022-01-01 RX ORDER — BUMETANIDE 1 MG/1
5 TABLET ORAL
Qty: 300 TABLET | Refills: 0 | Status: CANCELLED | OUTPATIENT
Start: 2022-01-01 | End: 2022-01-01

## 2022-01-01 RX ORDER — BUMETANIDE 0.25 MG/ML
4 INJECTION INTRAMUSCULAR; INTRAVENOUS ONCE
Status: COMPLETED | OUTPATIENT
Start: 2022-01-01 | End: 2022-01-01

## 2022-01-01 RX ORDER — POTASSIUM CHLORIDE 1.5 G/1.58G
20 POWDER, FOR SOLUTION ORAL DAILY
Qty: 7 PACKET | Refills: 0 | Status: SHIPPED | OUTPATIENT
Start: 2022-01-01 | End: 2022-01-01

## 2022-01-01 RX ORDER — ACETAMINOPHEN 325 MG/1
650 TABLET ORAL EVERY 6 HOURS PRN
Status: DISCONTINUED | OUTPATIENT
Start: 2022-01-01 | End: 2022-01-01 | Stop reason: HOSPADM

## 2022-01-01 RX ORDER — DEXTROSE MONOHYDRATE 25 G/50ML
25 INJECTION, SOLUTION INTRAVENOUS ONCE
Status: COMPLETED | OUTPATIENT
Start: 2022-01-01 | End: 2022-01-01

## 2022-01-01 RX ORDER — POLYETHYLENE GLYCOL 3350 17 G/17G
17 POWDER, FOR SOLUTION ORAL DAILY
Status: DISCONTINUED | OUTPATIENT
Start: 2022-01-01 | End: 2022-01-01

## 2022-01-01 RX ORDER — AMBRISENTAN 10 MG/1
10 TABLET, FILM COATED ORAL EVERY MORNING
Qty: 30 TABLET | Refills: 11 | Status: SHIPPED | OUTPATIENT
Start: 2022-01-01

## 2022-01-01 RX ORDER — PROCHLORPERAZINE 25 MG
25 SUPPOSITORY, RECTAL RECTAL EVERY 12 HOURS PRN
Status: DISCONTINUED | OUTPATIENT
Start: 2022-01-01 | End: 2022-01-01 | Stop reason: HOSPADM

## 2022-01-01 RX ORDER — LORAZEPAM 2 MG/ML
0.5 INJECTION INTRAMUSCULAR ONCE
Status: COMPLETED | OUTPATIENT
Start: 2022-01-01 | End: 2022-01-01

## 2022-01-01 RX ORDER — POTASSIUM CHLORIDE 1500 MG/1
40 TABLET, EXTENDED RELEASE ORAL 2 TIMES DAILY
Qty: 120 TABLET | Refills: 0 | Status: SHIPPED | OUTPATIENT
Start: 2022-01-01 | End: 2022-01-01

## 2022-01-01 RX ORDER — FLUTICASONE FUROATE AND VILANTEROL 100; 25 UG/1; UG/1
1 POWDER RESPIRATORY (INHALATION) DAILY
Status: DISCONTINUED | OUTPATIENT
Start: 2022-01-01 | End: 2022-01-01

## 2022-01-01 RX ORDER — POTASSIUM CHLORIDE 1500 MG/1
40 TABLET, EXTENDED RELEASE ORAL 2 TIMES DAILY
Qty: 120 TABLET | Refills: 0 | Status: CANCELLED | OUTPATIENT
Start: 2022-01-01

## 2022-01-01 RX ORDER — DIPHENHYDRAMINE HCL 25 MG
25 CAPSULE ORAL ONCE
Status: COMPLETED | OUTPATIENT
Start: 2022-01-01 | End: 2022-01-01

## 2022-01-01 RX ORDER — HYDROMORPHONE HCL IN WATER/PF 6 MG/30 ML
0.2 PATIENT CONTROLLED ANALGESIA SYRINGE INTRAVENOUS
Status: DISCONTINUED | OUTPATIENT
Start: 2022-01-01 | End: 2022-01-01 | Stop reason: HOSPADM

## 2022-01-01 RX ORDER — CARBOXYMETHYLCELLULOSE SODIUM 5 MG/ML
1 SOLUTION/ DROPS OPHTHALMIC
Status: DISCONTINUED | OUTPATIENT
Start: 2022-01-01 | End: 2022-01-01 | Stop reason: HOSPADM

## 2022-01-01 RX ORDER — GADOBUTROL 604.72 MG/ML
10 INJECTION INTRAVENOUS ONCE
Status: COMPLETED | OUTPATIENT
Start: 2022-01-01 | End: 2022-01-01

## 2022-01-01 RX ORDER — TORSEMIDE 100 MG/1
100 TABLET ORAL
Status: DISCONTINUED | OUTPATIENT
Start: 2022-01-01 | End: 2022-01-01 | Stop reason: HOSPADM

## 2022-01-01 RX ORDER — FUROSEMIDE 10 MG/ML
60 INJECTION INTRAMUSCULAR; INTRAVENOUS ONCE
Status: COMPLETED | OUTPATIENT
Start: 2022-01-01 | End: 2022-01-01

## 2022-01-01 RX ORDER — TREPROSTINIL 1 MG/ML
INJECTION, SOLUTION INTRAVENOUS; SUBCUTANEOUS
COMMUNITY
Start: 2022-01-01

## 2022-01-01 RX ORDER — ALBUTEROL SULFATE 90 UG/1
2 AEROSOL, METERED RESPIRATORY (INHALATION) EVERY 6 HOURS PRN
Qty: 18 G | Refills: 11 | Status: SHIPPED | OUTPATIENT
Start: 2022-01-01

## 2022-01-01 RX ORDER — ACETAMINOPHEN 650 MG/1
650 SUPPOSITORY RECTAL EVERY 6 HOURS PRN
Status: DISCONTINUED | OUTPATIENT
Start: 2022-01-01 | End: 2022-01-01 | Stop reason: HOSPADM

## 2022-01-01 RX ORDER — POTASSIUM CHLORIDE 1.5 G/1.58G
60 POWDER, FOR SOLUTION ORAL ONCE
Status: COMPLETED | OUTPATIENT
Start: 2022-01-01 | End: 2022-01-01

## 2022-01-01 RX ORDER — POTASSIUM CHLORIDE 1500 MG/1
40 TABLET, EXTENDED RELEASE ORAL 2 TIMES DAILY
Qty: 120 TABLET | Refills: 0 | OUTPATIENT
Start: 2022-01-01

## 2022-01-01 RX ORDER — GRANISETRON HYDROCHLORIDE 1 MG/ML
1 INJECTION INTRAVENOUS EVERY 12 HOURS PRN
Status: DISCONTINUED | OUTPATIENT
Start: 2022-01-01 | End: 2022-01-01 | Stop reason: HOSPADM

## 2022-01-01 RX ORDER — PREDNISONE 20 MG/1
TABLET ORAL
Qty: 22 TABLET | Refills: 0 | Status: SHIPPED | OUTPATIENT
Start: 2022-01-01 | End: 2022-01-01

## 2022-01-01 RX ORDER — NALOXONE HYDROCHLORIDE 0.4 MG/ML
0.4 INJECTION, SOLUTION INTRAMUSCULAR; INTRAVENOUS; SUBCUTANEOUS
Status: DISCONTINUED | OUTPATIENT
Start: 2022-01-01 | End: 2022-01-01 | Stop reason: HOSPADM

## 2022-01-01 RX ORDER — DIPHENHYDRAMINE HCL 25 MG
25 CAPSULE ORAL EVERY 6 HOURS
Status: DISCONTINUED | OUTPATIENT
Start: 2022-01-01 | End: 2022-01-01 | Stop reason: HOSPADM

## 2022-01-01 RX ORDER — LIDOCAINE 40 MG/G
CREAM TOPICAL
Status: DISCONTINUED | OUTPATIENT
Start: 2022-01-01 | End: 2022-01-01 | Stop reason: HOSPADM

## 2022-01-01 RX ORDER — FUROSEMIDE 40 MG
40 TABLET ORAL 2 TIMES DAILY
Status: DISCONTINUED | OUTPATIENT
Start: 2022-01-01 | End: 2022-01-01 | Stop reason: HOSPADM

## 2022-01-01 RX ORDER — POTASSIUM CHLORIDE 1500 MG/1
40 TABLET, EXTENDED RELEASE ORAL 2 TIMES DAILY
Qty: 120 TABLET | Refills: 0 | Status: SHIPPED | OUTPATIENT
Start: 2022-01-01

## 2022-01-01 RX ORDER — TADALAFIL 20 MG/1
40 TABLET ORAL DAILY
Status: DISCONTINUED | OUTPATIENT
Start: 2022-01-01 | End: 2022-01-01 | Stop reason: HOSPADM

## 2022-01-01 RX ORDER — HEPARIN SODIUM 5000 [USP'U]/.5ML
5000 INJECTION, SOLUTION INTRAVENOUS; SUBCUTANEOUS EVERY 12 HOURS
Status: DISCONTINUED | OUTPATIENT
Start: 2022-01-01 | End: 2022-01-01 | Stop reason: HOSPADM

## 2022-01-01 RX ORDER — PREDNISONE 5 MG/1
TABLET ORAL
Qty: 90 TABLET | Refills: 0 | Status: SHIPPED | OUTPATIENT
Start: 2022-01-01 | End: 2022-11-29

## 2022-01-01 RX ORDER — PROCHLORPERAZINE MALEATE 10 MG
10 TABLET ORAL EVERY 6 HOURS PRN
Status: DISCONTINUED | OUTPATIENT
Start: 2022-01-01 | End: 2022-01-01 | Stop reason: HOSPADM

## 2022-01-01 RX ORDER — TAMSULOSIN HYDROCHLORIDE 0.4 MG/1
0.4 CAPSULE ORAL DAILY
Status: DISCONTINUED | OUTPATIENT
Start: 2022-01-01 | End: 2022-01-01 | Stop reason: HOSPADM

## 2022-01-01 RX ORDER — ACETAMINOPHEN 325 MG/1
975 TABLET ORAL 3 TIMES DAILY PRN
Status: DISCONTINUED | OUTPATIENT
Start: 2022-01-01 | End: 2022-01-01 | Stop reason: HOSPADM

## 2022-01-01 RX ORDER — LORAZEPAM 2 MG/ML
0.5 INJECTION INTRAMUSCULAR
Status: DISCONTINUED | OUTPATIENT
Start: 2022-01-01 | End: 2022-01-01 | Stop reason: HOSPADM

## 2022-01-01 RX ADMIN — DIPHENHYDRAMINE HYDROCHLORIDE 25 MG: 25 CAPSULE ORAL at 21:51

## 2022-01-01 RX ADMIN — Medication 1 LOZENGE: at 19:42

## 2022-01-01 RX ADMIN — Medication 1 LOZENGE: at 12:50

## 2022-01-01 RX ADMIN — AMBRISENTAN 10 MG: 10 TABLET, FILM COATED ORAL at 08:39

## 2022-01-01 RX ADMIN — PREDNISONE 40 MG: 20 TABLET ORAL at 08:36

## 2022-01-01 RX ADMIN — HYDROXYZINE HYDROCHLORIDE 25 MG: 25 TABLET, FILM COATED ORAL at 09:30

## 2022-01-01 RX ADMIN — FUROSEMIDE 80 MG: 10 INJECTION, SOLUTION INTRAVENOUS at 01:12

## 2022-01-01 RX ADMIN — SELEXIPAG 1600 MCG: 800 TABLET, COATED ORAL at 20:04

## 2022-01-01 RX ADMIN — DIPHENHYDRAMINE HYDROCHLORIDE 25 MG: 25 CAPSULE ORAL at 05:06

## 2022-01-01 RX ADMIN — AMBRISENTAN 10 MG: 10 TABLET, FILM COATED ORAL at 08:28

## 2022-01-01 RX ADMIN — DIPHENHYDRAMINE HYDROCHLORIDE 25 MG: 25 CAPSULE ORAL at 01:14

## 2022-01-01 RX ADMIN — PREDNISONE 2.5 MG: 2.5 TABLET ORAL at 09:01

## 2022-01-01 RX ADMIN — ALBUTEROL SULFATE 2 PUFF: 90 AEROSOL, METERED RESPIRATORY (INHALATION) at 14:47

## 2022-01-01 RX ADMIN — FUROSEMIDE 20 MG/HR: 10 INJECTION, SOLUTION INTRAVENOUS at 21:38

## 2022-01-01 RX ADMIN — TADALAFIL 40 MG: 20 TABLET, FILM COATED ORAL at 09:29

## 2022-01-01 RX ADMIN — POTASSIUM CHLORIDE 40 MEQ: 750 TABLET, EXTENDED RELEASE ORAL at 19:42

## 2022-01-01 RX ADMIN — SELEXIPAG 800 MCG: 800 TABLET, COATED ORAL at 08:17

## 2022-01-01 RX ADMIN — ACETAMINOPHEN 975 MG: 325 TABLET, FILM COATED ORAL at 08:42

## 2022-01-01 RX ADMIN — AMBRISENTAN 10 MG: 10 TABLET, FILM COATED ORAL at 08:46

## 2022-01-01 RX ADMIN — POTASSIUM CHLORIDE 40 MEQ: 750 TABLET, EXTENDED RELEASE ORAL at 08:39

## 2022-01-01 RX ADMIN — SODIUM ZIRCONIUM CYCLOSILICATE 10 G: 10 POWDER, FOR SUSPENSION ORAL at 11:23

## 2022-01-01 RX ADMIN — TADALAFIL 40 MG: 20 TABLET, FILM COATED ORAL at 08:21

## 2022-01-01 RX ADMIN — SELEXIPAG 1600 MCG: 800 TABLET, COATED ORAL at 20:12

## 2022-01-01 RX ADMIN — FUROSEMIDE 40 MG/HR: 10 INJECTION, SOLUTION INTRAVENOUS at 14:03

## 2022-01-01 RX ADMIN — LORAZEPAM 0.5 MG: 2 INJECTION INTRAMUSCULAR; INTRAVENOUS at 16:00

## 2022-01-01 RX ADMIN — SELEXIPAG 1600 MCG: 800 TABLET, COATED ORAL at 05:51

## 2022-01-01 RX ADMIN — FUROSEMIDE 80 MG: 10 INJECTION, SOLUTION INTRAVENOUS at 10:08

## 2022-01-01 RX ADMIN — ALBUTEROL SULFATE 2 PUFF: 90 AEROSOL, METERED RESPIRATORY (INHALATION) at 20:12

## 2022-01-01 RX ADMIN — TREPROSTINIL 2 NG/KG/MIN: 20 INJECTION, SOLUTION INTRAVENOUS; SUBCUTANEOUS at 22:19

## 2022-01-01 RX ADMIN — Medication 1 LOZENGE: at 11:49

## 2022-01-01 RX ADMIN — CHLOROTHIAZIDE SODIUM 1000 MG: 500 INJECTION, POWDER, LYOPHILIZED, FOR SOLUTION INTRAVENOUS at 11:23

## 2022-01-01 RX ADMIN — LORAZEPAM 0.5 MG: 2 INJECTION INTRAMUSCULAR; INTRAVENOUS at 11:35

## 2022-01-01 RX ADMIN — MORPHINE SULFATE 1 MG: 2 INJECTION, SOLUTION INTRAMUSCULAR; INTRAVENOUS at 01:43

## 2022-01-01 RX ADMIN — Medication 1 LOZENGE: at 03:13

## 2022-01-01 RX ADMIN — PREDNISONE 40 MG: 20 TABLET ORAL at 17:41

## 2022-01-01 RX ADMIN — TORSEMIDE 100 MG: 100 TABLET ORAL at 08:42

## 2022-01-01 RX ADMIN — Medication 1 LOZENGE: at 03:37

## 2022-01-01 RX ADMIN — FERROUS GLUCONATE 324 MG: 324 TABLET ORAL at 09:01

## 2022-01-01 RX ADMIN — TREPROSTINIL 4 NG/KG/MIN: 20 INJECTION, SOLUTION INTRAVENOUS; SUBCUTANEOUS at 15:00

## 2022-01-01 RX ADMIN — FUROSEMIDE 80 MG: 10 INJECTION, SOLUTION INTRAVENOUS at 14:10

## 2022-01-01 RX ADMIN — Medication 1 LOZENGE: at 06:21

## 2022-01-01 RX ADMIN — POTASSIUM CHLORIDE 40 MEQ: 750 TABLET, EXTENDED RELEASE ORAL at 08:43

## 2022-01-01 RX ADMIN — SELEXIPAG 1600 MCG: 800 TABLET, COATED ORAL at 20:22

## 2022-01-01 RX ADMIN — CHLOROTHIAZIDE SODIUM 1000 MG: 500 INJECTION, POWDER, LYOPHILIZED, FOR SOLUTION INTRAVENOUS at 14:10

## 2022-01-01 RX ADMIN — DIPHENHYDRAMINE HYDROCHLORIDE 25 MG: 25 CAPSULE ORAL at 11:55

## 2022-01-01 RX ADMIN — SELEXIPAG 1600 MCG: 800 TABLET, COATED ORAL at 19:43

## 2022-01-01 RX ADMIN — POTASSIUM CHLORIDE 20 MEQ: 1.5 POWDER, FOR SOLUTION ORAL at 08:17

## 2022-01-01 RX ADMIN — Medication: at 04:14

## 2022-01-01 RX ADMIN — ACETAMINOPHEN 975 MG: 325 TABLET, FILM COATED ORAL at 00:30

## 2022-01-01 RX ADMIN — AMBRISENTAN 10 MG: 10 TABLET, FILM COATED ORAL at 08:43

## 2022-01-01 RX ADMIN — HUMAN INSULIN 8 UNITS: 100 INJECTION, SOLUTION SUBCUTANEOUS at 15:58

## 2022-01-01 RX ADMIN — Medication 1 LOZENGE: at 16:53

## 2022-01-01 RX ADMIN — Medication 1 LOZENGE: at 21:52

## 2022-01-01 RX ADMIN — SELEXIPAG 1600 MCG: 800 TABLET, COATED ORAL at 09:32

## 2022-01-01 RX ADMIN — AMBRISENTAN 10 MG: 10 TABLET, FILM COATED ORAL at 09:26

## 2022-01-01 RX ADMIN — Medication 1 LOZENGE: at 21:02

## 2022-01-01 RX ADMIN — TADALAFIL 40 MG: 20 TABLET, FILM COATED ORAL at 08:43

## 2022-01-01 RX ADMIN — DEXTROSE MONOHYDRATE 300 ML: 100 INJECTION, SOLUTION INTRAVENOUS at 17:31

## 2022-01-01 RX ADMIN — AMBRISENTAN 10 MG: 10 TABLET, FILM COATED ORAL at 08:17

## 2022-01-01 RX ADMIN — SELEXIPAG 1600 MCG: 800 TABLET, COATED ORAL at 08:50

## 2022-01-01 RX ADMIN — POTASSIUM CHLORIDE 10 MEQ: 750 TABLET, EXTENDED RELEASE ORAL at 12:03

## 2022-01-01 RX ADMIN — ALBUTEROL SULFATE 2 PUFF: 90 AEROSOL, METERED RESPIRATORY (INHALATION) at 06:21

## 2022-01-01 RX ADMIN — Medication: at 11:14

## 2022-01-01 RX ADMIN — PREDNISONE 40 MG: 20 TABLET ORAL at 21:51

## 2022-01-01 RX ADMIN — DEXTROSE MONOHYDRATE 25 G: 25 INJECTION, SOLUTION INTRAVENOUS at 03:25

## 2022-01-01 RX ADMIN — MORPHINE SULFATE 1 MG: 2 INJECTION, SOLUTION INTRAMUSCULAR; INTRAVENOUS at 00:43

## 2022-01-01 RX ADMIN — BUMETANIDE 5 MG: 1 TABLET ORAL at 09:26

## 2022-01-01 RX ADMIN — CHLOROTHIAZIDE SODIUM 1000 MG: 500 INJECTION, POWDER, LYOPHILIZED, FOR SOLUTION INTRAVENOUS at 15:16

## 2022-01-01 RX ADMIN — ACETAMINOPHEN 975 MG: 325 TABLET, FILM COATED ORAL at 09:32

## 2022-01-01 RX ADMIN — DIPHENHYDRAMINE HYDROCHLORIDE 25 MG: 25 CAPSULE ORAL at 17:41

## 2022-01-01 RX ADMIN — POTASSIUM CHLORIDE 40 MEQ: 750 TABLET, EXTENDED RELEASE ORAL at 21:38

## 2022-01-01 RX ADMIN — Medication 20 MG/HR: at 05:10

## 2022-01-01 RX ADMIN — CALCIUM GLUCONATE 1 G: 20 INJECTION, SOLUTION INTRAVENOUS at 03:00

## 2022-01-01 RX ADMIN — Medication 1 LOZENGE: at 05:28

## 2022-01-01 RX ADMIN — LIDOCAINE: 40 CREAM TOPICAL at 14:16

## 2022-01-01 RX ADMIN — POTASSIUM CHLORIDE 40 MEQ: 750 TABLET, EXTENDED RELEASE ORAL at 20:21

## 2022-01-01 RX ADMIN — DIPHENHYDRAMINE HYDROCHLORIDE 25 MG: 50 INJECTION INTRAMUSCULAR; INTRAVENOUS at 18:53

## 2022-01-01 RX ADMIN — Medication 1 LOZENGE: at 15:36

## 2022-01-01 RX ADMIN — HEPARIN SODIUM 5000 UNITS: 10000 INJECTION, SOLUTION INTRAVENOUS; SUBCUTANEOUS at 21:18

## 2022-01-01 RX ADMIN — Medication 25 MCG: at 08:28

## 2022-01-01 RX ADMIN — POTASSIUM CHLORIDE 10 MEQ: 750 TABLET, EXTENDED RELEASE ORAL at 22:22

## 2022-01-01 RX ADMIN — DEXTROSE MONOHYDRATE 25 ML: 25 INJECTION, SOLUTION INTRAVENOUS at 06:02

## 2022-01-01 RX ADMIN — Medication 20 MG/HR: at 06:33

## 2022-01-01 RX ADMIN — SODIUM CHLORIDE 300 ML: 9 INJECTION, SOLUTION INTRAVENOUS at 11:14

## 2022-01-01 RX ADMIN — SELEXIPAG 800 MCG: 800 TABLET, COATED ORAL at 11:51

## 2022-01-01 RX ADMIN — VANCOMYCIN HYDROCHLORIDE 1750 MG: 10 INJECTION, POWDER, LYOPHILIZED, FOR SOLUTION INTRAVENOUS at 18:53

## 2022-01-01 RX ADMIN — TADALAFIL 40 MG: 20 TABLET, FILM COATED ORAL at 08:41

## 2022-01-01 RX ADMIN — DEXTROSE MONOHYDRATE 25 G: 25 INJECTION, SOLUTION INTRAVENOUS at 06:14

## 2022-01-01 RX ADMIN — ACETAMINOPHEN 975 MG: 325 TABLET, FILM COATED ORAL at 11:56

## 2022-01-01 RX ADMIN — Medication 40 MG/HR: at 22:27

## 2022-01-01 RX ADMIN — ACETAMINOPHEN 975 MG: 325 TABLET, FILM COATED ORAL at 21:02

## 2022-01-01 RX ADMIN — Medication: at 19:39

## 2022-01-01 RX ADMIN — BUMETANIDE 4 MG: 0.25 INJECTION, SOLUTION INTRAMUSCULAR; INTRAVENOUS at 03:38

## 2022-01-01 RX ADMIN — Medication 1 LOZENGE: at 06:58

## 2022-01-01 RX ADMIN — Medication 1 LOZENGE: at 22:29

## 2022-01-01 RX ADMIN — SODIUM ZIRCONIUM CYCLOSILICATE 10 G: 10 POWDER, FOR SUSPENSION ORAL at 20:07

## 2022-01-01 RX ADMIN — Medication 40 MG/HR: at 10:13

## 2022-01-01 RX ADMIN — FLUTICASONE FUROATE AND VILANTEROL TRIFENATATE 1 PUFF: 100; 25 POWDER RESPIRATORY (INHALATION) at 09:05

## 2022-01-01 RX ADMIN — FUROSEMIDE 40 MG: 40 TABLET ORAL at 09:44

## 2022-01-01 RX ADMIN — Medication 1 LOZENGE: at 23:54

## 2022-01-01 RX ADMIN — ALBUTEROL SULFATE 2 PUFF: 90 AEROSOL, METERED RESPIRATORY (INHALATION) at 08:40

## 2022-01-01 RX ADMIN — Medication 20 MG/HR: at 01:12

## 2022-01-01 RX ADMIN — POTASSIUM CHLORIDE 60 MEQ: 1.5 POWDER, FOR SOLUTION ORAL at 06:27

## 2022-01-01 RX ADMIN — DIPHENHYDRAMINE HYDROCHLORIDE 25 MG: 25 CAPSULE ORAL at 20:25

## 2022-01-01 RX ADMIN — ACETAMINOPHEN 650 MG: 325 TABLET, FILM COATED ORAL at 08:58

## 2022-01-01 RX ADMIN — ONDANSETRON 4 MG: 2 INJECTION INTRAMUSCULAR; INTRAVENOUS at 22:20

## 2022-01-01 RX ADMIN — Medication 1 LOZENGE: at 20:25

## 2022-01-01 RX ADMIN — SODIUM CHLORIDE 300 ML: 9 INJECTION, SOLUTION INTRAVENOUS at 19:38

## 2022-01-01 RX ADMIN — POTASSIUM CHLORIDE 20 MEQ: 1.5 POWDER, FOR SOLUTION ORAL at 20:15

## 2022-01-01 RX ADMIN — POTASSIUM CHLORIDE 20 MEQ: 750 TABLET, EXTENDED RELEASE ORAL at 08:28

## 2022-01-01 RX ADMIN — FUROSEMIDE 60 MG: 10 INJECTION, SOLUTION INTRAMUSCULAR; INTRAVENOUS at 17:16

## 2022-01-01 RX ADMIN — ACETAMINOPHEN 975 MG: 325 TABLET, FILM COATED ORAL at 18:24

## 2022-01-01 RX ADMIN — MORPHINE SULFATE 2 MG: 2 INJECTION, SOLUTION INTRAMUSCULAR; INTRAVENOUS at 03:01

## 2022-01-01 RX ADMIN — DIPHENHYDRAMINE HYDROCHLORIDE 25 MG: 25 CAPSULE ORAL at 23:19

## 2022-01-01 RX ADMIN — BUMETANIDE 2 MG/HR: 0.25 INJECTION, SOLUTION INTRAMUSCULAR; INTRAVENOUS at 04:29

## 2022-01-01 RX ADMIN — ONDANSETRON 4 MG: 2 INJECTION INTRAMUSCULAR; INTRAVENOUS at 08:02

## 2022-01-01 RX ADMIN — PREDNISONE 40 MG: 20 TABLET ORAL at 09:45

## 2022-01-01 RX ADMIN — SELEXIPAG 1400 MCG: 800 TABLET, COATED ORAL at 08:02

## 2022-01-01 RX ADMIN — HEPARIN SODIUM 5000 UNITS: 5000 INJECTION, SOLUTION INTRAVENOUS; SUBCUTANEOUS at 21:39

## 2022-01-01 RX ADMIN — FUROSEMIDE 80 MG: 10 INJECTION, SOLUTION INTRAVENOUS at 21:39

## 2022-01-01 RX ADMIN — POTASSIUM CHLORIDE 20 MEQ: 750 TABLET, EXTENDED RELEASE ORAL at 04:49

## 2022-01-01 RX ADMIN — ACETAMINOPHEN 975 MG: 325 TABLET, FILM COATED ORAL at 08:49

## 2022-01-01 RX ADMIN — SELEXIPAG 1400 MCG: 800 TABLET, COATED ORAL at 22:12

## 2022-01-01 RX ADMIN — POTASSIUM CHLORIDE 10 MEQ: 750 TABLET, EXTENDED RELEASE ORAL at 09:27

## 2022-01-01 RX ADMIN — UMECLIDINIUM 1 PUFF: 62.5 AEROSOL, POWDER ORAL at 09:40

## 2022-01-01 RX ADMIN — TADALAFIL 40 MG: 20 TABLET, FILM COATED ORAL at 08:52

## 2022-01-01 RX ADMIN — Medication 25 MCG: at 08:39

## 2022-01-01 RX ADMIN — ONDANSETRON 4 MG: 2 INJECTION INTRAMUSCULAR; INTRAVENOUS at 16:01

## 2022-01-01 RX ADMIN — GRANISETRON HYDROCHLORIDE 1 MG: 1 INJECTION, SOLUTION INTRAVENOUS at 21:40

## 2022-01-01 RX ADMIN — BUMETANIDE 4 MG: 0.25 INJECTION, SOLUTION INTRAMUSCULAR; INTRAVENOUS at 20:12

## 2022-01-01 RX ADMIN — AMBRISENTAN 10 MG: 10 TABLET, FILM COATED ORAL at 09:01

## 2022-01-01 RX ADMIN — SODIUM CHLORIDE 250 ML: 9 INJECTION, SOLUTION INTRAVENOUS at 11:14

## 2022-01-01 RX ADMIN — HUMAN INSULIN 8.05 UNITS: 100 INJECTION, SOLUTION SUBCUTANEOUS at 06:15

## 2022-01-01 RX ADMIN — HEPARIN SODIUM 5000 UNITS: 5000 INJECTION, SOLUTION INTRAVENOUS; SUBCUTANEOUS at 08:03

## 2022-01-01 RX ADMIN — DIPHENHYDRAMINE HYDROCHLORIDE 25 MG: 25 CAPSULE ORAL at 18:24

## 2022-01-01 RX ADMIN — FENTANYL CITRATE 50 MCG: 0.05 INJECTION, SOLUTION INTRAMUSCULAR; INTRAVENOUS at 01:56

## 2022-01-01 RX ADMIN — POTASSIUM CHLORIDE 40 MEQ: 750 TABLET, EXTENDED RELEASE ORAL at 08:28

## 2022-01-01 RX ADMIN — PREDNISONE 2.5 MG: 2.5 TABLET ORAL at 07:57

## 2022-01-01 RX ADMIN — DEXTROSE MONOHYDRATE 25 ML: 25 INJECTION, SOLUTION INTRAVENOUS at 05:15

## 2022-01-01 RX ADMIN — CHLOROTHIAZIDE SODIUM 1000 MG: 500 INJECTION, POWDER, LYOPHILIZED, FOR SOLUTION INTRAVENOUS at 00:35

## 2022-01-01 RX ADMIN — SELEXIPAG 1600 MCG: 800 TABLET, COATED ORAL at 08:45

## 2022-01-01 RX ADMIN — POTASSIUM CHLORIDE 40 MEQ: 750 TABLET, EXTENDED RELEASE ORAL at 20:09

## 2022-01-01 RX ADMIN — CALCIUM GLUCONATE 1 G: 20 INJECTION, SOLUTION INTRAVENOUS at 16:06

## 2022-01-01 RX ADMIN — POTASSIUM CHLORIDE 40 MEQ: 750 TABLET, EXTENDED RELEASE ORAL at 19:45

## 2022-01-01 RX ADMIN — FLUTICASONE FUROATE AND VILANTEROL TRIFENATATE 1 PUFF: 100; 25 POWDER RESPIRATORY (INHALATION) at 21:40

## 2022-01-01 RX ADMIN — Medication 25 MCG: at 09:01

## 2022-01-01 RX ADMIN — Medication 25 MCG: at 08:43

## 2022-01-01 RX ADMIN — PREDNISONE 10 MG: 10 TABLET ORAL at 08:43

## 2022-01-01 RX ADMIN — Medication 1 LOZENGE: at 21:01

## 2022-01-01 RX ADMIN — HYDROMORPHONE HYDROCHLORIDE 0.2 MG: 0.2 INJECTION, SOLUTION INTRAMUSCULAR; INTRAVENOUS; SUBCUTANEOUS at 20:29

## 2022-01-01 RX ADMIN — SELEXIPAG 1600 MCG: 800 TABLET, COATED ORAL at 19:46

## 2022-01-01 RX ADMIN — POTASSIUM CHLORIDE 40 MEQ: 750 TABLET, EXTENDED RELEASE ORAL at 20:04

## 2022-01-01 RX ADMIN — Medication 1 LOZENGE: at 08:43

## 2022-01-01 RX ADMIN — HEPARIN SODIUM 5000 UNITS: 10000 INJECTION, SOLUTION INTRAVENOUS; SUBCUTANEOUS at 21:52

## 2022-01-01 RX ADMIN — DEXTROSE MONOHYDRATE 300 ML: 100 INJECTION, SOLUTION INTRAVENOUS at 05:59

## 2022-01-01 RX ADMIN — SELEXIPAG 1600 MCG: 800 TABLET, COATED ORAL at 17:40

## 2022-01-01 RX ADMIN — POTASSIUM CHLORIDE 20 MEQ: 750 TABLET, EXTENDED RELEASE ORAL at 18:44

## 2022-01-01 RX ADMIN — ACETAMINOPHEN 975 MG: 325 TABLET, FILM COATED ORAL at 16:35

## 2022-01-01 RX ADMIN — AMBRISENTAN 10 MG: 10 TABLET, FILM COATED ORAL at 08:04

## 2022-01-01 RX ADMIN — UMECLIDINIUM 1 PUFF: 62.5 AEROSOL, POWDER ORAL at 07:57

## 2022-01-01 RX ADMIN — SELEXIPAG 1600 MCG: 800 TABLET, COATED ORAL at 08:28

## 2022-01-01 RX ADMIN — ACETAMINOPHEN 975 MG: 325 TABLET, FILM COATED ORAL at 03:01

## 2022-01-01 RX ADMIN — HEPARIN SODIUM 5000 UNITS: 5000 INJECTION, SOLUTION INTRAVENOUS; SUBCUTANEOUS at 09:02

## 2022-01-01 RX ADMIN — HEPARIN SODIUM 5000 UNITS: 10000 INJECTION, SOLUTION INTRAVENOUS; SUBCUTANEOUS at 08:35

## 2022-01-01 RX ADMIN — Medication 40 MG/HR: at 14:54

## 2022-01-01 RX ADMIN — ACETAMINOPHEN 975 MG: 325 TABLET, FILM COATED ORAL at 22:29

## 2022-01-01 RX ADMIN — POTASSIUM CHLORIDE 40 MEQ: 750 TABLET, EXTENDED RELEASE ORAL at 09:34

## 2022-01-01 RX ADMIN — Medication 1 LOZENGE: at 00:55

## 2022-01-01 RX ADMIN — TRAMADOL HYDROCHLORIDE 25 MG: 50 TABLET, COATED ORAL at 21:39

## 2022-01-01 RX ADMIN — BUDESONIDE 0.25 MG: 0.25 INHALANT RESPIRATORY (INHALATION) at 07:39

## 2022-01-01 RX ADMIN — PREDNISONE 10 MG: 10 TABLET ORAL at 08:39

## 2022-01-01 RX ADMIN — FERROUS GLUCONATE 324 MG: 324 TABLET ORAL at 08:04

## 2022-01-01 RX ADMIN — MORPHINE SULFATE 2 MG: 2 INJECTION, SOLUTION INTRAMUSCULAR; INTRAVENOUS at 04:25

## 2022-01-01 RX ADMIN — PREDNISONE 10 MG: 10 TABLET ORAL at 09:32

## 2022-01-01 RX ADMIN — POLYETHYLENE GLYCOL 3350 17 G: 17 POWDER, FOR SOLUTION ORAL at 07:57

## 2022-01-01 RX ADMIN — ACETAMINOPHEN 975 MG: 325 TABLET, FILM COATED ORAL at 14:47

## 2022-01-01 RX ADMIN — DIPHENHYDRAMINE HYDROCHLORIDE 25 MG: 25 CAPSULE ORAL at 05:41

## 2022-01-01 RX ADMIN — BUMETANIDE 2 MG/HR: 0.25 INJECTION, SOLUTION INTRAMUSCULAR; INTRAVENOUS at 20:08

## 2022-01-01 RX ADMIN — SODIUM ZIRCONIUM CYCLOSILICATE 10 G: 10 POWDER, FOR SUSPENSION ORAL at 08:00

## 2022-01-01 RX ADMIN — FLUTICASONE FUROATE AND VILANTEROL TRIFENATATE 1 PUFF: 200; 25 POWDER RESPIRATORY (INHALATION) at 07:38

## 2022-01-01 RX ADMIN — SELEXIPAG 1600 MCG: 800 TABLET, COATED ORAL at 20:10

## 2022-01-01 RX ADMIN — SENNOSIDES AND DOCUSATE SODIUM 2 TABLET: 50; 8.6 TABLET ORAL at 09:46

## 2022-01-01 RX ADMIN — FLUTICASONE FUROATE AND VILANTEROL TRIFENATATE 1 PUFF: 100; 25 POWDER RESPIRATORY (INHALATION) at 07:57

## 2022-01-01 RX ADMIN — Medication 1 LOZENGE: at 12:03

## 2022-01-01 RX ADMIN — POTASSIUM CHLORIDE 10 MEQ: 750 TABLET, EXTENDED RELEASE ORAL at 15:57

## 2022-01-01 RX ADMIN — LORAZEPAM 0.5 MG: 2 INJECTION INTRAMUSCULAR at 11:35

## 2022-01-01 RX ADMIN — BUMETANIDE 5 MG: 1 TABLET ORAL at 16:36

## 2022-01-01 RX ADMIN — Medication 1 LOZENGE: at 14:46

## 2022-01-01 RX ADMIN — ALBUTEROL SULFATE 2 PUFF: 90 AEROSOL, METERED RESPIRATORY (INHALATION) at 19:42

## 2022-01-01 RX ADMIN — SELEXIPAG 1600 MCG: 800 TABLET, COATED ORAL at 08:39

## 2022-01-01 RX ADMIN — HEPARIN SODIUM 5000 UNITS: 5000 INJECTION, SOLUTION INTRAVENOUS; SUBCUTANEOUS at 20:06

## 2022-01-01 RX ADMIN — ACETAMINOPHEN 975 MG: 325 TABLET, FILM COATED ORAL at 10:15

## 2022-01-01 RX ADMIN — DEXTROSE MONOHYDRATE 300 ML: 100 INJECTION, SOLUTION INTRAVENOUS at 03:46

## 2022-01-01 RX ADMIN — ACETAMINOPHEN 975 MG: 325 TABLET, FILM COATED ORAL at 20:25

## 2022-01-01 RX ADMIN — FENTANYL CITRATE 50 MCG: 0.05 INJECTION, SOLUTION INTRAMUSCULAR; INTRAVENOUS at 22:27

## 2022-01-01 RX ADMIN — TAZOBACTAM SODIUM AND PIPERACILLIN SODIUM 3.38 G: 375; 3 INJECTION, SOLUTION INTRAVENOUS at 18:06

## 2022-01-01 RX ADMIN — TADALAFIL 40 MG: 20 TABLET, FILM COATED ORAL at 09:01

## 2022-01-01 RX ADMIN — PREDNISONE 10 MG: 10 TABLET ORAL at 08:17

## 2022-01-01 RX ADMIN — PREDNISONE 10 MG: 10 TABLET ORAL at 08:28

## 2022-01-01 RX ADMIN — TADALAFIL 40 MG: 20 TABLET, FILM COATED ORAL at 08:28

## 2022-01-01 RX ADMIN — Medication 1 LOZENGE: at 08:54

## 2022-01-01 RX ADMIN — HUMAN INSULIN 8 UNITS: 100 INJECTION, SOLUTION SUBCUTANEOUS at 03:34

## 2022-01-01 RX ADMIN — BUDESONIDE 0.25 MG: 0.25 INHALANT RESPIRATORY (INHALATION) at 20:15

## 2022-01-01 RX ADMIN — DEXTROSE MONOHYDRATE 25 G: 25 INJECTION, SOLUTION INTRAVENOUS at 17:27

## 2022-01-01 RX ADMIN — TREPROSTINIL 2 NG/KG/MIN: 20 INJECTION, SOLUTION INTRAVENOUS; SUBCUTANEOUS at 02:11

## 2022-01-01 RX ADMIN — Medication 20 MG/HR: at 15:36

## 2022-01-01 RX ADMIN — TADALAFIL 40 MG: 20 TABLET, FILM COATED ORAL at 07:57

## 2022-01-01 RX ADMIN — GADOBUTROL 10 ML: 604.72 INJECTION INTRAVENOUS at 12:19

## 2022-01-01 RX ADMIN — ACETAMINOPHEN 975 MG: 325 TABLET, FILM COATED ORAL at 23:52

## 2022-01-01 RX ADMIN — LORAZEPAM 0.5 MG: 2 INJECTION INTRAMUSCULAR at 00:59

## 2022-01-01 RX ADMIN — DIPHENHYDRAMINE HYDROCHLORIDE 25 MG: 25 CAPSULE ORAL at 05:51

## 2022-01-01 RX ADMIN — Medication 1 LOZENGE: at 14:18

## 2022-01-01 RX ADMIN — DIPHENHYDRAMINE HYDROCHLORIDE 25 MG: 25 CAPSULE ORAL at 12:48

## 2022-01-01 RX ADMIN — ACETAMINOPHEN 650 MG: 325 TABLET, FILM COATED ORAL at 07:57

## 2022-01-01 ASSESSMENT — ENCOUNTER SYMPTOMS
LIGHT-HEADEDNESS: 0
PALPITATIONS: 1
NERVOUS/ANXIOUS: 0
COUGH: 0
BLOOD IN STOOL: 0
HOARSE VOICE: 0
HYPERTENSION: 0
JAUNDICE: 0
POSTURAL DYSPNEA: 1
HEARTBURN: 1
PANIC: 0
MEMORY LOSS: 0
TREMORS: 0
HEARTBURN: 1
DIARRHEA: 0
POSTURAL DYSPNEA: 1
COUGH: 1
SLEEP DISTURBANCES DUE TO BREATHING: 1
MUSCLE WEAKNESS: 1
MUSCLE CRAMPS: 0
JAUNDICE: 0
COUGH: 1
POOR WOUND HEALING: 0
WEAKNESS: 1
HYPOTENSION: 0
SEIZURES: 0
FLANK PAIN: 1
VOMITING: 0
TROUBLE SWALLOWING: 1
NECK PAIN: 1
DYSURIA: 0
SMELL DISTURBANCE: 0
TROUBLE SWALLOWING: 1
MYALGIAS: 1
BLOATING: 1
HYPOTENSION: 0
BLOOD IN STOOL: 0
SINUS PAIN: 1
WHEEZING: 1
HOARSE VOICE: 0
COUGH DISTURBING SLEEP: 0
DEPRESSION: 1
SINUS CONGESTION: 1
CONSTIPATION: 0
SHORTNESS OF BREATH: 1
TROUBLE SWALLOWING: 1
DEPRESSION: 1
JAUNDICE: 0
HYPERTENSION: 0
DYSPNEA ON EXERTION: 1
DYSURIA: 0
DEPRESSION: 1
DEPRESSION: 1
HEMATURIA: 0
SLEEP DISTURBANCES DUE TO BREATHING: 1
INSOMNIA: 1
NECK MASS: 0
POSTURAL DYSPNEA: 1
INSOMNIA: 0
VOMITING: 0
SMELL DISTURBANCE: 0
FLANK PAIN: 1
BOWEL INCONTINENCE: 0
SMELL DISTURBANCE: 0
JAUNDICE: 0
VOMITING: 0
TROUBLE SWALLOWING: 1
ABDOMINAL PAIN: 1
NECK MASS: 0
MUSCLE CRAMPS: 0
ORTHOPNEA: 1
HYPOTENSION: 0
HEARTBURN: 1
SINUS CONGESTION: 1
NECK MASS: 0
DYSURIA: 0
SYNCOPE: 0
HEMOPTYSIS: 0
CONSTIPATION: 0
HEMOPTYSIS: 0
SPUTUM PRODUCTION: 1
NAUSEA: 0
NAUSEA: 0
DYSPNEA ON EXERTION: 1
DECREASED CONCENTRATION: 0
NAUSEA: 0
HEMATURIA: 0
BLOATING: 1
WHEEZING: 1
ABDOMINAL PAIN: 1
BOWEL INCONTINENCE: 0
SHORTNESS OF BREATH: 1
SINUS PAIN: 1
NUMBNESS: 0
TINGLING: 0
JOINT SWELLING: 0
NAIL CHANGES: 0
DIARRHEA: 1
DIARRHEA: 1
STIFFNESS: 0
SMELL DISTURBANCE: 0
POOR WOUND HEALING: 0
EXERCISE INTOLERANCE: 1
FLANK PAIN: 1
SMELL DISTURBANCE: 0
FLANK PAIN: 1
NAIL CHANGES: 0
COUGH DISTURBING SLEEP: 0
BOWEL INCONTINENCE: 0
HEARTBURN: 1
ABDOMINAL PAIN: 1
SORE THROAT: 1
TASTE DISTURBANCE: 0
LEG PAIN: 0
BLOATING: 1
ORTHOPNEA: 1
HEADACHES: 1
BLOOD IN STOOL: 0
HEMOPTYSIS: 0
TASTE DISTURBANCE: 1
NERVOUS/ANXIOUS: 0
PARALYSIS: 0
BRUISES/BLEEDS EASILY: 1
TROUBLE SWALLOWING: 1
SORE THROAT: 0
SNORES LOUDLY: 0
DIZZINESS: 0
ABDOMINAL PAIN: 1
DIFFICULTY URINATING: 0
BACK PAIN: 1
ORTHOPNEA: 1
DIARRHEA: 0
SPUTUM PRODUCTION: 1
VOMITING: 0
DYSPNEA ON EXERTION: 1
BRUISES/BLEEDS EASILY: 1
SINUS PAIN: 1
DECREASED CONCENTRATION: 0
SORE THROAT: 0
JAUNDICE: 0
DIFFICULTY URINATING: 0
BLOATING: 0
BACK PAIN: 1
DEPRESSION: 1
PANIC: 0
HYPERTENSION: 0
RECTAL PAIN: 0
BRUISES/BLEEDS EASILY: 1
SNORES LOUDLY: 0
NERVOUS/ANXIOUS: 0
HYPOTENSION: 0
TASTE DISTURBANCE: 0
SPUTUM PRODUCTION: 1
BLOATING: 0
TASTE DISTURBANCE: 0
EXERCISE INTOLERANCE: 1
LEG PAIN: 0
PANIC: 0
PALPITATIONS: 1
NAUSEA: 0
SLEEP DISTURBANCES DUE TO BREATHING: 1
HEMATURIA: 0
LOSS OF CONSCIOUSNESS: 0
VOMITING: 0
DIFFICULTY URINATING: 0
RECTAL PAIN: 0
TASTE DISTURBANCE: 1
MUSCLE WEAKNESS: 1
DECREASED CONCENTRATION: 0
INSOMNIA: 0
SYNCOPE: 0
MUSCLE WEAKNESS: 1
DYSURIA: 0
EXERCISE INTOLERANCE: 1
HEMATURIA: 0
HOARSE VOICE: 0
SINUS CONGESTION: 1
LEG PAIN: 0
MYALGIAS: 1
HOARSE VOICE: 0
ARTHRALGIAS: 0
DYSPNEA ON EXERTION: 1
MUSCLE CRAMPS: 0
DYSPNEA ON EXERTION: 1
NECK MASS: 0
PANIC: 0
DISTURBANCES IN COORDINATION: 0
BOWEL INCONTINENCE: 0
SHORTNESS OF BREATH: 1
SLEEP DISTURBANCES DUE TO BREATHING: 1
STIFFNESS: 1
DIFFICULTY URINATING: 0
HYPOTENSION: 0
SPUTUM PRODUCTION: 1
EXERCISE INTOLERANCE: 1
ABDOMINAL PAIN: 1
COUGH DISTURBING SLEEP: 0
LIGHT-HEADEDNESS: 0
SNORES LOUDLY: 0
COUGH DISTURBING SLEEP: 0
JOINT SWELLING: 0
DIFFICULTY URINATING: 0
BACK PAIN: 1
POSTURAL DYSPNEA: 1
DYSURIA: 0
SKIN CHANGES: 0
SHORTNESS OF BREATH: 1
EXERCISE INTOLERANCE: 1
LIGHT-HEADEDNESS: 0
ORTHOPNEA: 1
STIFFNESS: 1
SPUTUM PRODUCTION: 1
DECREASED CONCENTRATION: 0
NECK PAIN: 0
NERVOUS/ANXIOUS: 0
COUGH: 0
WHEEZING: 1
BLOOD IN STOOL: 0
COUGH: 1
ORTHOPNEA: 1
LEG PAIN: 0
SHORTNESS OF BREATH: 1
PALPITATIONS: 1
SORE THROAT: 0
SHORTNESS OF BREATH: 1
RECTAL PAIN: 0
SWOLLEN GLANDS: 0
SINUS CONGESTION: 1
HYPERTENSION: 0
SYNCOPE: 0
SINUS PAIN: 1
POOR WOUND HEALING: 0
POSTURAL DYSPNEA: 1
WHEEZING: 1
PALPITATIONS: 1
NAIL CHANGES: 0
JOINT SWELLING: 0
FLANK PAIN: 1
DIARRHEA: 1
LEG PAIN: 0
BLOOD IN STOOL: 0
INSOMNIA: 1
SKIN CHANGES: 0
SKIN CHANGES: 0
BOWEL INCONTINENCE: 0
COUGH DISTURBING SLEEP: 0
ARTHRALGIAS: 0
LIGHT-HEADEDNESS: 0
NERVOUS/ANXIOUS: 0
LIGHT-HEADEDNESS: 0
SWOLLEN GLANDS: 0
PANIC: 0
ABDOMINAL PAIN: 1
HOARSE VOICE: 0
HEMATURIA: 0
SLEEP DISTURBANCES DUE TO BREATHING: 1
SNORES LOUDLY: 0
CONSTIPATION: 0
CONSTIPATION: 0
SNORES LOUDLY: 0
ARTHRALGIAS: 0
INSOMNIA: 1
HYPERTENSION: 0
NECK MASS: 0
FEVER: 0
SYNCOPE: 0
NECK PAIN: 1
HEMOPTYSIS: 0
NAUSEA: 0
SINUS PAIN: 1
CONSTIPATION: 0
RECTAL PAIN: 0
MYALGIAS: 1
PALPITATIONS: 1
SWOLLEN GLANDS: 0
RECTAL PAIN: 0
SYNCOPE: 0
HEARTBURN: 0
SINUS CONGESTION: 1
SORE THROAT: 0
HEMOPTYSIS: 0
SPEECH CHANGE: 0
DECREASED CONCENTRATION: 0

## 2022-01-01 ASSESSMENT — ACTIVITIES OF DAILY LIVING (ADL)
ADLS_ACUITY_SCORE: 47
ADLS_ACUITY_SCORE: 26
ADLS_ACUITY_SCORE: 28
ADLS_ACUITY_SCORE: 53
ADLS_ACUITY_SCORE: 28
ADLS_ACUITY_SCORE: 26
ADLS_ACUITY_SCORE: 35
ADLS_ACUITY_SCORE: 26
ADLS_ACUITY_SCORE: 29
ADLS_ACUITY_SCORE: 26
ADLS_ACUITY_SCORE: 24
ADLS_ACUITY_SCORE: 49
ADLS_ACUITY_SCORE: 28
ADLS_ACUITY_SCORE: 28
ADLS_ACUITY_SCORE: 53
ADLS_ACUITY_SCORE: 47
ADLS_ACUITY_SCORE: 6
ADLS_ACUITY_SCORE: 47
ADLS_ACUITY_SCORE: 53
ADLS_ACUITY_SCORE: 26
ADLS_ACUITY_SCORE: 26
ADLS_ACUITY_SCORE: 28
ADLS_ACUITY_SCORE: 39
ADLS_ACUITY_SCORE: 24
ADLS_ACUITY_SCORE: 29
ADLS_ACUITY_SCORE: 6
ADLS_ACUITY_SCORE: 24
ADLS_ACUITY_SCORE: 28
ADLS_ACUITY_SCORE: 24
ADLS_ACUITY_SCORE: 24
ADLS_ACUITY_SCORE: 26
ADLS_ACUITY_SCORE: 45
ADLS_ACUITY_SCORE: 39
ADLS_ACUITY_SCORE: 6
ADLS_ACUITY_SCORE: 26
ADLS_ACUITY_SCORE: 28
ADLS_ACUITY_SCORE: 26
ADLS_ACUITY_SCORE: 28
ADLS_ACUITY_SCORE: 53
CHANGE_IN_FUNCTIONAL_STATUS_SINCE_ONSET_OF_CURRENT_ILLNESS/INJURY: YES
ADLS_ACUITY_SCORE: 26
ADLS_ACUITY_SCORE: 24
ADLS_ACUITY_SCORE: 26
ADLS_ACUITY_SCORE: 26
ADLS_ACUITY_SCORE: 24
ADLS_ACUITY_SCORE: 41
FALL_HISTORY_WITHIN_LAST_SIX_MONTHS: NO
ADLS_ACUITY_SCORE: 28
ADLS_ACUITY_SCORE: 35
ADLS_ACUITY_SCORE: 6
ADLS_ACUITY_SCORE: 24
ADLS_ACUITY_SCORE: 6
ADLS_ACUITY_SCORE: 47
ADLS_ACUITY_SCORE: 24
ADLS_ACUITY_SCORE: 39
ADLS_ACUITY_SCORE: 28
ADLS_ACUITY_SCORE: 26
CONCENTRATING,_REMEMBERING_OR_MAKING_DECISIONS_DIFFICULTY: YES
TRANSFERRING: 1-->ASSISTANCE (EQUIPMENT/PERSON) NEEDED
DRESSING/BATHING_DIFFICULTY: YES
ADLS_ACUITY_SCORE: 28
ADLS_ACUITY_SCORE: 26
ADLS_ACUITY_SCORE: 24
ADLS_ACUITY_SCORE: 28
DRESSING/BATHING: DRESSING DIFFICULTY, ASSISTANCE 1 PERSON;BATHING DIFFICULTY, ASSISTANCE 1 PERSON
ADLS_ACUITY_SCORE: 39
ADLS_ACUITY_SCORE: 28
ADLS_ACUITY_SCORE: 47
ADLS_ACUITY_SCORE: 6
ADLS_ACUITY_SCORE: 26
ADLS_ACUITY_SCORE: 29
ADLS_ACUITY_SCORE: 24
ADLS_ACUITY_SCORE: 28
ADLS_ACUITY_SCORE: 39
ADLS_ACUITY_SCORE: 28
ADLS_ACUITY_SCORE: 28
ADLS_ACUITY_SCORE: 29
DOING_ERRANDS_INDEPENDENTLY_DIFFICULTY: YES
DRESS: 1-->ASSISTANCE (EQUIPMENT/PERSON) NEEDED (NOT DEVELOPMENTALLY APPROPRIATE)
ADLS_ACUITY_SCORE: 26
ADLS_ACUITY_SCORE: 28
ADLS_ACUITY_SCORE: 29
BATHING: 1-->ASSISTANCE NEEDED
FALL_HISTORY_WITHIN_LAST_SIX_MONTHS: NO
ADLS_ACUITY_SCORE: 41
ADLS_ACUITY_SCORE: 28
DRESS: 1-->ASSISTANCE (EQUIPMENT/PERSON) NEEDED
ADLS_ACUITY_SCORE: 26
ADLS_ACUITY_SCORE: 28
ADLS_ACUITY_SCORE: 6
ADLS_ACUITY_SCORE: 6
ADLS_ACUITY_SCORE: 24
DIFFICULTY_EATING/SWALLOWING: NO
ADLS_ACUITY_SCORE: 26
ADLS_ACUITY_SCORE: 29
ADLS_ACUITY_SCORE: 49
WALKING_OR_CLIMBING_STAIRS: OTHER (SEE COMMENTS)
ADLS_ACUITY_SCORE: 39
ADLS_ACUITY_SCORE: 26
ADLS_ACUITY_SCORE: 26
ADLS_ACUITY_SCORE: 47
ADLS_ACUITY_SCORE: 41
ADLS_ACUITY_SCORE: 28
ADLS_ACUITY_SCORE: 28
DEPENDENT_IADLS:: CLEANING;COOKING;LAUNDRY;MEAL PREPARATION
ADLS_ACUITY_SCORE: 47
ADLS_ACUITY_SCORE: 28
TRANSFERRING: 1-->ASSISTANCE (EQUIPMENT/PERSON) NEEDED (NOT DEVELOPMENTALLY APPROPRIATE)
ADLS_ACUITY_SCORE: 12
ADLS_ACUITY_SCORE: 24
ADLS_ACUITY_SCORE: 28
ADLS_ACUITY_SCORE: 28
ADLS_ACUITY_SCORE: 29
ADLS_ACUITY_SCORE: 6
ADLS_ACUITY_SCORE: 28
ADLS_ACUITY_SCORE: 26
ADLS_ACUITY_SCORE: 28
ADLS_ACUITY_SCORE: 26
ADLS_ACUITY_SCORE: 53
ADLS_ACUITY_SCORE: 28
ADLS_ACUITY_SCORE: 47
ADLS_ACUITY_SCORE: 28
ADLS_ACUITY_SCORE: 28
ADLS_ACUITY_SCORE: 24
ADLS_ACUITY_SCORE: 26
ADLS_ACUITY_SCORE: 26
ADLS_ACUITY_SCORE: 24
TOILETING_ISSUES: NO
ADLS_ACUITY_SCORE: 26
ADLS_ACUITY_SCORE: 26
ADLS_ACUITY_SCORE: 6
ADLS_ACUITY_SCORE: 53
ADLS_ACUITY_SCORE: 53
ADLS_ACUITY_SCORE: 6
ADLS_ACUITY_SCORE: 29
ADLS_ACUITY_SCORE: 24
ADLS_ACUITY_SCORE: 6
ADLS_ACUITY_SCORE: 28
ADLS_ACUITY_SCORE: 26
ADLS_ACUITY_SCORE: 12
ADLS_ACUITY_SCORE: 26
EQUIPMENT_CURRENTLY_USED_AT_HOME: OTHER (SEE COMMENTS)
ADLS_ACUITY_SCORE: 28
ADLS_ACUITY_SCORE: 12
ADLS_ACUITY_SCORE: 28
ADLS_ACUITY_SCORE: 47
ADLS_ACUITY_SCORE: 28
ADLS_ACUITY_SCORE: 47
VISION_MANAGEMENT: READING
ADLS_ACUITY_SCORE: 35
ADLS_ACUITY_SCORE: 28
ADLS_ACUITY_SCORE: 26
ADLS_ACUITY_SCORE: 28
ADLS_ACUITY_SCORE: 24
ADLS_ACUITY_SCORE: 28
WEAR_GLASSES_OR_BLIND: YES
ADLS_ACUITY_SCORE: 41
ADLS_ACUITY_SCORE: 26
ADLS_ACUITY_SCORE: 26
ADLS_ACUITY_SCORE: 47
ADLS_ACUITY_SCORE: 26
ADLS_ACUITY_SCORE: 28
ADLS_ACUITY_SCORE: 6
ADLS_ACUITY_SCORE: 26
ADLS_ACUITY_SCORE: 29
ADLS_ACUITY_SCORE: 6
ADLS_ACUITY_SCORE: 28
ADLS_ACUITY_SCORE: 47
WALKING_OR_CLIMBING_STAIRS_DIFFICULTY: YES

## 2022-01-01 ASSESSMENT — PATIENT HEALTH QUESTIONNAIRE - PHQ9
SUM OF ALL RESPONSES TO PHQ QUESTIONS 1-9: 13
SUM OF ALL RESPONSES TO PHQ QUESTIONS 1-9: 14

## 2022-01-01 ASSESSMENT — PAIN SCALES - GENERAL
PAINLEVEL: NO PAIN (0)
PAINLEVEL: NO PAIN (0)
PAINLEVEL: MILD PAIN (3)
PAINLEVEL: NO PAIN (0)
PAINLEVEL: MODERATE PAIN (5)
PAINLEVEL: NO PAIN (0)

## 2022-01-26 NOTE — PROGRESS NOTES
Chief Complaint   Patient presents with     RECHECK       Weight 70.3 kg (155 lb).    Jimmy is a 61 year old who is being evaluated via a billable video visit.      How would you like to obtain your AVS? MyChart  If the video visit is dropped, the invitation should be resent by: Other e-mail: use [employer]  Will anyone else be joining your video visit? No    Telephone encounter: Time on phone with Dr. Ba present: 9 minutes    Nephrology Clinic Follow Up Visit    Assessment and Plan:     1. CKD 4: Baseline Cr 1.4 in 2018 and rising since to a baseline near 3 in July 2020, then had SISI in setting of renal stone disease with rise of Cr into the 4s. Now Cr has returned to ~2.6-3.0 as a baseline. He likely has little renal reserve and further kidney injury could result in him needing renal replacement therapy. He was enrolled previously in the CKD Journey - will plan to have him complete dialysis education. At next visit, we can discuss modalities and begin access planning.   - Dialysis education  - RTC in 3-4 months    2. BP - at goal < 130/80    3. Volume - based on history, seems to be relatively euvolemic, currently on lasix 40mg once daily    4. Electrolytes/Acid-Base - elevated bicarb, likely compensation for metabolic acidosis    5. Anemia of CKD - Hgb 11.9, at goal    6. Nephrolithiasis - had left PCNL on 9/20/21 but has residual stone disease. Drinks plenty of fluids and follows low salt diet. Follows with Urology.    7. Sarcoidosis -  Management per Pulmonology    8. Pulmonary HTN - Management per Dr. Lindsey    Assessment and plan was discussed with patient and he voiced his understanding and agreement.    I discussed the patient's plan of care with Dr. Ba.    Karoline Parmar MD  Nephrology Fellow    Reason for Visit:  Jimmy Isaac is a 61 year old male with a history of Sarcoidosis, CKD and Nephrolithiasis who presents for routine follow up. Sarcoidosis was diagnosed in 1991 complicated by pulmonary  hypertension, hypoxia on home oxygen when ambulating and nephrolithiasis (calcium oxalate).    HPI:  Mr. Isaac was last seen in Nephrology clinic on 21 with Radha Rod PA-C. He has been overall feeling well. He reports recently being started on prednisone and Dulera and these have improved his respiratory symptoms. He has gained a little weight with the prednisone due to increased appetite. He does not feel like he's retaining more fluid. He occasionally has flank pain on the right side. He also occasionally notices a small amount of blood in the urine (rare). He drinks about 100oz of fluid daily. He follows a very low salt diet. He has not previously thought too much about dialysis should his kidneys continue to worsen. He would be interested in learning more about dialysis options.      ROS:  A comprehensive review of systems was obtained and negative, except as noted in the HPI or PMH.    Active Medical Problems:  Patient Active Problem List   Diagnosis     Pulmonary hypertension (H)     Sarcoidosis     Renal insufficiency due to nephrolithiasis     Dyspnea on exertion     CKD (chronic kidney disease) stage 4, GFR 15-29 ml/min (H)     High degree atrioventricular block     Kidney stone       Personal Hx:   Social History     Tobacco Use     Smoking status: Former Smoker     Packs/day: 1.00     Years: 30.00     Pack years: 30.00     Types: Cigarettes     Start date: 10/10/1975     Quit date: 3/1/2010     Years since quittin.9     Smokeless tobacco: Never Used   Substance Use Topics     Alcohol use: Not Currently     Alcohol/week: 0.0 standard drinks     Comment: stated 2 bottles of beer occ       Allergies: Reviewed by provider.     Medications:  Current Outpatient Medications   Medication Sig     ambrisentan (LETAIRIS) 10 MG tablet Take 10 mg by mouth every morning      furosemide (LASIX) 20 MG tablet Take 2 tablets (40 mg) by mouth 2 times daily     mometasone-formoterol (DULERA) 200-5 MCG/ACT  inhaler Inhale 2 puffs into the lungs 2 times daily     predniSONE (DELTASONE) 10 MG tablet Take 1 tablet (10 mg) by mouth daily     PROAIR  (90 Base) MCG/ACT inhaler Inhale 2 puffs into the lungs every 6 hours as needed for shortness of breath / dyspnea     selexipag (UPTRAVI) 1600 MCG tablet Take 1 tablet (1,600 mcg) by mouth every 12 hours     tadalafil, PAH, (ALYQ) 20 MG TABS Take 2 tablets (40 mg) by mouth daily     UNABLE TO FIND MEDICATION NAME: Home Oxygen, 5-10 L     Vitamin D3 (CHOLECALCIFEROL) 25 mcg (1000 units) tablet Take 1 tablet (25 mcg) by mouth every other day     budesonide (PULMICORT) 0.25 MG/2ML neb solution Take 2 mLs (0.25 mg) by nebulization 2 times daily     tamsulosin (FLOMAX) 0.4 MG capsule Take 1 capsule (0.4 mg) by mouth daily     UNABLE TO FIND MEDICATION NAME: Topical skin tag remover     No current facility-administered medications for this visit.       Vitals:  Wt 70.3 kg (155 lb)   BMI 21.02 kg/m      Exam:  No exam - telephone encounter    LABS:   CMP  Recent Labs   Lab Test 01/24/22  1413 12/27/21  1409 12/15/21  1138 11/17/21  0846 07/27/21  0742 07/01/21  1336 06/16/21  1630 12/11/19  1109 09/25/19  1209    140 138 139   < > 142 138 140 136   POTASSIUM 4.1 4.1 3.5 3.6   < > 4.0 4.4 4.3 4.1   CHLORIDE 103 105 99 101   < > 110* 106 109 102   CO2 33* 30 30 28   < > 25 23 28 29   ANIONGAP 6 5 9 10   < > 7 9 3 5   * 117* 90 118*   < > 95 96 78 90   BUN 50* 47* 59* 47*   < > 50* 51* 32* 30   CR 2.88* 2.66* 3.04* 2.92*   < > 3.27* 3.65* 1.85* 1.62*   GFRESTIMATED 24* 26* 21* 22*   < > 19* 17* 39* 45*   GFRESTBLACK  --   --   --   --   --  22* 20* 45* 53*   TERI 9.4 9.0 9.5 9.6   < > 8.7 9.3 8.9 9.4    < > = values in this interval not displayed.     Recent Labs   Lab Test 12/15/21  1138 11/17/21  0846 09/21/21  0253 08/18/21  1407   BILITOTAL 0.4 0.4 0.6 0.4   ALKPHOS 68 72 63 69   ALT 17 18 13 21   AST 14 14 14 17     CBC  Recent Labs   Lab Test 01/24/22  1417  12/15/21  1138 11/17/21  0846 09/23/21  0705   HGB 11.9* 10.9* 10.5* 8.1*   WBC 8.7 5.0 6.4 5.7   RBC 4.38* 4.01* 3.85* 2.90*   HCT 39.2* 34.8* 34.1* 27.1*   MCV 90 87 89 93   MCH 27.2 27.2 27.3 27.9   MCHC 30.4* 31.3* 30.8* 29.9*   RDW 15.9* 14.6 14.3 13.3    155 165 157     URINE STUDIES  Recent Labs   Lab Test 01/24/22  1417 11/02/21  1648 09/14/21  1451 08/07/21  1554 07/09/21  1320 06/16/21  1522   COLOR Yellow Yellow Yellow Yellow   < > Yellow   APPEARANCE Clear Cloudy* Slightly Cloudy* Cloudy*   < > Clear   URINEGLC Negative Negative Negative Negative   < > Negative   URINEBILI Negative Negative Negative Negative   < > Negative   URINEKETONE Negative Negative Negative Trace*   < > Negative   SG 1.015 1.010 1.009 1.016   < > 1.015   UBLD Small* Large* Small* Large*   < > Small*   URINEPH 6.5 6.0 6.0 6.0   < > 5.5   PROTEIN Negative 100 * 30 * 200 *   < > 100*   UROBILINOGEN 0.2  --   --   --   --  0.2   NITRITE Negative Negative Negative Negative   < > Negative   LEUKEST Moderate* Large* Large* Large*   < > Moderate*   RBCU 10-25* >182* 4* 84*   < >  --    WBCU * >182* 51* >182*   < >  --     < > = values in this interval not displayed.     Recent Labs   Lab Test 01/24/22  1417 12/15/21  1144 08/06/21 2022 07/01/21  1500   UTPG 0.43* 1.10* 1.16* 0.91*     PTH  Recent Labs   Lab Test 12/15/21  1138 07/30/21  1047 10/18/17  1121   PTHI 13* 13* 29     IRON STUDIES  Recent Labs   Lab Test 12/15/21  1138 07/01/21  1336 10/18/17  1121   IRON 28* 28* 82    241 285   IRONSAT 9* 12* 29   MIHIR 15* 33 41       Brandy Parmar MD

## 2022-01-26 NOTE — LETTER
1/26/2022     RE: Jimmy Isaac  [address] [address] [address]     Dear Colleague,    Thank you for referring your patient, Jimmy Isaac, to the [facility]PHROLOG[facility] [place] at [facility]. Please see a copy of my visit note below.    Chief Complaint   Patient presents with     RECHECK     Weight 70.3 kg (155 lb).    Jimmy is a 61 year old who is being evaluated via a billable video visit.      How would you like to obtain your AVS? MyChart  If the video visit is dropped, the invitation should be resent by: Other e-mail: use [email or web address]  Will anyone else be joining your video visit? No    Telephone encounter: Time on phone with Dr. Ba present: 9 minutes    Nephrology Clinic Follow Up Visit    Assessment and Plan:     1. CKD 4: Baseline Cr 1.4 in 2018 and rising since to a baseline near 3 in July 2020, then had SISI in setting of renal stone disease with rise of Cr into the 4s. Now Cr has returned to ~2.6-3.0 as a baseline. He likely has little renal reserve and further kidney injury could result in him needing renal replacement therapy. He was enrolled previously in the CKD Journey - will plan to have him complete dialysis education. At next visit, we can discuss modalities and begin access planning.   - Dialysis education  - RTC in 3-4 months    2. BP - at goal < 130/80    3. Volume - based on history, seems to be relatively euvolemic, currently on lasix 40mg once daily    4. Electrolytes/Acid-Base - elevated bicarb, likely compensation for metabolic acidosis    5. Anemia of CKD - Hgb 11.9, at goal    6. Nephrolithiasis - had left PCNL on 9/20/21 but has residual stone disease. Drinks plenty of fluids and follows low salt diet. Follows with Urology.    7. Sarcoidosis -  Management per Pulmonology    8. Pulmonary HTN - Management per Dr. Lindsey    Assessment and plan was discussed with patient and he voiced his understanding and  agreement.    I discussed the patient's plan of care with Dr. Ba.    Karoline Parmar MD  Nephrology Fellow    Reason for Visit:  Jimmy Isaac is a 61 year old male with a history of Sarcoidosis, CKD and Nephrolithiasis who presents for routine follow up. Sarcoidosis was diagnosed in  complicated by pulmonary hypertension, hypoxia on home oxygen when ambulating and nephrolithiasis (calcium oxalate).    HPI:  Mr. Isaac was last seen in Nephrology clinic on 21 with Radha Rod PA-C. He has been overall feeling well. He reports recently being started on prednisone and Dulera and these have improved his respiratory symptoms. He has gained a little weight with the prednisone due to increased appetite. He does not feel like he's retaining more fluid. He occasionally has flank pain on the right side. He also occasionally notices a small amount of blood in the urine (rare). He drinks about 100oz of fluid daily. He follows a very low salt diet. He has not previously thought too much about dialysis should his kidneys continue to worsen. He would be interested in learning more about dialysis options.    ROS:  A comprehensive review of systems was obtained and negative, except as noted in the HPI or PMH.    Active Medical Problems:  Patient Active Problem List   Diagnosis     Pulmonary hypertension (H)     Sarcoidosis     Renal insufficiency due to nephrolithiasis     Dyspnea on exertion     CKD (chronic kidney disease) stage 4, GFR 15-29 ml/min (H)     High degree atrioventricular block     Kidney stone     Personal Hx:   Social History     Tobacco Use     Smoking status: Former Smoker     Packs/day: 1.00     Years: 30.00     Pack years: 30.00     Types: Cigarettes     Start date: 10/10/1975     Quit date: 3/1/2010     Years since quittin.9     Smokeless tobacco: Never Used   Substance Use Topics     Alcohol use: Not Currently     Alcohol/week: 0.0 standard drinks     Comment: stated 2 bottles of beer occ      Allergies: Reviewed by provider.     Medications:  Current Outpatient Medications   Medication Sig     ambrisentan (LETAIRIS) 10 MG tablet Take 10 mg by mouth every morning      furosemide (LASIX) 20 MG tablet Take 2 tablets (40 mg) by mouth 2 times daily     mometasone-formoterol (DULERA) 200-5 MCG/ACT inhaler Inhale 2 puffs into the lungs 2 times daily     predniSONE (DELTASONE) 10 MG tablet Take 1 tablet (10 mg) by mouth daily     PROAIR  (90 Base) MCG/ACT inhaler Inhale 2 puffs into the lungs every 6 hours as needed for shortness of breath / dyspnea     selexipag (UPTRAVI) 1600 MCG tablet Take 1 tablet (1,600 mcg) by mouth every 12 hours     tadalafil, PAH, (ALYQ) 20 MG TABS Take 2 tablets (40 mg) by mouth daily     UNABLE TO FIND MEDICATION NAME: Home Oxygen, 5-10 L     Vitamin D3 (CHOLECALCIFEROL) 25 mcg (1000 units) tablet Take 1 tablet (25 mcg) by mouth every other day     budesonide (PULMICORT) 0.25 MG/2ML neb solution Take 2 mLs (0.25 mg) by nebulization 2 times daily     tamsulosin (FLOMAX) 0.4 MG capsule Take 1 capsule (0.4 mg) by mouth daily     UNABLE TO FIND MEDICATION NAME: Topical skin tag remover     No current facility-administered medications for this visit.     Vitals:  Wt 70.3 kg (155 lb)   BMI 21.02 kg/m      Exam:  No exam - telephone encounter    LABS:   CMP  Recent Labs   Lab Test 01/24/22  1413 12/27/21  1409 12/15/21  1138 11/17/21  0846 07/27/21  0742 07/01/21  1336 06/16/21  1630 12/11/19  1109 09/25/19  1209    140 138 139   < > 142 138 140 136   POTASSIUM 4.1 4.1 3.5 3.6   < > 4.0 4.4 4.3 4.1   CHLORIDE 103 105 99 101   < > 110* 106 109 102   CO2 33* 30 30 28   < > 25 23 28 29   ANIONGAP 6 5 9 10   < > 7 9 3 5   * 117* 90 118*   < > 95 96 78 90   BUN 50* 47* 59* 47*   < > 50* 51* 32* 30   CR 2.88* 2.66* 3.04* 2.92*   < > 3.27* 3.65* 1.85* 1.62*   GFRESTIMATED 24* 26* 21* 22*   < > 19* 17* 39* 45*   GFRESTBLACK  --   --   --   --   --  22* 20* 45* 53*   TERI 9.4  9.0 9.5 9.6   < > 8.7 9.3 8.9 9.4    < > = values in this interval not displayed.     Recent Labs   Lab Test 12/15/21  1138 11/17/21  0846 09/21/21  0253 08/18/21  1407   BILITOTAL 0.4 0.4 0.6 0.4   ALKPHOS 68 72 63 69   ALT 17 18 13 21   AST 14 14 14 17     CBC  Recent Labs   Lab Test 01/24/22  1417 12/15/21  1138 11/17/21  0846 09/23/21  0705   HGB 11.9* 10.9* 10.5* 8.1*   WBC 8.7 5.0 6.4 5.7   RBC 4.38* 4.01* 3.85* 2.90*   HCT 39.2* 34.8* 34.1* 27.1*   MCV 90 87 89 93   MCH 27.2 27.2 27.3 27.9   MCHC 30.4* 31.3* 30.8* 29.9*   RDW 15.9* 14.6 14.3 13.3    155 165 157     URINE STUDIES  Recent Labs   Lab Test 01/24/22  1417 11/02/21  1648 09/14/21  1451 08/07/21  1554 07/09/21  1320 06/16/21  1522   COLOR Yellow Yellow Yellow Yellow   < > Yellow   APPEARANCE Clear Cloudy* Slightly Cloudy* Cloudy*   < > Clear   URINEGLC Negative Negative Negative Negative   < > Negative   URINEBILI Negative Negative Negative Negative   < > Negative   URINEKETONE Negative Negative Negative Trace*   < > Negative   SG 1.015 1.010 1.009 1.016   < > 1.015   UBLD Small* Large* Small* Large*   < > Small*   URINEPH 6.5 6.0 6.0 6.0   < > 5.5   PROTEIN Negative 100 * 30 * 200 *   < > 100*   UROBILINOGEN 0.2  --   --   --   --  0.2   NITRITE Negative Negative Negative Negative   < > Negative   LEUKEST Moderate* Large* Large* Large*   < > Moderate*   RBCU 10-25* >182* 4* 84*   < >  --    WBCU * >182* 51* >182*   < >  --     < > = values in this interval not displayed.     Recent Labs   Lab Test 01/24/22  1417 12/15/21  1144 08/06/21 2022 07/01/21  1500   UTPG 0.43* 1.10* 1.16* 0.91*     PTH  Recent Labs   Lab Test 12/15/21  1138 07/30/21  1047 10/18/17  1121   PTHI 13* 13* 29     IRON STUDIES  Recent Labs   Lab Test 12/15/21  1138 07/01/21  1336 10/18/17  1121   IRON 28* 28* 82    241 285   IRONSAT 9* 12* 29   MIHIR 15* 33 41     Brandykeiry Parmar MD    Attestation signed by Lilly Ba MD at 2/25/2022  6:43  PM:  Attestation:  Jimmy Isaac has been evaluated and examined by me, Lilly Ba MD.  Discussed with the fellow or resident and agree with the findings and plan in this note.  I have reviewed Medications, Vital Signs, and Labs as well as provider notes.    Date of Service (when I saw the patient): 1/26/2022 - I was on the telephone from 4:34 to 443*, 9 total minutes      Lilly Ba MD  Health system  Department of Medicine  Division of Renal Disease and Hypertension

## 2022-01-31 NOTE — PROGRESS NOTES
Per visit summary with MD on 01/26/22, 'will plan to have him complete dialysis education. At next visit, we can discuss modalities and begin access planning.' Referral faxed to [facility], mychart message sent to pt.

## 2022-02-01 NOTE — PROGRESS NOTES
"CKD to ESRD Checklist    CKD Education:   Status: referred Date: 1/31/22   Definition/Purpose: During the encounter the patient is instructed about one or more of the following: the etiology and prognosis of their CKD; the stability (or lack thereof) of their eGFR or CrCl; medication dosing, IV contrast avoidance, or specific medications to avoid; sodium, potassium, or phosphorus restriction (or other dietary counseling).  Referral to an educator or program that offers any CKD-specific counseling is applicable. Having completed education in the previous 12 months to the encounter date is also applicable.     Modality Education:   Status: referred Date: 1/31/22   Definition/Purpose: During the encounter the patient is directly counseled and/or referred to education regarding the options for dialysis including in-center, home therapies, transplant, and/or hospice.  Patients who are at low risk of progression (by various risk calculators) may be deemed \"not a candidate.\" Completion of education in the past is also applicable.     Preferred Modality:   Definition/Purpose: During the encounter the patient indicates that they have chosen a modality of dialysis, irrespective of the estimated time to ESRD.  The goal of this measure is to reflect education and understanding.  This may change visit to visit as the patient s health status and other conditions dictate.  \"Not a Candidate\" should be selected for patients choosing hospice, are at low risk for progression (based on various scoring and/or clinician judgment), or for other medically documented exclusion.     Access Surgeon Referral:   Definition/Purpose: This measure indicates that the patient has been referred for discussion and/or evaluation by a dialysis access surgeon.  \"Not a Candidate\" should be selected for patients choosing hospice, are at low risk for progression (based on various scoring and/or clinician judgment), or for other medically documented " "exclusion.     Access Placed:   Definition/Purpose: This measure indicates that the patient has undergone surgery for dialysis access placement.   \"Not a Candidate\" should be selected for patients choosing hospice, are at low risk for progression (based on various scoring and/or clinician judgment), or for other medically documented exclusion.     Transplant Listing:   Status: not eligible Date: 1/31/22   Comments: Not a candidate based on his O2 requirements and severe pulmonary hypertension. Pt declined referral for lung transplant.   Definition/Purpose: This measure indicates that the patient has actively engaged in the transplant listing process.   \"Not Eligible\" should be selected for patients choosing hospice, are at low risk for progression (based on various scoring and/or clinician judgment), or for other medically documented exclusion.         "

## 2022-02-27 NOTE — PROCEDURES
PRELIMINARY CARDIAC CATH REPORT:     PROCEDURES PERFORMED:   Right Heart Catheterization    PHYSICIANS:  Attending Physician: Venkat Hoffman MD  Interventional Cardiology Fellow: None  Cardiology Fellow: Greg Leija MD    INDICATION:  Jimmy Isaac is a 56 year old male with pulmonary hypertension, pulmonary sarcoid who is here for RHC.     DESCRIPTION:  1. Consent obtained with discussion of risks.  All questions were answered.  2. Sterile prep and procedure.  3. Location with Sheaths:   Rt IJ  7 Fr 10 cm [short]  4. Access: Local anesthetic with lidocaine.  A standard 18 guage needle with ultrasound guidance was used to establish vascular access using a modified Seldinger technique.  5. Diagnostic Catheters:   4 Fr  Pelham Kasi  6. Estimated blood loss: < 5 ml    MEDICATIONS:  The procedure was performed under local anesthesia with lidocaine  Heart rate, BP, respiration, oxygen saturation and patient responses were monitored throughout the procedure with the assistance of the RN under my supervision.    Procedures:    HEMODYNAMICS:  1. HR 80 bpm  2. /79/99 mmHg  3. RA 12/15/12  4. RV 75/12  5. PA 75/34/52   6. PCW 16/17/16   7. PA sat 72.6%   8. PCW sat 97%  9. Hgb 14.2g/dL   10. Lesia CO 5.3   11. Lesia CI 2.6   12. TD CO 5.7   13. TD CI 2.8   14. PVR 6.9          Sheath Removal:  The 7 Fr sheath was manually removed in the cardiac catheterization laboratory.    Contrast: Isovue, 0 ml    Fluoroscopy Time: 1 min    COMPLICATIONS:  1. None    SUMMARY:   >> Mildly elevated right sided filling pressures.  >> Mildly elevated left sided filling pressures.  >> Severe pulmonary hypertension, pre-capillary  >> Normal cardiac output, 5.3 L/min with index 2.6 L/min/m2    PLAN:   >>. Discharge today per protocol    The attending interventional cardiologist was present and supervised all critical aspects the procedure.    See CVIS report for final draft.    Greg Leija MD  Cardiology Fellow    Venkat Hoffman,  MD  Cardiology Staff           The patient is a 70y Female complaining of headache.

## 2022-03-23 NOTE — LETTER
3/23/2022      RE: Jimmy Isaac  [address] [address] [address]       The patient returns for follow-up of sarcoid and sarcoid pulmonary hypertension.  He also has a history of abnormal renal function and recurrent nephrolithiasis. His shortness of breath is improved since staring prednisone.  He is treated with 3 oral drugs for PH. He and I talked for 45 minutes regarding the future, pros and cons of lung transplantation.  His case was also discussed with sarcoid clinic. Plan to have him referred to transplant clinic.  He will return for right heart catheterization in 3 months.    Constitutional:no weight loss, fever, chills, night sweats  HEENT: without visual changes, swallow difficulties  Pulmonary: without shortness of breath, cough, wheeze, hemoptysis  Cardiac: without chest pain, HOSKINS, PND, orthopnea, edema, palpitation, pre-syncope, syncope,  GI: without diarrhea, constipation, jaundice, melena, GERD, hematemesis  : without frequency, urgency, dysuria, hematuria  Skin: rash, bruise, open lesions  Neuro: without TIA, focal neurologic complaints, seizure, trauma  Ortho: without pain, swelling, mobility impairment  Endocrine: diabetes, thyroid, heat/cold intolerance, polyuria, polyphagia, change bowel habits.  Sleep:no BILLY, periodic breathing, fatigue    Constitutional: alert, oriented,ab normal gait and station, normal mentation.  Oral: moist mucous membrans  Lymph: without pathologic adenopathy  Chest: basilar rales, porlonged expiration  Cor: No evidence of left or right ventricular activity.  Rhythm is regular.  S1 normal, S2 split physiologically. Murmurs are not present  Abdomen: without tenderness, rebound, guarding, masses, ascites  Extremities: Edema not present  Neuro: no focal defects, normal mentation  Skin: without open lesions  Psych: oriented, verbal, mental status in tact      Name: JIMMY ISAAC  MRN: [id number]  : 1960  Study Date: 2021 07:57 AM  Age: 60  yrs  Gender: Male  Patient [facility]: [facility]  Reason For Study: Pulmonary hypertension (H), SOB (shortness of breath),  Sarcoidosis  Ordering Physician: DUY MIDDLETON  Referring Physician: DUY MIDDLETON  Performed By: Kim Nieves RDCS     BSA: 2.0 m2  Height: 73 in  Weight: 165 lb  BP: 123/72 mmHg  ______________________________________________________________________________  Procedure  Echocardiogram with two-dimensional, color and spectral Doppler performed.  Adequate quality two-dimensional was performed and interpreted. The patient's  rhythm is sinus bradycardia.  ______________________________________________________________________________  Interpretation Summary  Global and regional left ventricular function is normal with an EF of 55-60%.  Flattened septum is consistent with right ventricular pressure and volume  overload.  Global right ventricular function is mildly to moderately reduced. The  longitudinal excursion is preserved (TAPSE 2.1 cm and S 11.0 cm/s) but the  free wall contraction is reduced. The RV FAC was 25% but the basal segment of  the RV was not clearly visualized.  No significant valvular abnormality.  Pulmonary artery systolic pressure cannot be assessed due to inadequate TR  jet.  IVC diameter >2.1 cm collapsing <50% with sniff suggests a high RA pressure  estimated at 15 mmHg or greater.  No pericardial effusion is present.  This study was compared with the study from 10/29/2018. The RV function  appears to have declined based on direct visual and quantitative comparison.  This is primarily due to reduced RV free wall contraction.  ______________________________________________________________________________  Left Ventricle  Global and regional left ventricular function is normal with an EF of 55-60%.  Relative wall thickness is increased consistent with concentric remodeling.  Left ventricular diastolic function is normal. Flattened septum is consistent  with right  ventricular pressure and volume overload.     Right Ventricle  Moderate right ventricular dilation is present. Global right ventricular  function is mildly to moderately reduced. The longitudinal excursion is  preserved (TAPSE 2.1 cm and S 11.0 cm/s) but the free wall contraction is  reduced. The RV FAC was 25% but the basal segment of the RV was not clearly  visualized.     Atria  Both atria appear normal. The atrial septum is intact as assessed by color  Doppler .     Mitral Valve  Mild mitral annular calcification is present. Trace mitral insufficiency is  present.     Aortic Valve  Aortic valve is normal in structure and function. The aortic valve is  tricuspid. Trace aortic insufficiency is present.     Tricuspid Valve  The tricuspid valve cannot be assessed. Trace tricuspid insufficiency is  present. The right ventricular systolic pressure is approximated at 26.4 mmHg  plus the right atrial pressure.     Pulmonic Valve  The pulmonic valve cannot be assessed. Mild pulmonic insufficiency is present.  The PA acceleration time could not be obtained.     Vessels  Sinuses of Valsalva 3.5 cm. Ascending aorta 3.3 cm. IVC diameter >2.1 cm  collapsing <50% with sniff suggests a high RA pressure estimated at 15 mmHg or  greater.     Pericardium  No pericardial effusion is present.     Compared to Previous Study  This study was compared with the study from 10/29/2018 . The RV function  appears to have declined based on direct visual and quantitative comparison.  This is primarily due to reduced RV free wall contraction.      Current Outpatient Medications   Medication     ambrisentan (LETAIRIS) 10 MG tablet     furosemide (LASIX) 20 MG tablet     mometasone-formoterol (DULERA) 200-5 MCG/ACT inhaler     predniSONE (DELTASONE) 10 MG tablet     PROAIR  (90 Base) MCG/ACT inhaler     selexipag (UPTRAVI) 1600 MCG tablet     tadalafil, PAH, (ALYQ) 20 MG TABS     UNABLE TO FIND     Vitamin D3 (CHOLECALCIFEROL) 25 mcg (1000  units) tablet     budesonide (PULMICORT) 0.25 MG/2ML neb solution     tamsulosin (FLOMAX) 0.4 MG capsule     UNABLE TO FIND     No current facility-administered medications for this visit.   Results for MATTY LOPEZ (MRN [id number]) as of 3/23/2022 13:25   Ref. Range 1/24/2022 14:13 1/24/2022 14:17   Sodium Latest Ref Range: 133 - 144 mmol/L 142    Potassium Latest Ref Range: 3.4 - 5.3 mmol/L 4.1    Chloride Latest Ref Range: 94 - 109 mmol/L 103    Carbon Dioxide Latest Ref Range: 20 - 32 mmol/L 33 (H)    Urea Nitrogen Latest Ref Range: 7 - 30 mg/dL 50 (H)    Creatinine Latest Ref Range: 0.66 - 1.25 mg/dL 2.88 (H)    GFR Estimate Latest Ref Range: >60 mL/min/1.73m2 24 (L)    Calcium Latest Ref Range: 8.5 - 10.1 mg/dL 9.4    Anion Gap Latest Ref Range: 3 - 14 mmol/L 6    Phosphorus Latest Ref Range: 2.5 - 4.5 mg/dL 3.2    Albumin Latest Ref Range: 3.4 - 5.0 g/dL 3.5    Creatinine Urine Latest Units: mg/dL  47   Protein Random Urine Latest Units: g/L  0.20   Protein Total Urine g/gr Creatinine Latest Ref Range: 0.00 - 0.20 g/g Cr  0.43 (H)   Glucose Latest Ref Range: 70 - 99 mg/dL 102 (H)    WBC Latest Ref Range: 4.0 - 11.0 10e3/uL  8.7   Hemoglobin Latest Ref Range: 13.3 - 17.7 g/dL  11.9 (L)   Hematocrit Latest Ref Range: 40.0 - 53.0 %  39.2 (L)   Platelet Count Latest Ref Range: 150 - 450 10e3/uL  150   RBC Count Latest Ref Range: 4.40 - 5.90 10e6/uL  4.38 (L)   MCV Latest Ref Range: 78 - 100 fL  90   MCH Latest Ref Range: 26.5 - 33.0 pg  27.2   MCHC Latest Ref Range: 31.5 - 36.5 g/dL  30.4 (L)   RDW Latest Ref Range: 10.0 - 15.0 %  15.9 (H)   Color Urine Latest Ref Range: Colorless, Straw, Light Yellow, Yellow   Yellow   Appearance Urine Latest Ref Range: Clear   Clear   Glucose Urine Latest Ref Range: Negative mg/dL  Negative   Bilirubin Urine Latest Ref Range: Negative   Negative   Ketones Urine Latest Ref Range: Negative mg/dL  Negative   Specific Gravity Urine Latest Ref Range: 1.003 - 1.035   1.015   pH  Urine Latest Ref Range: 5.0 - 7.0   6.5   Protein Albumin Urine Latest Ref Range: Negative mg/dL  Negative   Urobilinogen Urine Latest Ref Range: 0.2, 1.0 E.U./dL  0.2   Nitrite Urine Latest Ref Range: Negative   Negative   Blood Urine Latest Ref Range: Negative   Small (A)   Leukocyte Esterase Urine Latest Ref Range: Negative   Moderate (A)   WBC Urine Latest Ref Range: 0-5 /HPF /HPF   (A)   RBC Urine Latest Ref Range: 0-2 /HPF /HPF  10-25 (A)   Bacteria Urine Latest Ref Range: None Seen /HPF  Moderate (A)   RA - 12  RV - 77/16  PA - 77/20/38  PCWP - 17  Hgb - 10.7  PA - 55.6%  Lesia CO/CI - 4.62/2.36      Khang Lindsey MD

## 2022-03-23 NOTE — PROGRESS NOTES
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  He also has a history of abnormal renal function and recurrent nephrolithiasis. His shortness of breath is improved since staring prednisone.  He is treated with 3 oral drugs for PH. He and I talked for 45 minutes regarding the future, pros and cons of lung transplantation.  His case was also discussed with sarcoid clinic. Plan to have him referred to transplant clinic.  He will return for right heart catheterization in 3 months.    Constitutional:no weight loss, fever, chills, night sweats  HEENT: without visual changes, swallow difficulties  Pulmonary: without shortness of breath, cough, wheeze, hemoptysis  Cardiac: without chest pain, HOSKINS, PND, orthopnea, edema, palpitation, pre-syncope, syncope,  GI: without diarrhea, constipation, jaundice, melena, GERD, hematemesis  : without frequency, urgency, dysuria, hematuria  Skin: rash, bruise, open lesions  Neuro: without TIA, focal neurologic complaints, seizure, trauma  Ortho: without pain, swelling, mobility impairment  Endocrine: diabetes, thyroid, heat/cold intolerance, polyuria, polyphagia, change bowel habits.  Sleep:no BILLY, periodic breathing, fatigue    Constitutional: alert, oriented,ab normal gait and station, normal mentation.  Oral: moist mucous membrans  Lymph: without pathologic adenopathy  Chest: basilar rales, porlonged expiration  Cor: No evidence of left or right ventricular activity.  Rhythm is regular.  S1 normal, S2 split physiologically. Murmurs are not present  Abdomen: without tenderness, rebound, guarding, masses, ascites  Extremities: Edema not present  Neuro: no focal defects, normal mentation  Skin: without open lesions  Psych: oriented, verbal, mental status in tact      Name: MATTY LOPEZ  MRN: [id number]  : 1960  Study Date: 2021 07:57 AM  Age: 60 yrs  Gender: Male  Patient Location: [facility]  Reason For Study: Pulmonary hypertension  (H), SOB (shortness of breath),  Sarcoidosis  Ordering Physician: DUY MIDDLETON  Referring Physician: DUY MIDDLETON  Performed By: Kim Nieves RDCS     BSA: 2.0 m2  Height: 73 in  Weight: 165 lb  BP: 123/72 mmHg  ______________________________________________________________________________  Procedure  Echocardiogram with two-dimensional, color and spectral Doppler performed.  Adequate quality two-dimensional was performed and interpreted. The patient's  rhythm is sinus bradycardia.  ______________________________________________________________________________  Interpretation Summary  Global and regional left ventricular function is normal with an EF of 55-60%.  Flattened septum is consistent with right ventricular pressure and volume  overload.  Global right ventricular function is mildly to moderately reduced. The  longitudinal excursion is preserved (TAPSE 2.1 cm and S 11.0 cm/s) but the  free wall contraction is reduced. The RV FAC was 25% but the basal segment of  the RV was not clearly visualized.  No significant valvular abnormality.  Pulmonary artery systolic pressure cannot be assessed due to inadequate TR  jet.  IVC diameter >2.1 cm collapsing <50% with sniff suggests a high RA pressure  estimated at 15 mmHg or greater.  No pericardial effusion is present.  This study was compared with the study from 10/29/2018. The RV function  appears to have declined based on direct visual and quantitative comparison.  This is primarily due to reduced RV free wall contraction.  ______________________________________________________________________________  Left Ventricle  Global and regional left ventricular function is normal with an EF of 55-60%.  Relative wall thickness is increased consistent with concentric remodeling.  Left ventricular diastolic function is normal. Flattened septum is consistent  with right ventricular pressure and volume overload.     Right Ventricle  Moderate right  ventricular dilation is present. Global right ventricular  function is mildly to moderately reduced. The longitudinal excursion is  preserved (TAPSE 2.1 cm and S 11.0 cm/s) but the free wall contraction is  reduced. The RV FAC was 25% but the basal segment of the RV was not clearly  visualized.     Atria  Both atria appear normal. The atrial septum is intact as assessed by color  Doppler .     Mitral Valve  Mild mitral annular calcification is present. Trace mitral insufficiency is  present.     Aortic Valve  Aortic valve is normal in structure and function. The aortic valve is  tricuspid. Trace aortic insufficiency is present.     Tricuspid Valve  The tricuspid valve cannot be assessed. Trace tricuspid insufficiency is  present. The right ventricular systolic pressure is approximated at 26.4 mmHg  plus the right atrial pressure.     Pulmonic Valve  The pulmonic valve cannot be assessed. Mild pulmonic insufficiency is present.  The PA acceleration time could not be obtained.     Vessels  Sinuses of Valsalva 3.5 cm. Ascending aorta 3.3 cm. IVC diameter >2.1 cm  collapsing <50% with sniff suggests a high RA pressure estimated at 15 mmHg or  greater.     Pericardium  No pericardial effusion is present.     Compared to Previous Study  This study was compared with the study from 10/29/2018 . The RV function  appears to have declined based on direct visual and quantitative comparison.  This is primarily due to reduced RV free wall contraction.      Current Outpatient Medications   Medication     ambrisentan (LETAIRIS) 10 MG tablet     furosemide (LASIX) 20 MG tablet     mometasone-formoterol (DULERA) 200-5 MCG/ACT inhaler     predniSONE (DELTASONE) 10 MG tablet     PROAIR  (90 Base) MCG/ACT inhaler     selexipag (UPTRAVI) 1600 MCG tablet     tadalafil, PAH, (ALYQ) 20 MG TABS     UNABLE TO FIND     Vitamin D3 (CHOLECALCIFEROL) 25 mcg (1000 units) tablet     budesonide (PULMICORT) 0.25 MG/2ML neb solution      tamsulosin (FLOMAX) 0.4 MG capsule     UNABLE TO FIND     No current facility-administered medications for this visit.   Results for MATTY LOPEZ (MRN [id number]) as of 3/23/2022 13:25   Ref. Range 1/24/2022 14:13 1/24/2022 14:17   Sodium Latest Ref Range: 133 - 144 mmol/L 142    Potassium Latest Ref Range: 3.4 - 5.3 mmol/L 4.1    Chloride Latest Ref Range: 94 - 109 mmol/L 103    Carbon Dioxide Latest Ref Range: 20 - 32 mmol/L 33 (H)    Urea Nitrogen Latest Ref Range: 7 - 30 mg/dL 50 (H)    Creatinine Latest Ref Range: 0.66 - 1.25 mg/dL 2.88 (H)    GFR Estimate Latest Ref Range: >60 mL/min/1.73m2 24 (L)    Calcium Latest Ref Range: 8.5 - 10.1 mg/dL 9.4    Anion Gap Latest Ref Range: 3 - 14 mmol/L 6    Phosphorus Latest Ref Range: 2.5 - 4.5 mg/dL 3.2    Albumin Latest Ref Range: 3.4 - 5.0 g/dL 3.5    Creatinine Urine Latest Units: mg/dL  47   Protein Random Urine Latest Units: g/L  0.20   Protein Total Urine g/gr Creatinine Latest Ref Range: 0.00 - 0.20 g/g Cr  0.43 (H)   Glucose Latest Ref Range: 70 - 99 mg/dL 102 (H)    WBC Latest Ref Range: 4.0 - 11.0 10e3/uL  8.7   Hemoglobin Latest Ref Range: 13.3 - 17.7 g/dL  11.9 (L)   Hematocrit Latest Ref Range: 40.0 - 53.0 %  39.2 (L)   Platelet Count Latest Ref Range: 150 - 450 10e3/uL  150   RBC Count Latest Ref Range: 4.40 - 5.90 10e6/uL  4.38 (L)   MCV Latest Ref Range: 78 - 100 fL  90   MCH Latest Ref Range: 26.5 - 33.0 pg  27.2   MCHC Latest Ref Range: 31.5 - 36.5 g/dL  30.4 (L)   RDW Latest Ref Range: 10.0 - 15.0 %  15.9 (H)   Color Urine Latest Ref Range: Colorless, Straw, Light Yellow, Yellow   Yellow   Appearance Urine Latest Ref Range: Clear   Clear   Glucose Urine Latest Ref Range: Negative mg/dL  Negative   Bilirubin Urine Latest Ref Range: Negative   Negative   Ketones Urine Latest Ref Range: Negative mg/dL  Negative   Specific Gravity Urine Latest Ref Range: 1.003 - 1.035   1.015   pH Urine Latest Ref Range: 5.0 - 7.0   6.5   Protein Albumin Urine Latest Ref  Range: Negative mg/dL  Negative   Urobilinogen Urine Latest Ref Range: 0.2, 1.0 E.U./dL  0.2   Nitrite Urine Latest Ref Range: Negative   Negative   Blood Urine Latest Ref Range: Negative   Small (A)   Leukocyte Esterase Urine Latest Ref Range: Negative   Moderate (A)   WBC Urine Latest Ref Range: 0-5 /HPF /HPF   (A)   RBC Urine Latest Ref Range: 0-2 /HPF /HPF  10-25 (A)   Bacteria Urine Latest Ref Range: None Seen /HPF  Moderate (A)   RA - 12  RV - 77/16  PA - 77/20/38  PCWP - 17  Hgb - 10.7  PA - 55.6%  Lesia CO/CI - 4.62/2.36  Answers for HPI/ROS submitted by the patient on 3/20/2022  General Symptoms: No  Skin Symptoms: No  HENT Symptoms: Yes  EYE SYMPTOMS: No  HEART SYMPTOMS: Yes  LUNG SYMPTOMS: Yes  INTESTINAL SYMPTOMS: Yes  URINARY SYMPTOMS: Yes  REPRODUCTIVE SYMPTOMS: Yes  SKELETAL SYMPTOMS: No  BLOOD SYMPTOMS: No  NERVOUS SYSTEM SYMPTOMS: No  MENTAL HEALTH SYMPTOMS: Yes  Ear pain: No  Ear discharge: No  Hearing loss: No  Tinnitus: No  Nosebleeds: No  Congestion: Yes  Sinus pain: Yes  Trouble swallowing: Yes   Voice hoarseness: No  Mouth sores: No  Sore throat: No  Tooth pain: No  Gum tenderness: No  Bleeding gums: No  Change in taste: No  Change in sense of smell: No  Dry mouth: Yes  Hearing aid used: No  Neck lump: No  Chest pain or pressure: Yes  Fast or irregular heartbeat: Yes  Pain in legs with walking: No  Trouble breathing while lying down: Yes  Fingers or toes appear blue: No  High blood pressure: No  Low blood pressure: No  Fainting: No  Murmurs: No  Pacemaker: Yes  Varicose veins: No  Edema or swelling: No  Wake up at night with shortness of breath: Yes  Light-headedness: No  Exercise intolerance: Yes  Cough: No  Sputum or phlegm: Yes  Coughing up blood: No  Difficulty breating or shortness of breath: Yes  Snoring: No  Wheezing: Yes  Difficulty breathing on exertion: Yes  Nighttime Cough: No  Difficulty breathing when lying flat: Yes  Heart burn or indigestion: Yes  Nausea: No  Vomiting:  No  Abdominal pain: Yes  Bloating: No  Constipation: No  Diarrhea: No  Blood in stool: No  Black stools: No  Rectal or Anal pain: No  Fecal incontinence: No  Yellowing of skin or eyes: No  Vomit with blood: No  Change in stools: No  Trouble holding urine or incontinence: Yes  Pain or burning: No  Trouble starting or stopping: No  Increased frequency of urination: Yes  Blood in urine: No  Decreased frequency of urination: No  Frequent nighttime urination: Yes  Flank pain: Yes  Difficulty emptying bladder: No  Scrotal pain or swelling: No  Erectile dysfunction: No  Penile discharge: No  Genital ulcers: No  Reduced libido: No  Nervous or Anxious: No  Depression: Yes  Trouble sleeping: No  Trouble thinking or concentrating: No  Mood changes: No  Panic attacks: No

## 2022-03-23 NOTE — LETTER
3/23/2022      RE: Jimmy Isaac  [address] [address] [id number]       Dear Colleague,    Thank you for the opportunity to participate in the care of your patient, Jimmy Isaac, at the [facility] [place] [facility]. Please see a copy of my visit note below.        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  He also has a history of abnormal renal function and recurrent nephrolithiasis. His shortness of breath is improved since staring prednisone.  He is treated with 3 oral drugs for PH. He and I talked for 45 minutes regarding the future, pros and cons of lung transplantation.  His case was also discussed with sarcoid clinic. Plan to have him referred to transplant clinic.  He will return for right heart catheterization in 3 months.    Constitutional:no weight loss, fever, chills, night sweats  HEENT: without visual changes, swallow difficulties  Pulmonary: without shortness of breath, cough, wheeze, hemoptysis  Cardiac: without chest pain, HOSKINS, PND, orthopnea, edema, palpitation, pre-syncope, syncope,  GI: without diarrhea, constipation, jaundice, melena, GERD, hematemesis  : without frequency, urgency, dysuria, hematuria  Skin: rash, bruise, open lesions  Neuro: without TIA, focal neurologic complaints, seizure, trauma  Ortho: without pain, swelling, mobility impairment  Endocrine: diabetes, thyroid, heat/cold intolerance, polyuria, polyphagia, change bowel habits.  Sleep:no BILLY, periodic breathing, fatigue    Constitutional: alert, oriented,ab normal gait and station, normal mentation.  Oral: moist mucous membrans  Lymph: without pathologic adenopathy  Chest: basilar rales, porlonged expiration  Cor: No evidence of left or right ventricular activity.  Rhythm is regular.  S1 normal, S2 split physiologically. Murmurs are not present  Abdomen: without tenderness, rebound, guarding, masses,  ascites  Extremities: Edema not present  Neuro: no focal defects, normal mentation  Skin: without open lesions  Psych: oriented, verbal, mental status in tact      Name: MATTY LOPEZ  MRN: [id number]  : 1960  Study Date: 2021 07:57 AM  Age: 60 yrs  Gender: Male  Patient Location: [facility]  Reason For Study: Pulmonary hypertension (H), SOB (shortness of breath),  Sarcoidosis  Ordering Physician: DUY MIDDLETON  Referring Physician: DUY MIDDLETON  Performed By: Kim Nieves RDCS     BSA: 2.0 m2  Height: 73 in  Weight: 165 lb  BP: 123/72 mmHg  ______________________________________________________________________________  Procedure  Echocardiogram with two-dimensional, color and spectral Doppler performed.  Adequate quality two-dimensional was performed and interpreted. The patient's  rhythm is sinus bradycardia.  ______________________________________________________________________________  Interpretation Summary  Global and regional left ventricular function is normal with an EF of 55-60%.  Flattened septum is consistent with right ventricular pressure and volume  overload.  Global right ventricular function is mildly to moderately reduced. The  longitudinal excursion is preserved (TAPSE 2.1 cm and S 11.0 cm/s) but the  free wall contraction is reduced. The RV FAC was 25% but the basal segment of  the RV was not clearly visualized.  No significant valvular abnormality.  Pulmonary artery systolic pressure cannot be assessed due to inadequate TR  jet.  IVC diameter >2.1 cm collapsing <50% with sniff suggests a high RA pressure  estimated at 15 mmHg or greater.  No pericardial effusion is present.  This study was compared with the study from 10/29/2018. The RV function  appears to have declined based on direct visual and quantitative comparison.  This is primarily due to reduced RV free wall  contraction.  ______________________________________________________________________________  Left Ventricle  Global and regional left ventricular function is normal with an EF of 55-60%.  Relative wall thickness is increased consistent with concentric remodeling.  Left ventricular diastolic function is normal. Flattened septum is consistent  with right ventricular pressure and volume overload.     Right Ventricle  Moderate right ventricular dilation is present. Global right ventricular  function is mildly to moderately reduced. The longitudinal excursion is  preserved (TAPSE 2.1 cm and S 11.0 cm/s) but the free wall contraction is  reduced. The RV FAC was 25% but the basal segment of the RV was not clearly  visualized.     Atria  Both atria appear normal. The atrial septum is intact as assessed by color  Doppler .     Mitral Valve  Mild mitral annular calcification is present. Trace mitral insufficiency is  present.     Aortic Valve  Aortic valve is normal in structure and function. The aortic valve is  tricuspid. Trace aortic insufficiency is present.     Tricuspid Valve  The tricuspid valve cannot be assessed. Trace tricuspid insufficiency is  present. The right ventricular systolic pressure is approximated at 26.4 mmHg  plus the right atrial pressure.     Pulmonic Valve  The pulmonic valve cannot be assessed. Mild pulmonic insufficiency is present.  The PA acceleration time could not be obtained.     Vessels  Sinuses of Valsalva 3.5 cm. Ascending aorta 3.3 cm. IVC diameter >2.1 cm  collapsing <50% with sniff suggests a high RA pressure estimated at 15 mmHg or  greater.     Pericardium  No pericardial effusion is present.     Compared to Previous Study  This study was compared with the study from 10/29/2018 . The RV function  appears to have declined based on direct visual and quantitative comparison.  This is primarily due to reduced RV free wall contraction.      Current Outpatient Medications   Medication      ambrisentan (LETAIRIS) 10 MG tablet     furosemide (LASIX) 20 MG tablet     mometasone-formoterol (DULERA) 200-5 MCG/ACT inhaler     predniSONE (DELTASONE) 10 MG tablet     PROAIR  (90 Base) MCG/ACT inhaler     selexipag (UPTRAVI) 1600 MCG tablet     tadalafil, PAH, (ALYQ) 20 MG TABS     UNABLE TO FIND     Vitamin D3 (CHOLECALCIFEROL) 25 mcg (1000 units) tablet     budesonide (PULMICORT) 0.25 MG/2ML neb solution     tamsulosin (FLOMAX) 0.4 MG capsule     UNABLE TO FIND     No current facility-administered medications for this visit.   Results for JESSICA MATTY S (MRN [id number]) as of 3/23/2022 13:25   Ref. Range 1/24/2022 14:13 1/24/2022 14:17   Sodium Latest Ref Range: 133 - 144 mmol/L 142    Potassium Latest Ref Range: 3.4 - 5.3 mmol/L 4.1    Chloride Latest Ref Range: 94 - 109 mmol/L 103    Carbon Dioxide Latest Ref Range: 20 - 32 mmol/L 33 (H)    Urea Nitrogen Latest Ref Range: 7 - 30 mg/dL 50 (H)    Creatinine Latest Ref Range: 0.66 - 1.25 mg/dL 2.88 (H)    GFR Estimate Latest Ref Range: >60 mL/min/1.73m2 24 (L)    Calcium Latest Ref Range: 8.5 - 10.1 mg/dL 9.4    Anion Gap Latest Ref Range: 3 - 14 mmol/L 6    Phosphorus Latest Ref Range: 2.5 - 4.5 mg/dL 3.2    Albumin Latest Ref Range: 3.4 - 5.0 g/dL 3.5    Creatinine Urine Latest Units: mg/dL  47   Protein Random Urine Latest Units: g/L  0.20   Protein Total Urine g/gr Creatinine Latest Ref Range: 0.00 - 0.20 g/g Cr  0.43 (H)   Glucose Latest Ref Range: 70 - 99 mg/dL 102 (H)    WBC Latest Ref Range: 4.0 - 11.0 10e3/uL  8.7   Hemoglobin Latest Ref Range: 13.3 - 17.7 g/dL  11.9 (L)   Hematocrit Latest Ref Range: 40.0 - 53.0 %  39.2 (L)   Platelet Count Latest Ref Range: 150 - 450 10e3/uL  150   RBC Count Latest Ref Range: 4.40 - 5.90 10e6/uL  4.38 (L)   MCV Latest Ref Range: 78 - 100 fL  90   MCH Latest Ref Range: 26.5 - 33.0 pg  27.2   MCHC Latest Ref Range: 31.5 - 36.5 g/dL  30.4 (L)   RDW Latest Ref Range: 10.0 - 15.0 %  15.9 (H)   Color Urine Latest  Ref Range: Colorless, Straw, Light Yellow, Yellow   Yellow   Appearance Urine Latest Ref Range: Clear   Clear   Glucose Urine Latest Ref Range: Negative mg/dL  Negative   Bilirubin Urine Latest Ref Range: Negative   Negative   Ketones Urine Latest Ref Range: Negative mg/dL  Negative   Specific Gravity Urine Latest Ref Range: 1.003 - 1.035   1.015   pH Urine Latest Ref Range: 5.0 - 7.0   6.5   Protein Albumin Urine Latest Ref Range: Negative mg/dL  Negative   Urobilinogen Urine Latest Ref Range: 0.2, 1.0 E.U./dL  0.2   Nitrite Urine Latest Ref Range: Negative   Negative   Blood Urine Latest Ref Range: Negative   Small (A)   Leukocyte Esterase Urine Latest Ref Range: Negative   Moderate (A)   WBC Urine Latest Ref Range: 0-5 /HPF /HPF   (A)   RBC Urine Latest Ref Range: 0-2 /HPF /HPF  10-25 (A)   Bacteria Urine Latest Ref Range: None Seen /HPF  Moderate (A)   RA - 12  RV - 77/16  PA - 77/20/38  PCWP - 17  Hgb - 10.7  PA - 55.6%  Lesia CO/CI - 4.62/2.36  Answers for HPI/ROS submitted by the patient on 3/20/2022  General Symptoms: No  Skin Symptoms: No  HENT Symptoms: Yes  EYE SYMPTOMS: No  HEART SYMPTOMS: Yes  LUNG SYMPTOMS: Yes  INTESTINAL SYMPTOMS: Yes  URINARY SYMPTOMS: Yes  REPRODUCTIVE SYMPTOMS: Yes  SKELETAL SYMPTOMS: No  BLOOD SYMPTOMS: No  NERVOUS SYSTEM SYMPTOMS: No  MENTAL HEALTH SYMPTOMS: Yes  Ear pain: No  Ear discharge: No  Hearing loss: No  Tinnitus: No  Nosebleeds: No  Congestion: Yes  Sinus pain: Yes  Trouble swallowing: Yes   Voice hoarseness: No  Mouth sores: No  Sore throat: No  Tooth pain: No  Gum tenderness: No  Bleeding gums: No  Change in taste: No  Change in sense of smell: No  Dry mouth: Yes  Hearing aid used: No  Neck lump: No  Chest pain or pressure: Yes  Fast or irregular heartbeat: Yes  Pain in legs with walking: No  Trouble breathing while lying down: Yes  Fingers or toes appear blue: No  High blood pressure: No  Low blood pressure: No  Fainting: No  Murmurs: No  Pacemaker:  Yes  Varicose veins: No  Edema or swelling: No  Wake up at night with shortness of breath: Yes  Light-headedness: No  Exercise intolerance: Yes  Cough: No  Sputum or phlegm: Yes  Coughing up blood: No  Difficulty breating or shortness of breath: Yes  Snoring: No  Wheezing: Yes  Difficulty breathing on exertion: Yes  Nighttime Cough: No  Difficulty breathing when lying flat: Yes  Heart burn or indigestion: Yes  Nausea: No  Vomiting: No  Abdominal pain: Yes  Bloating: No  Constipation: No  Diarrhea: No  Blood in stool: No  Black stools: No  Rectal or Anal pain: No  Fecal incontinence: No  Yellowing of skin or eyes: No  Vomit with blood: No  Change in stools: No  Trouble holding urine or incontinence: Yes  Pain or burning: No  Trouble starting or stopping: No  Increased frequency of urination: Yes  Blood in urine: No  Decreased frequency of urination: No  Frequent nighttime urination: Yes  Flank pain: Yes  Difficulty emptying bladder: No  Scrotal pain or swelling: No  Erectile dysfunction: No  Penile discharge: No  Genital ulcers: No  Reduced libido: No  Nervous or Anxious: No  Depression: Yes  Trouble sleeping: No  Trouble thinking or concentrating: No  Mood changes: No  Panic attacks: No          Please do not hesitate to contact me if you have any questions/concerns.     Sincerely,     Khang Lindsey MD

## 2022-03-23 NOTE — PATIENT INSTRUCTIONS
Thank you for visiting the [facility]ension [facility] today with Dr. Lindsey.        Plan of care:   1. Please schedule a follow-up with Dr. Lindsey in June the same day you see Dr. Aquino  2. Please have a Right Heart Cath scheduled the day before you come back to see us in June.  We will call you to schedule this.          Check-In  Time Check-In Location Estimated Length Procedure   Name      [facility]  waiting room 60-90 minutes Right Heart Catheterization**      Procedure Preparations & Instructions       This is an invasive procedure that DOES require preparation:       Take your temperature in the morning prior to coming in.  If your temperature is 100 F please call [phone] (Opt. 1) and notify them.  If you do not have access to a thermometer at home, please come in for testing.  If you are running a temperature your procedure may be rescheduled.    Nothing to eat for 6 hours     You may have clear liquids up until the time of your procedure    You can take your morning medications with sips of water    Please arrange for a ride to drop you off and pick you up in the instance you are unable to drive home, however you should be able to function as you normally would after the procedure         *Mandatory COVID Testing:   Pt will need to complete a COVID test no sooner than 4 days prior to their procedure.      To schedule COVID testing Please call [phone]    If you want to complete this at an outside facility please call them to find out if they will have the results within the appropriate time frame and their fax number.  You will need to provide us with that information so we can send them the order.    They will need to fax the results to [phone]    If you are running into and issues please call us.           Additional Instructions:     1. Continue staying active and eat a heart healthy, low sodium diet.      2. Please keep current list of medications with you at all times.      3.  Remember to weigh yourself daily after voiding and write it down on a log. If you have gained/lost 2 pounds overnight or 5 pounds in a week contact us for medication adjustments or further instructions.     4. Please call us immediately if you have syncope (fainting or passing out), chest pain, worsening edema (swelling or weight gain), or general worsening in how you are feeling.            ---------------------------------------------------------------------------------------------------------------     If you have questions or concerns please contact us at:           Obdulia Rome RN, BSN, PHN                 Samantha Chandra  (Schedule,Prior Auth)  Nurse Coordinator                                          Clinic   Pulmonary Hypertension                                Pulmonary Hypertension  [facility]               [facility]  (P)[phone]                                             (P) [phone]                                                                          (F) [phone]        ** Please note that you will NOT receive a reminder call regarding your scheduled testing, reminder calls are for provider appointments only.  If you are scheduled for testing within the [employer] system you may receive a call regarding pre-registration for billing purposes only.**      ------------------------------------------------------------------------------------------------------------     Interested in joining a support group?     Pulmonary Hypertension Association  Https://www.phassociation.org/  **Look at the Events Tab** They even have Support Groups that you can call into     [facility] Support Group  Second Saturday of the Month from 1-3 PM   Location: [address] [place] [address]  Leader: Sandra Rollins  Phone: [phone] or [phone]  Email: [email or web address]

## 2022-03-23 NOTE — NURSING NOTE
Chief Complaint   Patient presents with     Follow Up     3 month follow up      Vitals were taken and medications reconciled.    Damian Lindsay, EMT  12:39 PM

## 2022-03-23 NOTE — PROGRESS NOTES
Reason for Visit  Jimmy Isaac is a 61 year old year old male who is being seen for sarcoidosis  Pulmonary HPI    The patient was seen and examined by Shane Ruvalcaba MD     Mr. Isaac was last seen in the Pulmonary Clinic in 12/2021.  He has pulmonary hypertension and stage IV pulmonary sarcoidosis.  He was started on prednisone 10 mg per day to test if there would be any improvement in his functional status or PFTs if there was any reversible lung disease.  He comes in today for followup.    He tells me that he has felt quite well since he was started on the prednisone.  He reports more energy.  In fact, he also has improved functional status.  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  He uses 10  liter of oxygen via nasal cannula while he is doing that.  He reports episodes of fatigue while he is doing that, which he believes is an indicator of hypoxia.  He paces himself while he is working with his horses.  He denies no other new pulmonary symptoms.      Current Outpatient Medications   Medication     ambrisentan (LETAIRIS) 10 MG tablet     furosemide (LASIX) 20 MG tablet     mometasone-formoterol (DULERA) 200-5 MCG/ACT inhaler     predniSONE (DELTASONE) 10 MG tablet     PROAIR  (90 Base) MCG/ACT inhaler     selexipag (UPTRAVI) 1600 MCG tablet     tadalafil, PAH, (ALYQ) 20 MG TABS     UNABLE TO FIND     Vitamin D3 (CHOLECALCIFEROL) 25 mcg (1000 units) tablet     budesonide (PULMICORT) 0.25 MG/2ML neb solution     tamsulosin (FLOMAX) 0.4 MG capsule     UNABLE TO FIND     No current facility-administered medications for this visit.     Allergies   Allergen Reactions     Nkda [No Known Drug Allergies]      Past Medical History:   Diagnosis Date     Chronic sinusitis      CKD (chronic kidney disease)      Heart disease      Hypertension      Keratosis     frontal scalp, treated with liquid nitrogen at [facility]     Malignant neoplasm (H)      Pneumonia a  few times     Pulmonary sarcoidosis (H)        Past Surgical History:   Procedure Laterality Date     COLONOSCOPY       CV RIGHT HEART CATH MEASUREMENTS RECORDED N/A 2019    Procedure: CV RIGHT HEART CATH;  Surgeon: Kale Delgado MD;  Location:  HEART CARDIAC CATH LAB     CV RIGHT HEART CATH MEASUREMENTS RECORDED N/A 2019    Procedure: CV RIGHT HEART CATH;  Surgeon: Ion Lemon MD;  Location:  HEART CARDIAC CATH LAB     CV RIGHT HEART CATH MEASUREMENTS RECORDED N/A 2021    Procedure: CV RIGHT HEART CATH;  Surgeon: Jhony Barone MD;  Location:  HEART CARDIAC CATH LAB     ENT SURGERY      wisdom teeth extraction     EP PACEMAKER Left 2021    Procedure: EP Pacemaker;  Surgeon: Dora Fontanez MD;  Location:  HEART CARDIAC CATH LAB     IR NEPHROSTOMY TUBE PLACEMENT LEFT  2021     LASER HOLMIUM NEPHROLITHOTOMY VIA PERCUTANEOUS NEPHROSTOMY Left 2021    Procedure: NEPHROLITHOTOMY, PERCUTANEOUS, USING HOLMIUM LASER;  Surgeon: Zackery Moura MD;  Location:  OR     [facility]ILLECTOMY  [facility]       Social History     Socioeconomic History     Marital status: Single     Spouse name: Not on file     Number of children: Not on file     Years of education: Not on file     Highest education level: Not on file   Occupational History     Not on file   Tobacco Use     Smoking status: Former Smoker     Packs/day: 1.00     Years: 30.00     Pack years: 30.00     Types: Cigarettes     Start date: 10/10/1975     Quit date: 3/1/2010     Years since quittin.0     Smokeless tobacco: Never Used   Substance and Sexual Activity     Alcohol use: Not Currently     Alcohol/week: 0.0 standard drinks     Comment: stated 2 bottles of beer occ     Drug use: Not Currently     Comment: past use of marijuana     Sexual activity: Not Currently     Partners: Female     Birth control/protection: Abstinence, Female Surgical   Other Topics Concern     Parent/sibling w/ CABG, MI or angioplasty  before 65F 55M? Not Asked      Service Not Asked     Blood Transfusions Not Asked     Caffeine Concern Yes     Occupational Exposure Not Asked     Hobby Hazards Not Asked     Sleep Concern Not Asked     Stress Concern Not Asked     Weight Concern Not Asked     Special Diet Not Asked     Back Care Not Asked     Exercise Yes     Bike Helmet Not Asked     Seat Belt Not Asked     Self-Exams Not Asked   Social History Narrative     Not on file     Social Determinants of Health     Financial Resource Strain: Not on file   Food Insecurity: Not on file   Transportation Needs: Not on file   Physical Activity: Not on file   Stress: Not on file   Social Connections: Not on file   Intimate Partner Violence: Not on file   Housing Stability: Not on file       ROS Pulmonary  A complete ROS was otherwise negative except as noted in the HPI.  /71   Pulse 75   Ht 1.829 m (6')   Wt 74.8 kg (165 lb)   SpO2 99%   BMI 22.38 kg/m    O2 sats 92 on 1 liter.    Exam:   GENERAL APPEARANCE: Alert, and in no apparent distress.  EYES: PERRL, EOMI  HENT: Nasal mucosa with no edema and no hyperemia.   MOUTH: Oral mucosa is moist, without any lesions, no tonsillar enlargement, no oropharyngeal exudate.  NECK: supple, no masses, no thyromegaly.  LYMPHATICS: No significant axillary, cervical, or supraclavicular nodes.  RESP: normal percussion, good air flow throughout.  No crackles. No rhonchi. No wheezes.  CV: Normal S1, S2, regular rhythm, normal rate. No murmur.  No rub. No gallop. No LE edema.   MS: extremities normal. No clubbing. No cyanosis.  SKIN: no rash on limited exam  NEURO: Mentation intact, speech normal, normal strength and tone, normal gait and stance    Results:    PFTs, including 6-minute walk test was reviewed by me with the patient.  On 4 liters of oxygen supplementation, his O2 sats dropped to 88% at 1 minute.  The 6-minute walk distance on 10 liters nasal cannula oxygen supplementation was 750 feet, which is  below the lower limit.  O2 saturations and rest was 97%; at the end of the 6-minute walk test were 89%.  FVC 2.42 (49%), FEV1 1.0 (26%) and the ratio 41.  Total lung capacity 6.82 liters (90% of predicted), DLCO 7.31 (35% of predicted).  This is not corrected for hemoglobin.  My interpretation is that he has very severe obstruction with severely decreased diffusion capacity and air trapping.  In comparison to prior spirometry in December, no significant change.    Assessment and plan Mr. 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  Worsening functional status in 12/2021, for which prednisone was started.    1.  Pulmonary:  Some improvement in functional status without much change in PFTs today.  The patient believes that he was not able to perform the PFTs because of the humidity today.  We will continue the 10 mg per day of prednisone and follow up in 3 months including PFTs.  2.  Extrapulmonary sarcoidosis:  We will check metabolic labs today.  He has a history of renal stones likely related to sarcoidosis.  Pulmonary hypertension is also likely related to sarcoidosis.  He follows Dr. Lindsey for it, and he is on treatment.  3.  Hypoxia: On supplemental oxygen with activity upto 10 L NC. Continue.  4. Candidacy for transplantation:  The patient in the past had indicated that he would not want to pursue transplantation.  It might be a good idea to review the indications and also outcomes of transplantation.  The patient may benefit from a visit with one of the Transplant Pulmonary physicians for education purposes so that he makes an informed decision about this.  The care coordinators will reach out to the patient to review this and see if he is interested.    This note consists of symbols derived from keyboarding, dictation and/or voice recognition software. As a result, there may be errors  in the script that have gone undetected. Please consider this when interpreting information found in this chart        Answers for HPI/ROS submitted by the patient on 3/20/2022  General Symptoms: No  Skin Symptoms: No  HENT Symptoms: Yes  EYE SYMPTOMS: No  HEART SYMPTOMS: Yes  LUNG SYMPTOMS: Yes  INTESTINAL SYMPTOMS: Yes  URINARY SYMPTOMS: Yes  REPRODUCTIVE SYMPTOMS: Yes  SKELETAL SYMPTOMS: No  BLOOD SYMPTOMS: No  NERVOUS SYSTEM SYMPTOMS: No  MENTAL HEALTH SYMPTOMS: Yes  Ear pain: No  Ear discharge: No  Hearing loss: No  Tinnitus: No  Nosebleeds: No  Congestion: Yes  Sinus pain: Yes  Trouble swallowing: Yes   Voice hoarseness: No  Mouth sores: No  Sore throat: No  Tooth pain: No  Gum tenderness: No  Bleeding gums: No  Change in taste: No  Change in sense of smell: No  Dry mouth: Yes  Hearing aid used: No  Neck lump: No  Chest pain or pressure: Yes  Fast or irregular heartbeat: Yes  Pain in legs with walking: No  Trouble breathing while lying down: Yes  Fingers or toes appear blue: No  High blood pressure: No  Low blood pressure: No  Fainting: No  Murmurs: No  Pacemaker: Yes  Varicose veins: No  Edema or swelling: No  Wake up at night with shortness of breath: Yes  Light-headedness: No  Exercise intolerance: Yes  Cough: No  Sputum or phlegm: Yes  Coughing up blood: No  Difficulty breating or shortness of breath: Yes  Snoring: No  Wheezing: Yes  Difficulty breathing on exertion: Yes  Nighttime Cough: No  Difficulty breathing when lying flat: Yes  Heart burn or indigestion: Yes  Nausea: No  Vomiting: No  Abdominal pain: Yes  Bloating: No  Constipation: No  Diarrhea: No  Blood in stool: No  Black stools: No  Rectal or Anal pain: No  Fecal incontinence: No  Yellowing of skin or eyes: No  Vomit with blood: No  Change in stools: No  Trouble holding urine or incontinence: Yes  Pain or burning: No  Trouble starting or stopping: No  Increased frequency of urination: Yes  Blood in urine: No  Decreased frequency of urination:  No  Frequent nighttime urination: Yes  Flank pain: Yes  Difficulty emptying bladder: No  Scrotal pain or swelling: No  Erectile dysfunction: No  Penile discharge: No  Genital ulcers: No  Reduced libido: No  Nervous or Anxious: No  Depression: Yes  Trouble sleeping: No  Trouble thinking or concentrating: No  Mood changes: No  Panic attacks: No

## 2022-03-23 NOTE — LETTER
3/23/2022         RE: Jimmy Isaac  [address] [address] [address]        Dear Colleague,    Thank you for referring your patient, Jimmy Isaac, to the [facility]G [facility] AND [facility] [place]. Please see a copy of my visit note below.    Reason for Visit  Jimmy Isaac is a 61 year old year old male who is being seen for sarcoidosis  Pulmonary HPI    The patient was seen and examined by Shane Ruvalcaba MD     Mr. Isaac was last seen in the Pulmonary Clinic in 12/2021.  He has pulmonary hypertension and stage IV pulmonary sarcoidosis.  He was started on prednisone 10 mg per day to test if there would be any improvement in his functional status or PFTs if there was any reversible lung disease.  He comes in today for followup.    He tells me that he has felt quite well since he was started on the prednisone.  He reports more energy.  In fact, he also has improved functional status.  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  He uses 1 liter of oxygen via nasal cannula while he is doing that.  He reports episodes of fatigue while he is doing that, which he believes is an indicator of hypoxia.  He paces himself while he is working with his horses.  He denies no other new pulmonary symptoms.      Current Outpatient Medications   Medication     ambrisentan (LETAIRIS) 10 MG tablet     furosemide (LASIX) 20 MG tablet     mometasone-formoterol (DULERA) 200-5 MCG/ACT inhaler     predniSONE (DELTASONE) 10 MG tablet     PROAIR  (90 Base) MCG/ACT inhaler     selexipag (UPTRAVI) 1600 MCG tablet     tadalafil, PAH, (ALYQ) 20 MG TABS     UNABLE TO FIND     Vitamin D3 (CHOLECALCIFEROL) 25 mcg (1000 units) tablet     budesonide (PULMICORT) 0.25 MG/2ML neb solution     tamsulosin (FLOMAX) 0.4 MG capsule     UNABLE TO FIND     No current facility-administered medications for this visit.     Allergies   Allergen Reactions     Nkda [No  Known Drug Allergies]      Past Medical History:   Diagnosis Date     Chronic sinusitis      CKD (chronic kidney disease)      Heart disease      Hypertension      Keratosis     frontal scalp, treated with liquid nitrogen at [facility]     Malignant neoplasm (H)      Pneumonia a few times     Pulmonary sarcoidosis (H)        Past Surgical History:   Procedure Laterality Date     COLONOSCOPY       CV RIGHT HEART CATH MEASUREMENTS RECORDED N/A 2019    Procedure: CV RIGHT HEART CATH;  Surgeon: Kale Delgado MD;  Location:  HEART CARDIAC CATH LAB     CV RIGHT HEART CATH MEASUREMENTS RECORDED N/A 2019    Procedure: CV RIGHT HEART CATH;  Surgeon: Ion Lemon MD;  Location:  HEART CARDIAC CATH LAB     CV RIGHT HEART CATH MEASUREMENTS RECORDED N/A 2021    Procedure: CV RIGHT HEART CATH;  Surgeon: Jhony Barone MD;  Location:  HEART CARDIAC CATH LAB     ENT SURGERY      wisdom teeth extraction     EP PACEMAKER Left 2021    Procedure: EP Pacemaker;  Surgeon: Dora Fontanez MD;  Location:  HEART CARDIAC CATH LAB     IR NEPHROSTOMY TUBE PLACEMENT LEFT  2021     LASER HOLMIUM NEPHROLITHOTOMY VIA PERCUTANEOUS NEPHROSTOMY Left 2021    Procedure: NEPHROLITHOTOMY, PERCUTANEOUS, USING HOLMIUM LASER;  Surgeon: Zackery Moura MD;  Location:  OR     TONSILLECTOMY  1964       Social History     Socioeconomic History     Marital status: Single     Spouse name: Not on file     Number of children: Not on file     Years of education: Not on file     Highest education level: Not on file   Occupational History     Not on file   Tobacco Use     Smoking status: Former Smoker     Packs/day: 1.00     Years: 30.00     Pack years: 30.00     Types: Cigarettes     Start date: 10/10/1975     Quit date: 3/1/2010     Years since quittin.0     Smokeless tobacco: Never Used   Substance and Sexual Activity     Alcohol use: Not Currently     Alcohol/week: 0.0 standard drinks      Comment: stated 2 bottles of beer occ     Drug use: Not Currently     Comment: past use of marijuana     Sexual activity: Not Currently     Partners: Female     Birth control/protection: Abstinence, Female Surgical   Other Topics Concern     Parent/sibling w/ CABG, MI or angioplasty before 65F 55M? Not Asked      Service Not Asked     Blood Transfusions Not Asked     Caffeine Concern Yes     Occupational Exposure Not Asked     Hobby Hazards Not Asked     Sleep Concern Not Asked     Stress Concern Not Asked     Weight Concern Not Asked     Special Diet Not Asked     Back Care Not Asked     Exercise Yes     Bike Helmet Not Asked     Seat Belt Not Asked     Self-Exams Not Asked   Social History Narrative     Not on file     Social Determinants of Health     Financial Resource Strain: Not on file   Food Insecurity: Not on file   Transportation Needs: Not on file   Physical Activity: Not on file   Stress: Not on file   Social Connections: Not on file   Intimate Partner Violence: Not on file   Housing Stability: Not on file       ROS Pulmonary  A complete ROS was otherwise negative except as noted in the HPI.  /71   Pulse 75   Ht 1.829 m (6')   Wt 74.8 kg (165 lb)   SpO2 99%   BMI 22.38 kg/m    O2 sats 92 on 1 liter.    Exam:   GENERAL APPEARANCE: Alert, and in no apparent distress.  EYES: PERRL, EOMI  HENT: Nasal mucosa with no edema and no hyperemia.   MOUTH: Oral mucosa is moist, without any lesions, no tonsillar enlargement, no oropharyngeal exudate.  NECK: supple, no masses, no thyromegaly.  LYMPHATICS: No significant axillary, cervical, or supraclavicular nodes.  RESP: normal percussion, good air flow throughout.  No crackles. No rhonchi. No wheezes.  CV: Normal S1, S2, regular rhythm, normal rate. No murmur.  No rub. No gallop. No LE edema.   MS: extremities normal. No clubbing. No cyanosis.  SKIN: no rash on limited exam  NEURO: Mentation intact, speech normal, normal strength and tone, normal  gait and stance    Results:    PFTs, including 6-minute walk test was reviewed by me with the patient.  On 4 liters of oxygen supplementation, his O2 sats dropped to 88% at 1 minute.  The 6-minute walk distance on 10 liters nasal cannula oxygen supplementation was 750 feet, which is below the lower limit.  O2 saturations and rest was 97%; at the end of the 6-minute walk test were 89%.  FVC 2.42 (49%), FEV1 1.0 (26%) and the ratio 41.  Total lung capacity 6.82 liters (90% of predicted), DLCO 7.31 (35% of predicted).  This is not corrected for hemoglobin.  My interpretation is that he has very severe obstruction with severely decreased diffusion capacity and air trapping.  In comparison to prior spirometry in December, no significant change.    Assessment and plan Mr. 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  Worsening functional status in 12/2021, for which prednisone was started.    1.  Pulmonary:  Some improvement in functional status without much change in PFTs today.  The patient believes that he was not able to perform the PFTs because of the humidity today.  We will continue the 10 mg per day of prednisone and follow up in 3 months including PFTs.  2.  Extrapulmonary sarcoidosis:  We will check metabolic labs today.  He has a history of renal stones likely related to sarcoidosis.  Pulmonary hypertension is also likely related to sarcoidosis.  He follows Dr. Lindsey for it, and he is on treatment.  3.  Candidacy for transplantation:  The patient in the past had indicated that he would not want to pursue transplantation.  It might be a good idea to review the indications and also outcomes of transplantation.  The patient may benefit from a visit with one of the Transplant Pulmonary physicians for education purposes so that he makes an informed decision about this.  The care coordinators  will reach out to the patient to review this and see if he is interested.    This note consists of symbols derived from keyboarding, dictation and/or voice recognition software. As a result, there may be errors in the script that have gone undetected. Please consider this when interpreting information found in this chart    Again, thank you for allowing me to participate in the care of your patient.      Sincerely,      Shane Ruvalcaba MD

## 2022-03-23 NOTE — NURSING NOTE
Chief Complaint   Patient presents with     Interstitial Lung Disease (ILD)     3 month f/u     Vitals were taken and medications were reconciled.     Elin Dowell RMA  10:59 AM

## 2022-03-24 NOTE — LETTER
April 14, 2022    From:  [facility]    To: Mr. Isaac  [address] [address] [address]    Dear Jimmy Isaac,    Thank you for considering the [facility]. Dr. Shane Ruvalcaba referred you to our program for consideration of lung transplant evaluation. We have reviewed your records and, unfortunately, you do not meet our criteria for lung transplant evaluation.  Although your lung disease may be severe enough to consider transplantation as an option, our opinion is that your chronic kidney disease would put your health and survival at great risk after lung transplantation.     Thank you for considering the [facility] for your health care needs. Please feel free to contact us at [phone] if you or your physician would like to discuss our decision or with any concerns.     Sincerely,    [facility]    CC Primary Care Provider        Referring Provider

## 2022-03-24 NOTE — LETTER
Jimmy Isaac  [id number] [address] [address]    Dear Jimmy,    Thank you for your interest in the Transplant Center at [facility]. We look forward to being a part of your care team and assisting you through the transplant process.    As we discussed, your transplant coordinator is Anyi Arroyo (Lung).  You may call your coordinator at any time with questions or concerns.  Your first scheduled call will be on 4/7/22.  If this needs to change, call [phone].    Please complete the following.    1. Fill out and return the enclosed forms that are being sent through [employer]    Authorization for Electronic Communication    Authorization to Discuss Protected Health Information    Authorization for Release of Protected Health Information    Authorization for Care Everywhere Release of Information    2. Sign up for:    Remedify, access to your electronic medical record (see enclosed pamphlet)    [email or web address], a transplant education website    You can use these tools to learn more about your transplant, communicate with your care team, and track your medical details      Sincerely,      Solid Organ Transplant  [facility]    cc: Referring Physician Dialysis Unit PCP Care Team

## 2022-03-28 NOTE — PROGRESS NOTES
March 28, 2022 3:09 PM -  AMARX1:   Verbal consent received to access outside records on 03/28/22  Sharon Caro LPN   Solid Organ Transplant

## 2022-03-28 NOTE — TELEPHONE ENCOUNTER
Care Team  Referring Provider: ALEX HENDERSON  Primary Provider: SHERRY BOB  Cardiologist: Khang Lindsey   Nephrologist: Brandy Parmar     Diagnosis: pulmonary hypertension and stage IV pulmonary sarcoidosis  Source/Facility: [facility]  Evaluating at another facility? No    Critical History  Smoking/nicotine use history: 3770-5514, 30 pack yrs  Alcohol use history: None  Drug use history: None  Cancer history: Skin cancer, no ongoing issues   Cardiac history:  Heart disease  BMI: 23.19  O2: 5L at rest, 10L with activity   Notes:     Due to COVID, verbal consent received for Care Everywhere Authorization from the patient during this call.    Is patient in a group home/assisted living? No  Does patient have a guardian? No    Referral intake process completed.  Patient is aware that after financial approval is received, medical records will be requested.   Patient confirmed for a callback from transplant coordinator on 4/7/22  Confirmed coordinator will discuss evaluation process in more detail at the time of their call.   Patient is aware of the need to arrange age appropriate cancer screening, vaccinations, and dental care.  Reminded patient to complete questionnaire, complete medical records release, and review packet prior to evaluation visit .    Assessed patient for special needs (ie--wheelchair, assistance, guardian, and ):  None    Patient instructed to call [phone] with questions.  Insurance Information  Insurance provider:  [employer] - BLUE [employer] [facility] ([facility])  Policy kearns:  Self  Subscriber/policy/ID number:  [id number]  Group Number:  [id number]

## 2022-04-14 NOTE — TELEPHONE ENCOUNTER
Relayed lung txp committee decision that Jimmy's CKD is prohibitive for consideration of lung transplant at [facility].    Provided with information on other transplant centers that he could reach out to for transplant consultation with consideration of multivisceral transplantation.

## 2022-04-14 NOTE — COMMITTEE REVIEW
Thoracic Committee Review Note     Evaluation Date:   Committee Review Date: 04/14/22      Organ being evaluated for: BSL    Transplant Phase: Referral  Transplant Status: Declined    Transplant Coordinator: Anyi Arroyo    Referring Physician: Shane Ruvalcaba    Primary Diagnosis: Pulmonary Hypertension/Pulmonary Arterial Hypertension  Secondary Diagnosis: sarcoidosis    Committee Review Members:  Lilly Fischer, Porter De La Fuente, Benson Muller, Samantha Sharma, Anyi Arroyo, Kalin Tomlinson, Noemi Neumann, Cecilia Mead    Committee Review Decision: Declined due to CKD4     Absolute Contraindications: Baseline Cr 3, GFR 20-30s    Committee Chair Porter De La Fuente verbally attested to the committee's decision.    Committee Discussion Details:   CKD4 absolute contraindication to lung txp at our center.  Could pursue transplant at other transplant centers.

## 2022-05-04 NOTE — TELEPHONE ENCOUNTER
5/4 sent a MC and tried to LVM but the VM box was set-up weird and I am not sure if the VM was saved. Called for pt to get RHC scheduled the day before Rosalia kohlert. on 6/28 - EF

## 2022-05-08 PROBLEM — Z86.2 HX OF SARCOIDOSIS: Status: ACTIVE | Noted: 2022-01-01

## 2022-05-08 PROBLEM — I50.9 ACUTE ON CHRONIC CONGESTIVE HEART FAILURE, UNSPECIFIED HEART FAILURE TYPE (H): Status: ACTIVE | Noted: 2022-01-01

## 2022-05-08 PROBLEM — N17.9 ACUTE RENAL FAILURE SUPERIMPOSED ON STAGE 4 CHRONIC KIDNEY DISEASE, UNSPECIFIED ACUTE RENAL FAILURE TYPE (H): Status: ACTIVE | Noted: 2022-01-01

## 2022-05-08 PROBLEM — N47.2 PARAPHIMOSIS: Status: ACTIVE | Noted: 2022-01-01

## 2022-05-08 PROBLEM — R60.1 ANASARCA: Status: ACTIVE | Noted: 2022-01-01

## 2022-05-08 PROBLEM — B02.8 HERPES ZOSTER WITH COMPLICATION: Status: ACTIVE | Noted: 2022-01-01

## 2022-05-08 PROBLEM — J96.11 CHRONIC RESPIRATORY FAILURE WITH HYPOXIA, ON HOME O2 THERAPY (H): Status: ACTIVE | Noted: 2022-01-01

## 2022-05-08 PROBLEM — R21 RASH: Status: ACTIVE | Noted: 2022-01-01

## 2022-05-08 PROBLEM — N18.4 ACUTE RENAL FAILURE SUPERIMPOSED ON STAGE 4 CHRONIC KIDNEY DISEASE, UNSPECIFIED ACUTE RENAL FAILURE TYPE (H): Status: ACTIVE | Noted: 2022-01-01

## 2022-05-08 PROBLEM — Z99.81 CHRONIC RESPIRATORY FAILURE WITH HYPOXIA, ON HOME O2 THERAPY (H): Status: ACTIVE | Noted: 2022-01-01

## 2022-05-08 PROBLEM — M79.89 SWELLING OF BOTH LOWER EXTREMITIES: Status: ACTIVE | Noted: 2022-01-01

## 2022-05-08 NOTE — ED TRIAGE NOTES
"Pt states that he is currently being treated for shingles. Pt states that as of recently \"my whole body is swollen and I think I;m retaining fluid.\" Pt wears 5L oxygen via nasal cannula at baseline. PT has hx pulmonary htn, sarcoidosis, and CHF. Pt also notes raised rash to most of his skin.       "

## 2022-05-08 NOTE — ED NOTES
Skin WDL - Skin Comments: SCABBED SHINGLES TO LEFT UPPER CHEST AND LEFT SHOULDER. RED SKIN TO PT'S BODY, WITH BLOTHY AREAS(CHEST, ARMS, ABD,LEGS,HANDS     Genitourinary (Adult) - Genitourinary WDL:  (KIDNEY FAILURE, +3-4 BRANDO TO BILAT LOWER EXT. AND PENIS.)

## 2022-05-08 NOTE — H&P
[facility]    History and Physical - Hospitalist Service       Date of Admission:  5/8/2022    Assessment & Plan      Jimmy Isaac is a 61 year old male admitted on 5/8/2022. He has a past medical history significant for sarcoidosis, pulmonary hypertension, coronary artery disease, skin cancer, chronic kidney disease, hypertension, and chronic hypoxic respiratory failure on chronic supplemental oxygen.  He was recently diagnosed with herpes zoster.  Started on antiviral medication to treat his herpes zoster.  He presented to emergency room today due to diffuse rash.    1.  Diffuse maculopapular rash.  Appears to be likely drug reaction.  Suspect this could be due to his recently started antiviral medication as this is the only new medication he has been on since his diagnosis of herpes zoster.  -Stop antivirals.  -Infectious disease consult.  -Scheduled Benadryl 25 mg every 6 hours.  -Increase prednisone to 40 mg twice a day.    2.  Herpes zoster.    Has been present for little more than a week.  Was started on antivirals in the outpatient setting.  Has now developed diffuse rash as noted above.  Concerning for drug reaction to antivirals.  Emergency room physician concern for possible secondary bacterial infection of his herpes zoster.    -Infectious disease consult.  -Hold further antibiotics until seen by ID.  -Check MRSA PCR.  -Would try to avoid the combination of IV vancomycin and Zosyn due to his known chronic kidney disease.    3.  Chronic kidney disease stage IV.  -Creatinine appears to be near baseline.  -Avoid nephrotoxins as able.  -Recheck metabolic panel tomorrow.    4.  Sarcoidosis.  Normally on prednisone 10 mg a day.  -Increase prednisone to 40 mg twice a day due to concern for drug reaction rash.    5.  Acute on chronic peripheral edema.  Lower extremities more swollen than usual per patient.  Did have a dose of IV furosemide 60 mg once in the ER.  -Monitor results of IV  furosemide.  -Weigh daily.  -Intake and output monitoring.  -Reassess for IV furosemide versus restarting his home oral furosemide in the morning.    6.  Chronic hypoxic respiratory failure.  -Continue supplemental oxygen.     Diet:  2 g sodium diet.  DVT Prophylaxis: Heparin SQ  Chong Catheter: Not present  Central Lines: None  Cardiac Monitoring: None  Code Status:  Full code.    Clinically Significant Risk Factors Present on Admission             # Hypoalbuminemia: Albumin = 2.7 g/dL (Ref range: 3.4 - 5.0 g/dL) on admission, will monitor as appropriate              Rodrigo Noel DO  Hospitalist Service  [facility]  Securely message with the Vocera Web Console (learn more here)  Text page via inDinero Paging/Directory         ______________________________________________________________________    Chief Complaint   Rash.    History is obtained from the patient    History of Present Illness   Jimmy Isaac is a 61 year old male who has a past medical history significant for sarcoidosis, pulmonary hypertension, coronary artery disease, skin cancer, chronic kidney disease, hypertension, and chronic hypoxic respiratory failure on chronic supplemental oxygen.  He began feeling quite itchy 2 weeks ago.  Then developed some lesions on his left shoulder.  He was diagnosed with herpes zoster a little more than a week ago.  He was started on an antiviral medication because of his herpes zoster.  He does not member which of the antivirals he was started on.  He has been taking this 3 times a day.  A couple of days ago, he developed a diffuse rash across his chest, arms, abdomen, back, groin, and upper legs.  The rash is significantly different than the initial herpes zoster.  He has not had a rash like this previously.  He is having some itching because of the rash.  He initially was having pain in his left shoulder area.  Pain has been much resolved in his left shoulder.  He is not having pain in  other places.  He does have chronic shortness of breath due to his sarcoidosis.  Is always on supplemental oxygen.  He also has chronic swelling in his lower extremities.  He does feel that his lower extremities have gotten a little more swollen over the past week.  Swelling is now all the way up to his groin.  He has been trying to take an increased amount of his furosemide to treat increased swelling.  Does not seem like that has been helpful.  No other acute complaints.    Review of Systems    The 10 point Review of Systems is negative other than noted in the HPI     Past Medical History    I have reviewed this patient's medical history and updated it with pertinent information if needed.   Past Medical History:   Diagnosis Date     Chronic sinusitis      CKD (chronic kidney disease)      Heart disease      Hypertension      Keratosis     frontal scalp, treated with liquid nitrogen at [facility]     Malignant neoplasm (H)      Pneumonia a few times     Pulmonary sarcoidosis (H)        Past Surgical History   I have reviewed this patient's surgical history and updated it with pertinent information if needed.  Past Surgical History:   Procedure Laterality Date     COLONOSCOPY       CV RIGHT HEART CATH MEASUREMENTS RECORDED N/A 6/19/2019    Procedure: CV RIGHT HEART CATH;  Surgeon: Kale Delgado MD;  Location:  HEART CARDIAC CATH LAB     CV RIGHT HEART CATH MEASUREMENTS RECORDED N/A 12/11/2019    Procedure: CV RIGHT HEART CATH;  Surgeon: Ion Lemon MD;  Location:  HEART CARDIAC CATH LAB     CV RIGHT HEART CATH MEASUREMENTS RECORDED N/A 7/30/2021    Procedure: CV RIGHT HEART CATH;  Surgeon: Jhony Barone MD;  Location:  HEART CARDIAC CATH LAB     ENT SURGERY      wisdom teeth extraction     EP PACEMAKER Left 8/2/2021    Procedure: EP Pacemaker;  Surgeon: Dora Fontaenz MD;  Location:  HEART CARDIAC CATH LAB     IR NEPHROSTOMY TUBE PLACEMENT LEFT  7/9/2021     LASER HOLMIUM  NEPHROLITHOTOMY VIA PERCUTANEOUS NEPHROSTOMY Left 2021    Procedure: NEPHROLITHOTOMY, PERCUTANEOUS, USING HOLMIUM LASER;  Surgeon: Zackery Moura MD;  Location: [facility] OR     TONSILLECTOMY         Social History   I have reviewed this patient's social history and updated it with pertinent information if needed.  Social History     Tobacco Use     Smoking status: Former Smoker     Packs/day: 1.00     Years: 30.00     Pack years: 30.00     Types: Cigarettes     Start date: 10/10/1975     Quit date: 3/1/2010     Years since quittin.1     Smokeless tobacco: Never Used   Substance Use Topics     Alcohol use: Not Currently     Alcohol/week: 0.0 standard drinks     Comment: stated 2 bottles of beer occ     Drug use: Not Currently     Comment: past use of marijuana       Family History   I have reviewed this patient's family history and updated it with pertinent information if needed.  Family History   Problem Relation Age of Onset     Coronary Artery Disease Father      Heart Disease Father         heart attack age 35-40     Hypertension Mother      Hyperlipidemia Mother      Cerebrovascular Disease Mother      Dementia Mother      Lupus Sister      Glaucoma No family hx of      Macular Degeneration No family hx of        Prior to Admission Medications   Prior to Admission Medications   Prescriptions Last Dose Informant Patient Reported? Taking?   PROAIR  (90 Base) MCG/ACT inhaler   No No   Sig: Inhale 2 puffs into the lungs every 6 hours as needed for shortness of breath / dyspnea   UNABLE TO FIND  Self Yes No   Sig: MEDICATION NAME: Topical skin tag remover   UNABLE TO FIND   Yes No   Sig: MEDICATION NAME: Home Oxygen, 5-10 L   Vitamin D3 (CHOLECALCIFEROL) 25 mcg (1000 units) tablet   No No   Sig: Take 1 tablet (25 mcg) by mouth every other day   ambrisentan (LETAIRIS) 10 MG tablet  Self Yes No   Sig: Take 10 mg by mouth every morning    budesonide (PULMICORT) 0.25 MG/2ML neb solution   No No    Sig: Take 2 mLs (0.25 mg) by nebulization 2 times daily   furosemide (LASIX) 20 MG tablet   No No   Sig: Take 2 tablets (40 mg) by mouth 2 times daily   mometasone-formoterol (DULERA) 200-5 MCG/ACT inhaler   No No   Sig: Inhale 2 puffs into the lungs 2 times daily   predniSONE (DELTASONE) 10 MG tablet   No No   Sig: Take 1 tablet (10 mg) by mouth daily   selexipag (UPTRAVI) 1600 MCG tablet  Self No No   Sig: Take 1 tablet (1,600 mcg) by mouth every 12 hours   tadalafil, PAH, (ALYQ) 20 MG TABS   No No   Sig: Take 2 tablets (40 mg) by mouth daily   tamsulosin (FLOMAX) 0.4 MG capsule  Self No No   Sig: Take 1 capsule (0.4 mg) by mouth daily      Facility-Administered Medications: None     Allergies   Allergies   Allergen Reactions     Nkda [No Known Drug Allergies]        Physical Exam   Vital Signs: Temp: 97  F (36.1  C)   BP: 105/62 Pulse: 61   Resp: 9 SpO2: 100 %      Weight: 0 lbs 0 oz    Gen:  NAD, A&Ox3.  Eyes:  PERRL, sclera anicteric.  OP:  MMM, no lesions.  Neck:  Supple.  CV:  Regular, no murmurs.  Lung:  Crackles throughout lung fields b/l, normal effort.  Ab:  +BS, soft.  Skin:  Warm, dry to touch.  Diffuse maculopapular erythematous rash.  Multiple crusted lesions on his left shoulder.  Ext:  2+ pitting edema LE b/l.      Data   Data reviewed today: I reviewed all medications, new labs and imaging results over the last 24 hours.     Recent Labs   Lab 05/08/22  1639   WBC 9.3   HGB 13.4   MCV 94      INR 1.08      POTASSIUM 3.6   CHLORIDE 100   CO2 27   BUN 59*   CR 3.25*   ANIONGAP 7   TERI 8.7   *   ALBUMIN 2.7*   PROTTOTAL 5.7*   BILITOTAL 0.5   ALKPHOS 55   ALT 20   AST 15

## 2022-05-09 NOTE — ED NOTES
[facility]  ED Nurse Handoff Report    Jimmy Isaac is a 61 year old male   ED Chief complaint: No chief complaint on file.  . ED Diagnosis:   Final diagnoses:   Paraphimosis   Rash   Swelling of both lower extremities   Anasarca   Hx of sarcoidosis   Herpes zoster with complication   Acute on chronic congestive heart failure, unspecified heart failure type (H)   Acute renal failure superimposed on stage 4 chronic kidney disease, unspecified acute renal failure type (H)   Chronic respiratory failure with hypoxia, on home O2 therapy (H)     Allergies:   Allergies   Allergen Reactions     Nkda [No Known Drug Allergies]      Code Status: Full Code  Activity level - Baseline/Home:  Independent. Activity Level - Current:   Stand by Assist. Lift room needed: No. Bariatric: No   Needed: No   Isolation: Yes. Infection: .     Vital Signs:   Vitals:    05/08/22 1815 05/08/22 1830 05/08/22 1845 05/08/22 1900   BP: 110/71 105/62 105/57 118/70   Pulse: 63 61 62 60   Resp: 15 9 13 18   Temp:       SpO2: 99% 100% 100% 100%     Cardiac Rhythm:  ,       Ventricular-paced rhythm with premature ventricular or aberrantly conducted complexes   Abnormal EKG  Pain level:    Patient confused: No. Patient Falls Risk: Yes.   Elimination Status: Has voided   Patient Report  Focused Assessment:    Skin WDL - Skin Comments: SCABBED SHINGLES TO LEFT UPPER CHEST AND LEFT SHOULDER. RED SKIN TO PT'S BODY, WITH BLOTHY AREAS(CHEST, ARMS, ABD,LEGS,HANDS   Genitourinary (Adult) - Genitourinary WDL:  (KIDNEY FAILURE, +3-4 BRANDO TO BILAT LOWER EXT. AND PENIS.)  Tests Performed / Abnormal Results:    XR Chest Port 1 View   Final Result   IMPRESSION: Baseline hyperinflation of the lungs with probable COPD/emphysema. Similar appearance of mild probable scarring in the right midlung. Similar appearance of streaky opacities in the left lung. No definite new consolidation. No pleural    effusions. Pattern is not suggestive of acute  congestive heart failure. Calcified pleural plaques are not excluded. No pneumothorax. Unchanged cardiac size. Left-sided cardiac rhythm maintenance device.        Labs Ordered and Resulted from Time of ED Arrival to Time of ED Departure   COMPREHENSIVE METABOLIC PANEL - Abnormal       Result Value    Sodium 134      Potassium 3.6      Chloride 100      Carbon Dioxide (CO2) 27      Anion Gap 7      Urea Nitrogen 59 (*)     Creatinine 3.25 (*)     Calcium 8.7      Glucose 105 (*)     Alkaline Phosphatase 55      AST 15      ALT 20      Protein Total 5.7 (*)     Albumin 2.7 (*)     Bilirubin Total 0.5      GFR Estimate 21 (*)    ROUTINE UA WITH MICROSCOPIC - Abnormal    Color Urine Light Yellow      Appearance Urine Clear      Glucose Urine Negative      Bilirubin Urine Negative      Ketones Urine Negative      Specific Gravity Urine 1.009      Blood Urine Moderate (*)     pH Urine 5.5      Protein Albumin Urine 10  (*)     Urobilinogen Urine Normal      Nitrite Urine Negative      Leukocyte Esterase Urine Moderate (*)     Mucus Urine Present (*)     RBC Urine 15 (*)     WBC Urine 13 (*)    NT PROBNP INPATIENT - Abnormal    N terminal Pro BNP Inpatient 6,378 (*)    CBC WITH PLATELETS AND DIFFERENTIAL - Abnormal    WBC Count 9.3      RBC Count 4.55      Hemoglobin 13.4      Hematocrit 42.6      MCV 94      MCH 29.5      MCHC 31.5      RDW 14.3      Platelet Count 222      % Neutrophils 81      % Lymphocytes 5      % Monocytes 5      % Eosinophils 8      % Basophils 0      % Immature Granulocytes 1      NRBCs per 100 WBC 0      Absolute Neutrophils 7.5      Absolute Lymphocytes 0.5 (*)     Absolute Monocytes 0.5      Absolute Eosinophils 0.7      Absolute Basophils 0.0      Absolute Immature Granulocytes 0.1      Absolute NRBCs 0.0     ISTAT GASES LACTATE VENOUS POCT - Abnormal    Lactic Acid POCT 0.6      Bicarbonate Venous POCT 28      O2 Sat, Venous POCT 43 (*)     pCO2V Venous POCT 56 (*)     pH Venous POCT 7.31 (*)      pO2 Venous POCT 27     INR - Normal    INR 1.08     PARTIAL THROMBOPLASTIN TIME - Normal    aPTT 33     INFLUENZA A/B & SARS-COV2 PCR MULTIPLEX - Normal    Influenza A PCR Negative      Influenza B PCR Negative      RSV PCR Negative      SARS CoV2 PCR Negative     TROPONIN I - Normal    Troponin I High Sensitivity 50     PROCALCITONIN   BLOOD CULTURE   BLOOD CULTURE   Treatments provided: PIV, LABS, BLOOD CULTX2, ABXX2, COVID, BENADRYL, XRAY, ISOLATION, MONITORING, URINE SAMPLE, BOX LUNCH,   Family Comments: NONE  OBS brochure/video discussed/provided to patient:  No  ED Medications:   Medications   sodium chloride (PF) 0.9% PF flush 3 mL (has no administration in time range)   sodium chloride (PF) 0.9% PF flush 3 mL (has no administration in time range)   vancomycin 1750 mg in 0.9% NaCl 500 ml intermittent infusion 1,750 mg (1,750 mg Intravenous Given 5/8/22 1853)   furosemide (LASIX) injection 60 mg (60 mg Intravenous Given 5/8/22 1716)   piperacillin-tazobactam (ZOSYN) infusion 3.375 g (3.375 g Intravenous New Bag 5/8/22 1806)   diphenhydrAMINE (BENADRYL) injection 25 mg (25 mg Intravenous Given 5/8/22 1853)     Drips infusing:  Yes  For the majority of the shift, the patient's behavior Green. Interventions performed were NONE.    Sepsis treatment initiated: No     Patient tested for COVID 19 prior to admission: YES    ED Nurse Name/Phone Number: Joseph Anaya RN, [phone]  7:03 PM    RECEIVING UNIT ED HANDOFF REVIEW    Above ED Nurse Handoff Report was reviewed: Yes  Reviewed by: Praneeth García RN on May 8, 2022 at 8:22 PM

## 2022-05-09 NOTE — PROGRESS NOTES
Admission medication history interview status for this patient is complete. See [facility] admission navigator for allergy information, prior to admission medications and immunization status.     Medication history interview done, indicate source(s): Patient  Medication history resources (including written lists, pill bottles, clinic record):None  Pharmacy: [place] [facility]     Changes made to PTA medication list:  Added: none   Changed: prednisone 10 mg daily --> 5 mg daily   Reported as Not Taking: Flomax, Pulmicort   Removed: none     Actions taken by pharmacist (provider contacted, etc):None     Additional medication history information:Note patient stated that he stopped taking Pulmicort as it made him dizzy.     Medication reconciliation/reorder completed by provider prior to medication history?  No       Prior to Admission medications    Medication Sig Last Dose Taking? Auth Provider   ambrisentan (LETAIRIS) 10 MG tablet Take 10 mg by mouth every morning  5/8/2022 at am Yes Khang Lindsey MD   furosemide (LASIX) 20 MG tablet Take 2 tablets (40 mg) by mouth 2 times daily 5/8/2022 at am Yes Khang Lindsey MD   mometasone-formoterol (DULERA) 200-5 MCG/ACT inhaler Inhale 2 puffs into the lungs 2 times daily 5/8/2022 at pm Yes Shane Ruvalcaba MD   predniSONE (DELTASONE) 5 MG tablet Take 5 mg by mouth daily 5/8/2022 at am Yes Unknown, Entered By History   PROAIR  (90 Base) MCG/ACT inhaler Inhale 2 puffs into the lungs every 6 hours as needed for shortness of breath / dyspnea  Yes Shane Ruvalcaba MD   selexipag (UPTRAVI) 1600 MCG tablet Take 1 tablet (1,600 mcg) by mouth every 12 hours 5/8/2022 at pm Yes Khang Lindsey MD   tadalafil, PAH, (ALYQ) 20 MG TABS Take 2 tablets (40 mg) by mouth daily 5/8/2022 at am Yes Khang Lindsey MD   Vitamin D3 (CHOLECALCIFEROL) 25 mcg (1000 units) tablet Take 1 tablet (25 mcg) by mouth every other day  Patient taking differently:  Take 25 mcg by mouth every other day Take on odd days of the month 5/7/2022 at am Yes Radha Rod PA-C   budesonide (PULMICORT) 0.25 MG/2ML neb solution Take 2 mLs (0.25 mg) by nebulization 2 times daily  Patient not taking: Reported on 5/8/2022 Not Taking at Unknown time  Shane Ruvalcaba MD   predniSONE (DELTASONE) 10 MG tablet Take 1 tablet (10 mg) by mouth daily  Patient not taking: Reported on 5/8/2022 Not Taking at Unknown time  Shane Ruvalcaba MD   tamsulosin (FLOMAX) 0.4 MG capsule Take 1 capsule (0.4 mg) by mouth daily  Patient not taking: Reported on 5/8/2022 Not Taking at Unknown time  Marcela Donnelly PA-C   UNABLE TO FIND MEDICATION NAME: Home Oxygen, 5-10 L   Reported, Patient   UNABLE TO FIND MEDICATION NAME: Topical skin tag remover   Reported, Patient

## 2022-05-09 NOTE — PLAN OF CARE
Goal Outcome Evaluation:    Pt came up for the ER approx. 2100 last night. Pt oriented to room and call light system.     Pt A&O x4. VSS on 5L O2 NC baseline at home when sitting and 10L when ambulating. Denies pain. Receiving Heparin subcutaneous, Benegryl and Prednisone 40 mg for rash. Permanent pacemaker. SBA with belt to bathroom, voiding. LS: clear. ID consult scheduled for today.        Pt did come in with bag of medications and these medications will be sent down to pharmacy.

## 2022-05-09 NOTE — CONSULTS
ID consult dictated IMP 1 60 yo male with L arm dermatomal zoster, minor pain, not disseninated, compromised  2 Allergic rxn presume acyclovir    REc No antimicrobials, almost fortunate this happened as was on a massive overdiose of valactclovir(not renal adjusted), other than allergic rxn appears to gave avoided the possible adverse renal affect of the overdose  Contact iso only, symptomatic rx only acyclovir as listed allergy

## 2022-05-09 NOTE — PROGRESS NOTES
"Care Management Note    Discharge Date: 05/11/2022     Discharge Disposition: home      Discharge Services: n/a    Discharge DME: baseline home oxygen, 5L    Discharge Transportation:  Family or friend to provide    Private pay costs discussed: Not applicable    Handoff Referral Completed: No    Additional Information:  Reviewed pt's status and discussed in rounds.    Patient is 61 year old male admitted on 5/8/2022 with a chief complaint of \"rash\" (per H&P). Pt identified as having unplanned readmission risk of 20%. Discussed with nursing, no discharge needs identified at this time. No concerns indicated.     SW/CM will not follow at this time. Please alert SW/CM if needs arise or place consult.     RUBY Welch, LSW  Inpatient Care Coordination  [facility], [facility]  [phone]    RUBY Dominguez          "

## 2022-05-09 NOTE — PROGRESS NOTES
[facility]    Medicine Progress Note - Hospitalist Service    Date of Admission:  5/8/2022    Assessment & Plan               Jimmy Isaac is a 61 year old male admitted on 5/8/2022. He has a past medical history significant for sarcoidosis, pulmonary hypertension, coronary artery disease, skin cancer, chronic kidney disease, hypertension, and chronic hypoxic respiratory failure on chronic supplemental oxygen.  He was recently diagnosed with herpes zoster.  Started on antiviral medication to treat his herpes zoster.  He presented to emergency room today due to diffuse rash.    1.  Diffuse maculopapular rash.  -Suspected drug reaction from antiviral on acyclovir earlier    Suspect this could be due to his recently started antiviral medication as this is the only new medication he has been on since his diagnosis of herpes zoster.  -Stop antivirals.  -Appreciate input from infectious disease service  -Scheduled Benadryl 25 mg every 6 hours.  -Increase prednisone to 40 mg twice a day.    2.  Herpes zoster.    -Does not appear disseminated  Has been present for little more than a week.  Was started on antivirals in the outpatient setting.  Has now developed diffuse rash as noted above.  Concerning for drug reaction to antivirals.  Emergency room physician concern for possible secondary bacterial infection of his herpes zoster.    -Infectious disease consult.  -No role for further antimicrobial    3.  Chronic kidney disease stage IV.  -Creatinine at baseline level  -Avoid nephrotoxins as able.  -Continue to monitor    4.  Sarcoidosis.  Normally on prednisone 10 mg a day.  -Increase prednisone to 40 mg twice a day due to concern for drug reaction rash.    5.  Acute on chronic peripheral edema.  Lower extremities more swollen than usual per patient.  Did have a dose of IV furosemide 60 mg once in the ER.  -Monitor results of IV furosemide.  -Weigh daily.  -Intake and output monitoring.  -His  creatinine remained appears to be at baseline  -I will resume his home regimen of twice daily Lasix the next day.      6.  Chronic hypoxic respiratory failure.  On home oxygen at 5 L  -Continue supplemental oxygen.    7.  Pulmonary hypertension  -I resumed his Uptravi       Diet: Combination Diet 2 gm NA Diet    DVT Prophylaxis: Heparin SQ  Chong Catheter: Not present  Central Lines: None  Cardiac Monitoring: None  Code Status: Full Code      Disposition Plan   Expected Discharge: 05/11/2022     Anticipated discharge location:  Awaiting care coordination huddle  Delays:            The patient's care was discussed with the Patient and Charge nurse.    Yung Shrestha MD, MD  Hospitalist Service  [facility]  Securely message with the Vocera Web Console (learn more here)  Text page via Moment Paging/Directory         Clinically Significant Risk Factors Present on Admission             # Hypoalbuminemia: Albumin = 2.7 g/dL (Ref range: 3.4 - 5.0 g/dL) on admission, will monitor as appropriate          ______________________________________________________________________    Interval History   I assume service care today.  Seen and examined.  Chart reviewed.  Case discussed with charge nurse.  Jimmy mentioned that he is feeling better as he is lower extremity rash appears to be clearing.  Still having some pruritus.  Tolerating oral diet.  Stable at baseline home oxygen support at 5 L by nasal cannula.  No reported vomiting, diarrhea, bleeding tendencies.  Afebrile.  No mental status changes.  Denies any headaches, blurry vision or neck pain.    Data reviewed today: I reviewed all medications, new labs and imaging results over the last 24 hours. I personally reviewed no images or EKG's today.    Physical Exam   Vital Signs: Temp: 98.2  F (36.8  C) Temp src: Temporal BP: 116/60 Pulse: 61   Resp: 16 SpO2: 94 % O2 Device: Nasal cannula Oxygen Delivery: 5 LPM  Weight: 176 lbs 0 oz  HEENT;  Atraumatic, normocephalic, pinkish conjuctiva, pupils bilateral reactive   Skin: warm and moist, diffuse maculopapular erythematous rash  -Had some open crusted lesions on his shoulder area                  Lungs: Fair air entry, scattered fine crackles  Heart: normal rate, normal rhythm, no rubs or gallops.   Abdomen: normal bowel sounds, no tenderness, no peritoneal signs, no guarding  Extremities: no deformities, no edema   Neuro; follow commands, alert and oriented x3, spontaneous speech, coherent, moves all extremities spontaneously  Psych; no hallucination, euthymic mood, not agitated        Data   Recent Labs   Lab 05/09/22  0628 05/08/22  1639 05/05/22  1056   WBC 9.1 9.3  --    HGB 12.6* 13.4  --    MCV 94 94  --     222  --    INR  --  1.08  --     134  --    POTASSIUM 4.0 3.6  --    CHLORIDE 100 100 96*   CO2 23 27  --    BUN 59* 59*  --    CR 3.28* 3.25*  --    ANIONGAP 10 7  --    TERI 8.6 8.7  --    * 105*  --    ALBUMIN  --  2.7*  --    PROTTOTAL  --  5.7*  --    BILITOTAL  --  0.5  --    ALKPHOS  --  55  --    ALT  --  20  --    AST  --  15  --      Recent Results (from the past 24 hour(s))   XR Chest Port 1 View    Narrative    EXAM: XR CHEST PORT 1 VIEW  LOCATION: [facility]  DATE/TIME: 5/8/2022 5:50 PM    INDICATION: Anasarca, eval for pulm edema effusions.  CHF exacerbation.  Possible disseminated zoster  COMPARISON: 8/9/2021      Impression    IMPRESSION: Baseline hyperinflation of the lungs with probable COPD/emphysema. Similar appearance of mild probable scarring in the right midlung. Similar appearance of streaky opacities in the left lung. No definite new consolidation. No pleural   effusions. Pattern is not suggestive of acute congestive heart failure. Calcified pleural plaques are not excluded. No pneumothorax. Unchanged cardiac size. Left-sided cardiac rhythm maintenance device.     Medications       diphenhydrAMINE  25 mg Oral Q6H     heparin  ANTICOAGULANT  5,000 Units Subcutaneous Q12H     predniSONE  40 mg Oral BID w/meals     sodium chloride (PF)  3 mL Intracatheter Q8H

## 2022-05-10 NOTE — PROGRESS NOTES
[facility]  Infectious Disease Progress Note          Assessment and Plan:   IMPRESSION:     1.  A 61-year-old male with recent left arm and shoulder area dermatomal zoster, no significant major pain, lesions are resolving course of Valtrex being given.   2.  Diffuse generalized maculopapular rash, very likely allergic reaction to the valacyclovir that he has been getting.  3.  Mild immunocompromise source underlying sarcoidosis, on chronic prednisone.  4.  Chronic edema, no major wounds.  5.  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  6.  Chronic renal insufficiency, significant.     RECOMMENDATIONS:    1.  Discontinue acyclovir products, lesions are crusting.  No dissemination, no signs of any visceral involvement and fortunately no major pain associated.  2.  He has been getting any major overdose of the acyclovir products.  Fortunately, no major worsening of his renal function or other complications have occurred.  No other reason to discontinue therapy.  3.  Symptomatic treatment of the rash.  4.  Contact isolation, although even that limited need here as lesions are mostly crusted.  No need for airborne isolation.  This is not disseminated.  OK disposition  Should do well no sig pain           Interval History:   no new complaints and doing well; no cp, sob, n/v/d, or abd pain. No fevr pain OK              Medications:       budesonide  0.25 mg Nebulization BID     diphenhydrAMINE  25 mg Oral Q6H     fluticasone-vilanterol  1 puff Inhalation Daily     furosemide  40 mg Oral BID     heparin ANTICOAGULANT  5,000 Units Subcutaneous Q12H     predniSONE  40 mg Oral BID w/meals     selexipag  1,600 mcg Oral Q12H     sodium chloride (PF)  3 mL Intracatheter Q8H     tamsulosin  0.4 mg Oral Daily                  Physical Exam:   Blood pressure 129/70, pulse 78, temperature 97.5  F (36.4  C), temperature source Temporal, resp. rate 20, height 1.829 m (6'), weight 80 kg (176 lb  6.4 oz), SpO2 98 %.  Wt Readings from Last 2 Encounters:   05/10/22 80 kg (176 lb 6.4 oz)   03/28/22 76.2 kg (168 lb)     Vital Signs with Ranges  Temp:  [97.5  F (36.4  C)-98.3  F (36.8  C)] 97.5  F (36.4  C)  Pulse:  [69-78] 78  Resp:  [20] 20  BP: (106-129)/(62-70) 129/70  SpO2:  [92 %-98 %] 98 %    Constitutional: Awake, alert, cooperative, no apparent distress   Lungs: Clear to auscultation bilaterally, no crackles or wheezing   Cardiovascular: Regular rate and rhythm, normal S1 and S2, and no murmur noted   Abdomen: Normal bowel sounds, soft, non-distended, non-tender   Skin: rash better, no cyanosis, no edema   Other:           Data:   All microbiology laboratory data reviewed.  Recent Labs   Lab Test 05/10/22  0821 05/09/22  0628 05/08/22  1639   WBC 9.0 9.1 9.3   HGB 11.9* 12.6* 13.4   HCT 37.8* 40.6 42.6   MCV 94 94 94    197 222     Recent Labs   Lab Test 05/10/22  0821 05/09/22  0628 05/08/22  1639   CR 3.24* 3.28* 3.25*     Recent Labs   Lab Test 09/22/17  1227   SED 8     Recent Labs   Lab Test 07/09/21  1320 11/01/17  1420   CULT No growth Scedosporium species*  Unable to hold 4 weeks due to overgrowth of fungus  Light growth  Achromobacter xylosoxidans/denitrificans  *

## 2022-05-10 NOTE — PLAN OF CARE
Afebrile.  Denies pain.  No nausea.  LS diminished fine crackles bases, baseline O2 5L rest & 10L activity, HOSKINS.  Edema BLEs, penile & scrotum improving, encouraged elevation.  Shingle scabbed L chest & shoulder, open blisters x3 upper L back KIARA.  Rash diffused redness scattered all over body, mild itchiness managed with sched. benadryl, declined cold pack.  Up SBA + belt, ambulating to bathroom.  Voiding.  ID following.

## 2022-05-10 NOTE — CONSULTS
Consult Date: 05/10/2022    INFECTIOUS DISEASE CONSULTATION    LOCATION:  ROOM [address].  [facility].    REFERRING PHYSICIAN:  Dr. Rodrigo Noel.    IMPRESSION:     1.  A 61-year-old male with recent left arm and shoulder area dermatomal zoster, no significant major pain, lesions are resolving course of Valtrex being given.   2.  Diffuse generalized maculopapular rash, very likely allergic reaction to the valacyclovir that he has been getting.  3.  Mild immunocompromise source underlying sarcoidosis, on chronic prednisone.  4.  Chronic edema, no major wounds.  5.  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  6.  Chronic renal insufficiency, significant.    RECOMMENDATIONS:    1.  Discontinue acyclovir products, lesions are crusting.  No dissemination, no signs of any visceral involvement and fortunately no major pain associated.  2.  He has been getting any major overdose of the acyclovir products.  Fortunately, no major worsening of his renal function or other complications have occurred.  No other reason to discontinue therapy.  3.  Symptomatic treatment of the rash.  4.  Contact isolation, although even that limited need here as lesions are mostly crusted.  No need for airborne isolation.  This is not disseminated.    HISTORY OF PRESENT ILLNESS:  This 61-year-old male is seen in consultation.  He had onset more than 10 days ago of pain and itching area in his left shoulder and arm area ventral lesions occurred was seen early last week, started on valacyclovir.  He was started on a very high dose given he has major chronic renal insufficiency.  He had a full 1 gram  dosing.  Soon after starting this, developed a generalized diffuse rash that has persisted to the present time.  No major new fever or chills, not having significant pain in the shoulder area.  The lesions have primarily crusted.    PAST MEDICAL HISTORY:  Underlying sarcoidosis, including some respiratory involvement, history  of chronic kidney disease, baseline creatinine in the mid 3s.    SOCIAL AND FAMILY HISTORY:  No recent travels or exposures.  No one else he has been around has been ill.  No significant history of resistant pathogens.    MEDICATIONS:  As listed.    REVIEW OF SYSTEMS:  Largely as above.  No other focal or localizing symptoms, pain relatively minimal, skin rashes, itching.    PHYSICAL EXAMINATION:    GENERAL:  The patient appears his stated age, does not look particularly toxic or ill.  He has a generalized diffuse erythematous rash.  h.  EXTREMITIES:  He has a rash in his left shoulder area that is a different appearance that is compatible with varicella, mostly crusting lesions.  He has a few lesions outside that dermatome, but relatively minimal.    LABORATORY DATA:  Creatinine 3.28.  CBC unremarkable.  Hemoglobin 12.  Urinalysis with a few white cells.    Thank you very much for the consultation.  I will follow the patient with you.    Cristhian Lubin MD        D: 05/10/2022   T: 05/10/2022   MT: LEVY    Name:     MATTY LOPEZ  MRN:      [id number]        Account:      [id number]   :      1960           Consult Date: 05/10/2022     Document: [id number]

## 2022-05-10 NOTE — DISCHARGE SUMMARY
Discharge Summary  Hospitalist Service    Jimmy Isaac MRN# [id number]   YOB: 1960 Age: 61 year old     Date of Admission:  5/8/2022  Date of Discharge:  5/10/2022  Admitting Physician:  Rodrigo Noel DO  Discharge Physician: Meeta Kowalski MD  Discharging Service: Hospitalist Service     Primary Provider: Cristhian Busch  Primary Care Physician Phone Number: None         Discharge Diagnoses/Problem Oriented Hospital Course (Providers):      Jimmy Isaac was admitted on 5/8/2022 by Rodrigo Noel DO and I would refer you to their history and physical.  The following problems were addressed during his hospitalization:    Jimmy Isaac is a 61 year old male admitted on 5/8/2022. He has a past medical history significant for sarcoidosis, pulmonary hypertension, coronary artery disease, skin cancer, chronic kidney disease, hypertension, and chronic hypoxic respiratory failure on chronic supplemental oxygen.  He was recently diagnosed with herpes zoster.  Started on antiviral medication to treat his herpes zoster.  He presented to emergency room due to diffuse rash.    He states that he is feeling back to baseline, his rash is receding, and would like to be discharged home.  Seems reasonable from a medical standpoint, discussed reasons to return should he decline at home.      1.  Diffuse maculopapular rash.  -Suspected drug reaction from antiviral on acyclovir earlier    Suspect this could be due to his recently started antiviral medication as this is the only new medication he has been on since his diagnosis of herpes zoster.  -Stop antivirals.  -Appreciate input from infectious disease service no indication for further treatment of shingles  -Scheduled Benadryl 25 mg every 6 hours for 3 more days then prn   -Is chronically on prednisone 10 mg a day (although had weaned down to 5 but was planning on going back up to 10 mg),  Here has been on 40 mg bid for his rash.  Will discharge with  prednisone taper ON TOP of his baseline dose over the next roughly 2 weeks.      2.  Herpes zoster.    -Does not appear disseminated  Has been present for little more than a week.  Was started on antivirals in the outpatient setting.  Has now developed diffuse rash as noted above.  Concerning for drug reaction to antivirals.  Emergency room physician concern for possible secondary bacterial infection of his herpes zoster.    -Infectious disease consulted   -No role for further antimicrobial therapy      3.  Chronic kidney disease stage IV.  -Creatinine at baseline level  -Avoid nephrotoxins as able.     4.  Sarcoidosis.  Normally on prednisone 10 mg a day he dropped back to 5 mg pre day due to edema but it didn't improve much.  Encouraged to resume 10 mg and discuss adjustments in dose with his primary care team   -Increase prednisone to 40 mg twice a day due to concern for drug reaction rash-see steroid taper as described in discharge medication list     5.  Acute on chronic peripheral edema.  Lower extremities more swollen than usual per patient.  Did have a dose of IV furosemide 60 mg once in the ER. And furosemide 40 mg IV x 1 on day of discharge.  Tolerated from renal standpoint  -resumed home dose at 40 mg bid at discharge and recommend close follow-up with primary care team      6.  Chronic hypoxic respiratory failure.  On home oxygen at 5 L  -Continue supplemental oxygen.     7.  Pulmonary hypertension  -resumed his Uptravi           Code Status:      Full Code        Brief Hospital Stay Summary Sent Home With Patient in AVS:          Reason for your hospital stay      Shingles  Rash thought related to valcyclovir                      Important Results:        As noted below          Pending Results:        Unresulted Labs Ordered in the Past 30 Days of this Admission     Date and Time Order Name Status Description    5/8/2022  4:43 PM Blood Culture Arm, Left Preliminary     5/8/2022  4:00 PM Blood Culture  Peripheral Blood Preliminary             Discharge Instructions and Follow-Up:      Follow-up Appointments     Follow-up and recommended labs and tests       Follow-up with usual care team at the [facility] within the next 2 weeks  If your rash recurs please go to the [facility] for evaluation               Discharge Disposition:        Discharged to home         Discharge Medications:        Current Discharge Medication List      START taking these medications    Details   diphenhydrAMINE (BENADRYL) 25 MG capsule Take 25 mg po q 6 hours for the next 3 days then can change to q 6 hours prn  Qty: 60 capsule, Refills: 0    Associated Diagnoses: Herpes zoster with complication         CONTINUE these medications which have CHANGED    Details   !! predniSONE (DELTASONE) 20 MG tablet 30 mg x 3 days then 20 mg x 3 days then 10 mg x 3 days then stop  Qty: 10 tablet, Refills: 0    Associated Diagnoses: Herpes zoster with complication       !! - Potential duplicate medications found. Please discuss with provider.      CONTINUE these medications which have NOT CHANGED    Details   ambrisentan (LETAIRIS) 10 MG tablet Take 10 mg by mouth every morning   Qty: 30 tablet, Refills: 11    Associated Diagnoses: Pulmonary hypertension (H)      furosemide (LASIX) 20 MG tablet Take 2 tablets (40 mg) by mouth 2 times daily  Qty: 365 tablet, Refills: 3    Associated Diagnoses: Pulmonary hypertension (H)      mometasone-formoterol (DULERA) 200-5 MCG/ACT inhaler Inhale 2 puffs into the lungs 2 times daily  Qty: 13 g, Refills: 11      PROAIR  (90 Base) MCG/ACT inhaler Inhale 2 puffs into the lungs every 6 hours as needed for shortness of breath / dyspnea  Qty: 18 g, Refills: 11    Comments: Pharmacy may dispense brand covered by insurance (Proair, or proventil or ventolin or generic albuterol inhaler)  Associated Diagnoses: Pulmonary hypertension (H); Mixed hyperlipidemia      selexipag (UPTRAVI) 1600 MCG tablet Take 1 tablet (1,600  mcg) by mouth every 12 hours  Qty: 60 tablet, Refills: 11    Associated Diagnoses: Pulmonary hypertension (H)      tadalafil, PAH, (ALYQ) 20 MG TABS Take 2 tablets (40 mg) by mouth daily  Qty: 60 tablet, Refills: 11    Associated Diagnoses: Pulmonary hypertension (H)      Vitamin D3 (CHOLECALCIFEROL) 25 mcg (1000 units) tablet Take 1 tablet (25 mcg) by mouth every other day  Qty: 45 tablet, Refills: 3    Associated Diagnoses: Vitamin D deficiency      budesonide (PULMICORT) 0.25 MG/2ML neb solution Take 2 mLs (0.25 mg) by nebulization 2 times daily  Qty: 120 mL, Refills: 4    Associated Diagnoses: Sarcoidosis; COPD (chronic obstructive pulmonary disease) (H)      !! predniSONE (DELTASONE) 10 MG tablet Take 1 tablet (10 mg) by mouth daily  Qty: 90 tablet, Refills: 3    Associated Diagnoses: Pulmonary hypertension (H)      tamsulosin (FLOMAX) 0.4 MG capsule Take 1 capsule (0.4 mg) by mouth daily  Qty: 30 capsule, Refills: 0    Associated Diagnoses: Benign prostatic hyperplasia with urinary frequency      !! UNABLE TO FIND MEDICATION NAME: Home Oxygen, 5-10 L      !! UNABLE TO FIND MEDICATION NAME: Topical skin tag remover       !! - Potential duplicate medications found. Please discuss with provider.            Allergies:         Allergies   Allergen Reactions     Nkda [No Known Drug Allergies]            Consultations This Hospital Stay:        Consultation during this admission received from infectious disease         Condition and Physical on Discharge:        Discharge condition: Stable   Vitals: Blood pressure 129/70, pulse 78, temperature 97.5  F (36.4  C), temperature source Temporal, resp. rate 20, height 1.829 m (6'), weight 80 kg (176 lb 6.4 oz), SpO2 98 %.     Constitutional: Pleasant nad looks stated age head nc/at sclera clear      Lungs: lungs diffusely diminished but otherwise clear no rales or wheezes   Cardiovascular: rrr no mrg    Abdomen: S/nt/nd   Skin: Warm and dry, mild shingles rash on  neck/shoulders with scabbing over.  Still with diffuse maculopapular rash only partially blanching that he states is improved   Other: Alert and oriented affect appropriate crowe          Discharge Time:      Greater than 30 minutes.        Image Results From This Hospital Stay (For Non-EPIC Providers):        Results for orders placed or performed during the hospital encounter of 05/08/22   XR Chest Port 1 View    Narrative    EXAM: XR CHEST PORT 1 VIEW  LOCATION: [facility]  DATE/TIME: 5/8/2022 5:50 PM    INDICATION: Anasarca, eval for pulm edema effusions.  CHF exacerbation.  Possible disseminated zoster  COMPARISON: 8/9/2021      Impression    IMPRESSION: Baseline hyperinflation of the lungs with probable COPD/emphysema. Similar appearance of mild probable scarring in the right midlung. Similar appearance of streaky opacities in the left lung. No definite new consolidation. No pleural   effusions. Pattern is not suggestive of acute congestive heart failure. Calcified pleural plaques are not excluded. No pneumothorax. Unchanged cardiac size. Left-sided cardiac rhythm maintenance device.           Most Recent Lab Results In EPIC (For Non-EPIC Providers):    Most Recent 3 CBC's:  Recent Labs   Lab Test 05/10/22  0821 05/09/22  0628 05/08/22  1639   WBC 9.0 9.1 9.3   HGB 11.9* 12.6* 13.4   MCV 94 94 94    197 222      Most Recent 3 BMP's:  Recent Labs   Lab Test 05/10/22  0821 05/09/22  0628 05/08/22  1639   * 133 134   POTASSIUM 4.0 4.0 3.6   CHLORIDE 100 100 100   CO2 26 23 27   BUN 71* 59* 59*   CR 3.24* 3.28* 3.25*   ANIONGAP 6 10 7   TERI 9.1 8.6 8.7   * 128* 105*     Most Recent 3 INR's:  Recent Labs   Lab Test 05/08/22  1639 09/21/21  0253   INR 1.08 1.16*     Most Recent 2 LFT's:  Recent Labs   Lab Test 05/08/22  1639 12/15/21  1138   AST 15 14   ALT 20 17   ALKPHOS 55 68   BILITOTAL 0.5 0.4

## 2022-05-10 NOTE — PLAN OF CARE
Goal Outcome Evaluation:    Pt A/O x4, VSS on 5 LPM per NC. O2 use baseline for pt. Denies pain. Red rash to chest/abdomen, back, arms and upper legs. Open blisters to left shoulder and back. Heparin subQ. 2 gr Na diet. Voiding. SBA with GB. Airborne precautions. Will continue to monitor.

## 2022-05-10 NOTE — PLAN OF CARE
Afebrile.  Denies pain.  No nausea.  LS diminished bilat., baseline O2 5L rest & 10L activity, HOSKINS, infreq. coughs.  Edema BLEs, penile & scrotum better.  Shingle mostly scabbed crusted over, tiny open blisters L upper back & shoulder KIARA.  Rash diffused redness scattered over body, improving.  Up SBA + belt, ambulating to bathroom.  Voiding.  Reviewed discharge instructions and medications with patient, questions answered.  Patient discharged to home with discharge instructions, medications (benadryl, prednisone), and belongings.  Left floor via w/c, transported home by friend.

## 2022-05-25 PROBLEM — N17.9 ACUTE RENAL FAILURE SUPERIMPOSED ON STAGE 4 CHRONIC KIDNEY DISEASE, UNSPECIFIED ACUTE RENAL FAILURE TYPE (H): Status: RESOLVED | Noted: 2022-01-01 | Resolved: 2022-01-01

## 2022-05-25 PROBLEM — N18.4 ACUTE RENAL FAILURE SUPERIMPOSED ON STAGE 4 CHRONIC KIDNEY DISEASE, UNSPECIFIED ACUTE RENAL FAILURE TYPE (H): Status: RESOLVED | Noted: 2022-01-01 | Resolved: 2022-01-01

## 2022-05-25 NOTE — LETTER
5/25/2022     RE: Jimmy Isaac  [address] [address] [id number]     Dear Colleague,    Thank you for referring your patient, Jimmy Isaac, to the [facility]PHROLOG[facility] [place] at [facility]. Please see a copy of my visit note below.    Jimmy is a 61 year old who is being evaluated via a billable telephone visit.      What phone number would you like to be contacted at? [phone]  How would you like to obtain your AVS? [employer]  Phone call duration: 8 minutes    Nephrology Clinic Follow Up    Assessment and Plan:     1. CKD 4  Baseline Cr 1.4 in 2018 and rising since to a baseline near 3 in July 2020, then had SISI in setting of renal stone disease with rise of Cr into the 4s. Now Cr has returned to ~2.6-3.0 as a baseline. He likely has little renal reserve and further kidney injury could result in him needing renal replacement therapy. He has started CKD Journey & dialysis education but has not yet chosen a method. We did not discuss this in detail due to concern regarding acute issues as below.    2. SOB  3. Hypervolemia  4. Concern for CAP  Pt reports ongoing SOB, stable since discharge from [facility], but unimproved. Increased lasix 60mg BID starting Tuesday, but will increase further given degree of CKD. Additionally, pt feels strongly that he would benefit from an antibiotic. No cough or fever, but he does note wheezing and leukocytosis. Given his hx of pulm htn and current hypervolemia he could have a brewing infection. Will prescribe doxycycline for a 5 day course.   - Increased lasix to 80mg BID  - Ordered a 5 day course of doxycycline 100mg BID  - If no improvement, needs follow up in-person in clinic    5. BP - not routinely checked at home    6. Anemia of CKD - Hgb 12.5, at goal    7. Nephrolithiasis - had left PCNL on 9/20/21 but has residual stone disease. Drinks plenty of fluids and follows low salt diet. Follows with  Urology.    8. Sarcoidosis - management per Pulmonology. Currently on prednisone 15mg daily.    9. Pulmonary HTN - management per Dr. Lindsey    Assessment and plan was discussed with patient and he voiced his understanding and agreement.    I discussed the patient's plan of care with Dr. Cho.     Karoline Parmar MD  Nephrology Fellow       Reason for Visit:  Jimmy Isaac is a 61 year old male with a history of Sarcoidosis, CKD and Nephrolithiasis who presents for routine follow up. Sarcoidosis was diagnosed in 1991 complicated by pulmonary hypertension, hypoxia on home oxygen when ambulating and nephrolithiasis (calcium oxalate).    HPI:  Mr Isaac had a telephone encounter today. He had an admission to [facility] in early May for an allergic reaction to shingles treatment and then was also noted to be hypervolemic. He was treated with IV diuretics. He said he insisted on discharging because he felt he wasn't getting better. He is currently ~180lbs and was 172lbs at the end of April. He was 176 lbs on the day of discharge. At te time of discharge he was still SOB with exertion and this hasn't changed. He also has lower extremity edema. He has to sleep on the couch and he wakes up every couple of hours short of breath. This hasn't changed or progressed - it is just persistent. He feels like he might have a pneumonia though he doesn't have a fever or cough. He notes wheezing. He's using an albuterol inhaler multiple times per day.       Prednisone - 10mg qam/5mg at night  Albuterol - 4-5x/day      ROS:  A comprehensive review of systems was obtained and negative, except as noted in the HPI or PMH.    Active Medical Problems:  Patient Active Problem List   Diagnosis     Pulmonary hypertension (H)     Sarcoidosis     Renal insufficiency due to nephrolithiasis     Dyspnea on exertion     CKD (chronic kidney disease) stage 4, GFR 15-29 ml/min (H)     High degree atrioventricular block     Kidney stone      Paraphimosis     Anasarca     Rash     Hx of sarcoidosis     Herpes zoster with complication     Swelling of both lower extremities     Acute renal failure superimposed on stage 4 chronic kidney disease, unspecified acute renal failure type (H)     Chronic respiratory failure with hypoxia, on home O2 therapy (H)     Acute on chronic congestive heart failure, unspecified heart failure type (H)       Personal Hx:   Social History     Tobacco Use     Smoking status: Former Smoker     Packs/day: 1.00     Years: 30.00     Pack years: 30.00     Types: Cigarettes     Start date: 10/10/1975     Quit date: 3/1/2010     Years since quittin.2     Smokeless tobacco: Never Used   Substance Use Topics     Alcohol use: Not Currently     Alcohol/week: 0.0 standard drinks     Comment: stated 2 bottles of beer occ       Allergies: Reviewed by provider.     Medications:  Current Outpatient Medications   Medication Sig     ambrisentan (LETAIRIS) 10 MG tablet Take 10 mg by mouth every morning      furosemide (LASIX) 20 MG tablet Take 2 tablets (40 mg) by mouth 2 times daily     mometasone-formoterol (DULERA) 200-5 MCG/ACT inhaler Inhale 2 puffs into the lungs 2 times daily     predniSONE (DELTASONE) 10 MG tablet Take 1 tablet (10 mg) by mouth daily     PROAIR  (90 Base) MCG/ACT inhaler Inhale 2 puffs into the lungs every 6 hours as needed for shortness of breath / dyspnea     selexipag (UPTRAVI) 1600 MCG tablet Take 1 tablet (1,600 mcg) by mouth every 12 hours     tadalafil, PAH, (ALYQ) 20 MG TABS Take 2 tablets (40 mg) by mouth daily     UNABLE TO FIND MEDICATION NAME: Home Oxygen, 5-10 L     Vitamin D3 (CHOLECALCIFEROL) 25 mcg (1000 units) tablet Take 1 tablet (25 mcg) by mouth every other day (Patient taking differently: Take 25 mcg by mouth every other day Take on odd days of the month)     diphenhydrAMINE (BENADRYL) 25 MG capsule Take 25 mg po q 6 hours for the next 3 days then can change to q 6 hours prn     predniSONE  (DELTASONE) 20 MG tablet 30 mg bid x 1 day then 20 mg bid x 3 days then 10 mg bid x 3 days then 10 mg every day x 3 days then stop     tamsulosin (FLOMAX) 0.4 MG capsule Take 1 capsule (0.4 mg) by mouth daily (Patient not taking: Reported on 5/8/2022)     UNABLE TO FIND MEDICATION NAME: Topical skin tag remover     No current facility-administered medications for this visit.       Vitals:  BP (!) 145/80   Ht 1.829 m (6')   Wt 80.3 kg (177 lb)   BMI 24.01 kg/m      Exam:  No exam - telephone encounter    LABS:   CMP  Recent Labs   Lab Test 05/10/22  0821 05/09/22  0628 05/08/22  1639 05/05/22  1056 01/24/22  1413 07/27/21  0742 07/01/21  1336 06/16/21  1630 12/11/19  1109 09/25/19  1209   * 133 134  --  142   < > 142 138 140 136   POTASSIUM 4.0 4.0 3.6  --  4.1   < > 4.0 4.4 4.3 4.1   CHLORIDE 100 100 100 96* 103   < > 110* 106 109 102   CO2 26 23 27  --  33*   < > 25 23 28 29   ANIONGAP 6 10 7  --  6   < > 7 9 3 5   * 128* 105*  --  102*   < > 95 96 78 90   BUN 71* 59* 59*  --  50*   < > 50* 51* 32* 30   CR 3.24* 3.28* 3.25*  --  2.88*   < > 3.27* 3.65* 1.85* 1.62*   GFRESTIMATED 21* 20* 21*  --  24*   < > 19* 17* 39* 45*   GFRESTBLACK  --   --   --   --   --   --  22* 20* 45* 53*   TERI 9.1 8.6 8.7  --  9.4   < > 8.7 9.3 8.9 9.4    < > = values in this interval not displayed.     Recent Labs   Lab Test 05/08/22  1639 12/15/21  1138 11/17/21  0846 09/21/21  0253   BILITOTAL 0.5 0.4 0.4 0.6   ALKPHOS 55 68 72 63   ALT 20 17 18 13   AST 15 14 14 14     CBC  Recent Labs   Lab Test 05/23/22  1310 05/10/22  0821 05/09/22  0628 05/08/22  1639   HGB 12.5* 11.9* 12.6* 13.4   WBC 14.1* 9.0 9.1 9.3   RBC 4.18* 4.01* 4.31* 4.55   HCT 39.5* 37.8* 40.6 42.6   MCV 95 94 94 94   MCH 29.9 29.7 29.2 29.5   MCHC 31.6 31.5 31.0* 31.5   RDW 15.4* 14.4 14.5 14.3    193 197 222     URINE STUDIES  Recent Labs   Lab Test 05/23/22  1321 05/08/22  1808 01/24/22  1417 11/02/21  1648 07/09/21  1320 06/16/21  1522   COLOR  Yellow Light Yellow Yellow Yellow   < > Yellow   APPEARANCE Clear Clear Clear Cloudy*   < > Clear   URINEGLC Negative Negative Negative Negative   < > Negative   URINEBILI Negative Negative Negative Negative   < > Negative   URINEKETONE Negative Negative Negative Negative   < > Negative   SG 1.010 1.009 1.015 1.010   < > 1.015   UBLD Large* Moderate* Small* Large*   < > Small*   URINEPH 5.0 5.5 6.5 6.0   < > 5.5   PROTEIN Trace* 10 * Negative 100 *   < > 100*   UROBILINOGEN 0.2  --  0.2  --   --  0.2   NITRITE Negative Negative Negative Negative   < > Negative   LEUKEST Small* Moderate* Moderate* Large*   < > Moderate*   RBCU 5-10* 15* 10-25* >182*   < >  --    WBCU 5-10* 13* * >182*   < >  --     < > = values in this interval not displayed.     Recent Labs   Lab Test 05/23/22  1321 01/24/22  1417 12/15/21  1144 08/06/21 2022   UTPG 0.66* 0.43* 1.10* 1.16*     PTH  Recent Labs   Lab Test 05/23/22  1310 12/15/21  1138 07/30/21  1047   PTHI 65 13* 13*     IRON STUDIES  Recent Labs   Lab Test 12/15/21  1138 07/01/21  1336 10/18/17  1121   IRON 28* 28* 82    241 285   IRONSAT 9* 12* 29   MIHIR 15* 33 41       Brandy Parmar MD    Attestation signed by Luiza Cho MD at 6/1/2022 12:54 PM:  Attending's attestation:   I was the supervising physician in the delivery of the service. I verified the history of present illness and the patient's clinical course. I personally evaluated and interviewed the patient on the date of the encounter.       I reviewed the relevant labs, previous notes and imaging studies, and participated in the formulation of a diagnostic and treatment plan.  I have reviewed and discussed with the fellow their history, physical exam and plan. This note reflects my direct involvement in the patient's care.     We discussed some acute complaints today including significant edema and worsening shortness of breath with orthopnea, PNDs and increased oxygen requirements. He is now sleeping  while sitting on a chair. Also noted a rise in his WBCs raising concern for CAP or Acute bronchitis.  We advised him to go to the ED in case he feels badly or worse but he refused.So we decided to start antibiotics and increase his diuretic dose. If no improvement, he needs close follow up in-person in clinic.      Luiza Cho MD   Physicians  , [facility]  Division of Nephrology  Department of Internal Medicine

## 2022-05-25 NOTE — PROGRESS NOTES
Jimmy is a 61 year old who is being evaluated via a billable telephone visit.      What phone number would you like to be contacted at? [phone]  How would you like to obtain your AVS? Carlos Alberto  Phone call duration: 8 minutes    Nephrology Clinic Follow Up    Assessment and Plan:     1. CKD 4  Baseline Cr 1.4 in 2018 and rising since to a baseline near 3 in July 2020, then had SISI in setting of renal stone disease with rise of Cr into the 4s. Now Cr has returned to ~2.6-3.0 as a baseline. He likely has little renal reserve and further kidney injury could result in him needing renal replacement therapy. He has started CKD Journey & dialysis education but has not yet chosen a method. We did not discuss this in detail due to concern regarding acute issues as below.    2. SOB  3. Hypervolemia  4. Concern for CAP  Pt reports ongoing SOB, stable since discharge from [facility], but unimproved. Increased lasix 60mg BID starting Tuesday, but will increase further given degree of CKD. Additionally, pt feels strongly that he would benefit from an antibiotic. No cough or fever, but he does note wheezing and leukocytosis. Given his hx of pulm htn and current hypervolemia he could have a brewing infection. Will prescribe doxycycline for a 5 day course.   - Increased lasix to 80mg BID  - Ordered a 5 day course of doxycycline 100mg BID  - If no improvement, needs follow up in-person in clinic    5. BP - not routinely checked at home    6. Anemia of CKD - Hgb 12.5, at goal    7. Nephrolithiasis - had left PCNL on 9/20/21 but has residual stone disease. Drinks plenty of fluids and follows low salt diet. Follows with Urology.    8. Sarcoidosis - management per Pulmonology. Currently on prednisone 15mg daily.    9. Pulmonary HTN - management per Dr. Lindsey    Assessment and plan was discussed with patient and he voiced his understanding and agreement.    I discussed the patient's plan of care with Dr. Cho.     Karoline Parmar,  MD  Nephrology Fellow       Reason for Visit:  Jimmy Isaac is a 61 year old male with a history of Sarcoidosis, CKD and Nephrolithiasis who presents for routine follow up. Sarcoidosis was diagnosed in 1991 complicated by pulmonary hypertension, hypoxia on home oxygen when ambulating and nephrolithiasis (calcium oxalate).    HPI:  Mr Isaac had a telephone encounter today. He had an admission to [facility] in early May for an allergic reaction to shingles treatment and then was also noted to be hypervolemic. He was treated with IV diuretics. He said he insisted on discharging because he felt he wasn't getting better. He is currently ~180lbs and was 172lbs at the end of April. He was 176 lbs on the day of discharge. At te time of discharge he was still SOB with exertion and this hasn't changed. He also has lower extremity edema. He has to sleep on the couch and he wakes up every couple of hours short of breath. This hasn't changed or progressed - it is just persistent. He feels like he might have a pneumonia though he doesn't have a fever or cough. He notes wheezing. He's using an albuterol inhaler multiple times per day.       Prednisone - 10mg qam/5mg at night  Albuterol - 4-5x/day      ROS:  A comprehensive review of systems was obtained and negative, except as noted in the HPI or PMH.    Active Medical Problems:  Patient Active Problem List   Diagnosis     Pulmonary hypertension (H)     Sarcoidosis     Renal insufficiency due to nephrolithiasis     Dyspnea on exertion     CKD (chronic kidney disease) stage 4, GFR 15-29 ml/min (H)     High degree atrioventricular block     Kidney stone     Paraphimosis     Anasarca     Rash     Hx of sarcoidosis     Herpes zoster with complication     Swelling of both lower extremities     Acute renal failure superimposed on stage 4 chronic kidney disease, unspecified acute renal failure type (H)     Chronic respiratory failure with hypoxia, on home O2 therapy (H)     Acute on  chronic congestive heart failure, unspecified heart failure type (H)       Personal Hx:   Social History     Tobacco Use     Smoking status: Former Smoker     Packs/day: 1.00     Years: 30.00     Pack years: 30.00     Types: Cigarettes     Start date: 10/10/1975     Quit date: 3/1/2010     Years since quittin.2     Smokeless tobacco: Never Used   Substance Use Topics     Alcohol use: Not Currently     Alcohol/week: 0.0 standard drinks     Comment: stated 2 bottles of beer occ       Allergies: Reviewed by provider.     Medications:  Current Outpatient Medications   Medication Sig     ambrisentan (LETAIRIS) 10 MG tablet Take 10 mg by mouth every morning      furosemide (LASIX) 20 MG tablet Take 2 tablets (40 mg) by mouth 2 times daily     mometasone-formoterol (DULERA) 200-5 MCG/ACT inhaler Inhale 2 puffs into the lungs 2 times daily     predniSONE (DELTASONE) 10 MG tablet Take 1 tablet (10 mg) by mouth daily     PROAIR  (90 Base) MCG/ACT inhaler Inhale 2 puffs into the lungs every 6 hours as needed for shortness of breath / dyspnea     selexipag (UPTRAVI) 1600 MCG tablet Take 1 tablet (1,600 mcg) by mouth every 12 hours     tadalafil, PAH, (ALYQ) 20 MG TABS Take 2 tablets (40 mg) by mouth daily     UNABLE TO FIND MEDICATION NAME: Home Oxygen, 5-10 L     Vitamin D3 (CHOLECALCIFEROL) 25 mcg (1000 units) tablet Take 1 tablet (25 mcg) by mouth every other day (Patient taking differently: Take 25 mcg by mouth every other day Take on odd days of the month)     diphenhydrAMINE (BENADRYL) 25 MG capsule Take 25 mg po q 6 hours for the next 3 days then can change to q 6 hours prn     predniSONE (DELTASONE) 20 MG tablet 30 mg bid x 1 day then 20 mg bid x 3 days then 10 mg bid x 3 days then 10 mg every day x 3 days then stop     tamsulosin (FLOMAX) 0.4 MG capsule Take 1 capsule (0.4 mg) by mouth daily (Patient not taking: Reported on 2022)     UNABLE TO FIND MEDICATION NAME: Topical skin tag remover     No  current facility-administered medications for this visit.       Vitals:  BP (!) 145/80   Ht 1.829 m (6')   Wt 80.3 kg (177 lb)   BMI 24.01 kg/m      Exam:  No exam - telephone encounter    LABS:   CMP  Recent Labs   Lab Test 05/10/22  0821 05/09/22  0628 05/08/22  1639 05/05/22  1056 01/24/22  1413 07/27/21  0742 07/01/21  1336 06/16/21  1630 12/11/19  1109 09/25/19  1209   * 133 134  --  142   < > 142 138 140 136   POTASSIUM 4.0 4.0 3.6  --  4.1   < > 4.0 4.4 4.3 4.1   CHLORIDE 100 100 100 96* 103   < > 110* 106 109 102   CO2 26 23 27  --  33*   < > 25 23 28 29   ANIONGAP 6 10 7  --  6   < > 7 9 3 5   * 128* 105*  --  102*   < > 95 96 78 90   BUN 71* 59* 59*  --  50*   < > 50* 51* 32* 30   CR 3.24* 3.28* 3.25*  --  2.88*   < > 3.27* 3.65* 1.85* 1.62*   GFRESTIMATED 21* 20* 21*  --  24*   < > 19* 17* 39* 45*   GFRESTBLACK  --   --   --   --   --   --  22* 20* 45* 53*   TERI 9.1 8.6 8.7  --  9.4   < > 8.7 9.3 8.9 9.4    < > = values in this interval not displayed.     Recent Labs   Lab Test 05/08/22  1639 12/15/21  1138 11/17/21  0846 09/21/21  0253   BILITOTAL 0.5 0.4 0.4 0.6   ALKPHOS 55 68 72 63   ALT 20 17 18 13   AST 15 14 14 14     CBC  Recent Labs   Lab Test 05/23/22  1310 05/10/22  0821 05/09/22  0628 05/08/22  1639   HGB 12.5* 11.9* 12.6* 13.4   WBC 14.1* 9.0 9.1 9.3   RBC 4.18* 4.01* 4.31* 4.55   HCT 39.5* 37.8* 40.6 42.6   MCV 95 94 94 94   MCH 29.9 29.7 29.2 29.5   MCHC 31.6 31.5 31.0* 31.5   RDW 15.4* 14.4 14.5 14.3    193 197 222     URINE STUDIES  Recent Labs   Lab Test 05/23/22  1321 05/08/22  1808 01/24/22  1417 11/02/21  1648 07/09/21  1320 06/16/21  1522   COLOR Yellow Light Yellow Yellow Yellow   < > Yellow   APPEARANCE Clear Clear Clear Cloudy*   < > Clear   URINEGLC Negative Negative Negative Negative   < > Negative   URINEBILI Negative Negative Negative Negative   < > Negative   URINEKETONE Negative Negative Negative Negative   < > Negative   SG 1.010 1.009 1.015 1.010   <  > 1.015   UBLD Large* Moderate* Small* Large*   < > Small*   URINEPH 5.0 5.5 6.5 6.0   < > 5.5   PROTEIN Trace* 10 * Negative 100 *   < > 100*   UROBILINOGEN 0.2  --  0.2  --   --  0.2   NITRITE Negative Negative Negative Negative   < > Negative   LEUKEST Small* Moderate* Moderate* Large*   < > Moderate*   RBCU 5-10* 15* 10-25* >182*   < >  --    WBCU 5-10* 13* * >182*   < >  --     < > = values in this interval not displayed.     Recent Labs   Lab Test 05/23/22  1321 01/24/22  1417 12/15/21  1144 08/06/21 2022   UTPG 0.66* 0.43* 1.10* 1.16*     PTH  Recent Labs   Lab Test 05/23/22  1310 12/15/21  1138 07/30/21  1047   PTHI 65 13* 13*     IRON STUDIES  Recent Labs   Lab Test 12/15/21  1138 07/01/21  1336 10/18/17  1121   IRON 28* 28* 82    241 285   IRONSAT 9* 12* 29   MIHIR 15* 33 41       Brandy Parmar MD

## 2022-06-02 NOTE — PROGRESS NOTES
Primary Provider: Cristhian Busch    CC:   -Shortness of breath/Orthopnea/Increased oxygen requirements  -Bilateral Lower extremity edema    HPI:   Jimmy Isaac is a pleasant 61 year old male  who presents for evaluation of the above. We had a phone visit last week with Dr. Parmar. At that point, he had the same complaints. We increased his lasix dose to 80 mg bid and started on 5 days of doxycycline for suspected acute bronchitis.   He did not improve so he is here today for follow up in person.    He has a complicated medical history with some active problems highlighted below:    1-Pulmonary HTN secondary right heart systolic dysfunction with chronic hypoxic respiratory failure: thought to be secondary to pulmonary sarcoidosis.  He is on triple combination therapy with tadalafil, ambrisentan, and selexipag.He has chronic hypoxemia and has been oxygen dependent since 2012. He uses 5 L/min at rest and increases that to 10 ml/min upon exertion.  He has been rather on 10 ml/min for the past month.    He had a RHC in July whichshowed moderate PAH with normal cardiac output. Echo done last year showed EF 55-60%. Mild RV dilation and mild to moderately reduced RV function. No recent echocardiography available.    He is supposed to follow up for a repeat RHC with cardiology    2-Complete heart block S/P pacemaker insertion on 8/2/21    3-Chronic kidney disease stage 4; thought to be secondary to nephrolithiasis.    4-Nephrolithiasis s/p several interventions by urology    5-Pulmonary sarcoidosis on chronic steroids    6-Recent hospitalization for an allergic reaction thought to be secondary to valacyclovir which he started for shingles    7-Smoker for 16 years till 2010    He is here today concerned that the swelling in his legs might be related to a kidney stone and that the shortness of breath might be related to a PE.     As mentioned above, he has been more short of breath over the past few weeks since his hospital  antoine and his weight has increased to 177 pounds up from a dry weight of 152#.  He denies any abdominal pain, any pain in his legs or chest pain, any new rash. He has mild pain in his flanks but rather lower back bilaterally. No dysuria or hematuria.  He felt better after taking doxycycline but that positive effect seems to have blunted now. No fever, no cough, mild Sore throat.  He has noticed that his abdomen has increased in size.    Allergies   Allergen Reactions     Valacyclovir Rash     Diffuse body rash       ambrisentan (LETAIRIS) 10 MG tablet, Take 10 mg by mouth every morning   furosemide (LASIX) 20 MG tablet, Take 2 tablets (40 mg) by mouth 2 times daily (Patient taking differently: Take 80 mg by mouth 2 times daily)  mometasone-formoterol (DULERA) 200-5 MCG/ACT inhaler, Inhale 2 puffs into the lungs 2 times daily  predniSONE (DELTASONE) 10 MG tablet, Take 1 tablet (10 mg) by mouth daily  PROAIR  (90 Base) MCG/ACT inhaler, Inhale 2 puffs into the lungs every 6 hours as needed for shortness of breath / dyspnea  selexipag (UPTRAVI) 1600 MCG tablet, Take 1 tablet (1,600 mcg) by mouth every 12 hours  tadalafil, PAH, (ALYQ) 20 MG TABS, Take 2 tablets (40 mg) by mouth daily  UNABLE TO FIND, MEDICATION NAME: Home Oxygen, 5-10 L  Vitamin D3 (CHOLECALCIFEROL) 25 mcg (1000 units) tablet, Take 1 tablet (25 mcg) by mouth every other day (Patient taking differently: Take 25 mcg by mouth every other day Take on odd days of the month)  diphenhydrAMINE (BENADRYL) 25 MG capsule, Take 25 mg po q 6 hours for the next 3 days then can change to q 6 hours prn  predniSONE (DELTASONE) 20 MG tablet, 30 mg bid x 1 day then 20 mg bid x 3 days then 10 mg bid x 3 days then 10 mg every day x 3 days then stop  tamsulosin (FLOMAX) 0.4 MG capsule, Take 1 capsule (0.4 mg) by mouth daily (Patient not taking: Reported on 5/8/2022)  UNABLE TO FIND, MEDICATION NAME: Topical skin tag remover    No current facility-administered  medications on file prior to visit.      Past Medical History:   Diagnosis Date     Acute renal failure superimposed on stage 4 chronic kidney disease, unspecified acute renal failure type (H) 2022     Chronic sinusitis      CKD (chronic kidney disease)      Heart disease      Hypertension      Keratosis     frontal scalp, treated with liquid nitrogen at [facility]     Malignant neoplasm (H)      Pneumonia a few times     Pulmonary sarcoidosis (H)        Past Surgical History:   Procedure Laterality Date     COLONOSCOPY       CV RIGHT HEART CATH MEASUREMENTS RECORDED N/A 2019    Procedure: CV RIGHT HEART CATH;  Surgeon: Kale Delgado MD;  Location:  HEART CARDIAC CATH LAB     CV RIGHT HEART CATH MEASUREMENTS RECORDED N/A 2019    Procedure: CV RIGHT HEART CATH;  Surgeon: Ion Lemon MD;  Location:  HEART CARDIAC CATH LAB     CV RIGHT HEART CATH MEASUREMENTS RECORDED N/A 2021    Procedure: CV RIGHT HEART CATH;  Surgeon: Jhony Barone MD;  Location:  HEART CARDIAC CATH LAB     ENT SURGERY      wisdom teeth extraction     EP PACEMAKER Left 2021    Procedure: EP Pacemaker;  Surgeon: Dora Fontanez MD;  Location: [facility] CARDIAC CATH LAB     IR NEPHROSTOMY TUBE PLACEMENT LEFT  2021     LASER HOLMIUM NEPHROLITHOTOMY VIA PERCUTANEOUS NEPHROSTOMY Left 2021    Procedure: NEPHROLITHOTOMY, PERCUTANEOUS, USING HOLMIUM LASER;  Surgeon: Zackery Moura MD;  Location:  OR     TONSILLECTOMY  1964       Social History     Tobacco Use     Smoking status: Former Smoker     Packs/day: 1.00     Years: 30.00     Pack years: 30.00     Types: Cigarettes     Start date: 10/10/1975     Quit date: 3/1/2010     Years since quittin.2     Smokeless tobacco: Never Used   Substance Use Topics     Alcohol use: Not Currently     Alcohol/week: 0.0 standard drinks     Comment: stated 2 bottles of beer occ     Drug use: Not Currently     Comment: past use of marijuana        Family History   Problem Relation Age of Onset     Coronary Artery Disease Father      Heart Disease Father         heart attack age 35-40     Hypertension Mother      Hyperlipidemia Mother      Cerebrovascular Disease Mother      Dementia Mother      Lupus Sister      Glaucoma No family hx of      Macular Degeneration No family hx of        ROS: A 12 system review of systems was negative other than noted here or above.     Physical Exam:  /75   Pulse 78   Temp 97.5  F (36.4  C)   Resp 24   Wt 80.1 kg (176 lb 8 oz)   SpO2 100%   BMI 23.94 kg/m    GENERAL APPEARANCE: mild distress; wearing oxygen; in a wheelchair  EYES: PERRL  HENT: mouth without ulcers or lesions  NECK: supple, no adenopathy; Significant JVD up to the ear lobule and positive hepato-jugular reflex  RESP: decreased air entry over right base - no rales, rhonchi or wheezes  CV: regular rhythm, normal rate, no rub   ABDOMEN:  soft, nontender, no HSM or masses and bowel sounds normal. Distended ? Ascites  MS: Significant bilateral LE edema up to the knees; +4 pitting. no evidence of inflammation in joints, no muscle tenderness  No asterexis  SKIN: dry scaly skin; no rash  NEURO: Normal strength and tone, sensory exam grossly normal, mentation intact and speech normal  PSYCH: mentation appears normal. and affect normal/bright    Results:  Ordered today and reviewed in details with the patient.  Echocardiography: The RV is severely enlarged with moderate hypertrophy. The global systolic function is severely reduced.  The RVEF is 29%.    Assessment/Plan:    1-LE edema/Shortness of breath:  This is likely multifactorial but mainly driven by his volume overload in addition to the pulmonary HTN. He has increased > 20 pounds above his dry weight and doesn't seem to be responding to lasix 80 bid.   He exhibits significant JVD. He will need aggressive oral diuresis and if no response, he will need admission for IV diuresis.  His lasix was  increased to 120 mg bid and I added hydrochlorothiazide 25 mg as well. I instructed him that he needs to lose 1-2 pounds per day. He needs to weigh himself daily.   Chest x-ray was ordered today to look for any pleural effusion or congestion. It did not show any congestion which rather emphasizes the right sided heart failure pathology.    2-CKD (chronic kidney disease) stage 4, GFR 15-29 ml/min (H)  (primary encounter diagnosis)  Comment: This is thought to be secondary to nephrolithiasis but also might be related to repetitive acute kidney injuries and aging.  He was already enrolled in the CKD journey point we did not discuss options for renal placement therapy during this visit because of time limits.  Plan: hydrochlorothiazide (HYDRODIURIL) 12.5 MG         tablet, Magnesium, Phosphorus, TSH, UA with         Microscopic, CBC with platelets differential,         Albumin level, Phosphorus, Parathyroid Hormone         Intact, Albumin Random Urine Quantitative with         Creat Ratio, Comprehensive metabolic panel,         Echocardiogram Complete, US Renal Complete         3-Pulmonary hypertension (H)  Comment: This is thought to be secondary to sarcoidosis; and by itself  resulting in right-sided heart failure with evident volume overload.  Plan is to diurese him as indicated above.  He needs to follow-up with pulmonary soon  Plan: furosemide (LASIX) 20 MG tablet, HCTZ    4-Right heart failure due to pulmonary hypertension (H)  Comment: I ordered an echocardiography today to further assess his right-sided systolic function. He  needs to follow-up with cardiology soon  Plan: Echocardiogram    5-Hypokalemia:   This is likely secondary to diuresis and decreased p.o. intake. His labs showed a potassium of 3.2 mEq/L after his clinic visit.  I ordered potassium chloride 20 mEq daily. I called the patient and asked him to pick his medications and to repeat labs in a few days especially knowing that his potassium might go  lower with further diuresis.    Luiza Cho MD   [facility]   Department of Medicine   Division of Renal Disease and Hypertension

## 2022-06-02 NOTE — PATIENT INSTRUCTIONS
We will increase lasix to 120 mg twice daily and add a new medication called hydrochlorothiazide 25 mg daily(in the morning). We hope that your weight decrease by 1-2 pounds per day so please check your weight daily.    If no response, then we might need to increase your dose to 160 mg. Otherwise, you will need IV diuretics.    I ordered a chest X ray and an echocardiography for your heart.    All the best    Dr. Luiza Cho

## 2022-06-02 NOTE — NURSING NOTE
Chief Complaint   Patient presents with     RECHECK     Follow Up     /75   Pulse 78   Temp 97.5  F (36.4  C)   Resp 24   Wt 80.1 kg (176 lb 8 oz)   SpO2 100%   BMI 23.94 kg/m    Naeem Barron on 6/2/2022 at 1:05 PM

## 2022-06-07 NOTE — PROGRESS NOTES
6/7- Spoke to Jimmy regarding his edema. He reports his edema seems to be slowly getting better, especially above the knees. He is losing about a pound a day so far. He just picked up his potassium supplements and hydrochlorothiazide yesterday so he just started taking them this a.m.He plans to have labs done on Thursday.    6/10 Sent result note to Dr. Cho on crt/ potassium results. Updated her with Mychart message from Jimmy.

## 2022-06-13 NOTE — TELEPHONE ENCOUNTER
[facility] / [facility]  ECT Procedure Note    Fiona Friends Patient Status:  Outpatient   Age/Gender 65year old female MRN [id number]   Location [address] [facility] Attending Shaheen Lopez MD   Hosp Day # 0 PCP Ta Luiza Cho MD Forrest, Rebecca J, RN  Caller: Unspecified (6 days ago,  9:27 AM)  Rafael Meiery,     I just ordered KCL 20 meq bid. He needs to take those until his upcoming appointment in 2 weeks.  Can you please let him know.   Thanks     Luiza Cho MD Forrest, Rebecca J, RN  Thanks clover. He needs to take 40 meq of potassium total per day and to repeat tests upon his clinic visit in 2 weeks.      Spoke to Jimmy. He had already received a call from Dr. Cho so he will start the 20meq's potassium twice a day until his follow up appointment with her. Labs ordered for clinic appt on 6/29.

## 2022-06-15 NOTE — TELEPHONE ENCOUNTER
----- Message from Radha Schmitt sent at 6/10/2022  4:34 PM CDT -----  Regarding: RE: Appointments  This patient is schedule for RHC 6/22 Echo 6/24 and then follow up with pft already scheduled     ----- Message -----  From: Anthony Moore  Sent: 5/6/2022   8:30 AM CDT  To: Obdulia Thakur, RN, #  Subject: Appointments                                     Yes, I'm afraid Dr. Ruvalcaba [place] be [place] the week of 6/29 (day he is sched'd to see Rosalia also), his next opening would be on 8/10, but I think in cardiology you would have to decide if OK for the pt to wait 'till then for [facility]/RHC so we can do everything on the same day..    Vikas  Pulm / ILD Program    ----- Message -----  From: Jaden Marroquin  Sent: 5/6/2022   8:21 AM CDT  To: Anthony Bean,    Are you aware of this situation? I am not quite sure I am understanding what is being requested. This patient appears to have an appointment with Dr. Ruvalcaba that will be rescheduled.    Thanks  Jaden  Clinic Coordinator - Pulmonology  ----- Message -----  From: Radha Schmitt  Sent: 5/6/2022   8:14 AM CDT  To: Clinic Coordinators-Pulm-U[facility]    Is your doctor out for the day he was scheduled with Dr. Lindsey? I'm trying to see if I can schedule with rosalia with your Dr since the patient would like them on the same day, when is the next day available with your Dr?    ----- Message -----  From: Obdulia Rome, RN  Sent: 5/5/2022   9:00 AM CDT  To: Radha Garcia,    Patient is very responsive to mychart, I would use that.    Obdulia  ----- Message -----  From: Kassandra Westfall RN  Sent: 5/5/2022   8:06 AM CDT  To: Obdulia Rome, LUCINA, Radha Schmitt    Yes, I would reach out to the tx coordinators/schedulers and see what days work for them and us. And then schedule the [facility] the day prior to that appt in June.    Denis  ----- Message -----  From: Radha Schmitt  Sent: 5/4/2022   3:57 PM CDT  To:  Kassandra Westfall RN    Hey this is what you sent me :       1. follow-up with Dr. Lindsey in June the same day you see Dr. Aquino.  This is getting scheduled   2. Please have a Right Heart Cath scheduled the day before you come back to see us in June.       I did all the Cath education but have not put in the orders. Passing to you.     Thanks!   Isidro           We did call and Lvm once but now I just re looked at things and now his appointment with Kenia is on the reschedule list. So how would we proceed doing this? Coordinate with the Lung team to try and get both on the same day and possibly getting RHC?

## 2022-06-21 NOTE — TELEPHONE ENCOUNTER
Call complete for pre procedure reminder and updated visitor policy.  Pt will complete home covid test today. Does not have a smart phone to take a picture but will notify staff tomorrow of results

## 2022-06-22 PROBLEM — E87.6 HYPOKALEMIA: Status: ACTIVE | Noted: 2022-01-01

## 2022-06-22 PROBLEM — R06.02 SOB (SHORTNESS OF BREATH): Status: ACTIVE | Noted: 2022-01-01

## 2022-06-22 NOTE — Clinical Note
dry, intact, no bleeding and no hematoma. RIJ 7Fr, removed, pressure held to hemostasis, primapore.

## 2022-06-22 NOTE — PROGRESS NOTES
S/p RHC. Pt alert and oriented. Right neck site c/d/i. Denies pain. VSS. On 8L NC baseline at 100%. Pt eating and drinking. Will be admitted to  for treatment.

## 2022-06-22 NOTE — DISCHARGE INSTRUCTIONS
[facility]                        Interventional Cardiology  Discharge Instructions   Post Right Heart Cath      AFTER YOU GO HOME:  DO drink plenty of fluids  DO resume your regular diet and medications unless otherwise instructed by your Primary Physician  Do Not scrub the procedure site vigorously  No lotion or powder to the puncture site for 3 days    CALL YOUR PRIMARY PHYSICIAN IF: You may resume all normal activity.  Monitor neck site for bleeding, swelling, or voice changes. If you notice bleeding or swelling immediately apply pressure to the site and call number below to speak with Cardiology Fellow.  If you experience any changes in your breathing you should call your doctor immediately or come to the closest Emergency Department.  Do not drive yourself.    ADDITIONAL INSTRUCTIONS: Medications: You are to resume all home medications including anticoagulation therapy unless otherwise advised by your primary cardiologist or nurse coordinator.    Follow Up: Per your primary cardiology team    If you have any questions or concerns regarding your procedure site please call [phone] at anytime and ask for Cardiology Fellow on call.  They are available 24 hours a day.  You may also contact the Cardiology Clinic after hours number at [phone].                                                       Telephone Numbers [phone] Monday-Friday 8:00 am to 4:30 pm    [phone] After 4:30 pm Monday-Friday, Weekends & Holidays  Ask for Interventional Cardiologist on call. Someone is on call 24 hours/day   [facility] toll free number [phone] Monday-Friday 8:00 am to 4:30 pm   [facility] Emergency Dept [phone]

## 2022-06-22 NOTE — H&P
[facility]    Cardiology History and Physical - Cardiology       Date of Admission:  6/22/2022    Primary Cardiologist: Khang Lindsey    Assessment & Plan: SL    Jimmy Isaac is a 61 year old male admitted on 6/22/2022. He has a pmhx significant for sarcoidosis, pulmonary hypertension, CAD, CKD, HTN, chronic hypoxic respiratory failure on chronic home oxygen (baseline 5lpm at rest and increases to 10l/min upon exertion) and herpes zoster admitted for few weeks of worsening SOB, swelling and weight gain (dry weight 152) concerning for volume overload.     # Acute on chronic right heart failure 2/2 pulmonary hypertension in the setting of pulmonary sarcoidosis  # Complete AV block s/p pacemaker insertion on 08/02/2021    Follows with Dr. Lindsey. Admitted for SOB, weight gain 14lbs and LLEE edema. Patient is chronically dependent on home O2 since 2012. Prior RHC (77/20/38) in November 2021.. He is on triple combination therapy with ambrisentan, tadalafil and selexipag. Recent RHC in July 2021 showed moderate PAH with normal cardiac output. Hemodynamically stable. His labs are notable for Na 135, K 3.1, BUN 89.9, Cr 2.88 (GFR 24), Ca 9.4, Mg 2.3, P 4.4, Alb 3.9, normal LFTs, BNP 6893, CBC Hb 12.2, normal WBC and platelets, INR 1.   Had RHC this PM: RA 16, /45 (65), PCWP 16, CI 1.78, CO 3.64, MAP 91, Hb 12.2, SVR 1648, PVR = 13.46, [place]= 3.56, CVP/PCWP = 1 concerning for severe pulmonary hypertension (combined pre- and post-capillary HTN).     - Fluid status: Hypervolemia. JVP ~15. Dry weight 152. Wt on admission 166   > Bumex 4mg, Diuril 1g followed by bumex 2mg/hr gtt   > Potassium chloride 20meq BID    > CMP q8h  - RV failure and PH meds   > Continue with PTA ambrisentan 10mg qday   > Continue with PTA tadalafil 40mg qday   > Continue with PTA selexipag 1600 mcg q12h  - Pending CXR and EKG    # Hyponatremia  # Moderate hypokalemia  # CKD stage IV, GFR  15-29 ml/min  # Non-oliguric   # Hx of nephrolithiasis s/p multiple urologic procedure  # Chronic normocytic anemia  Cr in 1.4 in 2018 and reaching a new baseline ~3 in July 2020. Had a hx of SISI 2/2 nephrolithiasis with rise of Cr into the 4s. Haf left PCNL on 09/20/21 but has residual.   - BP at goal <130/80  - CMP q8h  - Mg and K high replacement protocol    # Pulmonary sarcoidosis on chronic steroids  Patient has no response to prednisone therapy in 2017. Currently off all other systemic anti-inflammatory medications. Patient is not interested in pursuing lung transplantation.  Has CT with  - Continue with pta prednisone 10mg qday  - Continue with proair inhaler 2 puffs q6hr prn  - Continuous with O2 to maintain SatO2>95%    # Hx of herpes zoster  # Moderate allergy to Valcyte  Stable     Diet: 2g sodium diet  DVT Prophylaxis: Ambulate every shift  Chong Catheter: Not present  Code Status:  Full Code  Fluids: Fluid restriction 2L  Lines: PIV    Disposition Plan   Expected discharge: 2 - 3 days, recommended to prior living arrangement once fluid volume status optimized on oral medication.    Entered: Sofiya Aguillon MD 06/22/2022, 4:04 PM     The patient's care was discussed with the Attending Physician, Dr. Hills.    Sofiya Aguillon MD  [facility]    ______________________________________________________________________    Chief Complaint   Shortness of breath    History is obtained from the patient    History of Present Illness   Jimmy Isaac is a 61 year old male admitted on 6/22/2022. He has a pmhx significant for sarcoidosis, pulmonary hypertension, CAD, CKD, HTN, chronic hypoxic respiratory failure on chronic home oxygen (baseline 5lpm at rest and increases to 10l/min upon exertion) and herpes zoster admitted for few weeks of worsening SOB, swelling and weight gain (dry weight 152) concerning for volume overload.      Patient uses O2 at baseline  with 5L at rest and 10L on activities and has chronic shortness of breath. But over the past 8 weeks he has noticed SOB that has progressively worsened along with orthopnea, nocturnal paroxysmal dyspnea, intermittent chest tightness. He was able to walk up to 3-5 blocks before worsening clinical status, now he can barely walk a few steps. Denies palpitations, headaches, lightheadedness, dizziness, fever, nauseas, presyncopal symptoms.      Patient has a long history of sarcoidosis since 2010 with PFT that showed severe obstruction with air trapping (per Dr. Ruvalcaba He is not interested in pursuing injectable therapy.  Methotrexate is not a possibility given his elevated creatinine.  Options could include Imuran and CellCept.  Traditionally, CellCept has not been very effective in our patient population.  Leflunomide could also be an option.  At this point, the patient is wanting to go back on prednisone.  We will start at 10 mg, follow PFTs and add CellCept or Imuran if needed.  A TPMT is being done.). Follows with Dr. Lindsey, diagnosed with pulmonary HTN since November 2021, has been on treprostinil but unclear why it was discontinued (since did not significantly impact on his breathing) and then was transitioned to selexipag. Recent RHC in November 2021 showed moderate  pulmonary hypertension(77/20/38)     Review of Systems    The 10 point Review of Systems is negative other than noted in the HPI or here.     Cardiac History    Echocardiogram 07/27/2021 07:57 AM  BSA: 2.0 m2  Height: 73 in  Weight: 165 lb  BP: 123/72 mmHg  ______________________________________________________________________________  Procedure  Echocardiogram with two-dimensional, color and spectral Doppler performed.  Adequate quality two-dimensional was performed and interpreted. The patient's  rhythm is sinus bradycardia.  ______________________________________________________________________________  Interpretation Summary  Global and  regional left ventricular function is normal with an EF of 55-60%.  Flattened septum is consistent with right ventricular pressure and volume  overload.  Global right ventricular function is mildly to moderately reduced. The  longitudinal excursion is preserved (TAPSE 2.1 cm and S 11.0 cm/s) but the  free wall contraction is reduced. The RV FAC was 25% but the basal segment of  the RV was not clearly visualized.  No significant valvular abnormality.  Pulmonary artery systolic pressure cannot be assessed due to inadequate TR  jet.  IVC diameter >2.1 cm collapsing <50% with sniff suggests a high RA pressure  estimated at 15 mmHg or greater.  No pericardial effusion is present.  This study was compared with the study from 10/29/2018. The RV function  appears to have declined based on direct visual and quantitative comparison.  This is primarily due to reduced RV free wall contraction.  ______________________________________________________________________________  Left Ventricle  Global and regional left ventricular function is normal with an EF of 55-60%.  Relative wall thickness is increased consistent with concentric remodeling.  Left ventricular diastolic function is normal. Flattened septum is consistent  with right ventricular pressure and volume overload.     Right Ventricle  Moderate right ventricular dilation is present. Global right ventricular  function is mildly to moderately reduced. The longitudinal excursion is  preserved (TAPSE 2.1 cm and S 11.0 cm/s) but the free wall contraction is  reduced. The RV FAC was 25% but the basal segment of the RV was not clearly  visualized.     Atria  Both atria appear normal. The atrial septum is intact as assessed by color  Doppler .     Mitral Valve  Mild mitral annular calcification is present. Trace mitral insufficiency is  present.     Aortic Valve  Aortic valve is normal in structure and function. The aortic valve is  tricuspid. Trace aortic insufficiency is  present.     Tricuspid Valve  The tricuspid valve cannot be assessed. Trace tricuspid insufficiency is  present. The right ventricular systolic pressure is approximated at 26.4 mmHg  plus the right atrial pressure.     Pulmonic Valve  The pulmonic valve cannot be assessed. Mild pulmonic insufficiency is present.  The PA acceleration time could not be obtained.     Vessels  Sinuses of Valsalva 3.5 cm. Ascending aorta 3.3 cm. IVC diameter >2.1 cm  collapsing <50% with sniff suggests a high RA pressure estimated at 15 mmHg or  greater.     Pericardium  No pericardial effusion is present.     Compared to Previous Study  This study was compared with the study from 10/29/2018 . The RV function  appears to have declined based on direct visual and quantitative comparison.  This is primarily due to reduced RV free wall contraction.    Past Medical History    I have reviewed this patient's medical history and updated it with pertinent information if needed.   Past Medical History:   Diagnosis Date     Acute renal failure superimposed on stage 4 chronic kidney disease, unspecified acute renal failure type (H) 5/8/2022     Chronic sinusitis      CKD (chronic kidney disease)      Heart disease      Hypertension      Keratosis     frontal scalp, treated with liquid nitrogen at [facility]     Malignant neoplasm (H)      Pneumonia a few times     Pulmonary sarcoidosis (H)        Past Surgical History   I have reviewed this patient's surgical history and updated it with pertinent information if needed.  Past Surgical History:   Procedure Laterality Date     COLONOSCOPY       CV RIGHT HEART CATH MEASUREMENTS RECORDED N/A 6/19/2019    Procedure: CV RIGHT HEART CATH;  Surgeon: Kale Delgado MD;  Location:  HEART CARDIAC CATH LAB     CV RIGHT HEART CATH MEASUREMENTS RECORDED N/A 12/11/2019    Procedure: CV RIGHT HEART CATH;  Surgeon: Ion Lemon MD;  Location:  HEART CARDIAC CATH LAB     CV RIGHT HEART CATH  MEASUREMENTS RECORDED N/A 2021    Procedure: CV RIGHT HEART CATH;  Surgeon: Jhony Barone MD;  Location:  HEART CARDIAC CATH LAB     ENT SURGERY      wisdom teeth extraction     EP PACEMAKER Left 2021    Procedure: EP Pacemaker;  Surgeon: Dora Fontanez MD;  Location:  HEART CARDIAC CATH LAB     IR NEPHROSTOMY TUBE PLACEMENT LEFT  2021     LASER HOLMIUM NEPHROLITHOTOMY VIA PERCUTANEOUS NEPHROSTOMY Left 2021    Procedure: NEPHROLITHOTOMY, PERCUTANEOUS, USING HOLMIUM LASER;  Surgeon: Zackery Moura MD;  Location:  OR     TONSILLECTOMY  1964       Social History   I have reviewed this patient's social history and updated it with pertinent information if needed.  Social History     Tobacco Use     Smoking status: Former Smoker     Packs/day: 1.00     Years: 30.00     Pack years: 30.00     Types: Cigarettes     Start date: 10/10/1975     Quit date: 3/1/2010     Years since quittin.3     Smokeless tobacco: Never Used   Substance Use Topics     Alcohol use: Not Currently     Alcohol/week: 0.0 standard drinks     Comment: stated 2 bottles of beer occ     Drug use: Not Currently     Comment: past use of marijuana     Family History   I have reviewed this patient's family history and updated it with pertinent information if needed.   I have reviewed this patient's family history and updated it with pertinent information if needed.  Family History   Problem Relation Age of Onset     Coronary Artery Disease Father      Heart Disease Father         heart attack age 35-40     Hypertension Mother      Hyperlipidemia Mother      Cerebrovascular Disease Mother      Dementia Mother      Lupus Sister      Glaucoma No family hx of      Macular Degeneration No family hx of        Prior to Admission Medications   Prior to Admission Medications   Prescriptions Last Dose Informant Patient Reported? Taking?   PROAIR  (90 Base) MCG/ACT inhaler 2022 at 0530  No Yes   Sig: Inhale 2 puffs  into the lungs every 6 hours as needed for shortness of breath / dyspnea   UNABLE TO FIND   Yes No   Sig: MEDICATION NAME: Home Oxygen, 5-10 L   Vitamin D3 (CHOLECALCIFEROL) 25 mcg (1000 units) tablet 6/22/2022 at 0530  No Yes   Sig: Take 1 tablet (25 mcg) by mouth every other day   Patient taking differently: Take 25 mcg by mouth every other day Take on odd days of the month   ambrisentan (LETAIRIS) 10 MG tablet 6/22/2022 at 0500 Self Yes Yes   Sig: Take 10 mg by mouth every morning    furosemide (LASIX) 20 MG tablet 6/22/2022 at 0500  No Yes   Sig: Take 6 tablets (120 mg) by mouth 2 times daily   hydrochlorothiazide (HYDRODIURIL) 12.5 MG tablet 6/22/2022 at 0500  No Yes   Sig: Take 2 tablets (25 mg) by mouth daily   mometasone-formoterol (DULERA) 200-5 MCG/ACT inhaler 6/22/2022 at 0530  No Yes   Sig: Inhale 2 puffs into the lungs 2 times daily   potassium chloride (KLOR-CON) 20 MEQ packet 6/22/2022 at 0530  No Yes   Sig: Take 20 mEq by mouth 2 times daily   predniSONE (DELTASONE) 10 MG tablet 6/22/2022 at 0530  No Yes   Sig: Take 1 tablet (10 mg) by mouth daily   selexipag (UPTRAVI) 1600 MCG tablet 6/22/2022 at 0530 Self No Yes   Sig: Take 1 tablet (1,600 mcg) by mouth every 12 hours   tadalafil, PAH, (ALYQ) 20 MG TABS 6/22/2022 at 0530  No Yes   Sig: Take 2 tablets (40 mg) by mouth daily      Facility-Administered Medications: None     Allergies   Allergies   Allergen Reactions     Valacyclovir Rash     Diffuse body rash       Physical Exam   Vital Signs: Temp: 98.7  F (37.1  C)   BP: 124/72 Pulse: 67   Resp: 20 SpO2: 98 % O2 Device: Oxymask Oxygen Delivery: 8 LPM  Weight: 166 lbs 0 oz    Constitutional: Pleasant, awake, alert and oriented x3. No acute distress.   Hematologic / Lymphatic: no cervical lymphadenopathy and no supraclavicular lymphadenopathy  Respiratory: Increased work of breathing, good air exchange, clear to auscultation bilaterally, bibasilar crackles or wheezing  Cardiovascular: Normal apical  impulse, regular rate and rhythm, normal S1 and pronounced S2, no S3 or S4  GI: Mild ascites, increased bowel sounds, soft, non-distended, non-tender, no masses palpated, no hepatosplenomegally, no pain to deep palpation  Skin: no bruising or bleeding, normal skin color, texture, turgor, no redness, warmth, or swelling, no rashes, no lesions and no abnormal moles. Swelling in LLEE 2/4 bilaterally. Warm to touch and normal capillary refill.  Neurologic: Awake, alert, oriented to name, place and time.  Cranial nerves II-XII are grossly intact.  Motor is 5 out of 5 bilaterally.     Data   Data reviewed today: I reviewed all medications, new labs and imaging results over the last 24 hours. I personally reviewed. Pending EKG results.    Recent Labs   Lab 06/22/22  1551 06/22/22  1550 06/22/22  1335   WBC  --   --  8.5   HGB 12.4* 12.4* 12.2*   MCV  --   --  92   PLT  --   --  158   INR  --   --  1.00   NA  --   --  135*  135*   POTASSIUM  --   --  3.1*  3.1*   CHLORIDE  --   --  85*  85*   CO2  --   --  33*  33*   BUN  --   --  89.9*  89.9*   CR  --   --  2.88*  2.88*   ANIONGAP  --   --  17*  17*   TERI  --   --  9.4  9.4   GLC  --   --  137*  137*   ALBUMIN  --   --  3.9  3.9   PROTTOTAL  --   --  6.3*   BILITOTAL  --   --  0.6   ALKPHOS  --   --  58   ALT  --   --  19   AST  --   --  23     Most Recent 3 CBC's:Recent Labs   Lab Test 06/22/22  1551 06/22/22  1550 06/22/22  1335 06/02/22  1454 05/23/22  1310   WBC  --   --  8.5 10.2 14.1*   HGB 12.4* 12.4* 12.2* 13.0* 12.5*   MCV  --   --  92 95 95   PLT  --   --  158 130* 171     Most Recent 3 BMP's:Recent Labs   Lab Test 06/22/22 1335 06/09/22  1412 06/02/22  1454 05/23/22  1310   *  135*  --  142 140   POTASSIUM 3.1*  3.1* 3.1* 3.2* 4.2   CHLORIDE 85*  85*  --  99 101   CO2 33*  33*  --  32 29   BUN 89.9*  89.9*  --  76* 67*   CR 2.88*  2.88* 2.47* 2.56* 2.49*   ANIONGAP 17*  17*  --  11 10   TERI 9.4  9.4  --  8.7 9.0   *  137*  --   104* 118*     Most Recent 2 LFT's:Recent Labs   Lab Test 06/22/22  1335 06/02/22  1454   AST 23 23   ALT 19 39   ALKPHOS 58 59   BILITOTAL 0.6 0.6     Most Recent 3 INR's:Recent Labs   Lab Test 06/22/22  1335 05/08/22  1639 09/21/21  0253   INR 1.00 1.08 1.16*     Anticoagulation Dose History     Recent Dosing and Labs Latest Ref Rng & Units 1/12/2012 3/8/2012 3/9/2012 7/9/2021 9/21/2021 5/8/2022 6/22/2022    INR 0.85 - 1.15 1.15(H) 1.15(H) 1.18(H) - 1.16(H) 1.08 1.00    INRPOC 0.86 - 1.14 - - - 1.2(H) - - -          No results found for this or any previous visit (from the past 24 hour(s)).

## 2022-06-22 NOTE — PROGRESS NOTES
Shift: 1730 - 1930    Shortness of breath/Orthopnea/Increased oxygen requirements  -Bilateral Lower extremity edema    61 year old male  who presents for evaluation of SOB/orthopnea/increased oxygen requirements and bilateral lower extremity edema.     VS: Temp: 98.7  F (37.1  C)   BP: 121/62 Pulse: 61   Resp: 16 SpO2: 99 % O2 Device: Nasal cannula Oxygen Delivery: 8 LPM  Pain: denies pain.  Neuro: A&Ox4.  Cardiac:   Paced.  Respiratory: NC 10L NC with activity, 6L NC while at rest.  GI/Diet/Appetite: 2gm Na diet with 2L FR.   :  Voiding w/o difficulty.   LDA's: PIV to NIKKI, SL.   Skin: Jermain, dry, flaky, RHC access to Right internal jugular, dressing intact.   Activity: Up ad michael to BR. Steady gait.   Tests/Procedures:   Pertinent Labs/Lab Collection: K+ 3.1, KCl 20meq given, recheck at 2330.     Plan: Diuresis, contact primary for questions or concerns.

## 2022-06-22 NOTE — PHARMACY-ADMISSION MEDICATION HISTORY
Admission Medication History Completed by Pharmacy    See [facility] Admission Navigator for allergy information, preferred outpatient pharmacy, prior to admission medications and immunization status.     Medication History Sources:     Patient Interview    Surescripts    Changes made to PTA medication list (reason):    Added: None    Deleted: None    Changed:   o Deleted pred 5mg in the evening     Additional Information:    Patient takes vit D every other day, took his dose this morning 6/22    Prior to Admission medications    Medication Sig Last Dose Taking? Auth Provider Long Term End Date   ambrisentan (LETAIRIS) 10 MG tablet Take 10 mg by mouth every morning  6/22/2022 at 0500 Yes Khang Lindsey MD Yes    furosemide (LASIX) 20 MG tablet Take 6 tablets (120 mg) by mouth 2 times daily 6/22/2022 at 0500 Yes Luiza Cho MD Yes    hydrochlorothiazide (HYDRODIURIL) 12.5 MG tablet Take 2 tablets (25 mg) by mouth daily 6/22/2022 at 0500 Yes Luiza Cho MD Yes    mometasone-formoterol (DULERA) 200-5 MCG/ACT inhaler Inhale 2 puffs into the lungs 2 times daily 6/22/2022 at 0530 Yes Shane Ruvalcaba MD Yes    potassium chloride (KLOR-CON) 20 MEQ packet Take 20 mEq by mouth 2 times daily 6/22/2022 at 0530 Yes Luiza Cho MD     predniSONE (DELTASONE) 10 MG tablet Take 1 tablet (10 mg) by mouth daily 6/22/2022 at 0530 Yes Shane Ruvalcaba MD     PROAIR  (90 Base) MCG/ACT inhaler Inhale 2 puffs into the lungs every 6 hours as needed for shortness of breath / dyspnea 6/22/2022 at 0530 Yes Shane Ruvalcaba MD Yes    selexipag (UPTRAVI) 1600 MCG tablet Take 1 tablet (1,600 mcg) by mouth every 12 hours 6/22/2022 at 0530 Yes Khang Lindsey MD     tadalafil, PAH, (ALYQ) 20 MG TABS Take 2 tablets (40 mg) by mouth daily 6/22/2022 at 0530 Yes Khang Lindsey MD Yes    Vitamin D3 (CHOLECALCIFEROL) 25 mcg (1000 units) tablet Take 1 tablet (25 mcg) by mouth every other day 6/22/2022 at 0530  Yes Radha Rod PALI     UNABLE TO FIND MEDICATION NAME: Home Oxygen, 5-10 L   Reported, Patient         Date completed: 06/22/22    Medication history completed by: Fede Valdivia RPH

## 2022-06-22 NOTE — PROGRESS NOTES
Report given to Nidhi GAN RN. Pt transferred to , room 14-1 via litter by this RN & co-worker RN.

## 2022-06-23 NOTE — PLAN OF CARE
D: Pt admitted after R heart cath with fluid overload and hypokalemia.  PMhx: sarcoidosis, pulmonary hypertension, CAD, CKD, HTN, chronic hypoxic respiratory failure on chronic home oxygen (baseline 5lpm at rest and increases to 10l/min upon exertion)  Last month admitted to [facility] after allergic reaction to valgancyclovir for treatment of herpes zoster.      I: Monitored vitals and assessed pt status.   Changed:  Running: Started Bumex gtt @ 2mg/hr via PIV  PRN:Tylenol    A: A0x4. VSS, on 6L at rest, 10L with activity. Pt has severe activity intolerance. Paced. Afebrile. Shoulder Pain from residual shingles pain controlled with tylenol.  Lytes replaced per protocol. K= 3.1 (+40_mEq). Recheck at 2330. Making adequate urine output.  GI/Diet/Appetite: 2gm Na diet with 2L FR.    LDA's: PIV to LUE   Skin: Jermain, dry, flaky, RHC access to Right internal jugular, dressing intact.   Activity: Up ad michael to BR. Steady gait. Very HOSKINS    I/O this shift:  In: 360 [P.O.:360]  Out: 925 [Urine:925]    Temp:  [97.5  F (36.4  C)-98.7  F (37.1  C)] 97.5  F (36.4  C)  Pulse:  [60-67] 62  Resp:  [16-20] 16  BP: (121-145)/(51-72) 123/67  SpO2:  [97 %-100 %] 97 %      P: Continue to monitor Pt status and report changes to treatment team.

## 2022-06-23 NOTE — PROGRESS NOTES
[id number]    NEURO: A&O x4; able to make needs known  RESPIRATORY: 5-7L NC at rest; 8-10L NC with activity; sats >92%; patient baseline at 5L NC at home. HOSKINS   CARDIAC: Paced rhythm;   GI/: Voiding appropriately to urinal at bedside; small, frequent amounts. No BM this shift.    SKIN: Jermain, edematous, dry, flaky; [facility] RIJ site dressing CDI.    PAIN: Reports muscle aching, cramping, pains across entire body; PRN Tylenol available.    MOBILITY: Up ad michael to bathroom;    DIET: 2g Na; 2L FR   LDAs: PIV x1 to L forearm; Lasix at 20mg/hr (2ml/hr) infusing.     PLAN: Continue to diurese patient; closely monitor I/O; notify the primary team with any updates/concerns.      I/O this shift:  In: 360 [P.O.:360]  Out: 1625 [Urine:1625]     BP Readings from Last 1 Encounters:   06/23/22 133/69      Pulse Readings from Last 1 Encounters:   06/23/22 60      Resp Readings from Last 1 Encounters:   06/23/22 18      Temp Readings from Last 1 Encounters:   06/23/22 97.6  F (36.4  C) (Oral)      SpO2 Readings from Last 1 Encounters:   06/23/22 95%      Wt Readings from Last 1 Encounters:   06/23/22 72.7 kg (160 lb 4.8 oz)      Ht Readings from Last 1 Encounters:   06/22/22 1.829 m (6')

## 2022-06-23 NOTE — PROGRESS NOTES
[facility]    Cardiology History and Physical - Cardiology       Date of Admission:  6/22/2022    Primary Cardiologist: Khang Lindsey    Assessment & Plan: SL    Jimmy Isaac is a 61 year old male admitted on 6/22/2022. He has a pmhx significant for sarcoidosis, pulmonary hypertension, CAD, CKD, HTN, chronic hypoxic respiratory failure on chronic home oxygen (baseline 5lpm at rest and increases to 10l/min upon exertion) and herpes zoster admitted for few weeks of worsening SOB, swelling and weight gain (dry weight 152) concerning for volume overload. S/p bumex, diuril. Off bumex given muscle cramps. S/p lasix gtt with improved diuresis, weight loss and kidney function.    Today 06/23  - Increased lasix from 20mg/hr to 40mg/hr + lasix 80m IV bolus at 1000 am. Has urinary output 142cc/hr  - K replacement protocol + scheduled K 40meq BID   - Consider Palliative care if needed  - Disposition plan: Home once euvolemic.    # Acute on chronic right heart failure 2/2 pulmonary hypertension in the setting of pulmonary sarcoidosis  # Complete AV block s/p pacemaker insertion on 08/02/2021    Follows with Dr. Lindsey. Admitted for SOB, weight gain 14lbs and LLEE edema. Patient is chronically dependent on home O2 since 2012. Prior RHC (77/20/38) in November 2021.. He is on triple combination therapy with ambrisentan, tadalafil and selexipag. Recent RHC in July 2021 showed moderate PAH with normal cardiac output. Hemodynamically stable. His labs are notable for Na 135, K 3.1, BUN 89.9, Cr 2.88 (GFR 24), Ca 9.4, Mg 2.3, P 4.4, Alb 3.9, normal LFTs, BNP 6893, CBC Hb 12.2, normal WBC and platelets, INR 1. Had RHC this PM: RA 16, /45 (65), PCWP 16, CI 1.78, CO 3.64, MAP 91, Hb 12.2, SVR 1648, PVR = 13.46, [place]= 3.56, CVP/PCWP = 1 concerning for severe pulmonary hypertension (combined pre- and post-capillary HTN). CXR on 06/22 with patchy perihiliar airspace opacities. EKG  without ST/T ischemic changes.    - Fluid status: Hypervolemia. JVP ~15. Dry weight 152. Wt on admission 166  Wt on 06/23 160 lbs   > Diuresis    > 06/22 0900 Bumex 4mg, Diuril 1g followed by bumex 2mg/hr gtt               > 06/23 0100 Lasix 20mg/hr + lasix 80mg IV     > 06/23 1000 Lasix 40mg/hr + lasix 80mg IV      > Since MN net negative -3.3L. Since 10 am -1L (as of 5pm 142cc/hr)   > Potassium chloride 40meq BID + K replacement protocol   > CMP q8h  - RV failure and PH meds   > Continue with PTA ambrisentan 10mg qday   > Continue with PTA tadalafil 40mg qday   > Continue with PTA selexipag 1600 mcg q12h  - Consider Palliative care if needed    # Hyponatremia, improving  # Moderate hypokalemia, improving  # CKD stage IV, GFR 15-29 ml/min  # Non-oliguric   # Hx of nephrolithiasis s/p multiple urologic procedure  # Chronic normocytic anemia  Cr in 1.4 in 2018 and reaching a new baseline ~3 in July 2020. Had a hx of SISI 2/2 nephrolithiasis with rise of Cr into the 4s. Haf left PCNL on 09/20/21 but has residual.   - BP at goal <130/80  - CMP q8h  - Mg and K high replacement protocol    # Pulmonary sarcoidosis on chronic steroids  Patient has no response to prednisone therapy in 2017. Currently off all other systemic anti-inflammatory medications. Patient is not interested in pursuing lung transplantation.  Has CT with  - Continue with pta prednisone 10mg qday  - Continue with proair inhaler 2 puffs q6hr prn  - Continuous with O2 to maintain SatO2>95%    # Hx of herpes zoster  # Moderate allergy to Valcyte  Stable     Diet: 2g sodium diet  DVT Prophylaxis: Ambulate every shift  Chong Catheter: Not present  Code Status:  Full Code  Fluids: Fluid restriction 2L  Lines: PIV    Disposition Plan   Expected discharge: 2 - 3 days, recommended to prior living arrangement once fluid volume status optimized on oral medication.    Entered: Sofiya Aguillon MD 06/23/2022, 4:01 PM     The patient's care was discussed with the  Attending Physician, Dr. Hills.    Sofiya Aguillon MD  [facility]    ______________________________________________________________________    Interval Progress  Nursing notes reviewed. Patient developed generalized muscles cramps more pronounced in LLEE after bumex administration. Feels more relieved after switching to lasix. Denies chest pain, palpitations, syncope, lightheadedness. Remains afebrile and hemodynamically stable. Creatinine is improving with diuresis. Has at least ~6 lbs down since admission.       Review of Systems    The 10 point Review of Systems is negative other than noted in the HPI or here.     Cardiac History    Echocardiogram 07/27/2021 07:57 AM  BSA: 2.0 m2  Height: 73 in  Weight: 165 lb  BP: 123/72 mmHg  ______________________________________________________________________________  Procedure  Echocardiogram with two-dimensional, color and spectral Doppler performed.  Adequate quality two-dimensional was performed and interpreted. The patient's  rhythm is sinus bradycardia.  ______________________________________________________________________________  Interpretation Summary  Global and regional left ventricular function is normal with an EF of 55-60%.  Flattened septum is consistent with right ventricular pressure and volume  overload.  Global right ventricular function is mildly to moderately reduced. The  longitudinal excursion is preserved (TAPSE 2.1 cm and S 11.0 cm/s) but the  free wall contraction is reduced. The RV FAC was 25% but the basal segment of  the RV was not clearly visualized.  No significant valvular abnormality.  Pulmonary artery systolic pressure cannot be assessed due to inadequate TR  jet.  IVC diameter >2.1 cm collapsing <50% with sniff suggests a high RA pressure  estimated at 15 mmHg or greater.  No pericardial effusion is present.  This study was compared with the study from 10/29/2018. The RV  function  appears to have declined based on direct visual and quantitative comparison.  This is primarily due to reduced RV free wall contraction.  ______________________________________________________________________________  Left Ventricle  Global and regional left ventricular function is normal with an EF of 55-60%.  Relative wall thickness is increased consistent with concentric remodeling.  Left ventricular diastolic function is normal. Flattened septum is consistent  with right ventricular pressure and volume overload.     Right Ventricle  Moderate right ventricular dilation is present. Global right ventricular  function is mildly to moderately reduced. The longitudinal excursion is  preserved (TAPSE 2.1 cm and S 11.0 cm/s) but the free wall contraction is  reduced. The RV FAC was 25% but the basal segment of the RV was not clearly  visualized.     Atria  Both atria appear normal. The atrial septum is intact as assessed by color  Doppler .     Mitral Valve  Mild mitral annular calcification is present. Trace mitral insufficiency is  present.     Aortic Valve  Aortic valve is normal in structure and function. The aortic valve is  tricuspid. Trace aortic insufficiency is present.     Tricuspid Valve  The tricuspid valve cannot be assessed. Trace tricuspid insufficiency is  present. The right ventricular systolic pressure is approximated at 26.4 mmHg  plus the right atrial pressure.     Pulmonic Valve  The pulmonic valve cannot be assessed. Mild pulmonic insufficiency is present.  The PA acceleration time could not be obtained.     Vessels  Sinuses of Valsalva 3.5 cm. Ascending aorta 3.3 cm. IVC diameter >2.1 cm  collapsing <50% with sniff suggests a high RA pressure estimated at 15 mmHg or  greater.     Pericardium  No pericardial effusion is present.     Compared to Previous Study  This study was compared with the study from 10/29/2018 . The RV function  appears to have declined based on direct visual and  quantitative comparison.  This is primarily due to reduced RV free wall contraction.    Allergies   Allergies   Allergen Reactions     Valacyclovir Rash     Diffuse body rash       Physical Exam   Vital Signs: Temp: 97.5  F (36.4  C) Temp src: Oral BP: 119/69 Pulse: 63   Resp: 18 SpO2: 97 % O2 Device: Nasal cannula with humidification Oxygen Delivery: 7 LPM  Weight: 160 lbs 4.8 oz    Constitutional: Pleasant, awake, alert and oriented x3. No acute distress.   Hematologic / Lymphatic: no LAD. Mois mucous membranes  Respiratory: No increased work of breathing, good air exchange, clear to auscultation bilaterally, bibasilar crackles or wheezing. JVD ~14 cm  Cardiovascular: Normal apical impulse, regular rate and rhythm, normal S1 and pronounced S2, no S3 or S4  GI: Improved ascites, increased bowel sounds, soft, non-distended, non-tender, no masses palpated, no hepatosplenomegally, no pain to deep palpation  Skin: no bruising or bleeding, normal skin color, texture, turgor, no redness, warmth, or swelling, no rashes, no lesions and no abnormal moles. Swelling in LLEE 2/4 bilaterally. Warm to touch and capillary refill<2sec.  Neurologic: Awake, alert, oriented to name, place and time.  Cranial nerves II-XII are grossly intact.  Motor is 5 out of 5 bilaterally.     Data   Data reviewed today: I reviewed all medications, new labs and imaging results over the last 24 hours. I personally reviewed. Pending EKG results.    Recent Labs   Lab 06/23/22  1314 06/23/22  1005 06/23/22  0627 06/23/22  0126 06/22/22  1955 06/22/22  1551 06/22/22  1550 06/22/22  1335   WBC  --   --   --   --   --   --   --  8.5   HGB  --   --   --   --   --  12.4* 12.4* 12.2*   MCV  --   --   --   --   --   --   --  92   PLT  --   --   --   --   --   --   --  158   INR  --   --   --   --   --   --   --  1.00     --   --  136 135*  --   --  135*  135*   POTASSIUM 4.1 3.9 3.0* 3.2* 3.0*  --   --  3.1*  3.1*   CHLORIDE 85*  --   --  86* 84*  --    --  85*  85*   CO2 34*  --   --  34* 34*  --   --  33*  33*   BUN 93.1*  --   --  93.4* 91.5*  --   --  89.9*  89.9*   CR 2.88*  --   --  3.02* 2.78*  --   --  2.88*  2.88*   ANIONGAP 17*  --   --  16* 17*  --   --  17*  17*   TERI 9.8  --   --  10.0 9.8  --   --  9.4  9.4   *  --   --  105* 189*  --   --  137*  137*   ALBUMIN 4.2  --   --  4.1 3.9  --   --  3.9  3.9   PROTTOTAL 6.9  --   --  6.5 6.3*  --   --  6.3*   BILITOTAL 0.5  --   --  0.5 0.5  --   --  0.6   ALKPHOS 69  --   --  64 60  --   --  58   ALT 20  --   --  18 20  --   --  19   AST 24  --   --  23 27  --   --  23     Most Recent 3 CBC's:  Recent Labs   Lab Test 06/22/22  1551 06/22/22  1550 06/22/22  1335 06/02/22  1454 05/23/22  1310   WBC  --   --  8.5 10.2 14.1*   HGB 12.4* 12.4* 12.2* 13.0* 12.5*   MCV  --   --  92 95 95   PLT  --   --  158 130* 171     Most Recent 3 BMP's:  Recent Labs   Lab Test 06/23/22  1314 06/23/22  1005 06/23/22  0627 06/23/22  0126 06/22/22  1955     --   --  136 135*   POTASSIUM 4.1 3.9 3.0* 3.2* 3.0*   CHLORIDE 85*  --   --  86* 84*   CO2 34*  --   --  34* 34*   BUN 93.1*  --   --  93.4* 91.5*   CR 2.88*  --   --  3.02* 2.78*   ANIONGAP 17*  --   --  16* 17*   TERI 9.8  --   --  10.0 9.8   *  --   --  105* 189*     Most Recent 2 LFT's:  Recent Labs   Lab Test 06/23/22  1314 06/23/22  0126   AST 24 23   ALT 20 18   ALKPHOS 69 64   BILITOTAL 0.5 0.5     Most Recent 3 INR's:  Recent Labs   Lab Test 06/22/22  1335 05/08/22  1639 09/21/21  0253   INR 1.00 1.08 1.16*     Anticoagulation Dose History     Recent Dosing and Labs Latest Ref Rng & Units 1/12/2012 3/8/2012 3/9/2012 7/9/2021 9/21/2021 5/8/2022 6/22/2022    INR 0.85 - 1.15 1.15(H) 1.15(H) 1.18(H) - 1.16(H) 1.08 1.00    INRPOC 0.86 - 1.14 - - - 1.2(H) - - -          Recent Results (from the past 24 hour(s))   Cardiac Catheterization    Narrative      Right sided filling pressures are severely elevated.    Severely elevated pulmonary artery  hypertension.    Left sided filling pressures are moderately elevated.    Normal cardiac output level.    Hemodynamic data has been modified in Epic per physician review.     1. Severe pulmonary hypertension  2. Reduced cardiogenic shock concerning for ambulatory cardiogenic shock  3. Right sided heart failure      XR Chest Port 1 View    Narrative    Exam: XR CHEST PORT 1 VIEW, 6/23/2022 11:18 AM    Comparison: Chest x-ray 6/2/2022, 5/8/2022    History: Patient coming with volume overload    Findings:  Portable AP view of the chest. Left chest wall cardiac device with  leads in stable position. Pulmonary artery enlargement. Trachea is  midline. Cardiomediastinal silhouette is stable. Calcifications of the  aortic knob. Lungs are hyperinflated. Unchanged patchy perihilar  opacities. No new focal consolidation. There is no pneumothorax or  pleural effusion. The upper abdomen is unremarkable. Unchanged  sclerotic lesion in the right humeral head.      Impression    Impression:   Continued patchy perihilar airspace opacities. No new focal  consolidation.    I have personally reviewed the examination and initial interpretation  and I agree with the findings.    SHYANNE WINN MD         SYSTEM ID:  [id number]

## 2022-06-23 NOTE — PLAN OF CARE
Pt sent $57 cash, 3 credit cards and drivers license to security. Pt wished to keep clothing, cell phone and reading glasses at bedside.

## 2022-06-23 NOTE — PROGRESS NOTES
CLINICAL NUTRITION SERVICES    Reason for Assessment:  Low-sodium (2 g/day) nutrition education, received consult    Diet History:  Per chart review (8/2021), pt was on a 2 g sodium diet and 2 L fluid restriction during a prior admission.     Per discussion with pt today (6/23/2022), reports receiving low-sodium nutrition education in the past. States he has information at home.     Nutrition Diagnosis:  No nutrition education dx    Nutrition Prescription/Recs:  Continue low-sodium diet. Continue fluid restriction.     Interventions:  Nutrition Education: Offered nutrition education. Pt politely declined as pt has received nutrition education in the past. Gave sodium room service menu.        Goals:    Pt will verbalize at least five high sodium foods and the importance of avoiding added salt to foods for cooking or seasoning foods.     Follow-up:   Patient to ask any further nutrition-related questions before discharge. In addition, pt may request outpatient RD appointment.     Rachel Toth, MS, RD, LD, [facility]   6C Pgr: [phone]

## 2022-06-24 NOTE — PLAN OF CARE
Physical Therapy: Orders received. Chart reviewed and discussed with care team.? Physical Therapy not indicated due to pt at/near baseline mobility levels despite increased O2 needs.? Defer discharge recommendations to OT and they will follow.? Will complete orders.

## 2022-06-24 NOTE — PLAN OF CARE
Pt who presents this admission with worsening SOB, edema and weight gain concerning for fluid overload. PMHx of sarcoidosis, pulmonary hypertension, CAD, CKD, HTN, chronic hypoxic respiratory failure on chronic home oxygen (baseline 5LPM at rest and increases to 10LPM upon exertion) and recent herpes zoster infection.     Code: Full Team: Cards 2    Neuro: A/O x4; pleasant and cooperative  Cardiac/Tele:  pt paced rhythm with HRs in the 60s. Denies chest pain  Respiratory: on 6-10L O2 via NC with humidity, sats >92%. Pt dyspneic with activity but improved with rest. Albuterol inhaler given 1x per pt request for SOB.   GI/: Lasix gtt increased to 40 mg/hr (4 ml/hr) this morning. IVP bolus dose of lasix given 1x. 1000 mg IV diuril also given 1x. Pt voiding. BM x2 this shift.    Diet/Appetite: pt tolerating 2g Na+, 2LFR diet. Appetite good. Denies nausea.   Skin: bilateral LE ruddiness and +2-3 edema -- Lymphedema consult placed, still to see pt  LDAs: left PIV running lasix gtt at 40 mg/hr  Activity: up with SBA-independently in room  Pain: pt reports generalized whole body aching/cramping pain from diuresis; PRN tylenol given 1x. Aqua K pad also in use.   Labs: K+ this morning was 3.0, replacement ordered by provider given. Recheck at 1005 was 3.9. Afternoon K+ lab draw 4.1. Next check around 2100 tonight.    Procedures/tests: none     Plan: Continue diuresis. Lymphedema to see pt and wrap legs. Continue to monitor and assess pt with every encounter. Notify medical team with any changes or concerns.     Duration of Care: [id number]  Lilly BERNSTEIN RN

## 2022-06-24 NOTE — PLAN OF CARE
Pt who presents this admission with worsening SOB, edema and weight gain concerning for fluid overload. PMHx of sarcoidosis, pulmonary hypertension, CAD, CKD, HTN, chronic hypoxic respiratory failure on chronic home oxygen (baseline 5LPM at rest and increases to 10LPM upon exertion) and recent herpes zoster infection.      Code:  Full      Team: Cards 2     Neuro: A/O x4; pleasant and cooperative  Cardiac/Tele:  pt paced rhythm with HRs in the 60s. Denies chest pain  Respiratory: on 6L-10L O2 via NC, sats >92%. Pt dyspneic with activity but improved with rest. Albuterol inhaler given 1x per pt request for SOB and some wheezing this morning.   GI/: Lasix gtt decreased to 20 mg/hr (2 ml/hr). IV diuril given 1x. Pt voiding. BM x2 this shift.   Diet/Appetite: pt tolerating 2g Na+, 2L FR diet. Appetite good. Denies nausea.   Skin: bilateral LE ruddiness and +2-3 edema -- Lymphedema saw pt at bedside, lymph wraps placed on BLE  LDAs: left PIV running lasix gtt   Activity: up with SBA-independently in room. Pt ambulated hallways x1. Denies any lightheadedness or dizziness.   Pain: pt reports generalized whole body aching/cramping pain from diuresis; PRN tylenol given 1x. Aqua K pad also in use.   Labs: K+ this morning was 3.1, replacement given per protocol. Afternoon recheck 4.3. Magnesium continues to be elevated; pt stable. Cards 2 notified and aware. Next set of labs at 2100 tonight.   Procedures/tests: none     Plan: Continue diuresis. Continue to monitor and assess pt with every encounter. Notify medical team with any changes or concerns.      Duration of Care: [id number]  Lilly BERNSTEIN RN

## 2022-06-24 NOTE — PROGRESS NOTES
Care Coordinator  D:Pt here for: Jimmy Isaac is a 61 year old male admitted on 6/22/2022. He has a pmhx significant for sarcoidosis, pulmonary hypertension, CAD, CKD, HTN, chronic hypoxic respiratory failure on chronic home oxygen (baseline 5lpm at rest and increases to 10l/min upon exertion) and herpes zoster admitted for few weeks of worsening SOB, swelling and weight gain (dry weight 152) concerning for volume overload. S/p bumex, diuril. Off bumex given muscle cramps. S/p lasix gtt with improved diuresis, weight loss and kidney function--per Dr Sofiya Aguillon's note.  I: I called the pt as OT is recommending OP OT--pt agrees and is going to PCP: Dr Mcclendon at [place], [place].  Pt is on home oxygen: base is 5l/min via nasal cannula  Vendor: [employer]: [employer] [phone]  Fax: [phone]  He has a portable concentrator but it only goes to 5l/min! He would like to know what to do to get a concentrator that goes to 10l/mon as he wears this at night and frequently wakes SOB and has to switch to his tanks.  I called Brielle @ [employer] and she is checking w her manager what we need to do _________  He has oxygen Tanks at home size:E(standard), C 2/3 standard size) and D 1/2 size of standard and has them in his car as well--he drove self to the hospital.    A: informed pt that MD team expects discharge home Monday, he would like to go home this weekend if possible.  P: Wait for [employer] to call me back

## 2022-06-24 NOTE — PROGRESS NOTES
06/24/22 1046   Quick Adds   Quick Adds Edema   Type of Visit Initial Occupational Therapy Evaluation   Living Environment   People in Home spouse   Current Living Arrangements house   Home Accessibility   (has ramp to enter, stairs to basement)   Transportation Anticipated car, drives self   Living Environment Comments Pt lives with sree has A prn. Otherwise [place]   Self-Care   Usual Activity Tolerance fair   Current Activity Tolerance fair   Regular Exercise   (stays active on hobby farm)   Equipment Currently Used at Home shower chair   Fall history within last six months no   General Information   Onset of Illness/Injury or Date of Surgery 06/22/22   Referring Physician Sofiya Cabello MD   Patient/Family Therapy Goal Statement (OT) to get back home   Additional Occupational Profile Info/Pertinent History of Current Problem Jimmy Isaac is a 61 year old male admitted on 6/22/2022. He has a pmhx significant for sarcoidosis, pulmonary hypertension, CAD, CKD, HTN, chronic hypoxic respiratory failure on chronic home oxygen (baseline 5lpm at rest and increases to 10l/min upon exertion) and herpes zoster admitted for few weeks of worsening SOB, swelling and weight gain (dry weight 152) concerning for volume overload. S/p bumex, diuril. Off bumex given muscle cramps. S/p lasix gtt with improved diuresis, weight loss and kidney function.   Cognitive Status Examination   Orientation Status orientation to person, place and time   Visual Perception   Visual Impairment/Limitations WFL   Sensory   Sensory Quick Adds No deficits were identified   Pain Assessment   Patient Currently in Pain No   Integumentary/Edema   Integumentary/Edema Comments Avni LEs   Edema General Information   Onset of Edema 06/22/22   Affected Body Part(s) Right LE;Left LE   Edema Etiology   (CHF, low alubmin)   Edema Precautions Cardiac Edema/CHF   Edema Examination/Assessment   Skin Condition Pitting;Intact   Skin  Condition Comments Pt with 3+ pitting in feet, ankles and distal LEs, minimal pitting in proximal LEs. Skin dry and red, but no ulcerations   Scar No   Ulcerations No   Dorsal Pedal Pulse Symmetrical   Stemmer Sign Negative   Pitting Assessment 3+   Range of Motion Comprehensive   Comment, General Range of Motion R shoulder limited by shingles   Strength Comprehensive (MMT)   General Manual Muscle Testing (MMT) Assessment no strength deficits identified   Muscle Tone Assessment   Muscle Tone Quick Adds No deficits were identified   Coordination   Upper Extremity Coordination No deficits were identified   Bed Mobility   Bed Mobility   (Ind)   Transfers   Transfers No deficits identified   Activities of Daily Living   BADL Assessment/Intervention lower body dressing;bathing;toileting   Bathing Assessment/Intervention   [place] Level (Bathing) supervision   Lower Body Dressing Assessment/Training   [place] Level (Lower Body Dressing) supervision   Toileting   [place] Level (Toileting) supervision   Clinical Impression   Criteria for Skilled Therapeutic Interventions Met (OT) Yes, treatment indicated   OT Diagnosis decreased ADL I and tolerance   Edema: Patient Presentation Edema   Influenced by the following impairments SOB, deconditioning, fluid overload   Assessment of Occupational Performance 1-3 Performance Deficits   Identified Performance Deficits home mgmt, leisure   Planned Therapy Interventions (OT) ADL retraining;IADL retraining;home program guidelines;progressive activity/exercise   Edema: Planned Interventions Gradient compression bandaging;Fit for compression garment;Edema exercises;Precautions to prevent infection/exacerbation;Education;Manual therapy;ADL training   Clinical Decision Making Complexity (OT) low complexity   Anticipated Equipment Needs Upon Discharge (OT)   (none)   Risk & Benefits of therapy have been explained evaluation/treatment results reviewed;care plan/treatment goals  reviewed;risks/benefits reviewed;current/potential barriers reviewed;patient;participants voiced agreement with care plan   Clinical Impression Comments Pt may benefit from skilled OT/edema to increase activity tolerance and decrease LE edema   OT Discharge Planning   OT Discharge Recommendation (DC Rec) home with assist;home with outpatient occupational therapy  (A from family prn)   OT Rationale for DC Rec May benefit from OP follow up with cardiac rehab/ edema mgmt   Total Evaluation Time (Minutes)   Total Evaluation Time (Minutes) 12   OT Goals   Therapy Frequency (OT)   (Edema 4xweek, OT 6xweek)   OT Predicted Duration/Target Date for Goal Attainment 07/01/22   OT Goals Home Management;Aerobic Activity;Edema   OT: Home Management Supervision/stand-by assist;with light demand household tasks   OT: Perform aerobic activity with stable cardiovascular response intermittent activity;20 minutes   OT: Edema education to increase ability to manage edema after discharge from the hospital Patient;Verbalize;[place]   OT: Management of edema bandages Patient;Verbalize;[place]   OT: Functional edema exercise program to reduce limb volume, increase activity tolerance and improve independence with ADL Patient;Demonstrates;[place]

## 2022-06-24 NOTE — PLAN OF CARE
D: Patient admitted 6/22 for weeks of shortness of breath, swelling and weight gain c/f fluid overload. Hx. sarcoidosis, pulmonary hypertension, CAD, CKD, HTN, chronic hypoxic respiratory failure on chronic home oxygen (baseline 5lpm at rest and increases to 10l/min upon exertion) and herpes zoster.  I/A: A&Ox4. VSSA on 6-7L NC and up to 10L w/activity. HOSKINS. V-paced 60s (9 beats of VT HR 150s at 2133 noted on tele trends, Cards CC notified). C/o generalized soreness partially relieved by prn tyelnol. Lasix gtt infusing at 4 mg/hr. PM K+ replaced per protocol. PRN benadryl given overnight for c/o itching. Adequate uop. SBA. Appeared to rest comfortably overnight.   P: Continue to monitor and notify Cards 2 with questions/concerns.

## 2022-06-24 NOTE — PROGRESS NOTES
[facility]    Cardiology History and Physical - Cardiology       Date of Admission:  6/22/2022      Assessment & Plan: SL    Jimmy Isaac is a 61 year old male admitted on 6/22/2022. He has a pmhx significant for sarcoidosis, pulmonary hypertension, CAD, CKD, HTN, chronic hypoxic respiratory failure on chronic home oxygen (baseline 5lpm at rest and increases to 10l/min upon exertion) and herpes zoster admitted for few weeks of worsening SOB, swelling and weight gain (dry weight 152) concerning for volume overload. S/p bumex, diuril. Off bumex given muscle cramps. S/p lasix gtt with improved diuresis, weight loss and kidney function.    Today   - Decreased lasix to 20mg/hr and added 1g Diuril   - Diphenhydramine 25mg po q6h given  - Disposition plan: Home once euvolemic.    # Acute on chronic right heart failure 2/2 pulmonary hypertension in the setting of pulmonary sarcoidosis  # Complete AV block s/p pacemaker insertion on 08/02/2021    Follows with Dr. Lindsey. Admitted for SOB, weight gain 14lbs and LLEE edema. Patient is chronically dependent on home O2 since 2012. Prior RHC (77/20/38) in November 2021.. He is on triple combination therapy with ambrisentan, tadalafil and selexipag. Recent RHC in July 2021 showed moderate PAH with normal cardiac output. Hemodynamically stable. His labs are notable for Na 135, K 3.1, BUN 89.9, Cr 2.88 (GFR 24), Ca 9.4, Mg 2.3, P 4.4, Alb 3.9, normal LFTs, BNP 6893, CBC Hb 12.2, normal WBC and platelets, INR 1. Had RHC this PM: RA 16, /45 (65), PCWP 16, CI 1.78, CO 3.64, MAP 91, Hb 12.2, SVR 1648, PVR = 13.46, [place]= 3.56, CVP/PCWP = 1 concerning for severe pulmonary hypertension (combined pre- and post-capillary HTN). CXR on 06/22 with patchy perihiliar airspace opacities. EKG without ST/T ischemic changes.    - Fluid status: Hypervolemia. JVP ~13. Dry weight 152. Wt on admission 166  Wt on 06/24 156 lbs   > Diuresis    >  06/22 0900 Bumex 4mg, Diuril 1g followed by bumex 2mg/hr gtt               > 06/23 0100 Lasix 20mg/hr + lasix 80mg IV     > 06/23 1000 Lasix 40mg/hr + lasix 80mg IV      > Since MN net negative -3.3L. Since 10 am -1L (as of 5pm 142cc/hr)   > Potassium chloride 40meq BID + K replacement protocol   > CMP q8h  - RV failure and PH meds   > Continue with PTA ambrisentan 10mg qday   > Continue with PTA tadalafil 40mg qday   > Continue with PTA selexipag 1600 mcg q12h  - Consider Palliative care if needed    # Hyponatremia, improving  # Moderate hypokalemia, improving  # CKD stage IV, GFR 15-29 ml/min  # Non-oliguric   # Hx of nephrolithiasis s/p multiple urologic procedure  # Chronic normocytic anemia  Cr in 1.4 in 2018 and reaching a new baseline ~3 in July 2020. Had a hx of SISI 2/2 nephrolithiasis with rise of Cr into the 4s. Haf left PCNL on 09/20/21 but has residual.   - BP at goal <130/80  - CMP q8h  - Mg and K high replacement protocol    # Pulmonary sarcoidosis on chronic steroids  Patient has no response to prednisone therapy in 2017. Currently off all other systemic anti-inflammatory medications. Patient is not interested in pursuing lung transplantation.  Has CT with  - Continue with pta prednisone 10mg qday  - Continue with proair inhaler 2 puffs q6hr prn  - Continuous with O2 to maintain SatO2>95%    # Hx of herpes zoster  # Moderate allergy to Valcyte  Stable     Diet: 2g sodium diet  DVT Prophylaxis: Ambulate every shift  Chong Catheter: Not present  Code Status:  Full Code  Fluids: Fluid restriction 2L  Lines: PIV    Disposition Plan   Expected discharge: 2 - 3 days, recommended to prior living arrangement once fluid volume status optimized on oral medication.     The patient's care was discussed with the Attending Physician, Dr. Knutson.    Wilner Frey MD on 6/24/2022 at 7:03 PM    [facility]    ______________________________________________________________________    Interval Progress  Nursing notes reviewed. Patient developed generalized muscles cramps more pronounced in LLEE after bumex administration. Feels more relieved after switching to lasix. Denies chest pain, palpitations, syncope, lightheadedness. Remains afebrile and hemodynamically stable. Creatinine is improving with diuresis. Has at least ~6 lbs down since admission.       Review of Systems    The 10 point Review of Systems is negative other than noted in the HPI or here.     Cardiac History    Echocardiogram 07/27/2021 07:57 AM  BSA: 2.0 m2  Height: 73 in  Weight: 165 lb  BP: 123/72 mmHg  ______________________________________________________________________________  Procedure  Echocardiogram with two-dimensional, color and spectral Doppler performed.  Adequate quality two-dimensional was performed and interpreted. The patient's  rhythm is sinus bradycardia.  ______________________________________________________________________________  Interpretation Summary  Global and regional left ventricular function is normal with an EF of 55-60%.  Flattened septum is consistent with right ventricular pressure and volume  overload.  Global right ventricular function is mildly to moderately reduced. The  longitudinal excursion is preserved (TAPSE 2.1 cm and S 11.0 cm/s) but the  free wall contraction is reduced. The RV FAC was 25% but the basal segment of  the RV was not clearly visualized.  No significant valvular abnormality.  Pulmonary artery systolic pressure cannot be assessed due to inadequate TR  jet.  IVC diameter >2.1 cm collapsing <50% with sniff suggests a high RA pressure  estimated at 15 mmHg or greater.  No pericardial effusion is present.  This study was compared with the study from 10/29/2018. The RV function  appears to have declined based on direct visual and quantitative comparison.  This is primarily due to reduced RV free  wall contraction.  ______________________________________________________________________________  Left Ventricle  Global and regional left ventricular function is normal with an EF of 55-60%.  Relative wall thickness is increased consistent with concentric remodeling.  Left ventricular diastolic function is normal. Flattened septum is consistent  with right ventricular pressure and volume overload.     Right Ventricle  Moderate right ventricular dilation is present. Global right ventricular  function is mildly to moderately reduced. The longitudinal excursion is  preserved (TAPSE 2.1 cm and S 11.0 cm/s) but the free wall contraction is  reduced. The RV FAC was 25% but the basal segment of the RV was not clearly  visualized.     Atria  Both atria appear normal. The atrial septum is intact as assessed by color  Doppler .     Mitral Valve  Mild mitral annular calcification is present. Trace mitral insufficiency is  present.     Aortic Valve  Aortic valve is normal in structure and function. The aortic valve is  tricuspid. Trace aortic insufficiency is present.     Tricuspid Valve  The tricuspid valve cannot be assessed. Trace tricuspid insufficiency is  present. The right ventricular systolic pressure is approximated at 26.4 mmHg  plus the right atrial pressure.     Pulmonic Valve  The pulmonic valve cannot be assessed. Mild pulmonic insufficiency is present.  The PA acceleration time could not be obtained.     Vessels  Sinuses of Valsalva 3.5 cm. Ascending aorta 3.3 cm. IVC diameter >2.1 cm  collapsing <50% with sniff suggests a high RA pressure estimated at 15 mmHg or  greater.     Pericardium  No pericardial effusion is present.     Compared to Previous Study  This study was compared with the study from 10/29/2018 . The RV function  appears to have declined based on direct visual and quantitative comparison.  This is primarily due to reduced RV free wall contraction.    Allergies   Allergies   Allergen Reactions     Valacyclovir Rash     Diffuse body rash       Physical Exam   JVP ~13  Physical Exam  Constitutional:       Appearance: Normal appearance.   HENT:      Head: Normocephalic.      Nose: Nose normal.   Eyes:      Extraocular Movements: Extraocular movements intact.   Cardiovascular:      Rate and Rhythm: Normal rate and regular rhythm.      Pulses: Normal pulses.   Pulmonary:      Effort: Pulmonary effort is normal.      Comments: Crackles heard in LLL  Abdominal:      General: Abdomen is flat.   Musculoskeletal:         General: Normal range of motion.      Cervical back: Normal range of motion.   Skin:     General: Skin is warm.   Neurological:      General: No focal deficit present.   Psychiatric:         Mood and Affect: Mood normal.         Behavior: Behavior normal.         Data   Data reviewed today: I reviewed all medications, new labs and imaging results over the last 24 hours. I personally reviewed. Pending EKG results.  Lab Results   Component Value Date    PH 7.31 (L) 05/08/2022    PH 7.32 (L) 08/04/2021    PH 7.31 (L) 08/03/2021    PO2 67 (L) 08/04/2021    PO2 71 (L) 08/03/2021    PCO2 39 08/04/2021    PCO2 40 08/03/2021    HCO3 20 (L) 08/04/2021    HCO3 20 (L) 08/03/2021    ALKA -5.4 08/04/2021    ALKA -5.7 08/03/2021       Lab Results   Component Value Date    NTBNPI 6,893 (H) 06/22/2022    NTBNPI 6,378 (H) 05/08/2022    NTBNPI 5,187 (H) 09/22/2021    NTBNP 5,470 (H) 11/17/2021    NTBNP 28,841 (H) 07/30/2021    NTBNP 8,834 (H) 07/27/2021     Lab Results   Component Value Date    WBC 8.5 06/22/2022    WBC 10.2 06/02/2022    WBC 14.1 (H) 05/23/2022    HGB 12.4 (L) 06/22/2022    HGB 12.4 (L) 06/22/2022    HGB 12.2 (L) 06/22/2022    HCT 36.9 (L) 06/22/2022    HCT 40.6 06/02/2022    HCT 39.5 (L) 05/23/2022    MCV 92 06/22/2022    MCV 95 06/02/2022    MCV 95 05/23/2022     06/22/2022     (L) 06/02/2022     05/23/2022     Lab Results   Component Value Date    CR 3.05 (H) 06/24/2022    CR 3.06  (H) 06/24/2022    CR 2.90 (H) 06/23/2022     No results found for: DD, DIMER  Lab Results   Component Value Date     06/24/2022     06/24/2022     (L) 06/23/2022    POTASSIUM 4.3 06/24/2022    POTASSIUM 4.2 06/24/2022    POTASSIUM 3.1 (L) 06/24/2022    CHLORIDE 83 (L) 06/24/2022    CHLORIDE 85 (L) 06/24/2022    CHLORIDE 85 (L) 06/23/2022    CO2 40 (H) 06/24/2022    CO2 37 (H) 06/24/2022    CO2 34 (H) 06/23/2022     (H) 06/24/2022     (H) 06/24/2022     (H) 06/23/2022     Lab Results   Component Value Date    SED 8 09/22/2017     GFR Estimate   Date Value Ref Range Status   06/24/2022 22 (L) >60 mL/min/1.73m2 Final     Comment:     Effective December 21, 2021 eGFRcr in adults is calculated using the 2021 CKD-EPI creatinine equation which includes age and gender (Jacoby et al., NEJ, DOI: 10.1056/WTPFcb1238320)   06/24/2022 22 (L) >60 mL/min/1.73m2 Final     Comment:     Effective December 21, 2021 eGFRcr in adults is calculated using the 2021 CKD-EPI creatinine equation which includes age and gender (Jacoby et al., NEJ, DOI: 10.1056/AWJUxu0709004)   06/23/2022 24 (L) >60 mL/min/1.73m2 Final     Comment:     Effective December 21, 2021 eGFRcr in adults is calculated using the 2021 CKD-EPI creatinine equation which includes age and gender (Jacoby et al., NEJ, DOI: 10.1056/LCNLuu1495307)   07/01/2021 19 (L) >60 mL/min/[1.73_m2] Final     Comment:     Non  GFR Calc  Starting 12/18/2018, serum creatinine based estimated GFR (eGFR) will be   calculated using the Chronic Kidney Disease Epidemiology Collaboration   (CKD-EPI) equation.     06/16/2021 17 (L) >60 mL/min/[1.73_m2] Final     Comment:     Non  GFR Calc  Starting 12/18/2018, serum creatinine based estimated GFR (eGFR) will be   calculated using the Chronic Kidney Disease Epidemiology Collaboration   (CKD-EPI) equation.     12/11/2019 39 (L) >60 mL/min/[1.73_m2] Final     Comment:     Non   American GFR Calc  Starting 12/18/2018, serum creatinine based estimated GFR (eGFR) will be   calculated using the Chronic Kidney Disease Epidemiology Collaboration   (CKD-EPI) equation.       GFR Estimate If Black   Date Value Ref Range Status   07/01/2021 22 (L) >60 mL/min/[1.73_m2] Final     Comment:      GFR Calc  Starting 12/18/2018, serum creatinine based estimated GFR (eGFR) will be   calculated using the Chronic Kidney Disease Epidemiology Collaboration   (CKD-EPI) equation.     06/16/2021 20 (L) >60 mL/min/[1.73_m2] Final     Comment:      GFR Calc  Starting 12/18/2018, serum creatinine based estimated GFR (eGFR) will be   calculated using the Chronic Kidney Disease Epidemiology Collaboration   (CKD-EPI) equation.     12/11/2019 45 (L) >60 mL/min/[1.73_m2] Final     Comment:      GFR Calc  Starting 12/18/2018, serum creatinine based estimated GFR (eGFR) will be   calculated using the Chronic Kidney Disease Epidemiology Collaboration   (CKD-EPI) equation.       Lab Results   Component Value Date     (H) 06/24/2022     (H) 06/24/2022     (H) 06/23/2022     Lab Results   Component Value Date    HGB 12.4 (L) 06/22/2022    HGB 12.4 (L) 06/22/2022    HGB 12.2 (L) 06/22/2022     Lab Results   Component Value Date    AST 21 06/24/2022    AST 21 06/24/2022    AST 21 06/23/2022    ALT 16 06/24/2022    ALT 15 06/24/2022    ALT 17 06/23/2022    ALKPHOS 80 06/24/2022    ALKPHOS 61 06/24/2022    ALKPHOS 83 06/23/2022    BILITOTAL 0.8 06/24/2022    BILITOTAL 0.7 06/24/2022    BILITOTAL 0.5 06/23/2022    BILICONJ 0.0 01/12/2012     Lab Results   Component Value Date    INR 1.00 06/22/2022    INR 1.08 05/08/2022    INR 1.16 (H) 09/21/2021     No results found for: BILINEONATAL, TCBIL  Lab Results   Component Value Date     06/22/2022     (L) 06/02/2022     05/23/2022     Lab Results   Component Value Date    BUN 98.7 (H) 06/24/2022     BUN 98.3 (H) 06/24/2022    BUN 96.7 (H) 06/23/2022    CR 3.05 (H) 06/24/2022    CR 3.06 (H) 06/24/2022    CR 2.90 (H) 06/23/2022     Lab Results   Component Value Date    TSH 1.14 06/02/2022    TSH 2.40 07/30/2021    TSH 2.05 01/12/2012     No results found for: TROPONIN, TROPI, TROPR, TROPN  Lab Results   Component Value Date    URINEKETONE Negative 06/02/2022    URINEKETONE Negative 05/23/2022    URINEKETONE Negative 05/08/2022     Lab Results   Component Value Date    WBC 8.5 06/22/2022    WBC 10.2 06/02/2022    WBC 14.1 (H) 05/23/2022

## 2022-06-25 NOTE — PLAN OF CARE
Patient has been assessed for Home Oxygen needs. Oxygen readings:    *Pulse oximetry (SpO2) = 90% on room air at rest while awake.    *SpO2 improved to 98% on 6 liters/minute at rest.    *SpO2 = 80% on 6L during activity/with exercise.    *SpO2 improved to 93% on 10 liters/minute during activity/with exercise.

## 2022-06-25 NOTE — PLAN OF CARE
Pt who presents this admission with worsening SOB, edema and weight gain concerning for fluid overload. PMHx of sarcoidosis, pulmonary hypertension, CAD, CKD, HTN, chronic hypoxic respiratory failure on chronic home oxygen (baseline 5LPM at rest and increases to 10LPM upon exertion) and recent herpes zoster infection.      Neuro: AOx4, denies headache, lightheadedness and dizziness. No reports of numbness/tingling.  Resp: Sating >92% on 6L O2 overnight. Denies SOB. Albuterol inhaler given 1x prior to bed, Cepacol lozenges available for sore/dry throat with cough, given 2x overnight.  Cardiac: Paced HR in 60s. Denies chest pain/palpitations. Noted hypotension at 11PM vitals (86/45 MAP 58), notified CARDS 2 - no interventions at this time, will continue to monitor and notify of changes.  GI/: 2g Na / 2L FR tolerating well. Denies N/V. Voiding adequately on Lasix gtt, LBM 6/24.  Skin: No overt deficits noted, lymphedema wraps on BLE pt tolerating well.  Pain: Denies  Activity: SBA / independent to bathroom.  LDAs: L PIV infusing Lasix gtt at 2mL/hr.    Will continue to monitor and report changes to team.

## 2022-06-25 NOTE — PLAN OF CARE
Neuro: A&Ox4.   Cardiac: Afebrile, VSS. Paced. BP slightly softer at noon. Patient asymptomatic except felt 'tired'.   Respiratory: 6L NC at rest. Did home oxygen evaluation (appropriate test instead of 6 minute walk test) with OT. Requires up to 10L with activity to maintain saturations. Reports HOSKINS.  GI/: Voiding spontaneously, two lost voids this shift while trying to have BM. One BM this shift.  Diet/appetite: Tolerating 2gm Na diet. Compliant with 2L FR.    Activity: Up ad michael in room, up to bathroom.    Pain: . Intermittent right kidney pan and left shoulder pain. Using PRN lozenges for mouth lubrication and methol effects help with breathing.  Skin: Red, keely skin. BLE wraps replaced today by OT.  Lines: New PIV placed.  Replacements: AM K 3.7 and 10meq given. Afternoon K 3.8 and next shift to replace. Then labs again at 2100.    Goal Outcome Evaluation:    Plan of Care Reviewed With: patient

## 2022-06-25 NOTE — PROGRESS NOTES
[facility]    Cardiology History and Physical - Cardiology       Date of Admission:  6/22/2022      Assessment & Plan: SL    Jimmy Isaac is a 61 year old male admitted on 6/22/2022. He has a pmhx significant for sarcoidosis, pulmonary hypertension, CAD, CKD, HTN, chronic hypoxic respiratory failure on chronic home oxygen (baseline 5lpm at rest and increases to 10l/min upon exertion) and herpes zoster admitted for few weeks of worsening SOB, swelling and weight gain (dry weight 152) concerning for volume overload. S/p bumex, diuril. Off bumex given muscle cramps. S/p lasix gtt with improved diuresis, weight loss and kidney function.    Today   - Continued lasix 20mg/hr    > Check PM labs to assess electrolytes for evidence of euvolemia   - Disposition plan: Home once euvolemic. Will discuss discharge plan and home meds along with transition to torsemide vs bumex.    # Acute on chronic right heart failure 2/2 pulmonary hypertension in the setting of pulmonary sarcoidosis  # Complete AV block s/p pacemaker insertion on 08/02/2021    Follows with Dr. Lindsey. Admitted for SOB, weight gain 14lbs and LLEE edema. Patient is chronically dependent on home O2 since 2012. Prior RHC (77/20/38) in November 2021.. He is on triple combination therapy with ambrisentan, tadalafil and selexipag. Recent RHC in July 2021 showed moderate PAH with normal cardiac output. Hemodynamically stable. His labs are notable for Na 135, K 3.1, BUN 89.9, Cr 2.88 (GFR 24), Ca 9.4, Mg 2.3, P 4.4, Alb 3.9, normal LFTs, BNP 6893, CBC Hb 12.2, normal WBC and platelets, INR 1. Had RHC this PM: RA 16, /45 (65), PCWP 16, CI 1.78, CO 3.64, MAP 91, Hb 12.2, SVR 1648, PVR = 13.46, [place]= 3.56, CVP/PCWP = 1 concerning for severe pulmonary hypertension (combined pre- and post-capillary HTN). CXR on 06/22 with patchy perihiliar airspace opacities. EKG without ST/T ischemic changes.    - Fluid status:     > Hypervolemia. JVP ~9. Dry weight 152. Wt on admission 166  Wt on 06/25 155 lbs   >Continue furosemide 20mg. Patient output is adequate without need for additional 1g diuril at the moment.    >CMP adjusted to q12. Will use PM labs to assess for euvolemia in addition to improving measurement of JVP.   > Potassium chloride 40meq BID + K replacement protocol   > K and Mg check adjusted to q12  - RV failure and PH meds   > Continue with PTA ambrisentan 10mg qday   > Continue with PTA tadalafil 40mg qday   > Continue with PTA selexipag 1600 mcg q12h    # Hyponatremia, improving  # Moderate hypokalemia, Resolved  # CKD stage IV, GFR 15-29 ml/min  # Non-oliguric   # BL nephrolithiasis w/o hydronephrosis  # Hx of nephrolithiasis s/p multiple urologic procedure  # Chronic normocytic anemia  Cr 1.4 in 2018 and reaching a new baseline ~3 in July 2020. Had a hx of SISI 2/2 nephrolithiasis with rise of Cr into the 4s. Haf left PCNL on 09/20/21 but has residual.   - BL Nephrolithiasis w/o hydronephrosis noted on ultrasound 6/24   >Will continue diuresing. Patient asx.  - BP at goal <130/80  - CMP q12  - Mg and K high replacement protocol    # Pulmonary sarcoidosis on chronic steroids  Patient has no response to prednisone therapy in 2017. Currently off all other systemic anti-inflammatory medications. Patient is not interested in pursuing lung transplantation.  - Continue with pta prednisone 10mg qday  - Continue with proair inhaler 2 puffs q6hr prn  - Continuous with O2 to maintain SatO2>95%   > Patient managing well on 6L 02 and working with PT/OT.     # Hx of herpes zoster  # Moderate allergy to Valcyte  Stable     Diet: 2g sodium diet  DVT Prophylaxis: Ambulate every shift  Chong Catheter: Not present  Code Status:  Full Code  Fluids: Fluid restriction 2L  Lines: PIV    Disposition Plan   Expected discharge: 2 - 3 days, recommended to prior living arrangement once fluid volume status optimized on oral medication.     The  patient's care was discussed with the Attending Physician, Dr. Knutson.    Wilner Frey MD on 6/25/2022 at 5:42 PM    [facility]    ______________________________________________________________________    Interval Progress  Nursing notes reviewed. Patient states he is improving and has better time breathing. States fluid distributed from dorsal area of feet to lower legs BL once wraps were removed. Denies any chest pain, abdominal pain, and problems with urination.    Review of Systems    The 10 point Review of Systems is negative other than noted in the HPI or here.     Cardiac History    Echocardiogram 07/27/2021 07:57 AM  BSA: 2.0 m2  Height: 73 in  Weight: 165 lb  BP: 123/72 mmHg  ______________________________________________________________________________  Procedure  Echocardiogram with two-dimensional, color and spectral Doppler performed.  Adequate quality two-dimensional was performed and interpreted. The patient's  rhythm is sinus bradycardia.  ______________________________________________________________________________  Interpretation Summary  Global and regional left ventricular function is normal with an EF of 55-60%.  Flattened septum is consistent with right ventricular pressure and volume  overload.  Global right ventricular function is mildly to moderately reduced. The  longitudinal excursion is preserved (TAPSE 2.1 cm and S 11.0 cm/s) but the  free wall contraction is reduced. The RV FAC was 25% but the basal segment of  the RV was not clearly visualized.  No significant valvular abnormality.  Pulmonary artery systolic pressure cannot be assessed due to inadequate TR  jet.  IVC diameter >2.1 cm collapsing <50% with sniff suggests a high RA pressure  estimated at 15 mmHg or greater.  No pericardial effusion is present.  This study was compared with the study from 10/29/2018. The RV function  appears to have declined based on direct visual and  quantitative comparison.  This is primarily due to reduced RV free wall contraction.  ______________________________________________________________________________  Left Ventricle  Global and regional left ventricular function is normal with an EF of 55-60%.  Relative wall thickness is increased consistent with concentric remodeling.  Left ventricular diastolic function is normal. Flattened septum is consistent  with right ventricular pressure and volume overload.     Right Ventricle  Moderate right ventricular dilation is present. Global right ventricular  function is mildly to moderately reduced. The longitudinal excursion is  preserved (TAPSE 2.1 cm and S 11.0 cm/s) but the free wall contraction is  reduced. The RV FAC was 25% but the basal segment of the RV was not clearly  visualized.     Atria  Both atria appear normal. The atrial septum is intact as assessed by color  Doppler .     Mitral Valve  Mild mitral annular calcification is present. Trace mitral insufficiency is  present.     Aortic Valve  Aortic valve is normal in structure and function. The aortic valve is  tricuspid. Trace aortic insufficiency is present.     Tricuspid Valve  The tricuspid valve cannot be assessed. Trace tricuspid insufficiency is  present. The right ventricular systolic pressure is approximated at 26.4 mmHg  plus the right atrial pressure.     Pulmonic Valve  The pulmonic valve cannot be assessed. Mild pulmonic insufficiency is present.  The PA acceleration time could not be obtained.     Vessels  Sinuses of Valsalva 3.5 cm. Ascending aorta 3.3 cm. IVC diameter >2.1 cm  collapsing <50% with sniff suggests a high RA pressure estimated at 15 mmHg or  greater.     Pericardium  No pericardial effusion is present.     Compared to Previous Study  This study was compared with the study from 10/29/2018 . The RV function  appears to have declined based on direct visual and quantitative comparison.  This is primarily due to reduced RV free  wall contraction.    Allergies   Allergies   Allergen Reactions    Valacyclovir Rash     Diffuse body rash       Physical Exam   JVP ~9    Constitutional: awake   Eyes: vision intact  ENT: atramatic  Hematologic / Lymphatic: no cervical lymphadenopathy and no supraclavicular lymphadenopathy  Respiratory: Clear to auscultation BL  Cardiovascular: Normal S1 with more audible S2.  GI: Non-distended, normal bowel sounds, non-tender  Skin: no bruising or bleeding and normal skin color, texture, turgor  Musculoskeletal: 2-3+ Peripheral edema present BL in LE   Neurologic: Awake, alert, oriented to name, place and time.       Data   Data reviewed today: I reviewed all medications, new labs and imaging results over the last 24 hours. I personally reviewed.    Recent Results (from the past 48 hour(s))   Comprehensive metabolic panel    Collection Time: 06/23/22  8:39 PM   Result Value Ref Range    Sodium 135 (L) 136 - 145 mmol/L    Potassium 3.6 3.4 - 5.3 mmol/L    Creatinine 2.90 (H) 0.67 - 1.17 mg/dL    Urea Nitrogen 96.7 (H) 8.0 - 23.0 mg/dL    Chloride 85 (L) 98 - 107 mmol/L    Carbon Dioxide (CO2) 34 (H) 22 - 29 mmol/L    Anion Gap 16 (H) 7 - 15 mmol/L    Glucose 124 (H) 70 - 99 mg/dL    Calcium 9.7 8.8 - 10.2 mg/dL    Protein Total 6.8 6.4 - 8.3 g/dL    Albumin 4.2 3.5 - 5.2 g/dL    Bilirubin Total 0.5 <=1.2 mg/dL    Alkaline Phosphatase 83 40 - 129 U/L    AST 21 10 - 50 U/L    ALT 17 10 - 50 U/L    GFR Estimate 24 (L) >60 mL/min/1.73m2   Magnesium    Collection Time: 06/23/22  8:39 PM   Result Value Ref Range    Magnesium 2.6 (H) 1.7 - 2.3 mg/dL   Comprehensive metabolic panel    Collection Time: 06/24/22  6:09 AM   Result Value Ref Range    Sodium 136 136 - 145 mmol/L    Potassium 3.1 (L) 3.4 - 5.3 mmol/L    Creatinine 3.06 (H) 0.67 - 1.17 mg/dL    Urea Nitrogen 98.3 (H) 8.0 - 23.0 mg/dL    Chloride 85 (L) 98 - 107 mmol/L    Carbon Dioxide (CO2) 37 (H) 22 - 29 mmol/L    Anion Gap 14 7 - 15 mmol/L    Glucose 101 (H)  70 - 99 mg/dL    Calcium 9.8 8.8 - 10.2 mg/dL    Protein Total 6.3 (L) 6.4 - 8.3 g/dL    Albumin 3.8 3.5 - 5.2 g/dL    Bilirubin Total 0.7 <=1.2 mg/dL    Alkaline Phosphatase 61 40 - 129 U/L    AST 21 10 - 50 U/L    ALT 15 10 - 50 U/L    GFR Estimate 22 (L) >60 mL/min/1.73m2   Magnesium    Collection Time: 06/24/22  6:09 AM   Result Value Ref Range    Magnesium 3.0 (H) 1.7 - 2.3 mg/dL   Extra Purple Top Tube    Collection Time: 06/24/22  6:09 AM   Result Value Ref Range    Hold Specimen [facility]    Comprehensive metabolic panel    Collection Time: 06/24/22 12:58 PM   Result Value Ref Range    Sodium 137 136 - 145 mmol/L    Potassium 4.3 3.4 - 5.3 mmol/L    Creatinine 3.05 (H) 0.67 - 1.17 mg/dL    Urea Nitrogen 98.7 (H) 8.0 - 23.0 mg/dL    Chloride 83 (L) 98 - 107 mmol/L    Carbon Dioxide (CO2) 40 (H) 22 - 29 mmol/L    Anion Gap 14 7 - 15 mmol/L    Glucose 123 (H) 70 - 99 mg/dL    Calcium 9.8 8.8 - 10.2 mg/dL    Protein Total 6.7 6.4 - 8.3 g/dL    Albumin 4.2 3.5 - 5.2 g/dL    Bilirubin Total 0.8 <=1.2 mg/dL    Alkaline Phosphatase 80 40 - 129 U/L    AST 21 10 - 50 U/L    ALT 16 10 - 50 U/L    GFR Estimate 22 (L) >60 mL/min/1.73m2   Magnesium    Collection Time: 06/24/22 12:58 PM   Result Value Ref Range    Magnesium 2.9 (H) 1.7 - 2.3 mg/dL   Potassium    Collection Time: 06/24/22 12:58 PM   Result Value Ref Range    Potassium 4.2 3.4 - 5.3 mmol/L   Magnesium    Collection Time: 06/24/22  8:48 PM   Result Value Ref Range    Magnesium 2.5 (H) 1.7 - 2.3 mg/dL   Potassium    Collection Time: 06/24/22  8:48 PM   Result Value Ref Range    Potassium 4.2 3.4 - 5.3 mmol/L   Magnesium    Collection Time: 06/25/22  6:49 AM   Result Value Ref Range    Magnesium 2.2 1.7 - 2.3 mg/dL   Potassium    Collection Time: 06/25/22  6:49 AM   Result Value Ref Range    Potassium 3.7 3.4 - 5.3 mmol/L   Comprehensive metabolic panel    Collection Time: 06/25/22  6:49 AM   Result Value Ref Range    Sodium 135 (L) 136 - 145 mmol/L    Potassium 3.7  3.4 - 5.3 mmol/L    Creatinine 3.02 (H) 0.67 - 1.17 mg/dL    Urea Nitrogen 102.0 (H) 8.0 - 23.0 mg/dL    Chloride 83 (L) 98 - 107 mmol/L    Carbon Dioxide (CO2) 35 (H) 22 - 29 mmol/L    Anion Gap 17 (H) 7 - 15 mmol/L    Glucose 103 (H) 70 - 99 mg/dL    Calcium 10.1 8.8 - 10.2 mg/dL    Protein Total 6.4 6.4 - 8.3 g/dL    Albumin 4.0 3.5 - 5.2 g/dL    Bilirubin Total 0.7 <=1.2 mg/dL    Alkaline Phosphatase 66 40 - 129 U/L    AST 18 10 - 50 U/L    ALT 15 10 - 50 U/L    GFR Estimate 23 (L) >60 mL/min/1.73m2   Magnesium    Collection Time: 06/25/22  1:48 PM   Result Value Ref Range    Magnesium 2.4 (H) 1.7 - 2.3 mg/dL   Potassium    Collection Time: 06/25/22  1:48 PM   Result Value Ref Range    Potassium 3.8 3.4 - 5.3 mmol/L

## 2022-06-26 NOTE — PLAN OF CARE
Pt who presents this admission with worsening SOB, edema and weight gain concerning for fluid overload. PMHx of sarcoidosis, pulmonary hypertension, CAD, CKD, HTN, chronic hypoxic respiratory failure on chronic home oxygen (baseline 5LPM at rest and increases to 10LPM upon exertion) and recent herpes zoster infection.   Shift [id number]  Neuro: AOx4, denies headache, lightheadedness and dizziness. No reports of numbness/tingling.  Resp: Sating >92% on 6L O2 overnight. Denies SOB at rest, endorses HOSKINS. Cepacol lozenges given 1x and Albuterol inhaler given 1x  Cardiac: Paced HR in 60s. Denies chest pain/palpitations. Noted hypotension at 2300 vitals (91/46 MAP 58) pt asymptomatic, notified CARDS 2 - no interventions at this time, will continue to monitor and notify of changes.  GI/: 2g Na / 2L FR tolerating well. Denies N/V. Voiding adequately on Lasix gtt, LBM 6/25.  Skin: No overt deficits noted, pt requested lymphedema wraps be removed prior to bed at midnight due to itching and discomfort. Lotion applied where wraps were.  Pain: Denies  Activity: SBA / independent to bathroom.  LDAs: L PIV infusing Lasix gtt at 2mL/hr.    Will continue to monitor and report changes to team.

## 2022-06-26 NOTE — PLAN OF CARE
"VSs, afebrile. Patient exhausted, napping throughout the afternoon. Tylenol for L shoulder pain. Benadryl for residual itching from previous bout of shingles. Lozenge for throat. Heating pad. Continuing with IV diuretic. Walked unit x2, Up SBA in room for cord management.    Neuro: vision corrected    -glasses available and used  Respiratory: HOSKINS and SOB at rest; chronic O2; abdominal muscle use   -Attempt to wean O2   -See MAR for medication administration  CV: Pacemaker (implanted 2021), edema to BL; capillary refill >3 sec in BLE   -Lymph wraps off at HS 6/25 and back on during the day; team is following   -elevated BLE as tolerated  Skin: Reference LDA   -refusal of shower  Musculoskeletal: Generalized weakness r/t HOSKINS      Problem: Plan of Care - These are the overarching goals to be used throughout the patient stay.    Goal: Plan of Care Review/Shift Note  Description: The Plan of Care Review/Shift note should be completed every shift.  The Outcome Evaluation is a brief statement about your assessment that the patient is improving, declining, or no change.  This information will be displayed automatically on your shift note.  Outcome: Ongoing, Progressing  Flowsheets (Taken 6/26/2022 [id number])  Plan of Care Reviewed With: patient  Overall Patient Progress: improving  Goal: Patient-Specific Goal (Individualized)  Description: You can add care plan individualizations to a care plan. Examples of Individualization might be:  \"Parent requests to be called daily at 9am for status\", \"I have a hard time hearing out of my right ear\", or \"Do not touch me to wake me up as it startles me\".  Outcome: Ongoing, Progressing  Goal: Absence of Hospital-Acquired Illness or Injury  Outcome: Ongoing, Progressing  Intervention: Identify and Manage Fall Risk  Recent Flowsheet Documentation  Taken 6/26/2022 9508 by Magnolia Lamar RN  Safety Promotion/Fall Prevention:    lighting adjusted    supervised activity    clutter free " environment maintained    assistive device/personal items within reach  Intervention: Prevent and Manage VTE (Venous Thromboembolism) Risk  Recent Flowsheet Documentation  Taken 6/26/2022 0737 by Magnolia Lamar RN  VTE Prevention/Management: (Lymph wraps to be applied after shower per pt request) patient refused intervention  Activity Management: activity encouraged  Goal: Optimal Comfort and Wellbeing  Outcome: Ongoing, Progressing  Goal: Readiness for Transition of Care  Outcome: Ongoing, Progressing     Problem: Gas Exchange Impaired  Goal: Optimal Gas Exchange  Outcome: Ongoing, Progressing     Problem: Cardiac Output Decreased (Heart Failure)  Goal: Optimal Cardiac Output  Outcome: Ongoing, Progressing     Problem: Fluid Imbalance (Heart Failure)  Goal: Fluid Balance  Outcome: Ongoing, Progressing     Problem: Respiratory Compromise (Heart Failure)  Goal: Effective Oxygenation and Ventilation  Outcome: Ongoing, Progressing  Intervention: Promote Airway Secretion Clearance  Recent Flowsheet Documentation  Taken 6/26/2022 0737 by Magnolia Lamar RN  Cough And Deep Breathing: done with encouragement   Goal Outcome Evaluation:    Plan of Care Reviewed With: patient     Overall Patient Progress: improving

## 2022-06-26 NOTE — PLAN OF CARE
Pt is a 61 year old male admitted on 6/22/2022. He has a pmhx significant for sarcoidosis, pulmonary hypertension, CAD, CKD, HTN, chronic hypoxic respiratory failure on chronic home oxygen (baseline 5lpm at rest and increases to 10l/min upon exertion) and herpes zoster admitted for few weeks of worsening SOB, swelling and weight gain (dry weight 152) concerning for volume overload. S/p bumex, diuril. Off bumex given muscle cramps. S/p lasix gtt with improved diuresis, weight loss and kidney function.  Shift 15:00-23:30  Neuro: A&O x 4. Very pleasant.  Resp: Lungs diminished. Sats high 90s 6lnc at rest and 10 lnc with activity.HOSKINS.  CV: VSS. V-paced/flutter 60s-90s.LE edema mild, lymphedema wraps on bilaterally.   GI: Ate well. Had BM tonight.  : Continues on a lasix drip at 20 mg/hr. FR 2L. Adequate urine output. Last K+4.5. Elevated BUN and Creat.  Mobility: Ambulated in the [place] with RN and O2.  Pain: Pt had shingles in March and still has some pain and itching on his back. Medicated with tylenol and benadryl with relief.  Plan: Continue to diurese, I&O. Assist with cares. Cards 2 with issues.

## 2022-06-26 NOTE — PROGRESS NOTES
[facility]    Cardiology History and Physical - Cardiology       Date of Admission:  6/22/2022      Assessment & Plan: HVSL    Jimmy Isaac is a 61 year old male admitted on 6/22/2022. He has a pmhx significant for sarcoidosis, pulmonary hypertension, CAD, CKD, HTN, chronic hypoxic respiratory failure on chronic home oxygen (baseline 5lpm at rest and increases to 10l/min upon exertion) and herpes zoster admitted for few weeks of worsening SOB, swelling and weight gain (dry weight 152) concerning for volume overload. S/p bumex, diuril. Off bumex given muscle cramps. S/p lasix gtt with improved diuresis, weight loss and kidney function.    Today   - JVP ~9 and close to reported dry weight.   - Medication adjustments   - Will discontinue Lasix gtt this evening and transition to Bumex 5mg BID    > Check labs to assess electrolytes for evidence of euvolemia   > Monitor K+ goal of K>4.  - Possible discharge of Tuesday     > Will adjust home meds in anticipation for discharge  - Disposition plan: Home once euvolemic. Will discuss discharge plan and home meds along with transition to po bumex.    # Acute on chronic right heart failure 2/2 pulmonary hypertension in the setting of pulmonary sarcoidosis  # Complete AV block s/p pacemaker insertion on 08/02/2021    Follows with Dr. Lindsey. Admitted for SOB, weight gain 14lbs and LLEE edema. Patient is chronically dependent on home O2 since 2012. Prior RHC (77/20/38) in November 2021.. He is on triple combination therapy with ambrisentan, tadalafil and selexipag. Recent RHC in July 2021 showed moderate PAH with normal cardiac output. Hemodynamically stable. His labs are notable for Na 135, K 3.1, BUN 89.9, Cr 2.88 (GFR 24), Ca 9.4, Mg 2.3, P 4.4, Alb 3.9, normal LFTs, BNP 6893, CBC Hb 12.2, normal WBC and platelets, INR 1. Had RHC this PM: RA 16, /45 (65), PCWP 16, CI 1.78, CO 3.64, MAP 91, Hb 12.2, SVR 1648, PVR =  13.46, [place]= 3.56, CVP/PCWP = 1 concerning for severe pulmonary hypertension (combined pre- and post-capillary HTN). CXR on 06/22 with patchy perihiliar airspace opacities. EKG without ST/T ischemic changes.    - Fluid status:    > Mildly hypervolemic JVP ~9. Dry weight 152. Wt on admission 166  Wt on 06/26 154 lbs   - Discontinue Lasix gtt this evening and transition to Bumex 5mg BID    > Check labs to assess electrolytes for evidence of euvolemia   > Monitor K+ goal of K>4.    > Will use PM labs to assess for euvolemia in addition to improving measurement of JVP.   > Potassium chloride 40meq BID + K replacement protocol  - RV failure and PH meds   > Continue with PTA ambrisentan 10mg qday   > Continue with PTA tadalafil 40mg qday   > Continue with PTA selexipag 1600 mcg q12h    # Hyponatremia, improving  # Moderate hypokalemia, Resolved  # CKD stage IV, GFR 15-29 ml/min  # Non-oliguric   # BL nephrolithiasis w/o hydronephrosis  # Hx of nephrolithiasis s/p multiple urologic procedure  # Chronic normocytic anemia  Cr 1.4 in 2018 and reaching a new baseline ~3 in July 2020. Had a hx of SISI 2/2 nephrolithiasis with rise of Cr into the 4s. Haf left PCNL on 09/20/21 but has residual.   - BL Nephrolithiasis w/o hydronephrosis noted on ultrasound 6/24   >Will continue diuresing. Patient commented on flank pain and    Discussed recurrent issue which is usually solved via Tylenol at    Home.   - BP at goal <130/80  - CMP q12  - Mg and K high replacement protocol    # Pulmonary sarcoidosis on chronic steroids  Patient has no response to prednisone therapy in 2017. Currently off all other systemic anti-inflammatory medications. Patient is not interested in pursuing lung transplantation.  - Continue with pta prednisone 10mg qday  - Continue with proair inhaler 2 puffs q6hr prn  - Continuous with O2 to maintain SatO2>95%   > Patient managing well on 6L 02 and working with PT/OT.     # Hx of herpes zoster  # Moderate allergy to  Valcyte  Stable     Diet: 2g sodium diet  DVT Prophylaxis: Ambulate every shift  Chong Catheter: Not present  Code Status:  Full Code  Fluids: Fluid restriction 2L  Lines: PIV    Disposition Plan   Expected discharge: 2 - 3 days, recommended to prior living arrangement once fluid volume status optimized on oral medication.     The patient's care was discussed with the Attending Physician, Dr. Knutson.    Wilner Frey MD on 6/26/2022 at 7:42 PM  Internal Medicine PGY1    [facility]    ______________________________________________________________________    Interval Progress  Nursing notes reviewed. Patient states he is remains the same as yesterday. States legs were itched in bandages and removed them momentarily. Commented on flank pain/tenderness. Denies any chest pain, abdominal pain, and problems with urination.    Review of Systems    The 10 point Review of Systems is negative other than noted in the HPI or here.     Cardiac History    Echocardiogram 07/27/2021 07:57 AM  BSA: 2.0 m2  Height: 73 in  Weight: 165 lb  BP: 123/72 mmHg  ______________________________________________________________________________  Procedure  Echocardiogram with two-dimensional, color and spectral Doppler performed.  Adequate quality two-dimensional was performed and interpreted. The patient's  rhythm is sinus bradycardia.  ______________________________________________________________________________  Interpretation Summary  Global and regional left ventricular function is normal with an EF of 55-60%.  Flattened septum is consistent with right ventricular pressure and volume  overload.  Global right ventricular function is mildly to moderately reduced. The  longitudinal excursion is preserved (TAPSE 2.1 cm and S 11.0 cm/s) but the  free wall contraction is reduced. The RV FAC was 25% but the basal segment of  the RV was not clearly visualized.  No significant valvular  abnormality.  Pulmonary artery systolic pressure cannot be assessed due to inadequate TR  jet.  IVC diameter >2.1 cm collapsing <50% with sniff suggests a high RA pressure  estimated at 15 mmHg or greater.  No pericardial effusion is present.  This study was compared with the study from 10/29/2018. The RV function  appears to have declined based on direct visual and quantitative comparison.  This is primarily due to reduced RV free wall contraction.  ______________________________________________________________________________  Left Ventricle  Global and regional left ventricular function is normal with an EF of 55-60%.  Relative wall thickness is increased consistent with concentric remodeling.  Left ventricular diastolic function is normal. Flattened septum is consistent  with right ventricular pressure and volume overload.     Right Ventricle  Moderate right ventricular dilation is present. Global right ventricular  function is mildly to moderately reduced. The longitudinal excursion is  preserved (TAPSE 2.1 cm and S 11.0 cm/s) but the free wall contraction is  reduced. The RV FAC was 25% but the basal segment of the RV was not clearly  visualized.     Atria  Both atria appear normal. The atrial septum is intact as assessed by color  Doppler .     Mitral Valve  Mild mitral annular calcification is present. Trace mitral insufficiency is  present.     Aortic Valve  Aortic valve is normal in structure and function. The aortic valve is  tricuspid. Trace aortic insufficiency is present.     Tricuspid Valve  The tricuspid valve cannot be assessed. Trace tricuspid insufficiency is  present. The right ventricular systolic pressure is approximated at 26.4 mmHg  plus the right atrial pressure.     Pulmonic Valve  The pulmonic valve cannot be assessed. Mild pulmonic insufficiency is present.  The PA acceleration time could not be obtained.     Vessels  Sinuses of Valsalva 3.5 cm. Ascending aorta 3.3 cm. IVC diameter >2.1  cm  collapsing <50% with sniff suggests a high RA pressure estimated at 15 mmHg or  greater.     Pericardium  No pericardial effusion is present.     Compared to Previous Study  This study was compared with the study from 10/29/2018 . The RV function  appears to have declined based on direct visual and quantitative comparison.  This is primarily due to reduced RV free wall contraction.    Allergies   Allergies   Allergen Reactions    Valacyclovir Rash     Diffuse body rash       Physical Exam   JVP ~9    Constitutional: awake   Eyes: vision intact  ENT: atramatic  Hematologic / Lymphatic: no cervical lymphadenopathy and no supraclavicular lymphadenopathy  Respiratory: Clear to auscultation BL  Cardiovascular: Normal S1/S2. JVP: ~9  GI: Non-distended, normal bowel sounds, non-tender  Skin: no bruising or bleeding and normal skin color, texture, turgor  Musculoskeletal: 1+ Peripheral edema present BL in LE   Neurologic: Awake, alert, oriented to name, place and time.       Data   Data reviewed today: I reviewed all medications, new labs and imaging results over the last 24 hours. I personally reviewed.    Recent Results (from the past 48 hour(s))   Magnesium    Collection Time: 06/24/22  8:48 PM   Result Value Ref Range    Magnesium 2.5 (H) 1.7 - 2.3 mg/dL   Potassium    Collection Time: 06/24/22  8:48 PM   Result Value Ref Range    Potassium 4.2 3.4 - 5.3 mmol/L   Magnesium    Collection Time: 06/25/22  6:49 AM   Result Value Ref Range    Magnesium 2.2 1.7 - 2.3 mg/dL   Potassium    Collection Time: 06/25/22  6:49 AM   Result Value Ref Range    Potassium 3.7 3.4 - 5.3 mmol/L   Comprehensive metabolic panel    Collection Time: 06/25/22  6:49 AM   Result Value Ref Range    Sodium 135 (L) 136 - 145 mmol/L    Potassium 3.7 3.4 - 5.3 mmol/L    Creatinine 3.02 (H) 0.67 - 1.17 mg/dL    Urea Nitrogen 102.0 (H) 8.0 - 23.0 mg/dL    Chloride 83 (L) 98 - 107 mmol/L    Carbon Dioxide (CO2) 35 (H) 22 - 29 mmol/L    Anion Gap 17  (H) 7 - 15 mmol/L    Glucose 103 (H) 70 - 99 mg/dL    Calcium 10.1 8.8 - 10.2 mg/dL    Protein Total 6.4 6.4 - 8.3 g/dL    Albumin 4.0 3.5 - 5.2 g/dL    Bilirubin Total 0.7 <=1.2 mg/dL    Alkaline Phosphatase 66 40 - 129 U/L    AST 18 10 - 50 U/L    ALT 15 10 - 50 U/L    GFR Estimate 23 (L) >60 mL/min/1.73m2   Magnesium    Collection Time: 06/25/22  1:48 PM   Result Value Ref Range    Magnesium 2.4 (H) 1.7 - 2.3 mg/dL   Potassium    Collection Time: 06/25/22  1:48 PM   Result Value Ref Range    Potassium 3.8 3.4 - 5.3 mmol/L   Basic metabolic panel    Collection Time: 06/25/22  4:48 PM   Result Value Ref Range    Creatinine 3.06 (H) 0.67 - 1.17 mg/dL    Sodium 136 136 - 145 mmol/L    Potassium 4.5 3.4 - 5.3 mmol/L    Urea Nitrogen 105.0 (H) 8.0 - 23.0 mg/dL    Chloride 81 (L) 98 - 107 mmol/L    Carbon Dioxide (CO2) 38 (H) 22 - 29 mmol/L    Anion Gap 17 (H) 7 - 15 mmol/L    Glucose 169 (H) 70 - 99 mg/dL    GFR Estimate 22 (L) >60 mL/min/1.73m2    Calcium 10.2 8.8 - 10.2 mg/dL   Magnesium    Collection Time: 06/25/22  4:48 PM   Result Value Ref Range    Magnesium 2.6 (H) 1.7 - 2.3 mg/dL   Basic metabolic panel    Collection Time: 06/26/22  6:05 AM   Result Value Ref Range    Creatinine 2.98 (H) 0.67 - 1.17 mg/dL    Sodium 135 (L) 136 - 145 mmol/L    Potassium 4.0 3.4 - 5.3 mmol/L    Urea Nitrogen 107.0 (H) 8.0 - 23.0 mg/dL    Chloride 85 (L) 98 - 107 mmol/L    Carbon Dioxide (CO2) 35 (H) 22 - 29 mmol/L    Anion Gap 15 7 - 15 mmol/L    Glucose 106 (H) 70 - 99 mg/dL    GFR Estimate 23 (L) >60 mL/min/1.73m2    Calcium 9.9 8.8 - 10.2 mg/dL   Magnesium    Collection Time: 06/26/22  6:05 AM   Result Value Ref Range    Magnesium 2.3 1.7 - 2.3 mg/dL   Basic metabolic panel    Collection Time: 06/26/22  5:01 PM   Result Value Ref Range    Creatinine 3.05 (H) 0.67 - 1.17 mg/dL    Sodium 135 (L) 136 - 145 mmol/L    Potassium 4.7 3.4 - 5.3 mmol/L    Urea Nitrogen 109.0 (H) 8.0 - 23.0 mg/dL    Chloride 83 (L) 98 - 107 mmol/L     Carbon Dioxide (CO2) 40 (H) 22 - 29 mmol/L    Anion Gap 12 7 - 15 mmol/L    Glucose 157 (H) 70 - 99 mg/dL    GFR Estimate 22 (L) >60 mL/min/1.73m2    Calcium 9.5 8.8 - 10.2 mg/dL   Magnesium    Collection Time: 06/26/22  5:01 PM   Result Value Ref Range    Magnesium 2.2 1.7 - 2.3 mg/dL

## 2022-06-27 NOTE — PLAN OF CARE
"D: Admitted 6/22 for hypokalemia.     I: Monitored vitals and assessed pt status.   Changed: Started on PO Bumex   PRN: Tylenol, throat lozenge  Tele: Paced, HR 60's  O2: 6L NC (increased needs up to 10L with activity), on 5L chronic O2 at home   Mobility: Up ind in room, SBA in ovalle     A: AOx4. VSS. Afebrile. Urinating adequately. LBM today. Pt reported having \"side effects\" from the oral bumex today following his second dose including: sore throat, head and testical tenderness, increased SOB. Provider aware and stated they would come by to see the pt. Cardiac device check completed today.     P: Continue to monitor Pt status and report changes to Cards 2.           "

## 2022-06-27 NOTE — PROGRESS NOTES
[facility]    Cardiology History and Physical - Cardiology       Date of Admission:  6/22/2022      Assessment & Plan: SL    Jimmy Isaac is a 61 year old male admitted on 6/22/2022. He has a pmhx significant for sarcoidosis, pulmonary hypertension, CAD, CKD, HTN, chronic hypoxic respiratory failure on chronic home oxygen (baseline 5lpm at rest and increases to 10l/min upon exertion) and herpes zoster admitted for few weeks of worsening SOB, swelling and weight gain (dry weight 152) concerning for volume overload. S/p bumex, diuril. Off bumex given muscle cramps. S/p lasix gtt with improved diuresis, weight loss and kidney function.    Today   - JVP ~9 and close to reported dry weight.   - Medication adjustments change to Bumex 5mg po BID    > Check labs to assess electrolytes for evidence of euvolemia   > Monitor K+ goal of K>4.  - Discharge tomorrow  - Kept renal appointment Wednesday 6/29/22  - BMP, Mg ordered in a week  - Follow up with pulmonary hypertension clinic in 2-3 weeks. Informed Bernarda Wilson via epic message.   - Completed med rec in anticipation for discharge tomorrow.     # Acute on chronic right heart failure 2/2 pulmonary hypertension in the setting of pulmonary sarcoidosis  # Complete AV block s/p pacemaker insertion on 08/02/2021    Follows with Dr. Lindsey. Admitted for SOB, weight gain 14lbs and LLEE edema. Patient is chronically dependent on home O2 since 2012. Prior RHC (77/20/38) in November 2021.. He is on triple combination therapy with ambrisentan, tadalafil and selexipag. Recent RHC in July 2021 showed moderate PAH with normal cardiac output. Hemodynamically stable. His labs are notable for Na 135, K 3.1, BUN 89.9, Cr 2.88 (GFR 24), Ca 9.4, Mg 2.3, P 4.4, Alb 3.9, normal LFTs, BNP 6893, CBC Hb 12.2, normal WBC and platelets, INR 1. Had RHC this PM: RA 16, /45 (65), PCWP 16, CI 1.78, CO 3.64, MAP 91, Hb 12.2, SVR 1648, PVR =  13.46, [place]= 3.56, CVP/PCWP = 1 concerning for severe pulmonary hypertension (combined pre- and post-capillary HTN). CXR on 06/22 with patchy perihiliar airspace opacities. EKG without ST/T ischemic changes.    - Fluid status:    > Mildly hypervolemic JVP ~9. Dry weight 152. Wt on admission 166  Wt on 06/27 152 lbs   -  Bumex 5mg BID    > Monitor K+ goal of K>4.    > Potassium chloride 40meq BID + K replacement protocol  - RV failure and PH meds   > Continue with PTA ambrisentan 10mg qday   > Continue with PTA tadalafil 40mg qday   > Continue with PTA selexipag 1600 mcg q12h    # Hyponatremia, improving  # Moderate hypokalemia, Resolved  # CKD stage IV, GFR 15-29 ml/min  # Non-oliguric   # BL nephrolithiasis w/o hydronephrosis  # Hx of nephrolithiasis s/p multiple urologic procedure  # Chronic normocytic anemia  Cr 1.4 in 2018 and reaching a new baseline ~3 in July 2020. Had a hx of SISI 2/2 nephrolithiasis with rise of Cr into the 4s. Haf left PCNL on 09/20/21 but has residual.   - BL Nephrolithiasis w/o hydronephrosis noted on ultrasound 6/24   >Will continue diuresing. Patient commented on flank pain and    Discussed recurrent issue which is usually solved via Tylenol at    Home.   - BP at goal <130/80  - CMP q12  - Mg and K high replacement protocol    # Pulmonary sarcoidosis on chronic steroids  Patient has no response to prednisone therapy in 2017. Currently off all other systemic anti-inflammatory medications. Patient is not interested in pursuing lung transplantation.  - Continue with pta prednisone 10mg qday  - Continue with proair inhaler 2 puffs q6hr prn  - Continuous with O2 to maintain SatO2>95%   > Patient managing well on 6L 02 and working with PT/OT.     # Hx of herpes zoster  # Moderate allergy to Valcyte  Stable     Diet: 2g sodium diet  DVT Prophylaxis: Ambulate every shift  Chong Catheter: Not present  Code Status:  Full Code  Fluids: Fluid restriction 2L  Lines: PIV    Disposition Plan   Expected  discharge: Tomorrow, recommended to prior living arrangement once fluid volume status optimized on oral medication.     The patient's care was discussed with the Attending Physician, Dr. Knutson.    Wilner Frey MD on 6/27/2022 at 7:38 PM  Internal Medicine Y1    [facility]    ______________________________________________________________________    Interval Progress  Nursing notes reviewed. Patient stated hesitancy with Bumex. Will assess symptoms in the morning and consider discharge on 100mg Torsemide if ongoing concerns for side-effects with bumex.    Review of Systems    The 10 point Review of Systems is negative other than noted in the HPI or here.     Cardiac History    Echocardiogram 07/27/2021 07:57 AM  BSA: 2.0 m2  Height: 73 in  Weight: 165 lb  BP: 123/72 mmHg  ______________________________________________________________________________  Procedure  Echocardiogram with two-dimensional, color and spectral Doppler performed.  Adequate quality two-dimensional was performed and interpreted. The patient's  rhythm is sinus bradycardia.  ______________________________________________________________________________  Interpretation Summary  Global and regional left ventricular function is normal with an EF of 55-60%.  Flattened septum is consistent with right ventricular pressure and volume  overload.  Global right ventricular function is mildly to moderately reduced. The  longitudinal excursion is preserved (TAPSE 2.1 cm and S 11.0 cm/s) but the  free wall contraction is reduced. The RV FAC was 25% but the basal segment of  the RV was not clearly visualized.  No significant valvular abnormality.  Pulmonary artery systolic pressure cannot be assessed due to inadequate TR  jet.  IVC diameter >2.1 cm collapsing <50% with sniff suggests a high RA pressure  estimated at 15 mmHg or greater.  No pericardial effusion is present.  This study was compared with the study  from 10/29/2018. The RV function  appears to have declined based on direct visual and quantitative comparison.  This is primarily due to reduced RV free wall contraction.  ______________________________________________________________________________  Left Ventricle  Global and regional left ventricular function is normal with an EF of 55-60%.  Relative wall thickness is increased consistent with concentric remodeling.  Left ventricular diastolic function is normal. Flattened septum is consistent  with right ventricular pressure and volume overload.     Right Ventricle  Moderate right ventricular dilation is present. Global right ventricular  function is mildly to moderately reduced. The longitudinal excursion is  preserved (TAPSE 2.1 cm and S 11.0 cm/s) but the free wall contraction is  reduced. The RV FAC was 25% but the basal segment of the RV was not clearly  visualized.     Atria  Both atria appear normal. The atrial septum is intact as assessed by color  Doppler .     Mitral Valve  Mild mitral annular calcification is present. Trace mitral insufficiency is  present.     Aortic Valve  Aortic valve is normal in structure and function. The aortic valve is  tricuspid. Trace aortic insufficiency is present.     Tricuspid Valve  The tricuspid valve cannot be assessed. Trace tricuspid insufficiency is  present. The right ventricular systolic pressure is approximated at 26.4 mmHg  plus the right atrial pressure.     Pulmonic Valve  The pulmonic valve cannot be assessed. Mild pulmonic insufficiency is present.  The PA acceleration time could not be obtained.     Vessels  Sinuses of Valsalva 3.5 cm. Ascending aorta 3.3 cm. IVC diameter >2.1 cm  collapsing <50% with sniff suggests a high RA pressure estimated at 15 mmHg or  greater.     Pericardium  No pericardial effusion is present.     Compared to Previous Study  This study was compared with the study from 10/29/2018 . The RV function  appears to have declined based  on direct visual and quantitative comparison.  This is primarily due to reduced RV free wall contraction.    Allergies   Allergies   Allergen Reactions    Valacyclovir Rash     Diffuse body rash       Physical Exam   JVP ~9    Constitutional: awake   Eyes: vision intact  ENT: atramatic  Hematologic / Lymphatic: no cervical lymphadenopathy and no supraclavicular lymphadenopathy  Respiratory: Clear to auscultation BL  Cardiovascular: Normal S1/S2. JVP: ~9  GI: Non-distended, normal bowel sounds, non-tender  Skin: no bruising or bleeding and normal skin color, texture, turgor  Musculoskeletal: 1+ Peripheral edema present BL in LE   Neurologic: Awake, alert, oriented to name, place and time.       Data   Data reviewed today: I reviewed all medications, new labs and imaging results over the last 24 hours. I personally reviewed.    Recent Results (from the past 48 hour(s))   Basic metabolic panel    Collection Time: 06/26/22  6:05 AM   Result Value Ref Range    Creatinine 2.98 (H) 0.67 - 1.17 mg/dL    Sodium 135 (L) 136 - 145 mmol/L    Potassium 4.0 3.4 - 5.3 mmol/L    Urea Nitrogen 107.0 (H) 8.0 - 23.0 mg/dL    Chloride 85 (L) 98 - 107 mmol/L    Carbon Dioxide (CO2) 35 (H) 22 - 29 mmol/L    Anion Gap 15 7 - 15 mmol/L    Glucose 106 (H) 70 - 99 mg/dL    GFR Estimate 23 (L) >60 mL/min/1.73m2    Calcium 9.9 8.8 - 10.2 mg/dL   Magnesium    Collection Time: 06/26/22  6:05 AM   Result Value Ref Range    Magnesium 2.3 1.7 - 2.3 mg/dL   Basic metabolic panel    Collection Time: 06/26/22  5:01 PM   Result Value Ref Range    Creatinine 3.05 (H) 0.67 - 1.17 mg/dL    Sodium 135 (L) 136 - 145 mmol/L    Potassium 4.7 3.4 - 5.3 mmol/L    Urea Nitrogen 109.0 (H) 8.0 - 23.0 mg/dL    Chloride 83 (L) 98 - 107 mmol/L    Carbon Dioxide (CO2) 40 (H) 22 - 29 mmol/L    Anion Gap 12 7 - 15 mmol/L    Glucose 157 (H) 70 - 99 mg/dL    GFR Estimate 22 (L) >60 mL/min/1.73m2    Calcium 9.5 8.8 - 10.2 mg/dL   Magnesium    Collection Time: 06/26/22   5:01 PM   Result Value Ref Range    Magnesium 2.2 1.7 - 2.3 mg/dL   Basic metabolic panel    Collection Time: 06/27/22  4:53 AM   Result Value Ref Range    Creatinine 2.81 (H) 0.67 - 1.17 mg/dL    Sodium 133 (L) 136 - 145 mmol/L    Potassium 3.6 3.4 - 5.3 mmol/L    Urea Nitrogen 110.0 (H) 8.0 - 23.0 mg/dL    Chloride 87 (L) 98 - 107 mmol/L    Carbon Dioxide (CO2) 32 (H) 22 - 29 mmol/L    Anion Gap 14 7 - 15 mmol/L    Glucose 105 (H) 70 - 99 mg/dL    GFR Estimate 25 (L) >60 mL/min/1.73m2    Calcium 9.4 8.8 - 10.2 mg/dL   Magnesium    Collection Time: 06/27/22  4:53 AM   Result Value Ref Range    Magnesium 2.2 1.7 - 2.3 mg/dL   Extra Purple Top Tube    Collection Time: 06/27/22  4:53 AM   Result Value Ref Range    Hold Specimen [facility]    Cardiac Device Check - Inpatient    Collection Time: 06/27/22 12:11 PM   Result Value Ref Range    Date Time Interrogation Session [id number]     Implantable Pulse Generator  Medtronic     Implantable Pulse Generator Model [id number] [place] XT  MRI     Implantable Pulse Generator Serial Number [id number]     Type Interrogation Session In Clinic     Clinic Name [facility]     Implantable Pulse Generator Type Pacemaker     Implantable Pulse Generator Implant Date 20210802     Implantable Lead  Medtronic     Implantable Lead Model 5076 CapSureFix Novus MRI SureScan     Implantable Lead Serial Number [id number]     Implantable Lead Implant Date 20210802     Implantable Lead Polarity Type Bipolar Lead     Implantable Lead Location Detail 1 UNKNOWN     Implantable Lead Special Function 52 cm     Implantable Lead Location Right Atrium     Implantable Lead  Medtronic     Implantable Lead Model 5076 CapSureFix Novus MRI SureScan     Implantable Lead Serial Number [id number]     Implantable Lead Implant Date 20210802     Implantable Lead Polarity Type Bipolar Lead     Implantable Lead Location Detail 1 UNKNOWN     Implantable Lead  Special Function 58 cm     Implantable Lead Location Right Ventricle     Jose Cruz Setting Mode (NBG Code) DDDR     Jose Cruz Setting Lower Rate Limit 60 [beats]/min    Jose Cruz Setting Maximum Tracking Rate 130 [beats]/min    Jose Cruz Setting Maximum Sensor Rate 130 [beats]/min    Jose Cruz Setting Hysterisis Rate DISABLED     Jose Cruz Setting ELIAS Delay Low 150 ms    Jose Cruz Setting PAV Delay Low 180 ms    Jose Cruz Setting AT Mode Switch Rate 171 [beats]/min    Lead Channel Setting Sensing Polarity Bipolar     Lead Channel Setting Sensing Anode Location Right Atrium     Lead Channel Setting Sensing Anode Terminal Ring     Lead Channel Setting Sensing Cathode Location Right Atrium     Lead Channel Setting Sensing Cathode Terminal Tip     Lead Channel Setting Sensing Sensitivity 0.3 mV    Lead Channel Setting Sensing Polarity Bipolar     Lead Channel Setting Sensing Anode Location Right Ventricle     Lead Channel Setting Sensing Anode Terminal Ring     Lead Channel Setting Sensing Cathode Location Right Ventricle     Lead Channel Setting Sensing Cathode Terminal Tip     Lead Channel Setting Sensing Sensitivity 0.9 mV    Lead Channel Setting Pacing Polarity Bipolar     Lead Channel Setting Pacing Anode Location Right Atrium     Lead Channel Setting Pacing Anode Terminal Ring     Lead Channel Setting Sensing Cathode Location Right Atrium     Lead Channel Setting Sensing Cathode Terminal Tip     Lead Channel Setting Pacing Pulse Width 0.4 ms    Lead Channel Setting Pacing Amplitude 2 V    Lead Channel Setting Pacing Capture Mode Adaptive     Lead Channel Setting Pacing Polarity Bipolar     Lead Channel Setting Pacing Anode Location Right Ventricle     Lead Channel Setting Pacing Anode Terminal Ring     Lead Channel Setting Sensing Cathode Location Right Ventricle     Lead Channel Setting Sensing Cathode Terminal Tip     Lead Channel Setting Pacing Pulse Width 0.4 ms    Lead Channel Setting Pacing Amplitude 2 V    Lead Channel Setting Pacing  Capture Mode Adaptive     Zone Setting Type Category VF     Zone Setting Type Category VT     Zone Setting Type Category VT     Zone Setting Type Category VT     Zone Setting Detection Interval 400 ms    Zone Setting Type Category ATRIAL_FIBRILLATION     Zone Setting Type Category AT/AF     Zone Setting Detection Interval 350 ms    Lead Channel Impedance Value 608 ohm    Lead Channel Impedance Value 323 ohm    Lead Channel Sensing Intrinsic Amplitude 2.8 mV    Lead Channel Impedance Value 532 ohm    Lead Channel Impedance Value 437 ohm    Lead Channel Sensing Intrinsic Amplitude 7.1 mV    Lead Channel Pacing Threshold Amplitude 0.5 V    Lead Channel Pacing Threshold Pulse Width 0.4 ms    Battery Date Time of Measurements 20853273645151     Battery Status OK     Battery RRT Trigger 2.625     Battery Remaining Longevity 143 mo    Battery Voltage 3.06 V    Jose Cruz Statistic Date Time Start 20220401122037     Jose Cruz Statistic Date Time End 20220627093005     Jose Cruz Statistic RA Percent Paced 1.09 %    Jose Cruz Statistic RV Percent Paced 93.43 %    Jose Cruz Statistic AP  Percent 13.33 %    Jose Cruz Statistic AS  Percent 84.67 %    Jose Cruz Statistic AP VS Percent 0.02 %    Jose Cruz Statistic AS VS Percent 0.94 %    Atrial Tachy Statistic Date Time Start 20220401122037     Atrial Tachy Statistic Date Time End 20220627093005     Atrial Tachy Statistic AT/AF [place] Percent 92.1 %    Episode Statistic Recent Count 12     Episode Statistic Type Category AT/AF     Episode Statistic Recent Count 0     Episode Statistic Type Category VT     Episode Statistic Recent Count 0     Episode Statistic Type Category VT     Episode Statistic Recent Date Time Start 20220401122037     Episode Statistic Recent Date Time End [id number]     Episode Statistic Recent Date Time Start [id number]     Episode Statistic Recent Date Time End 06084108745231     Episode Statistic Recent Date Time Start 20220401122037     Episode Statistic Recent Date Time End  20220627093005     Episode Statistic Total Count 39     Episode Statistic Type Category AT/AF     Episode Statistic Total Count 13     Episode Statistic Type Category VT     Episode Statistic Total Count 0     Episode Statistic Type Category VT     Episode Statistic Total Date Time Start 55843052059921     Episode Statistic Total Date Time End 20220627093005     Episode Statistic Total Date Time Start 40873127657119     Episode Statistic Total Date Time End 20220627093005     Episode Statistic Total Date Time Start 74176166016977     Episode Statistic Total Date Time End 20220627093005     Episode Identifier 52     Episode Type Category Monitor     Episode Date Time [id number]     Episode Identifier 51     Episode Type Category Monitor     Episode Date Time [id number]     Episode Duration 3,075 s    Episode Identifier 50     Episode Type Category Monitor     Episode Date Time 20220608135120     Episode Duration 1,544 s    Episode Identifier 49     Episode Type Category Monitor     Episode Date Time [id number]     Episode Duration 1,111 s    Episode Identifier 48     Episode Type Category Monitor     Episode Date Time [id number]     Episode Duration 1,361 s    Episode Identifier 47     Episode Type Category Monitor     Episode Date Time [id number]     Episode Duration 2,902,056 s    Episode Identifier 46     Episode Type Category Monitor     Episode Date Time [id number]     Episode Duration 105,188 s    Episode Identifier 45     Episode Type Category Monitor     Episode Date Time [id number]     Episode Duration 45,849 s   Basic metabolic panel    Collection Time: 06/27/22  4:48 PM   Result Value Ref Range    Creatinine 2.71 (H) 0.67 - 1.17 mg/dL    Sodium 135 (L) 136 - 145 mmol/L    Potassium 4.7 3.4 - 5.3 mmol/L    Urea Nitrogen 106.0 (H) 8.0 - 23.0 mg/dL    Chloride 86 (L) 98 - 107 mmol/L    Carbon Dioxide (CO2) 36 (H) 22 - 29 mmol/L    Anion Gap 13 7 - 15 mmol/L    Glucose 120 (H) 70 -  99 mg/dL    GFR Estimate 26 (L) >60 mL/min/1.73m2    Calcium 9.8 8.8 - 10.2 mg/dL   Magnesium    Collection Time: 06/27/22  4:48 PM   Result Value Ref Range    Magnesium 2.5 (H) 1.7 - 2.3 mg/dL

## 2022-06-28 NOTE — PLAN OF CARE
/77 (BP Location: Right arm, Cuff Size: Adult Regular)   Pulse 61   Temp 97.6  F (36.4  C) (Oral)   Resp 18   Ht 1.829 m (6')   Wt 71.2 kg (157 lb)   SpO2 97%   BMI 21.29 kg/m      Shift: 5427-1827     Status: Adm 6/22 hypokalemia   Neuro: A&O x4. Able to make needs known   Respiratory: WDL on 8-10L NC overnight sating high 90s, crackles in base of lungs. HOSKINS   Cardiac: Paced, HR 60s, BP elevated at times, w/in order parameters   GI/: Voids w/out difficulty, LBM yesterday   Diet: 2g NA, 2L FR   Pain: Shoulder pain and headache relieved w/ PRN tylenol. Cepacol for sore throat. Pt c/o generalized cramping due to bumex   Incision/Drains: none  IV Access: L PIV SL   Labs: Reviewed  Activity: Up ad michael in room/ SBA in hallway    Plan: Continue w/ POC

## 2022-06-28 NOTE — PLAN OF CARE
Occupational Therapy Discharge Summary    Reason for therapy discharge:    Discharged to home with A, OP prn for CR/edema    Progress towards therapy goal(s). See goals on Care Plan in [facility] electronic health record for goal details.  Pt. S/I with BADLs and functional mobility, amb. Without AD.    Therapy recommendation(s):    Continued therapy is recommended.  Rationale/Recommendations:  cont. With OP CR/edema manag. prn.

## 2022-06-28 NOTE — PLAN OF CARE
DISCHARGE                         6/28/2022  2:35 PM  ----------------------------------------------------------------------------  Discharged to: Home  Via: Automobile  Accompanied by: Self   Discharge Instructions: diet, activity, medications, follow up appointments, when to call the MD, aftercare instructions, and what to watchout for (i.e. s/s of infection, increasing SOB, palpitations, chest pain,)  Prescriptions: To be filled by [place] [facility] per pt's request; medication list reviewed & sent with pt  Follow Up Appointments: arranged; information given  Belongings: All sent with pt  IV: out  Telemetry: off  Pt exhibits understanding of above discharge instructions; all questions answered.    Discharge Paperwork: Signed, copied, and sent home with patient.

## 2022-06-28 NOTE — PROGRESS NOTES
[facility]    Cardiology History and Physical - Cardiology       Date of Admission:  6/22/2022      Assessment & Plan: HVSL    Jimmy Isaac is a 61 year old male admitted on 6/22/2022. He has a pmhx significant for sarcoidosis, pulmonary hypertension, CAD, CKD, HTN, chronic hypoxic respiratory failure on chronic home oxygen (baseline 5lpm at rest and increases to 10l/min upon exertion) and herpes zoster admitted for few weeks of worsening SOB, swelling and weight gain (dry weight 152) concerning for volume overload. S/p bumex, diuril. Off bumex given muscle cramps. S/p lasix gtt with improved diuresis, weight loss and kidney function.     Today   - JVP ~7-8.  - Medication adjustments change to Torsemide 100mg po BID    > Check BMP and Mg labs to assess electrolytes in 1 week.  - Discharge Today  - Kept renal appointment Wednesday 6/29/22  - Follow up with pulmonary hypertension clinic in 2-3 weeks. Informed Bernarda Wilson via epic message.   - Completed med rec infor discharge.    # Acute on chronic right heart failure 2/2 pulmonary hypertension in the setting of pulmonary sarcoidosis  # Complete AV block s/p pacemaker insertion on 08/02/2021    Follows with Dr. Lindsey. Admitted for SOB, weight gain 14lbs and LLEE edema. Patient is chronically dependent on home O2 since 2012. Prior RHC (77/20/38) in November 2021.. He is on triple combination therapy with ambrisentan, tadalafil and selexipag. Recent RHC in July 2021 showed moderate PAH with normal cardiac output. Hemodynamically stable. His labs are notable for Na 135, K 3.1, BUN 89.9, Cr 2.88 (GFR 24), Ca 9.4, Mg 2.3, P 4.4, Alb 3.9, normal LFTs, BNP 6893, CBC Hb 12.2, normal WBC and platelets, INR 1. Had RHC this PM: RA 16, /45 (65), PCWP 16, CI 1.78, CO 3.64, MAP 91, Hb 12.2, SVR 1648, PVR = 13.46, [place]= 3.56, CVP/PCWP = 1 concerning for severe pulmonary hypertension (combined pre- and post-capillary HTN).  CXR on 06/22 with patchy perihiliar airspace opacities. EKG without ST/T ischemic changes.    - Fluid status:    > Potentially Euvolemic JVP ~8-9. Dry weight 152. Wt on admission 166  Wt on 06/28 157 lbs   > Torsemide 10mg BID    > Potassium chloride 40meq BID  - RV failure and PH meds   > Continue with PTA ambrisentan 10mg qday   > Continue with PTA tadalafil 40mg qday   > Continue with PTA selexipag 1600 mcg q12h    # Hyponatremia, improving  # Moderate hypokalemia, Resolved  # CKD stage IV, GFR 15-29 ml/min  # Non-oliguric   # BL nephrolithiasis w/o hydronephrosis  # Hx of nephrolithiasis s/p multiple urologic procedure  # Chronic normocytic anemia  Cr 1.4 in 2018 and reaching a new baseline ~3 in July 2020. Had a hx of SISI 2/2 nephrolithiasis with rise of Cr into the 4s. Haf left PCNL on 09/20/21 but has residual.   - BL Nephrolithiasis w/o hydronephrosis noted on ultrasound 6/24   >Will continue diuresing. Patient commented on flank pain and    Discussed recurrent issue which is usually solved via Tylenol at    Home.   - BP at goal <130/80  - Mg and K high replacement protocol    # Pulmonary sarcoidosis on chronic steroids  Patient has no response to prednisone therapy in 2017. Currently off all other systemic anti-inflammatory medications. Patient is not interested in pursuing lung transplantation.  - Continue with pta prednisone 10mg qday  - Continue with proair inhaler 2 puffs q6hr prn  - Continuous with O2 to maintain SatO2>95%   > Patient managing well on 6L 02 and working with PT/OT.     # Hx of herpes zoster  # Moderate allergy to Valcyte  Stable     Diet: 2g sodium diet  DVT Prophylaxis: Ambulate every shift  Chong Catheter: Not present  Code Status:  Full Code  Fluids: Fluid restriction 2L  Lines: PIV    Disposition Plan   Expected discharge: Today, recommended to prior living arrangement once fluid volume status optimized on oral medication.     The patient's care was discussed with the Attending  Physician, Dr. Knutson.    Wilner Frey MD on 6/28/2022 at 2:15 PM  Internal Medicine [address]    ______________________________________________________________________    Interval Progress  Nursing notes reviewed. Patient muscle cramps and additional hesitancy with Bumex this morning. Discharge on 100mg Torsemide.    Review of Systems    The 10 point Review of Systems is negative other than noted in the HPI or here.     Cardiac History    Echocardiogram 07/27/2021 07:57 AM  BSA: 2.0 m2  Height: 73 in  Weight: 165 lb  BP: 123/72 mmHg  ______________________________________________________________________________  Procedure  Echocardiogram with two-dimensional, color and spectral Doppler performed.  Adequate quality two-dimensional was performed and interpreted. The patient's  rhythm is sinus bradycardia.  ______________________________________________________________________________  Interpretation Summary  Global and regional left ventricular function is normal with an EF of 55-60%.  Flattened septum is consistent with right ventricular pressure and volume  overload.  Global right ventricular function is mildly to moderately reduced. The  longitudinal excursion is preserved (TAPSE 2.1 cm and S 11.0 cm/s) but the  free wall contraction is reduced. The RV FAC was 25% but the basal segment of  the RV was not clearly visualized.  No significant valvular abnormality.  Pulmonary artery systolic pressure cannot be assessed due to inadequate TR  jet.  IVC diameter >2.1 cm collapsing <50% with sniff suggests a high RA pressure  estimated at 15 mmHg or greater.  No pericardial effusion is present.  This study was compared with the study from 10/29/2018. The RV function  appears to have declined based on direct visual and quantitative comparison.  This is primarily due to reduced RV free wall  contraction.  ______________________________________________________________________________  Left Ventricle  Global and regional left ventricular function is normal with an EF of 55-60%.  Relative wall thickness is increased consistent with concentric remodeling.  Left ventricular diastolic function is normal. Flattened septum is consistent  with right ventricular pressure and volume overload.     Right Ventricle  Moderate right ventricular dilation is present. Global right ventricular  function is mildly to moderately reduced. The longitudinal excursion is  preserved (TAPSE 2.1 cm and S 11.0 cm/s) but the free wall contraction is  reduced. The RV FAC was 25% but the basal segment of the RV was not clearly  visualized.     Atria  Both atria appear normal. The atrial septum is intact as assessed by color  Doppler .     Mitral Valve  Mild mitral annular calcification is present. Trace mitral insufficiency is  present.     Aortic Valve  Aortic valve is normal in structure and function. The aortic valve is  tricuspid. Trace aortic insufficiency is present.     Tricuspid Valve  The tricuspid valve cannot be assessed. Trace tricuspid insufficiency is  present. The right ventricular systolic pressure is approximated at 26.4 mmHg  plus the right atrial pressure.     Pulmonic Valve  The pulmonic valve cannot be assessed. Mild pulmonic insufficiency is present.  The PA acceleration time could not be obtained.     Vessels  Sinuses of Valsalva 3.5 cm. Ascending aorta 3.3 cm. IVC diameter >2.1 cm  collapsing <50% with sniff suggests a high RA pressure estimated at 15 mmHg or  greater.     Pericardium  No pericardial effusion is present.     Compared to Previous Study  This study was compared with the study from 10/29/2018 . The RV function  appears to have declined based on direct visual and quantitative comparison.  This is primarily due to reduced RV free wall contraction.    Allergies   Allergies   Allergen Reactions      Valacyclovir Rash     Diffuse body rash       Physical Exam   JVP ~8-9    Constitutional: awake   Eyes: vision intact  ENT: atramatic  Hematologic / Lymphatic: no cervical lymphadenopathy and no supraclavicular lymphadenopathy  Respiratory: Clear to auscultation BL  Cardiovascular: Normal S1/S2. JVP: ~8-9  GI: Non-distended, normal bowel sounds, non-tender  Skin: no bruising or bleeding and normal skin color, texture, turgor  Musculoskeletal: 1+ Peripheral edema present BL in LE around ankles   Neurologic: Awake, alert, oriented to name, place and time.       Data   Data reviewed today: I reviewed all medications, new labs and imaging results over the last 24 hours. I personally reviewed.    Recent Results (from the past 48 hour(s))   Basic metabolic panel    Collection Time: 06/26/22  5:01 PM   Result Value Ref Range    Creatinine 3.05 (H) 0.67 - 1.17 mg/dL    Sodium 135 (L) 136 - 145 mmol/L    Potassium 4.7 3.4 - 5.3 mmol/L    Urea Nitrogen 109.0 (H) 8.0 - 23.0 mg/dL    Chloride 83 (L) 98 - 107 mmol/L    Carbon Dioxide (CO2) 40 (H) 22 - 29 mmol/L    Anion Gap 12 7 - 15 mmol/L    Glucose 157 (H) 70 - 99 mg/dL    GFR Estimate 22 (L) >60 mL/min/1.73m2    Calcium 9.5 8.8 - 10.2 mg/dL   Magnesium    Collection Time: 06/26/22  5:01 PM   Result Value Ref Range    Magnesium 2.2 1.7 - 2.3 mg/dL   Basic metabolic panel    Collection Time: 06/27/22  4:53 AM   Result Value Ref Range    Creatinine 2.81 (H) 0.67 - 1.17 mg/dL    Sodium 133 (L) 136 - 145 mmol/L    Potassium 3.6 3.4 - 5.3 mmol/L    Urea Nitrogen 110.0 (H) 8.0 - 23.0 mg/dL    Chloride 87 (L) 98 - 107 mmol/L    Carbon Dioxide (CO2) 32 (H) 22 - 29 mmol/L    Anion Gap 14 7 - 15 mmol/L    Glucose 105 (H) 70 - 99 mg/dL    GFR Estimate 25 (L) >60 mL/min/1.73m2    Calcium 9.4 8.8 - 10.2 mg/dL   Magnesium    Collection Time: 06/27/22  4:53 AM   Result Value Ref Range    Magnesium 2.2 1.7 - 2.3 mg/dL   Extra Purple Top Tube    Collection Time: 06/27/22  4:53 AM   Result  Value Ref Range    Hold Specimen [facility]    Cardiac Device Check - Inpatient    Collection Time: 06/27/22 12:11 PM   Result Value Ref Range    Date Time Interrogation Session [id number]     Implantable Pulse Generator  Medtronic     Implantable Pulse Generator Model [id number] [place] XT DR MRI     Implantable Pulse Generator Serial Number [id number]     Type Interrogation Session In Clinic     Clinic Name [facility]     Implantable Pulse Generator Type Pacemaker     Implantable Pulse Generator Implant Date 20210802     Implantable Lead  Medtronic     Implantable Lead Model 5076 CapSureFix Novus MRI SureScan     Implantable Lead Serial Number [id number]     Implantable Lead Implant Date 20210802     Implantable Lead Polarity Type Bipolar Lead     Implantable Lead Location Detail 1 UNKNOWN     Implantable Lead Special Function 52 cm     Implantable Lead Location Right Atrium     Implantable Lead  Medtronic     Implantable Lead Model 5076 CapSureFix Novus MRI SureScan     Implantable Lead Serial Number [id number]     Implantable Lead Implant Date 20210802     Implantable Lead Polarity Type Bipolar Lead     Implantable Lead Location Detail 1 UNKNOWN     Implantable Lead Special Function 58 cm     Implantable Lead Location Right Ventricle     Jose Cruz Setting Mode (NBG Code) DDDR     Jose Cruz Setting Lower Rate Limit 60 [beats]/min    Jose Cruz Setting Maximum Tracking Rate 130 [beats]/min    Jose Cruz Setting Maximum Sensor Rate 130 [beats]/min    Jose Cruz Setting Hysterisis Rate DISABLED     Jose Cruz Setting ELIAS Delay Low 150 ms    Jose Cruz Setting PAV Delay Low 180 ms    Jose Cruz Setting AT Mode Switch Rate 171 [beats]/min    Lead Channel Setting Sensing Polarity Bipolar     Lead Channel Setting Sensing Anode Location Right Atrium     Lead Channel Setting Sensing Anode Terminal Ring     Lead Channel Setting Sensing Cathode Location Right Atrium     Lead Channel Setting Sensing Cathode  Terminal Tip     Lead Channel Setting Sensing Sensitivity 0.3 mV    Lead Channel Setting Sensing Polarity Bipolar     Lead Channel Setting Sensing Anode Location Right Ventricle     Lead Channel Setting Sensing Anode Terminal Ring     Lead Channel Setting Sensing Cathode Location Right Ventricle     Lead Channel Setting Sensing Cathode Terminal Tip     Lead Channel Setting Sensing Sensitivity 0.9 mV    Lead Channel Setting Pacing Polarity Bipolar     Lead Channel Setting Pacing Anode Location Right Atrium     Lead Channel Setting Pacing Anode Terminal Ring     Lead Channel Setting Sensing Cathode Location Right Atrium     Lead Channel Setting Sensing Cathode Terminal Tip     Lead Channel Setting Pacing Pulse Width 0.4 ms    Lead Channel Setting Pacing Amplitude 2 V    Lead Channel Setting Pacing Capture Mode Adaptive     Lead Channel Setting Pacing Polarity Bipolar     Lead Channel Setting Pacing Anode Location Right Ventricle     Lead Channel Setting Pacing Anode Terminal Ring     Lead Channel Setting Sensing Cathode Location Right Ventricle     Lead Channel Setting Sensing Cathode Terminal Tip     Lead Channel Setting Pacing Pulse Width 0.4 ms    Lead Channel Setting Pacing Amplitude 2 V    Lead Channel Setting Pacing Capture Mode Adaptive     Zone Setting Type Category VF     Zone Setting Type Category VT     Zone Setting Type Category VT     Zone Setting Type Category VT     Zone Setting Detection Interval 400 ms    Zone Setting Type Category ATRIAL_FIBRILLATION     Zone Setting Type Category AT/AF     Zone Setting Detection Interval 350 ms    Lead Channel Impedance Value 608 ohm    Lead Channel Impedance Value 323 ohm    Lead Channel Sensing Intrinsic Amplitude 2.8 mV    Lead Channel Impedance Value 532 ohm    Lead Channel Impedance Value 437 ohm    Lead Channel Sensing Intrinsic Amplitude 7.1 mV    Lead Channel Pacing Threshold Amplitude 0.5 V    Lead Channel Pacing Threshold Pulse Width 0.4 ms    Battery Date  Time of Measurements 20220627091502     Battery Status OK     Battery RRT Trigger 2.625     Battery Remaining Longevity 143 mo    Battery Voltage 3.06 V    Jose Cruz Statistic Date Time Start 20220401122037     Jose Cruz Statistic Date Time End 20220627093005     Jose Cruz Statistic RA Percent Paced 1.09 %    Jose Cruz Statistic RV Percent Paced 93.43 %    Jose Cruz Statistic AP  Percent 13.33 %    Jose Cruz Statistic AS  Percent 84.67 %    Jose Cruz Statistic AP VS Percent 0.02 %    Jose Cruz Statistic AS VS Percent 0.94 %    Atrial Tachy Statistic Date Time Start 20220401122037     Atrial Tachy Statistic Date Time End 20220627093005     Atrial Tachy Statistic AT/AF [place] Percent 92.1 %    Episode Statistic Recent Count 12     Episode Statistic Type Category AT/AF     Episode Statistic Recent Count 0     Episode Statistic Type Category VT     Episode Statistic Recent Count 0     Episode Statistic Type Category VT     Episode Statistic Recent Date Time Start 20220401122037     Episode Statistic Recent Date Time End 20220627093005     Episode Statistic Recent Date Time Start 15199987047148     Episode Statistic Recent Date Time End 20220627093005     Episode Statistic Recent Date Time Start 20220401122037     Episode Statistic Recent Date Time End 20220627093005     Episode Statistic Total Count 39     Episode Statistic Type Category AT/AF     Episode Statistic Total Count 13     Episode Statistic Type Category VT     Episode Statistic Total Count 0     Episode Statistic Type Category VT     Episode Statistic Total Date Time Start 20210802120413     Episode Statistic Total Date Time End 20220627093005     Episode Statistic Total Date Time Start [id number]     Episode Statistic Total Date Time End 20220627093005     Episode Statistic Total Date Time Start 96767586036128     Episode Statistic Total Date Time End 20220627093005     Episode Identifier 52     Episode Type Category Monitor     Episode Date Time [id number]     Episode  Identifier 51     Episode Type Category Monitor     Episode Date Time [id number]     Episode Duration 3,075 s    Episode Identifier 50     Episode Type Category Monitor     Episode Date Time [id number]     Episode Duration 1,544 s    Episode Identifier 49     Episode Type Category Monitor     Episode Date Time [id number]     Episode Duration 1,111 s    Episode Identifier 48     Episode Type Category Monitor     Episode Date Time [id number]     Episode Duration 1,361 s    Episode Identifier 47     Episode Type Category Monitor     Episode Date Time [id number]     Episode Duration 2,902,056 s    Episode Identifier 46     Episode Type Category Monitor     Episode Date Time [id number]     Episode Duration 105,188 s    Episode Identifier 45     Episode Type Category Monitor     Episode Date Time [id number]     Episode Duration 45,849 s   Basic metabolic panel    Collection Time: 06/27/22  4:48 PM   Result Value Ref Range    Creatinine 2.71 (H) 0.67 - 1.17 mg/dL    Sodium 135 (L) 136 - 145 mmol/L    Potassium 4.7 3.4 - 5.3 mmol/L    Urea Nitrogen 106.0 (H) 8.0 - 23.0 mg/dL    Chloride 86 (L) 98 - 107 mmol/L    Carbon Dioxide (CO2) 36 (H) 22 - 29 mmol/L    Anion Gap 13 7 - 15 mmol/L    Glucose 120 (H) 70 - 99 mg/dL    GFR Estimate 26 (L) >60 mL/min/1.73m2    Calcium 9.8 8.8 - 10.2 mg/dL   Magnesium    Collection Time: 06/27/22  4:48 PM   Result Value Ref Range    Magnesium 2.5 (H) 1.7 - 2.3 mg/dL   Basic metabolic panel    Collection Time: 06/28/22  5:28 AM   Result Value Ref Range    Creatinine 2.55 (H) 0.67 - 1.17 mg/dL    Sodium 137 136 - 145 mmol/L    Potassium 4.7 3.4 - 5.3 mmol/L    Urea Nitrogen 104.0 (H) 8.0 - 23.0 mg/dL    Chloride 91 (L) 98 - 107 mmol/L    Carbon Dioxide (CO2) 33 (H) 22 - 29 mmol/L    Anion Gap 13 7 - 15 mmol/L    Glucose 113 (H) 70 - 99 mg/dL    GFR Estimate 28 (L) >60 mL/min/1.73m2    Calcium 9.1 8.8 - 10.2 mg/dL   Magnesium    Collection Time: 06/28/22  5:28 AM    Result Value Ref Range    Magnesium 2.4 (H) 1.7 - 2.3 mg/dL   Extra Purple Top Tube    Collection Time: 06/28/22  5:28 AM   Result Value Ref Range    Hold Specimen JI

## 2022-06-28 NOTE — DISCHARGE SUMMARY
[facility]    Cardiology Discharge Summary- Cardiology       Date of Admission:  6/22/2022  Date of Discharge:  6/28/2022  2:35 PM  Discharging Provider: Dr. Michelle Knutson MD    Discharge Diagnoses   - Acute on chronic right heart failure 2/2 severe pulmonary hypertension in the setting of pulmonary sarcoidosis  - Complete AV block s/p pacemaker insertion on 08/02/2021  - Coronary artery disease  - stage IV chronic kidney disease  - Pulmonary sarcoidosis on chronic steroids  - Hx of herpes zoster  - Hyponatremia  - Hypokalemia  - Hypochloremia  - Hypermagnesemia  - Acidosis         Follow-ups Needed After Discharge   1. Labs in 1 wk: BMP, Mg   2. Follow up pulmonary hypertension clinic with MD/XOCHILT 2-3 wks + labs: BMP, Mg   3. Follow up with nephrology as scheduled(Wednesday 6/29/22)     Discharge Disposition   Discharged to home with home OT  Condition at discharge: Stable    Hospital Course    # Acute on chronic right heart failure 2/2 severe pulmonary hypertension in the setting of pulmonary sarcoidosis  # Complete AV block s/p pacemaker insertion on 08/02/2021  Came in for RHC on admission showing severely elevated pulmonary pressure worse than prior. Numbers:RA 16, /45 (65), PCWP 16, CI 1.78, CO 3.64, MAP 91, Hb 12.2, SVR 1648, PVR = 13.46, [place]= 3.56, CVP/PCWP = 1 concerning for severe pulmonary hypertension (combined pre- and post-capillary HTN). CXR on 06/22 with patchy perihiliar airspace opacities. EKG without ST/T ischemic changes. She's already on triple oral therapy with Ambrisentan, tadalafil, selexipag. His prognosis is poor given that his not a lung transplant candidate in our institution due to his kidney disease. Aim was to diurese to obtain euvolemia. Got diuresed with lasix gtt and appears euvolemic at discharge with the discharge weight of 157 lbs. Transitioned to torsemide at discharge.      - Fluid status:    > Dry weight 152.  Wt on admission 166, Wt at discharge 06/28 157 lbs              > Appears potentially euvolemic JVP ~8-9.               > Torsemide 100 mg BID + Potassium chloride 40meq BID  - RV failure and PH meds              > Continue with PTA ambrisentan 10mg qday              > Continue with PTA tadalafil 40mg qday              > Continue with PTA selexipag 1600 mcg q12h     # Hyponatremia, improving  # Moderate hypokalemia, Resolved  # CKD stage IV, GFR 15-29 ml/min  # Non-oliguric   # BL nephrolithiasis w/o hydronephrosis  # Hx of nephrolithiasis s/p multiple urologic procedure  # Chronic normocytic anemia  Cr 1.4 in 2018 and reaching a new baseline ~3 in July 2020. Had a hx of SISI 2/2 nephrolithiasis with rise of Cr into the 4s. Haf left PCNL on 09/20/21 but has residual.   - BL Nephrolithiasis w/o hydronephrosis noted on ultrasound 6/24             - diuresing without problems  - BP at goal <130/80  - trend labs during follow up     # Pulmonary sarcoidosis on chronic steroids  Patient has no response to prednisone therapy in 2017. Currently off all other systemic anti-inflammatory medications. Patient is not interested in pursuing lung transplantation.  - Continue with pta prednisone 10mg qday  - Continue with proair inhaler 2 puffs q6hr prn  - Continuous with O2 to maintain SatO2>95%              > Patient managing well on 6L 02 and working with PT/OT.      # Hx of herpes zoster  # Moderate allergy to Valcyte  Stable    Consultations This Hospital Stay   OCCUPATIONAL THERAPY ADULT IP CONSULT  NUTRITION SERVICES ADULT IP CONSULT  PHYSICAL THERAPY ADULT IP CONSULT  LYMPHEDEMA THERAPY IP CONSULT  NURSING TO CONSULT FOR VASCULAR ACCESS CARE IP CONSULT    Code Status   Full Code     Staffed with Dr. Knutson.    Wilner Frey MD  [facility]    Gallo Shepard MD  PGY-2 Internal  Medicine  ______________________________________________________________________  Subjective  Nursing notes reviewed. Patient developed generalized muscles cramps more pronounced in LLEE after bumex administration. Feels more relieved after switching to lasix. Denies chest pain, palpitations, syncope, lightheadedness. Remains afebrile and hemodynamically stable. Creatinine is improving with diuresis. Has at least ~6 lbs down since admission.       Physical Exam   Vital Signs: Temp: 97.5  F (36.4  C) Temp src: Oral BP: 126/78 Pulse: 61   Resp: 18 SpO2: 97 % O2 Device: Nasal cannula Oxygen Delivery: 7 LPM  Weight: 157 lbs 0 oz    Constitutional: awake   Eyes: vision intact  ENT: atramatic  Hematologic / Lymphatic: no cervical lymphadenopathy and no supraclavicular lymphadenopathy  Respiratory: Clear to auscultation BL  Cardiovascular: Normal S1/S2. JVP: ~8-9  GI: Non-distended, normal bowel sounds, non-tender  Skin: no bruising or bleeding and normal skin color, texture, turgor  Musculoskeletal: 1+ Peripheral edema present BL in LE around ankles   Neurologic: Awake, alert, oriented to name, place and time.     Primary Care Physician   Cristhian Busch    Discharge Orders   No discharge procedures on file.    Significant Results and Procedures   Results for orders placed or performed during the hospital encounter of 06/22/22   XR Chest Port 1 View    Narrative    Exam: XR CHEST PORT 1 VIEW, 6/23/2022 11:18 AM    Comparison: Chest x-ray 6/2/2022, 5/8/2022    History: Patient coming with volume overload    Findings:  Portable AP view of the chest. Left chest wall cardiac device with  leads in stable position. Pulmonary artery enlargement. Trachea is  midline. Cardiomediastinal silhouette is stable. Calcifications of the  aortic knob. Lungs are hyperinflated. Unchanged patchy perihilar  opacities. No new focal consolidation. There is no pneumothorax or  pleural effusion. The upper abdomen is unremarkable.  Unchanged  sclerotic lesion in the right humeral head.      Impression    Impression:   Continued patchy perihilar airspace opacities. No new focal  consolidation.    I have personally reviewed the examination and initial interpretation  and I agree with the findings.    SHYANNE WINN MD         SYSTEM ID:  [id number]   US Renal Complete    Narrative    EXAMINATION: US RENAL COMPLETE, 6/24/2022 9:54 PM     COMPARISON: CT of the abdomen and pelvis dated 9/21/2021    HISTORY: Was scheduled for renal ultrasound outpatient. Will have  inpatient instead.    TECHNIQUE: The kidneys and bladder were scanned in the standard  fashion with specialized ultrasound transducer(s) using both gray  scale and limited color/spectral Doppler techniques.    FINDINGS:    Right kidney: Measures 9.3 cm in length. Diffuse parenchymal thinning  and increased echogenicity. No focal mass. No hydronephrosis. Numerous  echogenic renal calculi.    Left kidney: Measures 9.2 cm in length. Diffuse parenchymal thinning  and increased echogenicity. No focal mass. No hydronephrosis. Numerous  echogenic renal calculi.    Bladder: Bladder is moderately distended and within normal limits.      Impression    IMPRESSION:  1.  Bilateral nephrolithiasis without evidence of hydronephrosis  2.  Cortical thinning and increased echogenicity of the bilateral  kidneys, consistent with medical renal disease.    I have personally reviewed the examination and initial interpretation  and I agree with the findings.    JULIA LOVE MD         SYSTEM ID:  [id number]   Cardiac Catheterization    Narrative      Right sided filling pressures are severely elevated.    Severely elevated pulmonary artery hypertension.    Left sided filling pressures are moderately elevated.    Normal cardiac output level.    Hemodynamic data has been modified in Epic per physician review.     1. Severe pulmonary hypertension  2. Reduced cardiogenic shock concerning for ambulatory cardiogenic  shock  3. Right sided heart failure          Discharge Medications   Current Discharge Medication List      CONTINUE these medications which have NOT CHANGED    Details   ambrisentan (LETAIRIS) 10 MG tablet Take 10 mg by mouth every morning   Qty: 30 tablet, Refills: 11    Associated Diagnoses: Pulmonary hypertension (H)      furosemide (LASIX) 20 MG tablet Take 6 tablets (120 mg) by mouth 2 times daily  Qty: 365 tablet, Refills: 3    Associated Diagnoses: Pulmonary hypertension (H)      hydrochlorothiazide (HYDRODIURIL) 12.5 MG tablet Take 2 tablets (25 mg) by mouth daily  Qty: 2 tablet, Refills: 2    Associated Diagnoses: CKD (chronic kidney disease) stage 4, GFR 15-29 ml/min (H)      mometasone-formoterol (DULERA) 200-5 MCG/ACT inhaler Inhale 2 puffs into the lungs 2 times daily  Qty: 13 g, Refills: 11      potassium chloride (KLOR-CON) 20 MEQ packet Take 20 mEq by mouth 2 times daily  Qty: 7 packet, Refills: 0    Associated Diagnoses: Hypokalemia      predniSONE (DELTASONE) 10 MG tablet Take 1 tablet (10 mg) by mouth daily  Qty: 90 tablet, Refills: 3    Associated Diagnoses: Pulmonary hypertension (H)      PROAIR  (90 Base) MCG/ACT inhaler Inhale 2 puffs into the lungs every 6 hours as needed for shortness of breath / dyspnea  Qty: 18 g, Refills: 11    Comments: Pharmacy may dispense brand covered by insurance (Proair, or proventil or ventolin or generic albuterol inhaler)  Associated Diagnoses: Pulmonary hypertension (H); Mixed hyperlipidemia      selexipag (UPTRAVI) 1600 MCG tablet Take 1 tablet (1,600 mcg) by mouth every 12 hours  Qty: 60 tablet, Refills: 11    Associated Diagnoses: Pulmonary hypertension (H)      tadalafil, PAH, (ALYQ) 20 MG TABS Take 2 tablets (40 mg) by mouth daily  Qty: 60 tablet, Refills: 11    Associated Diagnoses: Pulmonary hypertension (H)      Vitamin D3 (CHOLECALCIFEROL) 25 mcg (1000 units) tablet Take 1 tablet (25 mcg) by mouth every other day  Qty: 45 tablet, Refills: 3     Associated Diagnoses: Vitamin D deficiency      UNABLE TO FIND MEDICATION NAME: Home Oxygen, 5-10 L           Allergies   Allergies   Allergen Reactions     Valacyclovir Rash     Diffuse body rash     Cardiac History     Echocardiogram 07/27/2021 07:57 AM  BSA: 2.0 m2  Height: 73 in  Weight: 165 lb  BP: 123/72 mmHg  ______________________________________________________________________________  Procedure  Echocardiogram with two-dimensional, color and spectral Doppler performed.  Adequate quality two-dimensional was performed and interpreted. The patient's  rhythm is sinus bradycardia.  ______________________________________________________________________________  Interpretation Summary  Global and regional left ventricular function is normal with an EF of 55-60%.  Flattened septum is consistent with right ventricular pressure and volume  overload.  Global right ventricular function is mildly to moderately reduced. The  longitudinal excursion is preserved (TAPSE 2.1 cm and S 11.0 cm/s) but the  free wall contraction is reduced. The RV FAC was 25% but the basal segment of  the RV was not clearly visualized.  No significant valvular abnormality.  Pulmonary artery systolic pressure cannot be assessed due to inadequate TR  jet.  IVC diameter >2.1 cm collapsing <50% with sniff suggests a high RA pressure  estimated at 15 mmHg or greater.  No pericardial effusion is present.  This study was compared with the study from 10/29/2018. The RV function  appears to have declined based on direct visual and quantitative comparison.  This is primarily due to reduced RV free wall contraction.  ______________________________________________________________________________  Left Ventricle  Global and regional left ventricular function is normal with an EF of 55-60%.  Relative wall thickness is increased consistent with concentric remodeling.  Left ventricular diastolic function is normal. Flattened septum is consistent  with right  ventricular pressure and volume overload.     Right Ventricle  Moderate right ventricular dilation is present. Global right ventricular  function is mildly to moderately reduced. The longitudinal excursion is  preserved (TAPSE 2.1 cm and S 11.0 cm/s) but the free wall contraction is  reduced. The RV FAC was 25% but the basal segment of the RV was not clearly  visualized.     Atria  Both atria appear normal. The atrial septum is intact as assessed by color  Doppler .     Mitral Valve  Mild mitral annular calcification is present. Trace mitral insufficiency is  present.     Aortic Valve  Aortic valve is normal in structure and function. The aortic valve is  tricuspid. Trace aortic insufficiency is present.     Tricuspid Valve  The tricuspid valve cannot be assessed. Trace tricuspid insufficiency is  present. The right ventricular systolic pressure is approximated at 26.4 mmHg  plus the right atrial pressure.     Pulmonic Valve  The pulmonic valve cannot be assessed. Mild pulmonic insufficiency is present.  The PA acceleration time could not be obtained.     Vessels  Sinuses of Valsalva 3.5 cm. Ascending aorta 3.3 cm. IVC diameter >2.1 cm  collapsing <50% with sniff suggests a high RA pressure estimated at 15 mmHg or  greater.     Pericardium  No pericardial effusion is present.     Compared to Previous Study  This study was compared with the study from 10/29/2018 . The RV function  appears to have declined based on direct visual and quantitative comparison.  This is primarily due to reduced RV free wall contraction.

## 2022-06-29 NOTE — TELEPHONE ENCOUNTER
[facility] Call Center    Phone Message    May a detailed message be left on voicemail: yes     Reason for Call: Other: pt said he wants to know if this follow up after a hospital stay can be a video visit. also needs ordered entered for   BMP, Mg  Labs per discharge paperwork. So he can go to a [facility] by him to get those done. Please call    Action Taken: Other: csc    Travel Screening: Not Applicable           ------------------------------  Lab orders placed. Obdulia Rome RN on 7/1/2022 at 11:29 AM     Appt made for 7/12 @ 9:15am

## 2022-07-12 NOTE — PROGRESS NOTES
Jimmy is a 61 year old who is being evaluated via a billable video visit.      How would you like to obtain your AVS? MyChart  If the video visit is dropped, the invitation should be resent by: Send to e-mail at: [email or web address]  Will anyone else be joining your video visit? No    Claritza Corral, Visit Facilitator/MA.        CARDIOLOGY PH CLINIC TELEPHONE VISIT  (converted from video visit due to lack of HIPPA compliant space in clinic)    Date of telephone visit: 07/12/22      Jimmy Isaac is a 61 year old male who is being evaluated via a billable telephone visit.      I have reviewed and updated the patient's Past Medical History, Social History, Family History and Medication List.      MEDICATIONS:  Current Outpatient Medications   Medication Sig Dispense Refill     ambrisentan (LETAIRIS) 10 MG tablet Take 10 mg by mouth every morning  30 tablet 11     mometasone-formoterol (DULERA) 200-5 MCG/ACT inhaler Inhale 2 puffs into the lungs 2 times daily 13 g 11     potassium chloride ER (KLOR-CON M) 20 MEQ CR tablet Take 2 tablets (40 mEq) by mouth 2 times daily for 30 days 120 tablet 0     predniSONE (DELTASONE) 10 MG tablet Take 1 tablet (10 mg) by mouth daily 90 tablet 3     PROAIR  (90 Base) MCG/ACT inhaler Inhale 2 puffs into the lungs every 6 hours as needed for shortness of breath / dyspnea 18 g 11     selexipag (UPTRAVI) 1600 MCG tablet Take 1 tablet (1,600 mcg) by mouth every 12 hours 60 tablet 11     tadalafil, PAH, (ALYQ) 20 MG TABS Take 2 tablets (40 mg) by mouth daily 60 tablet 11     torsemide (DEMADEX) 100 MG tablet Take 1 tablet (100 mg) by mouth 2 times daily 60 tablet 3     UNABLE TO FIND MEDICATION NAME: Home Oxygen, 5-10 L       Vitamin D3 (CHOLECALCIFEROL) 25 mcg (1000 units) tablet Take 1 tablet (25 mcg) by mouth every other day 45 tablet 3       ALLERGIES  Valacyclovir      Self reported vitals:  Weight: 165#  BP not reported    Primary PH cardiologist: Dr. Lindsey      HPI:    Jimmy Isaac is a pleasant 61 year old male with a past medical history of hypertension, AV block s/p pacemaker Aug 2021, nephrolithiasis s/p percutaneous nephrolithotomy Sept 2021, CKD,  and pulmonary sarcoidosis (without cardiac involvement). He is followed here for associated pulmonary hypertension and is maintained on combination therapy with Letairis, tadalafil, and selexipag. He is also oxygen dependent.     He was seen last by Dr. Lindsey in March, at which time a referral for transplant was discussed and he was encouraged to follow with the sarcoid clinic. Repeat hemodynamic testing was planned in 3 months.    Today, I am seeing Jimmy rooney. He presented for his RHC on 6/22 and was found to have severely elevated right and left sided filling pressures with worsening PA pressures. He was then admitted for IV diuresis from 6/22 to 6/28. Notably, he had also been told by the transplant team that he was not a candidate at our institution due to his renal function. Discharge weight was 157#, and his Lasix was changed to torsemide.     He reports today that his home weight is up a bit at 165# but he says that he feels too dehydrated when in the 150s with some lightheadedness and prefers his weight in this range. He does have some LE edema but says no worse. He reports that his breathing is at his baseline. He denies any new presyncope/syncope. He remains compliant with oxygen.     He had some labs performed yesterday in [place], but unfortunately were not yet resulted at the time of our visit.      CURRENT PULMONARY HYPERTENSION REGIMEN:     PAH Rx: Selexipag 1600mcg BID, Letairs 10mg daily, and tadalafil 40mg daily   (previously on Tyvaso, transitioned to selexipag due to RV dysfunction)     Diuretics: torsemide 100mg BID      Oxygen: 5L at rest, up to 10L with exertion     Anticoagulation: none         ASSESSMENT/PLAN:      1. Pulmonary arterial hypertension.              --In the setting of pulmonary  sarcoid and chronic hypoxemia. He remains on triple combination therapy with tadalafil, ambrisentan, and selexipag. He was recently admitted for volume overload after repeat RHC showed severely elevated both right and left sided filling pressures with worsening PAP and PVR. At this point, little else to offer from a PH standpoint, and he tells me that he was told he is not a transplant candidate at our institution due to his renal disease.   --His weight is back up at home per his report with chronic stable edema. Dry weight was reported at 152# and weight at home is 165#; however, he tells me that he feels better with his weight in the low 160s. As his labs are not yet resulted and he has an appt with nephrology tomorrow, will not make any changes for now. Currently on torsemide 100mg BID.    --He was previously on digoxin for RV function, though this was discontinued last year in the setting of his AV block now s/p pacemaker.    --Continue supplemental oxygen as is, and follow with the sarcoid clinic.       Follow up plan: Return in ~6 weeks to see Dr. Coon for follow up.        Testing/labs:    Most recent labs:      Latest Reference Range & Units 06/28/22 05:28 07/11/22 13:53   Sodium 136 - 145 mmol/L 137    Potassium 3.4 - 5.3 mmol/L 4.7    Chloride 98 - 107 mmol/L 91 (L)    Carbon Dioxide (CO2) 22 - 29 mmol/L 33 (H)    Urea Nitrogen 8.0 - 23.0 mg/dL 104.0 (H)    Creatinine 0.67 - 1.17 mg/dL 2.55 (H)    GFR Estimate >60 mL/min/1.73m2 28 (L)    Calcium 8.8 - 10.2 mg/dL 9.1    Anion Gap 7 - 15 mmol/L 13    Magnesium 1.7 - 2.3 mg/dL 2.4 (H)    Glucose 70 - 99 mg/dL 113 (H)    WBC 4.0 - 11.0 10e3/uL  10.7   Hemoglobin 13.3 - 17.7 g/dL  12.5 (L)   Hematocrit 40.0 - 53.0 %  39.5 (L)   Platelet Count 150 - 450 10e3/uL  161   RBC Count 4.40 - 5.90 10e6/uL  4.11 (L)   MCV 78 - 100 fL  96   MCH 26.5 - 33.0 pg  30.4   MCHC 31.5 - 36.5 g/dL  31.6   RDW 10.0 - 15.0 %  15.7 (H)   (L): Data is abnormally low  (H): Data is  abnormally high    Other recent pertinent testing:  [facility] June 2022  Hemodynamics    Performed with 10L oxygen (baseline 5L)  RA 16  /16  /50 (67)  PCWP 21  Wedge sat 97%  Lesia CO 3.64 CI 1.85  TDCO 3.5 CI 1.78   PVR 13.2 by TDCO Right sided filling pressures are severely elevated. Left sided filling pressures are moderately elevated. Severely elevated pulmonary artery hypertension. Normal cardiac output level. Hemodynamic data has been modified in Epic per physician review.     Echo   9/22/2021  Interpretation Summary  Technically difficult study.Extremely poor acoustic windows.  Small LV size.  The visual ejection fraction is 55-60%.  Mild right ventricular dilation is present.  Global right ventricular function is mildly to moderately reduced.  Right ventricular systolic pressure is 37mmHg above the right atrial pressure.  IVC diameter >2.1 cm collapsing <50% with sniff suggests a high RA pressure  estimated at 15 mmHg or greater.  No pericardial effusion is present.      NYHA Functional Class:  3      I have reviewed the note as documented above.  This accurately captures the substance of my conversation with the patient.      Phone call contact time  Call Started at 0932  Call Ended at 0943    An additional 15 minutes was spent today performing chart and history review, pre and post visit documentation, review of recent testing, and care coordination.        Gauri Laurent PA-C  [facility]  Pager [phone]

## 2022-07-12 NOTE — PATIENT INSTRUCTIONS
Medication Changes:  No changes    Patient Instructions:  1. Continue staying active and eat a heart healthy diet.    2. Please keep current list of medications with you at all times.    3. Remember to weigh yourself daily after voiding and before you consume any food or beverages and log the numbers.  If you have gained 2 pounds overnight or 5 pounds in a week contact us immediately for medication adjustments or further instructions.    4. **Please call us immediately if you have any syncope (fainting or passing out), chest pain, edema (swelling or weight gain), or decline in your functional status (general decline in how you are feeling).    5. Patients on Remodulin (treprostinil) or Veletri (epoprostenol): Please make sure that you have your backup pump and supplies with you at all times, your mixing instructions, and contact information for your specialty pharmacy.    Follow up Appointment Information:  Follow-up with Dr. Lindsey in 6 weeks    We are located on the third floor of the [facility] ([facility] on the [facility].  Our address is     [address] [place] [address] on [address] [place] [address]    Thank you for allowing us to be a part of your care here at the [facility]    If you have questions or concerns please contact us at:    Obdulia Rome, RN, BSN, PHN  Radha Schmitt (Scheduling,Prior Auth)  Nurse Coordinator     Clinic   Pulmonary Hypertension   Pulmonary Hypertension  [facility]  (Phone)[phone]   (Phone) [phone]        (Fax) [phone]    ** Please note that you will NOT receive a reminder call regarding your scheduled testing, reminder calls are for provider appointments only.  If you are scheduled for testing within the [employer] system you may receive a call regarding pre-registration for billing purposes only.**     Support  Group:  Pulmonary Hypertension Association  Https://www.phassociation.org/  **Look at the Events Tab** They even have Support Groups that you can call into    [facility] PH Support Group  Second Saturday of the Month from 1-3 PM   [facility] [address]  Leader: Sandra Jacques  Phone: [phone]  Email: [email or web address]

## 2022-07-12 NOTE — LETTER
7/12/2022      RE: Jimmy Isaac  [id number] [address] [address]       Dear Colleague,    Thank you for the opportunity to participate in the care of your patient, Jimmy Isaac, at the [facility]. Please see a copy of my visit note below.      CARDIOLOGY PH CLINIC TELEPHONE VISIT  (converted from video visit due to lack of HIPPA compliant space in clinic)    Date of telephone visit: 07/12/22      Jimmy Isaac is a 61 year old male who is being evaluated via a billable telephone visit.      I have reviewed and updated the patient's Past Medical History, Social History, Family History and Medication List.      MEDICATIONS:  Current Outpatient Medications   Medication Sig Dispense Refill     ambrisentan (LETAIRIS) 10 MG tablet Take 10 mg by mouth every morning  30 tablet 11     mometasone-formoterol (DULERA) 200-5 MCG/ACT inhaler Inhale 2 puffs into the lungs 2 times daily 13 g 11     potassium chloride ER (KLOR-CON M) 20 MEQ CR tablet Take 2 tablets (40 mEq) by mouth 2 times daily for 30 days 120 tablet 0     predniSONE (DELTASONE) 10 MG tablet Take 1 tablet (10 mg) by mouth daily 90 tablet 3     PROAIR  (90 Base) MCG/ACT inhaler Inhale 2 puffs into the lungs every 6 hours as needed for shortness of breath / dyspnea 18 g 11     selexipag (UPTRAVI) 1600 MCG tablet Take 1 tablet (1,600 mcg) by mouth every 12 hours 60 tablet 11     tadalafil, PAH, (ALYQ) 20 MG TABS Take 2 tablets (40 mg) by mouth daily 60 tablet 11     torsemide (DEMADEX) 100 MG tablet Take 1 tablet (100 mg) by mouth 2 times daily 60 tablet 3     UNABLE TO FIND MEDICATION NAME: Home Oxygen, 5-10 L       Vitamin D3 (CHOLECALCIFEROL) 25 mcg (1000 units) tablet Take 1 tablet (25 mcg) by mouth every other day 45 tablet 3       ALLERGIES  Valacyclovir      Self reported vitals:  Weight: 165#  BP not reported    Primary PH cardiologist:   Rosalia      HPI:  Mr. Jimmy Isaac is a pleasant 61 year old male with a past medical history of hypertension, AV block s/p pacemaker Aug 2021, nephrolithiasis s/p percutaneous nephrolithotomy Sept 2021, CKD,  and pulmonary sarcoidosis (without cardiac involvement). He is followed here for associated pulmonary hypertension and is maintained on combination therapy with Letairis, tadalafil, and selexipag. He is also oxygen dependent.     He was seen last by Dr. Lindsey in March, at which time a referral for transplant was discussed and he was encouraged to follow with the sarcoid clinic. Repeat hemodynamic testing was planned in 3 months.    Today, I am seeing Jimmy rooney. He presented for his RHC on 6/22 and was found to have severely elevated right and left sided filling pressures with worsening PA pressures. He was then admitted for IV diuresis from 6/22 to 6/28. Notably, he had also been told by the transplant team that he was not a candidate at our institution due to his renal function. Discharge weight was 157#, and his Lasix was changed to torsemide.     He reports today that his home weight is up a bit at 165# but he says that he feels too dehydrated when in the 150s with some lightheadedness and prefers his weight in this range. He does have some LE edema but says no worse. He reports that his breathing is at his baseline. He denies any new presyncope/syncope. He remains compliant with oxygen.     He had some labs performed yesterday in [place], but unfortunately were not yet resulted at the time of our visit.      CURRENT PULMONARY HYPERTENSION REGIMEN:     PAH Rx: Selexipag 1600mcg BID, Letairs 10mg daily, and tadalafil 40mg daily   (previously on Tyvaso, transitioned to selexipag due to RV dysfunction)     Diuretics: torsemide 100mg BID      Oxygen: 5L at rest, up to 10L with exertion     Anticoagulation: none         ASSESSMENT/PLAN:      1. Pulmonary arterial hypertension.              --In the  setting of pulmonary sarcoid and chronic hypoxemia. He remains on triple combination therapy with tadalafil, ambrisentan, and selexipag. He was recently admitted for volume overload after repeat RHC showed severely elevated both right and left sided filling pressures with worsening PAP and PVR. At this point, little else to offer from a PH standpoint, and he tells me that he was told he is not a transplant candidate at our institution due to his renal disease.   --His weight is back up at home per his report with chronic stable edema. Dry weight was reported at 152# and weight at home is 165#; however, he tells me that he feels better with his weight in the low 160s. As his labs are not yet resulted and he has an appt with nephrology tomorrow, will not make any changes for now. Currently on torsemide 100mg BID.    --He was previously on digoxin for RV function, though this was discontinued last year in the setting of his AV block now s/p pacemaker.    --Continue supplemental oxygen as is, and follow with the sarcoid clinic.       Follow up plan: Return in ~6 weeks to see Dr. Coon for follow up.        Testing/labs:    Most recent labs:      Latest Reference Range & Units 06/28/22 05:28 07/11/22 13:53   Sodium 136 - 145 mmol/L 137    Potassium 3.4 - 5.3 mmol/L 4.7    Chloride 98 - 107 mmol/L 91 (L)    Carbon Dioxide (CO2) 22 - 29 mmol/L 33 (H)    Urea Nitrogen 8.0 - 23.0 mg/dL 104.0 (H)    Creatinine 0.67 - 1.17 mg/dL 2.55 (H)    GFR Estimate >60 mL/min/1.73m2 28 (L)    Calcium 8.8 - 10.2 mg/dL 9.1    Anion Gap 7 - 15 mmol/L 13    Magnesium 1.7 - 2.3 mg/dL 2.4 (H)    Glucose 70 - 99 mg/dL 113 (H)    WBC 4.0 - 11.0 10e3/uL  10.7   Hemoglobin 13.3 - 17.7 g/dL  12.5 (L)   Hematocrit 40.0 - 53.0 %  39.5 (L)   Platelet Count 150 - 450 10e3/uL  161   RBC Count 4.40 - 5.90 10e6/uL  4.11 (L)   MCV 78 - 100 fL  96   MCH 26.5 - 33.0 pg  30.4   MCHC 31.5 - 36.5 g/dL  31.6   RDW 10.0 - 15.0 %  15.7 (H)   (L): Data is  abnormally low  (H): Data is abnormally high    Other recent pertinent testing:  RHC June 2022  Hemodynamics    Performed with 10L oxygen (baseline 5L)  RA 16  /16  /50 (67)  PCWP 21  Wedge sat 97%  Lesia CO 3.64 CI 1.85  TDCO 3.5 CI 1.78   PVR 13.2 by TDCO Right sided filling pressures are severely elevated. Left sided filling pressures are moderately elevated. Severely elevated pulmonary artery hypertension. Normal cardiac output level. Hemodynamic data has been modified in Epic per physician review.     Echo   9/22/2021  Interpretation Summary  Technically difficult study.Extremely poor acoustic windows.  Small LV size.  The visual ejection fraction is 55-60%.  Mild right ventricular dilation is present.  Global right ventricular function is mildly to moderately reduced.  Right ventricular systolic pressure is 37mmHg above the right atrial pressure.  IVC diameter >2.1 cm collapsing <50% with sniff suggests a high RA pressure  estimated at 15 mmHg or greater.  No pericardial effusion is present.      NYHA Functional Class:  3      I have reviewed the note as documented above.  This accurately captures the substance of my conversation with the patient.      Phone call contact time  Call Started at 0932  Call Ended at 0943    An additional 15 minutes was spent today performing chart and history review, pre and post visit documentation, review of recent testing, and care coordination.        Gauri Laurent PA-C  [facility]  Pager [phone]

## 2022-07-12 NOTE — NURSING NOTE
Chief Complaint   Patient presents with     Follow Up     Post hospital follow up, no updates per pt. Medications reviewed via [employer], no changes per pt.      Patient denies any changes since echeck-in regarding medication and allergies and states all information entered during echeck-in remains accurate.    Claritza Corral, Visit Facilitator/MA.

## 2022-07-13 NOTE — LETTER
7/13/2022       RE: Jimmy Isaac  [address] [address] [address]     Dear Colleague,    Thank you for referring your patient, Jimmy Isaac, to the [facility]. Please see a copy of my visit note below.    I called the patient by phone. We both prefer that we do a video visit julieth because I need to assess his volume status. We agreed to schedule for  Visit julieth.      Again, thank you for allowing me to participate in the care of your patient.      Sincerely,    PHANI AZEVEDO MD

## 2022-07-13 NOTE — PROGRESS NOTES
I called the patient by phone. We both prefer that we do a video visit julieth because I need to assess his volume status. We agreed to schedule for  Visit julieth.

## 2022-07-14 NOTE — PROGRESS NOTES
Primary Provider: Cristhian Busch    CC:   CKD stage IV    HPI:   This is a follow up video visit after hospital discharge. Jimmy Isaac is a pleasant 61 year old male  who presents for evaluation of CKD IV. He was admitted 3 weeks ago to the hospital for volume overload and received IV diuresis.      He has a complicated medical history with some active problems highlighted below:    1-Pulmonary HTN secondary right heart systolic dysfunction with chronic hypoxic respiratory failure: thought to be secondary to pulmonary sarcoidosis.  He is on triple combination therapy with tadalafil, ambrisentan, and selexipag.He has chronic hypoxemia and has been oxygen dependent since 2012. He uses 5 L/min at rest and increases that to 10 ml/min upon exertion.    2-Complete heart block S/P pacemaker insertion on 8/2/21    3-Chronic kidney disease stage 4; thought to be secondary to nephrolithiasis.    4-Nephrolithiasis s/p several interventions by urology    5-Pulmonary sarcoidosis on chronic steroids    6-Ex-Smoker for 16 years till 2010    Since his hospital discharge he has been doing well.  He is now taking torsemide 100 mg twice daily.  His edema has much improved.  His weight upon discharge from the hospital was 157 pounds.  He is now at 165 pounds but mentions that he is comfortable with that weight.  He is still not able to lie flat and sleeps in a chair but definitely feels much better than when I saw him last in clinic.    His appetite is good, no nausea, no vomiting, no dysphagia, no myoclonic jerks.  He denies any urinary symptoms, burning, dysuria or hematuria.  He has frequency which he attributes to the water pills.   No fever, no cough.    Wt Readings from Last 4 Encounters:   07/14/22 74.8 kg (165 lb)   06/28/22 71.2 kg (157 lb)   06/02/22 80.1 kg (176 lb 8 oz)   05/25/22 80.3 kg (177 lb)       Allergies   Allergen Reactions     Valacyclovir Rash     Diffuse body rash       ambrisentan (LETAIRIS) 10 MG tablet,  Take 10 mg by mouth every morning   mometasone-formoterol (DULERA) 200-5 MCG/ACT inhaler, Inhale 2 puffs into the lungs 2 times daily  potassium chloride ER (KLOR-CON M) 20 MEQ CR tablet, Take 2 tablets (40 mEq) by mouth 2 times daily for 30 days  predniSONE (DELTASONE) 10 MG tablet, Take 1 tablet (10 mg) by mouth daily  PROAIR  (90 Base) MCG/ACT inhaler, Inhale 2 puffs into the lungs every 6 hours as needed for shortness of breath / dyspnea  selexipag (UPTRAVI) 1600 MCG tablet, Take 1 tablet (1,600 mcg) by mouth every 12 hours  tadalafil, PAH, (ALYQ) 20 MG TABS, Take 2 tablets (40 mg) by mouth daily  torsemide (DEMADEX) 100 MG tablet, Take 1 tablet (100 mg) by mouth 2 times daily  UNABLE TO FIND, MEDICATION NAME: Home Oxygen, 5-10 L  Vitamin D3 (CHOLECALCIFEROL) 25 mcg (1000 units) tablet, Take 1 tablet (25 mcg) by mouth every other day  [DISCONTINUED] furosemide (LASIX) 20 MG tablet, Take 6 tablets (120 mg) by mouth 2 times daily  [DISCONTINUED] hydrochlorothiazide (HYDRODIURIL) 12.5 MG tablet, Take 2 tablets (25 mg) by mouth daily    No current facility-administered medications on file prior to visit.      Past Medical History:   Diagnosis Date     Acute renal failure superimposed on stage 4 chronic kidney disease, unspecified acute renal failure type (H) 5/8/2022     Chronic sinusitis      CKD (chronic kidney disease)      Heart disease      Hypertension      Keratosis     frontal scalp, treated with liquid nitrogen at [facility]     Malignant neoplasm (H)      Pneumonia a few times     Pulmonary sarcoidosis (H)        Past Surgical History:   Procedure Laterality Date     COLONOSCOPY       CV RIGHT HEART CATH MEASUREMENTS RECORDED N/A 6/19/2019    Procedure: CV RIGHT HEART CATH;  Surgeon: Kale Delgado MD;  Location:  HEART CARDIAC CATH LAB     CV RIGHT HEART CATH MEASUREMENTS RECORDED N/A 12/11/2019    Procedure: CV RIGHT HEART CATH;  Surgeon: Ion Lemon MD;  Location:   HEART CARDIAC CATH LAB     CV RIGHT HEART CATH MEASUREMENTS RECORDED N/A 2021    Procedure: CV RIGHT HEART CATH;  Surgeon: Jhony Barone MD;  Location:  HEART CARDIAC CATH LAB     CV RIGHT HEART CATH MEASUREMENTS RECORDED N/A 2022    Procedure: Heart Cath Right Heart Cath;  Surgeon: Emerson Yoo MD;  Location:  HEART CARDIAC CATH LAB     ENT SURGERY      wisdom teeth extraction     EP PACEMAKER Left 2021    Procedure: EP Pacemaker;  Surgeon: Dora Fontanez MD;  Location:  HEART CARDIAC CATH LAB     IR NEPHROSTOMY TUBE PLACEMENT LEFT  2021     LASER HOLMIUM NEPHROLITHOTOMY VIA PERCUTANEOUS NEPHROSTOMY Left 2021    Procedure: NEPHROLITHOTOMY, PERCUTANEOUS, USING HOLMIUM LASER;  Surgeon: Zackery Moura MD;  Location:  OR     TONSILLECTOMY  1964       Social History     Tobacco Use     Smoking status: Former Smoker     Packs/day: 1.00     Years: 30.00     Pack years: 30.00     Types: Cigarettes     Start date: 10/10/1975     Quit date: 3/1/2010     Years since quittin.3     Smokeless tobacco: Never Used   Substance Use Topics     Alcohol use: Not Currently     Alcohol/week: 0.0 standard drinks     Comment: stated 2 bottles of beer occ     Drug use: Not Currently     Comment: past use of marijuana       Family History   Problem Relation Age of Onset     Coronary Artery Disease Father      Heart Disease Father         heart attack age 35-40     Hypertension Mother      Hyperlipidemia Mother      Cerebrovascular Disease Mother      Dementia Mother      Lupus Sister      Glaucoma No family hx of      Macular Degeneration No family hx of        ROS: A 12 system review of systems was negative other than noted here or above.     Physical Exam:  Ht 1.829 m (6')   Wt 74.8 kg (165 lb)   BMI 22.38 kg/m    GENERAL APPEARANCE: not in distress, full sentences  LE: edema much better than when I saw him in clinic; would assess as trace-+1 at this point as compared to 2-3 last  visit    Results:  Reviewed in details with the patient.    Assessment/Plan:    1-CKD (chronic kidney disease) stage 4, GFR 15-29 ml/min (H)  (primary encounter diagnosis)  This is thought to be secondary to nephrolithiasis but also might be related to repetitive acute kidney injuries and aging.  He was already enrolled in the CKD journey.  His estimated GFR at this stage is 26 mL/min however we will do a 24-hour urine collection to calculate his actual GFR given his small muscle mass.  His volume status has much improved and he continues to be on torsemide 100 mg twice daily.  I emphasized the importance of limiting salt and consequently water intake and maintaining a dry weight of around 163 pounds.    2-Pulmonary hypertension (H) with Right heart failure  He had a right heart cath on 6/22 which showed severely elevated right-sided filling pressure as well as severely elevated pulmonary arterial pressure with moderately elevated left-sided filling pressure .  He was diuresed during his hospital stay and his dry weight upon discharge was 157 pounds.  His weight today is 165 pounds.    3-Hypokalemia:   This is likely secondary to diuresis and decreased p.o. intake.  He is currently on potassium 20 mEq twice daily.  His potassium level is at goal 4.1 mmol/L.    4-CKD-MBD: His calcium, phosphorus, PTH, vitamin D are within normal range    5-positive UA: His urine culture grew Enterococcus faecalis, however he is completely asymptomatic so we will not treat at this point.    6-no evidence of anemia    7-Follow-up in clinic in 3 months    The total time of this encounter amounted to 60 minutes on the day of the encounter 7/14/2022. This time included face to face time spent with the patient, preparatory work and chart review, ordering tests, and performing post visit documentation.    Luiza Cho MD   [facility]   Department of Medicine   Division of Renal Disease and Hypertension

## 2022-07-14 NOTE — LETTER
7/14/2022       RE: Jimmy Isaac  [address] [address] [address]     Dear Colleague,    Thank you for referring your patient, Jimmy Isaac, to the [facility] NEPHROLOG[facility]IC [place] at [facility]. Please see a copy of my visit note below.          Primary Provider: Cristhian Busch    CC:   CKD stage IV    HPI:   This is a follow up video visit after hospital discharge. Jimmy Isaac is a pleasant 61 year old male  who presents for evaluation of CKD IV. He was admitted 3 weeks ago to the hospital for volume overload and received IV diuresis.      He has a complicated medical history with some active problems highlighted below:    1-Pulmonary HTN secondary right heart systolic dysfunction with chronic hypoxic respiratory failure: thought to be secondary to pulmonary sarcoidosis.  He is on triple combination therapy with tadalafil, ambrisentan, and selexipag.He has chronic hypoxemia and has been oxygen dependent since 2012. He uses 5 L/min at rest and increases that to 10 ml/min upon exertion.    2-Complete heart block S/P pacemaker insertion on 8/2/21    3-Chronic kidney disease stage 4; thought to be secondary to nephrolithiasis.    4-Nephrolithiasis s/p several interventions by urology    5-Pulmonary sarcoidosis on chronic steroids    6-Ex-Smoker for 16 years till 2010    Since his hospital discharge he has been doing well.  He is now taking torsemide 100 mg twice daily.  His edema has much improved.  His weight upon discharge from the hospital was 157 pounds.  He is now at 165 pounds but mentions that he is comfortable with that weight.  He is still not able to lie flat and sleeps in a chair but definitely feels much better than when I saw him last in clinic.    His appetite is good, no nausea, no vomiting, no dysphagia, no myoclonic jerks.  He denies any urinary symptoms, burning, dysuria or hematuria.  He has frequency which he attributes  to the water pills.   No fever, no cough.    Wt Readings from Last 4 Encounters:   07/14/22 74.8 kg (165 lb)   06/28/22 71.2 kg (157 lb)   06/02/22 80.1 kg (176 lb 8 oz)   05/25/22 80.3 kg (177 lb)       Allergies   Allergen Reactions     Valacyclovir Rash     Diffuse body rash       ambrisentan (LETAIRIS) 10 MG tablet, Take 10 mg by mouth every morning   mometasone-formoterol (DULERA) 200-5 MCG/ACT inhaler, Inhale 2 puffs into the lungs 2 times daily  potassium chloride ER (KLOR-CON M) 20 MEQ CR tablet, Take 2 tablets (40 mEq) by mouth 2 times daily for 30 days  predniSONE (DELTASONE) 10 MG tablet, Take 1 tablet (10 mg) by mouth daily  PROAIR  (90 Base) MCG/ACT inhaler, Inhale 2 puffs into the lungs every 6 hours as needed for shortness of breath / dyspnea  selexipag (UPTRAVI) 1600 MCG tablet, Take 1 tablet (1,600 mcg) by mouth every 12 hours  tadalafil, PAH, (ALYQ) 20 MG TABS, Take 2 tablets (40 mg) by mouth daily  torsemide (DEMADEX) 100 MG tablet, Take 1 tablet (100 mg) by mouth 2 times daily  UNABLE TO FIND, MEDICATION NAME: Home Oxygen, 5-10 L  Vitamin D3 (CHOLECALCIFEROL) 25 mcg (1000 units) tablet, Take 1 tablet (25 mcg) by mouth every other day  [DISCONTINUED] furosemide (LASIX) 20 MG tablet, Take 6 tablets (120 mg) by mouth 2 times daily  [DISCONTINUED] hydrochlorothiazide (HYDRODIURIL) 12.5 MG tablet, Take 2 tablets (25 mg) by mouth daily    No current facility-administered medications on file prior to visit.      Past Medical History:   Diagnosis Date     Acute renal failure superimposed on stage 4 chronic kidney disease, unspecified acute renal failure type (H) 5/8/2022     Chronic sinusitis      CKD (chronic kidney disease)      Heart disease      Hypertension      Keratosis     frontal scalp, treated with liquid nitrogen at [facility]     Malignant neoplasm (H)      Pneumonia a few times     Pulmonary sarcoidosis (H)        Past Surgical History:   Procedure Laterality Date      COLONOSCOPY       CV RIGHT HEART CATH MEASUREMENTS RECORDED N/A 2019    Procedure: CV RIGHT HEART CATH;  Surgeon: Kale Delgado MD;  Location:  HEART CARDIAC CATH LAB     CV RIGHT HEART CATH MEASUREMENTS RECORDED N/A 2019    Procedure: CV RIGHT HEART CATH;  Surgeon: Ion Lemon MD;  Location:  HEART CARDIAC CATH LAB     CV RIGHT HEART CATH MEASUREMENTS RECORDED N/A 2021    Procedure: CV RIGHT HEART CATH;  Surgeon: Jhony Barone MD;  Location:  HEART CARDIAC CATH LAB     CV RIGHT HEART CATH MEASUREMENTS RECORDED N/A 2022    Procedure: Heart Cath Right Heart Cath;  Surgeon: Emerson Yoo MD;  Location:  HEART CARDIAC CATH LAB     ENT SURGERY      wisdom teeth extraction     EP PACEMAKER Left 2021    Procedure: EP Pacemaker;  Surgeon: Dora Fontanez MD;  Location:  HEART CARDIAC CATH LAB     IR NEPHROSTOMY TUBE PLACEMENT LEFT  2021     LASER HOLMIUM NEPHROLITHOTOMY VIA PERCUTANEOUS NEPHROSTOMY Left 2021    Procedure: NEPHROLITHOTOMY, PERCUTANEOUS, USING HOLMIUM LASER;  Surgeon: Zackery Moura MD;  Location:  OR     TONSILLECTOMY  1964       Social History     Tobacco Use     Smoking status: Former Smoker     Packs/day: 1.00     Years: 30.00     Pack years: 30.00     Types: Cigarettes     Start date: 10/10/1975     Quit date: 3/1/2010     Years since quittin.3     Smokeless tobacco: Never Used   Substance Use Topics     Alcohol use: Not Currently     Alcohol/week: 0.0 standard drinks     Comment: stated 2 bottles of beer occ     Drug use: Not Currently     Comment: past use of marijuana       Family History   Problem Relation Age of Onset     Coronary Artery Disease Father      Heart Disease Father         heart attack age 35-40     Hypertension Mother      Hyperlipidemia Mother      Cerebrovascular Disease Mother      Dementia Mother      Lupus Sister      Glaucoma No family hx of      Macular Degeneration No family hx of        ROS: A 12  system review of systems was negative other than noted here or above.     Physical Exam:  Ht 1.829 m (6')   Wt 74.8 kg (165 lb)   BMI 22.38 kg/m    GENERAL APPEARANCE: not in distress, full sentences  LE: edema much better than when I saw him in clinic; would assess as trace-+1 at this point as compared to 2-3 last visit    Results:  Reviewed in details with the patient.    Assessment/Plan:    1-CKD (chronic kidney disease) stage 4, GFR 15-29 ml/min (H)  (primary encounter diagnosis)  This is thought to be secondary to nephrolithiasis but also might be related to repetitive acute kidney injuries and aging.  He was already enrolled in the CKD journey.  His estimated GFR at this stage is 26 mL/min however we will do a 24-hour urine collection to calculate his actual GFR given his small muscle mass.  His volume status has much improved and he continues to be on torsemide 100 mg twice daily.  I emphasized the importance of limiting salt and consequently water intake and maintaining a dry weight of around 163 pounds.    2-Pulmonary hypertension (H) with Right heart failure  He had a right heart cath on 6/22 which showed severely elevated right-sided filling pressure as well as severely elevated pulmonary arterial pressure with moderately elevated left-sided filling pressure .  He was diuresed during his hospital stay and his dry weight upon discharge was 157 pounds.  His weight today is 165 pounds.    3-Hypokalemia:   This is likely secondary to diuresis and decreased p.o. intake.  He is currently on potassium 20 mEq twice daily.  His potassium level is at goal 4.1 mmol/L.    4-CKD-MBD: His calcium, phosphorus, PTH, vitamin D are within normal range    5-positive UA: His urine culture grew Enterococcus faecalis, however he is completely asymptomatic so we will not treat at this point.    6-no evidence of anemia    7-Follow-up in clinic in 3 months    The total time of this encounter amounted to 60 minutes on the day of  the encounter 7/14/2022. This time included face to face time spent with the patient, preparatory work and chart review, ordering tests, and performing post visit documentation.    Luiza Cho MD   [facility]   Department of Medicine   Division of Renal Disease and Hypertension

## 2022-07-14 NOTE — PROGRESS NOTES
Jimmy is a 61 year old who is being evaluated via a billable video visit.      How would you like to obtain your AVS? MyChart  If the video visit is dropped, the invitation should be resent by: Send to e-mail at: [email or web address]  Will anyone else be joining your video visit? No   No BP taken        Video-Visit Details    Video Start Time: 3:34 pm    Type of service:  Video Visit    Video End Time: 4:15 pm    Originating Location (pt. Location): Home    Distant Location (provider location):  [facility] NEPHROLOG[facility] [place]     Platform used for Video Visit: Superbly

## 2022-07-27 NOTE — PROGRESS NOTES
Neph Tracking Information 7/27/2022   CKD Education Status Complete   CKD Education Completed Date 7/27/2022   CKD Education Type Kidney Smart Modality Education;Kidney Smart CKD Basics   Transplant Evaluation Referral 3/23/22   Transplant Status  Referred   No flowsheet data found.

## 2022-08-27 NOTE — PROGRESS NOTES
Constitutional:no weight loss, fever, chills, night sweats  HEENT: without visual changes, swallow difficulties  Pulmonary: without shortness of breath, cough, wheeze, hemoptysis  Cardiac: without chest pain, HOSKINS, PND, orthopnea, edema, palpitation, pre-syncope, syncope,  GI: without diarrhea, constipation, jaundice, melena, GERD, hematemesis  : without frequency, urgency, dysuria, hematuria  Skin: rash, bruise, open lesions  Neuro: without TIA, focal neurologic complaints, seizure, trauma  Ortho: without pain, swelling, mobility impairment  Endocrine: diabetes, thyroid, heat/cold intolerance, polyuria, polyphagia, change bowel habits.  Sleep:no BILLY, periodic breathing, fatigue    Constitutional: alert, oriented,ab normal gait and station, normal mentation.  Oral: moist mucous membrans  Lymph: without pathologic adenopathy  Chest: basilar rales, porlonged expiration  Cor: No evidence of left or right ventricular activity.  Rhythm is regular.  S1 normal, S2 split physiologically. Murmurs are not present  Abdomen: without tenderness, rebound, guarding, masses, ascites  Extremities: Edema not present  Neuro: no focal defects, normal mentation  Skin: without open lesions  Psych: oriented, verbal, mental status in tact      Name: MATTY LOPEZ  MRN: [id number]  : 1960  Study Date: 2021 07:57 AM  Age: 60 yrs  Gender: Male  Patient Location: [facility]  Reason For Study: Pulmonary hypertension (H), SOB (shortness of breath),  Sarcoidosis  Ordering Physician: DUY MIDDLETON  Referring Physician: DUY MIDDLETON  Performed By: Kim Nieves RDCS     BSA: 2.0 m2  Height: 73 in  Weight: 165 lb  BP: 123/72 mmHg  ______________________________________________________________________________  Procedure  Echocardiogram with two-dimensional, color and spectral Doppler performed.  Adequate quality two-dimensional was performed and interpreted. The patient's  rhythm is sinus  bradycardia.  ______________________________________________________________________________  Interpretation Summary  Global and regional left ventricular function is normal with an EF of 55-60%.  Flattened septum is consistent with right ventricular pressure and volume  overload.  Global right ventricular function is mildly to moderately reduced. The  longitudinal excursion is preserved (TAPSE 2.1 cm and S 11.0 cm/s) but the  free wall contraction is reduced. The RV FAC was 25% but the basal segment of  the RV was not clearly visualized.  No significant valvular abnormality.  Pulmonary artery systolic pressure cannot be assessed due to inadequate TR  jet.  IVC diameter >2.1 cm collapsing <50% with sniff suggests a high RA pressure  estimated at 15 mmHg or greater.  No pericardial effusion is present.  This study was compared with the study from 10/29/2018. The RV function  appears to have declined based on direct visual and quantitative comparison.  This is primarily due to reduced RV free wall contraction.  ______________________________________________________________________________  Left Ventricle  Global and regional left ventricular function is normal with an EF of 55-60%.  Relative wall thickness is increased consistent with concentric remodeling.  Left ventricular diastolic function is normal. Flattened septum is consistent  with right ventricular pressure and volume overload.     Right Ventricle  Moderate right ventricular dilation is present. Global right ventricular  function is mildly to moderately reduced. The longitudinal excursion is  preserved (TAPSE 2.1 cm and S 11.0 cm/s) but the free wall contraction is  reduced. The RV FAC was 25% but the basal segment of the RV was not clearly  visualized.     Atria  Both atria appear normal. The atrial septum is intact as assessed by color  Doppler .     Mitral Valve  Mild mitral annular calcification is present. Trace mitral insufficiency is  present.      Aortic Valve  Aortic valve is normal in structure and function. The aortic valve is  tricuspid. Trace aortic insufficiency is present.     Tricuspid Valve  The tricuspid valve cannot be assessed. Trace tricuspid insufficiency is  present. The right ventricular systolic pressure is approximated at 26.4 mmHg  plus the right atrial pressure.     Pulmonic Valve  The pulmonic valve cannot be assessed. Mild pulmonic insufficiency is present.  The PA acceleration time could not be obtained.     Vessels  Sinuses of Valsalva 3.5 cm. Ascending aorta 3.3 cm. IVC diameter >2.1 cm  collapsing <50% with sniff suggests a high RA pressure estimated at 15 mmHg or  greater.     Pericardium  No pericardial effusion is present.     Compared to Previous Study  This study was compared with the study from 10/29/2018 . The RV function  appears to have declined based on direct visual and quantitative comparison.  This is primarily due to reduced RV free wall contraction.   Latest Reference Range & Units 08/31/22 09:59   Sodium 136 - 145 mmol/L 138   Potassium 3.4 - 5.3 mmol/L 4.3   Chloride 98 - 107 mmol/L 95 (L)   Carbon Dioxide (CO2) 22 - 29 mmol/L 28   Urea Nitrogen 8.0 - 23.0 mg/dL 58.5 (H)   Creatinine 0.67 - 1.17 mg/dL 2.53 (H)   GFR Estimate >60 mL/min/1.73m2 28 (L)   Calcium 8.8 - 10.2 mg/dL 9.4   Anion Gap 7 - 15 mmol/L 15   Albumin 3.5 - 5.2 g/dL 3.7   Protein Total 6.4 - 8.3 g/dL 6.1 (L)   Alkaline Phosphatase 40 - 129 U/L 75   ALT 10 - 50 U/L 15   AST 10 - 50 U/L 23   Bilirubin Total <=1.2 mg/dL 0.7   Glucose 70 - 99 mg/dL 116 (H)   WBC 4.0 - 11.0 10e3/uL 13.0 (H)   Hemoglobin 13.3 - 17.7 g/dL 13.0 (L)   Hematocrit 40.0 - 53.0 % 40.9   Platelet Count 150 - 450 10e3/uL 171   RBC Count 4.40 - 5.90 10e6/uL 4.22 (L)   MCV 78 - 100 fL 97   MCH 26.5 - 33.0 pg 30.8   MCHC 31.5 - 36.5 g/dL 31.8   RDW 10.0 - 15.0 % 14.2   (L): Data is abnormally low  (H): Data is abnormally high    Current Outpatient Medications   Medication      ambrisentan (LETAIRIS) 10 MG tablet     mometasone-formoterol (DULERA) 200-5 MCG/ACT inhaler     potassium chloride ER (KLOR-CON M) 20 MEQ CR tablet     predniSONE (DELTASONE) 10 MG tablet     PROAIR  (90 Base) MCG/ACT inhaler     selexipag (UPTRAVI) 1600 MCG tablet     tadalafil, PAH, (ALYQ) 20 MG TABS     torsemide (DEMADEX) 100 MG tablet     UNABLE TO FIND     Vitamin D3 (CHOLECALCIFEROL) 25 mcg (1000 units) tablet     No current facility-administered medications for this visit.     Answers for HPI/ROS submitted by the patient on 8/30/2022  General Symptoms: No  Skin Symptoms: Yes  HENT Symptoms: Yes  EYE SYMPTOMS: No  HEART SYMPTOMS: Yes  LUNG SYMPTOMS: Yes  INTESTINAL SYMPTOMS: Yes  URINARY SYMPTOMS: Yes  REPRODUCTIVE SYMPTOMS: No  SKELETAL SYMPTOMS: Yes  BLOOD SYMPTOMS: Yes  NERVOUS SYSTEM SYMPTOMS: No  MENTAL HEALTH SYMPTOMS: Yes  Ear pain: No  Ear discharge: No  Hearing loss: No  Tinnitus: No  Nosebleeds: No  Congestion: Yes  Sinus pain: Yes  Trouble swallowing: Yes   Voice hoarseness: No  Mouth sores: No  Sore throat: No  Tooth pain: Yes  Gum tenderness: Yes  Change in taste: Yes  Change in sense of smell: No  Dry mouth: Yes  Hearing aid used: No  Neck lump: No  Changes in hair: No  Changes in moles/birth marks: No  Itching: No  Rashes: No  Changes in nails: No  Acne: No  Change in facial hair: No  Warts: No  Non-healing sores: No  Scarring: No  Flaking of skin: Yes  Color changes of hands/feet in cold : No  Sun sensitivity: No  Skin thickening: No  Chest pain or pressure: Yes  Fast or irregular heartbeat: Yes  Pain in legs with walking: No  Trouble breathing while lying down: Yes  Fingers or toes appear blue: No  High blood pressure: No  Low blood pressure: No  Fainting: No  Murmurs: No  Pacemaker: Yes  Varicose veins: No  Wake up at night with shortness of breath: Yes  Light-headedness: No  Exercise intolerance: Yes  Cough: Yes  Sputum or phlegm: Yes  Coughing up blood: No  Difficulty breating or  shortness of breath: Yes  Snoring: No  Wheezing: Yes  Difficulty breathing on exertion: Yes  Nighttime Cough: No  Difficulty breathing when lying flat: Yes  Heart burn or indigestion: Yes  Nausea: No  Vomiting: No  Abdominal pain: Yes  Bloating: Yes  Constipation: No  Diarrhea: Yes  Blood in stool: No  Black stools: No  Rectal or Anal pain: No  Fecal incontinence: No  Yellowing of skin or eyes: No  Vomit with blood: No  Change in stools: No  Trouble holding urine or incontinence: Yes  Pain or burning: No  Trouble starting or stopping: No  Increased frequency of urination: Yes  Blood in urine: No  Decreased frequency of urination: No  Frequent nighttime urination: Yes  Flank pain: Yes  Difficulty emptying bladder: No  Back pain: Yes  Muscle aches: Yes  Neck pain: Yes  Swollen joints: No  Joint pain: No  Bone pain: No  Muscle cramps: No  Muscle weakness: Yes  Joint stiffness: Yes  Bone fracture: No  Edema or swelling: Yes  Anemia: No  Swollen glands: No  Easy bleeding or bruising: Yes  Nervous or Anxious: No  Depression: Yes  Trouble sleeping: Yes  Trouble thinking or concentrating: No  Mood changes: No  Panic attacks: No

## 2022-08-31 NOTE — NURSING NOTE
Chief Complaint   Patient presents with     Interstitial Lung Disease (ILD)     Follow up Sarcoids      Vitals were taken and medications were reconciled.     Elin Dowell RMA  10:39 AM

## 2022-08-31 NOTE — NURSING NOTE
Chief Complaint   Patient presents with     Follow Up     Return for 2 month PH F/U w/labs prior

## 2022-08-31 NOTE — PROGRESS NOTES
Reason for Visit  Jimmy Isaac is a 61 year old year old male who is being seen for sarcoidosis  HPI    The patient was seen and examined by Shane Ruvalcaba MD     Mr. Isaac comes for followup.  He was last seen in the Pulmonary Clinic in March.  At that time, he was on prednisone 10 mg per day, which was started in 12/2021.  He had reported improvement in fatigue and also felt that his breathing was better.  The plan was to continue the prednisone.  He had declined more aggressive anti-inflammatory therapy.    He comes in today for routine visit.  He reports worsening pulmonary symptoms.  Temporaly, it relates to an episode of shingles and he believes that his pulmonary symptoms have been worse since then.  I do not have any PFTs on him today.  He denies any other acute symptoms.  He does report low oxygen numbers at night whenever he has measured them.      Current Outpatient Medications   Medication     ambrisentan (LETAIRIS) 10 MG tablet     mometasone-formoterol (DULERA) 200-5 MCG/ACT inhaler     potassium chloride ER (KLOR-CON M) 20 MEQ CR tablet     predniSONE (DELTASONE) 10 MG tablet     PROAIR  (90 Base) MCG/ACT inhaler     selexipag (UPTRAVI) 1600 MCG tablet     tadalafil, PAH, (ALYQ) 20 MG TABS     torsemide (DEMADEX) 100 MG tablet     UNABLE TO FIND     Vitamin D3 (CHOLECALCIFEROL) 25 mcg (1000 units) tablet     No current facility-administered medications for this visit.     Allergies   Allergen Reactions     Valacyclovir Rash     Diffuse body rash     Past Medical History:   Diagnosis Date     Acute renal failure superimposed on stage 4 chronic kidney disease, unspecified acute renal failure type (H) 5/8/2022     Chronic sinusitis      CKD (chronic kidney disease)      Heart disease      Hypertension      Keratosis     frontal scalp, treated with liquid nitrogen at [facility]     Malignant neoplasm (H)      Pneumonia a few times     Pulmonary sarcoidosis (H)        Past Surgical  History:   Procedure Laterality Date     COLONOSCOPY       CV RIGHT HEART CATH MEASUREMENTS RECORDED N/A 2019    Procedure: CV RIGHT HEART CATH;  Surgeon: Kale Delgado MD;  Location:  HEART CARDIAC CATH LAB     CV RIGHT HEART CATH MEASUREMENTS RECORDED N/A 2019    Procedure: CV RIGHT HEART CATH;  Surgeon: Ion Lemon MD;  Location:  HEART [facility]ARDIAC CATH LAB     CV RIGHT HEART CATH MEASUREMENTS RECORDED N/A 2021    Procedure: CV RIGHT HEART CATH;  Surgeon: Jhony Barone MD;  Location:  HEART CARDIAC CATH LAB     CV RIGHT HEART CATH MEASUREMENTS RECORDED N/A 2022    Procedure: Heart Cath Right Heart Cath;  Surgeon: Emerson Yoo MD;  Location:  HEART CARDIAC CATH LAB     ENT SURGERY      wisdom teeth extraction     EP PACEMAKER Left 2021    Procedure: EP Pacemaker;  Surgeon: Dora oFntanez MD;  Location:  HEART CARDIAC CATH LAB     IR NEPHROSTOMY TUBE PLACEMENT LEFT  2021     LASER HOLMIUM NEPHROLITHOTOMY VIA PERCUTANEOUS NEPHROSTOMY Left 2021    Procedure: NEPHROLITHOTOMY, PERCUTANEOUS, USING HOLMIUM LASER;  Surgeon: Zackery Moura MD;  Location:  OR     TONSILLECTOMY  1964       Social History     Socioeconomic History     Marital status: Single     Spouse name: Not on file     Number of children: Not on file     Years of education: Not on file     Highest education level: Not on file   Occupational History     Not on file   Tobacco Use     Smoking status: Former Smoker     Packs/day: 1.00     Years: 30.00     Pack years: 30.00     Types: Cigarettes     Start date: 10/10/1975     Quit date: 3/1/2010     Years since quittin.5     Smokeless tobacco: Never Used   Substance and Sexual Activity     Alcohol use: Not Currently     Alcohol/week: 0.0 standard drinks     Comment: stated 2 bottles of beer occ     Drug use: Not Currently     Comment: past use of marijuana     Sexual activity: Not Currently     Partners: Female     Birth  control/protection: Abstinence, Female Surgical   Other Topics Concern     Parent/sibling w/ CABG, MI or angioplasty before 65F 55M? Not Asked      Service Not Asked     Blood Transfusions Not Asked     Caffeine Concern Yes     Occupational Exposure Not Asked     Hobby Hazards Not Asked     Sleep Concern Not Asked     Stress Concern Not Asked     Weight Concern Not Asked     Special Diet Not Asked     Back Care Not Asked     Exercise Yes     Bike Helmet Not Asked     Seat Belt Not Asked     Self-Exams Not Asked   Social History Narrative     Not on file     Social Determinants of Health     Financial Resource Strain: Not on file   Food Insecurity: Not on file   Transportation Needs: Not on file   Physical Activity: Not on file   Stress: Not on file   Social Connections: Not on file   Intimate Partner Violence: Not on file   Housing Stability: Not on file       ROS Pulmonary  A complete ROS was otherwise negative except as noted in the HPI.  /60   Pulse 63   Ht 1.829 m (6')   Wt 75.3 kg (166 lb)   SpO2 98%   BMI 22.51 kg/m    Exam:   GENERAL APPEARANCE: Alert, and in no apparent distress.  EYES: PERRL, EOMI  MOUTH: Oral mucosa is moist, without any lesions,, no oropharyngeal exudate.  NECK: supple, no masses, no thyromegaly.  LYMPHATICS: No significant axillary, cervical, or supraclavicular nodes.  RESP: normal percussion, good air flow throughout.  ** crackles. No rhonchi. No wheezes.  CV: Normal S1, S2, regular rhythm, normal rate. No murmur.  No rub. No gallop. No LE edema.   ABDOMEN:  Bowel sounds normal, soft, nontender, no HSM or masses.   MS: extremities normal. No clubbing. No cyanosis.  SKIN: no rash on limited exam  NEURO: Mentation intact, speech normal, normal strength and tone, normal gait and stance    Results:  Labs reviewed.  Electrolyte panel shows a creatinine of 2.53.  Total protein is 6.1.  LFTs are within normal range.  White count is 13.0, hemoglobin is 13.0, and the platelet  count is 171.    Assessment and plan: Mr. 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  Worsening functional status in 12/2021, for which prednisone was started.  1.  Pulmonary:  No PFTs today, but subjectively reports worsening symptoms.  We will get a spirometry.  I do not think that prednisone has helped.  There may be some risk of opportunistic infections with it.  We will start the prednisone taper to off with him decreasing the dose by 2.5 mg every month.  We will also add LAMA given that he does have obstruction, which likely represents airway involvement by sarcoid with remodeled/fibrotic airways.  If there is any possibility of reversibility, the addition of LAMA could improve the quality of life.He will continue dulera.  2. Extrapulmonary sarcoidosis and pulmonary hypertension, for which he is on triple therapy and follows with Dr. Lindsey.  Labs done today were reviewed.  We will add CRP, vitamin D and PTH to it.  3.  Hypoxia: Uses 10 L of oxygen with activity which we will continue to do.   4. Elevated WBC.  We will add porcalciton and CRP to the counts.  5. He is not a candidate for lung transplant at [facility]. Will discuss with Dr Lindsey if he should be evaluated at other centers.     I will see him back in 6 months with full PFTs.    Labs reviewed.  Electrolyte panel with creatinine of 2.53.  Total protein is 6.1.  LFTs are in the normal range.  N-terminal BNP is elevated at 7167.  White count is 13.0.  We do not have a differential..  The lab does not perform procalcitonin or CRP.  Will have care coordinators reach out to the patient to see if he can get these done locally.    This note consists of symbols derived from keyboarding, dictation and/or voice recognition software. As a result, there may be errors in the script that have gone undetected. Please consider this when  interpreting information found in this chart    Answers for HPI/ROS submitted by the patient on 8/30/2022  General Symptoms: No  Skin Symptoms: Yes  HENT Symptoms: Yes  EYE SYMPTOMS: No  HEART SYMPTOMS: Yes  LUNG SYMPTOMS: Yes  INTESTINAL SYMPTOMS: Yes  URINARY SYMPTOMS: Yes  REPRODUCTIVE SYMPTOMS: No  SKELETAL SYMPTOMS: Yes  BLOOD SYMPTOMS: Yes  NERVOUS SYSTEM SYMPTOMS: No  MENTAL HEALTH SYMPTOMS: Yes  Ear pain: No  Ear discharge: No  Hearing loss: No  Tinnitus: No  Nosebleeds: No  Congestion: Yes  Sinus pain: Yes  Trouble swallowing: Yes   Voice hoarseness: No  Mouth sores: No  Sore throat: No  Tooth pain: Yes  Gum tenderness: Yes  Change in taste: Yes  Change in sense of smell: No  Dry mouth: Yes  Hearing aid used: No  Neck lump: No  Changes in hair: No  Changes in moles/birth marks: No  Itching: No  Rashes: No  Changes in nails: No  Acne: No  Change in facial hair: No  Warts: No  Non-healing sores: No  Scarring: No  Flaking of skin: Yes  Color changes of hands/feet in cold : No  Sun sensitivity: No  Skin thickening: No  Chest pain or pressure: Yes  Fast or irregular heartbeat: Yes  Pain in legs with walking: No  Trouble breathing while lying down: Yes  Fingers or toes appear blue: No  High blood pressure: No  Low blood pressure: No  Fainting: No  Murmurs: No  Pacemaker: Yes  Varicose veins: No  Wake up at night with shortness of breath: Yes  Light-headedness: No  Exercise intolerance: Yes  Cough: Yes  Sputum or phlegm: Yes  Coughing up blood: No  Difficulty breating or shortness of breath: Yes  Snoring: No  Wheezing: Yes  Difficulty breathing on exertion: Yes  Nighttime Cough: No  Difficulty breathing when lying flat: Yes  Heart burn or indigestion: Yes  Nausea: No  Vomiting: No  Abdominal pain: Yes  Bloating: Yes  Constipation: No  Diarrhea: Yes  Blood in stool: No  Black stools: No  Rectal or Anal pain: No  Fecal incontinence: No  Yellowing of skin or eyes: No  Vomit with blood: No  Change in stools:  No  Trouble holding urine or incontinence: Yes  Pain or burning: No  Trouble starting or stopping: No  Increased frequency of urination: Yes  Blood in urine: No  Decreased frequency of urination: No  Frequent nighttime urination: Yes  Flank pain: Yes  Difficulty emptying bladder: No  Back pain: Yes  Muscle aches: Yes  Neck pain: Yes  Swollen joints: No  Joint pain: No  Bone pain: No  Muscle cramps: No  Muscle weakness: Yes  Joint stiffness: Yes  Bone fracture: No  Edema or swelling: Yes  Anemia: No  Swollen glands: No  Easy bleeding or bruising: Yes  Nervous or Anxious: No  Depression: Yes  Trouble sleeping: Yes  Trouble thinking or concentrating: No  Mood changes: No  Panic attacks: No

## 2022-08-31 NOTE — PROGRESS NOTES
Impression:  1. Sarcoid  2. Chronic respiratory failure  3. Oxygen dependence  4. CKD  5. Nephrolithiasis    Discussion:  1. Persistent severe PAH despite 3 oral drugs - discussed conversion to subcutaneous PAH in detail.  Highly recommended   2. Renal function is stable and without active symptoms of nephrolithiasis  3. Sarcoid is acceptably controlled    Plan:  1. Discussed conversion to parenteral prostacyclin      Constitutional:no weight loss, fever, chills, night sweats  HEENT: without visual changes, swallow difficulties  Pulmonary: without shortness of breath, cough, wheeze, hemoptysis  Cardiac: without chest pain, HOSKINS, PND, orthopnea, edema, palpitation, pre-syncope, syncope,  GI: without diarrhea, constipation, jaundice, melena, GERD, hematemesis  : without frequency, urgency, dysuria, hematuria  Skin: rash, bruise, open lesions  Neuro: without TIA, focal neurologic complaints, seizure, trauma  Ortho: without pain, swelling, mobility impairment  Endocrine: diabetes, thyroid, heat/cold intolerance, polyuria, polyphagia, change bowel habits.  Sleep:no BILLY, periodic breathing, fatigue    Constitutional: alert, oriented,ab normal gait and station, normal mentation.  Oral: moist mucous membranes  Lymph: without pathologic adenopathy  Chest: basilar rales, porlonged expiration  Cor: No evidence of left or right ventricular activity.  Rhythm is regular.  S1 normal, S2 split physiologically. Murmurs are not present  Abdomen: without tenderness, rebound, guarding, masses, ascites  Extremities: Edema not present  Neuro: no focal defects, normal mentation  Skin: without open lesions  Psych: oriented, verbal, mental status in tact          Current Outpatient Medications   Medication     ambrisentan (LETAIRIS) 10 MG tablet     furosemide (LASIX) 20 MG tablet     mometasone-formoterol (DULERA) 200-5 MCG/ACT inhaler     predniSONE (DELTASONE) 10 MG tablet     PROAIR  (90 Base) MCG/ACT inhaler     selexipag  (UPTRAVI) 1600 MCG tablet     tadalafil, PAH, (ALYQ) 20 MG TABS     UNABLE TO FIND     Vitamin D3 (CHOLECALCIFEROL) 25 mcg (1000 units) tablet     budesonide (PULMICORT) 0.25 MG/2ML neb solution     tamsulosin (FLOMAX) 0.4 MG capsule     UNABLE TO FIND        Latest Reference Range & Units 22 09:59   Sodium 136 - 145 mmol/L 138   Potassium 3.4 - 5.3 mmol/L 4.3   Chloride 98 - 107 mmol/L 95 (L)   Carbon Dioxide (CO2) 22 - 29 mmol/L 28   Urea Nitrogen 8.0 - 23.0 mg/dL 58.5 (H)   Creatinine 0.67 - 1.17 mg/dL 2.53 (H)   GFR Estimate >60 mL/min/1.73m2 28 (L)   Calcium 8.8 - 10.2 mg/dL 9.4   Anion Gap 7 - 15 mmol/L 15   Albumin 3.5 - 5.2 g/dL 3.7   Protein Total 6.4 - 8.3 g/dL 6.1 (L)   Alkaline Phosphatase 40 - 129 U/L 75   ALT 10 - 50 U/L 15   AST 10 - 50 U/L 23   Bilirubin Total <=1.2 mg/dL 0.7   Glucose 70 - 99 mg/dL 116 (H)   N-Terminal Pro Bnp 0 - 125 pg/mL 7,187 (H)   WBC 4.0 - 11.0 10e3/uL 13.0 (H)   Hemoglobin 13.3 - 17.7 g/dL 13.0 (L)   Hematocrit 40.0 - 53.0 % 40.9   Platelet Count 150 - 450 10e3/uL 171   RBC Count 4.40 - 5.90 10e6/uL 4.22 (L)   MCV 78 - 100 fL 97   MCH 26.5 - 33.0 pg 30.8   MCHC 31.5 - 36.5 g/dL 31.8   RDW 10.0 - 15.0 % 14.2   (L): Data is abnormally low  (H): Data is abnormally high    Performed with 10L oxygen (baseline 5L)  RA 16  /16  /50 (67)  PCWP 21  Wedge sat 97%  Lesia CO 3.64 CI 1.85  TDCO 3.5 CI 1.78   PVR 13.2 by TDCO Right sided filling pressures are severely elevated. Left sided filling pressures are moderately elevated. Severely elevated pulmonary artery hypertension. Normal cardiac output level. Hemodynamic data has been modified in Epic per physician review.      MRN:                  [id number]                                  Name:              MATTY LOPEZ                                  :                  1960                                  Scan Date:   2022 12:28:47                                   Electronically signed by Lalo Arreguin  2022-Apr-03 23:42:53     SUMMARY   ==========================================================================================================     Clinical history: 61-year old male with a history of extrapulmonary cardiac sarcoidosis with severe  sarcoidosis-associated pulmonary hypertension with newly diagnosed high grade atrioventricular (AV) block.  CMR to evaluate for cardiac involvement with sarcoidosis and determine whether this led to high-grade AV  block.  Comparison CMR: 01/27/2012     1. The LV cavity is small and underfilled. The LV exhibits normal wall thickness. The global systolic  function is normal. The LVEF is 59%. There is flattening of the interventricular septum throughout the  cardiac cycle, consistent with RV pressure and volume overload.     2. The RV is severely enlarged with moderate hypertrophy. The global systolic function is severely reduced.  The RVEF is 29%.      3. The left atrium is normal in size. The right atrium is moderately enlarged.     4. There is mild tricuspid regurgitation.     5. Late gadolinium enhancement imaging shows no MI, fibrosis or infiltrative disease in the left ventricle  or right ventricle. There is enhancement of the anterior and inferior RV insertion points in the basal  ventricular segments. This is seen in RV pressure and volume overload states.     6. There is no pericardial effusion or thickening.     7. There is no intracardiac thrombus.     CONCLUSIONS: The absence of LV/RV fibrosis suggests that there is no myocardial infiltration with  sarcoidosis to explain the patient's high-grade AV block. Moreover, the absence of fibrosis suggests that  the cause of the patient's severely reduced RV function with severe RV dilation is a pulmonary/pulmonary  vascular cause, such as the patient's known sarcoidosis-associated pulmonary hypertension. Upon direct  visual and quantitative comparison to the prior examination dated 01/27/2012, there is no  significant  change in the RV size/function or degree of fibrosis.     CORE EXAM   ==========================================================================================================     MEASUREMENTS   ----------------------------------------------------------------------------------------      VOLUMETRIC ANALYSIS       ----------------------------------------------  .---------------------------------------------------------.                   LV    Reference   RV    Reference    +-----+-----------+------+------------+------+------------+   EDV  ml          110   (109-191)   206   (105-205)         ml/m^2       54   (60-95)     101   ()     ESV  ml           42   (27-72)     145   (20-80)           ml/m^2       21   (14-36)      71   (11-40)      CO   L/min      5.31              4.78                     L/min/m^2  2.60              2.34                SV   ml           67   ()     60   ()          ml/m^2       33   (40-64)      30   (37-69)      EF   %            61   (58-76)      29   (55-81)     '-----+-----------+------+------------+------+------------'             CARDIAC OUTPUT HR:  79 BPM      LV DIMENSIONS       ----------------------------------------------          WALL THICKNESS - ANTEROSEPTAL:  1.1 cm          WALL THICKNESS - INFEROLATERAL:  0.6 cm          LV PAMELLA:  3.8 cm          LV ESD:  2.5 cm         LA DIMENSIONS (LV SYSTOLE)       ----------------------------------------------          DIAMETER:  4.0 cm         AORTIC ROOT DIMENSIONS       ----------------------------------------------          SINUS OF VALSALVA:  3.3 cm          AORTIC ROOT SIZE:  Normal        ANATOMY   ----------------------------------------------------------------------------------------      LEFT VENTRICLE       ----------------------------------------------          WALL THICKNESS:  Normal          CAVITY SIZE:  Normal          RIGHT VENTRICLE       ----------------------------------------------          WALL THICKNESS:  MODERATE HYPERTROPHY          CONTRACTILITY:  SEVERE GLOBALLY DECREASED          CAVITY SIZE:  SEVERELY ENLARGED         INTERVENTRICULAR SEPTUM       ----------------------------------------------               VENTRICULAR SEPTUM:  DIASTOLIC FLATTENING (RV VOLUME OVERLOAD), SYSTOLIC FLATTENING (RV PRESSURE               OVERLOAD)          LEFT ATRIUM       ----------------------------------------------          CAVITY SIZE:  Normal         RIGHT ATRIUM       ----------------------------------------------          CAVITY SIZE:  MODERATELY ENLARGED         PERICARDIUM       ----------------------------------------------          Normal          EFFUSION:  None         PLEURAL EFFUSION       ----------------------------------------------               None         VALVES   ----------------------------------------------------------------------------------------      AORTIC VALVE       ----------------------------------------------          AORTIC VALVE LEAFLETS:  Trileaflet         MITRAL VALVE       ----------------------------------------------          MITRAL VALVE LEAFLETS:  Normal Leaflets         TRICUSPID VALVE       ----------------------------------------------          TRICUSPID VALVE LEAFLETS:  Normal Leaflets          TRICUSPID REGURGITATION:  MILD         PULMONIC VALVE       ----------------------------------------------          PULMONIC VALVE LEAFLETS:  Normal Leaflets        17 SEGMENT   ----------------------------------------------------------------------------------------  .------------------------------------------------------------------------------------------------.   Segments            Wall Motion   Hyperenhancement  Stress Perfusion  Interpretation         +--------------------+--------------+------------------+------------------+----------------------+   Base Anterior       Normal/Hyper   None                                Normal                  Base Anteroseptal   Normal/Hyper  None                                Normal                  Base Inferoseptal   Normal/Hyper  None                                Normal                  Base Inferior       Normal/Hyper  None                                Normal                  Base Inferolateral  Normal/Hyper  None                                Normal                  Base Anterolateral  Normal/Hyper  None                                Normal                  Mid Anterior        Normal/Hyper  None                                Normal                  Mid Anteroseptal    Normal/Hyper  None                                Normal                  Mid Inferoseptal    Normal/Hyper  None                                Normal                  Mid Inferior        Normal/Hyper  None                                Normal                  Mid Inferolateral   Normal/Hyper  None                                Normal                  Mid Anterolateral   Normal/Hyper  None                                Normal                  Apical Anterior     Normal/Hyper  None                                Normal                  Apical Septal       Normal/Hyper  None                                Normal                  Apical Inferior     Normal/Hyper  None                                Normal                  Apical Lateral      Normal/Hyper  None                                Normal                  [place]                Normal/Hyper  None                                Normal                 +--------------------+--------------+------------------+------------------+----------------------+   RV Segments         Wall Motion   Hyperenhancement                    Interpretation          +--------------------+--------------+------------------+------------------+----------------------+   RV Basal Anterior   Severe Hypo   None                                Abnormal Wall Motion    RV Basal Inferior   Severe Hypo   None                                Abnormal Wall Motion    RV Mid              Severe Hypo   None                                Abnormal Wall Motion    RV Apical           Severe Hypo   None                                Abnormal Wall Motion   '--------------------+--------------+------------------+------------------+----------------------'         FINDINGS       ----------------------------------------------          LV SCAR SIZE (17 SEGMENT):  0 %        SCAN INFO   ==========================================================================================================     GENERAL   ----------------------------------------------------------------------------------------      CONTRAST AGENT       ----------------------------------------------          TYPE:  Gadavist          GD CONCENTRATION:  0.5 M          VOLUME ADMINISTERED:  10 ml          DOSAGE:  0.06 mmol/kg          SERUM CREATININE:  2.88 mg/dL          CREATININE DATE:  2022-Jan-24         SEDATION       ----------------------------------------------          SEDATION USED?:  No         VITALS       ----------------------------------------------          HEIGHT:  68.00 in          HEIGHT:  172.72 cm          WEIGHT:  200.00 lbs          WEIGHT:  90.72 kgs          BSA:  2.04 m^2         SETUP       ----------------------------------------------          SCAN TYPE:  Clinical          PATIENT TYPE:  Outpatient          INCOMPLETE SCAN:  No          REASON(S) FOR SCAN:  Sarcoid (known/suspect)           REFERRING PHYSICIAN:  PEREZ LESTER          ATTENDING PHYSICIAN:  PEREZ LESTER       Answers for HPI/ROS submitted by the patient on 8/30/2022  General Symptoms: No  Skin Symptoms: Yes  JEANNINE  Symptoms: Yes  EYE SYMPTOMS: No  HEART SYMPTOMS: Yes  LUNG SYMPTOMS: Yes  INTESTINAL SYMPTOMS: Yes  URINARY SYMPTOMS: Yes  REPRODUCTIVE SYMPTOMS: No  SKELETAL SYMPTOMS: Yes  BLOOD SYMPTOMS: Yes  NERVOUS SYSTEM SYMPTOMS: No  MENTAL HEALTH SYMPTOMS: Yes  Ear pain: No  Ear discharge: No  Hearing loss: No  Tinnitus: No  Nosebleeds: No  Congestion: Yes  Sinus pain: Yes  Trouble swallowing: Yes   Voice hoarseness: No  Mouth sores: No  Sore throat: No  Tooth pain: Yes  Gum tenderness: Yes  Change in taste: Yes  Change in sense of smell: No  Dry mouth: Yes  Hearing aid used: No  Neck lump: No  Changes in hair: No  Changes in moles/birth marks: No  Itching: No  Rashes: No  Changes in nails: No  Acne: No  Change in facial hair: No  Warts: No  Non-healing sores: No  Scarring: No  Flaking of skin: Yes  Color changes of hands/feet in cold : No  Sun sensitivity: No  Skin thickening: No  Chest pain or pressure: Yes  Fast or irregular heartbeat: Yes  Pain in legs with walking: No  Trouble breathing while lying down: Yes  Fingers or toes appear blue: No  High blood pressure: No  Low blood pressure: No  Fainting: No  Murmurs: No  Pacemaker: Yes  Varicose veins: No  Wake up at night with shortness of breath: Yes  Light-headedness: No  Exercise intolerance: Yes  Cough: Yes  Sputum or phlegm: Yes  Coughing up blood: No  Difficulty breating or shortness of breath: Yes  Snoring: No  Wheezing: Yes  Difficulty breathing on exertion: Yes  Nighttime Cough: No  Difficulty breathing when lying flat: Yes  Heart burn or indigestion: Yes  Nausea: No  Vomiting: No  Abdominal pain: Yes  Bloating: Yes  Constipation: No  Diarrhea: Yes  Blood in stool: No  Black stools: No  Rectal or Anal pain: No  Fecal incontinence: No  Yellowing of skin or eyes: No  Vomit with blood: No  Change in stools: No  Trouble holding urine or incontinence: Yes  Pain or burning: No  Trouble starting or stopping: No  Increased frequency of urination: Yes  Blood in urine:  No  Decreased frequency of urination: No  Frequent nighttime urination: Yes  Flank pain: Yes  Difficulty emptying bladder: No  Back pain: Yes  Muscle aches: Yes  Neck pain: Yes  Swollen joints: No  Joint pain: No  Bone pain: No  Muscle cramps: No  Muscle weakness: Yes  Joint stiffness: Yes  Bone fracture: No  Edema or swelling: Yes  Anemia: No  Swollen glands: No  Easy bleeding or bruising: Yes  Nervous or Anxious: No  Depression: Yes  Trouble sleeping: Yes  Trouble thinking or concentrating: No  Mood changes: No  Panic attacks: No

## 2022-08-31 NOTE — LETTER
8/31/2022         RE: Jimmy Isaac  [id number] [address] [id number]        Dear Colleague,    Thank you for referring your patient, Jimmy Isaac, to the [facility] FOR LUNG SCIENCE AND [facility] [place]. Please see a copy of my visit note below.    Reason for Visit  Jimmy Isaac is a 61 year old year old male who is being seen for sarcoidosis  HPI    The patient was seen and examined by Shane Ruvalcaba MD     Mr. Isaac comes for followup.  He was last seen in the Pulmonary Clinic in March.  At that time, he was on prednisone 10 mg per day, which was started in 12/2021.  He had reported improvement in fatigue and also felt that his breathing was better.  The plan was to continue the prednisone.  He had declined more aggressive anti-inflammatory therapy.    He comes in today for routine visit.  He reports worsening pulmonary symptoms.  Temporaly, it relates to an episode of shingles and he believes that his pulmonary symptoms have been worse since then.  I do not have any PFTs on him today.  He denies any other acute symptoms.  He does report low oxygen numbers at night whenever he has measured them.      Current Outpatient Medications   Medication     ambrisentan (LETAIRIS) 10 MG tablet     mometasone-formoterol (DULERA) 200-5 MCG/ACT inhaler     potassium chloride ER (KLOR-CON M) 20 MEQ CR tablet     predniSONE (DELTASONE) 10 MG tablet     PROAIR  (90 Base) MCG/ACT inhaler     selexipag (UPTRAVI) 1600 MCG tablet     tadalafil, PAH, (ALYQ) 20 MG TABS     torsemide (DEMADEX) 100 MG tablet     UNABLE TO FIND     Vitamin D3 (CHOLECALCIFEROL) 25 mcg (1000 units) tablet     No current facility-administered medications for this visit.     Allergies   Allergen Reactions     Valacyclovir Rash     Diffuse body rash     Past Medical History:   Diagnosis Date     Acute renal failure superimposed on stage 4 chronic kidney disease, unspecified acute renal failure type (H)  2022     Chronic sinusitis      CKD (chronic kidney disease)      Heart disease      Hypertension      Keratosis     frontal scalp, treated with liquid nitrogen at [facility]     Malignant neoplasm (H)      Pneumonia a few times     Pulmonary sarcoidosis (H)        Past Surgical History:   Procedure Laterality Date     COLONOSCOPY       CV RIGHT HEART CATH MEASUREMENTS RECORDED N/A 2019    Procedure: CV RIGHT HEART CATH;  Surgeon: Kale Delgado MD;  Location:  HEART CARDIAC CATH LAB     CV RIGHT HEART CATH MEASUREMENTS RECORDED N/A 2019    Procedure: CV RIGHT HEART CATH;  Surgeon: Ion Lemon MD;  Location:  HEART CARDIAC CATH LAB     CV RIGHT HEART CATH MEASUREMENTS RECORDED N/A 2021    Procedure: CV RIGHT HEART CATH;  Surgeon: Jhony Barone MD;  Location:  HEART CARDIAC CATH LAB     CV RIGHT HEART CATH MEASUREMENTS RECORDED N/A 2022    Procedure: Heart Cath Right Heart Cath;  Surgeon: Emerson Yoo MD;  Location:  HEART CARDIAC CATH LAB     ENT SURGERY      wisdom teeth extraction     EP PACEMAKER Left 2021    Procedure: EP Pacemaker;  Surgeon: Dora Fontanez MD;  Location:  HEART CARDIAC CATH LAB     IR NEPHROSTOMY TUBE PLACEMENT LEFT  2021     LASER HOLMIUM NEPHROLITHOTOMY VIA PERCUTANEOUS NEPHROSTOMY Left 2021    Procedure: NEPHROLITHOTOMY, PERCUTANEOUS, USING HOLMIUM LASER;  Surgeon: Zackery Moura MD;  Location:  OR     TONSILLECTOMY  1964       Social History     Socioeconomic History     Marital status: Single     Spouse name: Not on file     Number of children: Not on file     Years of education: Not on file     Highest education level: Not on file   Occupational History     Not on file   Tobacco Use     Smoking status: Former Smoker     Packs/day: 1.00     Years: 30.00     Pack years: 30.00     Types: Cigarettes     Start date: 10/10/1975     Quit date: 3/1/2010     Years since quittin.5     Smokeless tobacco:  Never Used   Substance and Sexual Activity     Alcohol use: Not Currently     Alcohol/week: 0.0 standard drinks     Comment: stated 2 bottles of beer occ     Drug use: Not Currently     Comment: past use of marijuana     Sexual activity: Not Currently     Partners: Female     Birth control/protection: Abstinence, Female Surgical   Other Topics Concern     Parent/sibling w/ CABG, MI or angioplasty before 65F 55M? Not Asked      Service Not Asked     Blood Transfusions Not Asked     Caffeine Concern Yes     Occupational Exposure Not Asked     Hobby Hazards Not Asked     Sleep Concern Not Asked     Stress Concern Not Asked     Weight Concern Not Asked     Special Diet Not Asked     Back Care Not Asked     Exercise Yes     Bike Helmet Not Asked     Seat Belt Not Asked     Self-Exams Not Asked   Social History Narrative     Not on file     Social Determinants of Health     Financial Resource Strain: Not on file   Food Insecurity: Not on file   Transportation Needs: Not on file   Physical Activity: Not on file   Stress: Not on file   Social Connections: Not on file   Intimate Partner Violence: Not on file   Housing Stability: Not on file       ROS Pulmonary  A complete ROS was otherwise negative except as noted in the HPI.  /60   Pulse 63   Ht 1.829 m (6')   Wt 75.3 kg (166 lb)   SpO2 98%   BMI 22.51 kg/m    Exam:   GENERAL APPEARANCE: Alert, and in no apparent distress.  EYES: PERRL, EOMI  MOUTH: Oral mucosa is moist, without any lesions,, no oropharyngeal exudate.  NECK: supple, no masses, no thyromegaly.  LYMPHATICS: No significant axillary, cervical, or supraclavicular nodes.  RESP: normal percussion, good air flow throughout.  ** crackles. No rhonchi. No wheezes.  CV: Normal S1, S2, regular rhythm, normal rate. No murmur.  No rub. No gallop. No LE edema.   ABDOMEN:  Bowel sounds normal, soft, nontender, no HSM or masses.   MS: extremities normal. No clubbing. No cyanosis.  SKIN: no rash on limited  exam  NEURO: Mentation intact, speech normal, normal strength and tone, normal gait and stance    Results:  Labs reviewed.  Electrolyte panel shows a creatinine of 2.53.  Total protein is 6.1.  LFTs are within normal range.  White count is 13.0, hemoglobin is 13.0, and the platelet count is 171.    Assessment and plan: Mr. 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  Worsening functional status in 12/2021, for which prednisone was started.  1.  Pulmonary:  No PFTs today, but subjectively reports worsening symptoms.  We will get a spirometry.  I do not think that prednisone has helped.  There may be some risk of opportunistic infections with it.  We will start the prednisone taper to off with him decreasing the dose by 2.5 mg every month.  We will also add LAMA given that he does have obstruction, which likely represents airway involvement by sarcoid with remodeled/fibrotic airways.  If there is any possibility of reversibility, the addition of LAMA could improve the quality of life.He will continue dulera.  2. Extrapulmonary sarcoidosis and pulmonary hypertension, for which he is on triple therapy and follows with Dr. Lindsey.  Labs done today were reviewed.  We will add CRP, vitamin D and PTH to it.  3.  Hypoxia: Uses 10 L of oxygen with activity which we will continue to do.   4. Elevated WBC.  We will add porcalciton and CRP to the counts.  5. He is not a candidate for lung transplant at [facility]. Will discuss with Dr Lindsey if he should be evaluated at other centers.     I will see him back in 6 months with full PFTs.    Labs reviewed.  Electrolyte panel with creatinine of 2.53.  Total protein is 6.1.  LFTs are in the normal range.  N-terminal BNP is elevated at 7167.  White count is 13.0.  We do not have a differential..  The lab does not perform procalcitonin or CRP.  Will have care  coordinators reach out to the patient to see if he can get these done locally.    This note consists of symbols derived from keyboarding, dictation and/or voice recognition software. As a result, there may be errors in the script that have gone undetected. Please consider this when interpreting information found in this chart    Answers for HPI/ROS submitted by the patient on 8/30/2022  General Symptoms: No  Skin Symptoms: Yes  HENT Symptoms: Yes  EYE SYMPTOMS: No  HEART SYMPTOMS: Yes  LUNG SYMPTOMS: Yes  INTESTINAL SYMPTOMS: Yes  URINARY SYMPTOMS: Yes  REPRODUCTIVE SYMPTOMS: No  SKELETAL SYMPTOMS: Yes  BLOOD SYMPTOMS: Yes  NERVOUS SYSTEM SYMPTOMS: No  MENTAL HEALTH SYMPTOMS: Yes  Ear pain: No  Ear discharge: No  Hearing loss: No  Tinnitus: No  Nosebleeds: No  Congestion: Yes  Sinus pain: Yes  Trouble swallowing: Yes   Voice hoarseness: No  Mouth sores: No  Sore throat: No  Tooth pain: Yes  Gum tenderness: Yes  Change in taste: Yes  Change in sense of smell: No  Dry mouth: Yes  Hearing aid used: No  Neck lump: No  Changes in hair: No  Changes in moles/birth marks: No  Itching: No  Rashes: No  Changes in nails: No  Acne: No  Change in facial hair: No  Warts: No  Non-healing sores: No  Scarring: No  Flaking of skin: Yes  Color changes of hands/feet in cold : No  Sun sensitivity: No  Skin thickening: No  Chest pain or pressure: Yes  Fast or irregular heartbeat: Yes  Pain in legs with walking: No  Trouble breathing while lying down: Yes  Fingers or toes appear blue: No  High blood pressure: No  Low blood pressure: No  Fainting: No  Murmurs: No  Pacemaker: Yes  Varicose veins: No  Wake up at night with shortness of breath: Yes  Light-headedness: No  Exercise intolerance: Yes  Cough: Yes  Sputum or phlegm: Yes  Coughing up blood: No  Difficulty breating or shortness of breath: Yes  Snoring: No  Wheezing: Yes  Difficulty breathing on exertion: Yes  Nighttime Cough: No  Difficulty breathing when lying flat: Yes  Heart burn  or indigestion: Yes  Nausea: No  Vomiting: No  Abdominal pain: Yes  Bloating: Yes  Constipation: No  Diarrhea: Yes  Blood in stool: No  Black stools: No  Rectal or Anal pain: No  Fecal incontinence: No  Yellowing of skin or eyes: No  Vomit with blood: No  Change in stools: No  Trouble holding urine or incontinence: Yes  Pain or burning: No  Trouble starting or stopping: No  Increased frequency of urination: Yes  Blood in urine: No  Decreased frequency of urination: No  Frequent nighttime urination: Yes  Flank pain: Yes  Difficulty emptying bladder: No  Back pain: Yes  Muscle aches: Yes  Neck pain: Yes  Swollen joints: No  Joint pain: No  Bone pain: No  Muscle cramps: No  Muscle weakness: Yes  Joint stiffness: Yes  Bone fracture: No  Edema or swelling: Yes  Anemia: No  Swollen glands: No  Easy bleeding or bruising: Yes  Nervous or Anxious: No  Depression: Yes  Trouble sleeping: Yes  Trouble thinking or concentrating: No  Mood changes: No  Panic attacks: No          Again, thank you for allowing me to participate in the care of your patient.        Sincerely,        Shane Ruvalcaba MD

## 2022-08-31 NOTE — LETTER
2022      RE: Matty Lopez  [id number] [address] [address]       Dear Colleague,    Thank you for the opportunity to participate in the care of your patient, Matty Lopez, at the [facility] HEART [facility] at [facility]. Please see a copy of my visit note below.            Constitutional:no weight loss, fever, chills, night sweats  HEENT: without visual changes, swallow difficulties  Pulmonary: without shortness of breath, cough, wheeze, hemoptysis  Cardiac: without chest pain, HOSKINS, PND, orthopnea, edema, palpitation, pre-syncope, syncope,  GI: without diarrhea, constipation, jaundice, melena, GERD, hematemesis  : without frequency, urgency, dysuria, hematuria  Skin: rash, bruise, open lesions  Neuro: without TIA, focal neurologic complaints, seizure, trauma  Ortho: without pain, swelling, mobility impairment  Endocrine: diabetes, thyroid, heat/cold intolerance, polyuria, polyphagia, change bowel habits.  Sleep:no BILLY, periodic breathing, fatigue    Constitutional: alert, oriented,ab normal gait and station, normal mentation.  Oral: moist mucous membrans  Lymph: without pathologic adenopathy  Chest: basilar rales, porlonged expiration  Cor: No evidence of left or right ventricular activity.  Rhythm is regular.  S1 normal, S2 split physiologically. Murmurs are not present  Abdomen: without tenderness, rebound, guarding, masses, ascites  Extremities: Edema not present  Neuro: no focal defects, normal mentation  Skin: without open lesions  Psych: oriented, verbal, mental status in tact      Name: MATTY LOPEZ  MRN: [id number]  : 1960  Study Date: 2021 07:57 AM  Age: 60 yrs  Gender: Male  Patient Location: [facility]  Reason For Study: Pulmonary hypertension (H), SOB (shortness of breath),  Sarcoidosis  Ordering Physician: DUY MIDDLETON  Referring Physician: DUY MIDDLETON  Performed By: Kim Nieves RDCS      BSA: 2.0 m2  Height: 73 in  Weight: 165 lb  BP: 123/72 mmHg  ______________________________________________________________________________  Procedure  Echocardiogram with two-dimensional, color and spectral Doppler performed.  Adequate quality two-dimensional was performed and interpreted. The patient's  rhythm is sinus bradycardia.  ______________________________________________________________________________  Interpretation Summary  Global and regional left ventricular function is normal with an EF of 55-60%.  Flattened septum is consistent with right ventricular pressure and volume  overload.  Global right ventricular function is mildly to moderately reduced. The  longitudinal excursion is preserved (TAPSE 2.1 cm and S 11.0 cm/s) but the  free wall contraction is reduced. The RV FAC was 25% but the basal segment of  the RV was not clearly visualized.  No significant valvular abnormality.  Pulmonary artery systolic pressure cannot be assessed due to inadequate TR  jet.  IVC diameter >2.1 cm collapsing <50% with sniff suggests a high RA pressure  estimated at 15 mmHg or greater.  No pericardial effusion is present.  This study was compared with the study from 10/29/2018. The RV function  appears to have declined based on direct visual and quantitative comparison.  This is primarily due to reduced RV free wall contraction.  ______________________________________________________________________________  Left Ventricle  Global and regional left ventricular function is normal with an EF of 55-60%.  Relative wall thickness is increased consistent with concentric remodeling.  Left ventricular diastolic function is normal. Flattened septum is consistent  with right ventricular pressure and volume overload.     Right Ventricle  Moderate right ventricular dilation is present. Global right ventricular  function is mildly to moderately reduced. The longitudinal excursion is  preserved (TAPSE 2.1 cm and S 11.0 cm/s) but  the free wall contraction is  reduced. The RV FAC was 25% but the basal segment of the RV was not clearly  visualized.     Atria  Both atria appear normal. The atrial septum is intact as assessed by color  Doppler .     Mitral Valve  Mild mitral annular calcification is present. Trace mitral insufficiency is  present.     Aortic Valve  Aortic valve is normal in structure and function. The aortic valve is  tricuspid. Trace aortic insufficiency is present.     Tricuspid Valve  The tricuspid valve cannot be assessed. Trace tricuspid insufficiency is  present. The right ventricular systolic pressure is approximated at 26.4 mmHg  plus the right atrial pressure.     Pulmonic Valve  The pulmonic valve cannot be assessed. Mild pulmonic insufficiency is present.  The PA acceleration time could not be obtained.     Vessels  Sinuses of Valsalva 3.5 cm. Ascending aorta 3.3 cm. IVC diameter >2.1 cm  collapsing <50% with sniff suggests a high RA pressure estimated at 15 mmHg or  greater.     Pericardium  No pericardial effusion is present.     Compared to Previous Study  This study was compared with the study from 10/29/2018 . The RV function  appears to have declined based on direct visual and quantitative comparison.  This is primarily due to reduced RV free wall contraction.   Latest Reference Range & Units 08/31/22 09:59   Sodium 136 - 145 mmol/L 138   Potassium 3.4 - 5.3 mmol/L 4.3   Chloride 98 - 107 mmol/L 95 (L)   Carbon Dioxide (CO2) 22 - 29 mmol/L 28   Urea Nitrogen 8.0 - 23.0 mg/dL 58.5 (H)   Creatinine 0.67 - 1.17 mg/dL 2.53 (H)   GFR Estimate >60 mL/min/1.73m2 28 (L)   Calcium 8.8 - 10.2 mg/dL 9.4   Anion Gap 7 - 15 mmol/L 15   Albumin 3.5 - 5.2 g/dL 3.7   Protein Total 6.4 - 8.3 g/dL 6.1 (L)   Alkaline Phosphatase 40 - 129 U/L 75   ALT 10 - 50 U/L 15   AST 10 - 50 U/L 23   Bilirubin Total <=1.2 mg/dL 0.7   Glucose 70 - 99 mg/dL 116 (H)   WBC 4.0 - 11.0 10e3/uL 13.0 (H)   Hemoglobin 13.3 - 17.7 g/dL 13.0 (L)    Hematocrit 40.0 - 53.0 % 40.9   Platelet Count 150 - 450 10e3/uL 171   RBC Count 4.40 - 5.90 10e6/uL 4.22 (L)   MCV 78 - 100 fL 97   MCH 26.5 - 33.0 pg 30.8   MCHC 31.5 - 36.5 g/dL 31.8   RDW 10.0 - 15.0 % 14.2   (L): Data is abnormally low  (H): Data is abnormally high    Current Outpatient Medications   Medication     ambrisentan (LETAIRIS) 10 MG tablet     mometasone-formoterol (DULERA) 200-5 MCG/ACT inhaler     potassium chloride ER (KLOR-CON M) 20 MEQ CR tablet     predniSONE (DELTASONE) 10 MG tablet     PROAIR  (90 Base) MCG/ACT inhaler     selexipag (UPTRAVI) 1600 MCG tablet     tadalafil, PAH, (ALYQ) 20 MG TABS     torsemide (DEMADEX) 100 MG tablet     UNABLE TO FIND     Vitamin D3 (CHOLECALCIFEROL) 25 mcg (1000 units) tablet     No current facility-administered medications for this visit.     Answers for HPI/ROS submitted by the patient on 8/30/2022  General Symptoms: No  Skin Symptoms: Yes  HENT Symptoms: Yes  EYE SYMPTOMS: No  HEART SYMPTOMS: Yes  LUNG SYMPTOMS: Yes  INTESTINAL SYMPTOMS: Yes  URINARY SYMPTOMS: Yes  REPRODUCTIVE SYMPTOMS: No  SKELETAL SYMPTOMS: Yes  BLOOD SYMPTOMS: Yes  NERVOUS SYSTEM SYMPTOMS: No  MENTAL HEALTH SYMPTOMS: Yes  Ear pain: No  Ear discharge: No  Hearing loss: No  Tinnitus: No  Nosebleeds: No  Congestion: Yes  Sinus pain: Yes  Trouble swallowing: Yes   Voice hoarseness: No  Mouth sores: No  Sore throat: No  Tooth pain: Yes  Gum tenderness: Yes  Change in taste: Yes  Change in sense of smell: No  Dry mouth: Yes  Hearing aid used: No  Neck lump: No  Changes in hair: No  Changes in moles/birth marks: No  Itching: No  Rashes: No  Changes in nails: No  Acne: No  Change in facial hair: No  Warts: No  Non-healing sores: No  Scarring: No  Flaking of skin: Yes  Color changes of hands/feet in cold : No  Sun sensitivity: No  Skin thickening: No  Chest pain or pressure: Yes  Fast or irregular heartbeat: Yes  Pain in legs with walking: No  Trouble breathing while lying down:  Yes  Fingers or toes appear blue: No  High blood pressure: No  Low blood pressure: No  Fainting: No  Murmurs: No  Pacemaker: Yes  Varicose veins: No  Wake up at night with shortness of breath: Yes  Light-headedness: No  Exercise intolerance: Yes  Cough: Yes  Sputum or phlegm: Yes  Coughing up blood: No  Difficulty breating or shortness of breath: Yes  Snoring: No  Wheezing: Yes  Difficulty breathing on exertion: Yes  Nighttime Cough: No  Difficulty breathing when lying flat: Yes  Heart burn or indigestion: Yes  Nausea: No  Vomiting: No  Abdominal pain: Yes  Bloating: Yes  Constipation: No  Diarrhea: Yes  Blood in stool: No  Black stools: No  Rectal or Anal pain: No  Fecal incontinence: No  Yellowing of skin or eyes: No  Vomit with blood: No  Change in stools: No  Trouble holding urine or incontinence: Yes  Pain or burning: No  Trouble starting or stopping: No  Increased frequency of urination: Yes  Blood in urine: No  Decreased frequency of urination: No  Frequent nighttime urination: Yes  Flank pain: Yes  Difficulty emptying bladder: No  Back pain: Yes  Muscle aches: Yes  Neck pain: Yes  Swollen joints: No  Joint pain: No  Bone pain: No  Muscle cramps: No  Muscle weakness: Yes  Joint stiffness: Yes  Bone fracture: No  Edema or swelling: Yes  Anemia: No  Swollen glands: No  Easy bleeding or bruising: Yes  Nervous or Anxious: No  Depression: Yes  Trouble sleeping: Yes  Trouble thinking or concentrating: No  Mood changes: No  Panic attacks: No         Impression:  1. Sarcoid  2. Chronic respiratory failure  3. Oxygen dependence  4. CKD  5. Nephrolithiasis    Discussion:  1. Persistent severe PAH despite 3 oral drugs - discussed conversion to subcutaneous PAH in detail.  Highly recommended   2. Renal function is stable and without active symptoms of nephrolithiasis  3. Sarcoid is acceptably controlled    Plan:  1. Discussed conversion to parenteral prostacyclin      Constitutional:no weight loss, fever, chills, night  sweats  HEENT: without visual changes, swallow difficulties  Pulmonary: without shortness of breath, cough, wheeze, hemoptysis  Cardiac: without chest pain, HOSKINS, PND, orthopnea, edema, palpitation, pre-syncope, syncope,  GI: without diarrhea, constipation, jaundice, melena, GERD, hematemesis  : without frequency, urgency, dysuria, hematuria  Skin: rash, bruise, open lesions  Neuro: without TIA, focal neurologic complaints, seizure, trauma  Ortho: without pain, swelling, mobility impairment  Endocrine: diabetes, thyroid, heat/cold intolerance, polyuria, polyphagia, change bowel habits.  Sleep:no BILLY, periodic breathing, fatigue    Constitutional: alert, oriented,ab normal gait and station, normal mentation.  Oral: moist mucous membranes  Lymph: without pathologic adenopathy  Chest: basilar rales, porlonged expiration  Cor: No evidence of left or right ventricular activity.  Rhythm is regular.  S1 normal, S2 split physiologically. Murmurs are not present  Abdomen: without tenderness, rebound, guarding, masses, ascites  Extremities: Edema not present  Neuro: no focal defects, normal mentation  Skin: without open lesions  Psych: oriented, verbal, mental status in tact          Current Outpatient Medications   Medication     ambrisentan (LETAIRIS) 10 MG tablet     furosemide (LASIX) 20 MG tablet     mometasone-formoterol (DULERA) 200-5 MCG/ACT inhaler     predniSONE (DELTASONE) 10 MG tablet     PROAIR  (90 Base) MCG/ACT inhaler     selexipag (UPTRAVI) 1600 MCG tablet     tadalafil, PAH, (ALYQ) 20 MG TABS     UNABLE TO FIND     Vitamin D3 (CHOLECALCIFEROL) 25 mcg (1000 units) tablet     budesonide (PULMICORT) 0.25 MG/2ML neb solution     tamsulosin (FLOMAX) 0.4 MG capsule     UNABLE TO FIND        Latest Reference Range & Units 08/31/22 09:59   Sodium 136 - 145 mmol/L 138   Potassium 3.4 - 5.3 mmol/L 4.3   Chloride 98 - 107 mmol/L 95 (L)   Carbon Dioxide (CO2) 22 - 29 mmol/L 28   Urea Nitrogen 8.0 - 23.0 mg/dL  58.5 (H)   Creatinine 0.67 - 1.17 mg/dL 2.53 (H)   GFR Estimate >60 mL/min/1.73m2 28 (L)   Calcium 8.8 - 10.2 mg/dL 9.4   Anion Gap 7 - 15 mmol/L 15   Albumin 3.5 - 5.2 g/dL 3.7   Protein Total 6.4 - 8.3 g/dL 6.1 (L)   Alkaline Phosphatase 40 - 129 U/L 75   ALT 10 - 50 U/L 15   AST 10 - 50 U/L 23   Bilirubin Total <=1.2 mg/dL 0.7   Glucose 70 - 99 mg/dL 116 (H)   N-Terminal Pro Bnp 0 - 125 pg/mL 7,187 (H)   WBC 4.0 - 11.0 10e3/uL 13.0 (H)   Hemoglobin 13.3 - 17.7 g/dL 13.0 (L)   Hematocrit 40.0 - 53.0 % 40.9   Platelet Count 150 - 450 10e3/uL 171   RBC Count 4.40 - 5.90 10e6/uL 4.22 (L)   MCV 78 - 100 fL 97   MCH 26.5 - 33.0 pg 30.8   MCHC 31.5 - 36.5 g/dL 31.8   RDW 10.0 - 15.0 % 14.2   (L): Data is abnormally low  (H): Data is abnormally high    Performed with 10L oxygen (baseline 5L)  RA 16  /16  /50 (67)  PCWP 21  Wedge sat 97%  Lesia CO 3.64 CI 1.85  TDCO 3.5 CI 1.78   PVR 13.2 by TDCO Right sided filling pressures are severely elevated. Left sided filling pressures are moderately elevated. Severely elevated pulmonary artery hypertension. Normal cardiac output level. Hemodynamic data has been modified in Epic per physician review.      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  CMR to evaluate for cardiac involvement with sarcoidosis and determine whether this led to high-grade  AV  block.  Comparison CMR: 01/27/2012     1. The LV cavity is small and underfilled. The LV exhibits normal wall thickness. The global systolic  function is normal. The LVEF is 59%. There is flattening of the interventricular septum throughout the  cardiac cycle, consistent with RV pressure and volume overload.     2. The RV is severely enlarged with moderate hypertrophy. The global systolic function is severely reduced.  The RVEF is 29%.      3. The left atrium is normal in size. The right atrium is moderately enlarged.     4. There is mild tricuspid regurgitation.     5. Late gadolinium enhancement imaging shows no MI, fibrosis or infiltrative disease in the left ventricle  or right ventricle. There is enhancement of the anterior and inferior RV insertion points in the basal  ventricular segments. This is seen in RV pressure and volume overload states.     6. There is no pericardial effusion or thickening.     7. There is no intracardiac thrombus.     CONCLUSIONS: The absence of LV/RV fibrosis suggests that there is no myocardial infiltration with  sarcoidosis to explain the patient's high-grade AV block. Moreover, the absence of fibrosis suggests that  the cause of the patient's severely reduced RV function with severe RV dilation is a pulmonary/pulmonary  vascular cause, such as the patient's known sarcoidosis-associated pulmonary hypertension. Upon direct  visual and quantitative comparison to the prior examination dated 01/27/2012, there is no significant  change in the RV size/function or degree of fibrosis.     CORE EXAM   ==========================================================================================================     MEASUREMENTS   ----------------------------------------------------------------------------------------      VOLUMETRIC ANALYSIS       ----------------------------------------------  .---------------------------------------------------------.                   LV    Reference    RV    Reference    +-----+-----------+------+------------+------+------------+   EDV  ml          110   (109-191)   206   (105-205)         ml/m^2       54   (60-95)     101   ()     ESV  ml           42   (27-72)     145   (20-80)           ml/m^2       21   (14-36)      71   (11-40)      CO   L/min      5.31              4.78                     L/min/m^2  2.60              2.34                SV   ml           67   ()     60   ()          ml/m^2       33   (40-64)      30   (37-69)      EF   %            61   (58-76)      29   (55-81)     '-----+-----------+------+------------+------+------------'             CARDIAC OUTPUT HR:  79 BPM      LV DIMENSIONS       ----------------------------------------------          WALL THICKNESS - ANTEROSEPTAL:  1.1 cm          WALL THICKNESS - INFEROLATERAL:  0.6 cm          LV PAMELLA:  3.8 cm          LV ESD:  2.5 cm         LA DIMENSIONS (LV SYSTOLE)       ----------------------------------------------          DIAMETER:  4.0 cm         AORTIC ROOT DIMENSIONS       ----------------------------------------------          SINUS OF VALSALVA:  3.3 cm          AORTIC ROOT SIZE:  Normal        ANATOMY   ----------------------------------------------------------------------------------------      LEFT VENTRICLE       ----------------------------------------------          WALL THICKNESS:  Normal          CAVITY SIZE:  Normal         RIGHT VENTRICLE       ----------------------------------------------          WALL THICKNESS:  MODERATE HYPERTROPHY          CONTRACTILITY:  SEVERE GLOBALLY DECREASED          CAVITY SIZE:  SEVERELY ENLARGED         INTERVENTRICULAR SEPTUM       ----------------------------------------------               VENTRICULAR SEPTUM:  DIASTOLIC FLATTENING (RV VOLUME OVERLOAD), SYSTOLIC FLATTENING (RV PRESSURE               OVERLOAD)          LEFT ATRIUM        ----------------------------------------------          CAVITY SIZE:  Normal         RIGHT ATRIUM       ----------------------------------------------          CAVITY SIZE:  MODERATELY ENLARGED         PERICARDIUM       ----------------------------------------------          Normal          EFFUSION:  None         PLEURAL EFFUSION       ----------------------------------------------               None         VALVES   ----------------------------------------------------------------------------------------      AORTIC VALVE       ----------------------------------------------          AORTIC VALVE LEAFLETS:  Trileaflet         MITRAL VALVE       ----------------------------------------------          MITRAL VALVE LEAFLETS:  Normal Leaflets         TRICUSPID VALVE       ----------------------------------------------          TRICUSPID VALVE LEAFLETS:  Normal Leaflets          TRICUSPID REGURGITATION:  MILD         PULMONIC VALVE       ----------------------------------------------          PULMONIC VALVE LEAFLETS:  Normal Leaflets        17 SEGMENT   ----------------------------------------------------------------------------------------  .------------------------------------------------------------------------------------------------.   Segments            Wall Motion   Hyperenhancement  Stress Perfusion  Interpretation         +--------------------+--------------+------------------+------------------+----------------------+   Base Anterior       Normal/Hyper  None                                Normal                  Base Anteroseptal   Normal/Hyper  None                                Normal                  Base Inferoseptal   Normal/Hyper  None                                Normal                  Base Inferior       Normal/Hyper  None                                Normal                  Base Inferolateral  Normal/Hyper  None                                Normal                   Base Anterolateral  Normal/Hyper  None                                Normal                  Mid Anterior        Normal/Hyper  None                                Normal                  Mid Anteroseptal    Normal/Hyper  None                                Normal                  Mid Inferoseptal    Normal/Hyper  None                                Normal                  Mid Inferior        Normal/Hyper  None                                Normal                  Mid Inferolateral   Normal/Hyper  None                                Normal                  Mid Anterolateral   Normal/Hyper  None                                Normal                  Apical Anterior     Normal/Hyper  None                                Normal                  Apical Septal       Normal/Hyper  None                                Normal                  Apical Inferior     Normal/Hyper  None                                Normal                  Apical Lateral      Normal/Hyper  None                                Normal                  [place]                Normal/Hyper  None                                Normal                 +--------------------+--------------+------------------+------------------+----------------------+   RV Segments         Wall Motion   Hyperenhancement                    Interpretation         +--------------------+--------------+------------------+------------------+----------------------+   RV Basal Anterior   Severe Hypo   None                                Abnormal Wall Motion    RV Basal Inferior   Severe Hypo   None                                Abnormal Wall Motion    RV Mid              Severe Hypo   None                                Abnormal Wall Motion    RV Apical           Severe Hypo   None                                Abnormal Wall Motion    '--------------------+--------------+------------------+------------------+----------------------'         FINDINGS       ----------------------------------------------          LV SCAR SIZE (17 SEGMENT):  0 %        SCAN INFO   ==========================================================================================================     GENERAL   ----------------------------------------------------------------------------------------      CONTRAST AGENT       ----------------------------------------------          TYPE:  Gadavist          GD CONCENTRATION:  0.5 M          VOLUME ADMINISTERED:  10 ml          DOSAGE:  0.06 mmol/kg          SERUM CREATININE:  2.88 mg/dL          CREATININE DATE:  2022-Jan-24         SEDATION       ----------------------------------------------          SEDATION USED?:  No         VITALS       ----------------------------------------------          HEIGHT:  68.00 in          HEIGHT:  172.72 cm          WEIGHT:  200.00 lbs          WEIGHT:  90.72 kgs          BSA:  2.04 m^2         SETUP       ----------------------------------------------          SCAN TYPE:  Clinical          PATIENT TYPE:  Outpatient          INCOMPLETE SCAN:  No          REASON(S) FOR SCAN:  Sarcoid (known/suspect)           REFERRING PHYSICIAN:  PEREZ LESTER          ATTENDING PHYSICIAN:  PEREZ LESTER       Answers for HPI/ROS submitted by the patient on 8/30/2022  General Symptoms: No  Skin Symptoms: Yes  HENT Symptoms: Yes  EYE SYMPTOMS: No  HEART SYMPTOMS: Yes  LUNG SYMPTOMS: Yes  INTESTINAL SYMPTOMS: Yes  URINARY SYMPTOMS: Yes  REPRODUCTIVE SYMPTOMS: No  SKELETAL SYMPTOMS: Yes  BLOOD SYMPTOMS: Yes  NERVOUS SYSTEM SYMPTOMS: No  MENTAL HEALTH SYMPTOMS: Yes  Ear pain: No  Ear discharge: No  Hearing loss: No  Tinnitus: No  Nosebleeds: No  Congestion: Yes  Sinus pain: Yes  Trouble swallowing: Yes   Voice hoarseness: No  Mouth sores: No  Sore throat: No  Tooth pain: Yes  Gum tenderness: Yes  Change in taste:  Yes  Change in sense of smell: No  Dry mouth: Yes  Hearing aid used: No  Neck lump: No  Changes in hair: No  Changes in moles/birth marks: No  Itching: No  Rashes: No  Changes in nails: No  Acne: No  Change in facial hair: No  Warts: No  Non-healing sores: No  Scarring: No  Flaking of skin: Yes  Color changes of hands/feet in cold : No  Sun sensitivity: No  Skin thickening: No  Chest pain or pressure: Yes  Fast or irregular heartbeat: Yes  Pain in legs with walking: No  Trouble breathing while lying down: Yes  Fingers or toes appear blue: No  High blood pressure: No  Low blood pressure: No  Fainting: No  Murmurs: No  Pacemaker: Yes  Varicose veins: No  Wake up at night with shortness of breath: Yes  Light-headedness: No  Exercise intolerance: Yes  Cough: Yes  Sputum or phlegm: Yes  Coughing up blood: No  Difficulty breating or shortness of breath: Yes  Snoring: No  Wheezing: Yes  Difficulty breathing on exertion: Yes  Nighttime Cough: No  Difficulty breathing when lying flat: Yes  Heart burn or indigestion: Yes  Nausea: No  Vomiting: No  Abdominal pain: Yes  Bloating: Yes  Constipation: No  Diarrhea: Yes  Blood in stool: No  Black stools: No  Rectal or Anal pain: No  Fecal incontinence: No  Yellowing of skin or eyes: No  Vomit with blood: No  Change in stools: No  Trouble holding urine or incontinence: Yes  Pain or burning: No  Trouble starting or stopping: No  Increased frequency of urination: Yes  Blood in urine: No  Decreased frequency of urination: No  Frequent nighttime urination: Yes  Flank pain: Yes  Difficulty emptying bladder: No  Back pain: Yes  Muscle aches: Yes  Neck pain: Yes  Swollen joints: No  Joint pain: No  Bone pain: No  Muscle cramps: No  Muscle weakness: Yes  Joint stiffness: Yes  Bone fracture: No  Edema or swelling: Yes  Anemia: No  Swollen glands: No  Easy bleeding or bruising: Yes  Nervous or Anxious: No  Depression: Yes  Trouble sleeping: Yes  Trouble thinking or concentrating: No  Mood  changes: No  Panic attacks: No          Please do not hesitate to contact me if you have any questions/concerns.     Sincerely,     Khang Lindsey MD

## 2022-08-31 NOTE — PATIENT INSTRUCTIONS
Recommendations:   Please look at the information regarding Subcutaneous Remodulin    Download the XOCHILT   RemunJigsaw XOCHILT    2.  Please call us in 1 week to discuss possibly switching to subcutaneous remodulin.        We are located on the third floor of the [facility] on the [facility].  Our address is      [address] [place] [address] [place] [address] [place] [address]     Thank you for allowing us to be a part of your care here at the [facility]     If you have questions or concerns please contact us at:     Obdulia Rome RN, BSN                                  Samantha Chandra (Schedule,Prior Auth)  Nurse Coordinator                                        Clinic   Pulmonary Hypertension                               Pulmonary Hypertension  [facility]               [facility]  (P)[phone]                                      (Phone) [phone]                                                                     (Fax) [phone]     ** Please note that you will NOT receive a reminder call regarding your scheduled testing, reminder calls are for provider appointments only.  If you are scheduled for testing within the [employer] system you may receive a call regarding pre-registration for billing purposes only.**      Remember to weigh yourself daily after voiding and before you consume any food or beverages and log the numbers.  If you have gained/lost 2 pounds overnight or 5 pounds in a week contact us immediately for medication adjustments or further instructions.   **Please call us immediately if you have any syncope, chest pain, edema, or decline in your functional status.     Support Group:  Pulmonary Hypertension Association  Https://www.phassociation.org/  **Look at the Events Tab** They even have Support Groups that you can call into     [facility]  Support Group  Second Saturday of the Month from 1-3 PM   Location: [address] [place] [id number]  Leader: Sandra Jacques and Cinda Rollins  Phone: [phone] or [phone]  Email: [email or web address]    mmediately for medication adjustments or further instructions.

## 2022-09-13 NOTE — TELEPHONE ENCOUNTER
"Received confirmation from Kate @ [facility] that they will send an RN to show patient both devices.  -------------------------------  Sent E-mail to LUCINA Love @ [facility] asking her to have an RN show patient both IV & subcutaneous pumps in person so he can decide. Obdulia Rome RN on 9/13/2022 at 11:44 AM    -----------------------------  Called patient and asked if he had been able to review information for the types of pumps available for Prostacyclin?  Yes, but he still had some questions that couldn't answer.  We agreed it would be better if an RN from [facility] came to see him with both versions (IV & subcutaneous) for him to feel, look and ask questions so he could make a decision.  Advised him I would call [facility] to have an RN come out for that.    patient asked me to review last two Heart Cath results which shows his CO/CI has gone down over the last year and the pressure is still in the \"40's\".  Patient verbalized understanding, agreed with plan and denied any further questions. Obdulia Rome RN on 9/13/2022 at 11:02 AM      ----- Message from Obdulia Rome RN sent at 9/9/2022  3:52 PM CDT -----  Regarding: Prostacyclin???  Call patient and ask if he has decided on the prostacyclin?    ----- Message -----  From: Kassandra Ashford RN  Sent: 8/31/2022  12:44 PM CDT  To: Cardiology Ph Nurse-  Subject: GREG Roy saw Jimmy in clinic today.  He wants him to consider paternal prostacyclin; remodulin pump    We sent him info to review via [employer].  The plan is he's going to call Obdulia in 1 week with questions and to discuss - then hopefully make the switch.    Thanks!  Isidro          "

## 2022-09-28 NOTE — TELEPHONE ENCOUNTER
PA Initiation    Medication: Ambrisentan 10mg  Insurance Company: [employer] ([facility]) - Phone [phone] Fax [phone]  Pharmacy Filling the Rx: [facility] - [place], [place] - [address] [place] [address] COURT  Start Date: 9/28/2022    *Prior authorization completed and submitted through CoverMyMeds for review.

## 2022-09-28 NOTE — TELEPHONE ENCOUNTER
*Remodulin referral form completed and faxed to [facility] along with right heart catheterization report, clinic note, echocardiogram report, and 6 minute walk test for expedited review and assistance with obtaining the pt's prior authorization.    Radha Schmitt  Clinic    Pulmonary Hypertension   [facility]  (p) [phone]

## 2022-09-28 NOTE — TELEPHONE ENCOUNTER
PA Initiation    Medication: Tadalafil 20mg  Insurance Company: [employer] - Phone [phone] Fax [phone]  Pharmacy Filling the Rx: [facility] - [place], [place] - [address] [place] [address] COURT  Start Date: 9/28/2022    *Prior authorization completed and submitted through CoverMyMeds for review.

## 2022-09-29 NOTE — TELEPHONE ENCOUNTER
Sent RHC, Echo, 6 min walk , CT , and clinical           Radha Schmitt  Clinic    Pulmonary Hypertension   [facility]  (p) [phone]

## 2022-09-30 NOTE — TELEPHONE ENCOUNTER
Prior Authorization Approval    Authorization Effective Date: 11/2/2022  Authorization Expiration Date: 11/2/2023  Medication: Tadalafil 20mg - Approved  Approved Dose/Quantity: 60 tablets/30 days  Reference #: [id number]/A   Insurance Company: [employer] - Phone [phone] Fax [phone]    Which Pharmacy is filling the prescription (Not needed for infusion/clinic administered): [facility] [place] [place] [facility]

## 2022-09-30 NOTE — TELEPHONE ENCOUNTER
Prior Authorization Approval    Authorization Effective Date: 11/2/2022  Authorization Expiration Date: 11/2/2023  Medication: Ambrisentan 10mg - Approved  Approved Dose/Quantity: 30 tablets/30 days  Reference #: N/A   Insurance Company: [employer] ([facility]) - Phone [phone] Fax [phone]  Which Pharmacy is filling the prescription (Not needed for infusion/clinic administered): [facility] - [place], [place] - [address]

## 2022-09-30 NOTE — PROGRESS NOTES
----- Message from DEON Garsia sent at 9/30/2022  9:32 AM EDT -----  Please call the patient regarding his negative result.   SURGICAL ICU PROGRESS NOTE  9/22/2021  Critical Care Services Progress Note:     Jimmy Isaac remains seriously ill with hypotension complicating management of staghorn renal calculi.   I personally examined and evaluated the patient today.   The patient s prognosis today is improved as he is able to return to his home medications and stay off vasoactive drugs.  I have evaluated all laboratory values and imaging studies from the past 24 hours.  Key findings and decisions made today included excellent progress overnight with weaning of vasoactive medication.  I personally managed the analgesia and sedation.   Consults ongoing and ordered are Pharmacy and Nutrition.  Procedures that will happen today are weaning of vasoactive drugs and resumption of home medications.  All treatments were placed at my direction.  I formulated today s plan with Dr. Davison and the house staff team or resident(s) and agree with the findings and plan in the associated note.     The above plans and care have been discussed with family members as available and all questions and concerns were addressed.   I spent a total of 35 minutes (excluding procedure time) personally providing and directing critical care services at the bedside and on the critical care unit for Jimmy Isaac.        Jimmy Bender MD        PRIMARY TEAM: Urology  PRIMARY PHYSICIAN: Dr. Moura    REASON FOR CRITICAL CARE ADMISSION: Hypotension requiring vasopresors, hemodynamic monitoring  CONSULTING PHYSICIAN: Dr. Bender    ASSESSMENT: 61 yo male with h/o HTN, complete heart block s/p pacemaker, pulmonary hypertension 2/2 pulmonary sarcoidosis, on 5-10 L O2 at home, CKD, kidney stones, prior L nephrostomy tube, now s/p lithotripsy of L kidney stone 9/20, and transferred SICU due to pressor requirements, now off and stable.     PLAN:  No acute issues.  # Neuro/ pain/ sedation:  - Monitor neurological status. Notify the MD for any acute changes in exam.  - Tylenol  650mg q6h PRN (x2 overnight)  - Dilaudid 0.2mg q2h PRN  - Oxycodone 5mg q4h PRN    Pulmonary care:   # Pulmonary artery hypertension  # Pulmonary sarcoidosis  On 5 L O2 at baseline, up to 10 L when walking. Currently 10L  - Supplemental oxygen to keep saturation above 92 %.  - Continue PTA ambrisentan 10mg daily  - Continue PTA Selexipag 1600mcg q12h  - Once hemodynamically stable, off pressors, resume Tadalafil 40mg daily  - Continue PTA Budesonide 0.25mg, neb, BID  - Continue PTA Albuterol inhaler, 2 puffs q6h PRN    Cardiovascular:    # Complete heart block s/p pacemaker  # Shock, Vasoplegic vs. Septic, resolved  Arrived on levophed post-op, required 0.03-0.05 intermittently 9/21. Off pressors overnight (since midnight)  - Continue to monitor  - Discontinue arterial line this AM    GI care:   No acute concerns  - Continue regular diet  - BM 9/21 per nursing    Fluids/ Electrolytes/ Nutrition:   No acute issues. Has been on NaCl 100ml/hr  - Continue to monitor  - Discontinue maintenance fluids this AM    Renal/ Fluid Balance:    # H/o kidney stones  # CKD  S/p L kidney stone lithotripsy. L nephrostomy tube removed during procedure  Pt continues to void on own.   - Will continue to monitor intake and output.  - Pt refusing flomax- endorsing dizziness at home when he takes it    Endocrine:    No acute concerns.   - Low dose sliding scale as needed    ID/ Antibiotics:  Last positive Ucx with pansusceptible PsAr. Completed Zosyn 9/21.  Cultures were sent in the setting of pressor requirements and prior infection.   Pt remains afebrile. Tmax 98.0. WBC 5.4  - Continue to monitor fever and WBC  - Follow up UCx and BCx   - Per Urology, Ciprofloxacin 250mg daily until discharge    Heme:     # Anemia of chronic disease  No acute concerns  Hemoglobin 8.5< 9.7, likely dilutional  - Continue to monitor    Prophylaxis:    - Mechanical prophylaxis for DVT.   - Hold chemical DVT prophylaxis for now per Urology     MSK:    - PT  and OT consulted. Appreciate recs.     Lines/ tubes/ drains:  - Arterial line (discontinue today), PIV     Disposition:  -Surgical ICU, plan to transfer to floor today, orders in     Patient seen, findings and plan discussed with surgical ICU staff, Dr. Bender.      Juan Davison MD, PhD  Neurosurgery PGY-1  Pager [phone]   - - - - - - - - - - - - - - - - - - - - - - - - - - - - - - - - - - - - - - - - - - -   Interval Events:   Patient reports feeling well. Endorses good appetite and good sleep overnight.   Denies any significant pain.     REVIEW OF SYSTEMS: 10 point ROS neg other than the symptoms noted above in the HPI.      MEDICATIONS:  No current facility-administered medications on file prior to encounter.  ambrisentan (LETAIRIS) 10 MG tablet, Take 10 mg by mouth every morning   budesonide (PULMICORT) 0.25 MG/2ML neb solution, Take 2 mLs (0.25 mg) by nebulization 2 times daily  PROAIR  (90 Base) MCG/ACT inhaler, Inhale 2 puffs into the lungs every 6 hours as needed for shortness of breath / dyspnea  selexipag (UPTRAVI) 1600 MCG tablet, Take 1 tablet (1,600 mcg) by mouth every 12 hours  tadalafil, PAH, (ALYQ) 20 MG TABS, Take 2 tablets (40 mg) by mouth daily (Patient taking differently: Take 40 mg by mouth every morning )  tamsulosin (FLOMAX) 0.4 MG capsule, Take 1 capsule (0.4 mg) by mouth daily  UNABLE TO FIND, MEDICATION NAME: Topical skin tag remover      PHYSICAL EXAMINATION:  Temp:  [97.5  F (36.4  C)-98.7  F (37.1  C)] 98  F (36.7  C)  Pulse:  [83-98] 83  Resp:  [16-24] 16  BP: (107)/(66) 107/66  MAP:  [54 mmHg-82 mmHg] 71 mmHg  Arterial Line BP: ()/(39-65) 114/55  FiO2 (%):  [5 %-10 %] 8 %  SpO2:  [89 %-100 %] 100 %      Gen: Awake, no acute distress, resting comfortably in bed  HEENT: Atraumatic, normocephalic  Neuro: A&Ox4. No gross neurologic deficits. Moves all extremities.  Pulm: nonlabored breathing on 10 L O2 with symmetric chest rise  CV: RRR, on cardiac monitor   ABD: Soft,  nontender, non-distended; left elen incision clean, dry, intact  MSK:  Normal active range of motion, no edema  Skin: Warm, dry, no rashes or abrasions noted  Psych: Normal affect, cooperative      LABS: Reviewed.   Complete Blood Count   Recent Labs   Lab 09/22/21  0542 09/21/21 0253   WBC 5.4 7.8   HGB 8.5* 9.7*   * 184     Basic Metabolic Panel  Recent Labs   Lab 09/21/21  1644 09/21/21  0935 09/21/21  0253 09/20/21  2251   NA  --   --  137  --    POTASSIUM  --   --  3.9  --    CHLORIDE  --   --  101  --    CO2  --   --  27  --    BUN  --   --  55*  --    CR  --   --  2.83*  --    GLC 97 97 188* 146*     Liver Function Tests  Recent Labs   Lab 09/21/21 0253   AST 14   ALT 13   ALKPHOS 63   BILITOTAL 0.6   ALBUMIN 2.7*   INR 1.16*     Pancreatic Enzymes  No lab results found in last 7 days.  Coagulation Profile  Recent Labs   Lab 09/21/21 0253   INR 1.16*       IMAGING:  Recent Results (from the past 24 hour(s))   CT Abdomen Pelvis w/o Contrast    Narrative    EXAMINATION: CT ABDOMEN PELVIS W/O CONTRAST 9/21/2021 9:19 AM    TECHNIQUE: Helical CT images from the lung bases through the symphysis  pubis were obtained without IV contrast.     COMPARISON: 8/12/2021, 8/1/2021    HISTORY: Flank pain, kidney stone suspected    FINDINGS:    Abdomen and pelvis:   The previously seen percutaneous nephrostomy tube has been removed,  and a left ureteral stent has been placed, with pigtails positioned in  the left renal pelvis and urinary bladder. The previously seen large  stone in the proximal left ureter is no longer present. One of the  large calyceal stones in the mid left kidney has been removed, and the  other is unchanged. Mild residual left hydronephrosis with diffuse  urothelial thickening/haziness. Increased left perinephric stranding  without discrete perinephric hematoma. Unchanged staghorn calculi  throughout the right kidney. No right hydronephrosis. A Chong catheter  is positioned within the urinary  bladder with associated air.    The liver, gallbladder, spleen, and adrenal glands appear normal.  Unchanged calcification in the pancreatic head. No significant main  pancreatic ductal dilation appreciated on this noncontrast exam.    No intra-abdominal free air or free fluid. No abnormally dilated loops  of bowel. Moderate stool burden. The appendix is normal. Moderate to  severe atherosclerotic calcifications in the normal caliber abdominal  aorta. Slightly decreased size of the prominent gastrohepatic lymph  node measuring 1.2 cm compared to 1.4 cm previously.    Lower chest:   The heart is not enlarged. Trace pericardial fluid. Pacemaker leads  seen in the right ventricle. No change in scattered prominent  subcentimeter periaortic lymph nodes. No change in mosaic attenuation,  groundglass opacities, and scattered nodular fibrosis in the lung  bases.    Bones and soft tissues:   Serpiginous sclerosis in the femoral heads without subchondral  collapse, unchanged. Mild multilevel degenerative changes in the  spine. No worrisome osseous lesion.        Impression    IMPRESSION:   1. Interval postprocedural changes of left PCNL. Increased left  perinephric stranding without discrete perinephric hematoma. Mild  residual left hydronephrosis with appropriately positioned left  ureteral stent.  2. Stable staghorn calculi in the right kidney and decreased stone  burden in the left kidney.  3. Sequelae of chronic pancreatitis. A prominent gastrohepatic lymph  node is slightly decreased in size.  4. Stable findings of pulmonary sarcoidosis in the lung bases.  5. Unchanged avascular necrosis without subchondral collapse in the  femoral heads.    ABDON NAVAS MD         SYSTEM ID:  [id number]

## 2022-10-03 NOTE — TELEPHONE ENCOUNTER
PA Initiation    Medication: PA Uptravi 1600  [facility] - Phone [phone] Fax [phone]  Pharmacy Filling the Rx: [place], [place] - [address] [place] [address]  Filling Pharmacy Phone:    Filling Pharmacy Fax:    Start Date: 9/29/2022      Radha Schmitt  Clinic    Pulmonary Hypertension   [facility]  (P) [phone]

## 2022-10-03 NOTE — TELEPHONE ENCOUNTER
Prior Authorization Approval    Authorization Effective Date: 11/2/2022  Authorization Expiration Date: 11/2/2023  Medication: PA Uptravi 1600  Approved Dose/Quantity:   Reference #:     Insurance Company: [employer] [place] - Phone [phone] Fax [phone]  Expected CoPay:       CoPay Card Available:      Foundation Assistance Needed:    Which Pharm[facility] is filling the prescription (Not needed for infusion/clinic administered[facility] [address]      Radha Schmitt  Clinic    Pulmonary Hypertension   [facility]  (p) [phone]

## 2022-10-06 NOTE — TELEPHONE ENCOUNTER
PA Initiation    Medication: PA remodulin Sub Q Remunity   Insurance Company: [facility] - Phone [phone] Fax [phone]  Pharmacy Filling the Rx: [facility] - [place], [place] - [address] [place] [address]  Filling Pharmacy Phone:    Filling Pharmacy Fax:    Start Date: 10/6/2022    [facility] sent us a PA form for [facility] to complete and fax back to [phone] with all testing with it.         Radha Schmitt  Clinic    Pulmonary Hypertension   [facility]  (p) [phone]

## 2022-10-06 NOTE — PATIENT INSTRUCTIONS
1-Will start a new medication called chlorthalidone 25 mg daily; it will help with the swelling and hopefully with the breathing.    2-restrict water intake to 60 ounces daily    3-keep same torsemide and potassium dose

## 2022-10-06 NOTE — PROGRESS NOTES
Primary Provider: Cristhian Busch    CC:   CKD stage IV    HPI:   This is a follow up video visit.  Jimmy Isaac is a pleasant 61 year old male  who presents for FU of CKD IV.     He has a complicated medical history with some active problems highlighted below:    1-Pulmonary HTN secondary right heart systolic dysfunction with chronic hypoxic respiratory failure: thought to be secondary to pulmonary sarcoidosis.  He is on triple combination therapy with tadalafil, ambrisentan, and selexipag.He has chronic hypoxemia and has been oxygen dependent since 2012. He uses 5 L/min at rest and increases that to 10 ml/min upon exertion.    2-Complete heart block S/P pacemaker insertion on 8/2/21    3-Chronic kidney disease stage 4; thought to be secondary to nephrolithiasis.    4-Nephrolithiasis s/p several interventions by urology    5-Pulmonary sarcoidosis on chronic steroids    6-Ex-Smoker for 16 years till 2010    Jimmy mentions he saw his pulmonologist recently.  Unfortunately his pulmonary hypertension is worsening and he is now requiring 10 L of oxygen consistently.  His pulmonologist is considering starting him on a new medication called Remodulin.  He is becoming more sedentary and has not been able to move much.  He thinks that this is further contributing to his lower extremity edema.  His weight has been stable around 165 pounds.  He sleeps on his side.    His prednisone is being tapered.  He is hoping that now was his prednisone taper he can cut down on his water pills.  His intake of fluids is around  ounces per day.  He says that he is a mouth breather and that makes him thirsty.    His appetite is good, no nausea, no vomiting, no dysphagia, no myoclonic jerks.  He denies any urinary symptoms, burning, dysuria or hematuria. No fever, no cough.    Wt Readings from Last 4 Encounters:   08/31/22 75.3 kg (166 lb)   08/31/22 75.3 kg (166 lb)   07/14/22 74.8 kg (165 lb)   06/28/22 71.2 kg (157 lb)     Allergies    Allergen Reactions     Valacyclovir Rash     Diffuse body rash       ambrisentan (LETAIRIS) 10 MG tablet, Take 10 mg by mouth every morning   mometasone-formoterol (DULERA) 200-5 MCG/ACT inhaler, Inhale 2 puffs into the lungs 2 times daily  potassium chloride ER (KLOR-CON M) 20 MEQ CR tablet, Take 2 tablets (40 mEq) by mouth 2 times daily  predniSONE (DELTASONE) 10 MG tablet, Take 1 tablet (10 mg) by mouth daily  predniSONE (DELTASONE) 5 MG tablet, Take 1.5 tablets (7.5 mg) by mouth daily for 30 days, THEN 1 tablet (5 mg) daily for 30 days, THEN 0.5 tablets (2.5 mg) daily for 30 days.  PROAIR  (90 Base) MCG/ACT inhaler, Inhale 2 puffs into the lungs every 6 hours as needed for shortness of breath / dyspnea  selexipag (UPTRAVI) 1600 MCG tablet, Take 1 tablet (1,600 mcg) by mouth every 12 hours  tadalafil, PAH, (ALYQ) 20 MG TABS, Take 2 tablets (40 mg) by mouth daily  tiotropium (SPIRIVA RESPIMAT) 2.5 MCG/ACT inhaler, Inhale 2 puffs into the lungs daily  torsemide (DEMADEX) 100 MG tablet, Take 1 tablet (100 mg) by mouth 2 times daily  UNABLE TO FIND, MEDICATION NAME: Home Oxygen, 5-10 L  Vitamin D3 (CHOLECALCIFEROL) 25 mcg (1000 units) tablet, Take 1 tablet (25 mcg) by mouth every other day  [DISCONTINUED] furosemide (LASIX) 20 MG tablet, Take 6 tablets (120 mg) by mouth 2 times daily  [DISCONTINUED] hydrochlorothiazide (HYDRODIURIL) 12.5 MG tablet, Take 2 tablets (25 mg) by mouth daily    No current facility-administered medications on file prior to visit.      Past Medical History:   Diagnosis Date     Acute renal failure superimposed on stage 4 chronic kidney disease, unspecified acute renal failure type (H) 5/8/2022     Chronic sinusitis      CKD (chronic kidney disease)      Heart disease      Hypertension      Keratosis     frontal scalp, treated with liquid nitrogen at [facility]     Malignant neoplasm (H)      Pneumonia a few times     Pulmonary sarcoidosis (H)        Past Surgical History:    Procedure Laterality Date     COLONOSCOPY       CV RIGHT HEART CATH MEASUREMENTS RECORDED N/A 2019    Procedure: CV RIGHT HEART CATH;  Surgeon: Kale Delgado MD;  Location:  HEART CARDIAC CATH LAB     CV RIGHT HEART CATH MEASUREMENTS RECORDED N/A 2019    Procedure: CV RIGHT HEART CATH;  Surgeon: Ion Lemon MD;  Location:  HEART CARDIAC CATH LAB     CV RIGHT HEART CATH MEASUREMENTS RECORDED N/A 2021    Procedure: CV RIGHT HEART CATH;  Surgeon: Johny Barone MD;  Location:  HEART CARDIAC CATH LAB     CV RIGHT HEART CATH MEASUREMENTS RECORDED N/A 2022    Procedure: Heart Cath Right Heart Cath;  Surgeon: Emerson Yoo MD;  Location:  HEART CARDIAC CATH LAB     ENT SURGERY      wisdom teeth extraction     EP PACEMAKER Left 2021    Procedure: EP Pacemaker;  Surgeon: Dora Fontanez MD;  Location:  HEART CARDIAC CATH LAB     IR NEPHROSTOMY TUBE PLACEMENT LEFT  2021     LASER HOLMIUM NEPHROLITHOTOMY VIA PERCUTANEOUS NEPHROSTOMY Left 2021    Procedure: NEPHROLITHOTOMY, PERCUTANEOUS, USING HOLMIUM LASER;  Surgeon: Zackery Moura MD;  Location:  OR     TONSILLECTOMY  1964       Social History     Tobacco Use     Smoking status: Former Smoker     Packs/day: 1.00     Years: 30.00     Pack years: 30.00     Types: Cigarettes     Start date: 10/10/1975     Quit date: 3/1/2010     Years since quittin.6     Smokeless tobacco: Never Used   Substance Use Topics     Alcohol use: Not Currently     Alcohol/week: 0.0 standard drinks     Comment: stated 2 bottles of beer occ     Drug use: Not Currently     Comment: past use of marijuana       Family History   Problem Relation Age of Onset     Coronary Artery Disease Father      Heart Disease Father         heart attack age 35-40     Hypertension Mother      Hyperlipidemia Mother      Cerebrovascular Disease Mother      Dementia Mother      Lupus Sister      Glaucoma No family hx of      Macular Degeneration  No family hx of        ROS: A 12 system review of systems was negative other than noted here or above.     Physical Exam:  There were no vitals taken for this visit.  GENERAL APPEARANCE: not in distress, full sentences    Results:  Reviewed in details with the patient.    Assessment/Plan:  1-CKD (chronic kidney disease) stage 4, GFR 15-29 ml/min (H)  (primary encounter diagnosis)  This is thought to be secondary to nephrolithiasis but also might be related to repetitive acute kidney injuries and aging.  He was already enrolled in the CKD journey.  His creatinine has been stable around 2.5 mg/dL.  He still complains of bilateral ankle edema while taking torsemide 100 mg twice daily.  His intake of fluid is also high at 80 to 100 ounces per day.  I emphasized the importance of limiting salt and consequently water intake and maintaining a dry weight of around 160 pounds.  -We will start him on chlorthalidone 25 mg daily, hoping that this has a synergistic effect with loop diuretic to achieve a better euvolemic state, especially with his shortness of breath and increased oxygen requirements.  -We discussed briefly the options for renal replacement therapy if the need arises.  He is not a candidate for a kidney transplant.  He is not very enthusiastic about dialysis either.  We talked briefly about moving towards conservative kidney management in his case as dialysis might not provide an improved quality of life.    2-Pulmonary hypertension (H) with Right heart failure  He had a right heart cath on 6/22 which showed severely elevated right-sided filling pressure as well as severely elevated pulmonary arterial pressure with moderately elevated left-sided filling pressure .  He is currently at 165 pounds but his dry weight should be around 160 pounds.      3-Hypokalemia:   This is likely secondary to diuresis.  He is currently on potassium 40 mEq twice daily.  His potassium level is at goal 4.8 mmol/L.    4-CKD-MBD: His  calcium, phosphorus, PTH, vitamin D are within normal range    5-positive UA: His urine culture grew Enterococcus faecalis, however he is completely asymptomatic so we will not treat at this point.    6-normocytic anemia : His hemoglobin is at 11.4 g/dL.  No need for BHARGAVI.  We will check iron studies.      7-Follow-up in clinic in person in 6 weeks    The total time of this encounter amounted to 45 minutes on the day of the encounter 7/14/2022. This time included face to face time spent with the patient, preparatory work and chart review, ordering tests, and performing post visit documentation.    Luiza Cho MD   [facility]   Department of Medicine   Division of Renal Disease and Hypertension

## 2022-10-06 NOTE — LETTER
10/6/2022       RE: Jimmy Isaac  [address] [address] [address]     Dear Colleague,    Thank you for referring your patient, Jimmy Isaac, to the [facility] NEPH[facility]OLOG[facility] [place] at [facility]. Please see a copy of my visit note below.      CC:   CKD stage IV    HPI:   This is a follow up video visit.  Jimmy Isaac is a pleasant 61 year old male  who presents for FU of CKD IV.     He has a complicated medical history with some active problems highlighted below:    1-Pulmonary HTN secondary right heart systolic dysfunction with chronic hypoxic respiratory failure: thought to be secondary to pulmonary sarcoidosis.  He is on triple combination therapy with tadalafil, ambrisentan, and selexipag.He has chronic hypoxemia and has been oxygen dependent since 2012. He uses 5 L/min at rest and increases that to 10 ml/min upon exertion.    2-Complete heart block S/P pacemaker insertion on 8/2/21    3-Chronic kidney disease stage 4; thought to be secondary to nephrolithiasis.    4-Nephrolithiasis s/p several interventions by urology    5-Pulmonary sarcoidosis on chronic steroids    6-Ex-Smoker for 16 years till 2010    Jimmy mentions he saw his pulmonologist recently.  Unfortunately his pulmonary hypertension is worsening and he is now requiring 10 L of oxygen consistently.  His pulmonologist is considering starting him on a new medication called Remodulin.  He is becoming more sedentary and has not been able to move much.  He thinks that this is further contributing to his lower extremity edema.  His weight has been stable around 165 pounds.  He sleeps on his side.    His prednisone is being tapered.  He is hoping that now was his prednisone taper he can cut down on his water pills.  His intake of fluids is around  ounces per day.  He says that he is a mouth breather and that makes him thirsty.    His appetite is good, no nausea, no vomiting,  no dysphagia, no myoclonic jerks.  He denies any urinary symptoms, burning, dysuria or hematuria. No fever, no cough.    Wt Readings from Last 4 Encounters:   08/31/22 75.3 kg (166 lb)   08/31/22 75.3 kg (166 lb)   07/14/22 74.8 kg (165 lb)   06/28/22 71.2 kg (157 lb)     Allergies   Allergen Reactions     Valacyclovir Rash     Diffuse body rash       ambrisentan (LETAIRIS) 10 MG tablet, Take 10 mg by mouth every morning   mometasone-formoterol (DULERA) 200-5 MCG/ACT inhaler, Inhale 2 puffs into the lungs 2 times daily  potassium chloride ER (KLOR-CON M) 20 MEQ CR tablet, Take 2 tablets (40 mEq) by mouth 2 times daily  predniSONE (DELTASONE) 10 MG tablet, Take 1 tablet (10 mg) by mouth daily  predniSONE (DELTASONE) 5 MG tablet, Take 1.5 tablets (7.5 mg) by mouth daily for 30 days, THEN 1 tablet (5 mg) daily for 30 days, THEN 0.5 tablets (2.5 mg) daily for 30 days.  PROAIR  (90 Base) MCG/ACT inhaler, Inhale 2 puffs into the lungs every 6 hours as needed for shortness of breath / dyspnea  selexipag (UPTRAVI) 1600 MCG tablet, Take 1 tablet (1,600 mcg) by mouth every 12 hours  tadalafil, PAH, (ALYQ) 20 MG TABS, Take 2 tablets (40 mg) by mouth daily  tiotropium (SPIRIVA RESPIMAT) 2.5 MCG/ACT inhaler, Inhale 2 puffs into the lungs daily  torsemide (DEMADEX) 100 MG tablet, Take 1 tablet (100 mg) by mouth 2 times daily  UNABLE TO FIND, MEDICATION NAME: Home Oxygen, 5-10 L  Vitamin D3 (CHOLECALCIFEROL) 25 mcg (1000 units) tablet, Take 1 tablet (25 mcg) by mouth every other day  [DISCONTINUED] furosemide (LASIX) 20 MG tablet, Take 6 tablets (120 mg) by mouth 2 times daily  [DISCONTINUED] hydrochlorothiazide (HYDRODIURIL) 12.5 MG tablet, Take 2 tablets (25 mg) by mouth daily    No current facility-administered medications on file prior to visit.      Past Medical History:   Diagnosis Date     Acute renal failure superimposed on stage 4 chronic kidney disease, unspecified acute renal failure type (H) 5/8/2022     Chronic  sinusitis      CKD (chronic kidney disease)      Heart disease      Hypertension      Keratosis     frontal scalp, treated with liquid nitrogen at [facility]     Malignant neoplasm (H)      Pneumonia a few times     Pulmonary sarcoidosis (H)        Past Surgical History:   Procedure Laterality Date     COLONOSCOPY       CV RIGHT HEART CATH MEASUREMENTS RECORDED N/A 2019    Procedure: CV RIGHT HEART CATH;  Surgeon: Kale Delgado MD;  Location:  HEART CARDIAC CATH LAB     CV RIGHT HEART CATH MEASUREMENTS RECORDED N/A 2019    Procedure: CV RIGHT HEART CATH;  Surgeon: Ion Lemon MD;  Location:  HEART CARDIAC CATH LAB     CV RIGHT HEART CATH MEASUREMENTS RECORDED N/A 2021    Procedure: CV RIGHT HEART CATH;  Surgeon: Jhony Barone MD;  Location:  HEART CARDIAC CATH LAB     CV RIGHT HEART CATH MEASUREMENTS RECORDED N/A 2022    Procedure: Heart Cath Right Heart Cath;  Surgeon: Emerson Yoo MD;  Location:  HEART CARDIAC CATH LAB     ENT SURGERY      wisdom teeth extraction     EP PACEMAKER Left 2021    Procedure: EP Pacemaker;  Surgeon: Dora Fontanez MD;  Location:  HEART CARDIAC CATH LAB     IR NEPHROSTOMY TUBE PLACEMENT LEFT  2021     LASER HOLMIUM NEPHROLITHOTOMY VIA PERCUTANEOUS NEPHROSTOMY Left 2021    Procedure: NEPHROLITHOTOMY, PERCUTANEOUS, USING HOLMIUM LASER;  Surgeon: Zackery Moura MD;  Location:  OR     TONSILLECTOMY  1964       Social History     Tobacco Use     Smoking status: Former Smoker     Packs/day: 1.00     Years: 30.00     Pack years: 30.00     Types: Cigarettes     Start date: 10/10/1975     Quit date: 3/1/2010     Years since quittin.6     Smokeless tobacco: Never Used   Substance Use Topics     Alcohol use: Not Currently     Alcohol/week: 0.0 standard drinks     Comment: stated 2 bottles of beer occ     Drug use: Not Currently     Comment: past use of marijuana       Family History   Problem Relation Age  of Onset     Coronary Artery Disease Father      Heart Disease Father         heart attack age 35-40     Hypertension Mother      Hyperlipidemia Mother      Cerebrovascular Disease Mother      Dementia Mother      Lupus Sister      Glaucoma No family hx of      Macular Degeneration No family hx of        ROS: A 12 system review of systems was negative other than noted here or above.     Physical Exam:  There were no vitals taken for this visit.  GENERAL APPEARANCE: not in distress, full sentences    Results:  Reviewed in details with the patient.    Assessment/Plan:  1-CKD (chronic kidney disease) stage 4, GFR 15-29 ml/min (H)  (primary encounter diagnosis)  This is thought to be secondary to nephrolithiasis but also might be related to repetitive acute kidney injuries and aging.  He was already enrolled in the CKD journey.  His creatinine has been stable around 2.5 mg/dL.  He still complains of bilateral ankle edema while taking torsemide 100 mg twice daily.  His intake of fluid is also high at 80 to 100 ounces per day.  I emphasized the importance of limiting salt and consequently water intake and maintaining a dry weight of around 160 pounds.  -We will start him on chlorthalidone 25 mg daily, hoping that this has a synergistic effect with loop diuretic to achieve a better euvolemic state, especially with his shortness of breath and increased oxygen requirements.  -We discussed briefly the options for renal replacement therapy if the need arises.  He is not a candidate for a kidney transplant.  He is not very enthusiastic about dialysis either.  We talked briefly about moving towards conservative kidney management in his case as dialysis might not provide an improved quality of life.    2-Pulmonary hypertension (H) with Right heart failure  He had a right heart cath on 6/22 which showed severely elevated right-sided filling pressure as well as severely elevated pulmonary arterial pressure with moderately elevated  left-sided filling pressure .  He is currently at 165 pounds but his dry weight should be around 160 pounds.      3-Hypokalemia:   This is likely secondary to diuresis.  He is currently on potassium 40 mEq twice daily.  His potassium level is at goal 4.8 mmol/L.    4-CKD-MBD: His calcium, phosphorus, PTH, vitamin D are within normal range    5-positive UA: His urine culture grew Enterococcus faecalis, however he is completely asymptomatic so we will not treat at this point.    6-normocytic anemia : His hemoglobin is at 11.4 g/dL.  No need for BHARGAVI.  We will check iron studies.      7-Follow-up in clinic in person in 6 weeks    The total time of this encounter amounted to 45 minutes on the day of the encounter 7/14/2022. This time included face to face time spent with the patient, preparatory work and chart review, ordering tests, and performing post visit documentation.    Luiza Cho MD   [facility]   Department of Medicine   Division of Renal Disease and Hypertension

## 2022-10-06 NOTE — TELEPHONE ENCOUNTER
HELIO ortega.          Radha Schmitt  Clinic    Pulmonary Hypertension   [facility]  (P) [phone]

## 2022-10-06 NOTE — PROGRESS NOTES
Jimmy is a 61 year old who is being evaluated via a billable video visit.      How would you like to obtain your AVS? [employer]  If the video visit is dropped, the invitation should be resent by: Send to e-mail at: [email or web address]  Will anyone else be joining your video visit? No        Video-Visit Details    Video Start Time: 1:55 PM    Type of service:  Video Visit    Video End Time:2:23 PM    Originating Location (pt. Location): Home    Distant Location (provider location):  [facility] NEPHROLOGY [facility]INIC [place]     Platform used for Video Visit: Vidit

## 2022-10-06 NOTE — TELEPHONE ENCOUNTER
[employer] called and wanted to know more clinical information which was provided. They said it could take up to 24hrs for determination. The case ID [id number]    Radha Schmitt  Clinic    Pulmonary Hypertension   [facility]  (p) [phone]

## 2022-10-12 NOTE — TELEPHONE ENCOUNTER
Sent appeal letter to [facility] fax number: [phone]. Letter is in chart     Radha Schmitt  Clinic    Pulmonary Hypertension   [facility]  (p) [phone]

## 2022-10-20 NOTE — TELEPHONE ENCOUNTER
Prior Authorization Approval    Authorization Effective Date: 10/5/2022  Authorization Expiration Date: 10/4/2023  Medication: PA remodulin Sub Q Remunity   Approved Dose/Quantity:   Reference #:     Insurance Company: [employer] [place] - Phone [phone] Fax [phone]  Expected CoPay:       CoPay Card Available:      Foundation Assistance Needed:    Which Pharmacy is filling the prescription (Not needed for infusion/clinic administered): [address]    Did fax over the approval to yany Schmitt  [facility]    Pulmonary Hypertension   [facility]  (p) [phone]

## 2022-10-25 NOTE — TELEPHONE ENCOUNTER
torsemide (DEMADEX) 100 MG  Last Written Prescription Date:  6/28/22  Last Fill Quantity: 60,   # refills: 3  Last Office Visit : 8/31/22  Future Office visit:  12/2/22  Routing refill request to provider for review/approval because:  ABN  CR >    Creatinine   Date Value Ref Range Status   10/05/2022 2.59 (H) 0.66 - 1.25 mg/dL Final   07/01/2021 3.27 (H) 0.66 - 1.25 mg/dL Final

## 2022-10-25 NOTE — TELEPHONE ENCOUNTER
Medication Refill double check:    Last office visit was on 8/31/22 with .    Follow up was recommended for after transition to Sub Q Remodulin.    Any additional encounters with changes to requested med? no    Authorizing provider is: Dr. Lindsey    Refill was approved.     Additional orders/notes:       Obdulia Rome RN on 10/25/2022 at 2:23 PM

## 2022-10-27 NOTE — TELEPHONE ENCOUNTER
Admission planned for Nov 7th @ 1pm. patient sent [employer] message with details for admission and requirement for Covid test.  Obdulia Rome RN on 10/27/2022 at 8:52 AM      [id number] - Updated calendar

## 2022-10-31 NOTE — TELEPHONE ENCOUNTER
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     Radha Schmitt  Clinic    Pulmonary Hypertension   [facility]  (P) [phone]

## 2022-11-02 NOTE — TELEPHONE ENCOUNTER
Health Call Center    Phone Message    May a detailed message be left on voicemail: yes     Reason for Call: Medication Refill Request    Has the patient contacted the pharmacy for the refill? Yes  / Pharmacy has had no response.    Name of medication being requested:   ambrisentan (LETAIRIS) 10 MG tablet 30 tablet 11 4/15/2021  --   Sig - Route: Take 10 mg by mouth every morning  - Oral   Class: Historical       Provider who prescribed the medication: Dr Lindsey    Pharmacy: [facility]    Date medication is needed: asap    Patients has 3 days left / refill asap due to mail order     Action Taken: Other: Cardiology    Travel Screening: Not Applicable           ---------------------------------------------------  Sent refill for Letairis.  Obdulia Rome RN on 11/3/2022 at 9:26 AM

## 2022-11-03 NOTE — TELEPHONE ENCOUNTER
Medication Refill double check:    Last office visit was on 8/31/22 with .    Follow up was recommended for after admission.    Any additional encounters with changes to requested med? no    Authorizing provider is: Dr. Lindsey    Refill was approved.     Additional orders/notes:       Obdulia Rome RN on 11/3/2022 at 9:22 AM

## 2022-11-07 NOTE — H&P
[facility]    Cardiology History and Physical - Cardiology 2         Date of Admission:  11/7/2022    Assessment & Plan: HVSL    Jimmy Isaac is a 61 year old male admitted on 11/7/2022. He has a history significant for right heart failure 2/2 severe pulmonary hypertension in the setting of pulmonary sarcoidosis, CAD, CKD IV, HTN, chronic hypoxic respiratory failure on chronic home oxygen (baseline 10L at rest and increases to 15L upon exertion) and herpes zoster admitted for initiation of remodulin. He is also presenting with progressive shortness of breath and fluid overload over the past several weeks (dry weight ~152 lbs).     #Acute on chronic right heart failure 2/2 severe pulmonary hypertension in the setting of pulmonary sarcoidosis, Group 1  #Complete AV block s/p pacemaker insertion 8/21  Admitted for remodulin uptitration. Patient also has progressive SOB and weight gain. Is chronically dependent on home O2, up to 10L at rest in recent weeks. Is on triple therapy at home (ambrisentan, tadalafil, selexipag). Last RHC completed 6/22 with RA 16, /16, PA 67, PCWP 21, CO 3.5, CI 1.78, PVR 13.2.  Primary cardiologist: Dr. Lindsey  Dry weight: 152 lbs  Weight on admission: 177 lbs  Current weight: 177 lbs  Fluid status: hypervolemic.      > will give bolus of 80mg IV Lasix followed by 20/hr Lasix gtt     > potassium 40mEq BID     > BMP q8h  PH meds:     > after PICC placement, begin remodulin at 1mg/mL with plan to uptitrate by 2 mg/mL q12h     > start PTA selexipag 1600mcg q12h, with plan to decrease total daily dose by 200mcg with each uptitration in remodulin      > continue PTA ambrisentan 10mg daily     > continue PTA tadalafil 40mg daily     > PRN Kytril and tramadol available for side effects of remodulin uptitration  Other meds:     > holding PTA chlorthalidone    #CKD Stage IV, GFR 22-28  #Hx of nephrolithiasis  Patient's baseline Cr appears  to be 2.5-2.7. CKD in the setting of nephrolithiasis. Creatinine from this admission pending.   - avoid nephrotoxins  - renally dose medications    #Chronic normocytic anemia  HgB 10.3 on this admission. Baseline 12-13. Patient denies any active bleeding. Iron studies 10/22 consistent with mixed iron deficiency/anemia of chronic disease. Patient on ferrous gluconate.   - continue to monitor hemoglobins  - continue PTA ferrous gluconate     Diet:  2g sodium restriction with 2L fluid restriction  DVT Prophylaxis: Heparin SQ  Chong Catheter: Not present  Code Status: full  Fluids: none  Lines: PICC line to be placed    Disposition Plan   Expected discharge: 2 - 3 days, recommended to prior living arrangement once fluid volume status optimized on oral medication.    The patient's care was discussed with the Attending Physician, Dr. Hills.    Lala Melara MD  [facility]    ______________________________________________________________________    Chief Complaint   Shortness of breath  Remodulin uptitration    History is obtained from the patient.    History of Present Illness   Jimmy Isaca is a 61 year old male admitted on 11/7/2022. He has a history significant for right heart failure 2/2 severe pulmonary hypertension in the setting of pulmonary sarcoidosis, CAD, CKD IV, HTN, chronic hypoxic respiratory failure on chronic home oxygen (baseline 10L at rest and increases to 15L upon exertion) and herpes zoster admitted for initiation of remodulin. He is also presenting with progressive shortness of breath and fluid overload over the past several weeks (dry weight ~152 lbs).     The patient endorses feeling worsening shortness of breath over the past 1.5 months or so. He has an increased O2 requirement; he requires 10L at rest and 15L with exertion. He is very limited with his daily activities given his shortness of breath and is not able to ambulate much. He is also  endorsing increased fluid retention, especially in his abdominal and lower extremities. He has an associated decreased appetite. He denies chest pain, palpitations, headaches, lightheadedness, fevers, chills, abdominal pain, nausea, or vomiting. He does endorse some intermittent constipation.     The patient was diagnosed with sarcoidosis in 2010, with PFTs showing severe obstruction with air trapping. He was diagnosed with pulmonary hypertension in November 2021 and follows with Dr. Lindsey. He is on triple therapy at home (ambrisentan, selexipag, tadalafil).  Last RHC completed 6/22 with RA 16, /16, PA 67, PCWP 21, CO 3.5, CI 1.78, PVR 13.2. He presents now with worsening shortness of breath and to initiate remodulin administration.     Review of Systems    The 10 point Review of Systems is negative other than noted in the HPI or here.     Past Medical History    I have reviewed this patient's medical history and updated it with pertinent information if needed.   Past Medical History:   Diagnosis Date     Acute renal failure superimposed on stage 4 chronic kidney disease, unspecified acute renal failure type (H) 5/8/2022     Chronic sinusitis      CKD (chronic kidney disease)      Heart disease      Hypertension      Keratosis     frontal scalp, treated with liquid nitrogen at [facility]     Malignant neoplasm (H)      Pneumonia a few times     Pulmonary sarcoidosis (H)        Past Surgical History   I have reviewed this patient's surgical history and updated it with pertinent information if needed.  Past Surgical History:   Procedure Laterality Date     COLONOSCOPY       CV RIGHT HEART CATH MEASUREMENTS RECORDED N/A 6/19/2019    Procedure: CV RIGHT HEART CATH;  Surgeon: Kale Delgado MD;  Location:  HEART CARDIAC CATH LAB     CV RIGHT HEART CATH MEASUREMENTS RECORDED N/A 12/11/2019    Procedure: CV RIGHT HEART CATH;  Surgeon: Ion Lemon MD;  Location:  HEART CARDIAC CATH LAB      CV RIGHT HEART CATH MEASUREMENTS RECORDED N/A 2021    Procedure: CV RIGHT HEART CATH;  Surgeon: Jhony Barone MD;  Location:  HEART CARDIAC CATH LAB     CV RIGHT HEART CATH MEASUREMENTS RECORDED N/A 2022    Procedure: Heart Cath Right Heart Cath;  Surgeon: Emerson Yoo MD;  Location:  HEART CARDIAC CATH LAB     ENT SURGERY      wisdom teeth extraction     EP PACEMAKER Left 2021    Procedure: EP Pacemaker;  Surgeon: Dora Fontanez MD;  Location:  HEART CARDIAC CATH LAB     IR NEPHROSTOMY TUBE PLACEMENT LEFT  2021     LASER HOLMIUM NEPHROLITHOTOMY VIA PERCUTANEOUS NEPHROSTOMY Left 2021    Procedure: NEPHROLITHOTOMY, PERCUTANEOUS, USING HOLMIUM LASER;  Surgeon: Zackery Moura MD;  Location:  OR     TONSILLECTOMY  1965       Social History   I have reviewed this patient's social history and updated it with pertinent information if needed.  Social History     Tobacco Use     Smoking status: Former     Packs/day: 1.00     Years: 30.00     Pack years: 30.00     Types: Cigarettes     Start date: 10/10/1975     Quit date: 3/1/2010     Years since quittin.6     Smokeless tobacco: Never   Substance Use Topics     Alcohol use: Not Currently     Alcohol/week: 0.0 standard drinks     Comment: stated 2 bottles of beer occ     Drug use: Not Currently     Comment: past use of marijuana     Family History   I have reviewed this patient's family history and updated it with pertinent information if needed.   I have reviewed this patient's family history and updated it with pertinent information if needed.  Family History   Problem Relation Age of Onset     Coronary Artery Disease Father      Heart Disease Father         heart attack age 35-40     Hypertension Mother      Hyperlipidemia Mother      Cerebrovascular Disease Mother      Dementia Mother      Lupus Sister      Glaucoma No family hx of      Macular Degeneration No family hx of        Prior to Admission Medications    Prior to Admission Medications   Prescriptions Last Dose Informant Patient Reported? Taking?   PROAIR  (90 Base) MCG/ACT inhaler   No No   Sig: Inhale 2 puffs into the lungs every 6 hours as needed for shortness of breath / dyspnea   UNABLE TO FIND   Yes No   Sig: MEDICATION NAME: Home Oxygen, 5-10 L   Vitamin D3 (CHOLECALCIFEROL) 25 mcg (1000 units) tablet   No No   Sig: Take 1 tablet (25 mcg) by mouth every other day   ambrisentan (LETAIRIS) 10 MG tablet   No No   Sig: Take 1 tablet (10 mg) by mouth every morning   chlorthalidone (HYGROTON) 25 MG tablet   No No   Sig: Take 1 tablet (25 mg) by mouth daily   ferrous gluconate (FERGON) 324 (38 Fe) MG tablet   No No   Sig: Take 1 tablet (324 mg) by mouth daily (with breakfast) for 90 days   mometasone-formoterol (DULERA) 200-5 MCG/ACT inhaler   No No   Sig: Inhale 2 puffs into the lungs 2 times daily   potassium chloride ER (KLOR-CON M) 20 MEQ CR tablet   No No   Sig: Take 2 tablets (40 mEq) by mouth 2 times daily   predniSONE (DELTASONE) 5 MG tablet   No No   Sig: Take 1.5 tablets (7.5 mg) by mouth daily for 30 days, THEN 1 tablet (5 mg) daily for 30 days, THEN 0.5 tablets (2.5 mg) daily for 30 days.   selexipag (UPTRAVI) 1600 MCG tablet  Self No No   Sig: Take 1 tablet (1,600 mcg) by mouth every 12 hours   tadalafil, PAH, (ALYQ) 20 MG TABS   No No   Sig: Take 2 tablets (40 mg) by mouth daily   tiotropium (SPIRIVA RESPIMAT) 2.5 MCG/ACT inhaler   No No   Sig: Inhale 2 puffs into the lungs daily   torsemide (DEMADEX) 100 MG tablet   No No   Sig: Take 1 tablet (100 mg) by mouth 2 times daily   treprostinil (REMODULIN) infusion 1 mg/mL   Yes No   Sig: Transition from Oral Uptravi to SubQ Remodulin via Remunity.  PA submitted.      Facility-Administered Medications: None     Allergies   Allergies   Allergen Reactions     Valacyclovir Rash     Diffuse body rash       Physical Exam   Vital Signs: Temp: 97.8  F (36.6  C) Temp src: Oral BP: 115/63 Pulse: 60   Resp:  19 SpO2: 98 % O2 Device: Nasal cannula Oxygen Delivery: 12 LPM  Weight: 177 lbs 0 oz  General: patient appears thin, frail, with NC O2 in place, in no acute distress  HEENT: normocephalic, EOMI, PERRL, MMM  CV: regular rate and rhythm, normal S1 with pronounced S2 without S3 or S4. JVP ~13 cm. 2+ lower extremity edema to the knees bilaterally.   Resp: increased work of breathing, lungs clear to auscultation bilaterally, no crackles/wheezing/rhonchi  GI: abdomen distended but nontender. Normoactive bowel sounds.   Integ: no wounds/rashes/lesions. Warm to the touch.   Neuro: AAOx3. No focal neuro deficits.     Data   Data reviewed today: I reviewed all medications, new labs and imaging results over the last 24 hours.   Recent Labs   Lab 11/07/22  1558   WBC 9.7   HGB 10.3*   MCV 91        No results found for this or any previous visit (from the past 24 hour(s)).

## 2022-11-08 NOTE — PROCEDURES
[facility]    Central line    Date/Time: 11/8/2022 5:21 PM  Performed by: Yolanda Villegas MD  Authorized by: Yolanda Villegas MD   Indications: vascular access      UNIVERSAL PROTOCOL   Site Marked: NA  Prior Images Obtained and Reviewed:  NA  Required items: Required blood products, implants, devices and special equipment available    Patient identity confirmed:  Verbally with patient  NA - No sedation, light sedation, or local anesthesia  Confirmation Checklist:  Patient's identity using two indicators  Time out: Immediately prior to the procedure a time out was called    Universal Protocol: the Joint Commission Universal Protocol was followed    Preparation: Patient was prepped and draped in usual sterile fashion    ESBL (mL):  5     ANESTHESIA    Anesthesia: Local infiltration  Local Anesthetic:  Lidocaine 1% with epinephrine  Anesthetic Total (mL):  5      SEDATION    Patient Sedated: No      Preparation: skin prepped with ChloraPrep  Skin prep agent dried: skin prep agent completely dried prior to procedure  Sterile barriers: all five maximum sterile barriers used - cap, mask, sterile gown, sterile gloves, and large sterile sheet  Hand hygiene: hand hygiene performed prior to central venous catheter insertion  Patient position: flat  Catheter type: cordis  Ultrasound guidance: yes  Number of attempts: 1  Successful placement: yes  Post-procedure: line sutured  Assessment: blood return through all ports      PROCEDURE  Describe Procedure: CXR pending.  Dr. Hills was present during the entire procedure.  Patient Tolerance:  Patient tolerated the procedure well with no immediate complications  Length of time physician/provider present for 1:1 monitoring during sedation: 5

## 2022-11-08 NOTE — PROGRESS NOTES
Brief Nephrology Note        Full consult to follow, initial plan below:      Pt is hypercapnic and acidotic, working on getting pt on Bipap to correct which should help with hyperkalemia (6.3 last check).  Also giving lokelma, has had minimal UOP with bumex gtt so can stop at this point.      I called and talked to his significant other (Sena) and obtained consent for RRT and temp line placement, pt will be brought to ICU given his resp status and to place line.  Will start with iHD today given his hemodynamics are reasonable.  Running 2.5h, 200bfr and will try to remove 3L of fluid as he is up ~10kg in wt mostly noticed in his abdomen.  Consent signed and scanned in under media.     ALLI Gamble CNS  Clinical Nurse Specialist  [phone]

## 2022-11-08 NOTE — CONSULTS
Nephrology Initial Consult  November 8, 2022      Jimmy Isaac MRN:[id number] YOB: 1960  Date of Admission:11/7/2022  Primary care provider: Eamon Olmstead  Requesting physician: Janae Hills MD    ASSESSMENT AND RECOMMENDATIONS:   SISI on CKD-Baseline Cr 2.5, has hx of kidney stones in addition to pHTN with R heart failure.  UOP has been oliguric despite bumex gtt and has hyperkalemia.  Given this we are initiating dialysis with short run today after transfer to ICU to facilitate closer monitoring of his resp status and line placement.  Etology of SISI is unclear, based on exam it may be volume overload/hepatic congestion as abdomen is firm.  UA was bland with regards to blood/protein or casts and BP's are normal.  No new medications or NSAID's.     -Correct resp acidosis as able with Bi-pap, possible intubation.     -SISI on CKD, unlikely to recover quickly given baseline, starting HD today for both hyperkalemia and volume removal.    -Dialysis consent signed and scanned in under media.     -Starting with low flow HD run to avoid dysequilibrium with BUN of ~150, Na also low at 124 which I do not want to correct quickly.  Pulling 3L in 2.5h to try to help overall volume status.      Volume-Volume overloaded, is ~25# up from his usual wt, most of it is abdominal as his abdomen is tense.  Will try to remove fluid with run today and will need daily runs for some time to address.  With RV dysfx edema will not be a reliable marker of his intravascular volume (will need a higher preload)     Acidosis-  pH  7.16 with pCO2 70.  Combined respiratory and metabolic acidosis.  Correction of pCO2 to 50 with bipap has increased pH to 7.2.  Dialysis will further improve pH but will reduce respiratory drive.     Electrolytes-K 6.7 despite lokelma and bumex gtt, starting dialysis and will likely need daily runs for several days for both clearance and volume.  Has resp acidosis so would treat this with Bipap,  may end up needing intubation.      Cardiac-Known R heart failure due to pHTN, on home O2, now admitted to start IV remodulin as triple PO therapy at home is now not sufficient.     BMD-Ca 9.2, Mg 2.5, Phos not checked.     Anemia-Hgb 10.0, no acute issue currently.      Nutrition-Deferred for now.  Suspect poor PO intake based on his family's report.     Time spent: 20 minutes on this date of encounter for chart review, physical exam, medical decision making and co-ordination of care.     Seen and discussed with Dr Mtz    Recommendations were communicated to primary team via verbal communication.       ALLI Gamble CNS  Clinical Nurse Specialist  [phone]    REASON FOR CONSULT: Requested to evaluate 61 yom for management of SISI on CKD.      HISTORY OF PRESENT ILLNESS:  Jimmy Isaac is a 61 yom with hx of pHTN c/b R heart failure with chronic pulmonary sarcoid, CAD, CKD 4, HTN and Resp failure on home O2 who is admitted with fluid overload and progressive SOB.  Followed in CKD/[facility] clinic where Cr usually is ~2.5 but now is 5.5 on admission.  Cardiology started bumex gtt but UOP has still been borderline oliguric today, also has hyperkalemia with K ranging from 5.8 to 6.7 since he was admitted.  Nephrology consulted for management of SISI oN CKD with hyperkalemia.      PAST MEDICAL HISTORY:  Past Medical History:   Diagnosis Date    Acute renal failure superimposed on stage 4 chronic kidney disease, unspecified acute renal failure type (H) 5/8/2022    Chronic sinusitis     CKD (chronic kidney disease)     Heart disease     Hypertension     Keratosis     frontal scalp, treated with liquid nitrogen at [facility]    Malignant neoplasm (H)     Pneumonia a few times    Pulmonary sarcoidosis (H)        Past Surgical History:   Procedure Laterality Date    COLONOSCOPY      CV RIGHT HEART CATH MEASUREMENTS RECORDED N/A 06/19/2019    Procedure: CV RIGHT HEART CATH;  Surgeon: Kale Delgado MD;   Location:  HEART CARDIAC CATH LAB    CV RIGHT HEART CATH MEASUREMENTS RECORDED N/A 12/11/2019    Procedure: CV RIGHT HEART CATH;  Surgeon: Ion Lemon MD;  Location:  HEART CARDIAC CATH LAB    CV RIGHT HEART CATH MEASUREMENTS RECORDED N/A 07/30/2021    Procedure: CV RIGHT HEART CATH;  Surgeon: Jhony Barone MD;  Location:  HEART CARDIAC CATH LAB    CV RIGHT HEART CATH MEASUREMENTS RECORDED N/A 06/22/2022    Procedure: Heart Cath Right Heart Cath;  Surgeon: Emerson Yoo MD;  Location:  HEART CARDIAC CATH LAB    ENT SURGERY      wisdom teeth extraction    EP PACEMAKER Left 08/02/2021    Procedure: EP Pacemaker;  Surgeon: Dora Fontanez MD;  Location:  HEART CARDIAC CATH LAB    IR NEPHROSTOMY TUBE PLACEMENT LEFT  07/09/2021    LASER HOLMIUM NEPHROLITHOTOMY VIA PERCUTANEOUS NEPHROSTOMY Left 09/20/2021    Procedure: NEPHROLITHOTOMY, PERCUTANEOUS, USING HOLMIUM LASER;  Surgeon: Zackery Moura MD;  Location: [facility] OR    PICC SINGLE LUMEN PLACEMENT Right 11/07/2022    Right basilic, 41 cm, 2 cm external length    TONSILLECTOMY  1965        MEDICATIONS:  PTA Meds  Prior to Admission medications    Medication Sig Last Dose Taking? Auth Provider Long Term End Date   ambrisentan (LETAIRIS) 10 MG tablet Take 1 tablet (10 mg) by mouth every morning   Khang Lindsey MD Yes    chlorthalidone (HYGROTON) 25 MG tablet Take 1 tablet (25 mg) by mouth daily   Luiza Cho MD Yes    ferrous gluconate (FERGON) 324 (38 Fe) MG tablet Take 1 tablet (324 mg) by mouth daily (with breakfast) for 90 days   Luiza Cho MD  1/5/23   mometasone-formoterol (DULERA) 200-5 MCG/ACT inhaler Inhale 2 puffs into the lungs 2 times daily   Shane Ruvalcaba MD Yes    potassium chloride ER (KLOR-CON M) 20 MEQ CR tablet Take 2 tablets (40 mEq) by mouth 2 times daily   Luiza Cho MD     predniSONE (DELTASONE) 5 MG tablet Take 1.5 tablets (7.5 mg) by mouth daily for 30 days, THEN 1 tablet (5 mg) daily for 30  days, THEN 0.5 tablets (2.5 mg) daily for 30 days.   Shane Ruvalcaba MD  11/29/22   PROAIR  (90 Base) MCG/ACT inhaler Inhale 2 puffs into the lungs every 6 hours as needed for shortness of breath / dyspnea   Shane Ruvalcaba MD Yes    selexipag (UPTRAVI) 1600 MCG tablet Take 1 tablet (1,600 mcg) by mouth every 12 hours   Khang Lindsey MD     tadalafil, PAH, (ALYQ) 20 MG TABS Take 2 tablets (40 mg) by mouth daily   Khang Lindsey MD Yes    tiotropium (SPIRIVA RESPIMAT) 2.5 MCG/ACT inhaler Inhale 2 puffs into the lungs daily   Shane Ruvalcaba MD Yes    torsemide (DEMADEX) 100 MG tablet Take 1 tablet (100 mg) by mouth 2 times daily   Khang Lindsey MD Yes    treprostinil (REMODULIN) infusion 1 mg/mL Transition from Oral Uptravi to SubQ Remodulin via Remunity.  PA submitted.   Khang Lindsey MD Yes    UNABLE TO FIND MEDICATION NAME: Home Oxygen, 5-10 L   Reported, Patient     Vitamin D3 (CHOLECALCIFEROL) 25 mcg (1000 units) tablet Take 1 tablet (25 mcg) by mouth every other day   Radha Rod, PA-C        Current Meds   sodium chloride 0.9%  250 mL Intravenous Once in dialysis/CRRT    sodium chloride 0.9%  300 mL Hemodialysis Machine Once    ambrisentan  10 mg Oral QAM    dextrose 10%  300 mL Intravenous Once    dextrose  25 g Intravenous Once    ferrous gluconate  324 mg Oral Daily with breakfast    fluticasone-vilanterol  1 puff Inhalation Daily    heparin ANTICOAGULANT  5,000 Units Subcutaneous Q12H    heparin lock flush  5-20 mL Intracatheter Q24H    - MEDICATION INSTRUCTIONS -   Does not apply Once    polyethylene glycol  17 g Oral Daily    predniSONE  2.5 mg Oral Daily    [Held by provider] selexipag  1,000 mcg Oral Once    And    [Held by provider] selexipag  800 mcg Oral Once    And    [Held by provider] selexipag  600 mcg Oral Once    And    [Held by provider] selexipag  400 mcg Oral Once    And    [Held by provider] selexipag  200 mcg Oral Once     selexipag  1,600 mcg Oral Q12H    senna-docusate  1 tablet Oral At Bedtime    sodium chloride (PF)  10-40 mL Intracatheter Q8H    sodium chloride (PF)  10-40 mL Intracatheter Q8H    sodium chloride (PF)  3 mL Intracatheter Q8H    sodium chloride (PF)  9 mL Intracatheter During Dialysis/CRRT (from stock)    sodium chloride (PF)  9 mL Intracatheter During Dialysis/CRRT (from stock)    sodium zirconium cyclosilicate  10 g Oral TID    tadalafil  40 mg Oral Daily    umeclidinium  1 puff Inhalation Daily    Vitamin D3  25 mcg Oral Every Other Day     Infusion Meds   furosemide 40 mg/hr (22 1403)    - MEDICATION INSTRUCTIONS -      - MEDICATION INSTRUCTIONS -      - MEDICATION INSTRUCTIONS -      - MEDICATION INSTRUCTIONS -      - MEDICATION INSTRUCTIONS -      treprostinil (REMODULIN) intravenous infusion (LESS than or EQUAL to 100 mcg/mL) 2 ng/kg/min (22 1315)       ALLERGIES:    Allergies   Allergen Reactions    Valacyclovir Rash     Diffuse body rash       REVIEW OF SYSTEMS:  A 10 point review of systems was negative except as noted above.    SOCIAL HISTORY:   Social History     Socioeconomic History    Marital status: Single     Spouse name: Not on file    Number of children: Not on file    Years of education: Not on file    Highest education level: Not on file   Occupational History    Not on file   Tobacco Use    Smoking status: Former     Packs/day: 1.00     Years: 30.00     Pack years: 30.00     Types: Cigarettes     Start date: 10/10/1975     Quit date: 3/1/2010     Years since quittin.6    Smokeless tobacco: Never   Substance and Sexual Activity    Alcohol use: Not Currently     Alcohol/week: 0.0 standard drinks     Comment: stated 2 bottles of beer occ    Drug use: Not Currently     Comment: past use of marijuana    Sexual activity: Not Currently     Partners: Female     Birth control/protection: Abstinence, Female Surgical   Other Topics Concern    Parent/sibling w/ CABG, MI or angioplasty  before 65F 55M? Not Asked     Service Not Asked    Blood Transfusions Not Asked    Caffeine Concern Yes    Occupational Exposure Not Asked    Hobby Hazards Not Asked    Sleep Concern Not Asked    Stress Concern Not Asked    Weight Concern Not Asked    Special Diet Not Asked    Back Care Not Asked    Exercise Yes    Bike Helmet Not Asked    Seat Belt Not Asked    Self-Exams Not Asked   Social History Narrative    Not on file     Social Determinants of Health     Financial Resource Strain: Not on file   Food Insecurity: Not on file   Transportation Needs: Not on file   Physical Activity: Not on file   Stress: Not on file   Social Connections: Not on file   Intimate Partner Violence: Not on file   Housing Stability: Not on file     Unable to review with pt due to lethargy  FAMILY MEDICAL HISTORY:   Family History   Problem Relation Age of Onset    Coronary Artery Disease Father     Heart Disease Father         heart attack age 35-40    Hypertension Mother     Hyperlipidemia Mother     Cerebrovascular Disease Mother     Dementia Mother     Lupus Sister     Glaucoma No family hx of     Macular Degeneration No family hx of      Unable to review with pt due to neuro status.      PHYSICAL EXAM:   Temp  Av.6  F (36.4  C)  Min: 96.5  F (35.8  C)  Max: 98.6  F (37  C)      Pulse  Av.1  Min: 59  Max: 62 Resp  Av  Min: 15  Max: 23  FiO2 (%)  Av %  Min: 40 %  Max: 40 %  SpO2  Av.7 %  Min: 93 %  Max: 100 %       /89   Pulse 62   Temp (!) 96.5  F (35.8  C) (Axillary)   Resp 15   Ht 1.829 m (6')   Wt 80.5 kg (177 lb 7.5 oz)   SpO2 94%   BMI 24.07 kg/m     Date 22 07 - 22 0659   Shift 6209-8679 8412-2372 4663-7098 24 Hour Total   INTAKE   P.O. 290   290   I.V. 482.84 5.72  488.56   Shift Total(mL/kg) 772.84(9.6) 5.72(0.07)  778.56(9.67)   OUTPUT   Urine 150   150   Shift Total(mL/kg) 150(1.86)   150(1.86)   Weight (kg) 80.5 80.5 80.5 80.5      Admit Weight: 80.3 kg (177 lb)      GENERAL APPEARANCE: Interactive but slow to respond, mildly confused.    EYES: No scleral icterus  NECK:  No JVD  Pulmonary: lungs clear to auscultation with equal breath sounds bilaterally, no clubbing or cyanosis  CV: Regular rhythm, normal rate, no rub  Edema: +2 generalized.   GI: Firm, nontender, ++ distended.    MS: no evidence of inflammation in joints, no muscle tenderness  : No Chong  SKIN: no rash, warm, dry  NEURO: Lethargic, moving all extremities.      LABS:   CMP  Recent Labs   Lab 11/08/22  1251 11/08/22  1154 11/08/22  1115 11/08/22  1015 11/08/22  0932 11/08/22  0921 11/08/22  0558 11/08/22  0549 11/08/22  0316 11/08/22  0022 11/07/22  2150 11/07/22  1558   NA  --  124*  --   --   --   --   --  128*  128*  --  127* 127* 130*   POTASSIUM  --  6.7*  --   --   --  6.3*  --  5.8*  5.8*  5.8*  --  6.2* 6.1* 6.4*   CHLORIDE  --  88*  --   --   --   --   --  90*  90*  --  87* 88* 90*   CO2  --  16*  --   --   --   --   --  22  22  --  21* 22 13*   ANIONGAP  --  20*  --   --   --   --   --  16*  16*  --  19* 17* 27*   * 128* 129* 126*   < >  --    < > 57*  57*   < > 107* 122* 82   BUN  --  140.0*  --   --   --   --   --  135.0*  135.0*  --  139.0* 140.0* 142.0*   CR  --  5.45*  --   --   --   --   --  5.48*  5.48*  --  5.37* 5.35* 5.59*   GFRESTIMATED  --  11*  --   --   --   --   --  11*  11*  --  11* 11* 11*   TERI  --  9.2  --   --   --   --   --  8.7*  8.7*  --  9.2 9.1 9.2   MAG  --   --   --   --   --   --   --  2.5*  --   --   --  2.3   PROTTOTAL  --   --   --   --   --   --   --  6.5  --   --   --  7.1   ALBUMIN  --   --   --   --   --   --   --  3.8  --   --   --  3.8   BILITOTAL  --   --   --   --   --   --   --  0.4  --   --   --  0.5   ALKPHOS  --   --   --   --   --   --   --  94  --   --   --  101   AST  --   --   --   --   --   --   --  21  --   --   --  33   ALT  --   --   --   --   --   --   --  12  --   --   --  14    < > = values in this interval not displayed.      CBC  Recent Labs   Lab 11/08/22  0549 11/07/22  1558   HGB 10.0* 10.3*   WBC 10.2 9.7   RBC 3.60* 3.70*   HCT 33.0* 33.5*   MCV 92 91   MCH 27.8 27.8   MCHC 30.3* 30.7*   RDW 15.0 14.9    169     INR  Recent Labs   Lab 11/08/22  1154   INR 1.19*     ABG  Recent Labs   Lab 11/08/22  1600 11/08/22  1402 11/08/22  1208 11/08/22  0549   PH  --   --  7.21*  --    PCO2  --   --  52*  --    PO2  --   --  85  --    HCO3  --   --  21  --    O2PER 40 94 40 96      URINE STUDIES  Recent Labs   Lab Test 11/08/22  0313 10/05/22  1346 07/11/22  1404 06/02/22  1459 05/23/22  1321 05/08/22  1808 01/24/22  1417   COLOR Light Yellow Yellow Yellow Straw Yellow   < > Yellow   APPEARANCE Slightly Cloudy* Clear Clear Clear Clear   < > Clear   URINEGLC Negative Negative Negative Negative Negative   < > Negative   URINEBILI Negative Negative Negative Negative Negative   < > Negative   URINEKETONE Negative Negative Negative Negative Negative   < > Negative   SG 1.011 1.015 1.015 1.009 1.010   < > 1.015   UBLD Moderate* Small* Moderate* Moderate* Large*   < > Small*   URINEPH 5.0 5.5 5.5 6.0 5.0   < > 6.5   PROTEIN 30* Trace* Trace* Negative Trace*   < > Negative   UROBILINOGEN  --  0.2 0.2  --  0.2  --  0.2   NITRITE Negative Negative Negative Negative Negative   < > Negative   LEUKEST Large* Trace* Small* Trace* Small*   < > Moderate*   RBCU 30* 2-5* 5-10* 27* 5-10*   < > 10-25*   WBCU 65* 5-10* 10-25* 5 5-10*   < > *    < > = values in this interval not displayed.     Recent Labs   Lab Test 05/23/22  1321 01/24/22  1417 12/15/21  1144 08/06/21  2022 07/01/21  1500 12/26/18  1335 05/30/18  1034 10/18/17  1125   UTPG 0.66* 0.43* 1.10* 1.16* 0.91* 0.64* 0.29* 0.19     PTH  Recent Labs   Lab Test 06/02/22  1454 05/23/22  1310 12/15/21  1138 07/30/21  1047 10/18/17  1121   PTHI 66* 65 13* 13* 29     IRON STUDIES  Recent Labs   Lab Test 10/05/22  1340 12/15/21  1138 07/01/21  1336 10/18/17  1121   IRON 26* 28* 28* 82     314 241 285   IRONSAT 10* 9* 12* 29   MIHIR 51 15* 33 41     I have seen and examined this patient with the NP.  This note reflects our joint assessment and plan.     Critically ill pt with acidosis, hyperkalemia, SISI, volume overload, and respiratory failure.  We rec correcting resp acidosis with Bipap and moving pt to ICU.  Pt has responded to Bipap but continues to have SISI and hyperkalemia so plan HD tonight.  Team has placed line.  Plan: Low flow HD run to remove fluid and correct K           Low Na bath           CRRT if cannot remove fluid           Re-assess kidney function when pt is more stable.  May have some residual function. Would not be good chronic HD candidate.    Nikky Mtz MD     [phone]

## 2022-11-08 NOTE — PROCEDURES
[facility]    Single Lumen PICC Placement    Date/Time: 11/7/2022 7:05 PM  Performed by: Magnolia Hankins RN  Authorized by: Parker Maguire MD   Indications: vascular access      UNIVERSAL PROTOCOL   Site Marked: Yes  Prior Images Obtained and Reviewed:  Yes  Required items: Required blood products, implants, devices and special equipment available    Patient identity confirmed:  Verbally with patient, arm band, provided demographic data, hospital-assigned identification number and anonymous protocol, patient vented/unresponsive  Patient was reevaluated immediately before administering moderate or deep sedation or anesthesia  Confirmation Checklist:  Patient's identity using two indicators, relevant allergies, procedure was appropriate and matched the consent or emergent situation and correct equipment/implants were available  Time out: Immediately prior to the procedure a time out was called    Universal Protocol: the Joint Commission Universal Protocol was followed    Preparation: Patient was prepped and draped in usual sterile fashion       ANESTHESIA    Anesthesia: Local infiltration  Local Anesthetic:  Lidocaine 1% without epinephrine  Anesthetic Total (mL):  3.5      SEDATION    Patient Sedated: No        Preparation: skin prepped with ChloraPrep  Skin prep agent: skin prep agent completely dried prior to procedure  Sterile barriers: maximum sterile barriers were used: cap, mask, sterile gown, sterile gloves, and large sterile sheet  Hand hygiene: hand hygiene performed prior to central venous catheter insertion  Type of line used: PICC  Catheter type: single lumen  Lumen type: non-valved and power PICC  Catheter size: 4 Fr  Brand: Bard  Placement method: venipuncture, MST and ultrasound  Number of attempts: 1  Difficulty threading catheter: no  Successful placement: yes  Orientation: right    Location: basilic vein (0.58 cm vein diameter)  Tip Location: SVC  (low)  Site rationale: left pacemaker  Arm circumference: adults 10 cm  Extremity circumference: 26  Visible catheter length: 2  Total catheter length: 41  Dressing and securement: adhesive securement device, alcohol impregnated caps, blood cleaned with CHG, chlorhexidine disc applied, glue, dressing applied, statlock, site cleansed, sterile dressing applied and transparent dressing  Post procedure assessment: blood return through all ports, free fluid flow and placement verified by x-ray

## 2022-11-08 NOTE — PLAN OF CARE
Admission          11/7/2022  1:00 PM  -----------------------------------------------------------  Reason for admission:  Primary team notified of pt arrival.  Admitted from:  Via: Wheelchair  Accompanied by: Karen'  Belongings: Placed in closet; valuables sent home with family  Admission Profile: complete  Teaching: orientation to unit and call light- call light within reach, call don't fall, use of console, meal times, when to call for the RN, and enforced importance of safety   Access: PIV on left hand, saline lock, picc on the right arm saline lock.  Telemetry:Placed on pt  Ht./Wt.: complete  Code Status verified on armband: yes  2 RN Skin Assessment Completed By: Bryanna ZEPEDA  Med Rec completed: yes  Bed surface reassessed with algorithm and charted: yes  New bed surface ordered: no  Suction/Ambu bag/Flowmeter at bedside: yes  Is patient having diarrhea upon admission- if YES fill out testing algorithm : yes    C. Diff Testing Algorithm (MUST be marked YES)   1. 3 or more loose stools in 24 hrs.  - no (only once today) [] Yes [x] No       Additional symptoms:(At least ONE must be marked yes)   1. Abdominal pain/discomfort  - no [] Yes [x] No   2. Fever at least 38C (100.4 F)   - no [] Yes [x] No   3. Elevated WBC(>11,000) [] Yes [x] No       Exclusion Criteria:  (MUST be marked YES)   1. Off laxatives for at least 48 hrs. [x] Yes [] No         Neuro: A&Ox4.   Cardiac: Paced on tele.   Respiratory: Sating >92% on 12lpm via nasal cannula.  GI/: Adequate urine output. BM X1, loose per patient prior to admission.  Diet/appetite: Tolerating Na 2gram restriction diet. Eating well.  On 2L fluid restriction as well, monitored.  Activity:  Assist of 1 with mobility.  Pain: At acceptable level on current regimen.   Skin: Bruise on bilateral forearms.  LDA's:  Left PIV and Right single picc, saline lock.    Plan: Continue with POC. Notify primary team with changes.  Will start Remodulin gtt tonight.

## 2022-11-08 NOTE — PROGRESS NOTES
Cross cover note    Cardiology cross cover team alerted regarding patient admission labs with multiple critical values including potassium 6.2 and creatinine 5.37 (baseline ~2.6). Patient seen at bedside: vitally and clinically stable. Patient is alert and oriented x3, responsive to questions, without new murmur and JVP >10.     Assessment/Plan    61 year old male with history of RV failure 2/2 severe pulmonary hypertension in the setting of pulmonary sarcoidosis on chronic home oxygen (baseline 10-15L) and CKD IV admitted for management of acute on chronic heart failure and PH, now with concern for acute kidney injury likely secondary to poor cardiac output state.     Acute on Chronic Heart Failure  PAH  Complete AV Block s/p pacemaker  Acute Kidney Injury on Chronic Kidney Disease  Hyperkalemia  - No indication for acute renal intervention per discussion with nephrology cross cover.   - AM labs ordered  - Added Hgb A1C, VBG for acid/base evaluation, Urinalysis to complete work-up  - EKG without significant changes  - s/p calcium gluconate  - Insulin for hyperkalemia  - Discontinued scheduled potassium BID  - Discontinued lasix gtt, added IV Bumex 4 once and Bumex 2 gtt  - Continue cardiac monitoring    Jimmy Okeefe MD PGY2  Cardiology Cross cover

## 2022-11-08 NOTE — PLAN OF CARE
Neuro: A&Ox4. Able to make needs known. Refused bed alarm overnight.  Cardiac: Pacemaker present- paced rhythm. HR 60s. Afebrile.   Respiratory: Sating >90% on 12L NC in mouth. Patient reports deviated septum and requested NC in mouth, as this is what he does at home.  GI/: Oliguric. Bladder scanned 84 ml. No BM this shift.  Diet/appetite: Tolerating 2G Na diet with 2L FR. Denies nausea.  Activity:  Assist of 1, self repositioned in bed. Sat at bedside.  Pain: Denies.   Skin: No new deficits noted.  LDA's: L and R PIV. R single lumen PICC. Remodulin gtt initiated and running at 2 ng/kg/min. Bumex gtt running at 8 ml/hour.     Potassium 6.4 and 6.2 overnight. Potassium shifted. Insulin and D50 given.    Plan: Continue with POC. Notify primary team with changes.

## 2022-11-08 NOTE — PLAN OF CARE
Neuro: A&Ox4.   Cardiac: V-paced. VSS.    Respiratory: Pt. Was on 12-15L NC (via mouth per pt. Preference). Pt placed on BIPAP by RT due to high CO2, high K, and low pH. ABG rechecked and has improved.   GI/: No urine output since beginning of shift, bladder scan 76mL. Last BM 11/7. Abdominal distended, taut, tender, BS hypoactive and pt complained of increased ABD pain, provider notified. No imaging ordered at this time.   Diet/appetite: 2g Na & 2L FR, switched to NPO.   Activity:  Pt very restless, been up to edge of bed multiple times during shift.   Pain: Tylenol x1 given to manage ABD pain, somewhat effective per pt   Skin: No new deficits noted. Light wraps on lower extremities. Edema on LEs to hip, 2-3+.   LDA's: R PICC with Remodulin infusing, PIV x2.     Plan: BG has been <100, apple juice x given. Rechecking BG hourly. Bumex gtt just discontinued, switching over to lasix. Pt. Transferring to unit 4A., report given to 4A LUCINA Carcamo. Cards 2 to call and update sisterFatemeh, phone number given by patient to primary team.

## 2022-11-08 NOTE — PHARMACY-CONSULT NOTE
Parenteral Prostacyclin Therapy Initiation     This patient has been initiated on a continuous IV  Treprostinil (Remodulin) infusion for the treatment of pulmonary hypertension.  The patient's current prostacyclin dosing parameters are as follows:    1.  Treprostinil (Remodulin) Dosing Weight = 80 kg.  (Please note: the prostacyclin dosing weight for therapy initiation is the most recent actual body weight).  This weight will remain the dosing weight for the duration of therapy.    2.  Prostacyclin Concentration = 5 Micrograms/mL  3.  Prostacyclin Dose = start at 2 Nanograms/kg/min and titrate by 2 Nanograms/kg/min every 12 hours to a goal of TBD Nanograms/kg/min  4. Line access for prostacyclin administration: PICC line    Selexipag is being tapered with each dose increase of treprostinil.    Yael Lyon, RastaD

## 2022-11-08 NOTE — PROVIDER NOTIFICATION
2347: Cards 2 notified of critical potassium level- 6.2.   Provider orders: STAT BMP recheck, STAT EKG      0213: Cards 2 notified of critical potassium level 6.4. Received call from lab at 0212 from lab drawn at 1558.  Provider orders: Potassium checks Q4 hours, STAT UA, venous blood gas. Shift potassium with D10% bolus 75 ml/hour, D50% injection 25g, Regular insulin 1 unit/ml, 8 units, Calcium gluconate 1 g infusion. Discontinue Lasix drip and start Bumex drip, 8 ml/hour.      0610: Cards 2 notified of critical pH level, 7.16. Provider to bedside to assess.

## 2022-11-09 NOTE — CONSULTS
Care Management Initial Consult    General Information  Assessment completed with: Spouse or significant other, SO, Sena  Type of CM/SW Visit: Initial Assessment    Primary Care Provider verified and updated as needed: Yes   Readmission within the last 30 days:           Advance Care Planning: Advance Care Planning Reviewed: no concerns identified        Communication Assessment  Patient's communication style: spoken language (English or Bilingual)    Hearing Difficulty or Deaf: yes   Wear Glasses or Blind: yes (wears glasses)    Cognitive  Cognitive/Neuro/Behavioral: .WDL except, arousability  Level of Consciousness: lethargic  Arousal Level: arouses to touch/gentle shaking, arouses to voice  Orientation: other (see comments) (ERAN)  Mood/Behavior: calm  Best Language: 0 - No aphasia  Speech: garbled, slow    Living Environment:   People in home: significant other  Sena  Current living Arrangements: house      Able to return to prior arrangements:  TBD     Family/Social Support:  Care provided by: self  Provides care for: no one, unable/limited ability to care for self  Marital Status: Lives with Significant Other  Significant Other, Sibling(s)       Sena  Description of Support System: Supportive, Involved       Current Resources:   Patient receiving home care services: No     Community Resources: None  Equipment currently used at home: other (see comments) (O2 concentrator and bottle O2)  Supplies currently used at home: Oxygen Tubing/Supplies (provided by [facility])    Employment/Financial:  Employment Status:          Financial Concerns: No concerns identified   Referral to Financial Worker: No     Lifestyle & Psychosocial Needs:  Social Determinants of Health     Tobacco Use: Medium Risk     Smoking Tobacco Use: Former     Smokeless Tobacco Use: Never     Passive Exposure: Not on file   Alcohol Use: Not on file   Financial Resource Strain: Not on file   Food Insecurity: Not on file   Transportation Needs:  Not on file   Physical Activity: Not on file   Stress: Not on file   Social Connections: Not on file   Intimate Partner Violence: Not on file   Depression: At risk     PHQ-2 Score: 6   Housing Stability: Not on file     Functional Status:  Prior to admission patient needed assistance:   Dependent ADLs:: Independent  Dependent IADLs:: Cleaning, Cooking, Laundry, Meal Preparation     Mental Health Status:  Mental Health Status: No Current Concerns       Chemical Dependency Status:  Chemical Dependency Status: No Current Concerns          Additional Information:  RNCC met with pt and so, Sena to introduce RNCC role and complete elevated risk score assessment.  Pt was on BiPAP and not oriented at time of my visit and visited only with pt so Sena.  Pt and Sena are not  but have been together for 20 years. Today is their anniversary and pt birthday. Sena stated pt is not doing well and his sister is coming to help with the decision making.  Pt has expressed to the team that he wants to be DNR/DNI but they are not sure if he was fully oriented at that time.  Sena state she knows he would not want to live hooked on a machine.  Sena stated she understand that pt sister will be his next of kin if he is not able to make decision.  Lalit and pt sister are planning to talk tonight after getting update from the team about pt current medical status.  Per discussion with Sena, pt was on 15 L  home O2 prior hospitalization.  RNCC informed Sena that RNCC/SW will cont to follow up and assist with any care coordination assistance needs.  Lalit asked if they can complete health care directive.  RNCC explained to Sena that pt has to be oriented to complete health care directive.  Per chart review, palliative have been consulted.      Norbert Calvillo RN, PHN, BSN  4A and 4E/ ICU  Care Coordinator  Phone: [phone]  Pager: [phone]    To contact the weekend RNCC  [place] (0800 - 1630) Saturday and  Sunday   Units: 4A, 4C, 4E, 5A and 5B- Pager 1: [phone]   Units: 6A, 6B, 6C, 6D- Pager 2: [phone]   Units: 7A, 7B, 7C, 7D, and 5C-Pager 3: [phone]

## 2022-11-09 NOTE — PLAN OF CARE
Major Shift Events:  Restless with apparent generalized discomfort. BPs stable. Paced rhythm. Afebrile. On BiPAP continuous at 40% FiO2. ABGs and potassium trending. Nausea with oral intake. PRN zofran given. Oliguric. HD initiated this evening post catheter placement.     Plan: Initiate HD and continue to monitor ABGs and potassium.   For vital signs and complete assessments, please see documentation flowsheets.

## 2022-11-09 NOTE — PROGRESS NOTES
[facility]    Cardiology Progress Note- Cardiology 2        Date of Admission:  11/7/2022     Assessment & Plan: HVSL   Jimmy Isaac is a 61 year old male admitted on 11/7/2022. He has a history significant for right heart failure 2/2 severe pulmonary hypertension in the setting of pulmonary sarcoidosis, CAD, CKD IV, HTN, chronic hypoxic respiratory failure on chronic home oxygen (baseline 10L at rest and increases to 15L upon exertion) and herpes zoster admitted for initiation of remodulin. He is also presenting with progressive shortness of breath and fluid overload over the past several weeks (dry weight ~152 lbs). He was transferred to the ICU on 11/8 with worsening hypercarbia and hyperkalemia, now on BIPAP and CRRT, with worsening encephalopathy.     Changes today:  - unable to tolerate dialysis due to hypotension; CRRT instead  - continue remodulin for now  - PRN fentanyl if needed for pain/dyspnea/agitation  - goals of care discussion with family and palliative care team; patient to continue with cares for the next 24 hours while family discusses comfort care    Goals of care conversation was had today with patient's family (fiancee, daughter, son-in-law, and sister via phone). Patient was unable to participate given encephalopathy. Patient's family express understanding that his prognosis is very poor. They shared their wishes to allow for another 24 hours on current therapies to allow them some time to discuss goals of care amongst the family. Will address the patient's symptoms of discomfort/agitation in the meantime, with recs provided by palliative care. Please see the palliative care team's note from 11/9 for more detail.       #Acute hypercarbic and hypoxic respiratory failure  #Acute on chronic right heart failure 2/2 severe pulmonary hypertension in the setting of pulmonary sarcoidosis, Group 1  #Complete AV block s/p pacemaker insertion  8/21  Admitted for remodulin uptitration. Patient also has progressive SOB and weight gain. Is chronically dependent on home O2, up to 10L at rest in recent weeks. Is on triple therapy at home (ambrisentan, tadalafil, selexipag). Last RHC completed 6/22 with RA 16, /16, PA 67, PCWP 21, CO 3.5, CI 1.78, PVR 13.2. Worsening hypercarbia on 11/8 with combined respiratory and metabolic acidosis. Patient initiated on BIPAP and admitted to the ICU. Worsening hyperkalemia as well with minimal urine output, so dialysis was initiated on 11/9. Patient requires further diuresis so another run was planned for 11/9, but patient did not tolerate it due to hypotension, so will plan for CRRT. For now, will continue with plan as below until patient's family has made a decision about pursuing comfort cares.  - CRRT  - continue remodulin at rate of 4/hr  - palliative following; appreciate assistance  - PRNs: fentanyl 25-50 IV q1h PRN for pain/dyspnea/agitation. Ativan 0.5-1mg IV q1h PRN for agitation only if fentanyl is ineffective. Tramadol stopped.   - patient not currently tolerating PO meds due to encephalopathy. Can hold the meds below unless that changes:    > selexipag 1200 BID     > ambrisentan 10mg daily     >  tadalafil 40mg daily     #End stage renal failure, CKD Stage 5, GFR 11-14, on CRRT  #Combined respiratory and metabolic acidosis  #hyperkalemia  #Hx of nephrolithiasis  Patient's baseline Cr appears to be 2.5-2.7. CKD in the setting of nephrolithiasis. Cr 5.59 on admission, likely in the setting of worsening cardiorenal syndrome. Patient's K >6, initially requiring shifting without benefit, now somewhat improved after dialysis.  Patient's pH 7.16 with CO2 initially 70, little improvement with BIPAP. HCO3 15. Respiratory acidosis in the setting of CO2 retention; metabolic acidosis in the setting of uremia.   - BIPAP  - continue CRRT  - avoid nephrotoxins  - renally dose medications    #Chronic normocytic  anemia  HgB 10.3 on this admission. Baseline 12-13. Patient denies any active bleeding. Iron studies 10/22 consistent with mixed iron deficiency/anemia of chronic disease. Patient on ferrous gluconate.   - continue to monitor hemoglobins     Diet:   2g sodium restriction with 2L fluid restriction  DVT Prophylaxis: Heparin SQ  Chong Catheter: Not present  Code Status:  DNR/DNI  Lines: dialysis line    The patient's care was discussed with the Attending Physician, Dr. Hills.    Lala Melara MD  [facility]    ______________________________________________________________________    Interval History   Patient with 3.3L of fluid removed via dialysis overnight. Patient still hyperkalemic afterwards, was shifted with insulin. BIPAP increased to 60%. ABGs/VBGs still showing significant acidosis with pH 7.14-7.16 and CO2 levels >60. Patient increasingly encephalopathic today with very low tolerance of BIPAP mask. One-time PRN Ativan was helpful and patient appeared more comfortable. Goals of care discussion was had with family, please see above and palliative care team's note from 11/9 for more details.     Data reviewed today: I reviewed all medications, new labs and imaging results over the last 24 hours.     Physical Exam   Vital Signs: Temp: 97.6  F (36.4  C) Temp src: Axillary BP: 95/47 Pulse: 61   Resp: 15 SpO2: 100 % O2 Device: Nasal cannula (oxi 8lpm) Oxygen Delivery: 6 LPM  Weight: 177 lbs 7.52 oz  General: patient thin and frail, appears in distress with accessory muscle use, BIPAP mask in place  HEENT: normocephalic, EOMI, PERRL, MMM  CV: regular rate and rhythm, normal S1 with pronounced S2 without S3 or S4. JVP ~14cm. 1+ lower extremity edema bilaterally to the knees  Resp: increased work of breathing with accessory muscle use on BIPAP. Decreased breath sounds in bases bilaterally  GI: abdomen distended but nontender. Normoactive bowel sounds  Integ: no  wounds/rashes/lesions. Warm to the touch.  Neuro: AAOx1, no focal neuro deficits    Data   Recent Labs   Lab 11/09/22  1043 11/09/22  0844 11/09/22  0756 11/09/22  0446 11/08/22  2218 11/08/22  1251 11/08/22  1154 11/08/22  0558 11/08/22  0549   WBC  --   --   --  10.4 17.8*  --   --   --  10.2   HGB  --   --   --  10.5* 11.4*  --   --   --  10.0*   MCV  --   --   --  92 91  --   --   --  92   PLT  --   --   --  131* 158  --   --   --  179   INR  --   --   --   --   --   --  1.19*  --   --    *  --   --  127* 126*  --  124*  --  128*  128*   POTASSIUM 5.3 4.9  --  6.1* 5.3   < > 6.7*   < > 5.8*  5.8*  5.8*   CHLORIDE 85*  --   --  88* 88*  --  88*  --  90*  90*   CO2 22  --   --  20* 19*  --  16*  --  22  22   .0*  --   --  104.0* 106.0*  --  140.0*  --  135.0*  135.0*   CR 5.04*  --   --  4.73* 4.43*  --  5.45*  --  5.48*  5.48*   ANIONGAP 16*  --   --  19* 19*  --  20*  --  16*  16*   TERI 9.3  --   --  9.0 9.3  --  9.2  --  8.7*  8.7*   *  --  148* 114* 165*   < > 128*   < > 57*  57*   ALBUMIN  --   --   --  3.9 4.0  --   --   --  3.8   PROTTOTAL  --   --   --  6.6 7.2  --   --   --  6.5   BILITOTAL  --   --   --  0.5 0.7  --   --   --  0.4   ALKPHOS  --   --   --  97 105  --   --   --  94   ALT  --   --   --  12 9*  --   --   --  12   AST  --   --   --  21 26  --   --   --  21    < > = values in this interval not displayed.     Recent Results (from the past 24 hour(s))   XR Chest Port 1 View    Narrative    EXAM: XR CHEST PORT 1 VIEW  11/8/2022 5:43 PM      HISTORY: dialysis catheter placement    COMPARISON: Chest x-ray 11/7/2022    FINDINGS: Portable upright AP radiograph of the chest. Left chest wall  pacemaker with leads projecting over the right atrium and right  ventricle. New right IJ central venous catheter tip projects over the  SVC/brachiocephalic confluence. Right upper extremity PICC tip  projects over the low SVC. The trachea is midline. The cardiac  silhouette is  enlarged. No pleural effusion or pneumothorax. Slightly  improved perihilar opacities.      Impression    IMPRESSION:   1. Right IJ central venous catheter tip projects over the  SVC/brachiocephalic confluence.  2. Slightly decreased perihilar opacities.  3. Cardiomegaly.    I have personally reviewed the examination and initial interpretation  and I agree with the findings.    ANN SOSA MD         SYSTEM ID:  [id number]   XR Abdomen Port 1 View    Narrative    EXAMINATION:  XR ABDOMEN PORT 1 VIEW 11/8/2022 8:52 PM     COMPARISON: Renal ultrasound 6/24/2022, CT 9/21/2021.    HISTORY: ABDOMINAL PAIN.    FINDINGS: Frontal view of the abdomen. No abnormally dilated loops of  bowel. Radiopaque right renal staghorn calculi and left  nephrolithiasis. Pelvic phleboliths. Unchanged avascular necrosis of  femeral heads.      Impression    IMPRESSION: Nonobstructive bowel gas pattern. Chronic right renal  staghorn calculi and left nephrolithiasis.    I have personally reviewed the examination and initial interpretation  and I agree with the findings.    ANN SOSA MD         SYSTEM ID:  [id number]

## 2022-11-09 NOTE — PROGRESS NOTES
Brief Progress Note    Regarding patient's code status:     Spoke with patient on 11/8 regarding his wishes around code status. Patient was alert and oriented at the time and was felt to have capacity to make decisions for himself, though there has been concern that his mental status waxes and wanes at times. Shared with the patient that if intubation is required, we believe it would be very unlikely that we would be able to subsequently extubate him. The patient voiced his understanding and stated that he would like to be DNI/DNR in that case.    The patient also endorsed that he would like his fiancee, Sena, to be his medical decision maker. However given that they are not  and the patient has not yet signed an advance directive, we discussed with him that his sister, Fatemeh, would be his decision maker. He was in agreement with this. The writer was unable to reach Fatemeh via phone, however did have a conversation with Sena. Sena felt that the patient would not want to be intubated if it was unlikely that he would be extubated. Further conversations will be had with the patient, his sister, and his fiancee on 11/9.

## 2022-11-09 NOTE — CONSULTS
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    Prognosis: Poor (end-stage PAH with severe RV failure and on HD with poor mental status)    Palliative performance scale (PPS): 10-20% at this point    CODE status: AND/DNI (per primary team discussion on 11/8)    Goals of Care: Restorative for now, likely focus on comfort in the next 24-48 hours but to be decided  o Care conference held with primary team, palliative team, bedside RN, and family (sree Shetty, daughter Asiya, son-in-law Brock, and sister Fatemeh (who was over phone))  o Jimmy was encephalopathic and unable to participate in the care conference given dose of Ativan and CO2 narcosis and delirium and being on BiPAP.  o Had a elen discussion with family noting that Jimmy's prognosis is very poor: he has known pulmonary hypertension needing 10-15 L at baseline, RV failure, now kidney family need HD, and has been struggling with worsening CO2 narcosis/delirium). Noted to family that Jimmy did not wish to escalate to ventilator and that he is currently on maximal therapy. Also noted to family that while Jimmy looked comfortable while resting during conversation on BiPAP, he had received Ativan and prior to medicating him, he was agitated and did not like the BiPAP.  o Sena stated in the past that Jimmy have been told a kidney transplant then lung transplant may be possible and inquired if this was an option. Noted to her that given his current status, he would not qualify for either transplant.   o Stressed to family that given Jimmy's clinical progression, he likely only has days left if " "restorative therapies are continued and if comfort care was pursued, would likely have hours at most.   o Family understandably distressed by acute change in Jimmy's status noting that \"he just came in for a medication change\". Offered supportive listening and grief support.  o Sena hopes that Jimmy could wake up and become lucid enough to participate in the conversation. Also hopes that since they are not , Jimmy could let her know what he would want to do with his assets (horses, farm, etc) and worried about how to get him to his designated burial spot with his parents after he passes.   o Teams advised Sena that while we hope he would become lucid enough to converse as well, given his clinical progression, he is unlikely to meaningfully participate in a conversation.  o Advised Fatemeh (who was over the phone) to come in today to see Jimmy. Bedside RN received permission to make a visitor exception so her  can visit as well.  o Plan to ask unit SW to speak with Sena about Jimmy's assets and help clarify some logistics  o Plan to ask palliative  and SW to help provide support tomorrow 11/10  o Symptom recs below as indicated/tolerated (would be conservative given that he is not on comfort measures)  o Comfort recs as below if comfort care is initiated.  o Surrogate: Sena (fiance), alternate: Fatemeh? (sister)    Disposition: Inpatient per primary. Likely will pass in hospital.    Pain  Delirium/CO2 narcosis  Dyspnea    Caution use of medications as these all cause respiratory depression and worsen delirium. Patient is NOT currently on comfort care    START Fentanyl 25-50 mcg IV q1h prn pain/dyspnea/agitation (ordered for you)    START Ativan 0.5-1 mg IV q1h prn agitation (ONLY use if opioid is ineffective) (ordered for you)    STOP Tramadol (weak opioid and Mariano is on BiPAP) (done for you)      If Comfort Care Measures are started:    Can consider GIP referral if family is okay with " it    Start comfort care orders    Turn off all monitors    Stop or at most, take vitals qShift    Stop all labs, ordered studies, and procedures    Discontinue all lines, tubes, drains, and restraints. Can consider continuing urinary catheters and fecal management systems with patient's comfort in mind.    Pacemakers do NOT have to reprogrammed. If Jimmy has an ICD then this would be need to be reprogrammed/turned off.    Stop all undesired and unnecessary medications not directed at symptom control including:     Ambrisentan, ferrous gluconate, prednisone, Selexipag, Lokelma, Tadalafil, and Vitamin D  Pain/Dyspnea    Plan to wean off BiPAP to NC O2 then off if desired.     Also plan to wean down Remodulin drip by increments given short half life of minutes    Prior to weaning O2 and Remodulin, please premedicate with opioid and benzo.    INCREASE Fentanyl 25-50 mcg IV to q15 min for weaning process then change back to q1h prn for dyspnea/agitation/distress    INCREASE lorazepam 0.5 mg IV to q15 min for weaning process then change back to q1h prn for anxiety/agitation (ONLY use if opioid is ineffective)  Secretions    Start glycopyrrolate 0.2 mg IV q4h prn    Palliative medicine will continue to follow.    Total time spent was >80 minutes (20 minutes on chart review, 10 minutes visiting patient and speaking with unit staff and examining patient, 55 minutes with conversing with family), >50% of time was spent counseling and/or coordination of care regarding goals of care and symptom control.    Isael Collins DO / Palliative Medicine / Text me via [employer].     Team Consult Pager [phone] (answered 8am-430pm M-F) - ok to text page via [employer] / After-Hours Answering Service [phone] / Palliative Clinic in the [facility] at the [facility] [place] - [phone] (scheduling); [phone] (triage).        Assessments:  Jimmy Isaac is a 62 yo male with a medical history significant for pulmonary hypertension c/b R heart  failure 2/2 chronic pulmonary sarcoid, CAD, CKD 4, HTN and CHR on home O2 (10L b/l, 15L on exertion) who was on admitted on 11/7/2022 with fluid overload and progressive SOB.  Cardiology started bumex gtt but UOP had been borderline oliguric with hyperkalemia since he admission. Nephrology started him on dialysis. Palliative medicine was consulted on 11/9 for goals of care in setting of severe RV failure from end-stage PAH.     Today, the patient was seen for:  Pain  Delirium  Dyspnea  Goals of care  Support  Encounter for palliative care    Prognosis, Goals, & Planning:      Functional Status just prior to hospitalization: 3 (Capable of only limited self-care; needs help with ADLs; in bed/chair >50% of waking hours)      Prognosis, Goals, and/or Advance Care Planning were addressed today: Yes        Summary/Comments:       Patient's decision making preferences: shared with support from loved ones          Patient has decision-making capacity today for complex decisions: No            I have concerns about the patient/family's health literacy today: Unable to assess today           Patient has a completed Health Care Directive: Yes, and on file.      Code status: No CPR / No Intubation    Coping, Meaning, & Spirituality:   Mood, coping, and/or meaning in the context of serious illness were addressed today: Yes  Summary/Comments: as above    Social:     Living situation: with sree    Key family / caregivers: Sena balbuena    Occupational history: famer?    History of Present Illness:  Jimmy Isaac is a 60 yo male with a medical history significant for pulmonary hypertension c/b R heart failure 2/2 chronic pulmonary sarcoid, CAD, CKD 4, HTN and CHR on home O2 (10L b/l, 15L on exertion) who was on admitted on 11/7/2022 with fluid overload and progressive SOB.  Cardiology started bumex gtt but UOP had been borderline oliguric with hyperkalemia since he admission. Nephrology started him on dialysis. Palliative medicine  was consulted on 11/9 for goals of care in setting of severe RV failure from end-stage PAH.    Seen and examined at bedside. On BiPAP. Finished HD. Family present. Conversation detailed above.    Key Palliative Symptom Data:  We are not helping to manage these symptoms currently in this patient.    Patient is on opioids: assessed and bowels ok/no needed changes to plan of care today.    ROS:  Patient was encephalopathic, could not obtain ROS     Past Medical History:  Past Medical History:   Diagnosis Date     Acute renal failure superimposed on stage 4 chronic kidney disease, unspecified acute renal failure type (H) 5/8/2022     Chronic sinusitis      CKD (chronic kidney disease)      Heart disease      Hypertension      Keratosis     frontal scalp, treated with liquid nitrogen at [facility]     Malignant neoplasm (H)      Pneumonia a few times     Pulmonary sarcoidosis (H)         Past Surgical History:  Past Surgical History:   Procedure Laterality Date     COLONOSCOPY       CV RIGHT HEART CATH MEASUREMENTS RECORDED N/A 06/19/2019    Procedure: CV RIGHT HEART CATH;  Surgeon: Kale Delgado MD;  Location:  HEART CARDIAC CATH LAB     CV RIGHT HEART CATH MEASUREMENTS RECORDED N/A 12/11/2019    Procedure: CV RIGHT HEART CATH;  Surgeon: Ion Lemon MD;  Location:  HEART CARDIAC CATH LAB     CV RIGHT HEART CATH MEASUREMENTS RECORDED N/A 07/30/2021    Procedure: CV RIGHT HEART CATH;  Surgeon: Jhony Barone MD;  Location:  HEART CARDIAC CATH LAB     CV RIGHT HEART CATH MEASUREMENTS RECORDED N/A 06/22/2022    Procedure: Heart Cath Right Heart Cath;  Surgeon: Emerson Yoo MD;  Location:  HEART CARDIAC CATH LAB     ENT SURGERY      wisdom teeth extraction     EP PACEMAKER Left 08/02/2021    Procedure: EP Pacemaker;  Surgeon: Dora Fontanez MD;  Location:  HEART CARDIAC CATH LAB     IR NEPHROSTOMY TUBE PLACEMENT LEFT  07/09/2021     LASER HOLMIUM NEPHROLITHOTOMY VIA PERCUTANEOUS  NEPHROSTOMY Left 09/20/2021    Procedure: NEPHROLITHOTOMY, PERCUTANEOUS, USING HOLMIUM LASER;  Surgeon: Zackery Moura MD;  Location: [facility] OR     PICC SINGLE LUMEN PLACEMENT Right 11/07/2022    Right basilic, 41 cm, 2 cm external length     TONSILLECTOMY  1964         Family History:  Family History   Problem Relation Age of Onset     Coronary Artery Disease Father      Heart Disease Father         heart attack age 35-40     Hypertension Mother      Hyperlipidemia Mother      Cerebrovascular Disease Mother      Dementia Mother      Lupus Sister      Glaucoma No family hx of      Macular Degeneration No family hx of          Allergies:  Allergies   Allergen Reactions     Valacyclovir Rash     Diffuse body rash        Medications:  I have reviewed this patient's medication profile and medications from this hospitalization.   Noted:  Ambrisentan  Breo Ellipta  Miralax po daily  Prednisone 2.5 mg po daily  Senna-docusate 1 tab po at bedtime  Lokelma  Tadalafil  Incruse ellipta  Vitamin D  Remodulin drip  Dialysate  APAP prn  Albuterol prn  Granisetron prn  Atarax prn  Zofran prn  Tramadol 25-50 mg po q12h prn > stopped 11/9  Fentanyl 25-50 mcg q1h prn > started 11/9  Ativan 0.5-1 mg IV q1h prn > started 11/9      Physical Exam:  Vital Signs: Temp: 97.6  F (36.4  C) Temp src: Axillary BP: 95/47 Pulse: 61   Resp: 15 SpO2: 100 % O2 Device: Nasal cannula (oxi 8lpm) Oxygen Delivery: 6 LPM  Weight: 177 lbs 7.52 oz  General: Not in acute distress.  Head: Atraumatic. Normocephalic.   Eyes: Eyes closed  Ear, Nose, and Throat: Mouth pink and moist without lesions. Neck overt neck masses  Pulmonary: on BiPAP  Cardiovascular: S1 and S2 noted. B/l LE edema  Abdomen: Soft. Non-distended. Normal bowel sound present.  Skin: UE warm. Feet cool.   Musculoskeletal: Joints of hand normal. Decreased muscle bulm.  Neuro: unable to assess  Psych: unable to assess    Data reviewed:  Recent imaging reviewed, my comments on pertinents:   XR  Abdomen Port 1 View [[id number]] Resulted: 11/08/22 2134   IMPRESSION: Nonobstructive bowel gas pattern. Chronic right renal   staghorn calculi and left nephrolithiasis.     XR Chest Port 1 View [[id number]] Resulted: 11/08/22 1857   IMPRESSION:   1. Right IJ central venous catheter tip projects over the   SVC/brachiocephalic confluence.   2. Slightly decreased perihilar opacities.   3. Cardiomegaly.       Recent lab data reviewed, my comments on pertinents:   ROUTINE ICU LABS (Last four results)  CMPRecent Labs   Lab 11/09/22  1043 11/09/22  0844 11/09/22  0756 11/09/22  0446 11/08/22  2218 11/08/22  1251 11/08/22  1154 11/08/22  0558 11/08/22  0549 11/07/22  2150 11/07/22  1558   *  --   --  127* 126*  --  124*  --  128*  128*   < > 130*   POTASSIUM 5.3 4.9  --  6.1* 5.3   < > 6.7*   < > 5.8*  5.8*  5.8*   < > 6.4*   CHLORIDE 85*  --   --  88* 88*  --  88*  --  90*  90*   < > 90*   CO2 22  --   --  20* 19*  --  16*  --  22  22   < > 13*   ANIONGAP 16*  --   --  19* 19*  --  20*  --  16*  16*   < > 27*   *  --  148* 114* 165*   < > 128*   < > 57*  57*   < > 82   .0*  --   --  104.0* 106.0*  --  140.0*  --  135.0*  135.0*   < > 142.0*   CR 5.04*  --   --  4.73* 4.43*  --  5.45*  --  5.48*  5.48*   < > 5.59*   GFRESTIMATED 12*  --   --  13* 14*  --  11*  --  11*  11*   < > 11*   TERI 9.3  --   --  9.0 9.3  --  9.2  --  8.7*  8.7*   < > 9.2   MAG  --   --   --  2.4* 2.2  --   --   --  2.5*  --  2.3   PHOS  --   --   --   --  6.2*  --   --   --   --   --   --    PROTTOTAL  --   --   --  6.6 7.2  --   --   --  6.5  --  7.1   ALBUMIN  --   --   --  3.9 4.0  --   --   --  3.8  --  3.8   BILITOTAL  --   --   --  0.5 0.7  --   --   --  0.4  --  0.5   ALKPHOS  --   --   --  97 105  --   --   --  94  --  101   AST  --   --   --  21 26  --   --   --  21  --  33   ALT  --   --   --  12 9*  --   --   --  12  --  14    < > = values in this interval not displayed.     CBC  Recent Labs   Lab  11/09/22  0446 11/08/22  2218 11/08/22  0549 11/07/22  1558   WBC 10.4 17.8* 10.2 9.7   RBC 3.80* 4.09* 3.60* 3.70*   HGB 10.5* 11.4* 10.0* 10.3*   HCT 34.8* 37.0* 33.0* 33.5*   MCV 92 91 92 91   MCH 27.6 27.9 27.8 27.8   MCHC 30.2* 30.8* 30.3* 30.7*   RDW 15.0 15.1* 15.0 14.9   * 158 179 169     INR  Recent Labs   Lab 11/08/22  1154   INR 1.19*     Arterial Blood Gas  Recent Labs   Lab 11/09/22  1042 11/09/22  0446 11/09/22  0047 11/08/22  2027 11/08/22  1402 11/08/22  1208   PH  --   --  7.18* 7.19*  --  7.21*   PCO2  --   --  59* 62*  --  52*   PO2  --   --  75* 82  --  85   HCO3  --   --  22 24  --  21   O2PER 50 60 40 40   < > 40    < > = values in this interval not displayed.

## 2022-11-09 NOTE — PROGRESS NOTES
HEMODIALYSIS TREATMENT NOTE    Date: 11/9/2022  Time: 1:30 PM    Data:  Pre Wt:   No new updated weight for today taken   Desired Wt:   kg   Post Wt:    Weight change:   kg  Ultrafiltration - Post Run Net Total Removed (mL): 728 mL  Vascular Access Status: patent  Dialyzer Rinse: Clear  Total Blood Volume Processed: 29.1 L Liters  Total Dialysis (Treatment) Time: 1 hour 44 minutes     Lab:   No    Interventions:  Unsuccessful line reversal for collapsing AP  NS flush to arterial line for continued AP alarms  BFR decreased to 250 for AP alarms  200mL and 100mL NS bolus for hypotension  UF off for hypotension  Tx ended 1 hour 16 minutes early for continued hypotension per nephrology team  CVC locked with saline    Assessment:  Patient completed 1 hour 44 minutes of treatment removing approximately 700mL. Patient began treatment restless and orientated to self. BiPAP was placed at start of dialysis at request of MD. Patient became increasingly agitated and restless as treatment progressed, taking off BiPAP and attempting to get out of bed. Dialysis RN attempted to redirect patient and remind him that he needed to stay in bed while getting dialysis. ICU RN able to give PRN attivan once redirection stopped being effective. Patient became more calm and able to rest remainder of treatment. Around an hour into treatment patient began to have arterial pressure alarms and different interventions were tried to get CVC to flow adequately. Next BP reading revealed hypotension, likely the source of the AP alarms. UF was shut off and NS bolus given. NEWTON Rizzo notified of hypotension. An additional bolus given prior to team arriving. Patient unable to return to SBP of over 100 with interventions. NEWTON Rizzo and Dr. Mtz ordered to end treatment to prevent further hypotension and potential kidney injury. SBP improved post rinseback. Report given to LILLIAN Dey.     Plan:    Per Renal.

## 2022-11-09 NOTE — PROGRESS NOTES
[facility]    Cardiology Progress Note- Cardiology 2        Date of Admission:  11/7/2022     Assessment & Plan: HVSL   Jimmy Isaac is a 61 year old male admitted on 11/7/2022. He has a history significant for right heart failure 2/2 severe pulmonary hypertension in the setting of pulmonary sarcoidosis, CAD, CKD IV, HTN, chronic hypoxic respiratory failure on chronic home oxygen (baseline 10L at rest and increases to 15L upon exertion) and herpes zoster admitted for initiation of remodulin. He is also presenting with progressive shortness of breath and fluid overload over the past several weeks (dry weight ~152 lbs). He was transferred to the ICU on 11/8 with worsening hypercarbia and hyperkalemia, now on BIPAP, with plans to start dialysis on 11/8 PM.     Changes today:  - transferred to ICU  - initiated on BIPAP  - patient to start dialysis  - serial ABGs and BMPs  - DNR/DNI  - pause on remodulin initiation    #Acute hypercarbic and hypoxic respiratory failure  #Acute on chronic right heart failure 2/2 severe pulmonary hypertension in the setting of pulmonary sarcoidosis, Group 1  #Complete AV block s/p pacemaker insertion 8/21  Admitted for remodulin uptitration. Patient also has progressive SOB and weight gain. Is chronically dependent on home O2, up to 10L at rest in recent weeks. Is on triple therapy at home (ambrisentan, tadalafil, selexipag). Last RHC completed 6/22 with RA 16, /16, PA 67, PCWP 21, CO 3.5, CI 1.78, PVR 13.2. Worsening hypercarbia on 11/8 with combined respiratory and metabolic acidosis. Patient initiated on BIPAP and admitted to the ICU. Worsening hyperkalemia as well with minimal urine output; plan to initiate dialysis on 11/8. Patient's code status updated to DNR/DNI.   Primary cardiologist: Dr. Lindsey  Dry weight: 152 lbs  Weight on admission: 177 lbs  Fluid status: hypervolemic. Will discontinue IV diuretics with plan to  initiate dialysis on 11/8.   PH meds:     > will hold on remodulin initiation for now given patient's worsening respiratory and volume status     > continue selexipag 1600 BID     > continue PTA ambrisentan 10mg daily     > continue PTA tadalafil 40mg daily     > holding PTA chlorthalidone  Patient is on BIPAP.     #SISI on CKD Stage IV, GFR 22-28  #Combined respiratory and metabolic acidosis  #Hx of nephrolithiasis  Patient's baseline Cr appears to be 2.5-2.7. CKD in the setting of nephrolithiasis. Cr 5.59 on admission, likely in the setting of worsening cardiorenal syndrome.  Patient's pH 7.16 with CO2 initially 70, somewhat improved with BIPAP. HCO3 15. Respiratory acidosis in the setting of CO2 retention; metabolic acidosis in the setting of uremia.   - BIPAP  - initiating dialysis on 11/8  - avoid nephrotoxins  - renally dose medications    #Chronic normocytic anemia  HgB 10.3 on this admission. Baseline 12-13. Patient denies any active bleeding. Iron studies 10/22 consistent with mixed iron deficiency/anemia of chronic disease. Patient on ferrous gluconate.   - continue to monitor hemoglobins  - continue PTA ferrous gluconate      Diet:   2g sodium restriction with 2L fluid restriction  DVT Prophylaxis: Heparin SQ  Chong Catheter: Not present  Code Status:  DNR/DNI  Lines: dialysis line      Disposition Plan   Expected discharge: 4 - 7 days, recommended to prior living arrangement once patient stabilized..    The patient's care was discussed with the Attending Physician, Dr. Hills.    Lala Melara MD  [facility]    ______________________________________________________________________    Interval History   Patient with elevated potassium overnight to 6.4. VBG with pH of 7.16 and CO2 of 70. Patient initially shifted with insulin but repeat potassium levels remained elevated and patient making minimal urine. Patient switched from lasix to bumex gtt, still had  minimal urine output. Patient later placed on BIPAP with some improvement in CO2 but pH remained 7.15-7.16. Patient transferred to the ICU on BIPAP with dialysis line placed; plan to start dialysis this PM. IV diuresis and remodulin discontinued. Patient's fiance updated on his condition. Patient endorsing significant body pain on bumex drip. Denying fevers, chills, abdominal pain, vomiting. Patient's code status updated to DNR/DNI after discussion with the patient.     Data reviewed today: I reviewed all medications, new labs and imaging results over the last 24 hours.     Physical Exam   Vital Signs: Temp: (!) 96.5  F (35.8  C) Temp src: Axillary BP: 123/89 Pulse: 62   Resp: 15 SpO2: 94 % O2 Device: BiPAP/CPAP Oxygen Delivery: 14 LPM  Weight: 177 lbs 7.52 oz  General: patient thin and frail, appears in distress with accessory muscle use, BIPAP mask in place  HEENT: normocephalic, EOMI, PERRL, MMM  CV: regular rate and rhythm, normal S1 with pronounced S2 without S3 or S4. JVP ~14cm. 2+ lower extremity edema bilaterally to the knees  Resp: increased work of breathing with accessory muscle use on BIPAP. Decreased breath sounds in bases bilaterally  GI: abdomen distended but nontender. Normoactive bowel sounds  Integ: no wounds/rashes/lesions. Warm to the touch.  Neuro: AAOx3, no focal neuro deficits    Data   Recent Labs   Lab 11/08/22  1601 11/08/22  1251 11/08/22  1154 11/08/22  1115 11/08/22  0932 11/08/22  0921 11/08/22  0558 11/08/22  0549 11/08/22  0316 11/08/22  0022 11/07/22  2150 11/07/22  1558   WBC  --   --   --   --   --   --   --  10.2  --   --   --  9.7   HGB  --   --   --   --   --   --   --  10.0*  --   --   --  10.3*   MCV  --   --   --   --   --   --   --  92  --   --   --  91   PLT  --   --   --   --   --   --   --  179  --   --   --  169   INR  --   --  1.19*  --   --   --   --   --   --   --   --   --    NA  --   --  124*  --   --   --   --  128*  128*  --  127*   < > 130*   POTASSIUM 6.9*  --   6.7*  --   --  6.3*  --  5.8*  5.8*  5.8*  --  6.2*   < > 6.4*   CHLORIDE  --   --  88*  --   --   --   --  90*  90*  --  87*   < > 90*   CO2  --   --  16*  --   --   --   --  22  22  --  21*   < > 13*   BUN  --   --  140.0*  --   --   --   --  135.0*  135.0*  --  139.0*   < > 142.0*   CR  --   --  5.45*  --   --   --   --  5.48*  5.48*  --  5.37*   < > 5.59*   ANIONGAP  --   --  20*  --   --   --   --  16*  16*  --  19*   < > 27*   TERI  --   --  9.2  --   --   --   --  8.7*  8.7*  --  9.2   < > 9.2   GLC  --  130* 128* 129*   < >  --    < > 57*  57*   < > 107*   < > 82   ALBUMIN  --   --   --   --   --   --   --  3.8  --   --   --  3.8   PROTTOTAL  --   --   --   --   --   --   --  6.5  --   --   --  7.1   BILITOTAL  --   --   --   --   --   --   --  0.4  --   --   --  0.5   ALKPHOS  --   --   --   --   --   --   --  94  --   --   --  101   ALT  --   --   --   --   --   --   --  12  --   --   --  14   AST  --   --   --   --   --   --   --  21  --   --   --  33    < > = values in this interval not displayed.     Recent Results (from the past 24 hour(s))   XR Chest Port 1 View    Narrative    EXAM: XR CHEST PORT 1 VIEW  11/7/2022 6:55 PM      HISTORY: s/p PICC    COMPARISON: Chest x-ray 6/23/2022    FINDINGS: Portable semiupright AP radiograph of the chest. Left chest  wall pacemaker with leads projecting over the right atrium and right  ventricle. Right upper extremity PICC tip projects over the low SVC.  The trachea is midline. The cardiac silhouette is enlarged. The left  costophrenic angle is collimated out of the field of view, however  there is no large pleural effusion. Atherosclerotic calcification of  the aortic arch. No pneumothorax. Unchanged diffuse interstitial  opacities. Stable sclerotic lesion of the proximal right humerus.      Impression    IMPRESSION:   1. Right upper extremity PICC tip projects over the low SVC.  2. Stable cardiomegaly.  3. Diffuse interstitial opacities are similar to  prior, likely  pulmonary edema.    I have personally reviewed the examination and initial interpretation  and I agree with the findings.    SOUTH TROY MD         SYSTEM ID:  [id number]   XR Chest Port 1 View    Impression    RESIDENT PRELIMINARY INTERPRETATION  IMPRESSION:   1. Right IJ central venous catheter tip projects over the  SVC/brachiocephalic confluence.  2. Slightly decreased perihilar opacities.  3. Cardiomegaly.

## 2022-11-09 NOTE — PLAN OF CARE
Major Shift Events: Labile between lethargy and very restless and uncomfortable. In pain, given dilaudid x1 with improvement. BPs stable with V paced rhythm. BiPAP continuous at 14/5 60%. Feels breathing difficulty, tachypnea. Ph and CO2 with little improvement, trending VBGs. Nausea somewhat improved but not able to tolerate oral intake currently. Oliguric. HD took of 3L. Remodulin @ 2 going through R SL PICC. R dialysis cath. K increased, given insulin and dextrose.    Plan: goals of care conversation

## 2022-11-09 NOTE — PROGRESS NOTES
HEMODIALYSIS TREATMENT NOTE    Date: 11/8/2022  Time: 9:39 PM    Data:  Pre Wt:80.5kg     Desired Wt:77.5kg     Weight change: 3 kg  Ultrafiltration - Post Run Net Total Removed (mL):  3300ml  Vascular Access Status: patent  Dialyzer Rinse:Clear    Total Blood Volume Processed:  29.1 L  Total Dialysis (Treatment) Time: 2.5Hours    Lab:   No    Interventions:  Uneventful HD initiation, Pt completed 2.5hrs  of HD via right non-tunneled on K2/Ca2.5. Pt tolerated 3 kg net UF. CVC functions optimally, lumen locked with saline. Dressing changed. Site CDI. VS stable throughout HD. Post BP: 109/58. Report given to PCN     Plan:    Per renal team

## 2022-11-09 NOTE — PROGRESS NOTES
Nephrology Progress Note  11/09/2022       Jimmy Isaac is a 61 yom with hx of pHTN c/b R heart failure with chronic pulmonary sarcoid, CAD, CKD 4, HTN and Resp failure on home O2 who is admitted with fluid overload and progressive SOB.  Followed in CKD/fellows clinic where Cr usually is ~2.5 but now is 5.5 on admission.  Cardiology started bumex gtt but UOP has still been borderline oliguric today, also has hyperkalemia with K ranging from 5.8 to 6.7 since he was admitted.  Nephrology consulted for management of SISI oN CKD with hyperkalemia.     Jimmy Isaac is a 61 yom with hx of pHTN c/b R heart failure with chronic pulmonary sarcoid, CAD, CKD 4, HTN and Resp failure on home O2 who is admitted with fluid overload and progressive SOB.  Followed in CKD/fellows clinic where Cr usually is ~2.5 but now is 5.5 on admission.  Cardiology started bumex gtt but UOP has still been borderline oliguric today, also has hyperkalemia with K ranging from 5.8 to 6.7 since he was admitted.  Nephrology consulted for management of SISI oN CKD with hyperkalemia.       Interval History :   Mr Isaac had HD yesterday where we pulled 3L without issue, K and Na are still an issue today as we used low flow run.  Ran HD this am but did not tolerate UF at all so planning to switch to CRRT modality.  Using 2k baths and will try to pull 50-100cc/hr net negative, we are starting with lower dose CRRT to avoid correcting Na too quickly.  Discussed with family that we will continue to try to optimize volume status and continue remodulin initiation but his overall situation is tenuous.      Assessment & Recommendations:   SISI on CKD-Baseline Cr 2.5, has hx of kidney stones in addition to pHTN with R heart failure.  UOP has been oliguric despite bumex gtt and has hyperkalemia.  Given this we are initiating dialysis with short run today after transfer to ICU to facilitate closer monitoring of his resp status and line placement.  Etology of SISI is  unclear, based on exam it may be volume overload/hepatic congestion as abdomen is firm.  UA was bland with regards to blood/protein or casts and BP's are normal.  No new medications or NSAID's.                  -Correct resp acidosis as able with Bi-pap, has made himself DNR/DNI                  -SISI on CKD, unlikely to recover quickly given baseline, starting HD today for both hyperkalemia and volume removal.                 -Dialysis consent signed and scanned in under media.                  -HD today was not tolerated, changing to CRRT modality.  2k baths, 500-100cc/hr net negative.       Volume-Volume overloaded, is ~25# up from his usual wt but pulled 3L of UF yesterday, most of it is abdominal as his abdomen is tense.  Will try to remove fluid with run today and will need daily runs for some time to address.  With RV dysfx edema will not be a reliable marker of his intravascular volume (will need a higher preload)      Acidosis-  pH  7.16 with pCO2 72.  Combined respiratory and metabolic acidosis.  Correction of pCO2 to 50 with bipap has increased pH to 7.2.  Dialysis will further improve pH but will reduce respiratory drive.      Electrolytes-K 5.3, bicarb 22, starting CRRT.       Cardiac-Known R heart failure due to pHTN, on home O2, now admitted to start IV remodulin as triple PO therapy at home is now not sufficient.      BMD-Ca 9.3, Mg 2.5, Phos not checked.      Anemia-Hgb 10.5, no acute issue currently.       Nutrition-Deferred for now.  Suspect poor PO intake based on his family's report.      Time spent: 20 minutes on this date of encounter for chart review, physical exam, medical decision making and co-ordination of care.      Seen and discussed with Dr Mtz     Recommendations were communicated to primary team via verbal communication.      Matthias Don, ALLI CNS  Clinical Nurse Specialist  [phone]    Review of Systems:   I reviewed the following systems:  ROS not done due to vent/sedation.      Physical Exam:   I/O last 3 completed shifts:  In: 1100.2 [P.O.:290; I.V.:810.2]  Out: 3500 [Urine:500; Other:3000]   BP 95/47   Pulse 61   Temp 97.6  F (36.4  C) (Axillary)   Resp 15   Ht 1.829 m (6')   Wt 80.5 kg (177 lb 7.5 oz)   SpO2 100%   BMI 24.07 kg/m       GENERAL APPEARANCE: Interactive but slow to respond, mildly confused.    EYES: No scleral icterus  NECK:  No JVD  Pulmonary: lungs clear to auscultation with equal breath sounds bilaterally, no clubbing or cyanosis  CV: Regular rhythm, normal rate, no rub  Edema: +2 generalized.   GI: Firm, nontender, ++ distended.    MS: no evidence of inflammation in joints, no muscle tenderness  : No Chong  SKIN: no rash, warm, dry  NEURO: Lethargic, moving all extremities.      Labs:   All labs reviewed by me  Electrolytes/Renal - Recent Labs   Lab Test 11/09/22  1043 11/09/22  0844 11/09/22  0756 11/09/22  0446 11/08/22  2218 11/08/22  0558 11/08/22  0549 11/07/22  1558 10/05/22  1340 08/31/22  0959 07/11/22  1353   *  --   --  127* 126*   < > 128*  128*   < > 137   < > 137   POTASSIUM 5.3 4.9  --  6.1* 5.3   < > 5.8*  5.8*  5.8*   < > 4.8   < > 4.1   CHLORIDE 85*  --   --  88* 88*   < > 90*  90*   < > 101   < > 99   CO2 22  --   --  20* 19*   < > 22  22   < > 27   < > 30   .0*  --   --  104.0* 106.0*   < > 135.0*  135.0*   < > 59*   < > 74*   CR 5.04*  --   --  4.73* 4.43*   < > 5.48*  5.48*   < > 2.59*   < > 2.60*   *  --  148* 114* 165*   < > 57*  57*   < > 116*   < > 176*   TERI 9.3  --   --  9.0 9.3   < > 8.7*  8.7*   < > 9.3   < > 9.5   MAG  --   --   --  2.4* 2.2  --  2.5*   < >  --   --  2.1   PHOS  --   --   --   --  6.2*  --   --   --  3.4  --  3.6    < > = values in this interval not displayed.       CBC -   Recent Labs   Lab Test 11/09/22 0446 11/08/22 2218 11/08/22  0549   WBC 10.4 17.8* 10.2   HGB 10.5* 11.4* 10.0*   * 158 179       LFTs -   Recent Labs   Lab Test 11/09/22 0446 11/08/22 2218  11/08/22  0549   ALKPHOS 97 105 94   BILITOTAL 0.5 0.7 0.4   ALT 12 9* 12   AST 21 26 21   PROTTOTAL 6.6 7.2 6.5   ALBUMIN 3.9 4.0 3.8       Iron Panel -   Recent Labs   Lab Test 10/05/22  1340 12/15/21  1138 12/15/21  1138 07/01/21  1336   IRON 26*  --  28* 28*   IRONSAT 10*  --  9* 12*   MIHIR 51   < > 15* 33    < > = values in this interval not displayed.           Current Medications:    ambrisentan  10 mg Oral QAM     ferrous gluconate  324 mg Oral Daily with breakfast     fluticasone-vilanterol  1 puff Inhalation Daily     heparin ANTICOAGULANT  5,000 Units Subcutaneous Q12H     heparin lock flush  5-20 mL Intracatheter Q24H     polyethylene glycol  17 g Oral Daily     predniSONE  2.5 mg Oral Daily     selexipag  1,200 mcg Oral Once     [Held by provider] selexipag  1,600 mcg Oral Q12H     senna-docusate  1 tablet Oral At Bedtime     sodium chloride (PF)  10-40 mL Intracatheter Q8H     sodium chloride (PF)  10-40 mL Intracatheter Q8H     sodium chloride (PF)  3 mL Intracatheter Q8H     sodium zirconium cyclosilicate  10 g Oral TID     tadalafil  40 mg Oral Daily     umeclidinium  1 puff Inhalation Daily     Vitamin D3  25 mcg Oral Every Other Day       CRRT replacement solution       - MEDICATION INSTRUCTIONS -       - MEDICATION INSTRUCTIONS -       - MEDICATION INSTRUCTIONS -       - MEDICATION INSTRUCTIONS -       - MEDICATION INSTRUCTIONS -       CRRT replacement solution       CRRT replacement solution       treprostinil (REMODULIN) intravenous infusion (LESS than or EQUAL to 100 mcg/mL) 4 ng/kg/min (11/09/22 1300)

## 2022-11-10 NOTE — PLAN OF CARE
Major Shift Events: Disoriented, moving all extremities and sometimes following commands. PERRL, pupils 3 mm. Ventricular paced rhythm 60 bpms, normotensive, afebrile. Bipap today 50-60% fio2 15/4, hypercapnic and acidotic. No cough. Tachypnea and abdominal muscle use. No BM, NPO, except meds. Voiding spontaneously, oliguria- primofit active. Some redness/irritation on chest/back. R HD line, R PICC x1 lumen and PIV x2-SL. Romadulin stopped @ 1900. Declines pain. HD at bedside today- unsuccessful d/t hypotension and agitation, IV ativan given. Pt's family at bedside- fiance, daughter, son-in-law, sister and brother-in-law. Multiple discussions with care team regarding plan of care including cardiology and palliative.     Plan: Initiate CRRT if family does not decide to go comfort cares  For vital signs and complete assessments, please see documentation flowsheets.

## 2022-11-10 NOTE — PROGRESS NOTES
Called in, for emotional and spiritual support as patient is to be extubated.  prayed with patient and family as requested. Family appreciated the emotional and spiritual support. No follow up needed at this time.

## 2022-11-10 NOTE — PLAN OF CARE
Pt passed away @ 05:03, patient's family notified by provider team. Family notified that patient's phone, wallet, glasses, clothing, and home Remodulin pump remain with patient. Security transported pt and belongings (listed above) down to [facility] @ ~[id number].

## 2022-11-10 NOTE — DISCHARGE SUMMARY
[facility]  Discharge Summary - Medicine & Pediatrics       Date of Admission:  2022  Date of Discharge: 11/10/2022  Discharging Provider: Jimmy Okeefe  Discharge Service: Cardiology    Discharge Diagnoses     #Acute on chronic right heart failure  #Acute hypercarbic and hypoxic respiratory failure  #Pulmonary Hypertension  #Complete AV block  #End stage renal failure  #Combined respiratory and metabolic acidosis  #Hyperkalemia  #Chronic normocytic anemia    Discharge Disposition   Patient  during this admission  Condition at discharge:     Cause of Death: Heart Failure    Hospital Course     Jimmy Isaac was a 61 year old male with a history significant of heart failure secondary to pulmonary hypertension in the setting of pulmonary sarcoidosis, CAD, CKD IV, HTN, chronic hypoxic respiratory failure on chronic home oxygen and herpes zoster admitted for management of acute on chronic heart failure with his hospital course complicated by acute on chronic respiratory failure and acute kidney injury requiring hemodialysis. Given ongoing decline in renal and respiratory function, Jimmy and his family elected to transition to comfort cares with Jimmy ultimately passing at 05:03 on 11/10/22.    Consultations This Hospital Stay   NURSING TO CONSULT FOR VASCULAR ACCESS CARE IP CONSULT  VASCULAR ACCESS FOR PICC PLACEMENT ADULT IP CONSULT  VASCULAR ACCESS FOR PICC PLACEMENT ADULT IP CONSULT  PHARMACY IP CONSULT  PHARMACY IP CONSULT  NURSING TO CONSULT FOR VASCULAR ACCESS CARE IP CONSULT  NEPHROLOGY GENERAL ADULT IP CONSULT  PALLIATIVE CARE ADULT IP CONSULT  PHARMACY CRRT IP CONSULT  SOCIAL WORK IP CONSULT  CARE MANAGEMENT / SOCIAL WORK IP CONSULT  SPIRITUAL HEALTH SERVICES IP CONSULT  SMOKING CESSATION PROGRAM IP CONSULT    Code Status   No CPR- Do NOT Intubate       Patient care and death pronouncement communicated with Heart Failure staff.    Jimmy Okeefe,  MD  Cardiology 2 Heart Failure Service  Formerly [facility] [address] [address]  Phone: [phone]  ______________________________________________________________________    Physical Exam   Vital Signs: Temp: 98  F (36.7  C) Temp src: Axillary BP: 100/44 Pulse: 60   Resp: 23 SpO2: (!) 60 % O2 Device: Nasal cannula (oxi 8lpm) Oxygen Delivery: 2 LPM  Weight: 177 lbs 7.52 oz     General: Unresponsive to noxious stimuli  Respiratory: Spontaneous respirations absent, Breath sounds absent  Cardiac: Carotid pulse absent, Heart sounds absent  Head: Pupillary light reflex absent, Corneal blink reflex absent      Primary Care Physician   MAURICIO HEATON    Discharge Orders   No discharge procedures on file.    Significant Results and Procedures   Results for orders placed or performed during the hospital encounter of 11/07/22   XR Chest Port 1 View    Narrative    EXAM: XR CHEST PORT 1 VIEW  11/7/2022 6:55 PM      HISTORY: s/p PICC    COMPARISON: Chest x-ray 6/23/2022    FINDINGS: Portable semiupright AP radiograph of the chest. Left chest  wall pacemaker with leads projecting over the right atrium and right  ventricle. Right upper extremity PICC tip projects over the low SVC.  The trachea is midline. The cardiac silhouette is enlarged. The left  costophrenic angle is collimated out of the field of view, however  there is no large pleural effusion. Atherosclerotic calcification of  the aortic arch. No pneumothorax. Unchanged diffuse interstitial  opacities. Stable sclerotic lesion of the proximal right humerus.      Impression    IMPRESSION:   1. Right upper extremity PICC tip projects over the low SVC.  2. Stable cardiomegaly.  3. Diffuse interstitial opacities are similar to prior, likely  pulmonary edema.    I have personally reviewed the examination and initial interpretation  and I agree with the findings.    SOUTH TROY MD         SYSTEM ID:  [id number]   XR Chest  Port 1 View    Narrative    EXAM: XR CHEST PORT 1 VIEW  11/8/2022 5:43 PM      HISTORY: dialysis catheter placement    COMPARISON: Chest x-ray 11/7/2022    FINDINGS: Portable upright AP radiograph of the chest. Left chest wall  pacemaker with leads projecting over the right atrium and right  ventricle. New right IJ central venous catheter tip projects over the  SVC/brachiocephalic confluence. Right upper extremity PICC tip  projects over the low SVC. The trachea is midline. The cardiac  silhouette is enlarged. No pleural effusion or pneumothorax. Slightly  improved perihilar opacities.      Impression    IMPRESSION:   1. Right IJ central venous catheter tip projects over the  SVC/brachiocephalic confluence.  2. Slightly decreased perihilar opacities.  3. Cardiomegaly.    I have personally reviewed the examination and initial interpretation  and I agree with the findings.    ANN SOSA MD         SYSTEM ID:  [id number]   XR Abdomen Port 1 View    Narrative    EXAMINATION:  XR ABDOMEN PORT 1 VIEW 11/8/2022 8:52 PM     COMPARISON: Renal ultrasound 6/24/2022, CT 9/21/2021.    HISTORY: ABDOMINAL PAIN.    FINDINGS: Frontal view of the abdomen. No abnormally dilated loops of  bowel. Radiopaque right renal staghorn calculi and left  nephrolithiasis. Pelvic phleboliths. Unchanged avascular necrosis of  femeral heads.      Impression    IMPRESSION: Nonobstructive bowel gas pattern. Chronic right renal  staghorn calculi and left nephrolithiasis.    I have personally reviewed the examination and initial interpretation  and I agree with the findings.    ANN SOSA MD         SYSTEM ID:  [id number]       Discharge Medications   Current Discharge Medication List      CONTINUE these medications which have NOT CHANGED    Details   ambrisentan (LETAIRIS) 10 MG tablet Take 1 tablet (10 mg) by mouth every morning  Qty: 30 tablet, Refills: 11    Associated Diagnoses: Pulmonary hypertension (H)      chlorthalidone (HYGROTON) 25 MG  tablet Take 1 tablet (25 mg) by mouth daily  Qty: 30 tablet, Refills: 4    Associated Diagnoses: Stage 3b chronic kidney disease (H)      ferrous gluconate (FERGON) 324 (38 Fe) MG tablet Take 1 tablet (324 mg) by mouth daily (with breakfast) for 90 days  Qty: 30 tablet, Refills: 3    Associated Diagnoses: Iron deficiency anemia, unspecified iron deficiency anemia type      mometasone-formoterol (DULERA) 200-5 MCG/ACT inhaler Inhale 2 puffs into the lungs 2 times daily  Qty: 13 g, Refills: 11      potassium chloride ER (KLOR-CON M) 20 MEQ CR tablet Take 2 tablets (40 mEq) by mouth 2 times daily  Qty: 120 tablet, Refills: 0    Associated Diagnoses: Acute on chronic congestive heart failure, unspecified heart failure type (H)      predniSONE (DELTASONE) 5 MG tablet Take 1.5 tablets (7.5 mg) by mouth daily for 30 days, THEN 1 tablet (5 mg) daily for 30 days, THEN 0.5 tablets (2.5 mg) daily for 30 days.  Qty: 90 tablet, Refills: 0    Associated Diagnoses: Sarcoidosis      PROAIR  (90 Base) MCG/ACT inhaler Inhale 2 puffs into the lungs every 6 hours as needed for shortness of breath / dyspnea  Qty: 18 g, Refills: 11    Comments: Pharmacy may dispense brand covered by insurance (Proair, or proventil or ventolin or generic albuterol inhaler)  Associated Diagnoses: Pulmonary hypertension (H); Mixed hyperlipidemia      selexipag (UPTRAVI) 1600 MCG tablet Take 1 tablet (1,600 mcg) by mouth every 12 hours  Qty: 60 tablet, Refills: 11    Associated Diagnoses: Pulmonary hypertension (H)      tadalafil, PAH, (ALYQ) 20 MG TABS Take 2 tablets (40 mg) by mouth daily  Qty: 60 tablet, Refills: 11    Associated Diagnoses: Pulmonary hypertension (H)      tiotropium (SPIRIVA RESPIMAT) 2.5 MCG/ACT inhaler Inhale 2 puffs into the lungs daily  Qty: 30 g, Refills: 4    Associated Diagnoses: Sarcoidosis      torsemide (DEMADEX) 100 MG tablet Take 1 tablet (100 mg) by mouth 2 times daily  Qty: 180 tablet, Refills: 3    Associated  Diagnoses: Acute on chronic congestive heart failure, unspecified heart failure type (H)      treprostinil (REMODULIN) infusion 1 mg/mL Transition from Oral Uptravi to SubQ Remodulin via Remunity.  PA submitted.    Comments: Sub Q Remodulin (treprostinil) Goal of * ng/kg/min, with dose increase of * (Twice a week or weekly)  Associated Diagnoses: Pulmonary hypertension (H)      UNABLE TO FIND MEDICATION NAME: Home Oxygen, 5-10 L      Vitamin D3 (CHOLECALCIFEROL) 25 mcg (1000 units) tablet Take 1 tablet (25 mcg) by mouth every other day  Qty: 45 tablet, Refills: 3    Associated Diagnoses: Vitamin D deficiency           Allergies   Allergies   Allergen Reactions     Valacyclovir Rash     Diffuse body rash

## 2022-11-10 NOTE — DEATH PRONOUNCEMENT
MD DEATH PRONOUNCEMENT    Called to pronounce Jimmy Isaac dead.    Physical Exam: Unresponsive to noxious stimuli, Spontaneous respirations absent, Breath sounds absent, Carotid pulse absent, Heart sounds absent, Pupillary light reflex absent and Corneal blink reflex absent    Patient was pronounced dead at 5:03 AM, November 10, 2022.    Preliminary Cause of Death: Heart Failure    Principal Problem:    Pulmonary hypertension (H)     Infectious disease present?: NO    Communicable disease present? (examples: HIV, chicken pox, TB, Ebola, CJD) :  NO    Multi-drug resistant organism present? (example: MRSA): NO    Please consider an autopsy if any of the following exist:  NO Unexpected or unexplained death during or following any dental, medical, or surgical diagnostic treatment procedures.   NO Death of mother at or up to seven days after delivery.     NO All  and pediatric deaths.     NO Death where the cause is sufficiently obscure to delay completion of the death certificate.   NO Deaths in which autopsy would confirm a suspected illness/condition that would affect surviving family members or recipients of transplanted organs.     The following deaths must be reported to the 's Office:  NO A death that may be due entirely or in part to any factors other than natural disease (recent surgery, recent trauma, suspected abuse/neglect).   NO A death that may be an accident, suicide, or homicide.     NO Any sudden, unexpected death in which there is no prior history of significant heart disease or any other condition associated with sudden death.   NO A death under suspicious, unusual, or unexpected circumstances.    NO Any death which is apparently due to natural causes but in which the  does not have a personal physician familiar with the patient s medical history, social, or environmental situation or the circumstances of the terminal event.   NO Any death apparently due to Sudden Infant Death  Syndrome.     NO Deaths that occur during, in association with, or as consequences of a diagnostic, therapeutic, or anesthetic procedure.   NO Any death in which a fracture of a major bone has occurred within the past (6) six months.   NO A death of persons note seen by their physician within 120 days of demise.     NO Any death in which the  was an inmate of a public institution or was in the custody of Law Enforcement personnel.   NO  All unexpected deaths of children   NO Solid organ donors   NO Unidentified bodies   NO Deaths of persons whose bodies are to be cremated or otherwise disposed of so that the bodies will later be unavailable for examination;   NO Deaths unattended by a physician outside of a licensed healthcare facility or licensed residential hospice program   NO Deaths occurring within 24 hours of arrival to a health care facility if death is unexpected.    NO Deaths associated with the decedent s employment.   NO Deaths attributed to acts of terrorism.   NO Any death in which there is uncertainty as to whether it is a medical examiner s care should be discussed with the medical investigator.        Body disposition: Autopsy was discussed with family member:  Sister by phone.  Permission for autopsy was declined.

## 2022-11-14 LAB
BACTERIA BLD CULT: NO GROWTH
BACTERIA BLD CULT: NO GROWTH

## 2022-12-12 NOTE — TELEPHONE ENCOUNTER
Called patient and confirmed he has been unable to afford the ~$400 per month co-pay since early January.  He had contacted the [facility]ty [facility] who advised him they would help him get assistance, but they never got back to him.    Patient states he finally broke down and paid the large co-pay and restarted the med.  Today was his first day of re-starting it, and he only took 20mg since he's been off of it so long.    Reviewed prices on [employer] for this med which we found was $61/month for [employer] @ [facility] in [facility]. Patient agreed he would like to do that, so I sent coupon to him via e-mail and sent through erx to new pharmacy.  Asked patient to call with any questions or problems.  Patient verbalized understanding, agreed with plan and denied any further questions. Obdulia Rome RN on 2/25/2020 at 2:44 PM       PROGRESS NOTE    PATIENT: Marshall Romano  SEX: [unfilled]    : 1949  AGE: 73 yrs  ADMISSION DATE:  2022  DATE:  2022  CURRENT HOSPITAL DAY:  Hospital Day: 13  ATTENDING PHYSICIAN:  Nick Candelaria MD  CODE STATUS:  Full Resuscitation    SUBJECTIVE        OBJECTIVE:    VITAL SIGNS:     Vital Last Value    Temperature 98.4 °F (36.9 °C) (22)    Pulse 90 (22)    Respiratory 16 (22)    Non-Invasive  Blood Pressure (!) 144/78 (22)    Pulse Oximetry 100 % (22)      Vital Today Admitted   Weight 75.5 kg (166 lb 7.2 oz) (22 123) Weight: 86.1 kg (189 lb 13.1 oz) (22 135)   Height N/A Height: 6' 2\" (188 cm) (22 135)   BMI N/A BMI (Calculated): 24.37 (22 135)     INTAKE/OUTPUT:      Intake/Output Summary (Last 24 hours) at 2022  Last data filed at 2022 1800  Gross per 24 hour   Intake 750 ml   Output 225 ml   Net 525 ml           Lungs: Clear ,    Heart: S1, S2    Abdomen: Soft,    Extremities: No  edema           LABORATORY DATA:    Recent Labs   Lab 22  0704 12/10/22  0544 22  0556   WBC 22.7* 21.7* 23.9*   RBC 3.27* 3.34* 3.23*   HGB 9.0* 9.5* 9.1*   HCT 29.4* 30.2* 29.4*    445 442       Recent Labs   Lab 22  0549 12/10/22  0544 22  0556   SODIUM 137 137 137   POTASSIUM 4.1 3.8 3.6   CHLORIDE 103 104 98   CO2 * 22 21   BUN 70* 48* 73*   CREATININE 3.63* 3.01* 4.05*   GLUCOSE 131* 141* 129*   ALBUMIN 1.6* 1.9* 2.0*   PHOS 4.1 2.0* 3.4   AST 80* 87* 98*   BILIRUBIN 0.5 0.2 0.3       @ T4, and TSH@                          MEDICATIONS REVIEWED       Active Hospital Problems    Diagnosis    • Sepsis (CMS/HCC)                      ASSESSMENT/PLAN:      Left upper lobe pneumonia , leukocytosis improving, BC -ve , c diff -ve ,  High fever, re culture , on abx , ID sx is following    Dunmor at the ostomy site, were  Removed by surgery   ESRD on HD   NSTEMI , medical mx as he's  not a candidate for any intervention ,  Will add small dose b bk upon discharge                   Nick Candelaria MD  Internal Medicine  12/11/2022   9:29 PM

## 2023-03-29 NOTE — TELEPHONE ENCOUNTER
Rain Villafuerte RN Forrest, Rebecca J, RN Lhotka, Radha MASON, Rain Queen RN   Caller: Unspecified (1 week ago)   Maybe try one more time in 1-2 weeks. He is scheduled for follow up with Dr. Ghulam schreiber the end of the month.     Thanks for trying,   Radha     (checking on blood pressures)    Spoke to Jimmy who is doing well. Reports he has more energy, better appetite since starting prednisone.  His BP's range from the lowest on 1/7 of 115/72 to the highest at 143/71 on 1/6. His weights have fluctuated between 160-162 lbs. He is aware of his upcoming appointment and labs the end of January. Routed information to Radha Mixon.     1.53

## 2023-06-14 NOTE — CONSULTS
Cardiac Electrophysiology consultation      Reason For Consultation:      Complete heart block    HPI:    59 y/o gentleman seen in consultation out of concern for CHB during his RHC.    Hx significant for:  Severe pulmonary HTN - chronically on home O2, Letairis, Tadalafil and Selexipag  Pulmonary sarcoidosis  HTN  CKD  Nephrolithiasis s/p L nephrostomy on 7/9/21    Briefly seen while on the cath lab table. States he has been fatigued for about 1 month. Denies any episodes of syncope or presyncope, unexplained falls. Notes his SOB has been worse too. Please see assessment and plan as our interaction was brief.    Past Medical History:      Past Medical History:   Diagnosis Date     Chronic sinusitis      CKD (chronic kidney disease)      Heart disease      Hypertension      Keratosis     frontal scalp, treated with liquid nitrogen at [facility]     Malignant neoplasm (H)      Pneumonia a few times     Pulmonary sarcoidosis (H)        Past Surgical  History:      Past Surgical History:   Procedure Laterality Date     COLONOSCOPY       CV RIGHT HEART CATH MEASUREMENTS RECORDED N/A 6/19/2019    Procedure: CV RIGHT HEART CATH;  Surgeon: Kale Delgado MD;  Location:  HEART CARDIAC CATH LAB     CV RIGHT HEART CATH MEASUREMENTS RECORDED N/A 12/11/2019    Procedure: CV RIGHT HEART CATH;  Surgeon: Ion Lemon MD;  Location:  HEART CARDIAC CATH LAB     ENT SURGERY      wisdom teeth extraction     IR NEPHROSTOMY TUBE PLACEMENT LEFT  7/9/2021     TONSILLECTOMY  1964       Family History:      Family History   Problem Relation Age of Onset     Coronary Artery Disease Father      Heart Disease Father         heart attack age 35-40     Hypertension Mother      Hyperlipidemia Mother      Cerebrovascular Disease Mother      Dementia Mother      Lupus Sister      Glaucoma No family hx of      Macular Degeneration No family hx of        Social History:      Social History     Socioeconomic History      Marital status: Single     Spouse name: Not on file     Number of children: Not on file     Years of education: Not on file     Highest education level: Not on file   Occupational History     Not on file   Tobacco Use     Smoking status: Former Smoker     Packs/day: 1.00     Years: 30.00     Pack years: 30.00     Types: Cigarettes     Start date: 10/10/1975     Quit date: 3/1/2010     Years since quittin.4     Smokeless tobacco: Never Used   Substance and Sexual Activity     Alcohol use: Yes     Alcohol/week: 0.0 standard drinks     Comment: stated 2 bottles of beer occ     Drug use: Not Currently     Comment: past use of marijuana     Sexual activity: Not Currently     Partners: Female     Birth control/protection: Abstinence, Female Surgical   Other Topics Concern     Parent/sibling w/ CABG, MI or angioplasty before 65F 55M? Not Asked      Service Not Asked     Blood Transfusions Not Asked     Caffeine Concern Yes     Occupational Exposure Not Asked     Hobby Hazards Not Asked     Sleep Concern Not Asked     Stress Concern Not Asked     Weight Concern Not Asked     Special Diet Not Asked     Back Care Not Asked     Exercise Yes     Bike Helmet Not Asked     Seat Belt Not Asked     Self-Exams Not Asked   Social History Narrative     Not on file     Social Determinants of Health     Financial Resource Strain:      Difficulty of Paying Living Expenses:    Food Insecurity:      Worried About Running Out of Food in the Last Year:      Ran Out of Food in the Last Year:    Transportation Needs:      Lack of Transportation (Medical):      Lack of Transportation (Non-Medical):    Physical Activity:      Days of Exercise per Week:      Minutes of Exercise per Session:    Stress:      Feeling of Stress :    Social Connections:      Frequency of Communication with Friends and Family:      Frequency of Social Gatherings with Friends and Family:      Attends Zoroastrianism Services:      Active Member of Clubs or  "Organizations:      Attends Club or Organization Meetings:      Marital Status:    Intimate Partner Violence:      Fear of Current or Ex-Partner:      Emotionally Abused:      Physically Abused:      Sexually Abused:        ROS:    A complete review of systems is negative except as noted above in the HPI.    Physical Exam:   Vitals: BP 90/72   Pulse (!) 42   Resp 18   Ht 1.829 m (6' 0.01\")   Wt 75 kg (165 lb 5.5 oz)   SpO2 100%   BMI 22.42 kg/m    Gen: NAD   Neck: Not assessed  Chest: CTAB  Cardiovascular: Regular, maite 40s, no M/R/G   Abd: Soft, NT/ND  Neuro: Not assessed  Skin: Not assessed         Relevant labs, imaging, medications and procedures were reviewed.        Assessment and Recommendations:   59 y/o gentleman seen in consultation after being noted to be in CHB during an outpatient RHC. Hx as above. BP stable. He demonstrates an intact junctional escape and his symptoms have been ongoing for about 4 weeks. Digoxin was stopped 3 days ago after noted worsening renal function (prior dig level was wnl on 7/1/21). His last EKG in the system was 2012 prior to 7/27/21 but we managed to find a RHC tracing that demonstrated first degree AVB in 2019. His RHC results were not grossly different to to prior. It appears his symptoms of fatigue are attributable to his CHB.    #Complete heart block, symptomatic  -no indication for emergent temporary wire  -telemetry  -optimize electrolytes  -keep pacing pads on chest and atropine at bedside for symptomatic bradycardia  -NPO on Sunday for PPM on Monday (8/2/21) if his CHB persists   -obtain dig level  -if hemodynamics deteriorate, please contact the on call EP fellow    I saw and evaluated the patient with Dr. Dora Fontanez.    Vivek Bonilla MD   Cardiac electrophysiology fellow    " Chelle Vaughn

## 2024-01-23 NOTE — TELEPHONE ENCOUNTER
Detail Level: Zone Verbal given for medication refill AlyQ 40mg daily, 180 with 3 refills. Kassandra Westfall RN on 12/19/2019 at 2:20 PM     Sunscreen Recommendations: Favor blue nevus but subtle in appearance.  Photo taken.  Recheck in 3 months. Detail Level: Detailed Patient Specific Counseling (Will Not Stick From Patient To Patient): Rec Amlactin
